# Patient Record
Sex: MALE | Race: AMERICAN INDIAN OR ALASKA NATIVE | Employment: OTHER | URBAN - METROPOLITAN AREA
[De-identification: names, ages, dates, MRNs, and addresses within clinical notes are randomized per-mention and may not be internally consistent; named-entity substitution may affect disease eponyms.]

---

## 2017-01-04 ENCOUNTER — OFFICE VISIT (OUTPATIENT)
Dept: PAIN MANAGEMENT | Age: 67
End: 2017-01-04

## 2017-01-04 VITALS
BODY MASS INDEX: 34.67 KG/M2 | SYSTOLIC BLOOD PRESSURE: 133 MMHG | WEIGHT: 270 LBS | HEART RATE: 60 BPM | DIASTOLIC BLOOD PRESSURE: 89 MMHG

## 2017-01-04 DIAGNOSIS — M54.42 CHRONIC BILATERAL LOW BACK PAIN WITH BILATERAL SCIATICA: ICD-10-CM

## 2017-01-04 DIAGNOSIS — G89.29 CHRONIC BILATERAL LOW BACK PAIN WITH BILATERAL SCIATICA: ICD-10-CM

## 2017-01-04 DIAGNOSIS — M54.41 CHRONIC BILATERAL LOW BACK PAIN WITH BILATERAL SCIATICA: ICD-10-CM

## 2017-01-04 DIAGNOSIS — G60.9 IDIOPATHIC PERIPHERAL NEUROPATHY: ICD-10-CM

## 2017-01-04 DIAGNOSIS — M51.36 DDD (DEGENERATIVE DISC DISEASE), LUMBAR: Primary | ICD-10-CM

## 2017-01-04 DIAGNOSIS — Z98.890 H/O LUMBOSACRAL SPINE SURGERY: ICD-10-CM

## 2017-01-04 DIAGNOSIS — M47.896 OTHER OSTEOARTHRITIS OF SPINE, LUMBAR REGION: ICD-10-CM

## 2017-01-04 DIAGNOSIS — G89.4 CHRONIC PAIN SYNDROME: ICD-10-CM

## 2017-01-04 RX ORDER — OXYCODONE HCL 10 MG/1
10 TABLET, FILM COATED, EXTENDED RELEASE ORAL EVERY 8 HOURS
Qty: 90 TAB | Refills: 0 | Status: SHIPPED | OUTPATIENT
Start: 2017-01-20 | End: 2017-03-02 | Stop reason: SDUPTHER

## 2017-01-04 RX ORDER — OXYCODONE HCL 10 MG/1
10 TABLET, FILM COATED, EXTENDED RELEASE ORAL EVERY 8 HOURS
Qty: 90 TAB | Refills: 0 | Status: SHIPPED | OUTPATIENT
Start: 2017-02-19 | End: 2017-03-02 | Stop reason: SDUPTHER

## 2017-01-04 NOTE — PROGRESS NOTES
HISTORY OF PRESENT ILLNESS  Lety Samson is a 74 y.o. male. HPI Mr. Tiffany Romeo returns today for followup of chronic low back and leg pain s/p work-related injury and h/o multiple lumbar surgeries and interventional procedures, including L4-S1 fusion, ESIs, MB RFAs, and spinal cord stimulator trial. Good improvement with Tens unit. He continues unchanged since last visit. He was reporting some mild worsening low back pain during his last visit. We ordered a lumbar CT scan which was reviewed and discussed in office today. He has been through several lumbar surgeries and interventional procedures including PERNELL's in the past.  He does get good improvement with TENS unit currently. He continues using his hydrocodone very sparingly and has plenty of his medication left over today. I will have him follow-up with Dr. Karina Farris next visit for further evaluation and recommendation. Medication management consists of OxyContin 10 mg TID, Norco 5 bid prn, Lyrica 150 mg TID, and Cymbalta 60 mg/day. He continues using a TENS unit which he describes as the \"best thing\" for his pain. Medications are helping with pain control and quality of life. his pain is 5-6/10 with medication and 10/10 without. Pt describes pain as constant, aching, sharp, tingling and radiating. Aggravating factors include ADLs, while alleviated by medication and activity modification. Current treatment is helping to improve general activity, mood, walking, sleep, enjoyment of life     Pt does report constipation but is well controlled   he is otherwise doing well with no other complaints today. he denies any adverse events including nausea, vomiting, dizziness, increased constipation, hallucinations, or seizures. Medications were brought to visit today. Pill counts were appropriate    PRIOR IMAGIN.  Lumbar CT 2016: Moderate to severe canal narrowing at L1-L2 as result of facet arthropathy, broad-based disc bulge and epidural fat. Postoperative changes from L3-L4 through L5-S1 without evidence of complication. Allergies   Allergen Reactions    Ciprofloxacin Anaphylaxis    Other Plant, Animal, Environmental Other (comments)     Patient cannot have MRI. Patient states that he has metal screws in his left arm.  Lamisil [Terbinafine] Swelling     Tongue swelling    Metformin Other (comments)     Elevated cr 1.4    Morphine Other (comments)     \"loss of blood pressure\"    Other Medication Other (comments)     Doxasylin causes extreme GERD    Pcn [Penicillins] Rash    Pentothal [Thiopental Sodium] Shortness of Breath    Sulfa (Sulfonamide Antibiotics) Rash       Past Surgical History   Procedure Laterality Date    Hx lumbar fusion  1999     fusion from L4-S1 and implantation of hardware    Hx lumbar fusion  1984     fusion on L5 area    Hx orthopaedic Bilateral 2004     trigger finger syndrome-all 4 fingers    Hx orthopaedic Left 2004     left arm torn muscle repair and placement of 2 screws    Hx back surgery  2000     removal of defective hardware    Hx back surgery  1980     to remove bone fragments         Review of Systems   Constitutional: Positive for malaise/fatigue. Negative for chills and fever. HENT: Positive for hearing loss. Respiratory: Negative for cough and shortness of breath. Cardiovascular: Positive for leg swelling. Negative for chest pain and palpitations. Gastrointestinal: Positive for constipation. Negative for diarrhea, heartburn, nausea and vomiting. Musculoskeletal: Positive for back pain and joint pain. Negative for falls. Skin: Negative for rash. Neurological: Negative for dizziness. Psychiatric/Behavioral: Positive for depression. Negative for suicidal ideas. The patient is nervous/anxious and has insomnia. Physical Exam   Constitutional: He is oriented to person, place, and time. He appears well-developed and well-nourished. No distress.    HENT:   Head: Normocephalic and atraumatic. Pulmonary/Chest: Effort normal. No respiratory distress. Neurological: He is alert and oriented to person, place, and time. Skin: Skin is warm and dry. He is not diaphoretic. Psychiatric: He has a normal mood and affect. His speech is normal. Cognition and memory are normal.   Nursing note and vitals reviewed. ASSESSMENT:    1. DDD (degenerative disc disease), lumbar    2. Other osteoarthritis of spine, lumbar region    3. Chronic pain syndrome    4. Chronic bilateral low back pain with bilateral sciatica    5. H/O lumbosacral spine surgery    6. Idiopathic peripheral neuropathy         [place] Prescription Monitoring Program was reviewed which does not demonstrate aberrancies and/or inconsistencies with regard to the historical prescribing of controlled medications to this patient by other providers. PLAN / Pt Instructions:  1. Continue current plan with no evidence of addiction or diversion. Stable on current medication without adverse events. 2. Continue hydrocodone 5/325 mg up to 2 times a day as needed for pain. Currently uses very sparingly and has plenty of medication left over. 3. Continue Cymbalta 60 mg once daily. Has 2 refills left  4. Refill OxyContin 10 mg 3 times daily. 5. Continue Lyrica 150 mg capsule 3 times daily. Has 2 refills left  6. Discussed risks of addiction, dependency, and opioid induced hyperalgesia. 7. Return to clinic in 2 months with Dr. Jaun Donnelly for physician oversight visit. Medications Ordered Today   Medications    oxyCODONE ER (OXYCONTIN) 10 mg ER tablet     Sig: Take 1 Tab by mouth every eight (8) hours for 30 days. Max Daily Amount: 30 mg. Dispense:  90 Tab     Refill:  0    oxyCODONE ER (OXYCONTIN) 10 mg ER tablet     Sig: Take 1 Tab by mouth every eight (8) hours for 30 days. Max Daily Amount: 30 mg.      Dispense:  90 Tab     Refill:  0       Pain medications prescribed with the objective of pain relief and improved physical and psychosocial function in this patient. Spent 25 minutes with patient today reviewing the treatment plan, goals of treatment plan, and limitations of the treatment plan, to include the potential for side effects from medications and procedures. Jorja Crigler, [place] 1/4/2017     Note: Please excuse any typographical errors. Voice recognition software was used for this note and may cause mistakes.

## 2017-01-04 NOTE — PATIENT INSTRUCTIONS
1. Continue current plan with no evidence of addiction or diversion. Stable on current medication without adverse events. 2. Continue hydrocodone 5/325 mg up to 2 times a day as needed for pain. Currently uses very sparingly and has plenty of medication left over. 3. Continue Cymbalta 60 mg once daily. Has 2 refills left  4. Refill OxyContin 10 mg 3 times daily. 5. Continue Lyrica 150 mg capsule 3 times daily. Has 2 refills left  6. Discussed risks of addiction, dependency, and opioid induced hyperalgesia. 7. Return to clinic in 2 months with Dr. Hassan for physician oversight visit.

## 2017-01-04 NOTE — PROGRESS NOTES
Nursing Notes    Patient presents to the office today in follow-up. Patient rates his pain at 8/10 on the numerical pain scale. Reviewed medications with counts as follows:    Rx Date filled Qty Dispensed Pill Count Last Dose Short   norco 5/325mg 09/19/16 30 62 unknown No    oxycontin 10mg CR 12/21/16 90 52 This am  No                                     Comments:     POC UDS was not performed in office today    Any new labs or imaging since last appointment? NO    Have you been to an emergency room (ER) or urgent care clinic since your last visit? NO            Have you been hospitalized since your last visit? NO     If yes, where, when, and reason for visit? Have you seen or consulted any other health care providers outside of the [address] [place] [address]  since your last visit? YES     If yes, where, when, and reason for visit? Cardiology     HM deferred to pcp.

## 2017-01-04 NOTE — MR AVS SNAPSHOT
Visit Information Date & Time Provider Department Dept. Phone Encounter #  
 1/4/2017  9:40 AM PAUL Baker [facility] [phone] [id number] Follow-up Instructions Return in about 2 months (around 3/4/2017). Your Appointments 1/25/2017  9:30 AM  
ROUTINE CARE with Leeroy Anderson MD  
[facility] Appt Note: 6 mo fu  
 [address] Pending sale to [facility] [address] [facility] [address] [facility]  
  
    
 2/20/2017 10:20 AM  
Follow Up with Carmelita Mario MD  
[facility] ([facility]) Appt Note: 6 month follow up [place] [address] [address] [address]  
[phone] [place]  
  
    
 3/9/2017  9:15 AM  
Office Visit with Vangie Ocampo MD  
[facility]) Appt Note: check up [address] [facility] [address] [facility] [address]  
[phone] [address] [facility] [address] Upcoming Health Maintenance Date Due INFLUENZA AGE 9 TO ADULT 8/1/2016 HEMOGLOBIN A1C Q6M 10/14/2016 FOOT EXAM Q1 11/1/2016 MEDICARE YEARLY EXAM 4/21/2017 EYE EXAM RETINAL OR DILATED Q1 4/26/2017 MICROALBUMIN Q1 7/19/2017 LIPID PANEL Q1 7/19/2017 Pneumococcal 65+ Low/Medium Risk (2 of 2 - PPSV23) 7/20/2017 GLAUCOMA SCREENING Q2Y 4/26/2018 COLONOSCOPY 10/19/2023 DTaP/Tdap/Td series (2 - Td) 5/16/2026 Allergies as of 1/4/2017  Review Complete On: 1/4/2017 By: PAUL Baker Severity Noted Reaction Type Reactions Ciprofloxacin High 11/16/2013    Anaphylaxis Other Plant, Animal, Environmental High 03/10/2016    Other (comments) Patient cannot have MRI. Patient states that he has metal screws in his left arm. Lamisil [Terbinafine]  10/18/2013    Swelling Tongue swelling Metformin  03/16/2016    Other (comments) Elevated cr 1.4 Morphine  10/18/2013    Other (comments) \"loss of blood pressure\" Other Medication  10/18/2013    Other (comments) Doxasylin causes extreme GERD Pcn [Penicillins]  10/18/2013    Rash Pentothal [Thiopental Sodium]  10/18/2013    Shortness of Breath Sulfa (Sulfonamide Antibiotics)  10/18/2013    Rash Current Immunizations  Reviewed on 7/20/2016 Name Date Influenza Vaccine 11/3/2014 Influenza Vaccine (Quad) PF 9/16/2015 Pneumococcal Conjugate (PCV-13) 7/20/2016 Tdap 5/16/2016 Not reviewed this visit You Were Diagnosed With   
  
 Codes Comments DDD (degenerative disc disease), lumbar    -  Primary ICD-10-CM: M51.36 
ICD-9-CM: 722.52 Other osteoarthritis of spine, lumbar region     ICD-10-CM: M47.896 Chronic pain syndrome     ICD-10-CM: G89.4 ICD-9-CM: 338. 4 Chronic bilateral low back pain with bilateral sciatica     ICD-10-CM: M54.42, M54.41, G89.29 ICD-9-CM: 724.2, 724.3, 338.29   
 H/O lumbosacral spine surgery     ICD-10-CM: Z98.890 ICD-9-CM: V15.29 Idiopathic peripheral neuropathy     ICD-10-CM: G60.9 ICD-9-CM: 356.9 Vitals BP Pulse Weight(growth percentile) BMI Smoking Status 133/89 (BP 1 Location: Right arm, BP Patient Position: Sitting) 60 270 lb (122.5 kg) 34.67 kg/m2 Former Smoker Vitals History BMI and BSA Data Body Mass Index Body Surface Area  
 34.67 kg/m 2 2.53 m 2 Preferred Pharmacy Pharmacy Name Phone [facility] [address] [place] [address] [phone] Your Updated Medication List  
  
   
This list is accurate as of: 1/4/17 10:03 AM.  Always use your most recent med list.  
  
  
  
  
 ACULAR 0.5 % ophthalmic solution Generic drug:  ketorolac Administer 1 Drop to both eyes two (2) times a day. albuterol 2.5 mg /3 mL (0.083 %) nebulizer solution Commonly known as:  PROVENTIL VENTOLIN  
3 mL by Nebulization route every four (4) hours as needed for Wheezing or Shortness of Breath. atorvastatin 20 mg tablet Commonly known as:  LIPITOR Take 1 Tab by mouth daily. Azelastine 0.15 % (205.5 mcg) nasal spray Commonly known as:  ASTEPRO  
1 Spray by Both Nostrils route two (2) times daily as needed. budesonide-formoterol 160-4.5 mcg/actuation HFA inhaler Commonly known as:  SYMBICORT Take 2 Puffs by inhalation two (2) times a day. Dexlansoprazole 60 mg Cpdb Take 60 mg by mouth daily. fenofibrate nanocrystallized 145 mg tablet Commonly known as:  [employer] Take 1 Tab by mouth daily. FIORICET -40 mg per tablet Generic drug:  butalbital-acetaminophen-caffeine Take 0.5 Tabs by mouth daily as needed for Pain. fluticasone 50 mcg/actuation nasal spray Commonly known as:  FLONASE  
USE ONE SPRAY IN EACH NOSTRIL ONCE DAILY HYDROcodone-acetaminophen 5-325 mg per tablet Commonly known as:  Keagan Ill Take 1 Tab by mouth daily as needed for Pain. insulin glargine 100 unit/mL (3 mL) pen Commonly known as:  LANTUS SOLOSTAR  
30 units every night. Please dispense niddle for lantus solostart pen to use once daily . insulin glulisine 100 unit/mL pen Commonly known as:  APIDRA SOLOSTAR  
2 units per carb consumed Insulin Needles (Disposable) 31 gauge x 5/16\" Ndle Use one for lantus flexipen and 3 for apidra with each meal.  
  
 JANUVIA 50 mg tablet Generic drug:  SITagliptin TAKE ONE TABLET BY MOUTH ONCE DAILY  
  
 levothyroxine 150 mcg tablet Commonly known as:  SYNTHROID  
TAKE ONE TABLET BY MOUTH ONCE DAILY BEFORE BREAKFAST  
  
 lisinopril 5 mg tablet Commonly known as:  PRINIVIL, ZESTRIL  
TAKE 1 TABLET BY MOUTH ONCE DAILY  
  
 metoprolol tartrate 25 mg tablet Commonly known as:  LOPRESSOR  
 Take 1 Tab by mouth two (2) times a day. * montelukast 10 mg tablet Commonly known as:  SINGULAIR Take 1 Tab by mouth daily. * montelukast 10 mg tablet Commonly known as:  SINGULAIR  
TAKE ONE TABLET BY MOUTH ONCE DAILY  
  
 * oxyCODONE ER 10 mg ER tablet Commonly known as:  OxyCONTIN Take 1 Tab by mouth every eight (8) hours for 30 days. Max Daily Amount: 30 mg. Start taking on:  1/20/2017 * oxyCODONE ER 10 mg ER tablet Commonly known as:  OxyCONTIN Take 1 Tab by mouth every eight (8) hours for 30 days. Max Daily Amount: 30 mg. Start taking on:  2/19/2017  
  
 pregabalin 150 mg capsule Commonly known as:  Edman Speaker TAKE 1 CAPSULE BY MOUTH THREE TIMES DAILY FOR 30 DAYS. MAXIMUM 3 CAPSULES DAILY.  
  
 raNITIdine 150 mg tablet Commonly known as:  ZANTAC Take 1 Tab by mouth daily. tamsulosin 0.4 mg capsule Commonly known as:  FLOMAX Take 1 Cap by mouth two (2) times a day. tiotropium 18 mcg inhalation capsule Commonly known as:  Theotis Muss Take 1 Cap by inhalation daily. * Notice: This list has 4 medication(s) that are the same as other medications prescribed for you. Read the directions carefully, and ask your doctor or other care provider to review them with you. Prescriptions Printed Refills  
 oxyCODONE ER (OXYCONTIN) 10 mg ER tablet 0 Starting on: 1/20/2017 Sig: Take 1 Tab by mouth every eight (8) hours for 30 days. Max Daily Amount: 30 mg.  
 Class: Print Route: Oral  
 oxyCODONE ER (OXYCONTIN) 10 mg ER tablet 0 Starting on: 2/19/2017 Sig: Take 1 Tab by mouth every eight (8) hours for 30 days. Max Daily Amount: 30 mg.  
 Class: Print Route: Oral  
  
Follow-up Instructions Return in about 2 months (around 3/4/2017). Patient Instructions 1. Continue current plan with no evidence of addiction or diversion. Stable on current medication without adverse events. 2. Continue hydrocodone 5/325 mg up to 2 times a day as needed for pain. Currently uses very sparingly and has plenty of medication left over. 3. Continue Cymbalta 60 mg once daily. Has 2 refills left 4. Refill OxyContin 10 mg 3 times daily. 5. Continue Lyrica 150 mg capsule 3 times daily. Has 2 refills left 6. Discussed risks of addiction, dependency, and opioid induced hyperalgesia. 7. Return to clinic in 2 months with Dr. Eliz Clements for physician oversight visit. Introducing [facility]! [facility] introduces [employer] patient portal. Now you can access parts of your medical record, email your doctor's office, and request medication refills online. 1. In your internet browser, go to [email or web address] 2. Click on the First Time User? Click Here link in the Sign In box. You will see the New Member Sign Up page. 3. Enter your [employer] Access Code exactly as it appears below. You will not need to use this code after youve completed the sign-up process. If you do not sign up before the expiration date, you must request a new code. · [employer] Access Code: [email or web address] Expires: 2/7/2017 10:06 AM 
 
4. Enter the last four digits of your Social Security Number (xxxx) and Date of Birth (mm/dd/yyyy) as indicated and click Submit. You will be taken to the next sign-up page. 5. Create a [employer] ID. This will be your [employer] login ID and cannot be changed, so think of one that is secure and easy to remember. 6. Create a [employer] password. You can change your password at any time. 7. Enter your Password Reset Question and Answer. This can be used at a later time if you forget your password. 8. Enter your e-mail address. You will receive e-mail notification when new information is available in [address] 9. Click Sign Up. You can now view and download portions of your medical record.  
10. Click the Download Summary menu link to download a portable copy of your medical information. If you have questions, please visit the Frequently Asked Questions section of the [employer] website. Remember, [employer] is NOT to be used for urgent needs. For medical emergencies, dial 911. Now available from your iPhone and Android! Please provide this summary of care documentation to your next provider. Your primary care clinician is listed as Henrik Serna. If you have any questions after today's visit, please call [phone].

## 2017-01-06 DIAGNOSIS — E13.9 DIABETES 1.5, MANAGED AS TYPE 2 (HCC): ICD-10-CM

## 2017-01-06 RX ORDER — INSULIN GLARGINE 100 [IU]/ML
INJECTION, SOLUTION SUBCUTANEOUS
Qty: 3 EACH | Refills: 3 | Status: SHIPPED | OUTPATIENT
Start: 2017-01-06 | End: 2017-04-24 | Stop reason: ALTCHOICE

## 2017-01-19 ENCOUNTER — HOSPITAL ENCOUNTER (OUTPATIENT)
Dept: LAB | Age: 67
Discharge: HOME | End: 2017-01-19
Payer: MEDICARE

## 2017-01-19 LAB
ALBUMIN SERPL BCP-MCNC: 3.7 G/DL (ref 3.4–5)
ALBUMIN/GLOB SERPL: 1.3 {RATIO} (ref 0.8–1.7)
ALP SERPL-CCNC: 51 U/L (ref 45–117)
ALT SERPL-CCNC: 30 U/L (ref 16–61)
ANION GAP BLD CALC-SCNC: 9 MMOL/L (ref 3–18)
AST SERPL W P-5'-P-CCNC: 14 U/L (ref 15–37)
BILIRUB SERPL-MCNC: 0.3 MG/DL (ref 0.2–1)
BUN SERPL-MCNC: 20 MG/DL (ref 7–18)
BUN/CREAT SERPL: 19 (ref 12–20)
CALCIUM SERPL-MCNC: 8.9 MG/DL (ref 8.5–10.1)
CHLORIDE SERPL-SCNC: 100 MMOL/L (ref 100–108)
CHOLEST SERPL-MCNC: 158 MG/DL
CO2 SERPL-SCNC: 30 MMOL/L (ref 21–32)
CREAT SERPL-MCNC: 1.08 MG/DL (ref 0.6–1.3)
EST. AVERAGE GLUCOSE BLD GHB EST-MCNC: 203 MG/DL
GLOBULIN SER CALC-MCNC: 2.9 G/DL (ref 2–4)
GLUCOSE SERPL-MCNC: 198 MG/DL (ref 74–99)
HBA1C MFR BLD: 8.7 % (ref 4.2–5.6)
HDLC SERPL-MCNC: 33 MG/DL (ref 40–60)
HDLC SERPL: 4.8 {RATIO} (ref 0–5)
LDLC SERPL CALC-MCNC: 77 MG/DL (ref 0–100)
LIPID PROFILE,FLP: ABNORMAL
POTASSIUM SERPL-SCNC: 4.2 MMOL/L (ref 3.5–5.5)
PROT SERPL-MCNC: 6.6 G/DL (ref 6.4–8.2)
SODIUM SERPL-SCNC: 139 MMOL/L (ref 136–145)
TRIGL SERPL-MCNC: 240 MG/DL (ref ?–150)
TSH SERPL DL<=0.05 MIU/L-ACNC: 1.16 UIU/ML (ref 0.36–3.74)
VLDLC SERPL CALC-MCNC: 48 MG/DL

## 2017-01-19 PROCEDURE — 83036 HEMOGLOBIN GLYCOSYLATED A1C: CPT | Performed by: FAMILY MEDICINE

## 2017-01-19 PROCEDURE — 84443 ASSAY THYROID STIM HORMONE: CPT | Performed by: FAMILY MEDICINE

## 2017-01-19 PROCEDURE — 36415 COLL VENOUS BLD VENIPUNCTURE: CPT | Performed by: FAMILY MEDICINE

## 2017-01-19 PROCEDURE — 80061 LIPID PANEL: CPT | Performed by: FAMILY MEDICINE

## 2017-01-19 PROCEDURE — 80053 COMPREHEN METABOLIC PANEL: CPT | Performed by: FAMILY MEDICINE

## 2017-01-20 NOTE — PROGRESS NOTES
Let pt know that need to come in to discuss diabetes test. It has gone up. Need to make adjustment in treatment. Will discuss lab result during follow up visit. Thanks.

## 2017-01-25 ENCOUNTER — OFFICE VISIT (OUTPATIENT)
Dept: FAMILY MEDICINE CLINIC | Age: 67
End: 2017-01-25

## 2017-01-25 VITALS
WEIGHT: 271.8 LBS | OXYGEN SATURATION: 97 % | BODY MASS INDEX: 34.88 KG/M2 | HEIGHT: 74 IN | TEMPERATURE: 99 F | DIASTOLIC BLOOD PRESSURE: 74 MMHG | SYSTOLIC BLOOD PRESSURE: 120 MMHG | HEART RATE: 94 BPM | RESPIRATION RATE: 16 BRPM

## 2017-01-25 DIAGNOSIS — R25.1 SHAKINESS: ICD-10-CM

## 2017-01-25 DIAGNOSIS — E11.65 POORLY CONTROLLED DIABETES MELLITUS (HCC): Primary | ICD-10-CM

## 2017-01-25 DIAGNOSIS — R00.0 TACHYCARDIA: ICD-10-CM

## 2017-01-25 DIAGNOSIS — E78.1 HYPERTRIGLYCERIDEMIA: ICD-10-CM

## 2017-01-25 DIAGNOSIS — G43.909 MIGRAINE WITHOUT STATUS MIGRAINOSUS, NOT INTRACTABLE, UNSPECIFIED MIGRAINE TYPE: ICD-10-CM

## 2017-01-25 DIAGNOSIS — R13.10 DYSPHAGIA, UNSPECIFIED TYPE: ICD-10-CM

## 2017-01-25 DIAGNOSIS — J44.9 CHRONIC OBSTRUCTIVE PULMONARY DISEASE, UNSPECIFIED COPD TYPE (HCC): ICD-10-CM

## 2017-01-25 DIAGNOSIS — R79.89 LOW TSH LEVEL: ICD-10-CM

## 2017-01-25 DIAGNOSIS — Z23 ENCOUNTER FOR IMMUNIZATION: ICD-10-CM

## 2017-01-25 DIAGNOSIS — K21.9 GASTROESOPHAGEAL REFLUX DISEASE WITHOUT ESOPHAGITIS: ICD-10-CM

## 2017-01-25 RX ORDER — METOPROLOL TARTRATE 25 MG/1
25 TABLET, FILM COATED ORAL 2 TIMES DAILY
Qty: 180 TAB | Refills: 0 | Status: SHIPPED | OUTPATIENT
Start: 2017-01-25 | End: 2017-05-24 | Stop reason: SDUPTHER

## 2017-01-25 RX ORDER — LEVOTHYROXINE SODIUM 150 UG/1
90 TABLET ORAL
Qty: 90 TAB | Refills: 1 | Status: SHIPPED | OUTPATIENT
Start: 2017-01-25 | End: 2017-01-25

## 2017-01-25 RX ORDER — FENOFIBRATE 145 MG/1
145 TABLET, COATED ORAL DAILY
Qty: 90 TAB | Refills: 1 | Status: SHIPPED | OUTPATIENT
Start: 2017-01-25 | End: 2017-05-24 | Stop reason: SDUPTHER

## 2017-01-25 RX ORDER — LEVOTHYROXINE SODIUM 150 UG/1
150 TABLET ORAL
Qty: 90 TAB | Refills: 1 | Status: SHIPPED | OUTPATIENT
Start: 2017-01-25 | End: 2017-05-24 | Stop reason: SDUPTHER

## 2017-01-25 RX ORDER — LISINOPRIL 5 MG/1
5 TABLET ORAL DAILY
Qty: 90 TAB | Refills: 1 | Status: SHIPPED | OUTPATIENT
Start: 2017-01-25 | End: 2017-05-24 | Stop reason: SDUPTHER

## 2017-01-25 RX ORDER — ATORVASTATIN CALCIUM 20 MG/1
20 TABLET, FILM COATED ORAL DAILY
Qty: 90 TAB | Refills: 1 | Status: SHIPPED | OUTPATIENT
Start: 2017-01-25 | End: 2017-05-24 | Stop reason: SDUPTHER

## 2017-01-25 RX ORDER — ESOMEPRAZOLE MAGNESIUM 40 MG/1
40 CAPSULE, DELAYED RELEASE ORAL 2 TIMES DAILY
COMMUNITY
Start: 2016-11-14 | End: 2020-09-16

## 2017-01-25 RX ORDER — DULOXETIN HYDROCHLORIDE 60 MG/1
60 CAPSULE, DELAYED RELEASE ORAL
COMMUNITY
Start: 2016-12-27 | End: 2017-06-19

## 2017-01-25 RX ORDER — BUTALBITAL, ACETAMINOPHEN AND CAFFEINE 50; 325; 40 MG/1; MG/1; MG/1
0.5 TABLET ORAL
Qty: 10 TAB | Refills: 0 | Status: SHIPPED | OUTPATIENT
Start: 2017-01-25 | End: 2018-11-13

## 2017-01-25 RX ORDER — BUTALBITAL, ACETAMINOPHEN AND CAFFEINE 50; 325; 40 MG/1; MG/1; MG/1
0.5 TABLET ORAL
Status: CANCELLED | OUTPATIENT
Start: 2017-01-25

## 2017-01-25 RX ORDER — BETAMETHASONE DIPROPIONATE 0.5 MG/G
OINTMENT TOPICAL
COMMUNITY
Start: 2017-01-05 | End: 2022-09-14

## 2017-01-25 NOTE — PROGRESS NOTES
1. Have you been to the ER, urgent care clinic since your last visit? Hospitalized since your last visit? No    2. Have you seen or consulted any other health care providers outside of the [facility] since your last visit? Include any pap smears or colon screening. [facility], LOV: two weeks ago for dm foot exam.    Patient has not had flu vaccine; requests today if possible.

## 2017-01-25 NOTE — PROGRESS NOTES
Verbal order for Influenza vaccine    Patient given Influenza vaccine in L deltoid. Patient tolerated well. No adverse effects noted.     Lot [id number]  Exp 6/30/2017  Mtg [address]  [id number]

## 2017-01-25 NOTE — MR AVS SNAPSHOT
Visit Information Date & Time Provider Department Dept. Phone Encounter #  
 1/25/2017  9:30 AM Abigail Merchant, [address] [id number] Follow-up Instructions Return in about 1 month (around 2/25/2017). Your Appointments 2/20/2017 10:20 AM  
Follow Up with Bard Lucie MD  
[facility] ([facility]) Appt Note: 6 month follow up [place] [id number]  
[phone] [address] [address] [address]  
  
    
 3/2/2017  9:30 AM  
Follow Up with Adonay Nam MD  
[address] [facility]) Appt Note: return in 2 months [address] [facility] [address]  
[phone] [address]  
  
    
 3/9/2017  9:15 AM  
Office Visit with Nesha Ramos MD  
[facility]) Appt Note: check up [address] [facility] [address] [address]  
[phone] [address] [place] [id number] Upcoming Health Maintenance Date Due INFLUENZA AGE 9 TO ADULT 8/1/2016 FOOT EXAM Q1 11/1/2016 MEDICARE YEARLY EXAM 4/21/2017 EYE EXAM RETINAL OR DILATED Q1 4/26/2017 HEMOGLOBIN A1C Q6M 7/19/2017 MICROALBUMIN Q1 7/19/2017 Pneumococcal 65+ Low/Medium Risk (2 of 2 - PPSV23) 7/20/2017 LIPID PANEL Q1 1/19/2018 GLAUCOMA SCREENING Q2Y 4/26/2018 COLONOSCOPY 10/19/2023 DTaP/Tdap/Td series (2 - Td) 5/16/2026 Allergies as of 1/25/2017  Review Complete On: 1/25/2017 By: Abigail Merchant MD  
  
 Severity Noted Reaction Type Reactions Ciprofloxacin High 11/16/2013    Anaphylaxis Other Plant, Animal, Environmental High 03/10/2016    Other (comments) Patient cannot have MRI. Patient states that he has metal screws in his left arm. Lamisil [Terbinafine]  10/18/2013    Swelling Tongue swelling Metformin  03/16/2016    Other (comments) Elevated cr 1.4 Morphine  10/18/2013    Other (comments) \"loss of blood pressure\" Other Medication  10/18/2013    Other (comments) Doxasylin causes extreme GERD Pcn [Penicillins]  10/18/2013    Rash Pentothal [Thiopental Sodium]  10/18/2013    Shortness of Breath Sulfa (Sulfonamide Antibiotics)  10/18/2013    Rash Current Immunizations  Reviewed on 7/20/2016 Name Date Influenza Vaccine 11/3/2014 Influenza Vaccine (Quad) PF 1/25/2017, 9/16/2015 Pneumococcal Conjugate (PCV-13) 7/20/2016 Tdap 5/16/2016 Not reviewed this visit You Were Diagnosed With   
  
 Codes Comments Poorly controlled diabetes mellitus ([facility]    -  Primary ICD-10-CM: E11.65 ICD-9-CM: 250.00 Hypertriglyceridemia     ICD-10-CM: E78.1 ICD-9-CM: 272.1 Low TSH level     ICD-10-CM: R94.6 ICD-9-CM: 794.5 Shakiness     ICD-10-CM: R25.1 ICD-9-CM: 781.0 Tachycardia     ICD-10-CM: R00.0 ICD-9-CM: 529. 0 Chronic obstructive pulmonary disease, unspecified COPD type ([facility]     ICD-10-CM: J44.9 ICD-9-CM: 289 Gastroesophageal reflux disease without esophagitis     ICD-10-CM: K21.9 ICD-9-CM: 530.81 Dysphagia, unspecified type     ICD-10-CM: R13.10 ICD-9-CM: 787.20 Migraine without status migrainosus, not intractable, unspecified migraine type     ICD-10-CM: G43.909 ICD-9-CM: 346.90 Encounter for immunization     ICD-10-CM: N47 ICD-9-CM: V03.89 Vitals BP Pulse Temp Resp Height(growth percentile) Weight(growth percentile) 120/74 (BP 1 Location: Left arm, BP Patient Position: Sitting) 94 99 °F (37.2 °C) (Oral) 16 6' 2\" (1.88 m) 271 lb 12.8 oz (123.3 kg) SpO2 BMI Smoking Status 97% 34.9 kg/m2 Former Smoker Vitals History BMI and BSA Data Body Mass Index Body Surface Area  34.9 kg/m 2 2.54 m 2  
  
  
 Preferred Pharmacy Pharmacy Name Phone [facility] [address] [place], [address] [phone] Your Updated Medication List  
  
   
This list is accurate as of: 1/25/17 11:26 AM.  Always use your most recent med list.  
  
  
  
  
 ACULAR 0.5 % ophthalmic solution Generic drug:  ketorolac Administer 1 Drop to both eyes two (2) times a day. albuterol 2.5 mg /3 mL (0.083 %) nebulizer solution Commonly known as:  PROVENTIL VENTOLIN  
3 mL by Nebulization route every four (4) hours as needed for Wheezing or Shortness of Breath. atorvastatin 20 mg tablet Commonly known as:  LIPITOR Take 1 Tab by mouth daily. Azelastine 0.15 % (205.5 mcg) nasal spray Commonly known as:  ASTEPRO  
1 Spray by Both Nostrils route two (2) times daily as needed. betamethasone dipropionate 0.05 % ointment Commonly known as:  DIPROLENE  
  
 budesonide-formoterol 160-4.5 mcg/actuation HFA inhaler Commonly known as:  SYMBICORT Take 2 Puffs by inhalation two (2) times a day. butalbital-acetaminophen-caffeine -40 mg per tablet Commonly known as:  Laveda Forge Take 0.5 Tabs by mouth daily as needed for Pain. DULoxetine 60 mg capsule Commonly known as:  CYMBALTA Take 60 mg by mouth nightly. esomeprazole 40 mg capsule Commonly known as:  Leonette Chiquito Take 40 mg by mouth daily. fenofibrate nanocrystallized 145 mg tablet Commonly known as:  [employer] Take 1 Tab by mouth daily. fluticasone 50 mcg/actuation nasal spray Commonly known as:  FLONASE  
USE ONE SPRAY IN EACH NOSTRIL ONCE DAILY HYDROcodone-acetaminophen 5-325 mg per tablet Commonly known as:  Rolinda Doles Take 1 Tab by mouth daily as needed for Pain. insulin glulisine 100 unit/mL pen Commonly known as:  APIDRA SOLOSTAR  
2 units per carb consumed Insulin Needles (Disposable) 31 gauge x 5/16\" Ndle Use one for lantus flexipen and 3 for apidra with each meal.  
  
 LANTUS SOLOSTAR 100 unit/mL (3 mL) pen Generic drug:  insulin glargine INJECT 30 UNITS SUBCUTANEOUSLY EVERY NIGHT  
  
 levothyroxine 150 mcg tablet Commonly known as:  SYNTHROID Take 1 Tab by mouth Daily (before breakfast). lisinopril 5 mg tablet Commonly known as:  Heather Primmer Take 1 Tab by mouth daily. metoprolol tartrate 25 mg tablet Commonly known as:  LOPRESSOR Take 1 Tab by mouth two (2) times a day. * montelukast 10 mg tablet Commonly known as:  SINGULAIR Take 1 Tab by mouth daily. * montelukast 10 mg tablet Commonly known as:  SINGULAIR  
TAKE ONE TABLET BY MOUTH ONCE DAILY  
  
 * oxyCODONE ER 10 mg ER tablet Commonly known as:  OxyCONTIN Take 1 Tab by mouth every eight (8) hours for 30 days. Max Daily Amount: 30 mg.  
  
 * oxyCODONE ER 10 mg ER tablet Commonly known as:  OxyCONTIN Take 1 Tab by mouth every eight (8) hours for 30 days. Max Daily Amount: 30 mg. Start taking on:  2/19/2017  
  
 pregabalin 150 mg capsule Commonly known as:  Ban Crosser TAKE 1 CAPSULE BY MOUTH THREE TIMES DAILY FOR 30 DAYS. MAXIMUM 3 CAPSULES DAILY.  
  
 raNITIdine 150 mg tablet Commonly known as:  ZANTAC Take 1 Tab by mouth daily. SITagliptin 50 mg tablet Commonly known as:  Loc Oats Take 1 Tab by mouth daily. tamsulosin 0.4 mg capsule Commonly known as:  FLOMAX Take 1 Cap by mouth two (2) times a day. tiotropium 18 mcg inhalation capsule Commonly known as:  Lesley Meeter Take 1 Cap by inhalation daily. * Notice: This list has 4 medication(s) that are the same as other medications prescribed for you. Read the directions carefully, and ask your doctor or other care provider to review them with you. Prescriptions Printed  Refills  
 butalbital-acetaminophen-caffeine (FIORICET, ESGIC) -40 mg per tablet 0  
 Sig: Take 0.5 Tabs by mouth daily as needed for Pain. Class: Print Route: Oral  
  
Prescriptions Sent to Pharmacy Refills  
 atorvastatin (LIPITOR) 20 mg tablet 1 Sig: Take 1 Tab by mouth daily. Class: Normal  
 Pharmacy: [address] [place] [address] [address] [address] Ph #: [phone] Route: Oral  
 fenofibrate nanocrystallized (TRICOR) 145 mg tablet 1 Sig: Take 1 Tab by mouth daily. Class: Normal  
 Pharmacy: [address] [place] [address] [address] [address] Ph #: [phone] Route: Oral  
 SITagliptin (JANUVIA) 50 mg tablet 1 Sig: Take 1 Tab by mouth daily. Class: Normal  
 Pharmacy: [address] [place] [address] [address] Ph #: [phone] Route: Oral  
 lisinopril (PRINIVIL, ZESTRIL) 5 mg tablet 1 Sig: Take 1 Tab by mouth daily. Class: Normal  
 Pharmacy: [address] [place] [address] [address] [address] Ph #: [phone] Route: Oral  
 metoprolol tartrate (LOPRESSOR) 25 mg tablet 0 Sig: Take 1 Tab by mouth two (2) times a day. Class: Normal  
 Pharmacy: [address] [place] [address] [address] [address] Ph #: [phone] Route: Oral  
 levothyroxine (SYNTHROID) 150 mcg tablet 1 Sig: Take 1 Tab by mouth Daily (before breakfast). Class: Normal  
 Pharmacy: [address] [place] [address] [address] [address] Ph #: [phone] Route: Oral  
  
We Performed the Following INFLUENZA VIRUS VAC QUAD,SPLIT,PRESV FREE SYRINGE 3/> YRS IM [id number] CPT(R)] REFERRAL TO ENDOCRINOLOGY [AMT08 Custom] Comments:  
 Please evaluate patient for poorly controlled diabetes Follow-up Instructions Return in about 1 month (around 2/25/2017). Referral Information Referral ID Referred By Referred To  
  
 [id number] [facility] R Not Available Visits Status Start Date End Date 1 New Request 1/25/17 1/25/18 If your referral has a status of pending review or denied, additional information will be sent to support the outcome of this decision. Patient Instructions Chronic Obstructive Pulmonary Disease (COPD): Care Instructions Your Care Instructions Chronic obstructive pulmonary disease (COPD) is a general term for a group of lung diseases, including emphysema and chronic bronchitis. People with COPD have decreased airflow in and out of the lungs, which makes it hard to breathe. The airways also can get clogged with thick mucus. Cigarette smoking is a major cause of COPD. Although there is no cure for COPD, you can slow its progress. Following your treatment plan and taking care of yourself can help you feel better and live longer. Follow-up care is a key part of your treatment and safety. Be sure to make and go to all appointments, and call your doctor if you are having problems. It's also a good idea to know your test results and keep a list of the medicines you take. How can you care for yourself at home? Staying healthy · Do not smoke. This is the most important step you can take to prevent more damage to your lungs. If you need help quitting, talk to your doctor about stop-smoking programs and medicines. These can increase your chances of quitting for good. · Avoid colds and flu. Get a pneumococcal vaccine shot. If you have had one before, ask your doctor whether you need a second dose. Get the flu vaccine every fall. If you must be around people with colds or the flu, wash your hands often. · Avoid secondhand smoke, air pollution, and high altitudes. Also avoid cold, dry air and hot, humid air. Stay at home with your windows closed when air pollution is bad. Medicines and oxygen therapy · Take your medicines exactly as prescribed. Call your doctor if you think you are having a problem with your medicine. · You may be taking medicines such as: ¨ Bronchodilators. These help open your airways and make breathing easier. Bronchodilators are either short-acting (work for 6 to 9 hours) or long-acting (work for 24 hours). You inhale most bronchodilators, so they start to act quickly. Always carry your quick-relief inhaler with you in case you need it while you are away from home. ¨ Corticosteroids (prednisone, budesonide). These reduce airway inflammation. They come in pill or inhaled form. You must take these medicines every day for them to work well. · A spacer may help you get more inhaled medicine to your lungs. Ask your doctor or pharmacist if a spacer is right for you. If it is, ask how to use it properly. · Do not take any vitamins, over-the-counter medicine, or herbal products without talking to your doctor first. 
· If your doctor prescribed antibiotics, take them as directed. Do not stop taking them just because you feel better. You need to take the full course of antibiotics. · Oxygen therapy boosts the amount of oxygen in your blood and helps you breathe easier. Use the flow rate your doctor has recommended, and do not change it without talking to your doctor first. 
Activity · Get regular exercise. Walking is an easy way to get exercise. Start out slowly, and walk a little more each day. · Pay attention to your breathing. You are exercising too hard if you cannot talk while you are exercising. · Take short rest breaks when doing household chores and other activities. · Learn breathing methodssuch as breathing through pursed lipsto help you become less short of breath. · If your doctor has not set you up with a pulmonary rehabilitation program, talk to him or her about whether rehab is right for you. Rehab includes exercise programs, education about your disease and how to manage it, help with diet and other changes, and emotional support. Diet · Eat regular, healthy meals.  Use bronchodilators about 1 hour before you eat to make it easier to eat. Eat several small meals instead of three large ones. Drink beverages at the end of the meal. Avoid foods that are hard to chew. · Eat foods that contain protein so that you do not lose muscle mass. Mental health · Talk to your family, friends, or a therapist about your feelings. It is normal to feel frightened, angry, hopeless, helpless, and even guilty. Talking openly about bad feelings can help you cope. If these feelings last, talk to your doctor. When should you call for help? Call 911 anytime you think you may need emergency care. For example, call if: 
· You have severe trouble breathing. Call your doctor now or seek immediate medical care if: 
· You have new or worse trouble breathing. · You cough up blood. · You have a fever. Watch closely for changes in your health, and be sure to contact your doctor if: 
· You cough more deeply or more often, especially if you notice more mucus or a change in the color of your mucus. · You have new or worse swelling in your legs or belly. · You are not getting better as expected. Where can you learn more? Go to http://slava-enrique.info/. Maddy Franz in the search box to learn more about \"Chronic Obstructive Pulmonary Disease (COPD): Care Instructions. \" Current as of: May 23, 2016 Content Version: 11.1 © 9128-8245 [employer]. Care instructions adapted under license by [employer] (which disclaims liability or warranty for this information). If you have questions about a medical condition or this instruction, always ask your healthcare professional. [place] [address] any warranty or liability for your use of this information. Gastroesophageal Reflux Disease (GERD): Care Instructions Your Care Instructions Gastroesophageal reflux disease (GERD) is the backward flow of stomach acid into the esophagus.  The esophagus is the tube that leads from your throat to your stomach. A one-way valve prevents the stomach acid from moving up into this tube. When you have GERD, this valve does not close tightly enough. If you have mild GERD symptoms including heartburn, you may be able to control the problem with antacids or over-the-counter medicine. Changing your diet, losing weight, and making other lifestyle changes can also help reduce symptoms. Follow-up care is a key part of your treatment and safety. Be sure to make and go to all appointments, and call your doctor if you are having problems. Its also a good idea to know your test results and keep a list of the medicines you take. How can you care for yourself at home? · Take your medicines exactly as prescribed. Call your doctor if you think you are having a problem with your medicine. · Your doctor may recommend over-the-counter medicine. For mild or occasional indigestion, antacids, such as Tums, Gaviscon, Mylanta, or Maalox, may help. Your doctor also may recommend over-the-counter acid reducers, such as Pepcid AC, Tagamet HB, Zantac 75, or Prilosec. Read and follow all instructions on the label. If you use these medicines often, talk with your doctor. · Change your eating habits. ¨ Its best to eat several small meals instead of two or three large meals. ¨ After you eat, wait 2 to 3 hours before you lie down. ¨ Chocolate, mint, and alcohol can make GERD worse. ¨ Spicy foods, foods that have a lot of acid (like tomatoes and oranges), and coffee can make GERD symptoms worse in some people. If your symptoms are worse after you eat a certain food, you may want to stop eating that food to see if your symptoms get better. · Do not smoke or chew tobacco. Smoking can make GERD worse. If you need help quitting, talk to your doctor about stop-smoking programs and medicines. These can increase your chances of quitting for good.  
· If you have GERD symptoms at night, raise the head of your bed 6 to 8 inches by putting the frame on blocks or placing a foam wedge under the head of your mattress. (Adding extra pillows does not work.) · Do not wear tight clothing around your middle. · Lose weight if you need to. Losing just 5 to 10 pounds can help. When should you call for help? Call your doctor now or seek immediate medical care if: 
· You have new or different belly pain. · Your stools are black and tarlike or have streaks of blood. Watch closely for changes in your health, and be sure to contact your doctor if: 
· Your symptoms have not improved after 2 days. · Food seems to catch in your throat or chest. 
Where can you learn more? Go to http://slava-enrique.info/. Enter W025 in the search box to learn more about \"Gastroesophageal Reflux Disease (GERD): Care Instructions. \" Current as of: August 9, 2016 Content Version: 11.1 © 9045-6069 [employer]. Care instructions adapted under license by [employer] (which disclaims liability or warranty for this information). If you have questions about a medical condition or this instruction, always ask your healthcare professional. [place] [address] any warranty or liability for your use of this information. Learning About Low-Fat Eating What is low-fat eating? Most food has some fat in it. Your body needs some fat to be healthy. But some kinds of fats are healthier than others. In a low-fat eating plan, you try to choose healthier fats and eat fewer unhealthy fats. Healthy fats include olive and canola oil. Try to avoid eating too much saturated fat (such as in cheese and meats) and trans fat (a type of fat found in many packaged snack foods and other baked goods). You do not need to cut all fat from your diet. But you can make healthier choices about the types and amount of fat you eat.  
Even though it is a good idea to choose healthier fats, it is still important to be careful of how much fat you eat, because all fats are high in calories. What are the different types of fats? Unhealthy fats · Saturated fat. Saturated fats are mostly in animal foods, such as meat and dairy foods. Tropical oils, such as coconut oil, palm oil, and cocoa butter, are also saturated fats. · Trans fat. Trans fats include shortening, partially hydrogenated vegetable oils, and hydrogenated vegetable oils. Trans fats are made when a liquid fat is turned into a solid fat (for example, when corn oil is made into stick margarine). They are in many processed foods, such as cookies, crackers, and snack foods. Healthy fats · Monounsaturated fat. Monounsaturated fats are liquid at room temperature but get solid when refrigerated. Eating foods that are high in this fat may help lower your \"bad\" (LDL) cholesterol, keep your \"good\" (HDL) cholesterol level up, and lower your chances of getting heart disease. This fat is found in canola oil, olive oil, peanut oil, olives, avocados, nuts, and nut butters. · Polyunsaturated fat. Polyunsaturated fats are liquid at room temperature. They are in safflower, sunflower, and corn oils. They are also the main fat in seafood. Omega-3 fatty acids are types of polyunsaturated fat. Omega-3 fatty acids may lower your chances of getting heart disease. Fatty fish such as salmon and mackerel contain these healthy fatty acids. So do ground flaxseeds and flaxseed oil, soybeans, walnuts, and seeds. Why cut down on unhealthy fats? Eating foods that contain saturated fats can raise the LDL (\"bad\") cholesterol in your blood. Having a high level of LDL cholesterol increases your chance of hardening of the arteries (atherosclerosis), which can lead to heart disease, heart attack, and stroke. Trans fat raises the level of \"bad\" LDL cholesterol in your blood and may lower the \"good\" HDL cholesterol in your blood.  Trans fat can raise your risk of heart disease, heart attack, and stroke. In general: · No more than 10% of your daily calories should come from saturated fat. This is about 20 grams in a 2,000-calorie diet. · No more than 10% of your daily calories should come from polyunsaturated fat. This is about 20 grams in a 2,000-calorie diet. · Monounsaturated fats can be up to 15% of your daily calories. This is about 25 to 30 grams in a 2,000-calorie diet. If you're not sure how much fat you should be eating or how many calories you need each day to stay at a healthy weight, talk to a registered dietitian. He or she can help you create a plan that's right for you. What can you do to cut down on fat? Foods like cheese, butter, sausage, and desserts can have a lot of unhealthy fats. Try these tips for healthier meals at home and when you eat out. At home · Fill up on fruits, vegetables, and whole grains. · Think of meat as a side dish instead of as the main part of your meal. 
· When you do eat meat, make it extra-lean ground beef (97% lean), ground turkey breast (without skin added), meats with fat trimmed off before cooking, or skinless chicken. · Try main dishes that use whole wheat pasta, brown rice, dried beans, or vegetables. · Use cooking methods that use little or no fat, such as broiling, steaming, or grilling. Use cooking spray instead of oil. If you use oil, use a monounsaturated oil, such as canola or olive oil. · Read food labels on canned, bottled, or packaged foods. Choose those with little saturated fat and no trans fat. When eating out at a restaurant · Order foods that are broiled or poached instead of fried or breaded. · Cut back on the amount of butter or margarine that you use on bread. Use small amounts of olive oil instead. · Order sauces, gravies, and salad dressings on the side, and use only a little. · When you order pasta, choose tomato sauce instead of cream sauce. · Ask for salsa with your baked potato instead of sour cream, butter, cheese, or kohler. Where can you learn more? Go to http://slava-enrique.info/. Enter T206 in the search box to learn more about \"Learning About Low-Fat Eating. \" Current as of: July 26, 2016 Content Version: 11.1 © 8838-3271 [employer]. Care instructions adapted under license by [employer] (which disclaims liability or warranty for this information). If you have questions about a medical condition or this instruction, always ask your healthcare professional. [address] any warranty or liability for your use of this information. Introducing [facility]! Radha Landa introduces FiberLight patient portal. Now you can access parts of your medical record, email your doctor's office, and request medication refills online. 1. In your internet browser, go to [email or web address] 2. Click on the First Time User? Click Here link in the Sign In box. You will see the New Member Sign Up page. 3. Enter your FiberLight Access Code exactly as it appears below. You will not need to use this code after youve completed the sign-up process. If you do not sign up before the expiration date, you must request a new code. · FiberLight Access Code: [email or web address] Expires: 2/7/2017 10:06 AM 
 
4. Enter the last four digits of your Social Security Number (xxxx) and Date of Birth (mm/dd/yyyy) as indicated and click Submit. You will be taken to the next sign-up page. 5. Create a 3Nodt ID. This will be your FiberLight login ID and cannot be changed, so think of one that is secure and easy to remember. 6. Create a [employer] password. You can change your password at any time. 7. Enter your Password Reset Question and Answer. This can be used at a later time if you forget your password. 8. Enter your e-mail address.  You will receive e-mail notification when new information is available in [employer]. 9. Click Sign Up. You can now view and download portions of your medical record. 10. Click the Download Summary menu link to download a portable copy of your medical information. If you have questions, please visit the Frequently Asked Questions section of the [employer] website. Remember, [employer] is NOT to be used for urgent needs. For medical emergencies, dial 911. Now available from your iPhone and Android! Please provide this summary of care documentation to your next provider. Your primary care clinician is listed as Renita Yoo. If you have any questions after today's visit, please call [phone].

## 2017-01-25 NOTE — PATIENT INSTRUCTIONS
Chronic Obstructive Pulmonary Disease (COPD): Care Instructions  Your Care Instructions    Chronic obstructive pulmonary disease (COPD) is a general term for a group of lung diseases, including emphysema and chronic bronchitis. People with COPD have decreased airflow in and out of the lungs, which makes it hard to breathe. The airways also can get clogged with thick mucus. Cigarette smoking is a major cause of COPD. Although there is no cure for COPD, you can slow its progress. Following your treatment plan and taking care of yourself can help you feel better and live longer. Follow-up care is a key part of your treatment and safety. Be sure to make and go to all appointments, and call your doctor if you are having problems. It's also a good idea to know your test results and keep a list of the medicines you take. How can you care for yourself at home? Staying healthy  · Do not smoke. This is the most important step you can take to prevent more damage to your lungs. If you need help quitting, talk to your doctor about stop-smoking programs and medicines. These can increase your chances of quitting for good. · Avoid colds and flu. Get a pneumococcal vaccine shot. If you have had one before, ask your doctor whether you need a second dose. Get the flu vaccine every fall. If you must be around people with colds or the flu, wash your hands often. · Avoid secondhand smoke, air pollution, and high altitudes. Also avoid cold, dry air and hot, humid air. Stay at home with your windows closed when air pollution is bad. Medicines and oxygen therapy  · Take your medicines exactly as prescribed. Call your doctor if you think you are having a problem with your medicine. · You may be taking medicines such as:  ¨ Bronchodilators. These help open your airways and make breathing easier. Bronchodilators are either short-acting (work for 6 to 9 hours) or long-acting (work for 24 hours).  You inhale most bronchodilators, so they start to act quickly. Always carry your quick-relief inhaler with you in case you need it while you are away from home. ¨ Corticosteroids (prednisone, budesonide). These reduce airway inflammation. They come in pill or inhaled form. You must take these medicines every day for them to work well. · A spacer may help you get more inhaled medicine to your lungs. Ask your doctor or pharmacist if a spacer is right for you. If it is, ask how to use it properly. · Do not take any vitamins, over-the-counter medicine, or herbal products without talking to your doctor first.  · If your doctor prescribed antibiotics, take them as directed. Do not stop taking them just because you feel better. You need to take the full course of antibiotics. · Oxygen therapy boosts the amount of oxygen in your blood and helps you breathe easier. Use the flow rate your doctor has recommended, and do not change it without talking to your doctor first.  Activity  · Get regular exercise. Walking is an easy way to get exercise. Start out slowly, and walk a little more each day. · Pay attention to your breathing. You are exercising too hard if you cannot talk while you are exercising. · Take short rest breaks when doing household chores and other activities. · Learn breathing methods--such as breathing through pursed lips--to help you become less short of breath. · If your doctor has not set you up with a pulmonary rehabilitation program, talk to him or her about whether rehab is right for you. Rehab includes exercise programs, education about your disease and how to manage it, help with diet and other changes, and emotional support. Diet  · Eat regular, healthy meals. Use bronchodilators about 1 hour before you eat to make it easier to eat. Eat several small meals instead of three large ones. Drink beverages at the end of the meal. Avoid foods that are hard to chew.   · Eat foods that contain protein so that you do not lose muscle mass.  Mental health  · Talk to your family, friends, or a therapist about your feelings. It is normal to feel frightened, angry, hopeless, helpless, and even guilty. Talking openly about bad feelings can help you cope. If these feelings last, talk to your doctor. When should you call for help? Call 911 anytime you think you may need emergency care. For example, call if:  · You have severe trouble breathing. Call your doctor now or seek immediate medical care if:  · You have new or worse trouble breathing. · You cough up blood. · You have a fever. Watch closely for changes in your health, and be sure to contact your doctor if:  · You cough more deeply or more often, especially if you notice more mucus or a change in the color of your mucus. · You have new or worse swelling in your legs or belly. · You are not getting better as expected. Where can you learn more? Go to http://slavaOpexa Therapeuticsenrique.info/. Derrek Henriquez in the search box to learn more about \"Chronic Obstructive Pulmonary Disease (COPD): Care Instructions. \"  Current as of: May 23, 2016  Content Version: 11.1  © 0030-7662 [employer]. Care instructions adapted under license by [employer] (which disclaims liability or warranty for this information). If you have questions about a medical condition or this instruction, always ask your healthcare professional. [place] [address] any warranty or liability for your use of this information. Gastroesophageal Reflux Disease (GERD): Care Instructions  Your Care Instructions    Gastroesophageal reflux disease (GERD) is the backward flow of stomach acid into the esophagus. The esophagus is the tube that leads from your throat to your stomach. A one-way valve prevents the stomach acid from moving up into this tube. When you have GERD, this valve does not close tightly enough.   If you have mild GERD symptoms including heartburn, you may be able to control the problem with antacids or over-the-counter medicine. Changing your diet, losing weight, and making other lifestyle changes can also help reduce symptoms. Follow-up care is a key part of your treatment and safety. Be sure to make and go to all appointments, and call your doctor if you are having problems. Its also a good idea to know your test results and keep a list of the medicines you take. How can you care for yourself at home? · Take your medicines exactly as prescribed. Call your doctor if you think you are having a problem with your medicine. · Your doctor may recommend over-the-counter medicine. For mild or occasional indigestion, antacids, such as Tums, Gaviscon, Mylanta, or Maalox, may help. Your doctor also may recommend over-the-counter acid reducers, such as Pepcid AC, Tagamet HB, Zantac 75, or Prilosec. Read and follow all instructions on the label. If you use these medicines often, talk with your doctor. · Change your eating habits. ¨ Its best to eat several small meals instead of two or three large meals. ¨ After you eat, wait 2 to 3 hours before you lie down. ¨ Chocolate, mint, and alcohol can make GERD worse. ¨ Spicy foods, foods that have a lot of acid (like tomatoes and oranges), and coffee can make GERD symptoms worse in some people. If your symptoms are worse after you eat a certain food, you may want to stop eating that food to see if your symptoms get better. · Do not smoke or chew tobacco. Smoking can make GERD worse. If you need help quitting, talk to your doctor about stop-smoking programs and medicines. These can increase your chances of quitting for good. · If you have GERD symptoms at night, raise the head of your bed 6 to 8 inches by putting the frame on blocks or placing a foam wedge under the head of your mattress. (Adding extra pillows does not work.)  · Do not wear tight clothing around your middle. · Lose weight if you need to.  Losing just 5 to 10 pounds can help. When should you call for help? Call your doctor now or seek immediate medical care if:  · You have new or different belly pain. · Your stools are black and tarlike or have streaks of blood. Watch closely for changes in your health, and be sure to contact your doctor if:  · Your symptoms have not improved after 2 days. · Food seems to catch in your throat or chest.  Where can you learn more? Go to http://slava-enrique.info/. Enter G941 in the search box to learn more about \"Gastroesophageal Reflux Disease (GERD): Care Instructions. \"  Current as of: August 9, 2016  Content Version: 11.1  © 3599-8617 [employer]. Care instructions adapted under license by [employer] (which disclaims liability or warranty for this information). If you have questions about a medical condition or this instruction, always ask your healthcare professional. [place] [address] any warranty or liability for your use of this information. Learning About Low-Fat Eating  What is low-fat eating? Most food has some fat in it. Your body needs some fat to be healthy. But some kinds of fats are healthier than others. In a low-fat eating plan, you try to choose healthier fats and eat fewer unhealthy fats. Healthy fats include olive and canola oil. Try to avoid eating too much saturated fat (such as in cheese and meats) and trans fat (a type of fat found in many packaged snack foods and other baked goods). You do not need to cut all fat from your diet. But you can make healthier choices about the types and amount of fat you eat. Even though it is a good idea to choose healthier fats, it is still important to be careful of how much fat you eat, because all fats are high in calories. What are the different types of fats? Unhealthy fats  · Saturated fat. Saturated fats are mostly in animal foods, such as meat and dairy foods.  Tropical oils, such as coconut oil, palm oil, and cocoa butter, are also saturated fats. · Trans fat. Trans fats include shortening, partially hydrogenated vegetable oils, and hydrogenated vegetable oils. Trans fats are made when a liquid fat is turned into a solid fat (for example, when corn oil is made into stick margarine). They are in many processed foods, such as cookies, crackers, and snack foods. Healthy fats  · Monounsaturated fat. Monounsaturated fats are liquid at room temperature but get solid when refrigerated. Eating foods that are high in this fat may help lower your \"bad\" (LDL) cholesterol, keep your \"good\" (HDL) cholesterol level up, and lower your chances of getting heart disease. This fat is found in canola oil, olive oil, peanut oil, olives, avocados, nuts, and nut butters. · Polyunsaturated fat. Polyunsaturated fats are liquid at room temperature. They are in safflower, sunflower, and corn oils. They are also the main fat in seafood. Omega-3 fatty acids are types of polyunsaturated fat. Omega-3 fatty acids may lower your chances of getting heart disease. Fatty fish such as salmon and mackerel contain these healthy fatty acids. So do ground flaxseeds and flaxseed oil, soybeans, walnuts, and seeds. Why cut down on unhealthy fats? Eating foods that contain saturated fats can raise the LDL (\"bad\") cholesterol in your blood. Having a high level of LDL cholesterol increases your chance of hardening of the arteries (atherosclerosis), which can lead to heart disease, heart attack, and stroke. Trans fat raises the level of \"bad\" LDL cholesterol in your blood and may lower the \"good\" HDL cholesterol in your blood. Trans fat can raise your risk of heart disease, heart attack, and stroke. In general:  · No more than 10% of your daily calories should come from saturated fat. This is about 20 grams in a 2,000-calorie diet. · No more than 10% of your daily calories should come from polyunsaturated fat.  This is about 20 grams in a 2,000-calorie diet.  · Monounsaturated fats can be up to 15% of your daily calories. This is about 25 to 30 grams in a 2,000-calorie diet. If you're not sure how much fat you should be eating or how many calories you need each day to stay at a healthy weight, talk to a registered dietitian. He or she can help you create a plan that's right for you. What can you do to cut down on fat? Foods like cheese, butter, sausage, and desserts can have a lot of unhealthy fats. Try these tips for healthier meals at home and when you eat out. At home  · Fill up on fruits, vegetables, and whole grains. · Think of meat as a side dish instead of as the main part of your meal.  · When you do eat meat, make it extra-lean ground beef (97% lean), ground turkey breast (without skin added), meats with fat trimmed off before cooking, or skinless chicken. · Try main dishes that use whole wheat pasta, brown rice, dried beans, or vegetables. · Use cooking methods that use little or no fat, such as broiling, steaming, or grilling. Use cooking spray instead of oil. If you use oil, use a monounsaturated oil, such as canola or olive oil. · Read food labels on canned, bottled, or packaged foods. Choose those with little saturated fat and no trans fat. When eating out at a restaurant  · Order foods that are broiled or poached instead of fried or breaded. · Cut back on the amount of butter or margarine that you use on bread. Use small amounts of olive oil instead. · Order sauces, gravies, and salad dressings on the side, and use only a little. · When you order pasta, choose tomato sauce instead of cream sauce. · Ask for salsa with your baked potato instead of sour cream, butter, cheese, or kohler. Where can you learn more? Go to http://slava-enrique.info/. Enter M967 in the search box to learn more about \"Learning About Low-Fat Eating. \"  Current as of: July 26, 2016  Content Version: 11.1  © 0472-2904 [employer]. Care instructions adapted under license by [employer] (which disclaims liability or warranty for this information). If you have questions about a medical condition or this instruction, always ask your healthcare professional. [address] any warranty or liability for your use of this information.

## 2017-01-25 NOTE — PROGRESS NOTES
HISTORY OF PRESENT ILLNESS  Mabel Crowley is a 74 y.o. male. HPI; here for routine follow up. Multiple medical problem. Noted worsening of diabetes. Was at goal but now HBA1C recent was 8.7. Said his blood sugar fasting is around 200 sometimes. Went up to lantus around 45 units. Said not much change in diet. Has gained some weight. On Ladonna Donna since last visit as had to take him off from metformin due to renal insufficiency. No hypoglycemia. Suppose to take apidra if high sugar but said not been taking it lately. Denies any chest pain or sob. No dizziness. On and off migraine headaches and taking fiorinef helps. Need to take it rarely as has prescription left since 2015. Now still has medication but wants to get a new prescription. Last episode of migraine was yesterday night. Has h/o GERD. On PPI. Had EGD done showed gastritis. Now coming up appt next week with GI. Said sometimes dysphagia with pills only. No chocking episode. No nausea or vomiting. Does have habit of eating spicy food. Trying to change that. No abdominal pain. H/o hypertriglyceridemia. On fibrate and statin. Taking both with compliance. Denies any muscle ache or leg cramps. Recent labs showed elevated triglyceride. i have discussed with him diet modification. Discussed with him that need to modify diet as worsening of diabetes, triglyceride and gaining weight. Has seen nutritionist in the past and not interested to go back again. Limitation in doing exercise due to chronic lower back pain and neck pain. Worsening of lower back pain since fall around domenic. Seen pain management and on conservative management. Said something is not right as pain is progressively getting worse. Discuss to send a message to Dr. Junior Feldman and he will also follow up from his side if no improvement in pain in couple of weeks. Has coming up appt in march with him. H/o copd. Said need to use nebulizer at bedtime often as feeling post nasal drip.  Since change in weather feeling it. No wheezing or cough at this time. No chest pain or sob. Taking his inhalers as he suppose to. Had h/o tachycardia. Asymptomatic. No palpitation. No diaphoresis. On medication. No concern at this time. H/o hypothyroidism. Recent lab showed thyroid function wnl. Compliance with medication. Kalpesh Kyle he has gained some weight. No appetite changes. No mood changes. Review labs with him. Lab Results   Component Value Date/Time    Cholesterol, total 158 01/19/2017 09:21 AM    HDL Cholesterol 33 01/19/2017 09:21 AM    LDL, calculated 77 01/19/2017 09:21 AM    VLDL, calculated 48 01/19/2017 09:21 AM    Triglyceride 240 01/19/2017 09:21 AM    CHOL/HDL Ratio 4.8 01/19/2017 09:21 AM     Lab Results   Component Value Date/Time    Sodium 139 01/19/2017 09:21 AM    Potassium 4.2 01/19/2017 09:21 AM    Chloride 100 01/19/2017 09:21 AM    CO2 30 01/19/2017 09:21 AM    Anion gap 9 01/19/2017 09:21 AM    Glucose 198 01/19/2017 09:21 AM    BUN 20 01/19/2017 09:21 AM    Creatinine 1.08 01/19/2017 09:21 AM    BUN/Creatinine ratio 19 01/19/2017 09:21 AM    GFR est AA >60 01/19/2017 09:21 AM    GFR est non-AA >60 01/19/2017 09:21 AM    Calcium 8.9 01/19/2017 09:21 AM    Bilirubin, total 0.3 01/19/2017 09:21 AM    ALT 30 01/19/2017 09:21 AM    AST 14 01/19/2017 09:21 AM    Alk. phosphatase 51 01/19/2017 09:21 AM    Protein, total 6.6 01/19/2017 09:21 AM    Albumin 3.7 01/19/2017 09:21 AM    Globulin 2.9 01/19/2017 09:21 AM    A-G Ratio 1.3 01/19/2017 09:21 AM     Lab Results   Component Value Date/Time    TSH 1.16 01/19/2017 09:21 AM     Lab Results   Component Value Date/Time    Hemoglobin A1c 8.7 01/19/2017 09:21 AM    Hemoglobin A1c (POC) 6.9 12/16/2015 10:29 AM     Lab Results   Component Value Date/Time    Microalbumin/Creat ratio (mg/g creat)  07/19/2016 07:19 AM     Cannot calculate ratio due to micro albumin result outside reportable range.     Microalb/Creat ratio (ug/mg creat.) 6.8 03/10/2015 12:00 AM Microalbumin,urine random <0.50 07/19/2016 07:19 AM    Microalbumin, urine 4.9 03/10/2015 12:00 AM     Lab Results   Component Value Date/Time    WBC 7.8 03/11/2015 10:43 AM    HGB 13.9 03/11/2015 10:43 AM    HCT 40.5 03/11/2015 10:43 AM    PLATELET 865 94/05/5372 10:43 AM    MCV 82 03/11/2015 10:43 AM         ROS: see HPI     Physical Exam   Constitutional: He is oriented to person, place, and time. No distress. Neck: No thyromegaly present. Cardiovascular: Normal rate, regular rhythm and normal heart sounds. Pulmonary/Chest: No respiratory distress. He has no wheezes. CTA   Abdominal: Soft. Bowel sounds are normal. There is no tenderness. Musculoskeletal: He exhibits no edema. Lymphadenopathy:     He has no cervical adenopathy. Neurological: He is oriented to person, place, and time. Psychiatric: His behavior is normal.       ASSESSMENT and PLAN    ICD-10-CM ICD-9-CM    1. Poorly controlled diabetes mellitus ([facility] [address]): elevated HBA1C. Discussed diet modification. Limitation in exercise due to chronic back pain. For now will send him to endocrinology. Also he has been not taking his apidra with meal according to sliding scale. He will continue latus to 48 units with apidra according to sliding scale. Will follow endocrine recommendations from here. Advised him to keep blood sugar log and bring it to them. Will f/u next visit. E11.65 250.00 REFERRAL TO ENDOCRINOLOGY   2. Hypertriglyceridemia: continue statin and gemfibrozil. Diet modification addressed. E78.1 272.1 fenofibrate nanocrystallized (TRICOR) 145 mg tablet   3. Low TSH level: recent labs TSh wnl. Gaining some weight. Could be related to diet habit. Will continue current plan and f/u next visit. R94.6 794.5 REFERRAL TO ENDOCRINOLOGY      levothyroxine (SYNTHROID) 150 mcg tablet      DISCONTINUED: levothyroxine (SYNTHROID) 150 mcg tablet   4. Shakiness/ hand : stable. R25.1 781.0 metoprolol tartrate (LOPRESSOR) 25 mg tablet   5.  Tachycardia: stable on beta blocker  R00.0 785.0 metoprolol tartrate (LOPRESSOR) 25 mg tablet   6. Chronic obstructive pulmonary disease, unspecified COPD type ([facility] [address]): stable. As needed use of nebulizer. Post nasal drip. Will observe. J44.9 496    7. Gastroesophageal reflux disease : per EGD gastritis. On PPI. Some dysphagia with pills . Has coming up appt in a week with GI specialist he will f/u with them  K21.9 530.81    8. Dysphagia, unspecified type R13.10 787.20    9. Migraine without status migrainosus, not intractable, unspecified migraine type: as needed fioricet. G43.909 346.90 butalbital-acetaminophen-caffeine (FIORICET, ESGIC) -40 mg per tablet   10. Encounter for immunization Z23 V03.89 INFLUENZA VIRUS VAC QUAD,SPLIT,PRESV FREE SYRINGE 3/> YRS IM   Pt understood and agrees with above plan. Review    Follow-up Disposition:  Return in about 1 month (around 2/25/2017).

## 2017-01-26 DIAGNOSIS — R35.1 BPH ASSOCIATED WITH NOCTURIA: ICD-10-CM

## 2017-01-26 DIAGNOSIS — E13.9 DIABETES 1.5, MANAGED AS TYPE 2 (HCC): ICD-10-CM

## 2017-01-26 DIAGNOSIS — N40.1 BPH ASSOCIATED WITH NOCTURIA: ICD-10-CM

## 2017-01-26 RX ORDER — TAMSULOSIN HYDROCHLORIDE 0.4 MG/1
CAPSULE ORAL
Qty: 180 CAP | Refills: 0 | Status: SHIPPED | OUTPATIENT
Start: 2017-01-26 | End: 2017-02-20 | Stop reason: SDUPTHER

## 2017-01-27 RX ORDER — SYRINGE-NEEDLE,INSULIN,0.5 ML 30 GX5/16"
SYRINGE, EMPTY DISPOSABLE MISCELLANEOUS
Qty: 1 PACKAGE | Refills: 3 | Status: SHIPPED | OUTPATIENT
Start: 2017-01-27

## 2017-02-07 ENCOUNTER — HOSPITAL ENCOUNTER (OUTPATIENT)
Dept: GENERAL RADIOLOGY | Age: 67
Discharge: HOME | End: 2017-02-07
Attending: INTERNAL MEDICINE
Payer: MEDICARE

## 2017-02-07 DIAGNOSIS — K21.9 ESOPHAGEAL REFLUX: ICD-10-CM

## 2017-02-07 PROCEDURE — 74220 X-RAY XM ESOPHAGUS 1CNTRST: CPT

## 2017-02-07 PROCEDURE — 74011000250 HC RX REV CODE- 250: Performed by: INTERNAL MEDICINE

## 2017-02-07 PROCEDURE — 74011000255 HC RX REV CODE- 255: Performed by: INTERNAL MEDICINE

## 2017-02-07 RX ADMIN — ANTACID/ANTIFLATULENT 4 G: 380; 550; 10; 10 GRANULE, EFFERVESCENT ORAL at 08:00

## 2017-02-07 RX ADMIN — BARIUM SULFATE 95 ML: 980 POWDER, FOR SUSPENSION ORAL at 08:00

## 2017-02-07 RX ADMIN — BARIUM SULFATE 700 MG: 700 TABLET ORAL at 08:00

## 2017-02-07 RX ADMIN — BARIUM SULFATE 35 G: 960 POWDER, FOR SUSPENSION ORAL at 08:00

## 2017-02-10 ENCOUNTER — TELEPHONE (OUTPATIENT)
Dept: FAMILY MEDICINE CLINIC | Age: 67
End: 2017-02-10

## 2017-02-10 NOTE — TELEPHONE ENCOUNTER
Spoke with patient (all identifiers verified) to advise of upcoming endocrinology appointment with Dr. Verenice Krause on 3/10/17 at 2:00 p.m. He was asked to arrive 30 minutes prior to the appointment and bring in photo ID, insurance cards, medication list and completed new patient packet mailed by specialist. Patient verbalized understanding. He was given the address and phone number to specialist office [phone] [place], [address] [address]; [phone]). Office notes and labs have been faxed to Dr. Luz Doherty. A fax confirmation has been received.

## 2017-02-20 ENCOUNTER — OFFICE VISIT (OUTPATIENT)
Dept: CARDIOLOGY CLINIC | Age: 67
End: 2017-02-20

## 2017-02-20 VITALS
BODY MASS INDEX: 35.16 KG/M2 | HEIGHT: 74 IN | OXYGEN SATURATION: 97 % | SYSTOLIC BLOOD PRESSURE: 128 MMHG | WEIGHT: 274 LBS | DIASTOLIC BLOOD PRESSURE: 60 MMHG | HEART RATE: 89 BPM

## 2017-02-20 DIAGNOSIS — E13.9 DIABETES 1.5, MANAGED AS TYPE 2 (HCC): ICD-10-CM

## 2017-02-20 DIAGNOSIS — E78.5 HYPERLIPIDEMIA, UNSPECIFIED HYPERLIPIDEMIA TYPE: Primary | ICD-10-CM

## 2017-02-20 DIAGNOSIS — I10 ESSENTIAL HYPERTENSION: ICD-10-CM

## 2017-02-20 DIAGNOSIS — R07.9 CHRONIC CHEST PAIN: ICD-10-CM

## 2017-02-20 DIAGNOSIS — G89.29 CHRONIC CHEST PAIN: ICD-10-CM

## 2017-02-20 NOTE — MR AVS SNAPSHOT
Visit Information Date & Time Provider Department Dept. Phone Encounter #  
 2/20/2017 10:20 AM Joel Nichols MD [facility] [address] [id number] Your Appointments 2/20/2017 10:20 AM  
Follow Up with Joel Nichols MD  
[facility] ([facility]) Appt Note: 6 month follow up; 2/17/17 - confirmed - plk [place] [address] [address] [address] [phone] [address] [phone]  
  
    
 2/22/2017  1:15 PM  
ROUTINE CARE with Kimberly Pike MD  
[facility] ([facility]) Appt Note: 1 mo [address] [facility] [address] [id number] [address] [address]  
  
    
 3/2/2017  9:30 AM  
Follow Up with Rajni Guzman MD  
[facility]) Appt Note: return in 2 months [address] [address]  
[phone]  
  
    
 3/9/2017  9:15 AM  
Office Visit with Latrice Gomez MD  
[facility]) Appt Note: check up [address] [address]  
[phone] [address] [place] [address] Upcoming Health Maintenance Date Due  
 MEDICARE YEARLY EXAM 4/21/2017 EYE EXAM RETINAL OR DILATED Q1 4/26/2017 HEMOGLOBIN A1C Q6M 7/19/2017 MICROALBUMIN Q1 7/19/2017 Pneumococcal 65+ Low/Medium Risk (2 of 2 - PPSV23) 7/20/2017 FOOT EXAM Q1 1/9/2018 LIPID PANEL Q1 1/19/2018 GLAUCOMA SCREENING Q2Y 4/26/2018 COLONOSCOPY 10/19/2023 DTaP/Tdap/Td series (2 - Td) 5/16/2026 Allergies as of 2/20/2017  Review Complete On: 1/25/2017 By: Kimberly Pike MD  
  
 Severity Noted Reaction Type Reactions Ciprofloxacin High 11/16/2013    Anaphylaxis Other Plant, Animal, Environmental High 03/10/2016    Other (comments) Patient cannot have MRI. Patient states that he has metal screws in his left arm. Lamisil [Terbinafine]  10/18/2013    Swelling Tongue swelling Metformin  03/16/2016    Other (comments) Elevated cr 1.4 Morphine  10/18/2013    Other (comments) \"loss of blood pressure\" Other Medication  10/18/2013    Other (comments) Doxasylin causes extreme GERD Pcn [Penicillins]  10/18/2013    Rash Pentothal [Thiopental Sodium]  10/18/2013    Shortness of Breath Sulfa (Sulfonamide Antibiotics)  10/18/2013    Rash Current Immunizations  Reviewed on 7/20/2016 Name Date Influenza Vaccine 11/3/2014 Influenza Vaccine (Quad) PF 1/25/2017, 9/16/2015 Pneumococcal Conjugate (PCV-13) 7/20/2016 Tdap 5/16/2016 Not reviewed this visit You Were Diagnosed With   
  
 Codes Comments Chronic chest pain    -  Primary ICD-10-CM: R07.9, G89.29 ICD-9-CM: 786.50, 338.29 Hyperlipidemia, unspecified hyperlipidemia type     ICD-10-CM: E78.5 ICD-9-CM: 272.4 Essential hypertension     ICD-10-CM: I10 
ICD-9-CM: 401.9 Vitals BP Pulse Height(growth percentile) Weight(growth percentile) SpO2 BMI  
 128/60 89 6' 2\" (1.88 m) 274 lb (124.3 kg) 97% 35.18 kg/m2 Smoking Status Former Smoker Vitals History BMI and BSA Data Body Mass Index Body Surface Area  
 35.18 kg/m 2 2.55 m 2 Preferred Pharmacy Pharmacy Name Phone [facility] [address] [place] [address] [phone] Your Updated Medication List  
  
   
This list is accurate as of: 2/20/17 10:18 AM.  Always use your most recent med list.  
  
  
  
  
 ACULAR 0.5 % ophthalmic solution Generic drug:  ketorolac Administer 1 Drop to both eyes two (2) times a day. albuterol 2.5 mg /3 mL (0.083 %) nebulizer solution Commonly known as:  PROVENTIL VENTOLIN  
3 mL by Nebulization route every four (4) hours as needed for Wheezing or Shortness of Breath. atorvastatin 20 mg tablet Commonly known as:  LIPITOR Take 1 Tab by mouth daily. Azelastine 0.15 % (205.5 mcg) nasal spray Commonly known as:  ASTEPRO  
1 Spray by Both Nostrils route two (2) times daily as needed. betamethasone dipropionate 0.05 % ointment Commonly known as:  DIPROLENE  
  
 budesonide-formoterol 160-4.5 mcg/actuation HFA inhaler Commonly known as:  SYMBICORT Take 2 Puffs by inhalation two (2) times a day. butalbital-acetaminophen-caffeine -40 mg per tablet Commonly known as:  Olevia Isabell Take 0.5 Tabs by mouth daily as needed for Pain. DULoxetine 60 mg capsule Commonly known as:  CYMBALTA Take 60 mg by mouth nightly. esomeprazole 40 mg capsule Commonly known as:  [place] Dima Take 40 mg by mouth daily. fenofibrate nanocrystallized 145 mg tablet Commonly known as:  [employer] Take 1 Tab by mouth daily. fluticasone 50 mcg/actuation nasal spray Commonly known as:  FLONASE  
USE ONE SPRAY IN EACH NOSTRIL ONCE DAILY HYDROcodone-acetaminophen 5-325 mg per tablet Commonly known as:  March Castles Take 1 Tab by mouth daily as needed for Pain. insulin glulisine 100 unit/mL pen Commonly known as:  APIDRA SOLOSTAR  
2 units per carb consumed LANTUS SOLOSTAR 100 unit/mL (3 mL) pen Generic drug:  insulin glargine INJECT 30 UNITS SUBCUTANEOUSLY EVERY NIGHT  
  
 levothyroxine 150 mcg tablet Commonly known as:  SYNTHROID Take 1 Tab by mouth Daily (before breakfast). lisinopril 5 mg tablet Commonly known as:  Heather Primmer Take 1 Tab by mouth daily. metoprolol tartrate 25 mg tablet Commonly known as:  LOPRESSOR Take 1 Tab by mouth two (2) times a day. montelukast 10 mg tablet Commonly known as:  SINGULAIR Take 1 Tab by mouth daily. oxyCODONE ER 10 mg ER tablet Commonly known as:  OxyCONTIN Take 1 Tab by mouth every eight (8) hours for 30 days. Max Daily Amount: 30 mg. PEN NEEDLE 31 gauge x 5/16\" Ndle Generic drug:  Insulin Needles (Disposable) USE ONE PEN NEEDLE FOR LANTUS FLEXPEN AND 3 PEN NEEDLES FOR APIDRA WITH EACH MEAL  
  
 pregabalin 150 mg capsule Commonly known as:  Ban [place]er TAKE 1 CAPSULE BY MOUTH THREE TIMES DAILY FOR 30 DAYS. MAXIMUM 3 CAPSULES DAILY.  
  
 raNITIdine 150 mg tablet Commonly known as:  ZANTAC Take 1 Tab by mouth daily. SITagliptin 50 mg tablet Commonly known as:  Loc Oats Take 1 Tab by mouth daily. tamsulosin 0.4 mg capsule Commonly known as:  FLOMAX Take 1 Cap by mouth two (2) times a day. tiotropium 18 mcg inhalation capsule Commonly known as:  Lesley Meeter Take 1 Cap by inhalation daily. We Performed the Following AMB POC EKG ROUTINE W/ 12 LEADS, INTER & REP [77253 CPT(R)] Introducing [address]! [place] [employer] [facility] introduces [employer] patient portal. Now you can access parts of your medical record, email your doctor's office, and request medication refills online. 1. In your internet browser, go to [email or web address] 2. Click on the First Time User? Click Here link in the Sign In box. You will see the New Member Sign Up page. 3. Enter your [employer] Access Code exactly as it appears below. You will not need to use this code after youve completed the sign-up process. If you do not sign up before the expiration date, you must request a new code. · [employer] Access Code: [email or web address] Expires: 5/21/2017  9:43 AM 
 
4. Enter the last four digits of your Social Security Number (xxxx) and Date of Birth (mm/dd/yyyy) as indicated and click Submit. You will be taken to the next sign-up page. 5. Create a [employer]acotta ID. This will be your [employer] login ID and cannot be changed, so think of one that is secure and easy to remember. 6. Create a [employer]Streams password. You can change your password at any time. 7. Enter your Password Reset Question and Answer. This can be used at a later time if you forget your password. 8. Enter your e-mail address. You will receive e-mail notification when new information is available in [address] 9. Click Sign Up. You can now view and download portions of your medical record. 10. Click the Download Summary menu link to download a portable copy of your medical information. If you have questions, please visit the Frequently Asked Questions section of the [employer] website. Remember, [employer]Streams is NOT to be used for urgent needs. For medical emergencies, dial 911. Now available from your iPhone and Android! Please provide this summary of care documentation to your next provider. Your primary care clinician is listed as Cesar Denton. If you have any questions after today's visit, please call [phone].

## 2017-02-20 NOTE — PROGRESS NOTES
HISTORY OF PRESENT ILLNESS  Rito Currie is a 74 y.o. male. Hypertension   Associated symptoms include chest pain ( Atypical). Pertinent negatives include no abdominal pain, no headaches and no shortness of breath. Patient presents for an add-on office visit. He has a past medical history significant for hypertension, dyslipidemia,  diabetes mellitus, and COPD. He was initially referred here for evaluation of dyspnea on exertion and tachycardia with activity. The patient does not have a previous cardiac history. He does have a history of atypical chest pain, and reports a significant cardiac workup while living in [address] [place] [address] back in 2009 which included a cardiac catheterization showing no significant coronary artery disease. More recently, the patient underwent an echocardiogram in January 2014, showing an LVEF of 08-86%, grade 1 diastolic dysfunction, in no significant valvular heart disease. Normal PA pressures. The patient was last seen in the office 6 months ago. Since last visit, he has been doing fairly well. He still complains of episodic chest pain once or twice a month which only last for a couple of minutes always occurring at rest.  The symptoms have been present for several years if not longer have not particularly changed in frequency or character. He has never had any exertional chest pain. No change in his chronic dyspnea on exertion. No leg swelling, orthopnea, or PND. Past Medical History   Diagnosis Date    Asthma     Cancer [facility])      BASAL     Cardiac catheterization 2009     [address] [place] [address]:  No significant CAD.  Cardiac echocardiogram 01/20/2014     EF 50-55%. No WMA. Gr 1 DDfx. RVSP 25-30 mmHg. Mild TR.       DM type 2 (diabetes mellitus, type 2) (HCC)     Enlarged prostate     Failed back syndrome     GERD (gastroesophageal reflux disease)     Hearing loss, bilateral      Hearing Aids    Hypertension     Hypothyroidism     Insomnia  Low serum testosterone level     Neuropathy     Osteoarthritis of multiple joints     S/P colonoscopy 8/14/13     mild diverticulosis in sigmoid colon w/o evidence of diverticulitis; f/u 5 years    SOB (shortness of breath)      chronic; 2' \"lung fever\"    Vertigo       Current Outpatient Prescriptions   Medication Sig Dispense Refill    PEN NEEDLE 31 gauge x 5/16\" ndle USE ONE PEN NEEDLE FOR LANTUS FLEXPEN AND 3 PEN NEEDLES FOR APIDRA WITH EACH MEAL 1 Package 3    atorvastatin (LIPITOR) 20 mg tablet Take 1 Tab by mouth daily. 90 Tab 1    fenofibrate nanocrystallized (TRICOR) 145 mg tablet Take 1 Tab by mouth daily. 90 Tab 1    SITagliptin (JANUVIA) 50 mg tablet Take 1 Tab by mouth daily. 90 Tab 1    lisinopril (PRINIVIL, ZESTRIL) 5 mg tablet Take 1 Tab by mouth daily. 90 Tab 1    metoprolol tartrate (LOPRESSOR) 25 mg tablet Take 1 Tab by mouth two (2) times a day. 180 Tab 0    DULoxetine (CYMBALTA) 60 mg capsule Take 60 mg by mouth nightly.  esomeprazole (NEXIUM) 40 mg capsule Take 40 mg by mouth daily.  betamethasone dipropionate (DIPROLENE) 0.05 % ointment       levothyroxine (SYNTHROID) 150 mcg tablet Take 1 Tab by mouth Daily (before breakfast). 90 Tab 1    butalbital-acetaminophen-caffeine (FIORICET, ESGIC) -40 mg per tablet Take 0.5 Tabs by mouth daily as needed for Pain. 10 Tab 0    LANTUS SOLOSTAR 100 unit/mL (3 mL) pen INJECT 30 UNITS SUBCUTANEOUSLY EVERY NIGHT 3 Each 3    oxyCODONE ER (OXYCONTIN) 10 mg ER tablet Take 1 Tab by mouth every eight (8) hours for 30 days. Max Daily Amount: 30 mg. 90 Tab 0    fluticasone (FLONASE) 50 mcg/actuation nasal spray USE ONE SPRAY IN EACH NOSTRIL ONCE DAILY 1 Bottle 0    pregabalin (LYRICA) 150 mg capsule TAKE 1 CAPSULE BY MOUTH THREE TIMES DAILY FOR 30 DAYS. MAXIMUM 3 CAPSULES DAILY. 90 Cap 3    ranitidine (ZANTAC) 150 mg tablet Take 1 Tab by mouth daily. 90 Tab 0    montelukast (SINGULAIR) 10 mg tablet Take 1 Tab by mouth daily. 90 Tab 0    insulin glulisine (APIDRA SOLOSTAR) 100 unit/mL pen 2 units per carb consumed 3 Syringe 1    tamsulosin (FLOMAX) 0.4 mg capsule Take 1 Cap by mouth two (2) times a day. 180 Cap 3    HYDROcodone-acetaminophen (NORCO) 5-325 mg per tablet Take 1 Tab by mouth daily as needed for Pain.  albuterol (PROVENTIL VENTOLIN) 2.5 mg /3 mL (0.083 %) nebulizer solution 3 mL by Nebulization route every four (4) hours as needed for Wheezing or Shortness of Breath. 1 Package 5    Azelastine (ASTEPRO) 0.15 % (205.5 mcg) nasal spray 1 [place] by Both Nostrils route two (2) times daily as needed. 1 Bottle 5    budesonide-formoterol (SYMBICORT) 160-4.5 mcg/actuation HFA inhaler Take 2 Puffs by inhalation two (2) times a day. 1 Inhaler 5    tiotropium (SPIRIVA) 18 mcg inhalation capsule Take 1 Cap by inhalation daily. 30 Cap 5    ketorolac (ACULAR) 0.5 % ophthalmic solution Administer 1 Drop to both eyes two (2) times a day. Allergies   Allergen Reactions    Ciprofloxacin Anaphylaxis    Other Plant, Animal, Environmental Other (comments)     Patient cannot have MRI. Patient states that he has metal screws in his left arm.  Lamisil [Terbinafine] Swelling     Tongue swelling    Metformin Other (comments)     Elevated cr 1.4    Morphine Other (comments)     \"loss of blood pressure\"    Other Medication Other (comments)     Doxasylin causes extreme GERD    Pcn [Penicillins] Rash    Pentothal [Thiopental Sodium] Shortness of Breath    Sulfa (Sulfonamide Antibiotics) Rash      Social History   Substance Use Topics    Smoking status: Former Smoker     Packs/day: 1.00     Years: 31.00     Types: Pipe, Cigars     Quit date: 1/1/1995    Smokeless tobacco: Never Used    Alcohol use No            Review of Systems   Constitutional: Negative for chills, fever and weight loss. HENT: Negative for nosebleeds. Eyes: Negative for blurred vision and double vision.    Respiratory: Negative for cough, shortness of breath and wheezing. Cardiovascular: Positive for chest pain ( Atypical). Negative for palpitations, orthopnea, claudication, leg swelling and PND. Gastrointestinal: Negative for abdominal pain, heartburn, nausea and vomiting. Genitourinary: Negative for dysuria and hematuria. Musculoskeletal: Negative for falls and myalgias. Skin: Negative for rash. Neurological: Negative for dizziness, focal weakness and headaches. Endo/Heme/Allergies: Does not bruise/bleed easily. Psychiatric/Behavioral: Negative for substance abuse. Visit Vitals    /60    Pulse 89    Ht 6' 2\" (1.88 m)    Wt 124.3 kg (274 lb)    SpO2 97%    BMI 35.18 kg/m2      Physical Exam   Constitutional: He is oriented to person, place, and time. He appears well-developed and well-nourished. HENT:   Head: Normocephalic and atraumatic. Eyes: Conjunctivae are normal.   Neck: Neck supple. No JVD present. Carotid bruit is not present. Cardiovascular: Normal rate, regular rhythm, S1 normal, S2 normal and normal pulses. Exam reveals distant heart sounds. Exam reveals no gallop and no S3. No murmur heard. Pulmonary/Chest: Breath sounds normal. He has no wheezes. He has no rales. Abdominal: Soft. Bowel sounds are normal. There is no tenderness. Musculoskeletal: He exhibits no edema. Neurological: He is alert and oriented to person, place, and time. Skin: Skin is warm and dry. EKG: Normal sinus rhythm,, low voltage in the precordial leads, poor R wave progression, No ST or T wave abnormalities concerning for ischemia. Compared to the previous EKG, APCs are no longer present. ASSESSMENT and PLAN    Mixed dyslipidemia. Patient is now taking both TriCor and atorvastatin 20 mg daily. His most recent lipid panel from January 2017 looked very reasonable: Total cholesterol 158, triglycerides 240, LDL 77, HDL 33. I would continue this regimen. Atypical chest pain.  No significant change in his symptoms over the past one to 2 years. Hypertension. Patient blood pressure remains well controlled on his current regimen. Diabetes mellitus. His most recent hemoglobin A1c was 8.7. This is followed by his PCP, I am a cardiac standpoint I would prefer his A1c to be less than 7. Followup in 6 months or sooner if needed.

## 2017-02-20 NOTE — PROGRESS NOTES
1. Have you been to the ER, urgent care clinic since your last visit? Hospitalized since your last visit? No     2. Have you seen or consulted any other health care providers outside of the [address] [place] [address] since your last visit? Include any pap smears or colon screening.  No

## 2017-02-22 ENCOUNTER — OFFICE VISIT (OUTPATIENT)
Dept: FAMILY MEDICINE CLINIC | Age: 67
End: 2017-02-22

## 2017-02-22 VITALS
HEIGHT: 74 IN | OXYGEN SATURATION: 96 % | RESPIRATION RATE: 16 BRPM | TEMPERATURE: 98.3 F | HEART RATE: 97 BPM | SYSTOLIC BLOOD PRESSURE: 118 MMHG | DIASTOLIC BLOOD PRESSURE: 68 MMHG | BODY MASS INDEX: 35.32 KG/M2 | WEIGHT: 275.2 LBS

## 2017-02-22 DIAGNOSIS — E11.65 POORLY CONTROLLED DIABETES MELLITUS (HCC): Primary | ICD-10-CM

## 2017-02-22 NOTE — PROGRESS NOTES
Chief Complaint   Patient presents with    Diabetes    Hypertension     Patient states he is here for follow up on DM and HTN.

## 2017-02-22 NOTE — PROGRESS NOTES
HISTORY OF PRESENT ILLNESS  Marbin Green is a 74 y.o. male. HPI: Here for follow up on diabetes. Last visit elevated HBA1C and his fasting sugar also elevated around 200. Adjusted some lantus dose. Gradually went up and now taking 48 units of lantus at bedtime. Review blood sugar log from home and fasting sugar is coming down around 150 and post prandial in upper 200. He has been watching his carb closely as well. Denies any headache, dizziness, no chest pain or trouble breathing, no arm or leg weakness. No nausea or vomiting, no weight or appetite changes, no depression or anxiety. no palpitation, no diaphoresis. Had some urinary frequency last visit could be from high blood sugar which has been getting better. No dry mouth. No unusual fatigue. Has chronic constipation could be from his pain medication. Following GI for his GERD symptoms and lately increased PPI to bid.    Visit Vitals    /68 (BP 1 Location: Left arm, BP Patient Position: Sitting)    Pulse 97    Temp 98.3 °F (36.8 °C) (Oral)    Resp 16    Ht 6' 2\" (1.88 m)    Wt 275 lb 3.2 oz (124.8 kg)    SpO2 96%    BMI 35.33 kg/m2     Lab Results   Component Value Date/Time    Hemoglobin A1c 8.7 01/19/2017 09:21 AM    Hemoglobin A1c (POC) 6.9 12/16/2015 10:29 AM     Lab Results   Component Value Date/Time    Cholesterol, total 158 01/19/2017 09:21 AM    HDL Cholesterol 33 01/19/2017 09:21 AM    LDL, calculated 77 01/19/2017 09:21 AM    VLDL, calculated 48 01/19/2017 09:21 AM    Triglyceride 240 01/19/2017 09:21 AM    CHOL/HDL Ratio 4.8 01/19/2017 09:21 AM     Lab Results   Component Value Date/Time    Sodium 139 01/19/2017 09:21 AM    Potassium 4.2 01/19/2017 09:21 AM    Chloride 100 01/19/2017 09:21 AM    CO2 30 01/19/2017 09:21 AM    Anion gap 9 01/19/2017 09:21 AM    Glucose 198 01/19/2017 09:21 AM    BUN 20 01/19/2017 09:21 AM    Creatinine 1.08 01/19/2017 09:21 AM    BUN/Creatinine ratio 19 01/19/2017 09:21 AM    GFR est AA >60 01/19/2017 09:21 AM    GFR est non-AA >60 01/19/2017 09:21 AM    Calcium 8.9 01/19/2017 09:21 AM    Bilirubin, total 0.3 01/19/2017 09:21 AM    AST (SGOT) 14 01/19/2017 09:21 AM    Alk. phosphatase 51 01/19/2017 09:21 AM    Protein, total 6.6 01/19/2017 09:21 AM    Albumin 3.7 01/19/2017 09:21 AM    Globulin 2.9 01/19/2017 09:21 AM    A-G Ratio 1.3 01/19/2017 09:21 AM    ALT (SGPT) 30 01/19/2017 09:21 AM     Lab Results   Component Value Date/Time    TSH 1.16 01/19/2017 09:21 AM     Lab Results   Component Value Date/Time    Microalbumin/Creat ratio (mg/g creat)  07/19/2016 07:19 AM     Cannot calculate ratio due to micro albumin result outside reportable range. Microalbumin,urine random <0.50 07/19/2016 07:19 AM    Microalbumin, urine 4.9 03/10/2015 12:00 AM       ROS: see HPI     Physical Exam   Constitutional: He is oriented to person, place, and time. No distress. Cardiovascular: Normal heart sounds. Pulmonary/Chest: No respiratory distress. Abdominal: Soft. There is no tenderness. Musculoskeletal: He exhibits no edema. Neurological: He is oriented to person, place, and time. ASSESSMENT and PLAN    ICD-10-CM ICD-9-CM    1. Poorly controlled diabetes mellitus ([facility] [address]): for now gradually improving fasting blood sugar. See HPI. See scanned blood sugar log. Continue diet modification. For now lantus 52 units. Has coming up appt with endocrinologist and will follow their recommendations. E11.65 250.00    Pt understood and agrees with above plan. Review HM  Blood pressure stable today. Will observe and continue current plan. Follow-up Disposition:  Return in about 2 months (around 4/22/2017).

## 2017-02-22 NOTE — MR AVS SNAPSHOT
Visit Information Date & Time Provider Department Dept. Phone Encounter #  
 2/22/2017  1:15 PM Bridget Walden, [address] [facility] [id number] Follow-up Instructions Return in about 2 months (around 4/22/2017). Your Appointments 3/2/2017  9:30 AM  
Follow Up with Thamas Dakin, MD  
[address] [place] [address] for Pain Management [address] [place] [address]) Appt Note: return in 2 months [address] [facility] [address]  
[phone] [address]  
  
    
 3/9/2017  9:15 AM  
Office Visit with Trevon David MD  
[facility] [address] [facility]) Appt Note: check up [address] [address]  
[phone] [address] [address]  
  
    
 8/21/2017  2:40 PM  
Follow Up with Ondina Jones MD  
[facility] ([address]) Appt Note: 6 month follow up [place] [id number]  
[phone] [address] Upcoming Health Maintenance Date Due  
 MEDICARE YEARLY EXAM 4/21/2017 EYE EXAM RETINAL OR DILATED Q1 4/26/2017 HEMOGLOBIN A1C Q6M 7/19/2017 MICROALBUMIN Q1 7/19/2017 Pneumococcal 65+ Low/Medium Risk (2 of 2 - PPSV23) 7/20/2017 FOOT EXAM Q1 1/9/2018 LIPID PANEL Q1 1/19/2018 GLAUCOMA SCREENING Q2Y 4/26/2018 COLONOSCOPY 10/19/2023 DTaP/Tdap/Td series (2 - Td) 5/16/2026 Allergies as of 2/22/2017  Review Complete On: 2/22/2017 By: Bridget Walden MD  
  
 Severity Noted Reaction Type Reactions Ciprofloxacin High 11/16/2013    Anaphylaxis Other Plant, Animal, Environmental High 03/10/2016    Other (comments) Patient cannot have MRI. Patient states that he has metal screws in his left arm. Lamisil [Terbinafine]  10/18/2013    Swelling Tongue swelling Metformin  03/16/2016    Other (comments) Elevated cr 1.4 Morphine  10/18/2013    Other (comments) \"loss of blood pressure\" Other Medication  10/18/2013    Other (comments) Doxasylin causes extreme GERD Pcn [Penicillins]  10/18/2013    Rash Pentothal [Thiopental Sodium]  10/18/2013    Shortness of Breath Sulfa (Sulfonamide Antibiotics)  10/18/2013    Rash Current Immunizations  Reviewed on 7/20/2016 Name Date Influenza Vaccine 11/3/2014 Influenza Vaccine (Quad) PF 1/25/2017, 9/16/2015 Pneumococcal Conjugate (PCV-13) 7/20/2016 Tdap 5/16/2016 Not reviewed this visit You Were Diagnosed With   
  
 Codes Comments Poorly controlled diabetes mellitus ([facility] [address]    -  Primary ICD-10-CM: E11.65 ICD-9-CM: 250.00 Vitals BP  
  
  
  
  
  
 118/68 (BP 1 Location: Left arm, BP Patient Position: Sitting) BMI and BSA Data Body Mass Index Body Surface Area  
 35.33 kg/m 2 2.55 m 2 Preferred Pharmacy Pharmacy Name Phone [facility] [address] [place] [address] [phone] Your Updated Medication List  
  
   
This list is accurate as of: 2/22/17  1:54 PM.  Always use your most recent med list.  
  
  
  
  
 ACULAR 0.5 % ophthalmic solution Generic drug:  ketorolac Administer 1 Drop to both eyes two (2) times a day. albuterol 2.5 mg /3 mL (0.083 %) nebulizer solution Commonly known as:  PROVENTIL VENTOLIN  
3 mL by Nebulization route every four (4) hours as needed for Wheezing or Shortness of Breath. atorvastatin 20 mg tablet Commonly known as:  LIPITOR Take 1 Tab by mouth daily. Azelastine 0.15 % (205.5 mcg) nasal spray Commonly known as:  ASTEPRO  
1 Spray by Both Nostrils route two (2) times daily as needed. betamethasone dipropionate 0.05 % ointment Commonly known as:  DIPROLENE  
  
 budesonide-formoterol 160-4.5 mcg/actuation HFA inhaler Commonly known as:  SYMBICORT  
 Take 2 Puffs by inhalation two (2) times a day. butalbital-acetaminophen-caffeine -40 mg per tablet Commonly known as:  Ardell Push Take 0.5 Tabs by mouth daily as needed for Pain. DULoxetine 60 mg capsule Commonly known as:  CYMBALTA Take 60 mg by mouth nightly. esomeprazole 40 mg capsule Commonly known as:  Marta Dame Take 40 mg by mouth two (2) times a day. fenofibrate nanocrystallized 145 mg tablet Commonly known as:  [employer] Take 1 Tab by mouth daily. fluticasone 50 mcg/actuation nasal spray Commonly known as:  FLONASE  
USE ONE SPRAY IN EACH NOSTRIL ONCE DAILY HYDROcodone-acetaminophen 5-325 mg per tablet Commonly known as:  Raf Dolphin Take 1 Tab by mouth daily as needed for Pain. insulin glulisine 100 unit/mL pen Commonly known as:  APIDRA SOLOSTAR  
2 units per carb consumed LANTUS SOLOSTAR 100 unit/mL (3 mL) pen Generic drug:  insulin glargine INJECT 30 UNITS SUBCUTANEOUSLY EVERY NIGHT  
  
 levothyroxine 150 mcg tablet Commonly known as:  SYNTHROID Take 1 Tab by mouth Daily (before breakfast). lisinopril 5 mg tablet Commonly known as:  Mollie Ripa Take 1 Tab by mouth daily. metoprolol tartrate 25 mg tablet Commonly known as:  LOPRESSOR Take 1 Tab by mouth two (2) times a day. montelukast 10 mg tablet Commonly known as:  SINGULAIR Take 1 Tab by mouth daily. oxyCODONE ER 10 mg ER tablet Commonly known as:  OxyCONTIN Take 1 Tab by mouth every eight (8) hours for 30 days. Max Daily Amount: 30 mg. PEN NEEDLE 31 gauge x 5/16\" Ndle Generic drug:  Insulin Needles (Disposable) USE ONE PEN NEEDLE FOR LANTUS FLEXPEN AND 3 PEN NEEDLES FOR APIDRA WITH EACH MEAL  
  
 pregabalin 150 mg capsule Commonly known as:  Herb Bustle TAKE 1 CAPSULE BY MOUTH THREE TIMES DAILY FOR 30 DAYS. MAXIMUM 3 CAPSULES DAILY.  
  
 raNITIdine 150 mg tablet Commonly known as:  ZANTAC Take 1 Tab by mouth daily. SITagliptin 50 mg tablet Commonly known as:  Mordecai Chris Take 1 Tab by mouth daily. tamsulosin 0.4 mg capsule Commonly known as:  FLOMAX Take 1 Cap by mouth two (2) times a day. tiotropium 18 mcg inhalation capsule Commonly known as:  Dot Standing Take 1 Cap by inhalation daily. Follow-up Instructions Return in about 2 months (around 4/22/2017). Patient Instructions Learning About Diabetes Food Guidelines Your Care Instructions Meal planning is important to manage diabetes. It helps keep your blood sugar at a target level (which you set with your doctor). You don't have to eat special foods. You can eat what your family eats, including sweets once in a while. But you do have to pay attention to how often you eat and how much you eat of certain foods. You may want to work with a dietitian or a certified diabetes educator (CDE) to help you plan meals and snacks. A dietitian or CDE can also help you lose weight if that is one of your goals. What should you know about eating carbs? Managing the amount of carbohydrate (carbs) you eat is an important part of healthy meals when you have diabetes. Carbohydrate is found in many foods. · Learn which foods have carbs. And learn the amounts of carbs in different foods. ¨ Bread, cereal, pasta, and rice have about 15 grams of carbs in a serving. A serving is 1 slice of bread (1 ounce), ½ cup of cooked cereal, or 1/3 cup of cooked pasta or rice. ¨ Fruits have 15 grams of carbs in a serving. A serving is 1 small fresh fruit, such as an apple or orange; ½ of a banana; ½ cup of cooked or canned fruit; ½ cup of fruit juice; 1 cup of melon or raspberries; or 2 tablespoons of dried fruit. ¨ Milk and no-sugar-added yogurt have 15 grams of carbs in a serving. A serving is 1 cup of milk or 2/3 cup of no-sugar-added yogurt. ¨ Starchy vegetables have 15 grams of carbs in a serving.  A serving is ½ cup of mashed potatoes or sweet potato; 1 cup winter squash; ½ of a small baked potato; ½ cup of cooked beans; or ½ cup cooked corn or green peas. · Learn how much carbs to eat each day and at each meal. A dietitian or CDE can teach you how to keep track of the amount of carbs you eat. This is called carbohydrate counting. · If you are not sure how to count carbohydrate grams, use the Plate Method to plan meals. It is a good, quick way to make sure that you have a balanced meal. It also helps you spread carbs throughout the day. ¨ Divide your plate by types of foods. Put non-starchy vegetables on half the plate, meat or other protein food on one-quarter of the plate, and a grain or starchy vegetable in the final quarter of the plate. To this you can add a small piece of fruit and 1 cup of milk or yogurt, depending on how many carbs you are supposed to eat at a meal. 
· Try to eat about the same amount of carbs at each meal. Do not \"save up\" your daily allowance of carbs to eat at one meal. 
· Proteins have very little or no carbs per serving. Examples of proteins are beef, chicken, turkey, fish, eggs, tofu, cheese, cottage cheese, and peanut butter. A serving size of meat is 3 ounces, which is about the size of a deck of cards. Examples of meat substitute serving sizes (equal to 1 ounce of meat) are 1/4 cup of cottage cheese, 1 egg, 1 tablespoon of peanut butter, and ½ cup of tofu. How can you eat out and still eat healthy? · Learn to estimate the serving sizes of foods that have carbohydrate. If you measure food at home, it will be easier to estimate the amount in a serving of restaurant food. · If the meal you order has too much carbohydrate (such as potatoes, corn, or baked beans), ask to have a low-carbohydrate food instead. Ask for a salad or green vegetables. · If you use insulin, check your blood sugar before and after eating out to help you plan how much to eat in the future. · If you eat more carbohydrate at a meal than you had planned, take a walk or do other exercise. This will help lower your blood sugar. What else should you know? · Limit saturated fat, such as the fat from meat and dairy products. This is a healthy choice because people who have diabetes are at higher risk of heart disease. So choose lean cuts of meat and nonfat or low-fat dairy products. Use olive or canola oil instead of butter or shortening when cooking. · Don't skip meals. Your blood sugar may drop too low if you skip meals and take insulin or certain medicines for diabetes. · Check with your doctor before you drink alcohol. Alcohol can cause your blood sugar to drop too low. Alcohol can also cause a bad reaction if you take certain diabetes medicines. Follow-up care is a key part of your treatment and safety. Be sure to make and go to all appointments, and call your doctor if you are having problems. It's also a good idea to know your test results and keep a list of the medicines you take. Where can you learn more? Go to http://slava-enrique.info/. Enter X525 in the search box to learn more about \"Learning About Diabetes Food Guidelines. \" Current as of: May 23, 2016 Content Version: 11.1 © 6241-0557 [employer]. Care instructions adapted under license by [employer] (which disclaims liability or warranty for this information). If you have questions about a medical condition or this instruction, always ask your healthcare professional. [place] [address] any warranty or liability for your use of this information. Introducing [facility]! Meghan Farr introduces [employer] patient portal. Now you can access parts of your medical record, email your doctor's office, and request medication refills online. 1. In your internet browser, go to [email or web address] 2. Click on the First Time User? Click Here link in the Sign In box. You will see the New Member Sign Up page. 3. Enter your [employer] Access Code exactly as it appears below. You will not need to use this code after youve completed the sign-up process. If you do not sign up before the expiration date, you must request a new code. · [employer] Access Code: [email or web address] Expires: 5/21/2017  9:43 AM 
 
4. Enter the last four digits of your Social Security Number (xxxx) and Date of Birth (mm/dd/yyyy) as indicated and click Submit. You will be taken to the next sign-up page. 5. Create a E[employer] Network ID. This will be your E[employer] Network login ID and cannot be changed, so think of one that is secure and easy to remember. 6. Create a [employer] password. You can change your password at any time. 7. Enter your Password Reset Question and Answer. This can be used at a later time if you forget your password. 8. Enter your e-mail address. You will receive e-mail notification when new information is available in [address] 9. Click Sign Up. You can now view and download portions of your medical record. 10. Click the Download Summary menu link to download a portable copy of your medical information. If you have questions, please visit the Frequently Asked Questions section of the [employer] website. Remember, [employer] is NOT to be used for urgent needs. For medical emergencies, dial 911. Now available from your iPhone and Android! Please provide this summary of care documentation to your next provider. Your primary care clinician is listed as Kelsey Adam. If you have any questions after today's visit, please call [phone].

## 2017-02-22 NOTE — PATIENT INSTRUCTIONS

## 2017-03-02 ENCOUNTER — OFFICE VISIT (OUTPATIENT)
Dept: PAIN MANAGEMENT | Age: 67
End: 2017-03-02

## 2017-03-02 VITALS — DIASTOLIC BLOOD PRESSURE: 79 MMHG | HEART RATE: 84 BPM | SYSTOLIC BLOOD PRESSURE: 131 MMHG

## 2017-03-02 DIAGNOSIS — Z79.899 ENCOUNTER FOR LONG-TERM (CURRENT) USE OF MEDICATIONS: ICD-10-CM

## 2017-03-02 DIAGNOSIS — Z98.890 H/O LUMBOSACRAL SPINE SURGERY: ICD-10-CM

## 2017-03-02 DIAGNOSIS — G89.4 CHRONIC PAIN SYNDROME: ICD-10-CM

## 2017-03-02 DIAGNOSIS — M47.896 OTHER OSTEOARTHRITIS OF SPINE, LUMBAR REGION: ICD-10-CM

## 2017-03-02 DIAGNOSIS — M51.36 DDD (DEGENERATIVE DISC DISEASE), LUMBAR: ICD-10-CM

## 2017-03-02 DIAGNOSIS — M54.5 CHRONIC LOW BACK PAIN, UNSPECIFIED BACK PAIN LATERALITY, WITH SCIATICA PRESENCE UNSPECIFIED: Primary | ICD-10-CM

## 2017-03-02 DIAGNOSIS — G89.29 CHRONIC LOW BACK PAIN, UNSPECIFIED BACK PAIN LATERALITY, WITH SCIATICA PRESENCE UNSPECIFIED: Primary | ICD-10-CM

## 2017-03-02 PROBLEM — M54.50 CHRONIC LOW BACK PAIN: Status: ACTIVE | Noted: 2017-03-02

## 2017-03-02 LAB
ALCOHOL UR POC: NORMAL
AMPHETAMINES UR POC: NEGATIVE
BARBITURATES UR POC: NEGATIVE
BENZODIAZEPINES UR POC: NEGATIVE
BUPRENORPHINE UR POC: NORMAL
CANNABINOIDS UR POC: NEGATIVE
CARISOPRODOL UR POC: NORMAL
COCAINE UR POC: NEGATIVE
FENTANYL UR POC: NORMAL
MDMA/ECSTASY UR POC: NEGATIVE
METHADONE UR POC: NEGATIVE
METHAMPHETAMINE UR POC: NEGATIVE
METHYLPHENIDATE UR POC: NEGATIVE
OPIATES UR POC: NEGATIVE
OXYCODONE UR POC: NEGATIVE
PHENCYCLIDINE UR POC: NEGATIVE
PROPOXYPHENE UR POC: NORMAL
TRAMADOL UR POC: NORMAL
TRICYCLICS UR POC: NEGATIVE

## 2017-03-02 RX ORDER — OXYCODONE HCL 10 MG/1
10 TABLET, FILM COATED, EXTENDED RELEASE ORAL EVERY 8 HOURS
Qty: 90 TAB | Refills: 0 | Status: SHIPPED | OUTPATIENT
Start: 2017-04-01 | End: 2017-04-27 | Stop reason: SDUPTHER

## 2017-03-02 RX ORDER — DULOXETIN HYDROCHLORIDE 60 MG/1
60 CAPSULE, DELAYED RELEASE ORAL
Qty: 30 CAP | Refills: 3 | Status: SHIPPED | OUTPATIENT
Start: 2017-03-02 | End: 2017-04-27 | Stop reason: SDUPTHER

## 2017-03-02 RX ORDER — PREGABALIN 150 MG/1
CAPSULE ORAL
Qty: 90 CAP | Refills: 3 | Status: SHIPPED | OUTPATIENT
Start: 2017-03-02 | End: 2017-04-27 | Stop reason: SDUPTHER

## 2017-03-02 RX ORDER — HYDROCODONE BITARTRATE AND ACETAMINOPHEN 5; 325 MG/1; MG/1
1 TABLET ORAL
Qty: 30 TAB | Refills: 0 | Status: SHIPPED | OUTPATIENT
Start: 2017-03-02 | End: 2017-04-27 | Stop reason: SDUPTHER

## 2017-03-02 RX ORDER — OXYCODONE HCL 10 MG/1
10 TABLET, FILM COATED, EXTENDED RELEASE ORAL EVERY 8 HOURS
Qty: 90 TAB | Refills: 0 | Status: SHIPPED | OUTPATIENT
Start: 2017-03-02 | End: 2017-04-27 | Stop reason: SDUPTHER

## 2017-03-02 NOTE — MR AVS SNAPSHOT
Visit Information Date & Time Provider Department Dept. Phone Encounter #  
 3/2/2017  9:30 AM Hayley Queen MD [facility] for Pain Management [phone] Follow-up Instructions Return in about 2 months (around 5/2/2017). Your Appointments 3/9/2017  9:15 AM  
Office Visit with Cherylynn Dance, MD  
[facility] [address] [facility]) Appt Note: check up [address] [address]  
[phone] [address] [address]  
  
    
 4/24/2017  9:30 AM  
Office Visit with Marlon Lundberg MD  
[address] [facility] ([address]) Appt Note: 2 month followup and [place] [address] [place] [address] [facility] [address]  
  
    
 8/21/2017  2:40 PM  
Follow Up with Gerardo Shah MD  
[facility] ([address]) Appt Note: 6 month follow up [place] [facility] [id number]  
[phone] [address] Upcoming Health Maintenance Date Due  
 MEDICARE YEARLY EXAM 4/21/2017 EYE EXAM RETINAL OR DILATED Q1 4/26/2017 HEMOGLOBIN A1C Q6M 7/19/2017 MICROALBUMIN Q1 7/19/2017 Pneumococcal 65+ Low/Medium Risk (2 of 2 - PPSV23) 7/20/2017 FOOT EXAM Q1 1/9/2018 LIPID PANEL Q1 1/19/2018 GLAUCOMA SCREENING Q2Y 4/26/2018 COLONOSCOPY 10/19/2023 DTaP/Tdap/Td series (2 - Td) 5/16/2026 Allergies as of 3/2/2017  Review Complete On: 3/2/2017 By: Hayley Queen MD  
  
 Severity Noted Reaction Type Reactions Ciprofloxacin High 11/16/2013    Anaphylaxis Other Plant, Animal, Environmental High 03/10/2016    Other (comments) Patient cannot have MRI. Patient states that he has metal screws in his left arm. Lamisil [Terbinafine]  10/18/2013    Swelling Tongue swelling Metformin  03/16/2016    Other (comments) Elevated cr 1.4 Morphine  10/18/2013    Other (comments) \"loss of blood pressure\" Other Medication  10/18/2013    Other (comments) Doxasylin causes extreme GERD Pcn [Penicillins]  10/18/2013    Rash Pentothal [Thiopental Sodium]  10/18/2013    Shortness of Breath Sulfa (Sulfonamide Antibiotics)  10/18/2013    Rash Current Immunizations  Reviewed on 7/20/2016 Name Date Influenza Vaccine 11/3/2014 Influenza Vaccine (Quad) PF 1/25/2017, 9/16/2015 Pneumococcal Conjugate (PCV-13) 7/20/2016 Tdap 5/16/2016 Not reviewed this visit You Were Diagnosed With   
  
 Codes Comments Chronic low back pain, unspecified back pain laterality, with sciatica presence unspecified    -  Primary ICD-10-CM: M54.5, G89.29 ICD-9-CM: 724.2, 338.29 Encounter for long-term (current) use of medications     ICD-10-CM: Z79.899 ICD-9-CM: V58.69 Chronic pain syndrome     ICD-10-CM: G89.4 ICD-9-CM: 338.4 Other osteoarthritis of spine, lumbar region     ICD-10-CM: M47.896 H/O lumbosacral spine surgery     ICD-10-CM: Z98.890 ICD-9-CM: V15.29 DDD (degenerative disc disease), lumbar     ICD-10-CM: M51.36 
ICD-9-CM: 722.52 Vitals BP  
  
  
  
  
  
 131[address]79 Preferred Pharmacy Pharmacy Name Phone [facility] [address] [place] [address] [phone] Your Updated Medication List  
  
   
This list is accurate as of: 3/2/17  9:36 AM.  Always use your most recent med list.  
  
  
  
  
 ACULAR 0.5 % ophthalmic solution Generic drug:  ketorolac Administer 1 Drop to both eyes two (2) times a day. albuterol 2.5 mg /3 mL (0.083 %) nebulizer solution Commonly known as:  PROVENTIL VENTOLIN  
3 mL by Nebulization route every four (4) hours as needed for Wheezing or Shortness of Breath. atorvastatin 20 mg tablet Commonly known as:  LIPITOR Take 1 Tab by mouth daily. Azelastine 0.15 % (205.5 mcg) nasal spray Commonly known as:  ASTEPRO  
1 Spray by Both Nostrils route two (2) times daily as needed. betamethasone dipropionate 0.05 % ointment Commonly known as:  DIPROLENE  
  
 budesonide-formoterol 160-4.5 mcg/actuation HFA inhaler Commonly known as:  SYMBICORT Take 2 Puffs by inhalation two (2) times a day. butalbital-acetaminophen-caffeine -40 mg per tablet Commonly known as:  Carmell Sahil Take 0.5 Tabs by mouth daily as needed for Pain. * DULoxetine 60 mg capsule Commonly known as:  CYMBALTA Take 60 mg by mouth nightly. * DULoxetine 60 mg capsule Commonly known as:  CYMBALTA Take 1 Cap by mouth nightly for 30 days. esomeprazole 40 mg capsule Commonly known as:  Milana Waller Take 40 mg by mouth two (2) times a day. fenofibrate nanocrystallized 145 mg tablet Commonly known as:  [employer] Take 1 Tab by mouth daily. fluticasone 50 mcg/actuation nasal spray Commonly known as:  FLONASE  
USE ONE SPRAY IN EACH NOSTRIL ONCE DAILY  
  
 * HYDROcodone-acetaminophen 5-325 mg per tablet Commonly known as:  Yaneth Burgess Take 1 Tab by mouth daily as needed for Pain. * HYDROcodone-acetaminophen 5-325 mg per tablet Commonly known as:  Yaneth Burgess Take 1 Tab by mouth daily as needed for Pain for up to 30 days. insulin glulisine 100 unit/mL pen Commonly known as:  APIDRA SOLOSTAR  
2 units per carb consumed LANTUS SOLOSTAR 100 unit/mL (3 mL) pen Generic drug:  insulin glargine INJECT 30 UNITS SUBCUTANEOUSLY EVERY NIGHT  
  
 levothyroxine 150 mcg tablet Commonly known as:  SYNTHROID Take 1 Tab by mouth Daily (before breakfast). lisinopril 5 mg tablet Commonly known as:  Gloria Lizandro Take 1 Tab by mouth daily. metoprolol tartrate 25 mg tablet Commonly known as:  LOPRESSOR  
 Take 1 Tab by mouth two (2) times a day. montelukast 10 mg tablet Commonly known as:  SINGULAIR Take 1 Tab by mouth daily. * oxyCODONE ER 10 mg ER tablet Commonly known as:  OxyCONTIN Take 1 Tab by mouth every eight (8) hours for 30 days. Max Daily Amount: 30 mg.  
  
 * oxyCODONE ER 10 mg ER tablet Commonly known as:  OxyCONTIN Take 1 Tab by mouth every eight (8) hours for 30 days. Max Daily Amount: 30 mg. Start taking on:  4/1/2017 PEN NEEDLE 31 gauge x 5/16\" Ndle Generic drug:  Insulin Needles (Disposable) USE ONE PEN NEEDLE FOR LANTUS FLEXPEN AND 3 PEN NEEDLES FOR APIDRA WITH EACH MEAL  
  
 pregabalin 150 mg capsule Commonly known as:  Valerie Bard TAKE 1 CAPSULE BY MOUTH THREE TIMES DAILY FOR 30 DAYS. MAXIMUM 3 CAPSULES DAILY.  
  
 raNITIdine 150 mg tablet Commonly known as:  ZANTAC Take 1 Tab by mouth daily. SITagliptin 50 mg tablet Commonly known as:  Erling Sinner Take 1 Tab by mouth daily. tamsulosin 0.4 mg capsule Commonly known as:  FLOMAX Take 1 Cap by mouth two (2) times a day. tiotropium 18 mcg inhalation capsule Commonly known as:  Murlene Lauren Take 1 Cap by inhalation daily. * Notice: This list has 6 medication(s) that are the same as other medications prescribed for you. Read the directions carefully, and ask your doctor or other care provider to review them with you. Prescriptions Printed Refills  
 oxyCODONE ER (OXYCONTIN) 10 mg ER tablet 0 Sig: Take 1 Tab by mouth every eight (8) hours for 30 days. Max Daily Amount: 30 mg.  
 Class: Print Route: Oral  
 oxyCODONE ER (OXYCONTIN) 10 mg ER tablet 0 Starting on: 4/1/2017 Sig: Take 1 Tab by mouth every eight (8) hours for 30 days. Max Daily Amount: 30 mg.  
 Class: Print Route: Oral  
 pregabalin (LYRICA) 150 mg capsule 3 Sig: TAKE 1 CAPSULE BY MOUTH THREE TIMES DAILY FOR 30 DAYS. MAXIMUM 3 CAPSULES DAILY. Class: Print HYDROcodone-acetaminophen (NORCO) 5-325 mg per tablet 0 Sig: Take 1 Tab by mouth daily as needed for Pain for up to 30 days. Class: Print Route: Oral  
  
Prescriptions Sent to Pharmacy Refills DULoxetine (CYMBALTA) 60 mg capsule 3 Sig: Take 1 Cap by mouth nightly for 30 days. Class: Normal  
 Pharmacy: [address], [address] [place] [address] #: [phone] Route: Oral  
  
We Performed the Following AMB POC DRUG SCREEN () [ [facility]] DRUG SCREEN [[id number] Custom] Follow-up Instructions Return in about 2 months (around 5/2/2017). Introducing [facility]! [facility] introduces [employer] patient portal. Now you can access parts of your medical record, email your doctor's office, and request medication refills online. 1. In your internet browser, go to [email or web address] 2. Click on the First Time User? Click Here link in the Sign In box. You will see the New Member Sign Up page. 3. Enter your ELIKE Access Code exactly as it appears below. You will not need to use this code after youve completed the sign-up process. If you do not sign up before the expiration date, you must request a new code. · ELIKE Access Code: [email or web address] Expires: 5/21/2017  9:43 AM 
 
4. Enter the last four digits of your Social Security Number (xxxx) and Date of Birth (mm/dd/yyyy) as indicated and click Submit. You will be taken to the next sign-up page. 5. Create a [employer] ID. This will be your ELIKE login ID and cannot be changed, so think of one that is secure and easy to remember. 6. Create a ELIKE password. You can change your password at any time. 7. Enter your Password Reset Question and Answer. This can be used at a later time if you forget your password. 8. Enter your e-mail address. You will receive e-mail notification when new information is available in [address] 9. Click Sign Up. You can now view and download portions of your medical record. 10. Click the Download Summary menu link to download a portable copy of your medical information. If you have questions, please visit the Frequently Asked Questions section of the [employer] website. Remember, [employer] is NOT to be used for urgent needs. For medical emergencies, dial 911. Now available from your iPhone and Android! Please provide this summary of care documentation to your next provider. Your primary care clinician is listed as Edwina Schuster. If you have any questions after today's visit, please call [phone].

## 2017-03-02 NOTE — PROGRESS NOTES
HISTORY OF PRESENT ILLNESS  Brianna Colbert is a 74 y.o. male. HPI Comments: Meds help with pain control and quality of life. No new side effects reported today. No new medical problems reported today. Visit survey reviewed, and will be scanned. Recent Average pain level (out of 10). -- 7  Chief complaint, low back pain  Chronic pain  Using Cymbalta 60 mg once a day  Lyrica 150 mg 3 times a day  OxyContin 10 mg 3 times a day  Infrequent use of hydrocodone, 5 mg. A prescription for hydrocodone last him well over a month. He uses a TENS unit as needed  Medication helps improve general activity, mood, walking, sleep, enjoyment of life             Review of Systems   Constitutional: Positive for malaise/fatigue. Negative for chills and fever. HENT: Positive for hearing loss. Respiratory: Negative for cough and shortness of breath. Cardiovascular: Positive for leg swelling. Negative for chest pain and palpitations. Gastrointestinal: Positive for constipation. Negative for diarrhea, heartburn, nausea and vomiting. Musculoskeletal: Positive for back pain and joint pain. Negative for falls. Skin: Negative for rash. Neurological: Negative for dizziness. Psychiatric/Behavioral: Positive for depression. Negative for suicidal ideas. The patient is nervous/anxious and has insomnia. Physical Exam   Constitutional: He is oriented to person, place, and time. He appears well-developed and well-nourished. No distress. HENT:   Head: Normocephalic and atraumatic. Pulmonary/Chest: Effort normal. No respiratory distress. Neurological: He is alert and oriented to person, place, and time. Skin: Skin is warm and dry. He is not diaphoretic. Psychiatric: He has a normal mood and affect. His speech is normal. Cognition and memory are normal.   Nursing note and vitals reviewed. ASSESSMENT and PLAN  Encounter Diagnoses   Name Primary?     Chronic low back pain, unspecified back pain laterality, with sciatica presence unspecified Yes    Encounter for long-term (current) use of medications     Chronic pain syndrome     Other osteoarthritis of spine, lumbar region     H/O lumbosacral spine surgery     DDD (degenerative disc disease), lumbar    No signs of addiction or diversion. The primary Dxes. ,including pain are controlled. Pain/Pain control/Meds and Quality Of Life have been reviewed. Nonpharmacologic therapy and non-opioid pharmacologic therapy are always considered. If opioid therapy is prescribed, this is only if the expected benefits for both pain and function are anticipated to outweigh risks. Possible changes to treatment plan considered. Support/education given as needed. Today-medications are as listed. No significant changes to medications. Follow up -- 2 months.    --Urine test or oral swab today. Also, the prescription monitoring program was reviewed today. The majority of today's visit was spent counseling and coordinating care. Total visit time-40 minutes. -Dragon medical dictation software was used for portions of this report. Unintended errors may occur.

## 2017-03-02 NOTE — PROGRESS NOTES
Nursing Notes    Patient presents to the office today in follow-up. Reviewed medications with counts as follows:    Rx Date filled Qty Dispensed Pill Count Last Dose Short   norco 5/325 9/19/16 30 62 unknown no   oxycontin 10 mg 2/23/17 90 59 This am Yes. 5 tabs   Mr. Miguel Godinez has a reminder for a \"due or due soon\" health maintenance. I have asked that he contact his primary care provider for follow-up on this health maintenance. POC UDS was performed in office today    Any new labs or imaging since last appointment? YES  Labs    Have you been to an emergency room (ER) or urgent care clinic since your last visit? NO            Have you been hospitalized since your last visit? NO     If yes, where, when, and reason for visit? Have you seen or consulted any other health care providers outside of the [facility]  since your last visit?   YES     If yes, where, when, and reason for visit?   [facility]

## 2017-03-09 ENCOUNTER — HOSPITAL ENCOUNTER (OUTPATIENT)
Dept: LAB | Age: 67
Discharge: HOME | End: 2017-03-09
Payer: MEDICARE

## 2017-03-09 ENCOUNTER — OFFICE VISIT (OUTPATIENT)
Dept: UROLOGY | Age: 67
End: 2017-03-09

## 2017-03-09 VITALS
SYSTOLIC BLOOD PRESSURE: 136 MMHG | DIASTOLIC BLOOD PRESSURE: 76 MMHG | HEART RATE: 80 BPM | BODY MASS INDEX: 35.16 KG/M2 | HEIGHT: 74 IN | TEMPERATURE: 96.7 F | WEIGHT: 274 LBS | OXYGEN SATURATION: 98 %

## 2017-03-09 DIAGNOSIS — R35.1 BPH ASSOCIATED WITH NOCTURIA: Primary | ICD-10-CM

## 2017-03-09 DIAGNOSIS — N40.1 BPH ASSOCIATED WITH NOCTURIA: Primary | ICD-10-CM

## 2017-03-09 DIAGNOSIS — N40.1 BPH ASSOCIATED WITH NOCTURIA: ICD-10-CM

## 2017-03-09 DIAGNOSIS — R35.1 BPH ASSOCIATED WITH NOCTURIA: ICD-10-CM

## 2017-03-09 LAB
BILIRUB UR QL STRIP: NEGATIVE
GLUCOSE UR-MCNC: NEGATIVE MG/DL
KETONES P FAST UR STRIP-MCNC: NEGATIVE MG/DL
PH UR STRIP: 5.5 [PH] (ref 4.6–8)
PROT UR QL STRIP: NEGATIVE MG/DL
PSA SERPL-MCNC: 0.3 NG/ML (ref 0–4)
SP GR UR STRIP: 1.01 (ref 1–1.03)
UA UROBILINOGEN AMB POC: NORMAL (ref 0.2–1)
URINALYSIS CLARITY POC: CLEAR
URINALYSIS COLOR POC: YELLOW
URINE BLOOD POC: NEGATIVE
URINE LEUKOCYTES POC: NEGATIVE
URINE NITRITES POC: NEGATIVE

## 2017-03-09 PROCEDURE — 84153 ASSAY OF PSA TOTAL: CPT | Performed by: UROLOGY

## 2017-03-09 RX ORDER — TAMSULOSIN HYDROCHLORIDE 0.4 MG/1
0.4 CAPSULE ORAL DAILY
Qty: 90 CAP | Refills: 3 | Status: SHIPPED | OUTPATIENT
Start: 2017-03-09 | End: 2017-04-24 | Stop reason: SDUPTHER

## 2017-03-09 NOTE — PATIENT INSTRUCTIONS
cisimple Activation    Thank you for requesting access to cisimple. Please follow the instructions below to securely access and download your online medical record. cisimple allows you to send messages to your doctor, view your test results, renew your prescriptions, schedule appointments, and more. How Do I Sign Up? 1. In your internet browser, go to [email or web address]. 2. Click on the First Time User? Click Here link in the Sign In box. You will see the New Member Sign Up page. 3. Enter your cisimple Access Code exactly as it appears below. You will not need to use this code after youve completed the sign-up process. If you do not sign up before the expiration date, you must request a new code. cisimple Access Code: [email or web address]  Expires: 2017  9:43 AM (This is the date your cisimple access code will )    4. Enter the last four digits of your Social Security Number (xxxx) and Date of Birth (mm/dd/yyyy) as indicated and click Submit. You will be taken to the next sign-up page. 5. Create a cisimple ID. This will be your cisimple login ID and cannot be changed, so think of one that is secure and easy to remember. 6. Create a cisimple password. You can change your password at any time. 7. Enter your Password Reset Question and Answer. This can be used at a later time if you forget your password. 8. Enter your e-mail address. You will receive e-mail notification when new information is available in [address] 9. Click Sign Up. You can now view and download portions of your medical record. 10. Click the Download Summary menu link to download a portable copy of your medical information. Additional Information    If you have questions, please visit the Frequently Asked Questions section of the cisimple website at [email or web address] Remember, cisimple is NOT to be used for urgent needs. For medical emergencies, dial 911.            Benign Prostatic Hyperplasia: Care Instructions  Your Care Instructions    Benign prostatic hyperplasia, or BPH, is an enlarged prostate gland. The prostate is a small gland that makes some of the fluid in semen. Prostate enlargement happens to almost all men as they age. It is usually not serious. BPH does not cause prostate cancer. As the prostate gets bigger, it may partly block the flow of urine. You may have a hard time getting a urine stream started or completely stopped. BPH can cause dribbling. You may have a weak urine stream, or you may have to urinate more often than you used to, especially at night. Most men find these problems easy to manage. You do not need treatment unless your symptoms bother you a lot or you have other problems, such as bladder infections or stones. In these cases, medicines may help. Surgery is not needed unless the urine flow is blocked or the symptoms do not get better with medicine. Follow-up care is a key part of your treatment and safety. Be sure to make and go to all appointments, and call your doctor if you are having problems. It's also a good idea to know your test results and keep a list of the medicines you take. How can you care for yourself at home? · Take plenty of time to urinate. Try to relax. · Try \"double voiding. \" Urinate as much you can, relax for a few moments, and then try to urinate again. · Sit on the toilet to urinate. · Read or think of other things while you are waiting. · Turn on a faucet, or try to picture running water. Some men find that this helps get their urine flowing. · If dribbling is a problem, wash your penis daily to avoid skin irritation and infection. · Avoid caffeine and alcohol. These drinks will increase how often you need to urinate. Spread your fluid intake throughout the day. If the urge to urinate often wakes you at night, limit your fluid intake in the evening. Urinate right before you go to bed.   · Many over-the-counter cold and allergy medicines can make the symptoms of BPH worse. Avoid antihistamines, decongestants, and allergy pills, if you can. Read the warnings on the package. · If you take any prescription medicines, especially tranquilizers or antidepressants, ask your doctor or pharmacist whether they can cause urination problems. There may be other medicines you can use that do not cause urinary problems. · Be safe with medicines. Take your medicines exactly as prescribed. Call your doctor if you think you are having a problem with your medicine. When should you call for help? Call your doctor now or seek immediate medical care if:  · You cannot urinate at all. · You have symptoms of a urinary infection. For example:  ¨ You have blood or pus in your urine. ¨ You have pain in your back just below your rib cage. This is called flank pain. ¨ You have a fever, chills, or body aches. ¨ It hurts to urinate. ¨ You have groin or belly pain. Watch closely for changes in your health, and be sure to contact your doctor if:  · It hurts when you ejaculate. · Your urinary problems get a lot worse or bother you a lot. Where can you learn more? Go to http://slava-enrique.info/. Enter E687 in the search box to learn more about \"Benign Prostatic Hyperplasia: Care Instructions. \"  Current as of: May 24, 2016  Content Version: 11.1  © 0117-6170 [employer]. Care instructions adapted under license by [employer] (which disclaims liability or warranty for this information). If you have questions about a medical condition or this instruction, always ask your healthcare professional. [place] [address] any warranty or liability for your use of this information.

## 2017-03-09 NOTE — MR AVS SNAPSHOT
Visit Information Date & Time Provider Department Dept. Phone Encounter #  
 3/9/2017  9:15 AM Jina Bence, Yulisa [facility] [phone] Your Appointments 4/24/2017  9:30 AM  
Office Visit with Janie Jenkins MD  
[facility]) Appt Note: 2 month followup and [place] [address] [facility] [address]  
225 [facility] [address] [facility] [address]  
  
    
 4/27/2017 11:00 AM  
Follow Up with Hue Mixon MD  
[address] [facility] [address] [place] [address]) Appt Note: [address] F/U W/PROVIDER 3MTHS  
 33[address] [facility] [address]  
[phone] Za \[facility] [place] [facility]\""u [id number]  
  
    
 8/21/2017  2:40 PM  
Follow Up with Magdy Peraza MD  
[facility] ([address] [facility]) Appt Note: 6 month follow up [place] [address] [address] [address] [phone] [address] [place] [address] St [address] Upcoming Health Maintenance Date Due  
 MEDICARE YEARLY EXAM 4/21/2017 EYE EXAM RETINAL OR DILATED Q1 4/26/2017 HEMOGLOBIN A1C Q6M 7/19/2017 MICROALBUMIN Q1 7/19/2017 Pneumococcal 65+ Low/Medium Risk (2 of 2 - PPSV23) 7/20/2017 FOOT EXAM Q1 1/9/2018 LIPID PANEL Q1 1/19/2018 GLAUCOMA SCREENING Q2Y 4/26/2018 COLONOSCOPY 10/19/2023 DTaP/Tdap/Td series (2 - Td) 5/16/2026 Allergies as of 3/9/2017  Review Complete On: 3/9/2017 By: Daniel Morrow LPN Severity Noted Reaction Type Reactions Ciprofloxacin High 11/16/2013    Anaphylaxis Other Plant, Animal, Environmental High 03/10/2016    Other (comments) Patient cannot have MRI. Patient states that he has metal screws in his left arm. Lamisil [Terbinafine]  10/18/2013    Swelling Tongue swelling Metformin  03/16/2016    Other (comments) Elevated cr 1.4 Morphine  10/18/2013    Other (comments) \"loss of blood pressure\" Other Medication  10/18/2013    Other (comments) Doxasylin causes extreme GERD Pcn [Penicillins]  10/18/2013    Rash Pentothal [Thiopental Sodium]  10/18/2013    Shortness of Breath Sulfa (Sulfonamide Antibiotics)  10/18/2013    Rash Current Immunizations  Reviewed on 7/20/2016 Name Date Influenza Vaccine 11/3/2014 Influenza Vaccine (Quad) PF 1/25/2017, 9/16/2015 Pneumococcal Conjugate (PCV-13) 7/20/2016 Tdap 5/16/2016 Not reviewed this visit You Were Diagnosed With   
  
 Codes Comments BPH associated with nocturia    -  Primary ICD-10-CM: N40.1, R35.1 ICD-9-CM: 600.01, 788.43 Vitals BP Pulse Temp Height(growth percentile) Weight(growth percentile) SpO2  
 136/76 (BP 1 Location: Left arm, BP Patient Position: Sitting) 80 96.7 °F (35.9 °C) 6' 2\" (1.88 m) 274 lb (124.3 kg) 98% BMI Smoking Status 35.18 kg/m2 Former Smoker Vitals History BMI and BSA Data Body Mass Index Body Surface Area  
 35.18 kg/m 2 2.55 m 2 Preferred Pharmacy Pharmacy Name Phone [facility] [address] [place] [address] [phone] Your Updated Medication List  
  
   
This list is accurate as of: 3/9/17  9:19 AM.  Always use your most recent med list.  
  
  
  
  
 ACULAR 0.5 % ophthalmic solution Generic drug:  ketorolac Administer 1 Drop to both eyes two (2) times a day. albuterol 2.5 mg /3 mL (0.083 %) nebulizer solution Commonly known as:  PROVENTIL VENTOLIN  
3 mL by Nebulization route every four (4) hours as needed for Wheezing or Shortness of Breath. atorvastatin 20 mg tablet Commonly known as:  LIPITOR Take 1 Tab by mouth daily. Azelastine 0.15 % (205.5 mcg) nasal spray Commonly known as:  ASTEPRO  
 1 [place] by Both Nostrils route two (2) times daily as needed. betamethasone dipropionate 0.05 % ointment Commonly known as:  DIPROLENE  
  
 budesonide-formoterol 160-4.5 mcg/actuation HFA inhaler Commonly known as:  SYMBICORT Take 2 Puffs by inhalation two (2) times a day. butalbital-acetaminophen-caffeine -40 mg per tablet Commonly known as:  Lavella Amble Take 0.5 Tabs by mouth daily as needed for Pain. * DULoxetine 60 mg capsule Commonly known as:  CYMBALTA Take 60 mg by mouth nightly. * DULoxetine 60 mg capsule Commonly known as:  CYMBALTA Take 1 Cap by mouth nightly for 30 days. esomeprazole 40 mg capsule Commonly known as:  Mikala Calkin Take 40 mg by mouth two (2) times a day. fenofibrate nanocrystallized 145 mg tablet Commonly known as:  [employer] Take 1 Tab by mouth daily. fluticasone 50 mcg/actuation nasal spray Commonly known as:  FLONASE  
USE ONE SPRAY IN EACH NOSTRIL ONCE DAILY  
  
 * HYDROcodone-acetaminophen 5-325 mg per tablet Commonly known as:  Chitra Arts Take 1 Tab by mouth daily as needed for Pain. * HYDROcodone-acetaminophen 5-325 mg per tablet Commonly known as:  Chitra Arts Take 1 Tab by mouth daily as needed for Pain for up to 30 days. insulin glulisine 100 unit/mL pen Commonly known as:  APIDRA SOLOSTAR  
2 units per carb consumed LANTUS SOLOSTAR 100 unit/mL (3 mL) pen Generic drug:  insulin glargine INJECT 30 UNITS SUBCUTANEOUSLY EVERY NIGHT  
  
 levothyroxine 150 mcg tablet Commonly known as:  SYNTHROID Take 1 Tab by mouth Daily (before breakfast). lisinopril 5 mg tablet Commonly known as:  Iniguez Lorenzo Take 1 Tab by mouth daily. metoprolol tartrate 25 mg tablet Commonly known as:  LOPRESSOR Take 1 Tab by mouth two (2) times a day. montelukast 10 mg tablet Commonly known as:  SINGULAIR Take 1 Tab by mouth daily. * oxyCODONE ER 10 mg ER tablet Commonly known as:  OxyCONTIN Take 1 Tab by mouth every eight (8) hours for 30 days. Max Daily Amount: 30 mg.  
  
 * oxyCODONE ER 10 mg ER tablet Commonly known as:  OxyCONTIN Take 1 Tab by mouth every eight (8) hours for 30 days. Max Daily Amount: 30 mg. Start taking on:  4/1/2017 PEN NEEDLE 31 gauge x 5/16\" Ndle Generic drug:  Insulin Needles (Disposable) USE ONE PEN NEEDLE FOR LANTUS FLEXPEN AND 3 PEN NEEDLES FOR APIDRA WITH EACH MEAL  
  
 pregabalin 150 mg capsule Commonly known as:  Park Conchita TAKE 1 CAPSULE BY MOUTH THREE TIMES DAILY FOR 30 DAYS. MAXIMUM 3 CAPSULES DAILY.  
  
 raNITIdine 150 mg tablet Commonly known as:  ZANTAC Take 1 Tab by mouth daily. SITagliptin 50 mg tablet Commonly known as:  Martínez Connie Take 1 Tab by mouth daily. * tamsulosin 0.4 mg capsule Commonly known as:  FLOMAX Take 1 Cap by mouth two (2) times a day. * tamsulosin 0.4 mg capsule Commonly known as:  FLOMAX Take 1 Cap by mouth daily. Indications: SYMPTOMATIC BENIGN PROSTATIC HYPERPLASIA  
  
 tiotropium 18 mcg inhalation capsule Commonly known as:  Ross Rodney Take 1 Cap by inhalation daily. * Notice: This list has 8 medication(s) that are the same as other medications prescribed for you. Read the directions carefully, and ask your doctor or other care provider to review them with you. Prescriptions Sent to Pharmacy Refills  
 tamsulosin (FLOMAX) 0.4 mg capsule 3 Sig: Take 1 Cap by mouth daily. Indications: SYMPTOMATIC BENIGN PROSTATIC HYPERPLASIA Class: Normal  
 Pharmacy: [facility]. [address] [place] [address] Ph #: [phone] Route: Oral  
  
We Performed the Following AMB POC URINALYSIS DIP STICK AUTO W/O MICRO [34552 CPT(R)] OH COLLECTION VENOUS BLOOD,VENIPUNCTURE [id number] CPT(R)] To-Do List   
 03/09/2017 Lab:  PROSTATE SPECIFIC AG (PSA) Patient Instructions MyChart Activation Thank you for requesting access to [employer]. Please follow the instructions below to securely access and download your online medical record. [employer] allows you to send messages to your doctor, view your test results, renew your prescriptions, schedule appointments, and more. How Do I Sign Up? 1. In your internet browser, go to [email or web address]. 2. Click on the First Time User? Click Here link in the Sign In box. You will see the New Member Sign Up page. 3. Enter your [employer] Access Code exactly as it appears below. You will not need to use this code after youve completed the sign-up process. If you do not sign up before the expiration date, you must request a new code. [employer] Access Code: [email or web address] Expires: 2017  9:43 AM (This is the date your [employer] access code will ) 4. Enter the last four digits of your Social Security Number (xxxx) and Date of Birth (mm/dd/yyyy) as indicated and click Submit. You will be taken to the next sign-up page. 5. Create a [employer] ID. This will be your [employer] login ID and cannot be changed, so think of one that is secure and easy to remember. 6. Create a [employer] password. You can change your password at any time. 7. Enter your Password Reset Question and Answer. This can be used at a later time if you forget your password. 8. Enter your e-mail address. You will receive e-mail notification when new information is available in [address] 9. Click Sign Up. You can now view and download portions of your medical record. 10. Click the Download Summary menu link to download a portable copy of your medical information. Additional Information If you have questions, please visit the Frequently Asked Questions section of the [employer] website at [email or web address] Remember, [employer] is NOT to be used for urgent needs. For medical emergencies, dial 911. Benign Prostatic Hyperplasia: Care Instructions Your Care Instructions Benign prostatic hyperplasia, or BPH, is an enlarged prostate gland. The prostate is a small gland that makes some of the fluid in semen. Prostate enlargement happens to almost all men as they age. It is usually not serious. BPH does not cause prostate cancer. As the prostate gets bigger, it may partly block the flow of urine. You may have a hard time getting a urine stream started or completely stopped. BPH can cause dribbling. You may have a weak urine stream, or you may have to urinate more often than you used to, especially at night. Most men find these problems easy to manage. You do not need treatment unless your symptoms bother you a lot or you have other problems, such as bladder infections or stones. In these cases, medicines may help. Surgery is not needed unless the urine flow is blocked or the symptoms do not get better with medicine. Follow-up care is a key part of your treatment and safety. Be sure to make and go to all appointments, and call your doctor if you are having problems. It's also a good idea to know your test results and keep a list of the medicines you take. How can you care for yourself at home? · Take plenty of time to urinate. Try to relax. · Try \"double voiding. \" Urinate as much you can, relax for a few moments, and then try to urinate again. · Sit on the toilet to urinate. · Read or think of other things while you are waiting. · Turn on a faucet, or try to picture running water. Some men find that this helps get their urine flowing. · If dribbling is a problem, wash your penis daily to avoid skin irritation and infection. · Avoid caffeine and alcohol. These drinks will increase how often you need to urinate. Spread your fluid intake throughout the day. If the urge to urinate often wakes you at night, limit your fluid intake in the evening. Urinate right before you go to bed. · Many over-the-counter cold and allergy medicines can make the symptoms of BPH worse. Avoid antihistamines, decongestants, and allergy pills, if you can. Read the warnings on the package. · If you take any prescription medicines, especially tranquilizers or antidepressants, ask your doctor or pharmacist whether they can cause urination problems. There may be other medicines you can use that do not cause urinary problems. · Be safe with medicines. Take your medicines exactly as prescribed. Call your doctor if you think you are having a problem with your medicine. When should you call for help? Call your doctor now or seek immediate medical care if: 
· You cannot urinate at all. · You have symptoms of a urinary infection. For example: ¨ You have blood or pus in your urine. ¨ You have pain in your back just below your rib cage. This is called flank pain. ¨ You have a fever, chills, or body aches. ¨ It hurts to urinate. ¨ You have groin or belly pain. Watch closely for changes in your health, and be sure to contact your doctor if: 
· It hurts when you ejaculate. · Your urinary problems get a lot worse or bother you a lot. Where can you learn more? Go to http://slava-enrique.info/. Enter R019 in the search box to learn more about \"Benign Prostatic Hyperplasia: Care Instructions. \" Current as of: May 24, 2016 Content Version: 11.1 © 7048-6550 [employer]. Care instructions adapted under license by [employer] (which disclaims liability or warranty for this information). If you have questions about a medical condition or this instruction, always ask your healthcare professional. [place] [address] any warranty or liability for your use of this information. Introducing [address]!    
 Long Darnell introduces [employer] patient portal. Now you can access parts of your medical record, email your doctor's office, and request medication refills online. 1. In your internet browser, go to [email or web address] 2. Click on the First Time User? Click Here link in the Sign In box. You will see the New Member Sign Up page. 3. Enter your [employer] Access Code exactly as it appears below. You will not need to use this code after youve completed the sign-up process. If you do not sign up before the expiration date, you must request a new code. · [employer] Access Code: [email or web address] Expires: 5/21/2017  9:43 AM 
 
4. Enter the last four digits of your Social Security Number (xxxx) and Date of Birth (mm/dd/yyyy) as indicated and click Submit. You will be taken to the next sign-up page. 5. Create a [employer] ID. This will be your [employer] login ID and cannot be changed, so think of one that is secure and easy to remember. 6. Create a [employer] password. You can change your password at any time. 7. Enter your Password Reset Question and Answer. This can be used at a later time if you forget your password. 8. Enter your e-mail address. You will receive e-mail notification when new information is available in [address] 9. Click Sign Up. You can now view and download portions of your medical record. 10. Click the Download Summary menu link to download a portable copy of your medical information. If you have questions, please visit the Frequently Asked Questions section of the [employer] website. Remember, [employer] is NOT to be used for urgent needs. For medical emergencies, dial 911. Now available from your iPhone and Android! Please provide this summary of care documentation to your next provider. Your primary care clinician is listed as [place]. If you have any questions after today's visit, please call [phone].

## 2017-03-10 LAB
CREATININE, EXTERNAL: 94.8
HBA1C MFR BLD HPLC: 7.7 %
MICROALBUMIN UR TEST STR-MCNC: 4.1 MG/DL

## 2017-03-10 NOTE — PROGRESS NOTES
Susan Chadwick 74 y.o. male     Mr. Daniels Speak seen today for symptomatic BPH follow-up on alpha-blocker Flomax 0.4 mg daily also has history of hypogonadism  Treated by testosterone replacement therapy by IM injection of testosterone which has been discontinued because of perceived ineffectiveness  Vision is now voiding with a solid urinary stream good control no daytime urgency frequency nocturia once or twice per night-patient has no complaints regarding urination at this time    Hematocrit 43.1  \"   PSA 0.3 in September 2014  PSA 0.2 in March 2015  PSA 0.6 in March 2016     Review of Systems:    CNS: No seizures syncope headaches or visual changes/ Vertigo/Peripheral neuropathy  Respiratory: Shortness of breath  Cardiovascular:Hypertension  Intestinal:GERD  Urinary: Hypogonadism  Skeletal: No bone or joint pain  Endocrine:Hypothyroidism  Other:    Allergies: Allergies   Allergen Reactions    Ciprofloxacin Anaphylaxis    Other Plant, Animal, Environmental Other (comments)     Patient cannot have MRI. Patient states that he has metal screws in his left arm.  Lamisil [Terbinafine] Swelling     Tongue swelling    Metformin Other (comments)     Elevated cr 1.4    Morphine Other (comments)     \"loss of blood pressure\"    Other Medication Other (comments)     Doxasylin causes extreme GERD    Pcn [Penicillins] Rash    Pentothal [Thiopental Sodium] Shortness of Breath    Sulfa (Sulfonamide Antibiotics) Rash      Medications:    Current Outpatient Prescriptions   Medication Sig Dispense Refill    tamsulosin (FLOMAX) 0.4 mg capsule Take 1 Cap by mouth daily. Indications: SYMPTOMATIC BENIGN PROSTATIC HYPERPLASIA 90 Cap 3    oxyCODONE ER (OXYCONTIN) 10 mg ER tablet Take 1 Tab by mouth every eight (8) hours for 30 days. Max Daily Amount: 30 mg. 90 Tab 0    pregabalin (LYRICA) 150 mg capsule TAKE 1 CAPSULE BY MOUTH THREE TIMES DAILY FOR 30 DAYS. MAXIMUM 3 CAPSULES DAILY.  90 Cap 3    DULoxetine (CYMBALTA) 60 mg capsule Take 1 Cap by mouth nightly for 30 days. 30 Cap 3    PEN NEEDLE 31 gauge x 5/16\" ndle USE ONE PEN NEEDLE FOR LANTUS FLEXPEN AND 3 PEN NEEDLES FOR APIDRA WITH EACH MEAL 1 Package 3    atorvastatin (LIPITOR) 20 mg tablet Take 1 Tab by mouth daily. 90 Tab 1    fenofibrate nanocrystallized (TRICOR) 145 mg tablet Take 1 Tab by mouth daily. 90 Tab 1    SITagliptin (JANUVIA) 50 mg tablet Take 1 Tab by mouth daily. 90 Tab 1    metoprolol tartrate (LOPRESSOR) 25 mg tablet Take 1 Tab by mouth two (2) times a day. 180 Tab 0    esomeprazole (NEXIUM) 40 mg capsule Take 40 mg by mouth two (2) times a day.  betamethasone dipropionate (DIPROLENE) 0.05 % ointment       levothyroxine (SYNTHROID) 150 mcg tablet Take 1 Tab by mouth Daily (before breakfast). 90 Tab 1    LANTUS SOLOSTAR 100 unit/mL (3 mL) pen INJECT 30 UNITS SUBCUTANEOUSLY EVERY NIGHT (Patient taking differently: INJECT 52 UNITS SUBCUTANEOUSLY EVERY NIGHT) 3 Each 3    insulin glulisine (APIDRA SOLOSTAR) 100 unit/mL pen 2 units per carb consumed 3 Syringe 1    tamsulosin (FLOMAX) 0.4 mg capsule Take 1 Cap by mouth two (2) times a day. 180 Cap 3    tiotropium (SPIRIVA) 18 mcg inhalation capsule Take 1 Cap by inhalation daily. 30 Cap 5    [START ON 4/1/2017] oxyCODONE ER (OXYCONTIN) 10 mg ER tablet Take 1 Tab by mouth every eight (8) hours for 30 days. Max Daily Amount: 30 mg. 90 Tab 0    HYDROcodone-acetaminophen (NORCO) 5-325 mg per tablet Take 1 Tab by mouth daily as needed for Pain for up to 30 days. 30 Tab 0    lisinopril (PRINIVIL, ZESTRIL) 5 mg tablet Take 1 Tab by mouth daily. 90 Tab 1    DULoxetine (CYMBALTA) 60 mg capsule Take 60 mg by mouth nightly.  butalbital-acetaminophen-caffeine (FIORICET, ESGIC) -40 mg per tablet Take 0.5 Tabs by mouth daily as needed for Pain.  10 Tab 0    fluticasone (FLONASE) 50 mcg/actuation nasal spray USE ONE SPRAY IN EACH NOSTRIL ONCE DAILY 1 Bottle 0    ranitidine (ZANTAC) 150 mg tablet Take 1 Tab by mouth daily. 90 Tab 0    montelukast (SINGULAIR) 10 mg tablet Take 1 Tab by mouth daily. 90 Tab 0    HYDROcodone-acetaminophen (NORCO) 5-325 mg per tablet Take 1 Tab by mouth daily as needed for Pain.  albuterol (PROVENTIL VENTOLIN) 2.5 mg /3 mL (0.083 %) nebulizer solution 3 mL by Nebulization route every four (4) hours as needed for Wheezing or Shortness of Breath. 1 Package 5    Azelastine (ASTEPRO) 0.15 % (205.5 mcg) nasal spray 1 Ste[place] by Both Nostrils route two (2) times daily as needed. 1 Bottle 5    budesonide-formoterol (SYMBICORT) 160-4.5 mcg/actuation HFA inhaler Take 2 Puffs by inhalation two (2) times a day. 1 Inhaler 5    ketorolac (ACULAR) 0.5 % ophthalmic solution Administer 1 Drop to both eyes two (2) times a day. Past Medical History:   Diagnosis Date    Asthma     Cancer [facility])     BASAL     Cardiac catheterization 2009    [address]:  No significant CAD.  Cardiac echocardiogram 01/20/2014    EF 50-55%. No WMA. Gr 1 DDfx. RVSP 25-30 mmHg. Mild TR.       DM type 2 (diabetes mellitus, type 2) ([facility])     Enlarged prostate     Failed back syndrome     GERD (gastroesophageal reflux disease)     Hearing loss, bilateral     Hearing Aids    Hypertension     Hypothyroidism     Insomnia     Low serum testosterone level     Neuropathy     Osteoarthritis of multiple joints     S/P colonoscopy 8/14/13    mild diverticulosis in sigmoid colon w/o evidence of diverticulitis; f/u 5 years    SOB (shortness of breath)     chronic; 2' \"lung fever\"    Vertigo       Past Surgical History:   Procedure Laterality Date    HX BACK SURGERY  2000    removal of defective hardware    [address]    to remove bone fragments    HX LUMBAR FUSION  1999    fusion from L4-S1 and implantation of hardware    HX LUMBAR FUSION  1984    fusion on L5 area    HX ORTHOPAEDIC Bilateral 2004    trigger finger syndrome-all 4 fingers    HX ORTHOPAEDIC Left 2004    left arm torn muscle repair and placement of 2 screws     Family History   Problem Relation Age of Onset    Cancer Mother      Bone and Brain Cancer    Diabetes Mother     Liver Disease Father      Lung Cancer    Kidney Disease Sister     Diabetes Daughter     Colon Cancer Brother         Physical Examination: Well-nourished mature male in no apparent distress    Prostate by BOY is large rounded smooth benign consistency and nontender-no induration no nodularity   No rectal masses induration or tenderness     urinalysis: Negative dipstick/nitrite negative    Impression: Mildly symptomatic BPH responding favorably to alpha-blocker treatment                       -Hypogonadism unresponsive to testosterone replacement therapy    Plan: Flomax 0.4 mg daily #90 refill ×3    PSA today      Discussed prospect of reevaluating hypogonadism-patient not interested in pursuing that problem at this time            rtc 1 yr PSA BOY PVR      More than 1/2 of this 25 minute visit was spent in counselling and coordination of care, as described above. Robin Harry MD  -electronically signed-    PLEASE NOTE:  This document has been produced using voice recognition software. Unrecognized errors in transcription may be present.

## 2017-03-13 ENCOUNTER — TELEPHONE (OUTPATIENT)
Dept: FAMILY MEDICINE CLINIC | Age: 67
End: 2017-03-13

## 2017-03-13 NOTE — TELEPHONE ENCOUNTER
Jane with [address] called and states Mr. Jesenia Ruby thinks he has had a shingles vaccine in the past but is not sure exactly what he had. She is asking if we can please call back with this information [phone].

## 2017-04-18 ENCOUNTER — TELEPHONE (OUTPATIENT)
Dept: FAMILY MEDICINE CLINIC | Age: 67
End: 2017-04-18

## 2017-04-18 NOTE — TELEPHONE ENCOUNTER
Patient's wife called this afternoon requesting nurse Bonilla to call back regarding patient. She states patient was seen at an urgent care when they were out of town and was diagnosed with pneumonia. She is requesting to give nurse some information regarding patient.

## 2017-04-19 NOTE — TELEPHONE ENCOUNTER
Spoke with patient's wife (HIPPA verified) regarding wanting to verify receipt of Urgent Care office notes from [place] where patient went, was seen for pneumonia. Patient's wife informed we did receive the record and Dr Vijay Lake has it and is aware. Patient's wife voiced understanding.

## 2017-04-24 ENCOUNTER — OFFICE VISIT (OUTPATIENT)
Dept: FAMILY MEDICINE CLINIC | Age: 67
End: 2017-04-24

## 2017-04-24 VITALS
DIASTOLIC BLOOD PRESSURE: 66 MMHG | HEIGHT: 74 IN | BODY MASS INDEX: 34.11 KG/M2 | RESPIRATION RATE: 16 BRPM | OXYGEN SATURATION: 99 % | SYSTOLIC BLOOD PRESSURE: 126 MMHG | HEART RATE: 92 BPM | WEIGHT: 265.8 LBS | TEMPERATURE: 98.4 F

## 2017-04-24 DIAGNOSIS — Z13.39 SCREENING FOR ALCOHOLISM: ICD-10-CM

## 2017-04-24 DIAGNOSIS — E13.9 DIABETES 1.5, MANAGED AS TYPE 2 (HCC): ICD-10-CM

## 2017-04-24 DIAGNOSIS — Z00.00 ROUTINE GENERAL MEDICAL EXAMINATION AT A HEALTH CARE FACILITY: ICD-10-CM

## 2017-04-24 DIAGNOSIS — J44.9 ASTHMA WITH COPD (CHRONIC OBSTRUCTIVE PULMONARY DISEASE) (HCC): ICD-10-CM

## 2017-04-24 DIAGNOSIS — R05.9 COUGH: Primary | ICD-10-CM

## 2017-04-24 RX ORDER — MONTELUKAST SODIUM 10 MG/1
10 TABLET ORAL DAILY
Qty: 90 TAB | Refills: 0 | Status: SHIPPED | OUTPATIENT
Start: 2017-04-24 | End: 2017-06-25 | Stop reason: SDUPTHER

## 2017-04-24 RX ORDER — PEN NEEDLE, DIABETIC 31 GX3/16"
NEEDLE, DISPOSABLE MISCELLANEOUS
Qty: 100 PEN NEEDLE | Refills: 3 | Status: SHIPPED | OUTPATIENT
Start: 2017-04-24 | End: 2018-02-08 | Stop reason: SDUPTHER

## 2017-04-24 RX ORDER — LINAGLIPTIN 5 MG/1
5 TABLET, FILM COATED ORAL DAILY
COMMUNITY
Start: 2017-03-15 | End: 2017-08-25 | Stop reason: ALTCHOICE

## 2017-04-24 RX ORDER — CEFDINIR 300 MG/1
CAPSULE ORAL
COMMUNITY
Start: 2017-04-14 | End: 2017-04-24 | Stop reason: ALTCHOICE

## 2017-04-24 RX ORDER — AZITHROMYCIN 250 MG/1
TABLET, FILM COATED ORAL
COMMUNITY
Start: 2017-04-14 | End: 2017-04-24 | Stop reason: ALTCHOICE

## 2017-04-24 RX ORDER — BLOOD SUGAR DIAGNOSTIC
STRIP MISCELLANEOUS
COMMUNITY
Start: 2017-03-13

## 2017-04-24 RX ORDER — ALBUTEROL SULFATE 0.83 MG/ML
2.5 SOLUTION RESPIRATORY (INHALATION)
Qty: 1 PACKAGE | Refills: 5 | Status: SHIPPED | OUTPATIENT
Start: 2017-04-24 | End: 2021-09-22

## 2017-04-24 RX ORDER — LANCETS
EACH MISCELLANEOUS
COMMUNITY
Start: 2017-03-13

## 2017-04-24 RX ORDER — INSULIN GLARGINE 100 [IU]/ML
INJECTION, SOLUTION SUBCUTANEOUS
Qty: 3 PEN | Refills: 3 | Status: SHIPPED | OUTPATIENT
Start: 2017-04-24 | End: 2019-08-01 | Stop reason: SDUPTHER

## 2017-04-24 NOTE — MR AVS SNAPSHOT
Visit Information Date & Time Provider Department Dept. Phone Encounter #  
 4/24/2017  9:30 AM Hina Siu, [address] [id number] Follow-up Instructions Return in about 1 month (around 5/24/2017). Your Appointments 4/27/2017 11:00 AM  
Follow Up with Marci Peter MD  
[address] [facility]) Appt Note: [address] F/U W/PROVIDER 3MTHS  
 [address] [facility] [address]  
[phone]  [facility] [id number]  
  
    
 8/21/2017  2:40 PM  
Follow Up with Hanny Shah MD  
[facility] ([facility]) Appt Note: 6 month follow up [place] [phone]  
[phone] [address]  
  
    
 3/8/2018  9:00 AM  
Office Visit with Vernell Weinstein MD  
[facility]) Appt Note: check up [address] [address] [address]  
[phone] [address] [facility] [address] [place] [address] Upcoming Health Maintenance Date Due  
 MEDICARE YEARLY EXAM 4/21/2017 EYE EXAM RETINAL OR DILATED Q1 4/26/2017 Pneumococcal 65+ Low/Medium Risk (2 of 2 - PPSV23) 7/20/2017 HEMOGLOBIN A1C Q6M 9/10/2017 FOOT EXAM Q1 1/9/2018 LIPID PANEL Q1 1/19/2018 MICROALBUMIN Q1 3/10/2018 GLAUCOMA SCREENING Q2Y 4/26/2018 COLONOSCOPY 10/19/2023 DTaP/Tdap/Td series (2 - Td) 5/16/2026 Allergies as of 4/24/2017  Review Complete On: 4/24/2017 By: Hina Siu MD  
  
 Severity Noted Reaction Type Reactions Ciprofloxacin High 11/16/2013    Anaphylaxis Other Plant, Animal, Environmental High 03/10/2016    Other (comments) Patient cannot have MRI. Patient states that he has metal screws in his left arm. Lamisil [Terbinafine]  10/18/2013    Swelling Tongue swelling Metformin  03/16/2016    Other (comments) Elevated cr 1.4 Morphine  10/18/2013    Other (comments) \"loss of blood pressure\" Other Medication  10/18/2013    Other (comments) Doxasylin causes extreme GERD Pcn [Penicillins]  10/18/2013    Rash Pentothal [Thiopental Sodium]  10/18/2013    Shortness of Breath Sulfa (Sulfonamide Antibiotics)  10/18/2013    Rash Current Immunizations  Reviewed on 7/20/2016 Name Date Influenza Vaccine 11/3/2014 Influenza Vaccine (Quad) PF 1/25/2017, 9/16/2015 Pneumococcal Conjugate (PCV-13) 7/20/2016 Pneumococcal Polysaccharide (PPSV-23) 3/30/2017 Tdap 5/16/2016 Not reviewed this visit You Were Diagnosed With   
  
 Codes Comments Cough    -  Primary ICD-10-CM: U32 ICD-9-CM: 786.2 Diabetes 1.5, managed as type 2 ([facility]     ICD-10-CM: E13.9 ICD-9-CM: 250.00 Routine general medical examination at a health care facility     ICD-10-CM: Z00.00 ICD-9-CM: V70.0 Screening for alcoholism     ICD-10-CM: Z13.89 ICD-9-CM: V79.1 Asthma with COPD (chronic obstructive pulmonary disease) ([facility].)     ICD-10-CM: J44.9, J45.909 ICD-9-CM: 493.20 Vitals BP Pulse Temp Resp Height(growth percentile) Weight(growth percentile) 126/66 (BP 1 Location: Left arm, BP Patient Position: Sitting) 92 98.4 °F (36.9 °C) (Oral) 16 6' 2\" (1.88 m) 265 lb 12.8 oz (120.6 kg) SpO2 BMI Smoking Status 99% 34.13 kg/m2 Former Smoker Vitals History BMI and BSA Data Body Mass Index Body Surface Area  
 34.13 kg/m 2 2.51 m 2 Preferred Pharmacy Pharmacy Name Phone [address] [facility] [address] [place] [address] [phone] Your Updated Medication List  
  
   
This list is accurate as of: 4/24/17 10:25 AM.  Always use your most recent med list.  
  
  
  
  
 ACCU-CHEK JAZZY PLUS TEST STRP strip Generic drug:  glucose blood VI test strips [address] [place] [address] Generic drug:  Lancets ACULAR 0.5 % ophthalmic solution Generic drug:  ketorolac Administer 1 Drop to both eyes two (2) times a day. albuterol 2.5 mg /3 mL (0.083 %) nebulizer solution Commonly known as:  PROVENTIL VENTOLIN  
3 mL by Nebulization route every four (4) hours as needed for Wheezing or Shortness of Breath. atorvastatin 20 mg tablet Commonly known as:  LIPITOR Take 1 Tab by mouth daily. Azelastine 0.15 % (205.5 mcg) nasal spray Commonly known as:  ASTEPRO  
1 Spray by Both Nostrils route two (2) times daily as needed. betamethasone dipropionate 0.05 % ointment Commonly known as:  DIPROLENE  
  
 budesonide-formoterol 160-4.5 mcg/actuation HFA inhaler Commonly known as:  SYMBICORT Take 2 Puffs by inhalation two (2) times a day. butalbital-acetaminophen-caffeine -40 mg per tablet Commonly known as:  Murvin Duc Take 0.5 Tabs by mouth daily as needed for Pain. DULoxetine 60 mg capsule Commonly known as:  CYMBALTA Take 60 mg by mouth nightly. esomeprazole 40 mg capsule Commonly known as:  Maximiano Poster Take 40 mg by mouth two (2) times a day. fenofibrate nanocrystallized 145 mg tablet Commonly known as:  [employer] Take 1 Tab by mouth daily. fluticasone 50 mcg/actuation nasal spray Commonly known as:  FLONASE  
USE ONE SPRAY IN EACH NOSTRIL ONCE DAILY HYDROcodone-acetaminophen 5-325 mg per tablet Commonly known as:  Kriste Toño Take 1 Tab by mouth daily as needed for Pain. insulin glulisine 100 unit/mL pen Commonly known as:  APIDRA SOLOSTAR  
2 units per carb consumed  
  
 levothyroxine 150 mcg tablet Commonly known as:  SYNTHROID Take 1 Tab by mouth Daily (before breakfast). lisinopril 5 mg tablet Commonly known as:  Dorette Mike Take 1 Tab by mouth daily. metoprolol tartrate 25 mg tablet Commonly known as:  LOPRESSOR Take 1 Tab by mouth two (2) times a day. montelukast 10 mg tablet Commonly known as:  SINGULAIR Take 1 Tab by mouth daily. oxyCODONE ER 10 mg ER tablet Commonly known as:  OxyCONTIN Take 1 Tab by mouth every eight (8) hours for 30 days. Max Daily Amount: 30 mg.  
  
 * BD ULTRA-FINE FANI PEN NEEDLES  
by Does Not Apply route. 4mm x 32G * PEN NEEDLE 31 gauge x 5/16\" Ndle Generic drug:  Insulin Needles (Disposable) USE ONE PEN NEEDLE FOR LANTUS FLEXPEN AND 3 PEN NEEDLES FOR APIDRA WITH EACH MEAL * Insulin Needles (Disposable) 32 gauge x 5/32\" Ndle Commonly known as:  Fani Pen Needle Take at bedtime  
  
 pregabalin 150 mg capsule Commonly known as:  York Pace TAKE 1 CAPSULE BY MOUTH THREE TIMES DAILY FOR 30 DAYS. MAXIMUM 3 CAPSULES DAILY. tamsulosin 0.4 mg capsule Commonly known as:  FLOMAX Take 1 Cap by mouth two (2) times a day. tiotropium 18 mcg inhalation capsule Commonly known as:  [facility] Take 1 Cap by inhalation daily. TRADJENTA 5 mg tablet Generic drug:  linagliptin Take 5 mg by mouth daily. * Notice: This list has 3 medication(s) that are the same as other medications prescribed for you. Read the directions carefully, and ask your doctor or other care provider to review them with you. Prescriptions Sent to Pharmacy Refills  
 insulin glulisine (APIDRA SOLOSTAR) 100 unit/mL pen 3 Si units per carb consumed Class: Normal  
 Pharmacy: [address] [address] Ph #: [phone]  
 montelukast (SINGULAIR) 10 mg tablet 0 Sig: Take 1 Tab by mouth daily. Class: Normal  
 Pharmacy: [address] [address] Ph #: [phone] Route: Oral  
 albuterol (PROVENTIL VENTOLIN) 2.5 mg /3 mL (0.083 %) nebulizer solution 5 Sig: 3 mL by Nebulization route every four (4) hours as needed for Wheezing or Shortness of Breath.   
 Class: Normal  
 Pharmacy: [address], [address] [place] Ph #: [phone] Route: Nebulization Insulin Needles, Disposable, (FANI PEN NEEDLE) 32 gauge x 5/32\" ndle 3 Sig: Take at bedtime Class: Normal  
 Pharmacy: [address] [place], [address] [place] Ph #: [phone] Follow-up Instructions Return in about 1 month (around 5/24/2017). To-Do List   
 06/05/2017 Imaging:  XR CHEST PA LAT Patient Instructions Medicare Part B Preventive Services Limitations Recommendation Scheduled Bone Mass Measurement 
(age 72 & older, biennial) Requires diagnosis related to osteoporosis or estrogen deficiency. Biennial benefit unless patient has history of long-term glucocorticoid tx or baseline is needed because initial test was by other method N/a Cardiovascular Screening Blood Tests (every 5 years) Total cholesterol, HDL, Triglycerides Order as a panel if possible Up to date Colorectal Cancer Screening 
-Fecal occult blood test (annual) -Flexible sigmoidoscopy (5y) 
-Screening colonoscopy (10y) -Barium Enema  Up to date Counseling to Prevent Tobacco Use (up to 8 sessions per year) - Counseling greater than 3 and up to 10 minutes - Counseling greater than 10 minutes Patients must be asymptomatic of tobacco-related conditions to receive as preventive service N/a Diabetes Screening Tests (at least every 3 years, Medicare covers annually or at 6-month intervals for prediabetic patients) Fasting blood sugar (FBS) or glucose tolerance test (GTT) Patient must be diagnosed with one of the following: 
-Hypertension, Dyslipidemia, obesity, previous impaired FBS or GTT 
Or any two of the following: overweight, FH of diabetes, age ? 72, history of gestational diabetes, birth of baby weighing more than 9 pounds Up to date Diabetes Self-Management Training (DSMT) (no USPSTF recommendation) Requires referral by treating physician for patient with diabetes or renal disease. 10 hours of initial DSMT session of no less than 30 minutes each in a continuous 12-month period. 2 hours of follow-up DSMT in subsequent years. N/a Glaucoma Screening (no USPSTF recommendation) Diabetes mellitus, family history, , age 48 or over,  American, age 72 or over Up to date Coming up appt in may Human Immunodeficiency Virus (HIV) Screening (annually for increased risk patients) HIV-1 and HIV-2 by EIA, RAHUL, rapid antibody test, or oral mucosa transudate Patient must be at increased risk for HIV infection per USPSTF guidelines or pregnant. Tests covered annually for patients at increased risk. Pregnant patients may receive up to 3 test during pregnancy. N/a Medical Nutrition Therapy (MNT) (for diabetes or renal disease not recommended schedule) Requires referral by treating physician for patient with diabetes or renal disease. Can be provided in same year as diabetes self-management training (DSMT), and CMS recommends medical nutrition therapy take place after DSMT. Up to 3 hours for initial year and 2 hours in subsequent years. N/a Prostate Cancer Screening (annually up to age 76) - Digital rectal exam (BOY) - Prostate specific antigen (PSA) Annually (age 48 or over), BOY not paid separately when covered E/M service is provided on same date Men up to age 76 may need a screening blood test for prostate cancer at certain intervals, depending on their personal and family history. This decision is between the patient and his provider. Up to date Following urology Seasonal Influenza Vaccination (annually)  Up to date  Yearly Pneumococcal Vaccination (once after 65)  Up to date Hepatitis B Vaccinations (if medium/high risk) Medium/high risk factors:  End-stage renal disease, Hemophiliacs who received Factor VIII or IX concentrates, Clients of institutions for the mentally retarded, Persons who live in the same house as a HepB virus carrier, Homosexual men, Illicit injectable drug abusers. NA Shingles Vaccination A shingles vaccine is also recommended once in a lifetime after age 61 Up to date Ultrasound Screening for Abdominal Aortic Aneurysm (AAA) (once) Patient must be referred through IPPE and not have had a screening for abdominal aortic aneurysm before under Medicare. Limited to patients who meet one of the following criteria: 
- Men who are 73-68 years old and have smoked more than 100 cigarettes in their lifetime. 
-Anyone with a FH of AAA 
-Anyone recommended for screening by USPSTF N/a Asthma Action Plan: After Your Visit Your Care Instructions An asthma action plan is based on peak flow and asthma symptoms. Sorting symptoms and peak flow into red, yellow, and green \"zones\" can help you know how bad your asthma is and what actions you should take. Work with your doctor to make your plan. An action plan may include: · The peak flow readings and symptoms for each zone. · What medicines to take in each zone. · When to call a doctor. · A list of emergency contact numbers. · A list of your asthma triggers. Follow-up care is a key part of your treatment and safety. Be sure to make and go to all appointments, and call your doctor if you are having problems. It's also a good idea to know your test results and keep a list of the medicines you take. How can you care for yourself at home? · Take your daily medicines to help minimize long-term damage and avoid asthma attacks. · Check your peak flow every morning and evening. This is the best way to know how well your lungs are working. · Check your action plan to see what zone you are in. 
¨ If you are in the green zone, keep taking your daily asthma medicines as prescribed.  
¨ If you are in the yellow zone, you may be having or will soon have an asthma attack. You may not have any symptoms, but your lungs are not working as well as they should. Take the medicines listed in your action plan. If you stay in the yellow zone, your doctor may need to increase the dose or add a medicine. ¨ If you are in the red zone, follow your action plan. If your symptoms or peak flow don't improve soon, you may need to go to the emergency room or be admitted to the hospital. 
· Use an asthma diary. Write down your peak flow readings in the asthma diary. If you have an attack, write down what caused it (if you know), the symptoms, and what medicine you took. · Make sure you know how and when to call your doctor or go to the hospital. 
· Take both the asthma action plan and the asthma diaryalong with your peak flow meter and medicineswhen you see your doctor. Tell your doctor if you are having trouble following your action plan. When should you call for help? Call 911 anytime you think you may need emergency care. For example, call if: 
· You have severe trouble breathing. Call your doctor now or seek immediate medical care if: 
· Your symptoms do not get better after you have followed your asthma action plan. · You cough up yellow, dark brown, or bloody mucus (sputum). Watch closely for changes in your health, and be sure to contact your doctor if: 
· Your coughing and wheezing get worse. · You need to use quick-relief medicine on more than 2 days a week (unless it is just for exercise). · You need help figuring out what is triggering your asthma attacks. Where can you learn more? Go to Crosswise.be Enter 976 9748 in the search box to learn more about \"Asthma Action Plan: After Your Visit. \"  
© 9168-0429 Healthwise, Incorporated. Care instructions adapted under license by Torie Jain (which disclaims liability or warranty for this information).  This care instruction is for use with your licensed healthcare professional. If you have questions about a medical condition or this instruction, always ask your healthcare professional. [place] [address] any warranty or liability for your use of this information. Content Version: 04.5.973833; Last Revised: March 9, 2012 Pneumonia: Care Instructions Your Care Instructions Pneumonia is an infection of the lungs. Most cases are caused by infections from bacteria or viruses. Pneumonia may be mild or very severe. If it is caused by bacteria, you will be treated with antibiotics. It may take a few weeks to a few months to recover fully from pneumonia, depending on how sick you were and whether your overall health is good. Follow-up care is a key part of your treatment and safety. Be sure to make and go to all appointments, and call your doctor if you are having problems. Its also a good idea to know your test results and keep a list of the medicines you take. How can you care for yourself at home? · Take your antibiotics exactly as directed. Do not stop taking the medicine just because you are feeling better. You need to take the full course of antibiotics. · Take your medicines exactly as prescribed. Call your doctor if you think you are having a problem with your medicine. · Get plenty of rest and sleep. You may feel weak and tired for a while, but your energy level will improve with time. · To prevent dehydration, drink plenty of fluids, enough so that your urine is light yellow or clear like water. Choose water and other caffeine-free clear liquids until you feel better. If you have kidney, heart, or liver disease and have to limit fluids, talk with your doctor before you increase the amount of fluids you drink. · Take care of your cough so you can rest. A cough that brings up mucus from your lungs is common with pneumonia.  It is one way your body gets rid of the infection. But if coughing keeps you from resting or causes severe fatigue and chest-wall pain, talk to your doctor. He or she may suggest that you take a medicine to reduce the cough. · Use a vaporizer or humidifier to add moisture to your bedroom. Follow the directions for cleaning the machine. · Do not smoke or allow others to smoke around you. Smoke will make your cough last longer. If you need help quitting, talk to your doctor about stop-smoking programs and medicines. These can increase your chances of quitting for good. · Take an over-the-counter pain medicine, such as acetaminophen (Tylenol), ibuprofen (Advil, Motrin), or naproxen (Aleve). Read and follow all instructions on the label. · Do not take two or more pain medicines at the same time unless the doctor told you to. Many pain medicines have acetaminophen, which is Tylenol. Too much acetaminophen (Tylenol) can be harmful. · If you were given a spirometer to measure how well your lungs are working, use it as instructed. This can help your doctor tell how your recovery is going. · To prevent pneumonia in the future, talk to your doctor about getting a flu vaccine (once a year) and a pneumococcal vaccine (one time only for most people). When should you call for help? Call 911 anytime you think you may need emergency care. For example, call if: 
· You have severe trouble breathing. Call your doctor now or seek immediate medical care if: 
· You cough up dark brown or bloody mucus (sputum). · You have new or worse trouble breathing. · You are dizzy or lightheaded, or you feel like you may faint. Watch closely for changes in your health, and be sure to contact your doctor if: 
· You have a new or higher fever. · You are coughing more deeply or more often. · You are not getting better after 2 days (48 hours). · You do not get better as expected. Where can you learn more? Go to http://slava-enrique.info/. Enter [id number] in the search box to learn more about \"Pneumonia: Care Instructions. \" Current as of: May 23, 2016 Content Version: 11.2 © 4952-0448 [employer]. Care instructions adapted under license by [employer] (which disclaims liability or warranty for this information). If you have questions about a medical condition or this instruction, always ask your healthcare professional. [address] any warranty or liability for your use of this information. Learning About Diabetes Food Guidelines Your Care Instructions Meal planning is important to manage diabetes. It helps keep your blood sugar at a target level (which you set with your doctor). You don't have to eat special foods. You can eat what your family eats, including sweets once in a while. But you do have to pay attention to how often you eat and how much you eat of certain foods. You may want to work with a dietitian or a certified diabetes educator (CDE) to help you plan meals and snacks. A dietitian or CDE can also help you lose weight if that is one of your goals. What should you know about eating carbs? Managing the amount of carbohydrate (carbs) you eat is an important part of healthy meals when you have diabetes. Carbohydrate is found in many foods. · Learn which foods have carbs. And learn the amounts of carbs in different foods. ¨ Bread, cereal, pasta, and rice have about 15 grams of carbs in a serving. A serving is 1 slice of bread (1 ounce), ½ cup of cooked cereal, or 1/3 cup of cooked pasta or rice. ¨ Fruits have 15 grams of carbs in a serving. A serving is 1 small fresh fruit, such as an apple or orange; ½ of a banana; ½ cup of cooked or canned fruit; ½ cup of fruit juice; 1 cup of melon or raspberries; or 2 tablespoons of dried fruit. ¨ Milk and no-sugar-added yogurt have 15 grams of carbs in a serving. A serving is 1 cup of milk or 2/3 cup of no-sugar-added yogurt. ¨ Starchy vegetables have 15 grams of carbs in a serving. A serving is ½ cup of mashed potatoes or sweet potato; 1 cup winter squash; ½ of a small baked potato; ½ cup of cooked beans; or ½ cup cooked corn or green peas. · Learn how much carbs to eat each day and at each meal. A dietitian or CDE can teach you how to keep track of the amount of carbs you eat. This is called carbohydrate counting. · If you are not sure how to count carbohydrate grams, use the Plate Method to plan meals. It is a good, quick way to make sure that you have a balanced meal. It also helps you spread carbs throughout the day. ¨ Divide your plate by types of foods. Put non-starchy vegetables on half the plate, meat or other protein food on one-quarter of the plate, and a grain or starchy vegetable in the final quarter of the plate. To this you can add a small piece of fruit and 1 cup of milk or yogurt, depending on how many carbs you are supposed to eat at a meal. 
· Try to eat about the same amount of carbs at each meal. Do not \"save up\" your daily allowance of carbs to eat at one meal. 
· Proteins have very little or no carbs per serving. Examples of proteins are beef, chicken, turkey, fish, eggs, tofu, cheese, cottage cheese, and peanut butter. A serving size of meat is 3 ounces, which is about the size of a deck of cards. Examples of meat substitute serving sizes (equal to 1 ounce of meat) are 1/4 cup of cottage cheese, 1 egg, 1 tablespoon of peanut butter, and ½ cup of tofu. How can you eat out and still eat healthy? · Learn to estimate the serving sizes of foods that have carbohydrate. If you measure food at home, it will be easier to estimate the amount in a serving of restaurant food. · If the meal you order has too much carbohydrate (such as potatoes, corn, or baked beans), ask to have a low-carbohydrate food instead. Ask for a salad or green vegetables.  
· If you use insulin, check your blood sugar before and after eating out to help you plan how much to eat in the future. · If you eat more carbohydrate at a meal than you had planned, take a walk or do other exercise. This will help lower your blood sugar. What else should you know? · Limit saturated fat, such as the fat from meat and dairy products. This is a healthy choice because people who have diabetes are at higher risk of heart disease. So choose lean cuts of meat and nonfat or low-fat dairy products. Use olive or canola oil instead of butter or shortening when cooking. · Don't skip meals. Your blood sugar may drop too low if you skip meals and take insulin or certain medicines for diabetes. · Check with your doctor before you drink alcohol. Alcohol can cause your blood sugar to drop too low. Alcohol can also cause a bad reaction if you take certain diabetes medicines. Follow-up care is a key part of your treatment and safety. Be sure to make and go to all appointments, and call your doctor if you are having problems. It's also a good idea to know your test results and keep a list of the medicines you take. Where can you learn more? Go to http://slavaRetSKUenrique.info/. Enter L171 in the search box to learn more about \"Learning About Diabetes Food Guidelines. \" Current as of: May 23, 2016 Content Version: 11.2 © 6560-5484 [employer]. Care instructions adapted under license by NextInput (which disclaims liability or warranty for this information). If you have questions about a medical condition or this instruction, always ask your healthcare professional. [place] [address] any warranty or liability for your use of this information. Introducing \[facility] [place] [facility]\"" & HEALTH SERVICES! [employer] introduces [employer] patient portal. Now you can access parts of your medical record, email your doctor's office, and request medication refills online. 1. In your internet browser, go to [email or web address] 2. Click on the First Time User? Click Here link in the Sign In box. You will see the New Member Sign Up page. 3. Enter your [email or web address] Access Code exactly as it appears below. You will not need to use this code after youve completed the sign-up process. If you do not sign up before the expiration date, you must request a new code. · [email or web address] Access Code: [email or web address] Expires: 5/21/2017 10:43 AM 
 
4. Enter the last four digits of your Social Security Number (xxxx) and Date of Birth (mm/dd/yyyy) as indicated and click Submit. You will be taken to the next sign-up page. 5. Create a [email or web address] ID. This will be your [email or web address] login ID and cannot be changed, so think of one that is secure and easy to remember. 6. Create a [email or web address] password. You can change your password at any time. 7. Enter your Password Reset Question and Answer. This can be used at a later time if you forget your password. 8. Enter your e-mail address. You will receive e-mail notification when new information is available in [address] 9. Click Sign Up. You can now view and download portions of your medical record. 10. Click the Download Summary menu link to download a portable copy of your medical information. If you have questions, please visit the Frequently Asked Questions section of the [email or web address] website. Remember, [email or web address] is NOT to be used for urgent needs. For medical emergencies, dial 911. Now available from your iPhone and Android! Please provide this summary of care documentation to your next provider. Your primary care clinician is listed as Delfina Ferguson. If you have any questions after today's visit, please call [phone].

## 2017-04-24 NOTE — PATIENT INSTRUCTIONS
Medicare Part B Preventive Services Limitations Recommendation Scheduled   Bone Mass Measurement  (age 72 & older, biennial) Requires diagnosis related to osteoporosis or estrogen deficiency. Biennial benefit unless patient has history of long-term glucocorticoid tx or baseline is needed because initial test was by other method N/a      Cardiovascular Screening Blood Tests (every 5 years)  Total cholesterol, HDL, Triglycerides Order as a panel if possible Up to date    Colorectal Cancer Screening  -Fecal occult blood test (annual)  -Flexible sigmoidoscopy (5y)  -Screening colonoscopy (10y)  -Barium Enema  Up to date    Counseling to Prevent Tobacco Use (up to 8 sessions per year)  - Counseling greater than 3 and up to 10 minutes  - Counseling greater than 10 minutes Patients must be asymptomatic of tobacco-related conditions to receive as preventive service N/a      Diabetes Screening Tests (at least every 3 years, Medicare covers annually or at 6-month intervals for prediabetic patients)    Fasting blood sugar (FBS) or glucose tolerance test (GTT) Patient must be diagnosed with one of the following:  -Hypertension, Dyslipidemia, obesity, previous impaired FBS or GTT  Or any two of the following: overweight, FH of diabetes, age ? 72, history of gestational diabetes, birth of baby weighing more than 9 pounds Up to date    Diabetes Self-Management Training (DSMT) (no USPSTF recommendation) Requires referral by treating physician for patient with diabetes or renal disease. 10 hours of initial DSMT session of no less than 30 minutes each in a continuous 12-month period. 2 hours of follow-up DSMT in subsequent years.  N/a      Glaucoma Screening (no USPSTF recommendation) Diabetes mellitus, family history, , age 48 or over,  American, age 72 or over Up to date Coming up appt in may    Human Immunodeficiency Virus (HIV) Screening (annually for increased risk patients)  HIV-1 and HIV-2 by EIA, RAHUL, rapid antibody test, or oral mucosa transudate Patient must be at increased risk for HIV infection per USPSTF guidelines or pregnant. Tests covered annually for patients at increased risk. Pregnant patients may receive up to 3 test during pregnancy. N/a      Medical Nutrition Therapy (MNT) (for diabetes or renal disease not recommended schedule) Requires referral by treating physician for patient with diabetes or renal disease. Can be provided in same year as diabetes self-management training (DSMT), and CMS recommends medical nutrition therapy take place after DSMT. Up to 3 hours for initial year and 2 hours in subsequent years. N/a      Prostate Cancer Screening (annually up to age 76)  - Digital rectal exam (BOY)  - Prostate specific antigen (PSA) Annually (age 48 or over), BOY not paid separately when covered E/M service is provided on same date  Men up to age 76 may need a screening blood test for prostate cancer at certain intervals, depending on their personal and family history. This decision is between the patient and his provider. Up to date Following urology    Seasonal Influenza Vaccination (annually)  Up to date  Yearly      Pneumococcal Vaccination (once after 72)  Up to date     Hepatitis B Vaccinations (if medium/high risk) Medium/high risk factors:  End-stage renal disease,  Hemophiliacs who received Factor VIII or IX concentrates, Clients of institutions for the mentally retarded, Persons who live in the same house as a HepB virus carrier, Homosexual men, Illicit injectable drug abusers. NA    Shingles Vaccination A shingles vaccine is also recommended once in a lifetime after age 61 Up to date     Ultrasound Screening for Abdominal Aortic Aneurysm (AAA) (once) Patient must be referred through [facility] and not have had a screening for abdominal aortic aneurysm before under Medicare.   Limited to patients who meet one of the following criteria:  - Men who are 73-68 years old and have smoked more than 100 cigarettes in their lifetime.  -Anyone with a FH of AAA  -Anyone recommended for screening by USPSTF N/a          Asthma Action Plan: After Your Visit  Your Care Instructions  An asthma action plan is based on peak flow and asthma symptoms. Sorting symptoms and peak flow into red, yellow, and green \"zones\" can help you know how bad your asthma is and what actions you should take. Work with your doctor to make your plan. An action plan may include:  · The peak flow readings and symptoms for each zone. · What medicines to take in each zone. · When to call a doctor. · A list of emergency contact numbers. · A list of your asthma triggers. Follow-up care is a key part of your treatment and safety. Be sure to make and go to all appointments, and call your doctor if you are having problems. It's also a good idea to know your test results and keep a list of the medicines you take. How can you care for yourself at home? · Take your daily medicines to help minimize long-term damage and avoid asthma attacks. · Check your peak flow every morning and evening. This is the best way to know how well your lungs are working. · Check your action plan to see what zone you are in.  ¨ If you are in the green zone, keep taking your daily asthma medicines as prescribed. ¨ If you are in the yellow zone, you may be having or will soon have an asthma attack. You may not have any symptoms, but your lungs are not working as well as they should. Take the medicines listed in your action plan. If you stay in the yellow zone, your doctor may need to increase the dose or add a medicine. ¨ If you are in the red zone, follow your action plan. If your symptoms or peak flow don't improve soon, you may need to go to the emergency room or be admitted to the hospital.  · Use an asthma diary. Write down your peak flow readings in the asthma diary.  If you have an attack, write down what caused it (if you know), the symptoms, and what medicine you took. · Make sure you know how and when to call your doctor or go to the hospital.  · Take both the asthma action plan and the asthma diary--along with your peak flow meter and medicines--when you see your doctor. Tell your doctor if you are having trouble following your action plan. When should you call for help? Call 911 anytime you think you may need emergency care. For example, call if:  · You have severe trouble breathing. Call your doctor now or seek immediate medical care if:  · Your symptoms do not get better after you have followed your asthma action plan. · You cough up yellow, dark brown, or bloody mucus (sputum). Watch closely for changes in your health, and be sure to contact your doctor if:  · Your coughing and wheezing get worse. · You need to use quick-relief medicine on more than 2 days a week (unless it is just for exercise). · You need help figuring out what is triggering your asthma attacks. Where can you learn more? Go to [employer]  Enter B511 in the search box to learn more about \"Asthma Action Plan: After Your Visit. \"   © 1272-7329 [employer]. Care instructions adapted under license by [employer] (which disclaims liability or warranty for this information). This care instruction is for use with your licensed healthcare professional. If you have questions about a medical condition or this instruction, always ask your healthcare professional. [place] [address] any warranty or liability for your use of this information. Content Version: 01.1.244633; Last Revised: March 9, 2012                 Pneumonia: Care Instructions  Your Care Instructions    Pneumonia is an infection of the lungs. Most cases are caused by infections from bacteria or viruses. Pneumonia may be mild or very severe. If it is caused by bacteria, you will be treated with antibiotics.  It may take a few weeks to a few months to recover fully from pneumonia, depending on how sick you were and whether your overall health is good. Follow-up care is a key part of your treatment and safety. Be sure to make and go to all appointments, and call your doctor if you are having problems. Its also a good idea to know your test results and keep a list of the medicines you take. How can you care for yourself at home? · Take your antibiotics exactly as directed. Do not stop taking the medicine just because you are feeling better. You need to take the full course of antibiotics. · Take your medicines exactly as prescribed. Call your doctor if you think you are having a problem with your medicine. · Get plenty of rest and sleep. You may feel weak and tired for a while, but your energy level will improve with time. · To prevent dehydration, drink plenty of fluids, enough so that your urine is light yellow or clear like water. Choose water and other caffeine-free clear liquids until you feel better. If you have kidney, heart, or liver disease and have to limit fluids, talk with your doctor before you increase the amount of fluids you drink. · Take care of your cough so you can rest. A cough that brings up mucus from your lungs is common with pneumonia. It is one way your body gets rid of the infection. But if coughing keeps you from resting or causes severe fatigue and chest-wall pain, talk to your doctor. He or she may suggest that you take a medicine to reduce the cough. · Use a vaporizer or humidifier to add moisture to your bedroom. Follow the directions for cleaning the machine. · Do not smoke or allow others to smoke around you. Smoke will make your cough last longer. If you need help quitting, talk to your doctor about stop-smoking programs and medicines. These can increase your chances of quitting for good. · Take an over-the-counter pain medicine, such as acetaminophen (Tylenol), ibuprofen (Advil, Motrin), or naproxen (Aleve).  Read and follow all instructions on the label. · Do not take two or more pain medicines at the same time unless the doctor told you to. Many pain medicines have acetaminophen, which is Tylenol. Too much acetaminophen (Tylenol) can be harmful. · If you were given a spirometer to measure how well your lungs are working, use it as instructed. This can help your doctor tell how your recovery is going. · To prevent pneumonia in the future, talk to your doctor about getting a flu vaccine (once a year) and a pneumococcal vaccine (one time only for most people). When should you call for help? Call 911 anytime you think you may need emergency care. For example, call if:  · You have severe trouble breathing. Call your doctor now or seek immediate medical care if:  · You cough up dark brown or bloody mucus (sputum). · You have new or worse trouble breathing. · You are dizzy or lightheaded, or you feel like you may faint. Watch closely for changes in your health, and be sure to contact your doctor if:  · You have a new or higher fever. · You are coughing more deeply or more often. · You are not getting better after 2 days (48 hours). · You do not get better as expected. Where can you learn more? Go to http://slava-enrique.info/. Enter [id number] in the search box to learn more about \"Pneumonia: Care Instructions. \"  Current as of: May 23, 2016  Content Version: 11.2  © 8786-0342 [employer]. Care instructions adapted under license by [employer] (which disclaims liability or warranty for this information). If you have questions about a medical condition or this instruction, always ask your healthcare professional. [place] [address] any warranty or liability for your use of this information. Learning About Diabetes Food Guidelines  Your Care Instructions  Meal planning is important to manage diabetes.  It helps keep your blood sugar at a target level (which you set with your doctor). You don't have to eat special foods. You can eat what your family eats, including sweets once in a while. But you do have to pay attention to how often you eat and how much you eat of certain foods. You may want to work with a dietitian or a certified diabetes educator (CDE) to help you plan meals and snacks. A dietitian or CDE can also help you lose weight if that is one of your goals. What should you know about eating carbs? Managing the amount of carbohydrate (carbs) you eat is an important part of healthy meals when you have diabetes. Carbohydrate is found in many foods. · Learn which foods have carbs. And learn the amounts of carbs in different foods. ¨ Bread, cereal, pasta, and rice have about 15 grams of carbs in a serving. A serving is 1 slice of bread (1 ounce), ½ cup of cooked cereal, or 1/3 cup of cooked pasta or rice. ¨ Fruits have 15 grams of carbs in a serving. A serving is 1 small fresh fruit, such as an apple or orange; ½ of a banana; ½ cup of cooked or canned fruit; ½ cup of fruit juice; 1 cup of melon or raspberries; or 2 tablespoons of dried fruit. ¨ Milk and no-sugar-added yogurt have 15 grams of carbs in a serving. A serving is 1 cup of milk or 2/3 cup of no-sugar-added yogurt. ¨ Starchy vegetables have 15 grams of carbs in a serving. A serving is ½ cup of mashed potatoes or sweet potato; 1 cup winter squash; ½ of a small baked potato; ½ cup of cooked beans; or ½ cup cooked corn or green peas. · Learn how much carbs to eat each day and at each meal. A dietitian or CDE can teach you how to keep track of the amount of carbs you eat. This is called carbohydrate counting. · If you are not sure how to count carbohydrate grams, use the Plate Method to plan meals. It is a good, quick way to make sure that you have a balanced meal. It also helps you spread carbs throughout the day. ¨ Divide your plate by types of foods.  Put non-starchy vegetables on half the plate, meat or other protein food on one-quarter of the plate, and a grain or starchy vegetable in the final quarter of the plate. To this you can add a small piece of fruit and 1 cup of milk or yogurt, depending on how many carbs you are supposed to eat at a meal.  · Try to eat about the same amount of carbs at each meal. Do not \"save up\" your daily allowance of carbs to eat at one meal.  · Proteins have very little or no carbs per serving. Examples of proteins are beef, chicken, turkey, fish, eggs, tofu, cheese, cottage cheese, and peanut butter. A serving size of meat is 3 ounces, which is about the size of a deck of cards. Examples of meat substitute serving sizes (equal to 1 ounce of meat) are 1/4 cup of cottage cheese, 1 egg, 1 tablespoon of peanut butter, and ½ cup of tofu. How can you eat out and still eat healthy? · Learn to estimate the serving sizes of foods that have carbohydrate. If you measure food at home, it will be easier to estimate the amount in a serving of restaurant food. · If the meal you order has too much carbohydrate (such as potatoes, corn, or baked beans), ask to have a low-carbohydrate food instead. Ask for a salad or green vegetables. · If you use insulin, check your blood sugar before and after eating out to help you plan how much to eat in the future. · If you eat more carbohydrate at a meal than you had planned, take a walk or do other exercise. This will help lower your blood sugar. What else should you know? · Limit saturated fat, such as the fat from meat and dairy products. This is a healthy choice because people who have diabetes are at higher risk of heart disease. So choose lean cuts of meat and nonfat or low-fat dairy products. Use olive or canola oil instead of butter or shortening when cooking. · Don't skip meals. Your blood sugar may drop too low if you skip meals and take insulin or certain medicines for diabetes. · Check with your doctor before you drink alcohol.  Alcohol can cause your blood sugar to drop too low. Alcohol can also cause a bad reaction if you take certain diabetes medicines. Follow-up care is a key part of your treatment and safety. Be sure to make and go to all appointments, and call your doctor if you are having problems. It's also a good idea to know your test results and keep a list of the medicines you take. Where can you learn more? Go to http://slava-enrique.info/. Enter M495 in the search box to learn more about \"Learning About Diabetes Food Guidelines. \"  Current as of: May 23, 2016  Content Version: 11.2  © 3857-3375 [employer]. Care instructions adapted under license by [employer] (which disclaims liability or warranty for this information). If you have questions about a medical condition or this instruction, always ask your healthcare professional. [address] any warranty or liability for your use of this information.

## 2017-04-24 NOTE — PROGRESS NOTES
This is a Subsequent Medicare Annual Wellness Visit providing Personalized Prevention Plan Services (PPPS) (Performed 12 months after initial AWV and PPPS )    I have reviewed the patient's medical history in detail and updated the computerized patient record. History     Past Medical History:   Diagnosis Date    Asthma     Cancer [facility])     BASAL     Cardiac catheterization 2009    [address] [facility]:  No significant CAD.  Cardiac echocardiogram 01/20/2014    EF 50-55%. No WMA. Gr 1 DDfx. RVSP 25-30 mmHg. Mild TR.  DM type 2 (diabetes mellitus, type 2) (HCC)     Enlarged prostate     Failed back syndrome     GERD (gastroesophageal reflux disease)     Hearing loss, bilateral     Hearing Aids    Hypertension     Hypothyroidism     Insomnia     Low serum testosterone level     Neuropathy     Osteoarthritis of multiple joints     S/P colonoscopy 8/14/13    mild diverticulosis in sigmoid colon w/o evidence of diverticulitis; f/u 5 years    SOB (shortness of breath)     chronic; 2' \"lung fever\"    Vertigo       Past Surgical History:   Procedure Laterality Date    HX BACK SURGERY  2000    removal of defective hardware    [address]    to remove bone fragments    HX LUMBAR FUSION  1999    fusion from L4-S1 and implantation of hardware    HX LUMBAR FUSION  1984    fusion on L5 area    HX ORTHOPAEDIC Bilateral 2004    trigger finger syndrome-all 4 fingers    HX ORTHOPAEDIC Left 2004    left arm torn muscle repair and placement of 2 screws     Current Outpatient Prescriptions   Medication Sig Dispense Refill    TRADJENTA 5 mg tablet Take 5 mg by mouth daily.  ACCU-CHEK JAZZY PLUS TEST STRP strip       ACCU-CHEK SOFTCLIX LANCETS misc       PEN NEEDLE, DIABETIC (BD ULTRA-FINE FANI PEN NEEDLES) by Does Not Apply route. 4mm x 32G      oxyCODONE ER (OXYCONTIN) 10 mg ER tablet Take 1 Tab by mouth every eight (8) hours for 30 days.  Max Daily Amount: 30 mg. 90 Tab 0    pregabalin (LYRICA) 150 mg capsule TAKE 1 CAPSULE BY MOUTH THREE TIMES DAILY FOR 30 DAYS. MAXIMUM 3 CAPSULES DAILY. 90 Cap 3    PEN NEEDLE 31 gauge x 5/16\" ndle USE ONE PEN NEEDLE FOR LANTUS FLEXPEN AND 3 PEN NEEDLES FOR APIDRA WITH EACH MEAL 1 Package 3    atorvastatin (LIPITOR) 20 mg tablet Take 1 Tab by mouth daily. 90 Tab 1    fenofibrate nanocrystallized (TRICOR) 145 mg tablet Take 1 Tab by mouth daily. 90 Tab 1    lisinopril (PRINIVIL, ZESTRIL) 5 mg tablet Take 1 Tab by mouth daily. 90 Tab 1    metoprolol tartrate (LOPRESSOR) 25 mg tablet Take 1 Tab by mouth two (2) times a day. 180 Tab 0    DULoxetine (CYMBALTA) 60 mg capsule Take 60 mg by mouth nightly.  esomeprazole (NEXIUM) 40 mg capsule Take 40 mg by mouth two (2) times a day.  betamethasone dipropionate (DIPROLENE) 0.05 % ointment       levothyroxine (SYNTHROID) 150 mcg tablet Take 1 Tab by mouth Daily (before breakfast). 90 Tab 1    butalbital-acetaminophen-caffeine (FIORICET, ESGIC) -40 mg per tablet Take 0.5 Tabs by mouth daily as needed for Pain. 10 Tab 0    LANTUS SOLOSTAR 100 unit/mL (3 mL) pen INJECT 30 UNITS SUBCUTANEOUSLY EVERY NIGHT (Patient taking differently: INJECT 52 UNITS SUBCUTANEOUSLY EVERY NIGHT) 3 Each 3    fluticasone (FLONASE) 50 mcg/actuation nasal spray USE ONE SPRAY IN EACH NOSTRIL ONCE DAILY 1 Bottle 0    montelukast (SINGULAIR) 10 mg tablet Take 1 Tab by mouth daily. 90 Tab 0    insulin glulisine (APIDRA SOLOSTAR) 100 unit/mL pen 2 units per carb consumed 3 Syringe 1    tamsulosin (FLOMAX) 0.4 mg capsule Take 1 Cap by mouth two (2) times a day. 180 Cap 3    HYDROcodone-acetaminophen (NORCO) 5-325 mg per tablet Take 1 Tab by mouth daily as needed for Pain.  albuterol (PROVENTIL VENTOLIN) 2.5 mg /3 mL (0.083 %) nebulizer solution 3 mL by Nebulization route every four (4) hours as needed for Wheezing or Shortness of Breath.  1 Package 5    Azelastine (ASTEPRO) 0.15 % (205.5 mcg) nasal spray 1 [place] by Both Nostrils route two (2) times daily as needed. 1 Bottle 5    budesonide-formoterol (SYMBICORT) 160-4.5 mcg/actuation HFA inhaler Take 2 Puffs by inhalation two (2) times a day. 1 Inhaler 5    tiotropium (SPIRIVA) 18 mcg inhalation capsule Take 1 Cap by inhalation daily. 30 Cap 5    ketorolac (ACULAR) 0.5 % ophthalmic solution Administer 1 Drop to both eyes two (2) times a day.  cefdinir (OMNICEF) 300 mg capsule       azithromycin (ZITHROMAX) 250 mg tablet       tamsulosin (FLOMAX) 0.4 mg capsule Take 1 Cap by mouth daily. Indications: SYMPTOMATIC BENIGN PROSTATIC HYPERPLASIA 90 Cap 3    SITagliptin (JANUVIA) 50 mg tablet Take 1 Tab by mouth daily. 90 Tab 1    ranitidine (ZANTAC) 150 mg tablet Take 1 Tab by mouth daily. 90 Tab 0     Allergies   Allergen Reactions    Ciprofloxacin Anaphylaxis    Other Plant, Animal, Environmental Other (comments)     Patient cannot have MRI. Patient states that he has metal screws in his left arm.     Lamisil [Terbinafine] Swelling     Tongue swelling    Metformin Other (comments)     Elevated cr 1.4    Morphine Other (comments)     \"loss of blood pressure\"    Other Medication Other (comments)     Doxasylin causes extreme GERD    Pcn [Penicillins] Rash    Pentothal [Thiopental Sodium] Shortness of Breath    Sulfa (Sulfonamide Antibiotics) Rash     Family History   Problem Relation Age of Onset    Cancer Mother      Bone and Brain Cancer    Diabetes Mother     Liver Disease Father      Lung Cancer    Kidney Disease Sister     Diabetes Daughter     Colon Cancer Brother      Social History   Substance Use Topics    Smoking status: Former Smoker     Packs/day: 1.00     Years: 31.00     Types: Pipe, Cigars     Quit date: 1/1/1995    Smokeless tobacco: Never Used    Alcohol use No     Patient Active Problem List   Diagnosis Code    Chronic bilateral low back pain with bilateral sciatica M54.42, M54.41, G89.29    [facility] J44.9    Migraine headache G43.909    GERD (gastroesophageal reflux disease) K21.9    Hyperlipidemia E78.5    Hypothyroidism E03.9    BPH (benign prostatic hyperplasia) N40.0    Hypotestosteronism E29.1    Vertigo R44    Encounter for long-term (current) use of other medications Z79.899    Chronic pain syndrome G89.4    DJD (degenerative joint disease), lumbar M47.816    H/O lumbosacral spine surgery Z98.890    DDD (degenerative disc disease), lumbar M51.36    Peripheral neuropathy (HCC) G62.9    Asthma with COPD (chronic obstructive pulmonary disease) ([facility]) J44.9, J45.909    Essential hypertension I10    Vitamin B12 deficiency E53.8    Type 2 diabetes mellitus with diabetic neuropathy ([facility]) E11.40    Chronic chest pain R07.9, G89.29    Advance directive in chart Z68.5    H/O colonoscopy/ due in 2018 Z98.890    Chronic low back pain M54.5, G89.29       Depression Risk Factor Screening:     PHQ 2 / 9, over the last two weeks 2/22/2017   Little interest or pleasure in doing things Not at all   Feeling down, depressed or hopeless Not at all   Total Score PHQ 2 0     Alcohol Risk Factor Screening: On any occasion during the past 3 months, have you had more than 4 drinks containing alcohol? No    Do you average more than 14 drinks per week? No      Functional Ability and Level of Safety:     Hearing Loss   profound    Activities of Daily Living   Self-care. Requires assistance with: no ADLs    Fall Risk     Fall Risk Assessment, last 12 mths 2/22/2017   Able to walk? Yes   Fall in past 12 months? Yes   Fall with injury?  Yes   Number of falls in past 12 months 2   Fall Risk Score 3     Abuse Screen   Patient is not abused    Review of Systems   per Dr Mccormick Getting    Physical Examination     Evaluation of Cognitive Function:  Mood/affect:  happy  Appearance: age appropriate  Family member/caregiver input: N/A    Cognitive functions intact     Patient Care Team:  Darshan Lopez MD as PCP - General (Family Practice)  Cortes Cruz MD (Otolaryngology)  PAUL Aguirre (Internal Medicine)  Lucy Nguyen MD as Physician (Urology)  Janeth Moreno as Physician (Podiatry)  Jordyn Avila MD (Ophthalmology)  Luz Paulino MD (Cardiology)  Saige Finn MD (Endocrinology)    Advice/Referrals/Counseling   Education and counseling provided:  Are appropriate based on today's review and evaluation  Review nurses note and assessment. Agree with that. Discussed 5 years health plan and given copy in AVS.  Discussed advance directive. He has copy in the chart. No change since last reviewed . Discussed DNR and DNI. Assessment/Plan       ICD-10-CM ICD-9-CM    1. Routine general medical examination at a health care facility Z00.00 V70.0    2. Screening for alcoholism Z13.89 V79.1    Pt understood and agrees with above plan. Follow-up Disposition:  Return in about 1 month (around 5/24/2017). Chales Minus

## 2017-04-24 NOTE — PROGRESS NOTES
HISTORY OF PRESENT ILLNESS  Kvng Joshua is a 74 y.o. male. HPI: Here for follow up on diabetes and also recently was out of town and diagnosed with pneumonia. He was treated with steroid and antibiotic. Now feeling much better. No more sob. Cough on and off but feeling much better. No more audible wheezing. No chest pain. No nausea or vomiting. No urinary or bowel complains. Has h/o asthma with copd. On his inhalers. No fever. No sputum with cough. Has h/o BPH. On medication. Recently seen urology. No new recommendations. Current regimen is helping. No more on testosterone therapy as it did not feel any symptomatic improvement. For diabetes seen endocrinologist. Zhanna Plump to tradjenta and it is expensive. Will talk to endocrinologist. His fasting sugar is improving gradually. Did not bring the log. Review recent HBA1C 7.7 done at endocrinologist. No hypoglycemia. His lantus was changed to basaglar due to insurance preference. Still on 52 units. Will be following endocrinologist recommendations. Vitals stable.    Visit Vitals    /66 (BP 1 Location: Left arm, BP Patient Position: Sitting)    Pulse 92    Temp 98.4 °F (36.9 °C) (Oral)    Resp 16    Ht 6' 2\" (1.88 m)    Wt 265 lb 12.8 oz (120.6 kg)    SpO2 99%    BMI 34.13 kg/m2     Lab Results   Component Value Date/Time    Hemoglobin A1c 8.7 01/19/2017 09:21 AM    Hemoglobin A1c (POC) 6.9 12/16/2015 10:29 AM    Hemoglobin A1c, External 7.7 03/10/2017     Lab Results   Component Value Date/Time    Sodium 139 01/19/2017 09:21 AM    Potassium 4.2 01/19/2017 09:21 AM    Chloride 100 01/19/2017 09:21 AM    CO2 30 01/19/2017 09:21 AM    Anion gap 9 01/19/2017 09:21 AM    Glucose 198 01/19/2017 09:21 AM    BUN 20 01/19/2017 09:21 AM    Creatinine 1.08 01/19/2017 09:21 AM    BUN/Creatinine ratio 19 01/19/2017 09:21 AM    GFR est AA >60 01/19/2017 09:21 AM    GFR est non-AA >60 01/19/2017 09:21 AM    Calcium 8.9 01/19/2017 09:21 AM    Bilirubin, total 0.3 01/19/2017 09:21 AM    AST (SGOT) 14 01/19/2017 09:21 AM    Alk. phosphatase 51 01/19/2017 09:21 AM    Protein, total 6.6 01/19/2017 09:21 AM    Albumin 3.7 01/19/2017 09:21 AM    Globulin 2.9 01/19/2017 09:21 AM    A-G Ratio 1.3 01/19/2017 09:21 AM    ALT (SGPT) 30 01/19/2017 09:21 AM     Lab Results   Component Value Date/Time    Cholesterol, total 158 01/19/2017 09:21 AM    HDL Cholesterol 33 01/19/2017 09:21 AM    LDL, calculated 77 01/19/2017 09:21 AM    VLDL, calculated 48 01/19/2017 09:21 AM    Triglyceride 240 01/19/2017 09:21 AM    CHOL/HDL Ratio 4.8 01/19/2017 09:21 AM     ROS: see HPI     Physical Exam   Constitutional: He is oriented to person, place, and time. No distress. Cardiovascular: Normal rate, regular rhythm and normal heart sounds. Pulmonary/Chest:   CTA   Abdominal: Soft. Bowel sounds are normal. There is no tenderness. Musculoskeletal: He exhibits no edema. Neurological: He is oriented to person, place, and time. Psychiatric: His behavior is normal.       ASSESSMENT and PLAN    ICD-10-CM ICD-9-CM    1. Cough/ pneumonia : records not received from out of town hospital yet. Will try to obtain them. Done with antibiotic and prednisone. Feeling better. Will repeat chest x-ray in 6 wks. R05 786.2 XR CHEST PA LAT   2. Diabetes 1.5, managed as type 2 [facility]): seen endocrinology. Changed insulin and oral medication. It is expensive for him tradjenta. He will talk to endocrinologist. For now diet modification. Changed basal insulin by endo. F/u next visit. Advised to bring blood sugar log. E13.9 250.00 insulin glulisine (APIDRA SOLOSTAR) 100 unit/mL pen      Insulin Needles, Disposable, (FANI PEN NEEDLE) 32 gauge x 5/32\" ndle   3. Asthma with COPD (chronic obstructive pulmonary disease) ([facility]): for now improving. Recent pneumonia. Will obtain records. F/u next visit. Continue current management.   J44.9 493.20 montelukast (SINGULAIR) 10 mg tablet    J45.909  albuterol (PROVENTIL VENTOLIN) 2.5 mg /3 mL (0.083 %) nebulizer solution   Pt understood and agrees with above plan. Review    Follow-up Disposition:  Return in about 1 month (around 5/24/2017).

## 2017-04-24 NOTE — PROGRESS NOTES
1. Have you been to the ER, urgent care clinic since your last visit? [facility]    2. Have you seen or consulted any other health care providers outside of the [address] [place] [address] since your last visit? Include any pap smears or colon screening. Dr. Murphy Noland, LOV: 4/12/17    [facility] Jerri is too expensive; patient wants to discuss. Last dm eye exam six months ago with Dr. Arianna Akers; has upcoming appt on 5/11/17 for floaters and vision changes.

## 2017-04-27 ENCOUNTER — OFFICE VISIT (OUTPATIENT)
Dept: PAIN MANAGEMENT | Age: 67
End: 2017-04-27

## 2017-04-27 VITALS — HEART RATE: 86 BPM | SYSTOLIC BLOOD PRESSURE: 122 MMHG | DIASTOLIC BLOOD PRESSURE: 75 MMHG | RESPIRATION RATE: 18 BRPM

## 2017-04-27 DIAGNOSIS — M54.5 CHRONIC LOW BACK PAIN, UNSPECIFIED BACK PAIN LATERALITY, WITH SCIATICA PRESENCE UNSPECIFIED: ICD-10-CM

## 2017-04-27 DIAGNOSIS — G89.29 CHRONIC BILATERAL LOW BACK PAIN WITH BILATERAL SCIATICA: Primary | ICD-10-CM

## 2017-04-27 DIAGNOSIS — M51.36 DDD (DEGENERATIVE DISC DISEASE), LUMBAR: ICD-10-CM

## 2017-04-27 DIAGNOSIS — G89.4 CHRONIC PAIN SYNDROME: ICD-10-CM

## 2017-04-27 DIAGNOSIS — M47.816 OSTEOARTHRITIS OF LUMBAR SPINE, UNSPECIFIED SPINAL OSTEOARTHRITIS COMPLICATION STATUS: ICD-10-CM

## 2017-04-27 DIAGNOSIS — M54.42 CHRONIC BILATERAL LOW BACK PAIN WITH BILATERAL SCIATICA: Primary | ICD-10-CM

## 2017-04-27 DIAGNOSIS — Z98.890 H/O LUMBOSACRAL SPINE SURGERY: ICD-10-CM

## 2017-04-27 DIAGNOSIS — G89.29 CHRONIC LOW BACK PAIN, UNSPECIFIED BACK PAIN LATERALITY, WITH SCIATICA PRESENCE UNSPECIFIED: ICD-10-CM

## 2017-04-27 DIAGNOSIS — M54.41 CHRONIC BILATERAL LOW BACK PAIN WITH BILATERAL SCIATICA: Primary | ICD-10-CM

## 2017-04-27 RX ORDER — OXYCODONE HCL 10 MG/1
10 TABLET, FILM COATED, EXTENDED RELEASE ORAL EVERY 8 HOURS
Qty: 90 TAB | Refills: 0 | Status: SHIPPED | OUTPATIENT
Start: 2017-04-27 | End: 2017-06-19

## 2017-04-27 RX ORDER — HYDROCODONE BITARTRATE AND ACETAMINOPHEN 5; 325 MG/1; MG/1
1 TABLET ORAL
Qty: 30 TAB | Refills: 0 | Status: SHIPPED | OUTPATIENT
Start: 2017-04-27 | End: 2017-06-19

## 2017-04-27 RX ORDER — OXYCODONE HCL 10 MG/1
10 TABLET, FILM COATED, EXTENDED RELEASE ORAL EVERY 8 HOURS
Qty: 90 TAB | Refills: 0 | Status: SHIPPED | OUTPATIENT
Start: 2017-05-26 | End: 2017-06-19

## 2017-04-27 RX ORDER — PREGABALIN 150 MG/1
CAPSULE ORAL
Qty: 90 CAP | Refills: 3 | Status: SHIPPED | OUTPATIENT
Start: 2017-04-27 | End: 2017-06-19

## 2017-04-27 RX ORDER — DULOXETIN HYDROCHLORIDE 60 MG/1
60 CAPSULE, DELAYED RELEASE ORAL
Qty: 30 CAP | Refills: 3 | Status: SHIPPED | OUTPATIENT
Start: 2017-04-27 | End: 2017-05-27

## 2017-04-27 RX ORDER — NALOXONE HYDROCHLORIDE 4 MG/.1ML
4 SPRAY NASAL AS NEEDED
Qty: 1 BOX | Refills: 1 | Status: SHIPPED | OUTPATIENT
Start: 2017-04-27 | End: 2022-02-15

## 2017-04-27 NOTE — PROGRESS NOTES
Nursing Notes    Patient presents to the office today in follow-up. Reviewed medications with counts as follows:    Rx Date filled Qty Dispensed Pill Count Last Dose Short   oxycontin 10 mg 4/26/17 90 89 This am no   norco 5/325 9/19/16 30 58 2 wks ago no   Mr. Janett Aponte has a reminder for a \"due or due soon\" health maintenance. I have asked that he contact his primary care provider for follow-up on this health maintenance. Comments: patient returned norco 5/325 prescription with start date of 3/2/17 to be destroyed    POC UDS was not performed in office today    Any new labs or imaging since last appointment? NO    Have you been to an emergency room (ER) or urgent care clinic since your last visit? YES          ER in [place]. Dx with bronchitis     Have you been hospitalized since your last visit? NO     If yes, where, when, and reason for visit? Have you seen or consulted any other health care providers outside of the [address] [place] [address]  since your last visit? YES     If yes, where, when, and reason for visit?

## 2017-04-27 NOTE — PROGRESS NOTES
HISTORY OF PRESENT ILLNESS  Elif Robb is a 74 y.o. male. HPI Comments: Meds help with pain control and quality of life. No new side effects reported today. Visit survey reviewed and will be scanned.  reviewed. Recent average level of pain(out of 10)-7  Chief complaint low back pain  Pain to different joints  Chronic pain  Using OxyContin 10 mg 3 times a day  Hydrocodone 5 mg once a day as needed, he uses this infrequently and one prescription lasts quite a while  Lyrica 150 mg 3 times a day  Cymbalta 60 mg once a day  Medication helps improve general activity, mood, walking, sleep, enjoyment of life      Measuring clinical outcomes of chronic pain patients. This was reviewed today. The survey will be scanned. Please see the survey for details. Total score-6    He did explain to me that he recently was visiting family in [place]. He had some problems with pneumonia. He is doing better now. Review of Systems   Constitutional: Positive for malaise/fatigue. Negative for chills and fever. HENT: Positive for hearing loss. Respiratory: Negative for cough and shortness of breath. Cardiovascular: Positive for leg swelling. Negative for chest pain and palpitations. Gastrointestinal: Positive for constipation. Negative for diarrhea, heartburn, nausea and vomiting. Musculoskeletal: Positive for back pain and joint pain. Negative for falls. Skin: Negative for rash. Neurological: Negative for dizziness. Psychiatric/Behavioral: Positive for depression. Negative for suicidal ideas. The patient is nervous/anxious and has insomnia. Physical Exam   Constitutional: He is oriented to person, place, and time. He appears well-developed and well-nourished. No distress. HENT:   Head: Normocephalic and atraumatic. Pulmonary/Chest: Effort normal. No respiratory distress. Neurological: He is alert and oriented to person, place, and time. Skin: Skin is warm and dry. He is not diaphoretic. Psychiatric: He has a normal mood and affect. His speech is normal. Cognition and memory are normal.   Nursing note and vitals reviewed. ASSESSMENT and PLAN  Encounter Diagnoses   Name Primary?  Chronic bilateral low back pain with bilateral sciatica Yes    Chronic pain syndrome     Osteoarthritis of lumbar spine, unspecified spinal osteoarthritis complication status     H/O lumbosacral spine surgery     DDD (degenerative disc disease), lumbar     Chronic low back pain, unspecified back pain laterality, with sciatica presence unspecified    No indicators for recent medication misuse.  reviewed. Pain Meds and Quality Of Life have been reviewed. Nonpharmacologic therapy and non-opioid pharmacologic therapy were considered. If opioid therapy is prescribed, this is only if the expected benefits are anticipated to outweigh risks. Possible changes to treatment plan considered. Support/education given as needed. Today-medications are as listed. No significant changes to medications. Follow up -- 2 months. The majority of today's visit was spent counseling and coordinating care, visit time 40 minutes. Additional time spent discussing the naloxone. Because the patient's current regimen places him/her at increased risk for possible overdose, a prescription for naloxone is being provided. The patient understands that this medication is only to be used in the setting of a possible overdose and that inadvertent use of this medication could precipitate overt withdrawal.  I discussed naloxone with the patient today. In addition, the patient has received the naloxone instruction sheet.

## 2017-04-27 NOTE — MR AVS SNAPSHOT
Visit Information Date & Time Provider Department Dept. Phone Encounter #  
 4/27/2017 11:00 AM Dariusz Adam MD [facility] [id number] Follow-up Instructions Return in about 2 months (around 6/27/2017). Follow-up and Disposition History Your Appointments 5/24/2017  9:45 AM  
ROUTINE CARE with Riya Gurrola MD  
[facility] ([address]) Appt Note: routine f/u 1mo; -  
 [address] [id number] [address] 200 [facility]  
  
    
 8/21/2017  2:40 PM  
Follow Up with Arturo Berman MD  
[facility] ([address]) Appt Note: 6 month follow up [place] [address] [address] [id number] [phone] [address]  
  
    
 3/8/2018  9:00 AM  
Office Visit with Jim Garcia MD  
[facility] [address] [place] [address]) Appt Note: check up [address] [address]  
[phone] [address] [facility] [address] Upcoming Health Maintenance Date Due  
 EYE EXAM RETINAL OR DILATED Q1 4/26/2017 HEMOGLOBIN A1C Q6M 9/10/2017 FOOT EXAM Q1 1/9/2018 LIPID PANEL Q1 1/19/2018 MICROALBUMIN Q1 3/10/2018 MEDICARE YEARLY EXAM 4/25/2018 GLAUCOMA SCREENING Q2Y 4/26/2018 COLONOSCOPY 10/19/2023 DTaP/Tdap/Td series (2 - Td) 5/16/2026 Allergies as of 4/27/2017  Review Complete On: 4/27/2017 By: Dariusz Adam MD  
  
 Severity Noted Reaction Type Reactions Ciprofloxacin High 11/16/2013    Anaphylaxis Other Plant, Animal, Environmental High 03/10/2016    Other (comments) Patient cannot have MRI. Patient states that he has metal screws in his left arm. Lamisil [Terbinafine]  10/18/2013    Swelling Tongue swelling Metformin  03/16/2016    Other (comments) Elevated cr 1.4 Morphine  10/18/2013    Other (comments) \"loss of blood pressure\" Other Medication  10/18/2013    Other (comments) Doxasylin causes extreme GERD Pcn [Penicillins]  10/18/2013    Rash Pentothal [Thiopental Sodium]  10/18/2013    Shortness of Breath Sulfa (Sulfonamide Antibiotics)  10/18/2013    Rash Current Immunizations  Reviewed on 7/20/2016 Name Date Influenza Vaccine 11/3/2014 Influenza Vaccine (Quad) PF 1/25/2017, 9/16/2015 Pneumococcal Conjugate (PCV-13) 7/20/2016 Pneumococcal Polysaccharide (PPSV-23) 3/30/2017 Tdap 5/16/2016 Not reviewed this visit You Were Diagnosed With   
  
 Codes Comments Chronic bilateral low back pain with bilateral sciatica    -  Primary ICD-10-CM: M54.42, M54.41, G89.29 ICD-9-CM: 724.2, 724.3, 338.29 Chronic pain syndrome     ICD-10-CM: G89.4 ICD-9-CM: 338.4 Osteoarthritis of lumbar spine, unspecified spinal osteoarthritis complication status     [facility]-MI: M47.816 ICD-9-CM: 721.3 H/O lumbosacral spine surgery     ICD-10-CM: Z98.890 ICD-9-CM: V15.29 DDD (degenerative disc disease), lumbar     ICD-10-CM: M51.36 
ICD-9-CM: 722.52 Chronic low back pain, unspecified back pain laterality, with sciatica presence unspecified     ICD-10-CM: M54.5, G89.29 ICD-9-CM: 724.2, 338.29 Vitals BP Pulse Resp Smoking Status 122/75 80 18 Former Smoker Preferred Pharmacy Pharmacy Name Phone [facility] [address] [place] [address] [phone] Your Updated Medication List  
  
   
This list is accurate as of: 4/27/17 11:14 AM.  Always use your most recent med list.  
  
  
  
  
 ACCU-CHEK JAZZY PLUS TEST STRP strip Generic drug:  glucose blood VI test strips [address] Generic drug:  Lancets ACULAR 0.5 % ophthalmic solution Generic drug:  ketorolac Administer 1 Drop to both eyes two (2) times a day. albuterol 2.5 mg /3 mL (0.083 %) nebulizer solution Commonly known as:  PROVENTIL VENTOLIN  
3 mL by Nebulization route every four (4) hours as needed for Wheezing or Shortness of Breath. atorvastatin 20 mg tablet Commonly known as:  LIPITOR Take 1 Tab by mouth daily. Azelastine 0.15 % (205.5 mcg) nasal spray Commonly known as:  ASTEPRO  
1 Spray by Both Nostrils route two (2) times daily as needed. betamethasone dipropionate 0.05 % ointment Commonly known as:  DIPROLENE  
  
 budesonide-formoterol 160-4.5 mcg/actuation HFA inhaler Commonly known as:  SYMBICORT Take 2 Puffs by inhalation two (2) times a day. butalbital-acetaminophen-caffeine -40 mg per tablet Commonly known as:  Francisco Constable Take 0.5 Tabs by mouth daily as needed for Pain. * DULoxetine 60 mg capsule Commonly known as:  CYMBALTA Take 60 mg by mouth nightly. * DULoxetine 60 mg capsule Commonly known as:  CYMBALTA Take 1 Cap by mouth nightly for 30 days. esomeprazole 40 mg capsule Commonly known as:  Tan Sine Take 40 mg by mouth two (2) times a day. fenofibrate nanocrystallized 145 mg tablet Commonly known as:  [employer] Take 1 Tab by mouth daily. fluticasone 50 mcg/actuation nasal spray Commonly known as:  FLONASE  
USE ONE SPRAY IN EACH NOSTRIL ONCE DAILY  
  
 * HYDROcodone-acetaminophen 5-325 mg per tablet Commonly known as:  [place] Take 1 Tab by mouth daily as needed for Pain. * HYDROcodone-acetaminophen 5-325 mg per tablet Commonly known as:  [place] Take 1 Tab by mouth daily as needed for Pain for up to 30 days. insulin glargine 100 unit/mL (3 mL) pen Commonly known as:  BASAGLAR KWIKPEN  
52 units at bedtime  
  
 insulin glulisine 100 unit/mL pen Commonly known as:  APIDRA SOLOSTAR  
2 units per carb consumed. Max 30 / day  
  
 levothyroxine 150 mcg tablet Commonly known as:  SYNTHROID Take 1 Tab by mouth Daily (before breakfast). lisinopril 5 mg tablet Commonly known as:  Nam Leys Take 1 Tab by mouth daily. metoprolol tartrate 25 mg tablet Commonly known as:  LOPRESSOR Take 1 Tab by mouth two (2) times a day. montelukast 10 mg tablet Commonly known as:  SINGULAIR Take 1 Tab by mouth daily. naloxone 4 mg/actuation Spry 4 mg by Nasal route as needed. * oxyCODONE ER 10 mg ER tablet Commonly known as:  OxyCONTIN Take 1 Tab by mouth every eight (8) hours for 30 days. Max Daily Amount: 30 mg.  
  
 * oxyCODONE ER 10 mg ER tablet Commonly known as:  OxyCONTIN Take 1 Tab by mouth every eight (8) hours for 30 days. Max Daily Amount: 30 mg. Start taking on:  5/26/2017 * BD ULTRA-FINE MIREYA PEN NEEDLES  
by Does Not Apply route. 4mm x 32G * PEN NEEDLE 31 gauge x 5/16\" Ndle Generic drug:  Insulin Needles (Disposable) USE ONE PEN NEEDLE FOR LANTUS FLEXPEN AND 3 PEN NEEDLES FOR APIDRA WITH EACH MEAL * Insulin Needles (Disposable) 32 gauge x 5/32\" Ndle Commonly known as:  Mireya Pen Needle Take at bedtime  
  
 pregabalin 150 mg capsule Commonly known as:  Elo [place] TAKE 1 CAPSULE BY MOUTH THREE TIMES DAILY FOR 30 DAYS. MAXIMUM 3 CAPSULES DAILY. tamsulosin 0.4 mg capsule Commonly known as:  FLOMAX Take 1 Cap by mouth two (2) times a day. tiotropium 18 mcg inhalation capsule Commonly known as:  Tiffanie Buckle Take 1 Cap by inhalation daily. TRADJENTA 5 mg tablet Generic drug:  linagliptin Take 5 mg by mouth daily. * Notice: This list has 9 medication(s) that are the same as other medications prescribed for you. Read the directions carefully, and ask your doctor or other care provider to review them with you. Prescriptions Printed  Refills  
 oxyCODONE ER (OXYCONTIN) 10 mg ER tablet 0  
 Sig: Take 1 Tab by mouth every eight (8) hours for 30 days. Max Daily Amount: 30 mg.  
 Class: Print Route: Oral  
 oxyCODONE ER (OXYCONTIN) 10 mg ER tablet 0 Starting on: 2017 Sig: Take 1 Tab by mouth every eight (8) hours for 30 days. Max Daily Amount: 30 mg.  
 Class: Print Route: Oral  
 pregabalin (LYRICA) 150 mg capsule 3 Sig: TAKE 1 CAPSULE BY MOUTH THREE TIMES DAILY FOR 30 DAYS. MAXIMUM 3 CAPSULES DAILY. Class: Print HYDROcodone-acetaminophen (NORCO) 5-325 mg per tablet 0 Sig: Take 1 Tab by mouth daily as needed for Pain for up to 30 days. Class: Print Route: Oral  
 naloxone 4 mg/actuation spry 1 Si mg by Nasal route as needed. Class: Print Route: Nasal  
  
Prescriptions Sent to Pharmacy Refills DULoxetine (CYMBALTA) 60 mg capsule 3 Sig: Take 1 Cap by mouth nightly for 30 days. Class: Normal  
 Pharmacy: [address] [place] [address] #: [phone] Route: Oral  
  
Follow-up Instructions Return in about 2 months (around 2017). Introducing [facility]! Torie Jain introduces [employer]-System patient portal. Now you can access parts of your medical record, email your doctor's office, and request medication refills online. 1. In your internet browser, go to [email or web address] 2. Click on the First Time User? Click Here link in the Sign In box. You will see the New Member Sign Up page. 3. Enter your [employer]-System Access Code exactly as it appears below. You will not need to use this code after youve completed the sign-up process. If you do not sign up before the expiration date, you must request a new code. · [employer]-System Access Code: [email or web address] Expires: 2017 10:43 AM 
 
4. Enter the last four digits of your Social Security Number (xxxx) and Date of Birth (mm/dd/yyyy) as indicated and click Submit. You will be taken to the next sign-up page. 5. Create a Kickfire ID. This will be your [employer]fire login ID and cannot be changed, so think of one that is secure and easy to remember. 6. Create a [employer]fire password. You can change your password at any time. 7. Enter your Password Reset Question and Answer. This can be used at a later time if you forget your password. 8. Enter your e-mail address. You will receive e-mail notification when new information is available in [address] 9. Click Sign Up. You can now view and download portions of your medical record. 10. Click the Download Summary menu link to download a portable copy of your medical information. If you have questions, please visit the Frequently Asked Questions section of the [employer] website. Remember, [employer]fire is NOT to be used for urgent needs. For medical emergencies, dial 911. Now available from your iPhone and Android! Please provide this summary of care documentation to your next provider. Your primary care clinician is listed as Delfina Ferguson. If you have any questions after today's visit, please call [phone].

## 2017-05-02 DIAGNOSIS — R35.1 BPH ASSOCIATED WITH NOCTURIA: ICD-10-CM

## 2017-05-02 DIAGNOSIS — N40.1 BPH ASSOCIATED WITH NOCTURIA: ICD-10-CM

## 2017-05-02 RX ORDER — TAMSULOSIN HYDROCHLORIDE 0.4 MG/1
CAPSULE ORAL
Qty: 180 CAP | Refills: 0 | Status: SHIPPED | OUTPATIENT
Start: 2017-05-02 | End: 2017-09-05 | Stop reason: SDUPTHER

## 2017-05-17 ENCOUNTER — HOSPITAL ENCOUNTER (OUTPATIENT)
Dept: GENERAL RADIOLOGY | Age: 67
Discharge: HOME | End: 2017-05-17
Payer: MEDICARE

## 2017-05-17 DIAGNOSIS — R05.9 COUGH: ICD-10-CM

## 2017-05-17 PROCEDURE — 71020 XR CHEST PA LAT: CPT

## 2017-05-17 NOTE — PROGRESS NOTES
Let pt know that chest x- ray showed no acute finding. Thanks . Further discussion on follow up visit.  Raman

## 2017-05-23 NOTE — PROGRESS NOTES
Spoke with patient (2 verifiers name/) regarding chest x-ray showed no acute finding and further discussion on follow up visit. Patient voiced understanding.

## 2017-05-24 ENCOUNTER — OFFICE VISIT (OUTPATIENT)
Dept: FAMILY MEDICINE CLINIC | Age: 67
End: 2017-05-24

## 2017-05-24 VITALS
TEMPERATURE: 98.4 F | WEIGHT: 267.4 LBS | BODY MASS INDEX: 34.32 KG/M2 | HEART RATE: 73 BPM | RESPIRATION RATE: 16 BRPM | OXYGEN SATURATION: 99 % | DIASTOLIC BLOOD PRESSURE: 66 MMHG | HEIGHT: 74 IN | SYSTOLIC BLOOD PRESSURE: 114 MMHG

## 2017-05-24 DIAGNOSIS — R00.0 TACHYCARDIA: ICD-10-CM

## 2017-05-24 DIAGNOSIS — E78.1 HYPERTRIGLYCERIDEMIA: ICD-10-CM

## 2017-05-24 DIAGNOSIS — J18.9 PNEUMONIA DUE TO INFECTIOUS ORGANISM, UNSPECIFIED LATERALITY, UNSPECIFIED PART OF LUNG: Primary | ICD-10-CM

## 2017-05-24 DIAGNOSIS — R79.89 LOW TSH LEVEL: ICD-10-CM

## 2017-05-24 DIAGNOSIS — I10 ESSENTIAL HYPERTENSION: ICD-10-CM

## 2017-05-24 DIAGNOSIS — E11.65 POORLY CONTROLLED DIABETES MELLITUS (HCC): ICD-10-CM

## 2017-05-24 DIAGNOSIS — R25.1 SHAKINESS: ICD-10-CM

## 2017-05-24 RX ORDER — FENOFIBRATE 145 MG/1
145 TABLET, COATED ORAL DAILY
Qty: 90 TAB | Refills: 1 | Status: SHIPPED | OUTPATIENT
Start: 2017-05-24 | End: 2018-05-14 | Stop reason: SDUPTHER

## 2017-05-24 RX ORDER — METOPROLOL TARTRATE 25 MG/1
25 TABLET, FILM COATED ORAL 2 TIMES DAILY
Qty: 180 TAB | Refills: 0 | Status: SHIPPED | OUTPATIENT
Start: 2017-05-24 | End: 2017-11-21 | Stop reason: SDUPTHER

## 2017-05-24 RX ORDER — LISINOPRIL 5 MG/1
5 TABLET ORAL DAILY
Qty: 90 TAB | Refills: 1 | Status: SHIPPED | OUTPATIENT
Start: 2017-05-24 | End: 2017-08-25 | Stop reason: SDUPTHER

## 2017-05-24 RX ORDER — ATORVASTATIN CALCIUM 20 MG/1
20 TABLET, FILM COATED ORAL DAILY
Qty: 90 TAB | Refills: 1 | Status: SHIPPED | OUTPATIENT
Start: 2017-05-24 | End: 2018-06-14 | Stop reason: SDUPTHER

## 2017-05-24 RX ORDER — LEVOTHYROXINE SODIUM 150 UG/1
150 TABLET ORAL
Qty: 90 TAB | Refills: 1 | Status: SHIPPED | OUTPATIENT
Start: 2017-05-24 | End: 2018-03-13 | Stop reason: SDUPTHER

## 2017-05-24 NOTE — PROGRESS NOTES
HISTORY OF PRESENT ILLNESS  Angel Sauceda is a 74 y.o. male. HPI : here for follow up on on diabetes and recent pneumonia. Also h/o asthma / copd. Now feeling much better. 6 wks repeat chest x-ray showed no acute finding. Denies any cough or cold. No fever. No chest  Congestion. No wheezing. No trouble breathing or chest pain. Feeling much better after done with antibiotic and prednisone. Also needs medication refill. Last labs done at endocrinology noted low TSh and normal T4. For now will repeat labs. Asymptomatic. Complaint with medication. No mood changes. No urine or bowel changes. Visit Vitals    /66 (BP 1 Location: Left arm, BP Patient Position: Sitting)    Pulse 73    Temp 98.4 °F (36.9 °C) (Oral)    Resp 16    Ht 6' 2\" (1.88 m)    Wt 267 lb 6.4 oz (121.3 kg)    SpO2 99%    BMI 34.33 kg/m2     No headache or dizziness. No tachycardia. On beta blocker. Also denies any hand shakiness. His chronic back pain stable , under care of pain management. No concern from patient. H/o diabetes. Elevated HBA1C to 7.7/ following endocrinology. For now on tradjenta and it is expensive. Will talk to endocrinology regarding changing medication. Will follow their recommendations. Denies any hypoglycemia. No abdominal pain. No nausea or vomiting. On 52 units of bedtime insulin( basaglar)  and less use of Apidra with meal compare to before. Today morning fasting blood sugar was 99. GERD symptoms stable on current medication. ROS; see HPI     Physical Exam   Constitutional: He is oriented to person, place, and time. No distress. Cardiovascular: Normal rate, regular rhythm and normal heart sounds. Pulmonary/Chest:   CTA   Abdominal: Soft. Bowel sounds are normal. There is no tenderness. Musculoskeletal: He exhibits no edema. Neurological: He is oriented to person, place, and time. Psychiatric: His behavior is normal.       ASSESSMENT and PLAN    ICD-10-CM ICD-9-CM    1.  Pneumonia due to infectious organism, unspecified laterality, unspecified part of lung: feeling better. Repeat chest x0-ray no acute finding. Asymptomatic. J18.9 136.9      484.8    2. Hypertriglyceridemia: given medication refill. E78.1 272.1 atorvastatin (LIPITOR) 20 mg tablet      fenofibrate nanocrystallized (TRICOR) 145 mg tablet   3. Shakiness/ hand : stable on medication. R25.1 781.0 metoprolol tartrate (LOPRESSOR) 25 mg tablet   4. Tachycardial; stable on medication. Asymptomatic.  R00.0 785.0 metoprolol tartrate (LOPRESSOR) 25 mg tablet   5. Low TSH level: recheck labs and also given medication refill. R94.6 794.5 levothyroxine (SYNTHROID) 150 mcg tablet      TSH 3RD GENERATION      T4, FREE   6. Poorly controlled diabetes mellitus ([address]): improving HBA1C. Recent was 7.7. Improving fasting blood sugar. Will follow endocrinology recommendations. He will talk to them about changing Jinnie Rimes as it is expensive. E11.65 250.00    7. Essential hypertension: stable. Given medication refill. On low salt diet. I10 401.9 lisinopril (PRINIVIL, ZESTRIL) 5 mg tablet   8. GERD symptoms stable. Avoiding spicy food. Pt understood and agrees with above plan. Review HM  Up to date on eye exam. Pending records. Follow-up Disposition:  Return in about 3 months (around 8/24/2017).

## 2017-05-24 NOTE — MR AVS SNAPSHOT
Visit Information Date & Time Provider Department Dept. Phone Encounter #  
 5/24/2017  9:45 AM Ladonna Shankar, [address] [id number] Follow-up Instructions Return in about 3 months (around 8/24/2017). Your Appointments 6/19/2017 11:15 AM  
Follow Up with Luis Healy MD  
[facility] [address] [place] [address]) Appt Note: return in 2 months [address] [facility] [address]  
[phone] Kayley Shields 1348 [id number]  
  
    
 8/21/2017  2:40 PM  
Follow Up with Beulah Hernandez MD  
[facility] ([address] [place] [address]) Appt Note: 6 month follow up [address] [facility] [address] [address] [id number] [phone] [address]  
  
    
 3/8/2018  9:00 AM  
Office Visit with El Vance MD  
[facility] [address] [place] [address]) Appt Note: check up [address] [facility] [address] Dustin [address] [facility] [address]  
[phone] [address] [facility] [address] [facility] [address] Upcoming Health Maintenance Date Due  
 EYE EXAM RETINAL OR DILATED Q1 4/26/2017 INFLUENZA AGE 9 TO ADULT 8/1/2017 HEMOGLOBIN A1C Q6M 9/10/2017 FOOT EXAM Q1 1/9/2018 LIPID PANEL Q1 1/19/2018 MICROALBUMIN Q1 3/10/2018 MEDICARE YEARLY EXAM 4/25/2018 GLAUCOMA SCREENING Q2Y 4/26/2018 COLONOSCOPY 10/19/2023 DTaP/Tdap/Td series (2 - Td) 5/16/2026 Allergies as of 5/24/2017  Review Complete On: 5/24/2017 By: Ladonna Shankar MD  
  
 Severity Noted Reaction Type Reactions Ciprofloxacin High 11/16/2013    Anaphylaxis Other Plant, Animal, Environmental High 03/10/2016    Other (comments) Patient cannot have MRI. Patient states that he has metal screws in his left arm. Lamisil [Terbinafine]  10/18/2013    Swelling Tongue swelling Metformin  03/16/2016    Other (comments) Elevated cr 1.4 Morphine  10/18/2013    Other (comments) \"loss of blood pressure\" Other Medication  10/18/2013    Other (comments) Doxasylin causes extreme GERD Pcn [Penicillins]  10/18/2013    Rash Pentothal [Thiopental Sodium]  10/18/2013    Shortness of Breath Sulfa (Sulfonamide Antibiotics)  10/18/2013    Rash Current Immunizations  Reviewed on 7/20/2016 Name Date Influenza Vaccine 11/3/2014 Influenza Vaccine (Quad) PF 1/25/2017, 9/16/2015 Pneumococcal Conjugate (PCV-13) 7/20/2016 Pneumococcal Polysaccharide (PPSV-23) 3/30/2017 Tdap 5/16/2016 Not reviewed this visit You Were Diagnosed With   
  
 Codes Comments Pneumonia due to infectious organism, unspecified laterality, unspecified part of lung    -  Primary ICD-10-CM: J18.9 ICD-9-CM: 136.9, 484.8 Hypertriglyceridemia     ICD-10-CM: E78.1 ICD-9-CM: 272.1 Shakiness     ICD-10-CM: R25.1 ICD-9-CM: 781.0 Tachycardia     ICD-10-CM: R00.0 ICD-9-CM: 785.0 Low TSH level     ICD-10-CM: R94.6 ICD-9-CM: 794.5 Poorly controlled diabetes mellitus ([facility] [address]     ICD-10-CM: E11.65 ICD-9-CM: 250.00 Essential hypertension     ICD-10-CM: I10 
ICD-9-CM: 401.9 Vitals BP Pulse Temp Resp Height(growth percentile) Weight(growth percentile) 114/66 (BP 1 Location: Left arm, BP Patient Position: Sitting) 73 98.4 °F (36.9 °C) (Oral) 16 6' 2\" (1.88 m) 267 lb 6.4 oz (121.3 kg) SpO2 BMI Smoking Status 99% 34.33 kg/m2 Former Smoker BMI and BSA Data Body Mass Index Body Surface Area  
 34.33 kg/m 2 2.52 m 2 Preferred Pharmacy Pharmacy Name Phone [facility] [address] [place] [address] Avenue [phone] Your Updated Medication List  
  
   
This list is accurate as of: 5/24/17 10:20 AM.  Always use your most recent med list.  
  
  
  
  
 ACCU-CHEK JZAZY PLUS TEST STRP strip Generic drug:  glucose blood VI test strips [address] Generic drug:  Lancets ACULAR 0.5 % ophthalmic solution Generic drug:  ketorolac Administer 1 Drop to both eyes two (2) times a day. albuterol 2.5 mg /3 mL (0.083 %) nebulizer solution Commonly known as:  PROVENTIL VENTOLIN  
3 mL by Nebulization route every four (4) hours as needed for Wheezing or Shortness of Breath. atorvastatin 20 mg tablet Commonly known as:  LIPITOR Take 1 Tab by mouth daily. betamethasone dipropionate 0.05 % ointment Commonly known as:  DIPROLENE  
  
 budesonide-formoterol 160-4.5 mcg/actuation HFA inhaler Commonly known as:  SYMBICORT Take 2 Puffs by inhalation two (2) times a day. butalbital-acetaminophen-caffeine -40 mg per tablet Commonly known as:  Clayton Collado Take 0.5 Tabs by mouth daily as needed for Pain. * DULoxetine 60 mg capsule Commonly known as:  CYMBALTA Take 60 mg by mouth nightly. * DULoxetine 60 mg capsule Commonly known as:  CYMBALTA Take 1 Cap by mouth nightly for 30 days. esomeprazole 40 mg capsule Commonly known as:  Jesu Murcia Take 40 mg by mouth two (2) times a day. fenofibrate nanocrystallized 145 mg tablet Commonly known as:  [employer] Take 1 Tab by mouth daily. fluticasone 50 mcg/actuation nasal spray Commonly known as:  FLONASE  
USE ONE SPRAY IN EACH NOSTRIL ONCE DAILY  
  
 * HYDROcodone-acetaminophen 5-325 mg per tablet Commonly known as:  Mark Kilts Take 1 Tab by mouth daily as needed for Pain. * HYDROcodone-acetaminophen 5-325 mg per tablet Commonly known as:  Mark Kilts Take 1 Tab by mouth daily as needed for Pain for up to 30 days. insulin glargine 100 unit/mL (3 mL) Inpn Commonly known as:  JOSHAGLAR KWIKPEN  
52 units at bedtime  
  
 insulin glulisine 100 unit/mL pen Commonly known as:  APIDRA SOLOSTAR  
2 units per carb consumed. Max 30 / day levothyroxine 150 mcg tablet Commonly known as:  SYNTHROID Take 1 Tab by mouth Daily (before breakfast). lisinopril 5 mg tablet Commonly known as:  Garrick Mash Take 1 Tab by mouth daily. metoprolol tartrate 25 mg tablet Commonly known as:  LOPRESSOR Take 1 Tab by mouth two (2) times a day. montelukast 10 mg tablet Commonly known as:  SINGULAIR Take 1 Tab by mouth daily. naloxone 4 mg/actuation Spry 4 mg by Nasal route as needed. * oxyCODONE ER 10 mg ER tablet Commonly known as:  OxyCONTIN Take 1 Tab by mouth every eight (8) hours for 30 days. Max Daily Amount: 30 mg.  
  
 * oxyCODONE ER 10 mg ER tablet Commonly known as:  OxyCONTIN Take 1 Tab by mouth every eight (8) hours for 30 days. Max Daily Amount: 30 mg. Start taking on:  5/26/2017 * BD ULTRA-FINE FANI PEN NEEDLES  
by Does Not Apply route. 4mm x 32G * PEN NEEDLE 31 gauge x 5/16\" Ndle Generic drug:  Insulin Needles (Disposable) USE ONE PEN NEEDLE FOR LANTUS FLEXPEN AND 3 PEN NEEDLES FOR APIDRA WITH EACH MEAL * Insulin Needles (Disposable) 32 gauge x 5/32\" Ndle Commonly known as:  Fani Pen Needle Take at bedtime  
  
 pregabalin 150 mg capsule Commonly known as:  Daphane Ang TAKE 1 CAPSULE BY MOUTH THREE TIMES DAILY FOR 30 DAYS. MAXIMUM 3 CAPSULES DAILY. * tamsulosin 0.4 mg capsule Commonly known as:  FLOMAX Take 1 Cap by mouth two (2) times a day. * tamsulosin 0.4 mg capsule Commonly known as:  FLOMAX TAKE ONE CAPSULE BY MOUTH TWICE DAILY  
  
 tiotropium 18 mcg inhalation capsule Commonly known as:  Zada Shy Take 1 Cap by inhalation daily. TRADJENTA 5 mg tablet Generic drug:  linagliptin Take 5 mg by mouth daily. * Notice: This list has 11 medication(s) that are the same as other medications prescribed for you. Read the directions carefully, and ask your doctor or other care provider to review them with you. Prescriptions Sent to Pharmacy Refills  
 atorvastatin (LIPITOR) 20 mg tablet 1 Sig: Take 1 Tab by mouth daily. Class: Normal  
 Pharmacy: [address] Ph #: [phone] Route: Oral  
 fenofibrate nanocrystallized (TRICOR) 145 mg tablet 1 Sig: Take 1 Tab by mouth daily. Class: Normal  
 Pharmacy: [address] Ph #: [phone] Route: Oral  
 lisinopril (PRINIVIL, ZESTRIL) 5 mg tablet 1 Sig: Take 1 Tab by mouth daily. Class: Normal  
 Pharmacy: [address] Ph #: [phone] Route: Oral  
 metoprolol tartrate (LOPRESSOR) 25 mg tablet 0 Sig: Take 1 Tab by mouth two (2) times a day. Class: Normal  
 Pharmacy: [address] Ph #: [phone] Route: Oral  
 levothyroxine (SYNTHROID) 150 mcg tablet 1 Sig: Take 1 Tab by mouth Daily (before breakfast). Class: Normal  
 Pharmacy: [address] Ph #: [phone] Route: Oral  
  
Follow-up Instructions Return in about 3 months (around 8/24/2017). To-Do List   
 05/24/2017 Lab:  T4, FREE   
  
 05/24/2017 Lab:  TSH 3RD GENERATION Patient Instructions Learning About Diabetes Food Guidelines Your Care Instructions Meal planning is important to manage diabetes. It helps keep your blood sugar at a target level (which you set with your doctor). You don't have to eat special foods. You can eat what your family eats, including sweets once in a while. But you do have to pay attention to how often you eat and how much you eat of certain foods. You may want to work with a dietitian or a certified diabetes educator (CDE) to help you plan meals and snacks. A dietitian or CDE can also help you lose weight if that is one of your goals. What should you know about eating carbs? Managing the amount of carbohydrate (carbs) you eat is an important part of healthy meals when you have diabetes. Carbohydrate is found in many foods. · Learn which foods have carbs. And learn the amounts of carbs in different foods. ¨ Bread, cereal, pasta, and rice have about 15 grams of carbs in a serving. A serving is 1 slice of bread (1 ounce), ½ cup of cooked cereal, or 1/3 cup of cooked pasta or rice. ¨ Fruits have 15 grams of carbs in a serving. A serving is 1 small fresh fruit, such as an apple or orange; ½ of a banana; ½ cup of cooked or canned fruit; ½ cup of fruit juice; 1 cup of melon or raspberries; or 2 tablespoons of dried fruit. ¨ Milk and no-sugar-added yogurt have 15 grams of carbs in a serving. A serving is 1 cup of milk or 2/3 cup of no-sugar-added yogurt. ¨ Starchy vegetables have 15 grams of carbs in a serving. A serving is ½ cup of mashed potatoes or sweet potato; 1 cup winter squash; ½ of a small baked potato; ½ cup of cooked beans; or ½ cup cooked corn or green peas. · Learn how much carbs to eat each day and at each meal. A dietitian or CDE can teach you how to keep track of the amount of carbs you eat. This is called carbohydrate counting. · If you are not sure how to count carbohydrate grams, use the Plate Method to plan meals. It is a good, quick way to make sure that you have a balanced meal. It also helps you spread carbs throughout the day. ¨ Divide your plate by types of foods. Put non-starchy vegetables on half the plate, meat or other protein food on one-quarter of the plate, and a grain or starchy vegetable in the final quarter of the plate. To this you can add a small piece of fruit and 1 cup of milk or yogurt, depending on how many carbs you are supposed to eat at a meal. 
· Try to eat about the same amount of carbs at each meal. Do not \"save up\" your daily allowance of carbs to eat at one meal. 
· Proteins have very little or no carbs per serving.  Examples of proteins are beef, chicken, turkey, fish, eggs, tofu, cheese, cottage cheese, and peanut butter. A serving size of meat is 3 ounces, which is about the size of a deck of cards. Examples of meat substitute serving sizes (equal to 1 ounce of meat) are 1/4 cup of cottage cheese, 1 egg, 1 tablespoon of peanut butter, and ½ cup of tofu. How can you eat out and still eat healthy? · Learn to estimate the serving sizes of foods that have carbohydrate. If you measure food at home, it will be easier to estimate the amount in a serving of restaurant food. · If the meal you order has too much carbohydrate (such as potatoes, corn, or baked beans), ask to have a low-carbohydrate food instead. Ask for a salad or green vegetables. · If you use insulin, check your blood sugar before and after eating out to help you plan how much to eat in the future. · If you eat more carbohydrate at a meal than you had planned, take a walk or do other exercise. This will help lower your blood sugar. What else should you know? · Limit saturated fat, such as the fat from meat and dairy products. This is a healthy choice because people who have diabetes are at higher risk of heart disease. So choose lean cuts of meat and nonfat or low-fat dairy products. Use olive or canola oil instead of butter or shortening when cooking. · Don't skip meals. Your blood sugar may drop too low if you skip meals and take insulin or certain medicines for diabetes. · Check with your doctor before you drink alcohol. Alcohol can cause your blood sugar to drop too low. Alcohol can also cause a bad reaction if you take certain diabetes medicines. Follow-up care is a key part of your treatment and safety. Be sure to make and go to all appointments, and call your doctor if you are having problems. It's also a good idea to know your test results and keep a list of the medicines you take. Where can you learn more? Go to http://slava-enrique.info/. Enter J452 in the search box to learn more about \"Learning About Diabetes Food Guidelines. \" Current as of: May 23, 2016 Content Version: 11.2 © 0772-6502 Haha Pinche. Care instructions adapted under license by Spring.me (which disclaims liability or warranty for this information). If you have questions about a medical condition or this instruction, always ask your healthcare professional. [address] any warranty or liability for your use of this information. Low Sodium Diet (2,000 Milligram): Care Instructions Your Care Instructions Too much sodium causes your body to hold on to extra water. This can raise your blood pressure and force your heart and kidneys to work harder. In very serious cases, this could cause you to be put in the hospital. It might even be life-threatening. By limiting sodium, you will feel better and lower your risk of serious problems. The most common source of sodium is salt. People get most of the salt in their diet from canned, prepared, and packaged foods. Fast food and restaurant meals also are very high in sodium. Your doctor will probably limit your sodium to less than 2,000 milligrams (mg) a day. This limit counts all the sodium in prepared and packaged foods and any salt you add to your food. Follow-up care is a key part of your treatment and safety. Be sure to make and go to all appointments, and call your doctor if you are having problems. It's also a good idea to know your test results and keep a list of the medicines you take. How can you care for yourself at home? Read food labels · Read labels on cans and food packages. The labels tell you how much sodium is in each serving. Make sure that you look at the serving size. If you eat more than the serving size, you have eaten more sodium. · Food labels also tell you the Percent Daily Value for sodium. Choose products with low Percent Daily Values for sodium. · Be aware that sodium can come in forms other than salt, including monosodium glutamate (MSG), sodium citrate, and sodium bicarbonate (baking soda). MSG is often added to Asian food. When you eat out, you can sometimes ask for food without MSG or added salt. Buy low-sodium foods · Buy foods that are labeled \"unsalted\" (no salt added), \"sodium-free\" (less than 5 mg of sodium per serving), or \"low-sodium\" (less than 140 mg of sodium per serving). Foods labeled \"reduced-sodium\" and \"light sodium\" may still have too much sodium. Be sure to read the label to see how much sodium you are getting. · Buy fresh vegetables, or frozen vegetables without added sauces. Buy low-sodium versions of canned vegetables, soups, and other canned goods. Prepare low-sodium meals · Cut back on the amount of salt you use in cooking. This will help you adjust to the taste. Do not add salt after cooking. One teaspoon of salt has about 2,300 mg of sodium. · Take the salt shaker off the table. · Flavor your food with garlic, lemon juice, onion, vinegar, herbs, and spices. Do not use soy sauce, lite soy sauce, steak sauce, onion salt, garlic salt, celery salt, mustard, or ketchup on your food. · Use low-sodium salad dressings, sauces, and ketchup. Or make your own salad dressings and sauces without adding salt. · Use less salt (or none) when recipes call for it. You can often use half the salt a recipe calls for without losing flavor. Other foods such as rice, pasta, and grains do not need added salt. · Rinse canned vegetables, and cook them in fresh water. This removes somebut not allof the salt. · Avoid water that is naturally high in sodium or that has been treated with water softeners, which add sodium. Call your local water company to find out the sodium content of your water supply. If you buy bottled water, read the label and choose a sodium-free brand. Avoid high-sodium foods · Avoid eating: ¨ Smoked, cured, salted, and canned meat, fish, and poultry. ¨ Ham, kohler, hot dogs, and luncheon meats. ¨ Regular, hard, and processed cheese and regular peanut butter. ¨ Crackers with salted tops, and other salted snack foods such as pretzels, chips, and salted popcorn. ¨ Frozen prepared meals, unless labeled low-sodium. ¨ Canned and dried soups, broths, and bouillon, unless labeled sodium-free or low-sodium. ¨ Canned vegetables, unless labeled sodium-free or low-sodium. ¨ Western Lesley fries, pizza, tacos, and other fast foods. ¨ Pickles, olives, ketchup, and other condiments, especially soy sauce, unless labeled sodium-free or low-sodium. Where can you learn more? Go to http://slava-enrique.info/. Enter S636 in the search box to learn more about \"Low Sodium Diet (2,000 Milligram): Care Instructions. \" Current as of: July 26, 2016 Content Version: 11.2 © 4606-0423 Sensorin. Care instructions adapted under license by Airtime (which disclaims liability or warranty for this information). If you have questions about a medical condition or this instruction, always ask your healthcare professional. [place] [address] any warranty or liability for your use of this information. Pneumonia: Care Instructions Your Care Instructions Pneumonia is an infection of the lungs. Most cases are caused by infections from bacteria or viruses. Pneumonia may be mild or very severe. If it is caused by bacteria, you will be treated with antibiotics. It may take a few weeks to a few months to recover fully from pneumonia, depending on how sick you were and whether your overall health is good. Follow-up care is a key part of your treatment and safety. Be sure to make and go to all appointments, and call your doctor if you are having problems. Its also a good idea to know your test results and keep a list of the medicines you take. How can you care for yourself at home? · Take your antibiotics exactly as directed. Do not stop taking the medicine just because you are feeling better. You need to take the full course of antibiotics. · Take your medicines exactly as prescribed. Call your doctor if you think you are having a problem with your medicine. · Get plenty of rest and sleep. You may feel weak and tired for a while, but your energy level will improve with time. · To prevent dehydration, drink plenty of fluids, enough so that your urine is light yellow or clear like water. Choose water and other caffeine-free clear liquids until you feel better. If you have kidney, heart, or liver disease and have to limit fluids, talk with your doctor before you increase the amount of fluids you drink. · Take care of your cough so you can rest. A cough that brings up mucus from your lungs is common with pneumonia. It is one way your body gets rid of the infection. But if coughing keeps you from resting or causes severe fatigue and chest-wall pain, talk to your doctor. He or she may suggest that you take a medicine to reduce the cough. · Use a vaporizer or humidifier to add moisture to your bedroom. Follow the directions for cleaning the machine. · Do not smoke or allow others to smoke around you. Smoke will make your cough last longer. If you need help quitting, talk to your doctor about stop-smoking programs and medicines. These can increase your chances of quitting for good. · Take an over-the-counter pain medicine, such as acetaminophen (Tylenol), ibuprofen (Advil, Motrin), or naproxen (Aleve). Read and follow all instructions on the label. · Do not take two or more pain medicines at the same time unless the doctor told you to. Many pain medicines have acetaminophen, which is Tylenol. Too much acetaminophen (Tylenol) can be harmful.  
· If you were given a spirometer to measure how well your lungs are working, use it as instructed. This can help your doctor tell how your recovery is going. · To prevent pneumonia in the future, talk to your doctor about getting a flu vaccine (once a year) and a pneumococcal vaccine (one time only for most people). When should you call for help? Call 911 anytime you think you may need emergency care. For example, call if: 
· You have severe trouble breathing. Call your doctor now or seek immediate medical care if: 
· You cough up dark brown or bloody mucus (sputum). · You have new or worse trouble breathing. · You are dizzy or lightheaded, or you feel like you may faint. Watch closely for changes in your health, and be sure to contact your doctor if: 
· You have a new or higher fever. · You are coughing more deeply or more often. · You are not getting better after 2 days (48 hours). · You do not get better as expected. Where can you learn more? Go to http://slava-enrique.info/. Enter [id number] in the search box to learn more about \"Pneumonia: Care Instructions. \" Current as of: May 23, 2016 Content Version: 11.2 © 6130-5492 Qazzow. Care instructions adapted under license by Refinder by Gnowsis (which disclaims liability or warranty for this information). If you have questions about a medical condition or this instruction, always ask your healthcare professional. [place] [address] any warranty or liability for your use of this information. Introducing [facility]! Vane Cleverly introduces "[employer]Here[employer] patient portal. Now you can access parts of your medical record, email your doctor's office, and request medication refills online. 1. In your internet browser, go to [email or web address] 2. Click on the First Time User? Click Here link in the Sign In box. You will see the New Member Sign Up page. 3. Enter your "[employer] Access Code exactly as it appears below.  You will not need to use this code after youve completed the sign-up process. If you do not sign up before the expiration date, you must request a new code. · mSpot Access Code: [email or web address] Expires: 8/22/2017 10:20 AM 
 
4. Enter the last four digits of your Social Security Number (xxxx) and Date of Birth (mm/dd/yyyy) as indicated and click Submit. You will be taken to the next sign-up page. 5. Create a mSpot ID. This will be your mSpot login ID and cannot be changed, so think of one that is secure and easy to remember. 6. Create a mSpot password. You can change your password at any time. 7. Enter your Password Reset Question and Answer. This can be used at a later time if you forget your password. 8. Enter your e-mail address. You will receive e-mail notification when new information is available in [address] 9. Click Sign Up. You can now view and download portions of your medical record. 10. Click the Download Summary menu link to download a portable copy of your medical information. If you have questions, please visit the Frequently Asked Questions section of the mSpot website. Remember, mSpot is NOT to be used for urgent needs. For medical emergencies, dial 911. Now available from your iPhone and Android! Please provide this summary of care documentation to your next provider. Your primary care clinician is listed as Shayla Blake. If you have any questions after today's visit, please call [phone].

## 2017-05-24 NOTE — PROGRESS NOTES
1. Have you been to the ER, urgent care clinic since your last visit? Hospitalized since your last visit? No    2. Have you seen or consulted any other health care providers outside of the [address] [place] [address] since your last visit? Include any pap smears or colon screening. Dr. Stephany Escalera for eye exam on 5/18/17. Patient is scheduled for dm eye exam with Dr. Nuvia Branch on 6/11/17.

## 2017-05-24 NOTE — PATIENT INSTRUCTIONS
Learning About Diabetes Food Guidelines  Your Care Instructions  Meal planning is important to manage diabetes. It helps keep your blood sugar at a target level (which you set with your doctor). You don't have to eat special foods. You can eat what your family eats, including sweets once in a while. But you do have to pay attention to how often you eat and how much you eat of certain foods. You may want to work with a dietitian or a certified diabetes educator (CDE) to help you plan meals and snacks. A dietitian or CDE can also help you lose weight if that is one of your goals. What should you know about eating carbs? Managing the amount of carbohydrate (carbs) you eat is an important part of healthy meals when you have diabetes. Carbohydrate is found in many foods. · Learn which foods have carbs. And learn the amounts of carbs in different foods. ¨ Bread, cereal, pasta, and rice have about 15 grams of carbs in a serving. A serving is 1 slice of bread (1 ounce), ½ cup of cooked cereal, or 1/3 cup of cooked pasta or rice. ¨ Fruits have 15 grams of carbs in a serving. A serving is 1 small fresh fruit, such as an apple or orange; ½ of a banana; ½ cup of cooked or canned fruit; ½ cup of fruit juice; 1 cup of melon or raspberries; or 2 tablespoons of dried fruit. ¨ Milk and no-sugar-added yogurt have 15 grams of carbs in a serving. A serving is 1 cup of milk or 2/3 cup of no-sugar-added yogurt. ¨ Starchy vegetables have 15 grams of carbs in a serving. A serving is ½ cup of mashed potatoes or sweet potato; 1 cup winter squash; ½ of a small baked potato; ½ cup of cooked beans; or ½ cup cooked corn or green peas. · Learn how much carbs to eat each day and at each meal. A dietitian or CDE can teach you how to keep track of the amount of carbs you eat. This is called carbohydrate counting. · If you are not sure how to count carbohydrate grams, use the Plate Method to plan meals.  It is a good, quick way to make sure that you have a balanced meal. It also helps you spread carbs throughout the day. ¨ Divide your plate by types of foods. Put non-starchy vegetables on half the plate, meat or other protein food on one-quarter of the plate, and a grain or starchy vegetable in the final quarter of the plate. To this you can add a small piece of fruit and 1 cup of milk or yogurt, depending on how many carbs you are supposed to eat at a meal.  · Try to eat about the same amount of carbs at each meal. Do not \"save up\" your daily allowance of carbs to eat at one meal.  · Proteins have very little or no carbs per serving. Examples of proteins are beef, chicken, turkey, fish, eggs, tofu, cheese, cottage cheese, and peanut butter. A serving size of meat is 3 ounces, which is about the size of a deck of cards. Examples of meat substitute serving sizes (equal to 1 ounce of meat) are 1/4 cup of cottage cheese, 1 egg, 1 tablespoon of peanut butter, and ½ cup of tofu. How can you eat out and still eat healthy? · Learn to estimate the serving sizes of foods that have carbohydrate. If you measure food at home, it will be easier to estimate the amount in a serving of restaurant food. · If the meal you order has too much carbohydrate (such as potatoes, corn, or baked beans), ask to have a low-carbohydrate food instead. Ask for a salad or green vegetables. · If you use insulin, check your blood sugar before and after eating out to help you plan how much to eat in the future. · If you eat more carbohydrate at a meal than you had planned, take a walk or do other exercise. This will help lower your blood sugar. What else should you know? · Limit saturated fat, such as the fat from meat and dairy products. This is a healthy choice because people who have diabetes are at higher risk of heart disease. So choose lean cuts of meat and nonfat or low-fat dairy products. Use olive or canola oil instead of butter or shortening when cooking.   · Don't skip meals. Your blood sugar may drop too low if you skip meals and take insulin or certain medicines for diabetes. · Check with your doctor before you drink alcohol. Alcohol can cause your blood sugar to drop too low. Alcohol can also cause a bad reaction if you take certain diabetes medicines. Follow-up care is a key part of your treatment and safety. Be sure to make and go to all appointments, and call your doctor if you are having problems. It's also a good idea to know your test results and keep a list of the medicines you take. Where can you learn more? Go to http://slava-enrique.info/. Enter D349 in the search box to learn more about \"Learning About Diabetes Food Guidelines. \"  Current as of: May 23, 2016  Content Version: 11.2  © 4180-3920 [employer]. Care instructions adapted under license by [employer] (which disclaims liability or warranty for this information). If you have questions about a medical condition or this instruction, always ask your healthcare professional. [place] [address] any warranty or liability for your use of this information. Low Sodium Diet (2,000 Milligram): Care Instructions  Your Care Instructions  Too much sodium causes your body to hold on to extra water. This can raise your blood pressure and force your heart and kidneys to work harder. In very serious cases, this could cause you to be put in the hospital. It might even be life-threatening. By limiting sodium, you will feel better and lower your risk of serious problems. The most common source of sodium is salt. People get most of the salt in their diet from canned, prepared, and packaged foods. Fast food and restaurant meals also are very high in sodium. Your doctor will probably limit your sodium to less than 2,000 milligrams (mg) a day. This limit counts all the sodium in prepared and packaged foods and any salt you add to your food.   Follow-up care is a key part of your treatment and safety. Be sure to make and go to all appointments, and call your doctor if you are having problems. It's also a good idea to know your test results and keep a list of the medicines you take. How can you care for yourself at home? Read food labels  · Read labels on cans and food packages. The labels tell you how much sodium is in each serving. Make sure that you look at the serving size. If you eat more than the serving size, you have eaten more sodium. · Food labels also tell you the Percent Daily Value for sodium. Choose products with low Percent Daily Values for sodium. · Be aware that sodium can come in forms other than salt, including monosodium glutamate (MSG), sodium citrate, and sodium bicarbonate (baking soda). MSG is often added to Asian food. When you eat out, you can sometimes ask for food without MSG or added salt. Buy low-sodium foods  · Buy foods that are labeled \"unsalted\" (no salt added), \"sodium-free\" (less than 5 mg of sodium per serving), or \"low-sodium\" (less than 140 mg of sodium per serving). Foods labeled \"reduced-sodium\" and \"light sodium\" may still have too much sodium. Be sure to read the label to see how much sodium you are getting. · Buy fresh vegetables, or frozen vegetables without added sauces. Buy low-sodium versions of canned vegetables, soups, and other canned goods. Prepare low-sodium meals  · Cut back on the amount of salt you use in cooking. This will help you adjust to the taste. Do not add salt after cooking. One teaspoon of salt has about 2,300 mg of sodium. · Take the salt shaker off the table. · Flavor your food with garlic, lemon juice, onion, vinegar, herbs, and spices. Do not use soy sauce, lite soy sauce, steak sauce, onion salt, garlic salt, celery salt, mustard, or ketchup on your food. · Use low-sodium salad dressings, sauces, and ketchup. Or make your own salad dressings and sauces without adding salt.   · Use less salt (or none) when recipes call for it. You can often use half the salt a recipe calls for without losing flavor. Other foods such as rice, pasta, and grains do not need added salt. · Rinse canned vegetables, and cook them in fresh water. This removes some--but not all--of the salt. · Avoid water that is naturally high in sodium or that has been treated with water softeners, which add sodium. Call your local water company to find out the sodium content of your water supply. If you buy bottled water, read the label and choose a sodium-free brand. Avoid high-sodium foods  · Avoid eating:  ¨ Smoked, cured, salted, and canned meat, fish, and poultry. ¨ Ham, kohler, hot dogs, and luncheon meats. ¨ Regular, hard, and processed cheese and regular peanut butter. ¨ Crackers with salted tops, and other salted snack foods such as pretzels, chips, and salted popcorn. ¨ Frozen prepared meals, unless labeled low-sodium. ¨ Canned and dried soups, broths, and bouillon, unless labeled sodium-free or low-sodium. ¨ Canned vegetables, unless labeled sodium-free or low-sodium. ¨ [place] fries, pizza, tacos, and other fast foods. ¨ Pickles, olives, ketchup, and other condiments, especially soy sauce, unless labeled sodium-free or low-sodium. Where can you learn more? Go to http://slava-enrique.info/. Enter O258 in the search box to learn more about \"Low Sodium Diet (2,000 Milligram): Care Instructions. \"  Current as of: July 26, 2016  Content Version: 11.2  © 3116-2067 [employer]. Care instructions adapted under license by [employer] (which disclaims liability or warranty for this information). If you have questions about a medical condition or this instruction, always ask your healthcare professional. [address] any warranty or liability for your use of this information.        Pneumonia: Care Instructions  Your Care Instructions    Pneumonia is an infection of the lungs. Most cases are caused by infections from bacteria or viruses. Pneumonia may be mild or very severe. If it is caused by bacteria, you will be treated with antibiotics. It may take a few weeks to a few months to recover fully from pneumonia, depending on how sick you were and whether your overall health is good. Follow-up care is a key part of your treatment and safety. Be sure to make and go to all appointments, and call your doctor if you are having problems. Its also a good idea to know your test results and keep a list of the medicines you take. How can you care for yourself at home? · Take your antibiotics exactly as directed. Do not stop taking the medicine just because you are feeling better. You need to take the full course of antibiotics. · Take your medicines exactly as prescribed. Call your doctor if you think you are having a problem with your medicine. · Get plenty of rest and sleep. You may feel weak and tired for a while, but your energy level will improve with time. · To prevent dehydration, drink plenty of fluids, enough so that your urine is light yellow or clear like water. Choose water and other caffeine-free clear liquids until you feel better. If you have kidney, heart, or liver disease and have to limit fluids, talk with your doctor before you increase the amount of fluids you drink. · Take care of your cough so you can rest. A cough that brings up mucus from your lungs is common with pneumonia. It is one way your body gets rid of the infection. But if coughing keeps you from resting or causes severe fatigue and chest-wall pain, talk to your doctor. He or she may suggest that you take a medicine to reduce the cough. · Use a vaporizer or humidifier to add moisture to your bedroom. Follow the directions for cleaning the machine. · Do not smoke or allow others to smoke around you. Smoke will make your cough last longer.  If you need help quitting, talk to your doctor about stop-smoking programs and medicines. These can increase your chances of quitting for good. · Take an over-the-counter pain medicine, such as acetaminophen (Tylenol), ibuprofen (Advil, Motrin), or naproxen (Aleve). Read and follow all instructions on the label. · Do not take two or more pain medicines at the same time unless the doctor told you to. Many pain medicines have acetaminophen, which is Tylenol. Too much acetaminophen (Tylenol) can be harmful. · If you were given a spirometer to measure how well your lungs are working, use it as instructed. This can help your doctor tell how your recovery is going. · To prevent pneumonia in the future, talk to your doctor about getting a flu vaccine (once a year) and a pneumococcal vaccine (one time only for most people). When should you call for help? Call 911 anytime you think you may need emergency care. For example, call if:  · You have severe trouble breathing. Call your doctor now or seek immediate medical care if:  · You cough up dark brown or bloody mucus (sputum). · You have new or worse trouble breathing. · You are dizzy or lightheaded, or you feel like you may faint. Watch closely for changes in your health, and be sure to contact your doctor if:  · You have a new or higher fever. · You are coughing more deeply or more often. · You are not getting better after 2 days (48 hours). · You do not get better as expected. Where can you learn more? Go to http://slava-enrique.info/. Enter [id number] in the search box to learn more about \"Pneumonia: Care Instructions. \"  Current as of: May 23, 2016  Content Version: 11.2  © 2511-8959 [employer]. Care instructions adapted under license by [employer] (which disclaims liability or warranty for this information).  If you have questions about a medical condition or this instruction, always ask your healthcare professional. Zaida Jamil disclaims any warranty or liability for your use of this information.

## 2017-06-16 LAB
HBA1C MFR BLD HPLC: 7.2 %
LDL-C, EXTERNAL: 82

## 2017-06-19 ENCOUNTER — OFFICE VISIT (OUTPATIENT)
Dept: PAIN MANAGEMENT | Age: 67
End: 2017-06-19

## 2017-06-19 VITALS — DIASTOLIC BLOOD PRESSURE: 77 MMHG | RESPIRATION RATE: 17 BRPM | HEART RATE: 79 BPM | SYSTOLIC BLOOD PRESSURE: 129 MMHG

## 2017-06-19 DIAGNOSIS — M54.41 CHRONIC BILATERAL LOW BACK PAIN WITH BILATERAL SCIATICA: ICD-10-CM

## 2017-06-19 DIAGNOSIS — G89.29 CHRONIC LOW BACK PAIN, UNSPECIFIED BACK PAIN LATERALITY, WITH SCIATICA PRESENCE UNSPECIFIED: Primary | ICD-10-CM

## 2017-06-19 DIAGNOSIS — G89.4 CHRONIC PAIN SYNDROME: ICD-10-CM

## 2017-06-19 DIAGNOSIS — M47.816 OSTEOARTHRITIS OF LUMBAR SPINE, UNSPECIFIED SPINAL OSTEOARTHRITIS COMPLICATION STATUS: ICD-10-CM

## 2017-06-19 DIAGNOSIS — M54.5 CHRONIC LOW BACK PAIN, UNSPECIFIED BACK PAIN LATERALITY, WITH SCIATICA PRESENCE UNSPECIFIED: Primary | ICD-10-CM

## 2017-06-19 DIAGNOSIS — Z98.890 H/O LUMBOSACRAL SPINE SURGERY: ICD-10-CM

## 2017-06-19 DIAGNOSIS — M51.36 DDD (DEGENERATIVE DISC DISEASE), LUMBAR: ICD-10-CM

## 2017-06-19 DIAGNOSIS — M54.42 CHRONIC BILATERAL LOW BACK PAIN WITH BILATERAL SCIATICA: ICD-10-CM

## 2017-06-19 DIAGNOSIS — G89.29 CHRONIC BILATERAL LOW BACK PAIN WITH BILATERAL SCIATICA: ICD-10-CM

## 2017-06-19 RX ORDER — PREGABALIN 150 MG/1
CAPSULE ORAL
Qty: 90 CAP | Refills: 3 | Status: SHIPPED | OUTPATIENT
Start: 2017-06-19 | End: 2017-08-21 | Stop reason: SDUPTHER

## 2017-06-19 RX ORDER — DULOXETIN HYDROCHLORIDE 60 MG/1
60 CAPSULE, DELAYED RELEASE ORAL
Qty: 30 CAP | Refills: 1 | Status: SHIPPED | OUTPATIENT
Start: 2017-06-19 | End: 2017-08-21 | Stop reason: SDUPTHER

## 2017-06-19 RX ORDER — OXYCODONE HCL 10 MG/1
10 TABLET, FILM COATED, EXTENDED RELEASE ORAL EVERY 8 HOURS
Qty: 90 TAB | Refills: 0 | Status: SHIPPED | OUTPATIENT
Start: 2017-07-18 | End: 2017-08-21 | Stop reason: SDUPTHER

## 2017-06-19 RX ORDER — OXYCODONE HCL 10 MG/1
10 TABLET, FILM COATED, EXTENDED RELEASE ORAL EVERY 8 HOURS
Qty: 90 TAB | Refills: 0 | Status: SHIPPED | OUTPATIENT
Start: 2017-06-19 | End: 2017-08-21 | Stop reason: SDUPTHER

## 2017-06-19 RX ORDER — HYDROCODONE BITARTRATE AND ACETAMINOPHEN 5; 325 MG/1; MG/1
1 TABLET ORAL
Qty: 30 TAB | Refills: 0 | Status: SHIPPED | OUTPATIENT
Start: 2017-07-18 | End: 2017-08-21 | Stop reason: SDUPTHER

## 2017-06-19 RX ORDER — HYDROCODONE BITARTRATE AND ACETAMINOPHEN 5; 325 MG/1; MG/1
1 TABLET ORAL
Qty: 30 TAB | Refills: 0 | Status: SHIPPED | OUTPATIENT
Start: 2017-06-19 | End: 2017-08-21 | Stop reason: SDUPTHER

## 2017-06-19 NOTE — PROGRESS NOTES
HISTORY OF PRESENT ILLNESS  Sarah Quigley is a 74 y.o. male. HPI Comments: Meds help with pain control and quality of life. No new side effects reported today. Visit survey reviewed and will be scanned.  reviewed. Recent average level of pain(out of 10)-7    Chief complaint low back pain  Chronic pain  Using OxyContin 10 mg 3 times a day  Hydrocodone 5 mg once a day as needed  Lyrica 150 mg 3 times a day  Cymbalta 60 mg once a day  TENS unit as needed                      Back Pain    Associated symptoms include leg pain. Pertinent negatives include no chest pain and no fever. Hip Injury    Associated symptoms include back pain. Leg Pain    Associated symptoms include back pain. Review of Systems   Constitutional: Positive for malaise/fatigue. Negative for chills and fever. HENT: Positive for hearing loss. Respiratory: Negative for cough and shortness of breath. Cardiovascular: Positive for leg swelling. Negative for chest pain and palpitations. Gastrointestinal: Positive for constipation. Negative for diarrhea, heartburn, nausea and vomiting. Musculoskeletal: Positive for back pain and joint pain. Negative for falls. Skin: Negative for rash. Neurological: Negative for dizziness. Psychiatric/Behavioral: Positive for depression. Negative for suicidal ideas. The patient is nervous/anxious and has insomnia. Physical Exam   Constitutional: He is oriented to person, place, and time. He appears well-developed and well-nourished. No distress. HENT:   Head: Normocephalic and atraumatic. Pulmonary/Chest: Effort normal. No respiratory distress. Neurological: He is alert and oriented to person, place, and time. Skin: Skin is warm and dry. He is not diaphoretic. Psychiatric: He has a normal mood and affect. His speech is normal. Cognition and memory are normal.   Nursing note and vitals reviewed. ASSESSMENT and PLAN  Encounter Diagnoses   Name Primary?     Chronic low back pain, unspecified back pain laterality, with sciatica presence unspecified Yes    Chronic pain syndrome     Osteoarthritis of lumbar spine, unspecified spinal osteoarthritis complication status     H/O lumbosacral spine surgery     DDD (degenerative disc disease), lumbar     Chronic bilateral low back pain with bilateral sciatica    No indicators for recent medication misuse.  reviewed. Pain Meds and Quality Of Life have been reviewed. Nonpharmacologic therapy and non-opioid pharmacologic therapy were considered. If opioid therapy is prescribed, this is only if the expected benefits are anticipated to outweigh risks. Possible changes to treatment plan considered. Support/education given as needed. Today-medications are as listed. No significant changes to medications. Follow up -- 2 months.

## 2017-06-19 NOTE — MR AVS SNAPSHOT
Visit Information Date & Time Provider Department Dept. Phone Encounter #  
 6/19/2017 11:15 AM Katherine Ferrera MD [address] [facility] [phone] Follow-up Instructions Return in about 2 months (around 8/19/2017). Your Appointments 8/21/2017  2:40 PM  
Follow Up with Letty Rollins MD  
[facility] ([address]) Appt Note: 6 month follow up [place] [address] [address] [address] [phone] [place] [phone]  
  
    
 8/25/2017  9:30 AM  
ROUTINE CARE with Yee Love MD  
920 [facility] ([address]) Appt Note: 3 month followup [place] [facility] [address] [place] [address] [address] 200 [facility] [address]  
  
    
 3/8/2018  9:00 AM  
Office Visit with Elizabeth Birmingham MD  
[facility] [facility]) Appt Note: check up [address] [address]  
[phone] [address] [place] [address] Upcoming Health Maintenance Date Due  
 EYE EXAM RETINAL OR DILATED Q1 4/26/2017 INFLUENZA AGE 9 TO ADULT 8/1/2017 HEMOGLOBIN A1C Q6M 9/10/2017 FOOT EXAM Q1 1/9/2018 LIPID PANEL Q1 1/19/2018 MICROALBUMIN Q1 3/10/2018 MEDICARE YEARLY EXAM 4/25/2018 GLAUCOMA SCREENING Q2Y 4/26/2018 COLONOSCOPY 10/19/2023 DTaP/Tdap/Td series (2 - Td) 5/16/2026 Allergies as of 6/19/2017  Review Complete On: 6/19/2017 By: Katherine Ferrera MD  
  
 Severity Noted Reaction Type Reactions Ciprofloxacin High 11/16/2013    Anaphylaxis Other Plant, Animal, Environmental High 03/10/2016    Other (comments) Patient cannot have MRI. Patient states that he has metal screws in his left arm. Lamisil [Terbinafine]  10/18/2013    Swelling Tongue swelling Metformin  03/16/2016    Other (comments) Elevated cr 1.4 Morphine  10/18/2013    Other (comments) \"loss of blood pressure\" Other Medication  10/18/2013    Other (comments) Doxasylin causes extreme GERD Pcn [Penicillins]  10/18/2013    Rash Pentothal [Thiopental Sodium]  10/18/2013    Shortness of Breath Sulfa (Sulfonamide Antibiotics)  10/18/2013    Rash Current Immunizations  Reviewed on 7/20/2016 Name Date Influenza Vaccine 11/3/2014 Influenza Vaccine (Quad) PF 1/25/2017, 9/16/2015 Pneumococcal Conjugate (PCV-13) 7/20/2016 Pneumococcal Polysaccharide (PPSV-23) 3/30/2017 Tdap 5/16/2016 Not reviewed this visit You Were Diagnosed With   
  
 Codes Comments Chronic low back pain, unspecified back pain laterality, with sciatica presence unspecified    -  Primary ICD-10-CM: M54.5, G89.29 ICD-9-CM: 724.2, 338.29 Chronic pain syndrome     ICD-10-CM: G89.4 ICD-9-CM: 338.4 Osteoarthritis of lumbar spine, unspecified spinal osteoarthritis complication status     DWB-90-OP: M47.816 ICD-9-CM: 721.3 H/O lumbosacral spine surgery     ICD-10-CM: Z98.890 ICD-9-CM: V15.29 DDD (degenerative disc disease), lumbar     ICD-10-CM: M51.36 
ICD-9-CM: 722.52 Chronic bilateral low back pain with bilateral sciatica     ICD-10-CM: M54.42, M54.41, G89.29 ICD-9-CM: 724.2, 724.3, 338.29 Vitals BP Pulse Resp Smoking Status 129/77 79 14 Former Smoker Vitals History Preferred Pharmacy Pharmacy Name Phone [facility] [address] [place] [address] Avenue [phone] Your Updated Medication List  
  
   
This list is accurate as of: 6/19/17 11:36 AM.  Always use your most recent med list.  
  
  
  
  
 ACCU-CHEK JAZZY PLUS TEST STRP strip Generic drug:  glucose blood VI test strips [address] Generic drug:  Lancets ACULAR 0.5 % ophthalmic solution Generic drug:  ketorolac Administer 1 Drop to both eyes two (2) times a day. albuterol 2.5 mg /3 mL (0.083 %) nebulizer solution Commonly known as:  PROVENTIL VENTOLIN  
3 mL by Nebulization route every four (4) hours as needed for Wheezing or Shortness of Breath. atorvastatin 20 mg tablet Commonly known as:  LIPITOR Take 1 Tab by mouth daily. betamethasone dipropionate 0.05 % ointment Commonly known as:  DIPROLENE  
  
 budesonide-formoterol 160-4.5 mcg/actuation HFA inhaler Commonly known as:  SYMBICORT Take 2 Puffs by inhalation two (2) times a day. butalbital-acetaminophen-caffeine -40 mg per tablet Commonly known as:  Reginold Smolder Take 0.5 Tabs by mouth daily as needed for Pain. DULoxetine 60 mg capsule Commonly known as:  CYMBALTA Take 1 Cap by mouth nightly. esomeprazole 40 mg capsule Commonly known as:  Juline Estrellita Take 40 mg by mouth two (2) times a day. fenofibrate nanocrystallized 145 mg tablet Commonly known as:  [employer] Take 1 Tab by mouth daily. fluticasone 50 mcg/actuation nasal spray Commonly known as:  FLONASE  
USE ONE SPRAY IN EACH NOSTRIL ONCE DAILY  
  
 * HYDROcodone-acetaminophen 5-325 mg per tablet Commonly known as:  Fede Luis Take 1 Tab by mouth daily as needed for Pain. * HYDROcodone-acetaminophen 5-325 mg per tablet Commonly known as:  Fede Cuero Take 1 Tab by mouth daily as needed for Pain for up to 30 days. * HYDROcodone-acetaminophen 5-325 mg per tablet Commonly known as:  Fede Cuero Take 1 Tab by mouth daily as needed for Pain for up to 30 days. Start taking on:  7/18/2017  
  
 insulin glargine 100 unit/mL (3 mL) Inpn Commonly known as:  BASAGLAR KWIKPEN  
52 units at bedtime  
  
 insulin glulisine 100 unit/mL pen Commonly known as:  APIDRA SOLOSTAR  
2 units per carb consumed. Max 30 / day  
  
 levothyroxine 150 mcg tablet Commonly known as:  SYNTHROID  
 Take 1 Tab by mouth Daily (before breakfast). lisinopril 5 mg tablet Commonly known as:  Gareth Fleming Take 1 Tab by mouth daily. metoprolol tartrate 25 mg tablet Commonly known as:  LOPRESSOR Take 1 Tab by mouth two (2) times a day. montelukast 10 mg tablet Commonly known as:  SINGULAIR Take 1 Tab by mouth daily. naloxone 4 mg/actuation Spry 4 mg by Nasal route as needed. * oxyCODONE ER 10 mg ER tablet Commonly known as:  OxyCONTIN Take 1 Tab by mouth every eight (8) hours for 30 days. Max Daily Amount: 30 mg.  
  
 * oxyCODONE ER 10 mg ER tablet Commonly known as:  OxyCONTIN Take 1 Tab by mouth every eight (8) hours for 30 days. Max Daily Amount: 30 mg. Start taking on:  7/18/2017 * BD ULTRA-FINE FANI PEN NEEDLES  
by Does Not Apply route. 4mm x 32G * PEN NEEDLE 31 gauge x 5/16\" Ndle Generic drug:  Insulin Needles (Disposable) USE ONE PEN NEEDLE FOR LANTUS FLEXPEN AND 3 PEN NEEDLES FOR APIDRA WITH EACH MEAL * Insulin Needles (Disposable) 32 gauge x 5/32\" Ndle Commonly known as:  Fani Pen Needle Take at bedtime  
  
 pregabalin 150 mg capsule Commonly known as:  Karolynn Arch TAKE 1 CAPSULE BY MOUTH THREE TIMES DAILY FOR 30 DAYS. MAXIMUM 3 CAPSULES DAILY. * tamsulosin 0.4 mg capsule Commonly known as:  FLOMAX Take 1 Cap by mouth two (2) times a day. * tamsulosin 0.4 mg capsule Commonly known as:  FLOMAX TAKE ONE CAPSULE BY MOUTH TWICE DAILY  
  
 tiotropium 18 mcg inhalation capsule Commonly known as:  Hubertus Hutching Take 1 Cap by inhalation daily. TRADJENTA 5 mg tablet Generic drug:  linagliptin Take 5 mg by mouth daily. * Notice: This list has 10 medication(s) that are the same as other medications prescribed for you. Read the directions carefully, and ask your doctor or other care provider to review them with you. Prescriptions Printed Refills oxyCODONE ER (OXYCONTIN) 10 mg ER tablet 0 Sig: Take 1 Tab by mouth every eight (8) hours for 30 days. Max Daily Amount: 30 mg.  
 Class: Print Route: Oral  
 oxyCODONE ER (OXYCONTIN) 10 mg ER tablet 0 Starting on: 7/18/2017 Sig: Take 1 Tab by mouth every eight (8) hours for 30 days. Max Daily Amount: 30 mg.  
 Class: Print Route: Oral  
 pregabalin (LYRICA) 150 mg capsule 3 Sig: TAKE 1 CAPSULE BY MOUTH THREE TIMES DAILY FOR 30 DAYS. MAXIMUM 3 CAPSULES DAILY. Class: Print HYDROcodone-acetaminophen (NORCO) 5-325 mg per tablet 0 Sig: Take 1 Tab by mouth daily as needed for Pain for up to 30 days. Class: Print Route: Oral  
 HYDROcodone-acetaminophen (NORCO) 5-325 mg per tablet 0 Starting on: 7/18/2017 Sig: Take 1 Tab by mouth daily as needed for Pain for up to 30 days. Class: Print Route: Oral  
  
Prescriptions Sent to Pharmacy Refills DULoxetine (CYMBALTA) 60 mg capsule 1 Sig: Take 1 Cap by mouth nightly. Class: Normal  
 Pharmacy: [address] [facility], [address] [place] [address] Ph #: [phone] Route: Oral  
  
Follow-up Instructions Return in about 2 months (around 8/19/2017). Introducing [facility]! Norma Valerio introduces Billetto patient portal. Now you can access parts of your medical record, email your doctor's office, and request medication refills online. 1. In your internet browser, go to [email or web address] 2. Click on the First Time User? Click Here link in the Sign In box. You will see the New Member Sign Up page. 3. Enter your Billetto Access Code exactly as it appears below. You will not need to use this code after youve completed the sign-up process. If you do not sign up before the expiration date, you must request a new code. · Billetto Access Code: [email or web address] Expires: 8/22/2017 10:20 AM 
 
4.  Enter the last four digits of your Social Security Number (xxxx) and Date of Birth (mm/dd/yyyy) as indicated and click Submit. You will be taken to the next sign-up page. 5. Create a Boond ID. This will be your Boond login ID and cannot be changed, so think of one that is secure and easy to remember. 6. Create a Boond password. You can change your password at any time. 7. Enter your Password Reset Question and Answer. This can be used at a later time if you forget your password. 8. Enter your e-mail address. You will receive e-mail notification when new information is available in [address] 9. Click Sign Up. You can now view and download portions of your medical record. 10. Click the Download Summary menu link to download a portable copy of your medical information. If you have questions, please visit the Frequently Asked Questions section of the Boond website. Remember, Boond is NOT to be used for urgent needs. For medical emergencies, dial 911. Now available from your iPhone and Android! Please provide this summary of care documentation to your next provider. Your primary care clinician is listed as Martínez Mata. If you have any questions after today's visit, please call [phone].

## 2017-06-19 NOTE — PROGRESS NOTES
Nursing Notes    Patient presents to the office today in follow-up. Reviewed medications with counts as follows:    Rx Date filled Qty Dispensed Pill Count Last Dose Short   oxycontin 10 mg 5/24/17 90 18 today no   norco 5/325 9/9/16 30 58 unknown no   Mr. Denisha Law has a reminder for a \"due or due soon\" health maintenance. I have asked that he contact his primary care provider for follow-up on this health maintenance. Comments: patient returned norco 5/325 prescription with start date of 4/27/17 to be destroyed. Patient mentions he has an oxycontin prescription at home    POC UDS was not performed in office today    Any new labs or imaging since last appointment? YES  Labs    Have you been to an emergency room (ER) or urgent care clinic since your last visit? NO            Have you been hospitalized since your last visit? NO     If yes, where, when, and reason for visit? Have you seen or consulted any other health care providers outside of the [address] [place] [address]  since your last visit?   YES     If yes, where, when, and reason for visit?   pcp  Dr Roberto Barrow

## 2017-06-25 DIAGNOSIS — J44.9 ASTHMA WITH COPD (CHRONIC OBSTRUCTIVE PULMONARY DISEASE) (HCC): ICD-10-CM

## 2017-06-25 RX ORDER — MONTELUKAST SODIUM 10 MG/1
TABLET ORAL
Qty: 90 TAB | Refills: 0 | Status: SHIPPED | COMMUNITY
Start: 2017-06-25 | End: 2017-10-05 | Stop reason: SDUPTHER

## 2017-07-19 ENCOUNTER — HOSPITAL ENCOUNTER (OUTPATIENT)
Dept: LAB | Age: 67
Discharge: HOME | End: 2017-07-19
Payer: MEDICARE

## 2017-07-19 DIAGNOSIS — R79.89 LOW TSH LEVEL: ICD-10-CM

## 2017-07-19 LAB
T4 FREE SERPL-MCNC: 1.4 NG/DL (ref 0.7–1.5)
TSH SERPL DL<=0.05 MIU/L-ACNC: 1.19 UIU/ML (ref 0.36–3.74)

## 2017-07-19 PROCEDURE — 36415 COLL VENOUS BLD VENIPUNCTURE: CPT | Performed by: FAMILY MEDICINE

## 2017-07-19 PROCEDURE — 84439 ASSAY OF FREE THYROXINE: CPT | Performed by: FAMILY MEDICINE

## 2017-07-19 PROCEDURE — 84443 ASSAY THYROID STIM HORMONE: CPT | Performed by: FAMILY MEDICINE

## 2017-07-19 NOTE — LETTER
7/19/2017 4:48 PM 
 
Mr. Antonina Krishna 
[facility] [address] [place] [address] Dear Antonina Krishna: 
 
Please find your most recent results below. Resulted Orders TSH 3RD GENERATION Result Value Ref Range TSH 1.19 0.36 - 3.74 uIU/mL T4, FREE Result Value Ref Range T4, Free 1.4 0.7 - 1.5 NG/DL  
 
 
RECOMMENDATIONS: 
 
Your thyroid function test is within normal limits. Please call me if you have any questions: [phone] Sincerely, Prince Augustine MD

## 2017-08-21 ENCOUNTER — OFFICE VISIT (OUTPATIENT)
Dept: PAIN MANAGEMENT | Age: 67
End: 2017-08-21

## 2017-08-21 ENCOUNTER — OFFICE VISIT (OUTPATIENT)
Dept: CARDIOLOGY CLINIC | Age: 67
End: 2017-08-21

## 2017-08-21 VITALS
WEIGHT: 281 LBS | BODY MASS INDEX: 36.06 KG/M2 | HEART RATE: 74 BPM | DIASTOLIC BLOOD PRESSURE: 66 MMHG | SYSTOLIC BLOOD PRESSURE: 126 MMHG | HEIGHT: 74 IN | OXYGEN SATURATION: 97 %

## 2017-08-21 VITALS
SYSTOLIC BLOOD PRESSURE: 146 MMHG | HEART RATE: 77 BPM | BODY MASS INDEX: 35.81 KG/M2 | TEMPERATURE: 97.2 F | WEIGHT: 279 LBS | DIASTOLIC BLOOD PRESSURE: 81 MMHG | HEIGHT: 74 IN | RESPIRATION RATE: 19 BRPM

## 2017-08-21 DIAGNOSIS — Z98.890 H/O LUMBOSACRAL SPINE SURGERY: ICD-10-CM

## 2017-08-21 DIAGNOSIS — M54.5 CHRONIC LOW BACK PAIN, UNSPECIFIED BACK PAIN LATERALITY, WITH SCIATICA PRESENCE UNSPECIFIED: ICD-10-CM

## 2017-08-21 DIAGNOSIS — G89.29 CHRONIC LOW BACK PAIN, UNSPECIFIED BACK PAIN LATERALITY, WITH SCIATICA PRESENCE UNSPECIFIED: ICD-10-CM

## 2017-08-21 DIAGNOSIS — Z79.899 ENCOUNTER FOR LONG-TERM (CURRENT) USE OF MEDICATIONS: ICD-10-CM

## 2017-08-21 DIAGNOSIS — G89.29 CHRONIC BILATERAL LOW BACK PAIN WITH BILATERAL SCIATICA: Primary | ICD-10-CM

## 2017-08-21 DIAGNOSIS — E78.5 HYPERLIPIDEMIA, UNSPECIFIED HYPERLIPIDEMIA TYPE: ICD-10-CM

## 2017-08-21 DIAGNOSIS — M51.36 DDD (DEGENERATIVE DISC DISEASE), LUMBAR: ICD-10-CM

## 2017-08-21 DIAGNOSIS — M47.816 OSTEOARTHRITIS OF LUMBAR SPINE, UNSPECIFIED SPINAL OSTEOARTHRITIS COMPLICATION STATUS: ICD-10-CM

## 2017-08-21 DIAGNOSIS — M54.42 CHRONIC BILATERAL LOW BACK PAIN WITH BILATERAL SCIATICA: Primary | ICD-10-CM

## 2017-08-21 DIAGNOSIS — E13.9 DIABETES 1.5, MANAGED AS TYPE 2 (HCC): ICD-10-CM

## 2017-08-21 DIAGNOSIS — M54.41 CHRONIC BILATERAL LOW BACK PAIN WITH BILATERAL SCIATICA: Primary | ICD-10-CM

## 2017-08-21 DIAGNOSIS — G89.4 CHRONIC PAIN SYNDROME: ICD-10-CM

## 2017-08-21 DIAGNOSIS — I10 ESSENTIAL HYPERTENSION: Primary | ICD-10-CM

## 2017-08-21 RX ORDER — OXYCODONE HCL 10 MG/1
10 TABLET, FILM COATED, EXTENDED RELEASE ORAL EVERY 8 HOURS
Qty: 90 TAB | Refills: 0 | Status: SHIPPED | OUTPATIENT
Start: 2017-09-20 | End: 2017-10-24 | Stop reason: SDUPTHER

## 2017-08-21 RX ORDER — OXYCODONE HCL 10 MG/1
10 TABLET, FILM COATED, EXTENDED RELEASE ORAL EVERY 8 HOURS
Qty: 90 TAB | Refills: 0 | Status: SHIPPED | OUTPATIENT
Start: 2017-08-21 | End: 2017-10-24 | Stop reason: SDUPTHER

## 2017-08-21 RX ORDER — PREGABALIN 150 MG/1
CAPSULE ORAL
Qty: 90 CAP | Refills: 3 | Status: SHIPPED | OUTPATIENT
Start: 2017-08-21 | End: 2017-10-24 | Stop reason: SDUPTHER

## 2017-08-21 RX ORDER — DULOXETIN HYDROCHLORIDE 60 MG/1
60 CAPSULE, DELAYED RELEASE ORAL
Qty: 30 CAP | Refills: 1 | Status: SHIPPED | OUTPATIENT
Start: 2017-08-21 | End: 2017-10-24 | Stop reason: SDUPTHER

## 2017-08-21 RX ORDER — HYDROCODONE BITARTRATE AND ACETAMINOPHEN 5; 325 MG/1; MG/1
1 TABLET ORAL
Qty: 30 TAB | Refills: 0 | Status: SHIPPED | OUTPATIENT
Start: 2017-09-20 | End: 2017-10-24 | Stop reason: SDUPTHER

## 2017-08-21 RX ORDER — HYDROCODONE BITARTRATE AND ACETAMINOPHEN 5; 325 MG/1; MG/1
1 TABLET ORAL
Qty: 30 TAB | Refills: 0 | Status: SHIPPED | OUTPATIENT
Start: 2017-08-21 | End: 2017-10-24 | Stop reason: SDUPTHER

## 2017-08-21 NOTE — MR AVS SNAPSHOT
Visit Information Date & Time Provider Department Dept. Phone Encounter #  
 8/21/2017  9:45 AM Julián Rosales MD [facility] [phone] Follow-up Instructions Return in about 2 months (around 10/21/2017). Follow-up and Disposition History Your Appointments 8/21/2017  2:40 PM  
Follow Up with Nataliia Mendenhall MD  
[facility] ([address]) Appt Note: 6 month follow up [place] [address] [address] [address] [phone] [place]  [phone]  
  
    
 8/25/2017  9:30 AM  
ROUTINE CARE with Kelley Resendiz MD  
[facility] ([address]) Appt Note: 3 month followup [facility] [address] [facility] [address] [facility]  
  
    
 10/17/2017  9:45 AM  
Follow Up with Julián Rosales MD  
[facility] [address] [place] [address]) Appt Note: return in 2 months [address] [address]  
[phone] [facility] [address] [address]  
  
    
 3/8/2018  9:00 AM  
Office Visit with Kiana Fisher MD  
[facility] [address] [facility]) Appt Note: check up [address] [address]  
[phone] [address] [place] [address] Upcoming Health Maintenance Date Due INFLUENZA AGE 9 TO ADULT 8/1/2017 HEMOGLOBIN A1C Q6M 12/16/2017 FOOT EXAM Q1 1/9/2018 MICROALBUMIN Q1 3/10/2018 MEDICARE YEARLY EXAM 4/25/2018 LIPID PANEL Q1 6/16/2018 EYE EXAM RETINAL OR DILATED Q1 7/11/2018 GLAUCOMA SCREENING Q2Y 7/11/2019 COLONOSCOPY 10/19/2023 DTaP/Tdap/Td series (2 - Td) 5/16/2026 Allergies as of 8/21/2017  Review Complete On: 8/21/2017 By: Julián Rosales MD  
  
 Severity Noted Reaction Type Reactions Ciprofloxacin High 11/16/2013    Anaphylaxis Other Plant, Animal, Environmental High 03/10/2016    Other (comments) Patient cannot have MRI. Patient states that he has metal screws in his left arm. Lamisil [Terbinafine]  10/18/2013    Swelling Tongue swelling Metformin  03/16/2016    Other (comments) Elevated cr 1.4 Morphine  10/18/2013    Other (comments) \"loss of blood pressure\" Other Medication  10/18/2013    Other (comments) Doxasylin causes extreme GERD Pcn [Penicillins]  10/18/2013    Rash Pentothal [Thiopental Sodium]  10/18/2013    Shortness of Breath Sulfa (Sulfonamide Antibiotics)  10/18/2013    Rash Current Immunizations  Reviewed on 7/20/2016 Name Date Influenza Vaccine 11/3/2014 Influenza Vaccine (Quad) PF 1/25/2017, 9/16/2015 Pneumococcal Conjugate (PCV-13) 7/20/2016 Pneumococcal Polysaccharide (PPSV-23) 3/30/2017 Tdap 5/16/2016 Not reviewed this visit You Were Diagnosed With   
  
 Codes Comments Chronic bilateral low back pain with bilateral sciatica    -  Primary ICD-10-CM: M54.42, M54.41, G89.29 ICD-9-CM: 724.2, 724.3, 338.29 Encounter for long-term (current) use of medications     ICD-10-CM: Z79.899 ICD-9-CM: V58.69 Chronic pain syndrome     ICD-10-CM: G89.4 ICD-9-CM: 338.4 Osteoarthritis of lumbar spine, unspecified spinal osteoarthritis complication status     UNI-48-EP: M47.816 ICD-9-CM: 721.3 H/O lumbosacral spine surgery     ICD-10-CM: Z98.890 ICD-9-CM: V15.29 DDD (degenerative disc disease), lumbar     ICD-10-CM: M51.36 
ICD-9-CM: 722.52 Chronic low back pain, unspecified back pain laterality, with sciatica presence unspecified     ICD-10-CM: M54.5, G89.29 ICD-9-CM: 724.2, 338.29 Vitals BP Pulse Temp Resp Height(growth percentile) Weight(growth percentile) 146/81 77 97.2 °F (36.2 °C) 19 6' 2\" (1.88 m) 279 lb (126.6 kg) BMI Smoking Status 35.82 kg/m2 Former Smoker Vitals History BMI and BSA Data Body Mass Index Body Surface Area  
 35.82 kg/m 2 2.57 m 2 Preferred Pharmacy Pharmacy Name Phone [address] [facility] [place]s [address] [place] [address] [facility] [phone] Your Updated Medication List  
  
   
This list is accurate as of: 8/21/17 10:04 AM.  Always use your most recent med list.  
  
  
  
  
 ACCU-CHEK JAZZY PLUS TEST STRP strip Generic drug:  glucose blood VI test strips [address] Generic drug:  Lancets ACULAR 0.5 % ophthalmic solution Generic drug:  ketorolac Administer 1 Drop to both eyes two (2) times a day. albuterol 2.5 mg /3 mL (0.083 %) nebulizer solution Commonly known as:  PROVENTIL VENTOLIN  
3 mL by Nebulization route every four (4) hours as needed for Wheezing or Shortness of Breath. atorvastatin 20 mg tablet Commonly known as:  LIPITOR Take 1 Tab by mouth daily. betamethasone dipropionate 0.05 % ointment Commonly known as:  DIPROLENE  
  
 budesonide-formoterol 160-4.5 mcg/actuation HFA inhaler Commonly known as:  SYMBICORT Take 2 Puffs by inhalation two (2) times a day. butalbital-acetaminophen-caffeine -40 mg per tablet Commonly known as:  Denyce Sharp Take 0.5 Tabs by mouth daily as needed for Pain. DULoxetine 60 mg capsule Commonly known as:  CYMBALTA Take 1 Cap by mouth nightly. esomeprazole 40 mg capsule Commonly known as:  Eugenia Levans Take 40 mg by mouth two (2) times a day. fenofibrate nanocrystallized 145 mg tablet Commonly known as:  [employer] Take 1 Tab by mouth daily. fluticasone 50 mcg/actuation nasal spray Commonly known as:  FLONASE  
USE ONE SPRAY IN EACH NOSTRIL ONCE DAILY  
  
 * HYDROcodone-acetaminophen 5-325 mg per tablet Commonly known as:  Mone Castro  
 Take 1 Tab by mouth daily as needed for Pain. * HYDROcodone-acetaminophen 5-325 mg per tablet Commonly known as:  Shawn Sykes Take 1 Tab by mouth daily as needed for Pain for up to 30 days. * HYDROcodone-acetaminophen 5-325 mg per tablet Commonly known as:  Shawn Sykes Take 1 Tab by mouth daily as needed for Pain for up to 30 days. Start taking on:  9/20/2017  
  
 insulin glargine 100 unit/mL (3 mL) Inpn Commonly known as:  BASAGLAR KWIKPEN  
52 units at bedtime  
  
 insulin glulisine 100 unit/mL pen Commonly known as:  APIDRA SOLOSTAR  
2 units per carb consumed. Max 30 / day  
  
 levothyroxine 150 mcg tablet Commonly known as:  SYNTHROID Take 1 Tab by mouth Daily (before breakfast). lisinopril 5 mg tablet Commonly known as:  Kathyleen Terence Take 1 Tab by mouth daily. metoprolol tartrate 25 mg tablet Commonly known as:  LOPRESSOR Take 1 Tab by mouth two (2) times a day. montelukast 10 mg tablet Commonly known as:  SINGULAIR  
TAKE ONE TABLET BY MOUTH ONCE DAILY  
  
 naloxone 4 mg/actuation Spry 4 mg by Nasal route as needed. * oxyCODONE ER 10 mg ER tablet Commonly known as:  OxyCONTIN Take 1 Tab by mouth every eight (8) hours for 30 days. Max Daily Amount: 30 mg.  
  
 * oxyCODONE ER 10 mg ER tablet Commonly known as:  OxyCONTIN Take 1 Tab by mouth every eight (8) hours for 30 days. Max Daily Amount: 30 mg. Start taking on:  9/20/2017 * BD ULTRA-FINE MIREYA PEN NEEDLES  
by Does Not Apply route. 4mm x 32G * PEN NEEDLE 31 gauge x 5/16\" Ndle Generic drug:  Insulin Needles (Disposable) USE ONE PEN NEEDLE FOR LANTUS FLEXPEN AND 3 PEN NEEDLES FOR APIDRA WITH EACH MEAL * Insulin Needles (Disposable) 32 gauge x 5/32\" Ndle Commonly known as:  Mireya Pen Needle Take at bedtime  
  
 pregabalin 150 mg capsule Commonly known as:  Nicole Daviso TAKE 1 CAPSULE BY MOUTH THREE TIMES DAILY FOR 30 DAYS. MAXIMUM 3 CAPSULES DAILY. * tamsulosin 0.4 mg capsule Commonly known as:  FLOMAX Take 1 Cap by mouth two (2) times a day. * tamsulosin 0.4 mg capsule Commonly known as:  FLOMAX TAKE ONE CAPSULE BY MOUTH TWICE DAILY  
  
 tiotropium 18 mcg inhalation capsule Commonly known as:  Ralph Tiana Take 1 Cap by inhalation daily. TRADJENTA 5 mg tablet Generic drug:  linagliptin Take 5 mg by mouth daily. * Notice: This list has 10 medication(s) that are the same as other medications prescribed for you. Read the directions carefully, and ask your doctor or other care provider to review them with you. Prescriptions Printed Refills  
 oxyCODONE ER (OXYCONTIN) 10 mg ER tablet 0 Sig: Take 1 Tab by mouth every eight (8) hours for 30 days. Max Daily Amount: 30 mg.  
 Class: Print Route: Oral  
 oxyCODONE ER (OXYCONTIN) 10 mg ER tablet 0 Starting on: 9/20/2017 Sig: Take 1 Tab by mouth every eight (8) hours for 30 days. Max Daily Amount: 30 mg.  
 Class: Print Route: Oral  
 pregabalin (LYRICA) 150 mg capsule 3 Sig: TAKE 1 CAPSULE BY MOUTH THREE TIMES DAILY FOR 30 DAYS. MAXIMUM 3 CAPSULES DAILY. Class: Print HYDROcodone-acetaminophen (NORCO) 5-325 mg per tablet 0 Sig: Take 1 Tab by mouth daily as needed for Pain for up to 30 days. Class: Print Route: Oral  
 HYDROcodone-acetaminophen (NORCO) 5-325 mg per tablet 0 Starting on: 9/20/2017 Sig: Take 1 Tab by mouth daily as needed for Pain for up to 30 days. Class: Print Route: Oral  
  
Prescriptions Sent to Pharmacy Refills DULoxetine (CYMBALTA) 60 mg capsule 1 Sig: Take 1 Cap by mouth nightly. Class: Normal  
 Pharmacy: [address] [place] [address] #: [phone] Route: Oral  
  
We Performed the Following AMB POC DRUG SCREEN () [ [facility]] DRUG SCREEN [[id number] Custom] Follow-up Instructions Return in about 2 months (around 10/21/2017). Patient Instructions Preventing Falls: Care Instructions Your Care Instructions Getting around your home safely can be a challenge if you have injuries or health problems that make it easy for you to fall. Loose rugs and furniture in walkways are among the dangers for many older people who have problems walking or who have poor eyesight. People who have conditions such as arthritis, osteoporosis, or dementia also have to be careful not to fall. You can make your home safer with a few simple measures. Follow-up care is a key part of your treatment and safety. Be sure to make and go to all appointments, and call your doctor if you are having problems. It's also a good idea to know your test results and keep a list of the medicines you take. How can you care for yourself at home? Taking care of yourself · You may get dizzy if you do not drink enough water. To prevent dehydration, drink plenty of fluids, enough so that your urine is light yellow or clear like water. Choose water and other caffeine-free clear liquids. If you have kidney, heart, or liver disease and have to limit fluids, talk with your doctor before you increase the amount of fluids you drink. · Exercise regularly to improve your strength, muscle tone, and balance. Walk if you can. Swimming may be a good choice if you cannot walk easily. · Have your vision and hearing checked each year or any time you notice a change. If you have trouble seeing and hearing, you might not be able to avoid objects and could lose your balance. · Know the side effects of the medicines you take. Ask your doctor or pharmacist whether the medicines you take can affect your balance. Sleeping pills or sedatives can affect your balance. · Limit the amount of alcohol you drink. Alcohol can impair your balance and other senses.  
· Ask your doctor whether calluses or corns on your feet need to be removed. If you wear loose-fitting shoes because of calluses or corns, you can lose your balance and fall. · Talk to your doctor if you have numbness in your feet. Preventing falls at home · Remove raised doorway thresholds, throw rugs, and clutter. Repair loose carpet or raised areas in the floor. · Move furniture and electrical cords to keep them out of walking paths. · Use nonskid floor wax, and wipe up spills right away, especially on ceramic tile floors. · If you use a walker or cane, put rubber tips on it. If you use crutches, clean the bottoms of them regularly with an abrasive pad, such as steel wool. · Keep your house well lit, especially Wilfredo Cord, and outside walkways. Use night-lights in areas such as hallways and bathrooms. Add extra light switches or use remote switches (such as switches that go on or off when you clap your hands) to make it easier to turn lights on if you have to get up during the night. · Install sturdy handrails on stairways. · Move items in your cabinets so that the things you use a lot are on the lower shelves (about waist level). · Keep a cordless phone and a flashlight with new batteries by your bed. If possible, put a phone in each of the main rooms of your house, or carry a cell phone in case you fall and cannot reach a phone. Or, you can wear a device around your neck or wrist. You push a button that sends a signal for help. · Wear low-heeled shoes that fit well and give your feet good support. Use footwear with nonskid soles. Check the heels and soles of your shoes for wear. Repair or replace worn heels or soles. · Do not wear socks without shoes on wood floors. · Walk on the grass when the sidewalks are slippery. If you live in an area that gets snow and ice in the winter, sprinkle salt on slippery steps and sidewalks. Preventing falls in the bath · Install grab bars and nonskid mats inside and outside your shower or tub and near the toilet and sinks. · Use shower chairs and bath benches. · Use a hand-held shower head that will allow you to sit while showering. · Get into a tub or shower by putting the weaker leg in first. Get out of a tub or shower with your strong side first. 
· Repair loose toilet seats and consider installing a raised toilet seat to make getting on and off the toilet easier. · Keep your bathroom door unlocked while you are in the shower. Where can you learn more? Go to http://slava-enrique.info/. Enter [phone] in the search box to learn more about \"Preventing Falls: Care Instructions. \" Current as of: August 4, 2016 Content Version: 11.3 © 2010-7751 [employer]. Care instructions adapted under license by [employer] (which disclaims liability or warranty for this information). If you have questions about a medical condition or this instruction, always ask your healthcare professional. [address] any warranty or liability for your use of this information. Introducing [facility]! Neela Avelar introduces [employer] patient portal. Now you can access parts of your medical record, email your doctor's office, and request medication refills online. 1. In your internet browser, go to [email or web address] 2. Click on the First Time User? Click Here link in the Sign In box. You will see the New Member Sign Up page. 3. Enter your [employer] Access Code exactly as it appears below. You will not need to use this code after youve completed the sign-up process. If you do not sign up before the expiration date, you must request a new code. · [employer] Access Code: [email or web address] Expires: 8/22/2017 10:20 AM 
 
4. Enter the last four digits of your Social Security Number (xxxx) and Date of Birth (mm/dd/yyyy) as indicated and click Submit. You will be taken to the next sign-up page. 5. Create a [employer] ID. This will be your [employer] login ID and cannot be changed, so think of one that is secure and easy to remember. 6. Create a [employer] password. You can change your password at any time. 7. Enter your Password Reset Question and Answer. This can be used at a later time if you forget your password. 8. Enter your e-mail address. You will receive e-mail notification when new information is available in [address] 9. Click Sign Up. You can now view and download portions of your medical record. 10. Click the Download Summary menu link to download a portable copy of your medical information. If you have questions, please visit the Frequently Asked Questions section of the [employer] website. Remember, [employer] is NOT to be used for urgent needs. For medical emergencies, dial 911. Now available from your iPhone and Android! Please provide this summary of care documentation to your next provider. Your primary care clinician is listed as Eliezer Hardy. If you have any questions after today's visit, please call [phone].

## 2017-08-21 NOTE — PROGRESS NOTES
HISTORY OF PRESENT ILLNESS  Swapna Bolanos is a 74 y.o. male. Hypertension   Associated symptoms include shortness of breath. Pertinent negatives include no chest pain, no abdominal pain and no headaches. Shortness of Breath   Pertinent negatives include no fever, no headaches, no cough, no wheezing, no PND, no orthopnea, no chest pain, no vomiting, no abdominal pain, no rash, no leg swelling and no claudication. Palpitations    Associated symptoms include shortness of breath. Pertinent negatives include no fever, no chest pain, no claudication, no orthopnea, no PND, no abdominal pain, no nausea, no vomiting, no headaches, no dizziness and no cough. His past medical history is significant for hypertension. Patient presents for a follow-up office visit. He has a past medical history significant for hypertension, dyslipidemia,  diabetes mellitus, and COPD. He was initially referred here for evaluation of dyspnea on exertion and tachycardia with activity. The patient does not have a previous cardiac history. He does have a history of atypical chest pain, and reports a significant cardiac workup while living in [address] [place] [address] in 2009 which included a cardiac catheterization showing no significant coronary artery disease. More recently, the patient underwent an echocardiogram in January 2014, showing an LVEF of 96-69%, grade 1 diastolic dysfunction, in no significant valvular heart disease. Normal PA pressures. The patient was last seen in the office 6 months ago. Since last visit, he reports he was diagnosed with a pneumonia in April of this year while traveling in [place]. He also has noticed several episodes of palpitations which he describes as his heart racing. However each episode only last for a few minutes before subsiding. No associated dizziness, diaphoresis, or syncope. He has not expressed any new chest pain or pressure, no shortness of breath at rest or with exertion.   No orthopnea, no PND, leg swelling. Past Medical History:   Diagnosis Date    Asthma     Cancer [facility])     BASAL     Cardiac catheterization 2009    [address]:  No significant CAD.  Cardiac echocardiogram 01/20/2014    EF 50-55%. No WMA. Gr 1 DDfx. RVSP 25-30 mmHg. Mild TR.  DM type 2 (diabetes mellitus, type 2) (HCC)     Enlarged prostate     Failed back syndrome     GERD (gastroesophageal reflux disease)     Hearing loss, bilateral     Hearing Aids    Hypertension     Hypothyroidism     Insomnia     Low serum testosterone level     Neuropathy (HCC)     Osteoarthritis of multiple joints     S/P colonoscopy 8/14/13    mild diverticulosis in sigmoid colon w/o evidence of diverticulitis; f/u 5 years    SOB (shortness of breath)     chronic; 2' \"lung fever\"    Vertigo       Current Outpatient Prescriptions   Medication Sig Dispense Refill    oxyCODONE ER (OXYCONTIN) 10 mg ER tablet Take 1 Tab by mouth every eight (8) hours for 30 days. Max Daily Amount: 30 mg. 90 Tab 0    [START ON 9/20/2017] oxyCODONE ER (OXYCONTIN) 10 mg ER tablet Take 1 Tab by mouth every eight (8) hours for 30 days. Max Daily Amount: 30 mg. 90 Tab 0    pregabalin (LYRICA) 150 mg capsule TAKE 1 CAPSULE BY MOUTH THREE TIMES DAILY FOR 30 DAYS. MAXIMUM 3 CAPSULES DAILY. 90 Cap 3    DULoxetine (CYMBALTA) 60 mg capsule Take 1 Cap by mouth nightly. 30 Cap 1    HYDROcodone-acetaminophen (NORCO) 5-325 mg per tablet Take 1 Tab by mouth daily as needed for Pain for up to 30 days. 30 Tab 0    [START ON 9/20/2017] HYDROcodone-acetaminophen (NORCO) 5-325 mg per tablet Take 1 Tab by mouth daily as needed for Pain for up to 30 days. 30 Tab 0    montelukast (SINGULAIR) 10 mg tablet TAKE ONE TABLET BY MOUTH ONCE DAILY 90 Tab 0    atorvastatin (LIPITOR) 20 mg tablet Take 1 Tab by mouth daily. 90 Tab 1    fenofibrate nanocrystallized (TRICOR) 145 mg tablet Take 1 Tab by mouth daily.  90 Tab 1    lisinopril (PRINIVIL, ZESTRIL) 5 mg tablet Take 1 Tab by mouth daily. 90 Tab 1    metoprolol tartrate (LOPRESSOR) 25 mg tablet Take 1 Tab by mouth two (2) times a day. 180 Tab 0    levothyroxine (SYNTHROID) 150 mcg tablet Take 1 Tab by mouth Daily (before breakfast). 90 Tab 1    tamsulosin (FLOMAX) 0.4 mg capsule TAKE ONE CAPSULE BY MOUTH TWICE DAILY 180 Cap 0    naloxone 4 mg/actuation spry 4 mg by Nasal route as needed. 1 Box 1    TRADJENTA 5 mg tablet Take 5 mg by mouth daily.  ACCU-CHEK JAZZY PLUS TEST STRP strip       ACCU-CHEK SOFTCLIX LANCETS misc       albuterol (PROVENTIL VENTOLIN) 2.5 mg /3 mL (0.083 %) nebulizer solution 3 mL by Nebulization route every four (4) hours as needed for Wheezing or Shortness of Breath. 1 Package 5    PEN NEEDLE, DIABETIC (BD ULTRA-FINE FANI PEN NEEDLES) by Does Not Apply route. 4mm x 32G      Insulin Needles, Disposable, (FANI PEN NEEDLE) 32 gauge x 5/32\" ndle Take at bedtime 100 Pen Needle 3    insulin glulisine (APIDRA SOLOSTAR) 100 unit/mL pen 2 units per carb consumed. Max 30 / day 5 Pen 3    insulin glargine (BASAGLAR KWIKPEN) 100 unit/mL (3 mL) pen 52 units at bedtime 3 Pen 3    PEN NEEDLE 31 gauge x 5/16\" ndle USE ONE PEN NEEDLE FOR LANTUS FLEXPEN AND 3 PEN NEEDLES FOR APIDRA WITH EACH MEAL 1 Package 3    esomeprazole (NEXIUM) 40 mg capsule Take 40 mg by mouth two (2) times a day.  betamethasone dipropionate (DIPROLENE) 0.05 % ointment       butalbital-acetaminophen-caffeine (FIORICET, ESGIC) -40 mg per tablet Take 0.5 Tabs by mouth daily as needed for Pain. 10 Tab 0    fluticasone (FLONASE) 50 mcg/actuation nasal spray USE ONE SPRAY IN EACH NOSTRIL ONCE DAILY 1 Bottle 0    tamsulosin (FLOMAX) 0.4 mg capsule Take 1 Cap by mouth two (2) times a day. 180 Cap 3    HYDROcodone-acetaminophen (NORCO) 5-325 mg per tablet Take 1 Tab by mouth daily as needed for Pain.       budesonide-formoterol (SYMBICORT) 160-4.5 mcg/actuation HFA inhaler Take 2 Puffs by inhalation two (2) times a day. 1 Inhaler 5    tiotropium (SPIRIVA) 18 mcg inhalation capsule Take 1 Cap by inhalation daily. 30 Cap 5    ketorolac (ACULAR) 0.5 % ophthalmic solution Administer 1 Drop to both eyes two (2) times a day. Allergies   Allergen Reactions    Ciprofloxacin Anaphylaxis    Other Plant, Animal, Environmental Other (comments)     Patient cannot have MRI. Patient states that he has metal screws in his left arm.  Lamisil [Terbinafine] Swelling     Tongue swelling    Metformin Other (comments)     Elevated cr 1.4    Morphine Other (comments)     \"loss of blood pressure\"    Other Medication Other (comments)     Doxasylin causes extreme GERD    Pcn [Penicillins] Rash    Pentothal [Thiopental Sodium] Shortness of Breath    Sulfa (Sulfonamide Antibiotics) Rash      Social History   Substance Use Topics    Smoking status: Former Smoker     Packs/day: 1.00     Years: 31.00     Types: Pipe, Cigars     Quit date: 1/1/1995    Smokeless tobacco: Never Used    Alcohol use No            Review of Systems   Constitutional: Negative for chills, fever and weight loss. HENT: Negative for nosebleeds. Eyes: Negative for blurred vision and double vision. Respiratory: Positive for shortness of breath. Negative for cough and wheezing. Cardiovascular: Positive for palpitations. Negative for chest pain, orthopnea, claudication, leg swelling and PND. Gastrointestinal: Negative for abdominal pain, heartburn, nausea and vomiting. Genitourinary: Negative for dysuria and hematuria. Musculoskeletal: Negative for falls and myalgias. Skin: Negative for rash. Neurological: Negative for dizziness, focal weakness and headaches. Endo/Heme/Allergies: Does not bruise/bleed easily. Psychiatric/Behavioral: Negative for substance abuse.      Visit Vitals    /66    Pulse 74    Ht 6' 2\" (1.88 m)    Wt 127.5 kg (281 lb)    SpO2 97%    BMI 36.08 kg/m2      Physical Exam   Constitutional: He is oriented to person, place, and time. He appears well-developed and well-nourished. HENT:   Head: Normocephalic and atraumatic. Eyes: Conjunctivae are normal.   Neck: Neck supple. No JVD present. Carotid bruit is not present. Cardiovascular: Normal rate, regular rhythm, S1 normal, S2 normal and normal pulses. Exam reveals distant heart sounds. Exam reveals no gallop and no S3. No murmur heard. Pulmonary/Chest: Breath sounds normal. He has no wheezes. He has no rales. Abdominal: Soft. Bowel sounds are normal. There is no tenderness. Musculoskeletal: He exhibits no edema. Neurological: He is alert and oriented to person, place, and time. Skin: Skin is warm and dry. EKG: Normal sinus rhythm, borderline low voltage,  No ST or T wave abnormalities concerning for ischemia. Compared to the previous EKG, no significant interval change. ASSESSMENT and PLAN    Essential hypertension. Patient's blood pressure is now well controlled on his current medical regimen. All of which I would continue. Mixed dyslipidemia. Patient is now taking both TriCor and atorvastatin 20 mg daily. This is being followed by his PCP. Atypical chest pain. No significant change in his symptoms over the past one to 2 years. Diabetes mellitus. Historically this has been poorly controlled. This is followed by his PCP. From a cardiac standpoint I would prefer his A1c to be less than 7. Followup in 6 months or sooner if needed.

## 2017-08-21 NOTE — PROGRESS NOTES
HISTORY OF PRESENT ILLNESS  Edita White is a 74 y.o. male. HPI Comments: Meds help with pain control and quality of life. No new side effects reported today. Visit survey reviewed and will be scanned.  reviewed. Recent average level of pain(out of 10)-6  Chief complaint low back pain with symptoms in the legs  Pain to both feet, history of peripheral neuropathy  Using OxyContin 10 mg 3 times a day  Hydrocodone 5 mg once a day as needed, he does not use this every day  Lyrica 150 mg 3 times a day  Cymbalta 60 mg in the evening  Medication helps improve general activity, mood, walking, sleep, enjoyment of life  He has had some issues, intermittent, with imbalance for up to 10 years. He has been educated on this and on safety factors. I reminded him today and he has been told in the past the use of a cane or assistive device might help to decrease risk of fall. He has seen neurologist before and has been tested, diagnostic testing performed in the past as well. He states they are not 100% sure why he has intermittent episodes of imbalance. He states that at times he has issues with vertigo. He does not feel that the medications we are prescribing for him have any side effects or other possible complications that increase the risk for fall. Denies that they cause imbalance or dizziness. Measuring clinical outcomes of chronic pain patients. This was reviewed today. The survey will be scanned. Please see the survey for details. Total score3      Review of Systems   Constitutional: Positive for malaise/fatigue. Negative for chills and fever. HENT: Positive for hearing loss. Respiratory: Negative for cough and shortness of breath. Cardiovascular: Positive for leg swelling. Negative for chest pain and palpitations. Gastrointestinal: Positive for constipation. Negative for diarrhea, heartburn, nausea and vomiting. Musculoskeletal: Positive for back pain and joint pain. Negative for falls. Skin: Negative for rash. Neurological: Negative for dizziness. Psychiatric/Behavioral: Positive for depression. Negative for suicidal ideas. The patient is nervous/anxious and has insomnia. Physical Exam   Constitutional: He is oriented to person, place, and time. He appears well-developed and well-nourished. No distress. HENT:   Head: Normocephalic and atraumatic. Pulmonary/Chest: Effort normal. No respiratory distress. Neurological: He is alert and oriented to person, place, and time. Skin: Skin is warm and dry. He is not diaphoretic. Psychiatric: He has a normal mood and affect. His speech is normal. Cognition and memory are normal.   Nursing note and vitals reviewed. ASSESSMENT and PLAN  Encounter Diagnoses   Name Primary?  Chronic bilateral low back pain with bilateral sciatica Yes    Encounter for long-term (current) use of medications     Chronic pain syndrome     Osteoarthritis of lumbar spine, unspecified spinal osteoarthritis complication status     H/O lumbosacral spine surgery     DDD (degenerative disc disease), lumbar     Chronic low back pain, unspecified back pain laterality, with sciatica presence unspecified    --Urine test or oral swab today. Also, the prescription monitoring program was reviewed today. The majority of today's visit was spent counseling and coordinating care. Total visit time-40 minutes. -Dragon medical dictation software was used for portions of this report. Unintended errors may occur. No indicators for recent medication misuse.  reviewed. Pain Meds and Quality Of Life have been reviewed. Nonpharmacologic therapy and non-opioid pharmacologic therapy were considered. If opioid therapy is prescribed, this is only if the expected benefits are anticipated to outweigh risks. Possible changes to treatment plan considered. Support/education given as needed. Today-medications are as listed. No significant changes to medications.   Follow up -- 2 months.

## 2017-08-21 NOTE — PROGRESS NOTES
1. Have you been to the ER, urgent care clinic since your last visit? Hospitalized since your last visit? No     2. Have you seen or consulted any other health care providers outside of the [address] [place] [address] since your last visit? Include any pap smears or colon screening.  No

## 2017-08-21 NOTE — PROGRESS NOTES
Nursing Notes    Patient presents to the office today in follow-up. Reviewed medications with counts as follows:    Rx Date filled Qty Dispensed Pill Count Last Dose Short   norco 5/325 9/19/16 30 58 unknown no   oxycontin 10 mg 7/27/17 90 15 This am no   Mr. Suzy Mckee has a reminder for a \"due or due soon\" health maintenance. I have asked that he contact his primary care provider for follow-up on this health maintenance. Comments: (+) fall risk. Provider made aware. POC UDS was performed in office today    Any new labs or imaging since last appointment? NO    Have you been to an emergency room (ER) or urgent care clinic since your last visit? NO            Have you been hospitalized since your last visit? NO     If yes, where, when, and reason for visit? Have you seen or consulted any other health care providers outside of the [address] [place] [address]  since your last visit? NO     If yes, where, when, and reason for visit?

## 2017-08-21 NOTE — PATIENT INSTRUCTIONS

## 2017-08-21 NOTE — MR AVS SNAPSHOT
Visit Information Date & Time Provider Department Dept. Phone Encounter #  
 8/21/2017  2:40 PM Miguel Ochoa MD [phone] [id number] Your Appointments 8/25/2017  9:30 AM  
ROUTINE CARE with Jose Riley MD  
[facility] Appt Note: 3 month followup [address] [place] [address] [facility]  
  
    
 10/17/2017  9:45 AM  
Follow Up with Bibiana Khan MD  
[address] [place] [facility]) Appt Note: return in 2 months [address] [place] [address]  
[phone] [address]  
  
    
 2/19/2018  9:00 AM  
Follow Up with Miguel Ochoa MD  
[facility] ([facility]) Appt Note: 6 month f/up [facility] [id number] [phone] [address] [place] [address]  
  
    
 3/8/2018  9:00 AM  
Office Visit with Sara Fitzpatrick MD  
[facility] Appt Note: check up [address] [place] [address] [facility] [address]  
[phone] [address] [facility] [address] Upcoming Health Maintenance Date Due INFLUENZA AGE 9 TO ADULT 8/1/2017 HEMOGLOBIN A1C Q6M 12/16/2017 FOOT EXAM Q1 1/9/2018 MICROALBUMIN Q1 3/10/2018 MEDICARE YEARLY EXAM 4/25/2018 LIPID PANEL Q1 6/16/2018 EYE EXAM RETINAL OR DILATED Q1 7/11/2018 GLAUCOMA SCREENING Q2Y 7/11/2019 COLONOSCOPY 10/19/2023 DTaP/Tdap/Td series (2 - Td) 5/16/2026 Allergies as of 8/21/2017  Review Complete On: 8/21/2017 By: Bibiana Khan MD  
  
 Severity Noted Reaction Type Reactions Ciprofloxacin High 11/16/2013    Anaphylaxis Other Plant, Animal, Environmental High 03/10/2016    Other (comments) Patient cannot have MRI. Patient states that he has metal screws in his left arm. Lamisil [Terbinafine]  10/18/2013    Swelling Tongue swelling Metformin  03/16/2016    Other (comments) Elevated cr 1.4 Morphine  10/18/2013    Other (comments) \"loss of blood pressure\" Other Medication  10/18/2013    Other (comments) Doxasylin causes extreme GERD Pcn [Penicillins]  10/18/2013    Rash Pentothal [Thiopental Sodium]  10/18/2013    Shortness of Breath Sulfa (Sulfonamide Antibiotics)  10/18/2013    Rash Current Immunizations  Reviewed on 7/20/2016 Name Date Influenza Vaccine 11/3/2014 Influenza Vaccine (Quad) PF 1/25/2017, 9/16/2015 Pneumococcal Conjugate (PCV-13) 7/20/2016 Pneumococcal Polysaccharide (PPSV-23) 3/30/2017 Tdap 5/16/2016 Not reviewed this visit You Were Diagnosed With   
  
 Codes Comments Hyperlipidemia, unspecified hyperlipidemia type    -  Primary ICD-10-CM: E78.5 ICD-9-CM: 272.4 Essential hypertension     ICD-10-CM: I10 
ICD-9-CM: 401.9 Vitals BP Pulse Height(growth percentile) Weight(growth percentile) SpO2 BMI  
 126/66 74 6' 2\" (1.88 m) 281 lb (127.5 kg) 97% 36.08 kg/m2 Smoking Status Former Smoker Vitals History BMI and BSA Data Body Mass Index Body Surface Area 36.08 kg/m 2 2.58 m 2 Preferred Pharmacy Pharmacy Name Phone [address] [place] [address] [address] [facility] [phone] Your Updated Medication List  
  
   
This list is accurate as of: 8/21/17  3:36 PM.  Always use your most recent med list.  
  
  
  
  
 ACCU-CHEK JAZZY PLUS TEST STRP strip Generic drug:  glucose blood VI test strips [address] Generic drug:  Lancets ACULAR 0.5 % ophthalmic solution Generic drug:  ketorolac Administer 1 Drop to both eyes two (2) times a day. albuterol 2.5 mg /3 mL (0.083 %) nebulizer solution Commonly known as:  PROVENTIL VENTOLIN  
3 mL by Nebulization route every four (4) hours as needed for Wheezing or Shortness of Breath. atorvastatin 20 mg tablet Commonly known as:  LIPITOR Take 1 Tab by mouth daily. betamethasone dipropionate 0.05 % ointment Commonly known as:  DIPROLENE  
  
 budesonide-formoterol 160-4.5 mcg/actuation HFA inhaler Commonly known as:  SYMBICORT Take 2 Puffs by inhalation two (2) times a day. butalbital-acetaminophen-caffeine -40 mg per tablet Commonly known as:  [place] Take 0.5 Tabs by mouth daily as needed for Pain. DULoxetine 60 mg capsule Commonly known as:  CYMBALTA Take 1 Cap by mouth nightly. esomeprazole 40 mg capsule Commonly known as:  Maudry Nikia Take 40 mg by mouth two (2) times a day. fenofibrate nanocrystallized 145 mg tablet Commonly known as:  [employer] Take 1 Tab by mouth daily. fluticasone 50 mcg/actuation nasal spray Commonly known as:  FLONASE  
USE ONE SPRAY IN EACH NOSTRIL ONCE DAILY  
  
 * HYDROcodone-acetaminophen 5-325 mg per tablet Commonly known as:  [place] Dank Take 1 Tab by mouth daily as needed for Pain. * HYDROcodone-acetaminophen 5-325 mg per tablet Commonly known as:  [place] Dank Take 1 Tab by mouth daily as needed for Pain for up to 30 days. * HYDROcodone-acetaminophen 5-325 mg per tablet Commonly known as:  [place] Dank Take 1 Tab by mouth daily as needed for Pain for up to 30 days. Start taking on:  9/20/2017  
  
 insulin glargine 100 unit/mL (3 mL) Inpn Commonly known as:  BASAGLAR KWIKPEN  
52 units at bedtime  
  
 insulin glulisine 100 unit/mL pen Commonly known as:  APIDRA SOLOSTAR  
2 units per carb consumed. Max 30 / day  
  
 levothyroxine 150 mcg tablet Commonly known as:  SYNTHROID Take 1 Tab by mouth Daily (before breakfast). lisinopril 5 mg tablet Commonly known as:  Burma Fairy Take 1 Tab by mouth daily. metoprolol tartrate 25 mg tablet Commonly known as:  LOPRESSOR Take 1 Tab by mouth two (2) times a day. montelukast 10 mg tablet Commonly known as:  SINGULAIR  
TAKE ONE TABLET BY MOUTH ONCE DAILY  
  
 naloxone 4 mg/actuation Spry 4 mg by Nasal route as needed. * oxyCODONE ER 10 mg ER tablet Commonly known as:  OxyCONTIN Take 1 Tab by mouth every eight (8) hours for 30 days. Max Daily Amount: 30 mg.  
  
 * oxyCODONE ER 10 mg ER tablet Commonly known as:  OxyCONTIN Take 1 Tab by mouth every eight (8) hours for 30 days. Max Daily Amount: 30 mg. Start taking on:  9/20/2017 * BD ULTRA-FINE FANI PEN NEEDLES  
by Does Not Apply route. 4mm x 32G * PEN NEEDLE 31 gauge x 5/16\" Ndle Generic drug:  Insulin Needles (Disposable) USE ONE PEN NEEDLE FOR LANTUS FLEXPEN AND 3 PEN NEEDLES FOR APIDRA WITH EACH MEAL * Insulin Needles (Disposable) 32 gauge x 5/32\" Ndle Commonly known as:  Fani Pen Needle Take at bedtime  
  
 pregabalin 150 mg capsule Commonly known as:  Ruthanna Domingo TAKE 1 CAPSULE BY MOUTH THREE TIMES DAILY FOR 30 DAYS. MAXIMUM 3 CAPSULES DAILY. * tamsulosin 0.4 mg capsule Commonly known as:  FLOMAX Take 1 Cap by mouth two (2) times a day. * tamsulosin 0.4 mg capsule Commonly known as:  FLOMAX TAKE ONE CAPSULE BY MOUTH TWICE DAILY  
  
 tiotropium 18 mcg inhalation capsule Commonly known as:  Buddy Busman Take 1 Cap by inhalation daily. TRADJENTA 5 mg tablet Generic drug:  linagliptin Take 5 mg by mouth daily. * Notice: This list has 10 medication(s) that are the same as other medications prescribed for you. Read the directions carefully, and ask your doctor or other care provider to review them with you. We Performed the Following AMB POC EKG ROUTINE W/ 12 LEADS, INTER & REP [76237 CPT(R)] Introducing [facility]! Afua Avery introduces [employer] patient portal. Now you can access parts of your medical record, email your doctor's office, and request medication refills online. 1. In your internet browser, go to [email or web address] 2. Click on the First Time User? Click Here link in the Sign In box. You will see the New Member Sign Up page. 3. Enter your [employer] Access Code exactly as it appears below. You will not need to use this code after youve completed the sign-up process. If you do not sign up before the expiration date, you must request a new code. · [employer] Access Code: [email or web address] Expires: 8/22/2017 10:20 AM 
 
4. Enter the last four digits of your Social Security Number (xxxx) and Date of Birth (mm/dd/yyyy) as indicated and click Submit. You will be taken to the next sign-up page. 5. Create a [employer] ID. This will be your [employer] login ID and cannot be changed, so think of one that is secure and easy to remember. 6. Create a [employer] password. You can change your password at any time. 7. Enter your Password Reset Question and Answer. This can be used at a later time if you forget your password. 8. Enter your e-mail address. You will receive e-mail notification when new information is available in [address] 9. Click Sign Up. You can now view and download portions of your medical record. 10. Click the Download Summary menu link to download a portable copy of your medical information. If you have questions, please visit the Frequently Asked Questions section of the [employer] website. Remember, [employer] is NOT to be used for urgent needs. For medical emergencies, dial 911. Now available from your iPhone and Android! Please provide this summary of care documentation to your next provider. Your primary care clinician is listed as Brandan [email or web address]. If you have any questions after today's visit, please call [phone].

## 2017-08-25 ENCOUNTER — OFFICE VISIT (OUTPATIENT)
Dept: FAMILY MEDICINE CLINIC | Age: 67
End: 2017-08-25

## 2017-08-25 VITALS
HEIGHT: 74 IN | DIASTOLIC BLOOD PRESSURE: 76 MMHG | SYSTOLIC BLOOD PRESSURE: 124 MMHG | HEART RATE: 77 BPM | BODY MASS INDEX: 35.42 KG/M2 | TEMPERATURE: 98.5 F | OXYGEN SATURATION: 95 % | RESPIRATION RATE: 16 BRPM | WEIGHT: 276 LBS

## 2017-08-25 DIAGNOSIS — E11.9 WELL CONTROLLED DIABETES MELLITUS (HCC): ICD-10-CM

## 2017-08-25 DIAGNOSIS — E66.01 MORBID OBESITY, UNSPECIFIED OBESITY TYPE (HCC): ICD-10-CM

## 2017-08-25 DIAGNOSIS — M25.512 ACUTE PAIN OF LEFT SHOULDER: ICD-10-CM

## 2017-08-25 DIAGNOSIS — R00.0 TACHYCARDIA: ICD-10-CM

## 2017-08-25 DIAGNOSIS — J44.9 CHRONIC OBSTRUCTIVE PULMONARY DISEASE, UNSPECIFIED COPD TYPE (HCC): ICD-10-CM

## 2017-08-25 DIAGNOSIS — I10 ESSENTIAL HYPERTENSION: Primary | ICD-10-CM

## 2017-08-25 DIAGNOSIS — M79.642 PAIN OF LEFT HAND: ICD-10-CM

## 2017-08-25 DIAGNOSIS — G89.4 CHRONIC PAIN SYNDROME: ICD-10-CM

## 2017-08-25 DIAGNOSIS — E03.9 HYPOTHYROIDISM, UNSPECIFIED TYPE: ICD-10-CM

## 2017-08-25 RX ORDER — ZOSTER VACCINE LIVE 19400 [PFU]/.65ML
INJECTION, POWDER, LYOPHILIZED, FOR SUSPENSION SUBCUTANEOUS
COMMUNITY
Start: 2017-06-15 | End: 2018-06-05 | Stop reason: ALTCHOICE

## 2017-08-25 RX ORDER — LISINOPRIL 5 MG/1
5 TABLET ORAL DAILY
Qty: 90 TAB | Refills: 1 | Status: SHIPPED | OUTPATIENT
Start: 2017-08-25 | End: 2018-01-22 | Stop reason: SDUPTHER

## 2017-08-25 RX ORDER — SITAGLIPTIN 100 MG/1
100 TABLET, FILM COATED ORAL DAILY
COMMUNITY
Start: 2017-07-25 | End: 2020-09-16

## 2017-08-25 NOTE — MR AVS SNAPSHOT
Visit Information Date & Time Provider Department Dept. Phone Encounter #  
 8/25/2017  9:30 AM Eliezer Hardy, [address] [id number] Follow-up Instructions Return in about 3 months (around 11/25/2017). Your Appointments 10/17/2017  9:45 AM  
Follow Up with Paddy Coates MD  
[facility] [address] [place] [address]) Appt Note: return in 2 months [address] [facility] [address]  
[phone] [address]  
  
    
 2/19/2018  9:00 AM  
Follow Up with Behzad Nguyen MD  
[facility] ([address]) Appt Note: 6 month f/up [place] [id number] [phone] [address] [place] [address]  
  
    
 3/8/2018  9:00 AM  
Office Visit with Lucie Galvez MD  
[facility] [address] [place] [address]) Appt Note: check up [address] [facility] [address] [address]  
[phone] [address] [facility] [address] [place] [id number] Upcoming Health Maintenance Date Due INFLUENZA AGE 9 TO ADULT 8/1/2017 HEMOGLOBIN A1C Q6M 12/16/2017 FOOT EXAM Q1 1/9/2018 MICROALBUMIN Q1 3/10/2018 MEDICARE YEARLY EXAM 4/25/2018 LIPID PANEL Q1 6/16/2018 EYE EXAM RETINAL OR DILATED Q1 7/11/2018 GLAUCOMA SCREENING Q2Y 7/11/2019 COLONOSCOPY 10/19/2023 DTaP/Tdap/Td series (2 - Td) 5/16/2026 Allergies as of 8/25/2017  Review Complete On: 8/25/2017 By: Eliezer Hardy MD  
  
 Severity Noted Reaction Type Reactions Ciprofloxacin High 11/16/2013    Anaphylaxis Other Plant, Animal, Environmental High 03/10/2016    Other (comments) Patient cannot have MRI. Patient states that he has metal screws in his left arm. Lamisil [Terbinafine]  10/18/2013    Swelling Tongue swelling Metformin  03/16/2016    Other (comments) Elevated cr 1.4 Morphine  10/18/2013    Other (comments) \"loss of blood pressure\" Other Medication  10/18/2013    Other (comments) Doxasylin causes extreme GERD Pcn [Penicillins]  10/18/2013    Rash Pentothal [Thiopental Sodium]  10/18/2013    Shortness of Breath Sulfa (Sulfonamide Antibiotics)  10/18/2013    Rash Current Immunizations  Reviewed on 7/20/2016 Name Date Influenza Vaccine 11/3/2014 Influenza Vaccine (Quad) PF 1/25/2017, 9/16/2015 Pneumococcal Conjugate (PCV-13) 7/20/2016 Pneumococcal Polysaccharide (PPSV-23) 3/30/2017 Tdap 5/16/2016 Zoster Vaccine, Live 6/15/2017 Not reviewed this visit You Were Diagnosed With   
  
 Codes Comments Essential hypertension    -  Primary ICD-10-CM: I10 
ICD-9-CM: 401.9 Well controlled diabetes mellitus ([facility]     ICD-10-CM: E11.9 ICD-9-CM: 250.00 Chronic pain syndrome     ICD-10-CM: G89.4 ICD-9-CM: 338.4 Acute pain of left shoulder     ICD-10-CM: M25.512 ICD-9-CM: 719.41 Chronic obstructive pulmonary disease, unspecified COPD type ([facility].)     ICD-10-CM: J44.9 ICD-9-CM: 462 Pain of left hand     ICD-10-CM: J76.800 ICD-9-CM: 729.5 Tachycardia     ICD-10-CM: R00.0 ICD-9-CM: 785.0 Morbid obesity, unspecified obesity type ([facility].)     ICD-10-CM: E66.01 
ICD-9-CM: 278.01 Hypothyroidism, unspecified type     ICD-10-CM: E03.9 ICD-9-CM: 045. 9 Vitals BP Pulse Temp Resp Height(growth percentile) Weight(growth percentile) 124/76 (BP 1 Location: Left arm, BP Patient Position: Sitting) 77 98.5 °F (36.9 °C) (Oral) 16 6' 2\" (1.88 m) 276 lb (125.2 kg) SpO2 BMI Smoking Status 95% 35.44 kg/m2 Former Smoker BMI and BSA Data Body Mass Index Body Surface Area  
 35.44 kg/m 2 2.56 m 2 Preferred Pharmacy Pharmacy Name Phone [facility] [address] [place], [address] [place] [address] [phone] Your Updated Medication List  
  
   
This list is accurate as of: 8/25/17 10:07 AM.  Always use your most recent med list.  
  
  
  
  
 ACCU-CHEK JAZZY PLUS TEST STRP strip Generic drug:  glucose blood VI test strips [address] Generic drug:  Lancets ACULAR 0.5 % ophthalmic solution Generic drug:  ketorolac Administer 1 Drop to both eyes two (2) times a day. albuterol 2.5 mg /3 mL (0.083 %) nebulizer solution Commonly known as:  PROVENTIL VENTOLIN  
3 mL by Nebulization route every four (4) hours as needed for Wheezing or Shortness of Breath. atorvastatin 20 mg tablet Commonly known as:  LIPITOR Take 1 Tab by mouth daily. betamethasone dipropionate 0.05 % ointment Commonly known as:  DIPROLENE  
  
 budesonide-formoterol 160-4.5 mcg/actuation HFA inhaler Commonly known as:  SYMBICORT Take 2 Puffs by inhalation two (2) times a day. butalbital-acetaminophen-caffeine -40 mg per tablet Commonly known as:  Eduard Devoid Take 0.5 Tabs by mouth daily as needed for Pain. DULoxetine 60 mg capsule Commonly known as:  CYMBALTA Take 1 Cap by mouth nightly. esomeprazole 40 mg capsule Commonly known as:  Jenise [place] Take 40 mg by mouth two (2) times a day. fenofibrate nanocrystallized 145 mg tablet Commonly known as:  [employer] Take 1 Tab by mouth daily. fluticasone 50 mcg/actuation nasal spray Commonly known as:  FLONASE  
USE ONE SPRAY IN EACH NOSTRIL ONCE DAILY  
  
 * HYDROcodone-acetaminophen 5-325 mg per tablet Commonly known as:  Mandeep Hernández Take 1 Tab by mouth daily as needed for Pain. * HYDROcodone-acetaminophen 5-325 mg per tablet Commonly known as:  Mandeep [place] Take 1 Tab by mouth daily as needed for Pain for up to 30 days. * HYDROcodone-acetaminophen 5-325 mg per tablet Commonly known as:  Mandeep [place] Take 1 Tab by mouth daily as needed for Pain for up to 30 days. Start taking on:  9/20/2017  
  
 insulin glargine 100 unit/mL (3 mL) Inpn Commonly known as:  BASAGLAR KWIKPEN  
52 units at bedtime  
  
 insulin glulisine 100 unit/mL pen Commonly known as:  APIDRA SOLOSTAR  
2 units per carb consumed. Max 30 / day JANUVIA 100 mg tablet Generic drug:  SITagliptin Take 100 mg by mouth daily. levothyroxine 150 mcg tablet Commonly known as:  SYNTHROID Take 1 Tab by mouth Daily (before breakfast). lisinopril 5 mg tablet Commonly known as:  Matt Peek Take 1 Tab by mouth daily. metoprolol tartrate 25 mg tablet Commonly known as:  LOPRESSOR Take 1 Tab by mouth two (2) times a day. montelukast 10 mg tablet Commonly known as:  SINGULAIR  
TAKE ONE TABLET BY MOUTH ONCE DAILY  
  
 naloxone 4 mg/actuation Spry 4 mg by Nasal route as needed. * oxyCODONE ER 10 mg ER tablet Commonly known as:  OxyCONTIN Take 1 Tab by mouth every eight (8) hours for 30 days. Max Daily Amount: 30 mg.  
  
 * oxyCODONE ER 10 mg ER tablet Commonly known as:  OxyCONTIN Take 1 Tab by mouth every eight (8) hours for 30 days. Max Daily Amount: 30 mg. Start taking on:  9/20/2017 * BD ULTRA-FINE FANI PEN NEEDLES  
by Does Not Apply route. 4mm x 32G * PEN NEEDLE 31 gauge x 5/16\" Ndle Generic drug:  Insulin Needles (Disposable) USE ONE PEN NEEDLE FOR LANTUS FLEXPEN AND 3 PEN NEEDLES FOR APIDRA WITH EACH MEAL * Insulin Needles (Disposable) 32 gauge x 5/32\" Ndle Commonly known as:  Fani Pen Needle Take at bedtime  
  
 pregabalin 150 mg capsule Commonly known as:  Arely Dunnings TAKE 1 CAPSULE BY MOUTH THREE TIMES DAILY FOR 30 DAYS. MAXIMUM 3 CAPSULES DAILY. * tamsulosin 0.4 mg capsule Commonly known as:  FLOMAX Take 1 Cap by mouth two (2) times a day. * tamsulosin 0.4 mg capsule Commonly known as:  FLOMAX TAKE ONE CAPSULE BY MOUTH TWICE DAILY  
  
 tiotropium 18 mcg inhalation capsule Commonly known as:  Long Para Take 1 Cap by inhalation daily. varicella-zoster vacine live 19,400 unit/0.65 mL Susr injection Generic drug:  varicella zoster vacine live * Notice: This list has 10 medication(s) that are the same as other medications prescribed for you. Read the directions carefully, and ask your doctor or other care provider to review them with you. Prescriptions Sent to Pharmacy Refills  
 lisinopril (PRINIVIL, ZESTRIL) 5 mg tablet 1 Sig: Take 1 Tab by mouth daily. Class: Normal  
 Pharmacy: [address] [facility], [address] [facility] #: [phone] Route: Oral  
  
Follow-up Instructions Return in about 3 months (around 11/25/2017). Patient Instructions Low Sodium Diet (2,000 Milligram): Care Instructions Your Care Instructions Too much sodium causes your body to hold on to extra water. This can raise your blood pressure and force your heart and kidneys to work harder. In very serious cases, this could cause you to be put in the hospital. It might even be life-threatening. By limiting sodium, you will feel better and lower your risk of serious problems. The most common source of sodium is salt. People get most of the salt in their diet from canned, prepared, and packaged foods. Fast food and restaurant meals also are very high in sodium. Your doctor will probably limit your sodium to less than 2,000 milligrams (mg) a day. This limit counts all the sodium in prepared and packaged foods and any salt you add to your food. Follow-up care is a key part of your treatment and safety. Be sure to make and go to all appointments, and call your doctor if you are having problems. It's also a good idea to know your test results and keep a list of the medicines you take. How can you care for yourself at home? Read food labels · Read labels on cans and food packages.  The labels tell you how much sodium is in each serving. Make sure that you look at the serving size. If you eat more than the serving size, you have eaten more sodium. · Food labels also tell you the Percent Daily Value for sodium. Choose products with low Percent Daily Values for sodium. · Be aware that sodium can come in forms other than salt, including monosodium glutamate (MSG), sodium citrate, and sodium bicarbonate (baking soda). MSG is often added to Asian food. When you eat out, you can sometimes ask for food without MSG or added salt. Buy low-sodium foods · Buy foods that are labeled \"unsalted\" (no salt added), \"sodium-free\" (less than 5 mg of sodium per serving), or \"low-sodium\" (less than 140 mg of sodium per serving). Foods labeled \"reduced-sodium\" and \"light sodium\" may still have too much sodium. Be sure to read the label to see how much sodium you are getting. · Buy fresh vegetables, or frozen vegetables without added sauces. Buy low-sodium versions of canned vegetables, soups, and other canned goods. Prepare low-sodium meals · Cut back on the amount of salt you use in cooking. This will help you adjust to the taste. Do not add salt after cooking. One teaspoon of salt has about 2,300 mg of sodium. · Take the salt shaker off the table. · Flavor your food with garlic, lemon juice, onion, vinegar, herbs, and spices. Do not use soy sauce, lite soy sauce, steak sauce, onion salt, garlic salt, celery salt, mustard, or ketchup on your food. · Use low-sodium salad dressings, sauces, and ketchup. Or make your own salad dressings and sauces without adding salt. · Use less salt (or none) when recipes call for it. You can often use half the salt a recipe calls for without losing flavor. Other foods such as rice, pasta, and grains do not need added salt. · Rinse canned vegetables, and cook them in fresh water. This removes somebut not allof the salt.  
· Avoid water that is naturally high in sodium or that has been treated with water softeners, which add sodium. Call your local water company to find out the sodium content of your water supply. If you buy bottled water, read the label and choose a sodium-free brand. Avoid high-sodium foods · Avoid eating: ¨ Smoked, cured, salted, and canned meat, fish, and poultry. ¨ Ham, kohler, hot dogs, and luncheon meats. ¨ Regular, hard, and processed cheese and regular peanut butter. ¨ Crackers with salted tops, and other salted snack foods such as pretzels, chips, and salted popcorn. ¨ Frozen prepared meals, unless labeled low-sodium. ¨ Canned and dried soups, broths, and bouillon, unless labeled sodium-free or low-sodium. ¨ Canned vegetables, unless labeled sodium-free or low-sodium. ¨ [place] fries, pizza, tacos, and other fast foods. ¨ Pickles, olives, ketchup, and other condiments, especially soy sauce, unless labeled sodium-free or low-sodium. Where can you learn more? Go to http://slavaSouthern Swimenrique.info/. Enter U746 in the search box to learn more about \"Low Sodium Diet (2,000 Milligram): Care Instructions. \" Current as of: July 26, 2016 Content Version: 11.3 © 2438-7473 ProprietÃ¡rioDireto. Care instructions adapted under license by [employer] (which disclaims liability or warranty for this information). If you have questions about a medical condition or this instruction, always ask your healthcare professional. [place] [address] any warranty or liability for your use of this information. Learning About Diabetes Food Guidelines Your Care Instructions Meal planning is important to manage diabetes. It helps keep your blood sugar at a target level (which you set with your doctor). You don't have to eat special foods. You can eat what your family eats, including sweets once in a while. But you do have to pay attention to how often you eat and how much you eat of certain foods. You may want to work with a dietitian or a certified diabetes educator (CDE) to help you plan meals and snacks. A dietitian or CDE can also help you lose weight if that is one of your goals. What should you know about eating carbs? Managing the amount of carbohydrate (carbs) you eat is an important part of healthy meals when you have diabetes. Carbohydrate is found in many foods. · Learn which foods have carbs. And learn the amounts of carbs in different foods. ¨ Bread, cereal, pasta, and rice have about 15 grams of carbs in a serving. A serving is 1 slice of bread (1 ounce), ½ cup of cooked cereal, or 1/3 cup of cooked pasta or rice. ¨ Fruits have 15 grams of carbs in a serving. A serving is 1 small fresh fruit, such as an apple or orange; ½ of a banana; ½ cup of cooked or canned fruit; ½ cup of fruit juice; 1 cup of melon or raspberries; or 2 tablespoons of dried fruit. ¨ Milk and no-sugar-added yogurt have 15 grams of carbs in a serving. A serving is 1 cup of milk or 2/3 cup of no-sugar-added yogurt. ¨ Starchy vegetables have 15 grams of carbs in a serving. A serving is ½ cup of mashed potatoes or sweet potato; 1 cup winter squash; ½ of a small baked potato; ½ cup of cooked beans; or ½ cup cooked corn or green peas. · Learn how much carbs to eat each day and at each meal. A dietitian or CDE can teach you how to keep track of the amount of carbs you eat. This is called carbohydrate counting. · If you are not sure how to count carbohydrate grams, use the Plate Method to plan meals. It is a good, quick way to make sure that you have a balanced meal. It also helps you spread carbs throughout the day. ¨ Divide your plate by types of foods. Put non-starchy vegetables on half the plate, meat or other protein food on one-quarter of the plate, and a grain or starchy vegetable in the final quarter of the plate.  To this you can add a small piece of fruit and 1 cup of milk or yogurt, depending on how many carbs you are supposed to eat at a meal. 
· Try to eat about the same amount of carbs at each meal. Do not \"save up\" your daily allowance of carbs to eat at one meal. 
· Proteins have very little or no carbs per serving. Examples of proteins are beef, chicken, turkey, fish, eggs, tofu, cheese, cottage cheese, and peanut butter. A serving size of meat is 3 ounces, which is about the size of a deck of cards. Examples of meat substitute serving sizes (equal to 1 ounce of meat) are 1/4 cup of cottage cheese, 1 egg, 1 tablespoon of peanut butter, and ½ cup of tofu. How can you eat out and still eat healthy? · Learn to estimate the serving sizes of foods that have carbohydrate. If you measure food at home, it will be easier to estimate the amount in a serving of restaurant food. · If the meal you order has too much carbohydrate (such as potatoes, corn, or baked beans), ask to have a low-carbohydrate food instead. Ask for a salad or green vegetables. · If you use insulin, check your blood sugar before and after eating out to help you plan how much to eat in the future. · If you eat more carbohydrate at a meal than you had planned, take a walk or do other exercise. This will help lower your blood sugar. What else should you know? · Limit saturated fat, such as the fat from meat and dairy products. This is a healthy choice because people who have diabetes are at higher risk of heart disease. So choose lean cuts of meat and nonfat or low-fat dairy products. Use olive or canola oil instead of butter or shortening when cooking. · Don't skip meals. Your blood sugar may drop too low if you skip meals and take insulin or certain medicines for diabetes. · Check with your doctor before you drink alcohol. Alcohol can cause your blood sugar to drop too low. Alcohol can also cause a bad reaction if you take certain diabetes medicines. Follow-up care is a key part of your treatment and safety.  Be sure to make and go to all appointments, and call your doctor if you are having problems. It's also a good idea to know your test results and keep a list of the medicines you take. Where can you learn more? Go to http://slava-enriuqe.info/. Enter F096 in the search box to learn more about \"Learning About Diabetes Food Guidelines. \" Current as of: March 13, 2017 Content Version: 11.3 © 3626-3080 eLama. Care instructions adapted under license by [employer] (which disclaims liability or warranty for this information). If you have questions about a medical condition or this instruction, always ask your healthcare professional. [address] any warranty or liability for your use of this information. Carpal Tunnel Syndrome: Exercises Your Care Instructions Here are some examples of typical rehabilitation exercises for your condition. Start each exercise slowly. Ease off the exercise if you start to have pain. Your doctor or your physical or occupational therapist will tell you when you can start these exercises and which ones will work best for you. How to do the exercises Note: When you no longer have pain or numbness, you can do exercises to help prevent carpal tunnel syndrome from coming back. Do not do any stretch or movement that is uncomfortable or painful. Warm-up stretches 1. Rotate your wrist up, down, and from side to side. Repeat 4 times. 2. Stretch your fingers far apart. Relax them, and then stretch them again. Repeat 4 times. 3. Stretch your thumb by pulling it back gently, holding it, and then releasing it. Repeat 4 times. Prayer stretch 1. Start with your palms together in front of your chest just below your chin. 2. Slowly lower your hands toward your waistline, keeping your hands close to your stomach and your palms together until you feel a mild to moderate stretch under your forearms. 3. Hold for at least 15 to 30 seconds. Repeat 2 to 4 times. Wrist flexor stretch 1. Extend your arm in front of you with your palm up. 2. Bend your wrist, pointing your hand toward the floor. 3. With your other hand, gently bend your wrist farther until you feel a mild to moderate stretch in your forearm. 4. Hold for at least 15 to 30 seconds. Repeat 2 to 4 times. Wrist extensor stretch Repeat steps 1 through 4 of the stretch above, but begin with your extended hand palm down. Follow-up care is a key part of your treatment and safety. Be sure to make and go to all appointments, and call your doctor if you are having problems. It's also a good idea to know your test results and keep a list of the medicines you take. Where can you learn more? Go to http://slava-enrique.info/. Enter C800 in the search box to learn more about \"Carpal Tunnel Syndrome: Exercises. \" Current as of: March 21, 2017 Content Version: 11.3 © 5961-0498 Compliance 11. Care instructions adapted under license by MVP Interactive (which disclaims liability or warranty for this information). If you have questions about a medical condition or this instruction, always ask your healthcare professional. [address] any warranty or liability for your use of this information. Introducing [facility]! Alberto Fulton introduces ReefEdge patient portal. Now you can access parts of your medical record, email your doctor's office, and request medication refills online. 1. In your internet browser, go to [email or web address] 2. Click on the First Time User? Click Here link in the Sign In box. You will see the New Member Sign Up page. 3. Enter your ReefEdge Access Code exactly as it appears below. You will not need to use this code after youve completed the sign-up process. If you do not sign up before the expiration date, you must request a new code. · [employer] Access Code: [email or web address] Expires: 11/23/2017 10:07 AM 
 
4. Enter the last four digits of your Social Security Number (xxxx) and Date of Birth (mm/dd/yyyy) as indicated and click Submit. You will be taken to the next sign-up page. 5. Create a [employer] ID. This will be your [employer] login ID and cannot be changed, so think of one that is secure and easy to remember. 6. Create a [employer] password. You can change your password at any time. 7. Enter your Password Reset Question and Answer. This can be used at a later time if you forget your password. 8. Enter your e-mail address. You will receive e-mail notification when new information is available in [address] 9. Click Sign Up. You can now view and download portions of your medical record. 10. Click the Download Summary menu link to download a portable copy of your medical information. If you have questions, please visit the Frequently Asked Questions section of the [employer] website. Remember, [employer] is NOT to be used for urgent needs. For medical emergencies, dial 911. Now available from your iPhone and Android! Please provide this summary of care documentation to your next provider. Your primary care clinician is listed as Shahid Abraham. If you have any questions after today's visit, please call [phone].

## 2017-08-25 NOTE — PATIENT INSTRUCTIONS
Low Sodium Diet (2,000 Milligram): Care Instructions  Your Care Instructions  Too much sodium causes your body to hold on to extra water. This can raise your blood pressure and force your heart and kidneys to work harder. In very serious cases, this could cause you to be put in the hospital. It might even be life-threatening. By limiting sodium, you will feel better and lower your risk of serious problems. The most common source of sodium is salt. People get most of the salt in their diet from canned, prepared, and packaged foods. Fast food and restaurant meals also are very high in sodium. Your doctor will probably limit your sodium to less than 2,000 milligrams (mg) a day. This limit counts all the sodium in prepared and packaged foods and any salt you add to your food. Follow-up care is a key part of your treatment and safety. Be sure to make and go to all appointments, and call your doctor if you are having problems. It's also a good idea to know your test results and keep a list of the medicines you take. How can you care for yourself at home? Read food labels  · Read labels on cans and food packages. The labels tell you how much sodium is in each serving. Make sure that you look at the serving size. If you eat more than the serving size, you have eaten more sodium. · Food labels also tell you the Percent Daily Value for sodium. Choose products with low Percent Daily Values for sodium. · Be aware that sodium can come in forms other than salt, including monosodium glutamate (MSG), sodium citrate, and sodium bicarbonate (baking soda). MSG is often added to Asian food. When you eat out, you can sometimes ask for food without MSG or added salt. Buy low-sodium foods  · Buy foods that are labeled \"unsalted\" (no salt added), \"sodium-free\" (less than 5 mg of sodium per serving), or \"low-sodium\" (less than 140 mg of sodium per serving).  Foods labeled \"reduced-sodium\" and \"light sodium\" may still have too much sodium. Be sure to read the label to see how much sodium you are getting. · Buy fresh vegetables, or frozen vegetables without added sauces. Buy low-sodium versions of canned vegetables, soups, and other canned goods. Prepare low-sodium meals  · Cut back on the amount of salt you use in cooking. This will help you adjust to the taste. Do not add salt after cooking. One teaspoon of salt has about 2,300 mg of sodium. · Take the salt shaker off the table. · Flavor your food with garlic, lemon juice, onion, vinegar, herbs, and spices. Do not use soy sauce, lite soy sauce, steak sauce, onion salt, garlic salt, celery salt, mustard, or ketchup on your food. · Use low-sodium salad dressings, sauces, and ketchup. Or make your own salad dressings and sauces without adding salt. · Use less salt (or none) when recipes call for it. You can often use half the salt a recipe calls for without losing flavor. Other foods such as rice, pasta, and grains do not need added salt. · Rinse canned vegetables, and cook them in fresh water. This removes some--but not all--of the salt. · Avoid water that is naturally high in sodium or that has been treated with water softeners, which add sodium. Call your local water company to find out the sodium content of your water supply. If you buy bottled water, read the label and choose a sodium-free brand. Avoid high-sodium foods  · Avoid eating:  ¨ Smoked, cured, salted, and canned meat, fish, and poultry. ¨ Ham, kohler, hot dogs, and luncheon meats. ¨ Regular, hard, and processed cheese and regular peanut butter. ¨ Crackers with salted tops, and other salted snack foods such as pretzels, chips, and salted popcorn. ¨ Frozen prepared meals, unless labeled low-sodium. ¨ Canned and dried soups, broths, and bouillon, unless labeled sodium-free or low-sodium. ¨ Canned vegetables, unless labeled sodium-free or low-sodium. ¨ [place] fries, pizza, tacos, and other fast foods.   Yemi Vilchis, olives, ketchup, and other condiments, especially soy sauce, unless labeled sodium-free or low-sodium. Where can you learn more? Go to http://slava-enrique.info/. Enter D630 in the search box to learn more about \"Low Sodium Diet (2,000 Milligram): Care Instructions. \"  Current as of: July 26, 2016  Content Version: 11.3  © 0241-9662 [email or web address]. Care instructions adapted under license by [employer] (which disclaims liability or warranty for this information). If you have questions about a medical condition or this instruction, always ask your healthcare professional. [address] any warranty or liability for your use of this information. Learning About Diabetes Food Guidelines  Your Care Instructions  Meal planning is important to manage diabetes. It helps keep your blood sugar at a target level (which you set with your doctor). You don't have to eat special foods. You can eat what your family eats, including sweets once in a while. But you do have to pay attention to how often you eat and how much you eat of certain foods. You may want to work with a dietitian or a certified diabetes educator (CDE) to help you plan meals and snacks. A dietitian or CDE can also help you lose weight if that is one of your goals. What should you know about eating carbs? Managing the amount of carbohydrate (carbs) you eat is an important part of healthy meals when you have diabetes. Carbohydrate is found in many foods. · Learn which foods have carbs. And learn the amounts of carbs in different foods. ¨ Bread, cereal, pasta, and rice have about 15 grams of carbs in a serving. A serving is 1 slice of bread (1 ounce), ½ cup of cooked cereal, or 1/3 cup of cooked pasta or rice. ¨ Fruits have 15 grams of carbs in a serving.  A serving is 1 small fresh fruit, such as an apple or orange; ½ of a banana; ½ cup of cooked or canned fruit; ½ cup of fruit juice; 1 cup of melon or raspberries; or 2 tablespoons of dried fruit. ¨ Milk and no-sugar-added yogurt have 15 grams of carbs in a serving. A serving is 1 cup of milk or 2/3 cup of no-sugar-added yogurt. ¨ Starchy vegetables have 15 grams of carbs in a serving. A serving is ½ cup of mashed potatoes or sweet potato; 1 cup winter squash; ½ of a small baked potato; ½ cup of cooked beans; or ½ cup cooked corn or green peas. · Learn how much carbs to eat each day and at each meal. A dietitian or CDE can teach you how to keep track of the amount of carbs you eat. This is called carbohydrate counting. · If you are not sure how to count carbohydrate grams, use the Plate Method to plan meals. It is a good, quick way to make sure that you have a balanced meal. It also helps you spread carbs throughout the day. ¨ Divide your plate by types of foods. Put non-starchy vegetables on half the plate, meat or other protein food on one-quarter of the plate, and a grain or starchy vegetable in the final quarter of the plate. To this you can add a small piece of fruit and 1 cup of milk or yogurt, depending on how many carbs you are supposed to eat at a meal.  · Try to eat about the same amount of carbs at each meal. Do not \"save up\" your daily allowance of carbs to eat at one meal.  · Proteins have very little or no carbs per serving. Examples of proteins are beef, chicken, turkey, fish, eggs, tofu, cheese, cottage cheese, and peanut butter. A serving size of meat is 3 ounces, which is about the size of a deck of cards. Examples of meat substitute serving sizes (equal to 1 ounce of meat) are 1/4 cup of cottage cheese, 1 egg, 1 tablespoon of peanut butter, and ½ cup of tofu. How can you eat out and still eat healthy? · Learn to estimate the serving sizes of foods that have carbohydrate. If you measure food at home, it will be easier to estimate the amount in a serving of restaurant food.   · If the meal you order has too much carbohydrate (such as potatoes, corn, or baked beans), ask to have a low-carbohydrate food instead. Ask for a salad or green vegetables. · If you use insulin, check your blood sugar before and after eating out to help you plan how much to eat in the future. · If you eat more carbohydrate at a meal than you had planned, take a walk or do other exercise. This will help lower your blood sugar. What else should you know? · Limit saturated fat, such as the fat from meat and dairy products. This is a healthy choice because people who have diabetes are at higher risk of heart disease. So choose lean cuts of meat and nonfat or low-fat dairy products. Use olive or canola oil instead of butter or shortening when cooking. · Don't skip meals. Your blood sugar may drop too low if you skip meals and take insulin or certain medicines for diabetes. · Check with your doctor before you drink alcohol. Alcohol can cause your blood sugar to drop too low. Alcohol can also cause a bad reaction if you take certain diabetes medicines. Follow-up care is a key part of your treatment and safety. Be sure to make and go to all appointments, and call your doctor if you are having problems. It's also a good idea to know your test results and keep a list of the medicines you take. Where can you learn more? Go to http://slava-enrique.info/. Enter Y804 in the search box to learn more about \"Learning About Diabetes Food Guidelines. \"  Current as of: March 13, 2017  Content Version: 11.3  © 8670-2660 [employer]. Care instructions adapted under license by [employer] (which disclaims liability or warranty for this information). If you have questions about a medical condition or this instruction, always ask your healthcare professional. [place] [address] any warranty or liability for your use of this information.        Carpal Tunnel Syndrome: Exercises  Your Care Instructions  Here are some examples of typical rehabilitation exercises for your condition. Start each exercise slowly. Ease off the exercise if you start to have pain. Your doctor or your physical or occupational therapist will tell you when you can start these exercises and which ones will work best for you. How to do the exercises  Note: When you no longer have pain or numbness, you can do exercises to help prevent carpal tunnel syndrome from coming back. Do not do any stretch or movement that is uncomfortable or painful. Warm-up stretches  1. Rotate your wrist up, down, and from side to side. Repeat 4 times. 2. Stretch your fingers far apart. Relax them, and then stretch them again. Repeat 4 times. 3. Stretch your thumb by pulling it back gently, holding it, and then releasing it. Repeat 4 times. Prayer stretch    1. Start with your palms together in front of your chest just below your chin. 2. Slowly lower your hands toward your waistline, keeping your hands close to your stomach and your palms together until you feel a mild to moderate stretch under your forearms. 3. Hold for at least 15 to 30 seconds. Repeat 2 to 4 times. Wrist flexor stretch    1. Extend your arm in front of you with your palm up. 2. Bend your wrist, pointing your hand toward the floor. 3. With your other hand, gently bend your wrist farther until you feel a mild to moderate stretch in your forearm. 4. Hold for at least 15 to 30 seconds. Repeat 2 to 4 times. Wrist extensor stretch    Repeat steps 1 through 4 of the stretch above, but begin with your extended hand palm down. Follow-up care is a key part of your treatment and safety. Be sure to make and go to all appointments, and call your doctor if you are having problems. It's also a good idea to know your test results and keep a list of the medicines you take. Where can you learn more? Go to http://slava-enrique.info/.   Enter M577 in the search box to learn more about \"Carpal Tunnel Syndrome: Exercises. \"  Current as of: March 21, 2017  Content Version: 11.3  © 5059-6600 [employer]. Care instructions adapted under license by [employer] (which disclaims liability or warranty for this information). If you have questions about a medical condition or this instruction, always ask your healthcare professional. [place] [address] any warranty or liability for your use of this information.

## 2017-08-25 NOTE — PROGRESS NOTES
1. Have you been to the ER, urgent care clinic since your last visit? Hospitalized since your last visit? No    2. Have you seen or consulted any other health care providers outside of the [address] [place] [address] since your last visitarb? Include any pap smears or colon screening. Dr. Marquise Blunt LOV: two months ago and has upcoming appointment in September. Also, patient has upcoming podiatry appointment at [address]. Last flu vaccine 2016. Patient states colonoscopy was in 2013 at [place] [address]; states he was told to have another one in five years.

## 2017-08-25 NOTE — PROGRESS NOTES
HISTORY OF PRESENT ILLNESS  Edita White is a 74 y.o. male. HPI: here for routine follow up. Multiple medical problem. Following pain management for his chronic pain, mainly back and neck. Currently c/o left shoulder pain as he almost fell. Was more sore and now gradually improving. No swelling noted. Some limitation on ROM but said it has been getting better. C/o rt hand pain and some tremor and pain radiates from wrist to fingers mainly radial site. No swelling noted. Sheng James was diagnosed with carpal tunnel in the past but does not want any surgery or injection. Currently on pain medication. Also said he feels tremor over left hand more in am. Currently stable. No weakness of upper ext. Has h/o hypertension and tachycardia. Since on beta blocker it is stable. Evaluated for sleep apnea and had no sleep apnea. Obesity. Limitation in doing exercise due to back pain. For now trying diet modification. Last visit noted low TSH. Repeat was ok. No dosage adjustment done. Currently denies any new symptoms. Diabetes improving. Recent HBA1C improved from 7.7 to 7.2. Changed tradjanta back to [place] and it is improving. Today fasting sugar was 93 at home. Sheng James it is average below 120 . No hypoglycemia. Review labs.    Lab Results   Component Value Date/Time    Hemoglobin A1c 8.7 01/19/2017 09:21 AM    Hemoglobin A1c (POC) 6.9 12/16/2015 10:29 AM    Hemoglobin A1c, External 7.2 06/16/2017     Lab Results   Component Value Date/Time    Sodium 139 01/19/2017 09:21 AM    Potassium 4.2 01/19/2017 09:21 AM    Chloride 100 01/19/2017 09:21 AM    CO2 30 01/19/2017 09:21 AM    Anion gap 9 01/19/2017 09:21 AM    Glucose 198 01/19/2017 09:21 AM    BUN 20 01/19/2017 09:21 AM    Creatinine 1.08 01/19/2017 09:21 AM    BUN/Creatinine ratio 19 01/19/2017 09:21 AM    GFR est AA >60 01/19/2017 09:21 AM    GFR est non-AA >60 01/19/2017 09:21 AM    Calcium 8.9 01/19/2017 09:21 AM    Bilirubin, total 0.3 01/19/2017 09:21 AM    AST (SGOT) 14 01/19/2017 09:21 AM    Alk. phosphatase 51 01/19/2017 09:21 AM    Protein, total 6.6 01/19/2017 09:21 AM    Albumin 3.7 01/19/2017 09:21 AM    Globulin 2.9 01/19/2017 09:21 AM    A-G Ratio 1.3 01/19/2017 09:21 AM    ALT (SGPT) 30 01/19/2017 09:21 AM     Lab Results   Component Value Date/Time    Cholesterol, total 158 01/19/2017 09:21 AM    HDL Cholesterol 33 01/19/2017 09:21 AM    LDL, calculated 77 01/19/2017 09:21 AM    VLDL, calculated 48 01/19/2017 09:21 AM    Triglyceride 240 01/19/2017 09:21 AM    CHOL/HDL Ratio 4.8 01/19/2017 09:21 AM     Lab Results   Component Value Date/Time    Microalbumin/Creat ratio (mg/g creat)  07/19/2016 07:19 AM     Cannot calculate ratio due to micro albumin result outside reportable range. Microalbumin,urine random <0.50 07/19/2016 07:19 AM     Lab Results   Component Value Date/Time    TSH 1.19 07/19/2017 08:24 AM     H/o copd. Stable. No cough or chest congestion or wheezing. ROS: see HPI     Physical Exam   Constitutional: He is oriented to person, place, and time. No distress. Cardiovascular: Normal heart sounds. Pulmonary/Chest: No respiratory distress. He has no wheezes. Abdominal: Soft. There is no tenderness. Musculoskeletal: He exhibits no edema. Left shoulder: generalize discomfort over trapezius muscle area. No point tenderness over left shoulder . ROM limited above 90 degree. Muscle tone and power bilaterally equal in upper ext. No swelling or redness of joint noted. Left hand: no swelling or redness noted over wrist area. ROM wnl. Tinel test negative. Neurological: He is oriented to person, place, and time. Psychiatric: His behavior is normal.       ASSESSMENT and PLAN    ICD-10-CM ICD-9-CM    1. Essential hypertension: stable. Will continue current plan. Low salt diet. I10 401.9 lisinopril (PRINIVIL, ZESTRIL) 5 mg tablet   2.  Well controlled diabetes mellitus ([address]): gradually improving since changed medication back to [place] and d/c tradjenta. No hypoglycemia. Up to date with eye and foot exam. Diet modification. Continue f/u with endocrinologist.  E11.9 250.00    3. Chronic pain syndrome; following pain management. G89.4 338.4    4. Acute pain of left shoulder/ recent fall: now improving gradually. Symptomatic treatment. M25.512 719.41    5. Chronic obstructive pulmonary disease, unspecified COPD type ([facility]): stable. J44.9 496    6. Pain of left hand/ h/o carpal tunnel syndrome: ? Carpal tunnel syndrome vs neck degenerative changes. Will observe. symptomatic treatment with wrist support, ice.  M79.642 729.5    7. Tachycardia: stable on beta blocker. R00.0 785.0    8. Morbid obesity, unspecified obesity type ([facility]): limitation in exercise due to back pain. On diet modification. E66.01 278.01    9. Hypothyroidism, unspecified type: last lab showed low tsh. Repeat was ok. For now continue current dose. E03.9 244.9    Pt understood and agrees with above plan. Review HM  Discussed flu shot will be available soon probably next month. Follow-up Disposition:  Return in about 3 months (around 11/25/2017).

## 2017-09-05 DIAGNOSIS — R35.1 BPH ASSOCIATED WITH NOCTURIA: ICD-10-CM

## 2017-09-05 DIAGNOSIS — N40.1 BPH ASSOCIATED WITH NOCTURIA: ICD-10-CM

## 2017-09-05 RX ORDER — TAMSULOSIN HYDROCHLORIDE 0.4 MG/1
CAPSULE ORAL
Qty: 180 CAP | Refills: 0 | Status: SHIPPED | OUTPATIENT
Start: 2017-09-05 | End: 2017-10-24

## 2017-10-05 DIAGNOSIS — J44.9 ASTHMA WITH COPD (CHRONIC OBSTRUCTIVE PULMONARY DISEASE) (HCC): ICD-10-CM

## 2017-10-05 RX ORDER — MONTELUKAST SODIUM 10 MG/1
TABLET ORAL
Qty: 90 TAB | Refills: 0 | Status: SHIPPED | OUTPATIENT
Start: 2017-10-05 | End: 2017-11-01 | Stop reason: SDUPTHER

## 2017-10-24 ENCOUNTER — OFFICE VISIT (OUTPATIENT)
Dept: PAIN MANAGEMENT | Age: 67
End: 2017-10-24

## 2017-10-24 VITALS
WEIGHT: 276 LBS | DIASTOLIC BLOOD PRESSURE: 80 MMHG | HEART RATE: 77 BPM | HEIGHT: 74 IN | TEMPERATURE: 98 F | SYSTOLIC BLOOD PRESSURE: 172 MMHG | BODY MASS INDEX: 35.42 KG/M2 | RESPIRATION RATE: 14 BRPM

## 2017-10-24 DIAGNOSIS — G89.29 CHRONIC LOW BACK PAIN, UNSPECIFIED BACK PAIN LATERALITY, WITH SCIATICA PRESENCE UNSPECIFIED: ICD-10-CM

## 2017-10-24 DIAGNOSIS — M54.42 CHRONIC BILATERAL LOW BACK PAIN WITH BILATERAL SCIATICA: Primary | ICD-10-CM

## 2017-10-24 DIAGNOSIS — M47.816 OSTEOARTHRITIS OF LUMBAR SPINE, UNSPECIFIED SPINAL OSTEOARTHRITIS COMPLICATION STATUS: ICD-10-CM

## 2017-10-24 DIAGNOSIS — M54.41 CHRONIC BILATERAL LOW BACK PAIN WITH BILATERAL SCIATICA: Primary | ICD-10-CM

## 2017-10-24 DIAGNOSIS — G89.29 CHRONIC BILATERAL LOW BACK PAIN WITH BILATERAL SCIATICA: Primary | ICD-10-CM

## 2017-10-24 DIAGNOSIS — G89.4 CHRONIC PAIN SYNDROME: ICD-10-CM

## 2017-10-24 DIAGNOSIS — M54.5 CHRONIC LOW BACK PAIN, UNSPECIFIED BACK PAIN LATERALITY, WITH SCIATICA PRESENCE UNSPECIFIED: ICD-10-CM

## 2017-10-24 DIAGNOSIS — M51.36 DDD (DEGENERATIVE DISC DISEASE), LUMBAR: ICD-10-CM

## 2017-10-24 DIAGNOSIS — Z98.890 H/O LUMBOSACRAL SPINE SURGERY: ICD-10-CM

## 2017-10-24 RX ORDER — DULOXETIN HYDROCHLORIDE 60 MG/1
60 CAPSULE, DELAYED RELEASE ORAL
Qty: 30 CAP | Refills: 1 | Status: SHIPPED | OUTPATIENT
Start: 2017-10-24 | End: 2017-12-19 | Stop reason: SDUPTHER

## 2017-10-24 RX ORDER — HYDROCODONE BITARTRATE AND ACETAMINOPHEN 5; 325 MG/1; MG/1
1 TABLET ORAL
Qty: 30 TAB | Refills: 0 | Status: SHIPPED | OUTPATIENT
Start: 2017-10-24 | End: 2017-12-19 | Stop reason: SDUPTHER

## 2017-10-24 RX ORDER — OXYCODONE HCL 10 MG/1
10 TABLET, FILM COATED, EXTENDED RELEASE ORAL EVERY 8 HOURS
Qty: 90 TAB | Refills: 0 | Status: SHIPPED | OUTPATIENT
Start: 2017-10-24 | End: 2017-11-23

## 2017-10-24 RX ORDER — OXYCODONE HCL 10 MG/1
10 TABLET, FILM COATED, EXTENDED RELEASE ORAL EVERY 12 HOURS
COMMUNITY
End: 2017-12-19 | Stop reason: SDUPTHER

## 2017-10-24 RX ORDER — PREGABALIN 150 MG/1
CAPSULE ORAL
Qty: 90 CAP | Refills: 3 | Status: SHIPPED | OUTPATIENT
Start: 2017-10-24 | End: 2017-12-19 | Stop reason: SDUPTHER

## 2017-10-24 RX ORDER — HYDROCODONE BITARTRATE AND ACETAMINOPHEN 5; 325 MG/1; MG/1
1 TABLET ORAL
Qty: 30 TAB | Refills: 0 | Status: SHIPPED | OUTPATIENT
Start: 2017-11-23 | End: 2017-12-19 | Stop reason: SDUPTHER

## 2017-10-24 RX ORDER — OXYCODONE HCL 10 MG/1
10 TABLET, FILM COATED, EXTENDED RELEASE ORAL EVERY 8 HOURS
Qty: 90 TAB | Refills: 0 | Status: SHIPPED | OUTPATIENT
Start: 2017-11-23 | End: 2017-12-19 | Stop reason: SDUPTHER

## 2017-10-24 NOTE — PROGRESS NOTES
Nursing Notes    Patient presents to the office today in follow-up. Patient rates his pain at 6/10 on the numerical pain scale. Reviewed medications with counts as follows:    Rx Date filled Qty Dispensed Pill Count Last Dose Short   norco 5-325mg 09/19/17 30 58 +4 Rx 2 month ago no   oxycontin 10mg 09/30/17 90 20 today no                                  Comments:     POC UDS was not performed in office today    Any new labs or imaging since last appointment? YES, labs    Have you been to an emergency room (ER) or urgent care clinic since your last visit? NO            Have you been hospitalized since your last visit? NO     If yes, where, when, and reason for visit? Have you seen or consulted any other health care providers outside of the [address] [place] [address]  since your last visit? YES     If yes, where, when, and reason for visit? HM deferred to pcp.

## 2017-10-24 NOTE — PROGRESS NOTES
HISTORY OF PRESENT ILLNESS  Anil Hutson is a 74 y.o. male. HPI Comments: Visit survey reviewed  Chief complaint chronic pain syndrome low back pain  Medication helps improve general activity, mood, walking, sleep, enjoyment of life  He will continue with the OxyContin 10 mg 3 times a day, LyricaDahliaalta  Continue hydrocodone 5 mg, once a day as needed    No new significant side effects reported  Medication continues to help improve quality of life. Medications reviewed including risk and benefits. Recent average level of pain6    Measurement of clinical outcome for chronic pain patient/ SPAASMS Score Card-            Information reviewed and will be scanned. Total score today-5      Review of Systems   Constitutional: Positive for malaise/fatigue. Negative for chills and fever. HENT: Positive for hearing loss. Respiratory: Negative for cough and shortness of breath. Cardiovascular: Positive for leg swelling. Negative for chest pain and palpitations. Gastrointestinal: Positive for constipation. Negative for diarrhea, heartburn, nausea and vomiting. Musculoskeletal: Positive for back pain and joint pain. Negative for falls. Skin: Negative for rash. Neurological: Negative for dizziness. Psychiatric/Behavioral: Positive for depression. Negative for suicidal ideas. The patient is nervous/anxious and has insomnia. Physical Exam   Constitutional: He appears well-developed and well-nourished. He is cooperative. He does not have a sickly appearance. HENT:   Head: Normocephalic and atraumatic. Right Ear: External ear normal. No drainage. Left Ear: External ear normal. No drainage. Nose: Nose normal.   Eyes: Lids are normal. Right eye exhibits no discharge. Left eye exhibits no discharge. Right conjunctiva has no hemorrhage. Left conjunctiva has no hemorrhage. Neck: Neck supple. No tracheal deviation present. No thyroid mass present.    Pulmonary/Chest: Effort normal. No respiratory distress. Neurological: He is alert. No cranial nerve deficit. Skin: Skin is intact. No rash noted. Psychiatric: His speech is normal. His affect is not angry. He does not express inappropriate judgment. Nursing note and vitals reviewed. ASSESSMENT and PLAN  Encounter Diagnoses   Name Primary?  Chronic bilateral low back pain with bilateral sciatica Yes    Chronic pain syndrome     Osteoarthritis of lumbar spine, unspecified spinal osteoarthritis complication status     H/O lumbosacral spine surgery     DDD (degenerative disc disease), lumbar     Chronic low back pain, unspecified back pain laterality, with sciatica presence unspecified    No indicators for recent medication misuse.  reviewed. Pain Meds and Quality Of Life have been reviewed. Nonpharmacologic therapy and non-opioid pharmacologic therapy were considered. If opioid therapy is prescribed, this is only if the expected benefits are anticipated to outweigh risks. Possible changes to treatment plan considered. Support/education given as needed. Today-medications are as listed. No significant changes to medications. Follow up -- 2 months.

## 2017-10-24 NOTE — MR AVS SNAPSHOT
Visit Information Date & Time Provider Department Dept. Phone Encounter #  
 10/24/2017  8:00 AM Rajni Guzman MD [address] [facility] [phone] [id number] Follow-up Instructions Return in about 2 months (around 12/24/2017). Follow-up and Disposition History Your Appointments 11/27/2017  9:30 AM  
ROUTINE CARE with Kimberly Pike MD  
[facility] ([address]) Appt Note: routine f/u 3mo [address] [place] [address] 200 [facility]  
  
    
 2/19/2018  9:00 AM  
Follow Up with Joel Nichols MD  
[facility] ([address]) Appt Note: 6 month f/up Theo Segal [phone]  
[phone] [address]  
  
    
 3/8/2018  9:00 AM  
Office Visit with Latrice Gomez MD  
[facility] [address] [place] [address]) Appt Note: check up [address] [place] [id number] [facility] [facility] [id number]  
[phone] [address] [place] [address] Upcoming Health Maintenance Date Due INFLUENZA AGE 9 TO ADULT 8/1/2017 HEMOGLOBIN A1C Q6M 12/16/2017 FOOT EXAM Q1 1/9/2018 MICROALBUMIN Q1 3/10/2018 MEDICARE YEARLY EXAM 4/25/2018 LIPID PANEL Q1 6/16/2018 EYE EXAM RETINAL OR DILATED Q1 7/11/2018 GLAUCOMA SCREENING Q2Y 7/11/2019 COLONOSCOPY 10/19/2023 DTaP/Tdap/Td series (2 - Td) 5/16/2026 Allergies as of 10/24/2017  Review Complete On: 10/24/2017 By: Rajni Guzman MD  
  
 Severity Noted Reaction Type Reactions Ciprofloxacin High 11/16/2013    Anaphylaxis Other Plant, Animal, Environmental High 03/10/2016    Other (comments) Patient cannot have MRI. Patient states that he has metal screws in his left arm. Lamisil [Terbinafine]  10/18/2013    Swelling Tongue swelling Metformin  03/16/2016    Other (comments) Elevated cr 1.4 Morphine  10/18/2013    Other (comments) \"loss of blood pressure\" Other Medication  10/18/2013    Other (comments) Doxasylin causes extreme GERD Pcn [Penicillins]  10/18/2013    Rash Pentothal [Thiopental Sodium]  10/18/2013    Shortness of Breath Sulfa (Sulfonamide Antibiotics)  10/18/2013    Rash Current Immunizations  Reviewed on 7/20/2016 Name Date Influenza Vaccine 11/3/2014 Influenza Vaccine (Quad) PF 1/25/2017, 9/16/2015 Pneumococcal Conjugate (PCV-13) 7/20/2016 Pneumococcal Polysaccharide (PPSV-23) 3/30/2017 Tdap 5/16/2016 Zoster Vaccine, Live 6/15/2017 Not reviewed this visit You Were Diagnosed With   
  
 Codes Comments Chronic bilateral low back pain with bilateral sciatica    -  Primary ICD-10-CM: M54.42, M54.41, G89.29 ICD-9-CM: 724.2, 724.3, 338.29 Chronic pain syndrome     ICD-10-CM: G89.4 ICD-9-CM: 338.4 Osteoarthritis of lumbar spine, unspecified spinal osteoarthritis complication status     R-85-OX: M47.816 ICD-9-CM: 721.3 H/O lumbosacral spine surgery     ICD-10-CM: Z98.890 ICD-9-CM: V15.29 DDD (degenerative disc disease), lumbar     ICD-10-CM: M51.36 
ICD-9-CM: 722.52 Chronic low back pain, unspecified back pain laterality, with sciatica presence unspecified     ICD-10-CM: M54.5, G89.29 ICD-9-CM: 724.2, 338.29 Vitals BP Pulse Temp Resp Height(growth percentile) Weight(growth percentile) 172/80 77 98 °F (36.7 °C) 14 6' 2\" (1.88 m) 276 lb (125.2 kg) BMI Smoking Status 35.44 kg/m2 Former Smoker BMI and BSA Data Body Mass Index Body Surface Area  
 35.44 kg/m 2 2.56 m 2 Preferred Pharmacy Pharmacy Name Phone [facility] [address] [facility] [phone] Your Updated Medication List  
  
   
 This list is accurate as of: 10/24/17  8:48 AM.  Always use your most recent med list.  
  
  
  
  
 ACCU-CHEK JAZZY PLUS TEST STRP strip Generic drug:  glucose blood VI test strips [address] Generic drug:  Lancets ACULAR 0.5 % ophthalmic solution Generic drug:  ketorolac Administer 1 Drop to both eyes two (2) times a day. albuterol 2.5 mg /3 mL (0.083 %) nebulizer solution Commonly known as:  PROVENTIL VENTOLIN  
3 mL by Nebulization route every four (4) hours as needed for Wheezing or Shortness of Breath. atorvastatin 20 mg tablet Commonly known as:  LIPITOR Take 1 Tab by mouth daily. betamethasone dipropionate 0.05 % ointment Commonly known as:  DIPROLENE  
  
 budesonide-formoterol 160-4.5 mcg/actuation Hfaa Commonly known as:  SYMBICORT Take 2 Puffs by inhalation two (2) times a day. butalbital-acetaminophen-caffeine -40 mg per tablet Commonly known as:  [place] Fresh Take 0.5 Tabs by mouth daily as needed for Pain. DULoxetine 60 mg capsule Commonly known as:  CYMBALTA Take 1 Cap by mouth nightly. esomeprazole 40 mg capsule Commonly known as:  Khan Cullens Take 40 mg by mouth two (2) times a day. fenofibrate nanocrystallized 145 mg tablet Commonly known as:  [employer] Take 1 Tab by mouth daily. fluticasone 50 mcg/actuation nasal spray Commonly known as:  FLONASE  
USE ONE SPRAY IN EACH NOSTRIL ONCE DAILY  
  
 * HYDROcodone-acetaminophen 5-325 mg per tablet Commonly known as:  Staci Parisian Take 1 Tab by mouth daily as needed for Pain. * HYDROcodone-acetaminophen 5-325 mg per tablet Commonly known as:  Staci Parisian Take 1 Tab by mouth daily as needed for Pain for up to 30 days. * HYDROcodone-acetaminophen 5-325 mg per tablet Commonly known as:  Staci Parisian Take 1 Tab by mouth daily as needed for Pain for up to 30 days. Start taking on:  11/23/2017 insulin glargine 100 unit/mL (3 mL) Inpn Commonly known as:  BASAGLAR KWIKPEN  
52 units at bedtime  
  
 insulin glulisine 100 unit/mL pen Commonly known as:  APIDRA SOLOSTAR  
2 units per carb consumed. Max 30 / day JANUVIA 100 mg tablet Generic drug:  SITagliptin Take 100 mg by mouth daily. levothyroxine 150 mcg tablet Commonly known as:  SYNTHROID Take 1 Tab by mouth Daily (before breakfast). lisinopril 5 mg tablet Commonly known as:  Nonah Alexa Take 1 Tab by mouth daily. metoprolol tartrate 25 mg tablet Commonly known as:  LOPRESSOR Take 1 Tab by mouth two (2) times a day. montelukast 10 mg tablet Commonly known as:  SINGULAIR  
TAKE ONE TABLET BY MOUTH ONCE DAILY  
  
 naloxone 4 mg/actuation nasal spray Commonly known as:  NARCAN  
4 mg by Nasal route as needed. * oxyCODONE ER 10 mg ER tablet Commonly known as:  OxyCONTIN Take 10 mg by mouth every twelve (12) hours. * oxyCODONE ER 10 mg ER tablet Commonly known as:  OxyCONTIN Take 1 Tab by mouth every eight (8) hours for 30 days. Max Daily Amount: 30 mg.  
  
 * oxyCODONE ER 10 mg ER tablet Commonly known as:  OxyCONTIN Take 1 Tab by mouth every eight (8) hours for 30 days. Max Daily Amount: 30 mg. Start taking on:  11/23/2017 * BD ULTRA-FINE FANI PEN NEEDLES  
by Does Not Apply route. 4mm x 32G * PEN NEEDLE 31 gauge x 5/16\" Ndle Generic drug:  Insulin Needles (Disposable) USE ONE PEN NEEDLE FOR LANTUS FLEXPEN AND 3 PEN NEEDLES FOR APIDRA WITH EACH MEAL * Insulin Needles (Disposable) 32 gauge x 5/32\" Ndle Commonly known as:  Fani Pen Needle Take at bedtime  
  
 pregabalin 150 mg capsule Commonly known as:  Seleta Baller TAKE 1 CAPSULE BY MOUTH THREE TIMES DAILY FOR 30 DAYS. MAXIMUM 3 CAPSULES DAILY. tamsulosin 0.4 mg capsule Commonly known as:  FLOMAX Take 1 Cap by mouth two (2) times a day. tiotropium 18 mcg inhalation capsule Commonly known as:  Mandy Estimable Take 1 Cap by inhalation daily. varicella-zoster vacine live 19,400 unit/0.65 mL Susr injection Generic drug:  varicella zoster vacine live * Notice: This list has 9 medication(s) that are the same as other medications prescribed for you. Read the directions carefully, and ask your doctor or other care provider to review them with you. Prescriptions Printed Refills  
 pregabalin (LYRICA) 150 mg capsule 3 Sig: TAKE 1 CAPSULE BY MOUTH THREE TIMES DAILY FOR 30 DAYS. MAXIMUM 3 CAPSULES DAILY. Class: Print  
 oxyCODONE ER (OXYCONTIN) 10 mg ER tablet 0 Sig: Take 1 Tab by mouth every eight (8) hours for 30 days. Max Daily Amount: 30 mg.  
 Class: Print Route: Oral  
 oxyCODONE ER (OXYCONTIN) 10 mg ER tablet 0 Starting on: 11/23/2017 Sig: Take 1 Tab by mouth every eight (8) hours for 30 days. Max Daily Amount: 30 mg.  
 Class: Print Route: Oral  
 HYDROcodone-acetaminophen (NORCO) 5-325 mg per tablet 0 Sig: Take 1 Tab by mouth daily as needed for Pain for up to 30 days. Class: Print Route: Oral  
 HYDROcodone-acetaminophen (NORCO) 5-325 mg per tablet 0 Starting on: 11/23/2017 Sig: Take 1 Tab by mouth daily as needed for Pain for up to 30 days. Class: Print Route: Oral  
  
Prescriptions Sent to Pharmacy Refills DULoxetine (CYMBALTA) 60 mg capsule 1 Sig: Take 1 Cap by mouth nightly. Class: Normal  
 Pharmacy: [address] Ph #: [phone] Route: Oral  
  
Follow-up Instructions Return in about 2 months (around 12/24/2017). Introducing [facility]! [facility] introduces Users[employer] patient portal. Now you can access parts of your medical record, email your doctor's office, and request medication refills online. 1. In your internet browser, go to [email or web address] You [email or web address]/When You Wish 2. Click on the First Time User? Click Here link in the Sign In box. You will see the New Member Sign Up page. 3. Enter your NetDocuments Access Code exactly as it appears below. You will not need to use this code after youve completed the sign-up process. If you do not sign up before the expiration date, you must request a new code. · Net[employer] Access Code: [email or web address] Expires: 11/23/2017 10:07 AM 
 
4. Enter the last four digits of your Social Security Number (xxxx) and Date of Birth (mm/dd/yyyy) as indicated and click Submit. You will be taken to the next sign-up page. 5. Create a NetDocuments ID. This will be your NetDocuments login ID and cannot be changed, so think of one that is secure and easy to remember. 6. Create a [employer] password. You can change your password at any time. 7. Enter your Password Reset Question and Answer. This can be used at a later time if you forget your password. 8. Enter your e-mail address. You will receive e-mail notification when new information is available in [address] 9. Click Sign Up. You can now view and download portions of your medical record. 10. Click the Download Summary menu link to download a portable copy of your medical information. If you have questions, please visit the Frequently Asked Questions section of the Net[employer] website. Remember, NetDocuments is NOT to be used for urgent needs. For medical emergencies, dial 911. Now available from your iPhone and Android! Please provide this summary of care documentation to your next provider. Your primary care clinician is listed as Levi Rodriguez. If you have any questions after today's visit, please call [phone].

## 2017-11-27 ENCOUNTER — OFFICE VISIT (OUTPATIENT)
Dept: FAMILY MEDICINE CLINIC | Age: 67
End: 2017-11-27

## 2017-11-27 VITALS
RESPIRATION RATE: 16 BRPM | DIASTOLIC BLOOD PRESSURE: 70 MMHG | OXYGEN SATURATION: 95 % | BODY MASS INDEX: 35.68 KG/M2 | HEART RATE: 100 BPM | SYSTOLIC BLOOD PRESSURE: 124 MMHG | WEIGHT: 278 LBS | HEIGHT: 74 IN | TEMPERATURE: 98.3 F

## 2017-11-27 DIAGNOSIS — I10 ESSENTIAL HYPERTENSION: ICD-10-CM

## 2017-11-27 DIAGNOSIS — J44.9 CHRONIC OBSTRUCTIVE PULMONARY DISEASE, UNSPECIFIED COPD TYPE (HCC): ICD-10-CM

## 2017-11-27 DIAGNOSIS — E78.5 HYPERLIPIDEMIA, UNSPECIFIED HYPERLIPIDEMIA TYPE: ICD-10-CM

## 2017-11-27 DIAGNOSIS — L02.92 BOIL: ICD-10-CM

## 2017-11-27 DIAGNOSIS — K21.9 GASTROESOPHAGEAL REFLUX DISEASE WITHOUT ESOPHAGITIS: ICD-10-CM

## 2017-11-27 DIAGNOSIS — M47.896 OTHER OSTEOARTHRITIS OF SPINE, LUMBAR REGION: ICD-10-CM

## 2017-11-27 DIAGNOSIS — E03.9 HYPOTHYROIDISM, UNSPECIFIED TYPE: ICD-10-CM

## 2017-11-27 DIAGNOSIS — R25.1 SHAKINESS: ICD-10-CM

## 2017-11-27 DIAGNOSIS — E11.8 TYPE 2 DIABETES MELLITUS WITH COMPLICATION, WITH LONG-TERM CURRENT USE OF INSULIN (HCC): Primary | ICD-10-CM

## 2017-11-27 DIAGNOSIS — G62.89 OTHER POLYNEUROPATHY: ICD-10-CM

## 2017-11-27 DIAGNOSIS — R00.0 TACHYCARDIA: ICD-10-CM

## 2017-11-27 DIAGNOSIS — Z79.4 TYPE 2 DIABETES MELLITUS WITH COMPLICATION, WITH LONG-TERM CURRENT USE OF INSULIN (HCC): Primary | ICD-10-CM

## 2017-11-27 RX ORDER — CLINDAMYCIN HYDROCHLORIDE 150 MG/1
150 CAPSULE ORAL 3 TIMES DAILY
Qty: 21 CAP | Refills: 0 | Status: SHIPPED | OUTPATIENT
Start: 2017-11-27 | End: 2017-12-04

## 2017-11-27 RX ORDER — METOPROLOL TARTRATE 25 MG/1
25 TABLET, FILM COATED ORAL 2 TIMES DAILY
Qty: 180 TAB | Refills: 1 | Status: SHIPPED | OUTPATIENT
Start: 2017-11-27 | End: 2018-08-24 | Stop reason: SDUPTHER

## 2017-11-27 NOTE — PROGRESS NOTES
Chief Complaint   Patient presents with    Hypertension    Diabetes    Medication Refill    Other     ingrown hair on face     Patient states he is here for HTN, DM and medication refill. Patient also states he has an ingrown hair on right side of face.

## 2017-11-27 NOTE — MR AVS SNAPSHOT
Visit Information Date & Time Provider Department Dept. Phone Encounter #  
 11/27/2017  9:30 AM Janae Canas, [address] [id number] Follow-up Instructions Return in about 3 months (around 2/27/2018) for if no improvement in boil f/u in 2 wks. .  
  
Your Appointments 12/19/2017  8:45 AM  
Follow Up with Nnamdi Baker MD  
[facility] [address] [place] [address]) Appt Note: Return in about 2 months (around 12/24/2017). [address] [facility] [address]  
[phone] [address]  
  
    
 2/19/2018  9:00 AM  
Follow Up with Anthony Miller MD  
[facility] ([address] [place] [address]) Appt Note: 6 month f/up [place] [address] [address] [address] [phone] [address]  
  
    
 3/8/2018  9:00 AM  
Office Visit with Brandan Lofton MD  
[facility] [address] [place] [address]) Appt Note: check up [address] [place] [address]  
[phone] [address] [address] Upcoming Health Maintenance Date Due HEMOGLOBIN A1C Q6M 12/16/2017 FOOT EXAM Q1 1/9/2018 MICROALBUMIN Q1 3/10/2018 MEDICARE YEARLY EXAM 4/25/2018 LIPID PANEL Q1 6/16/2018 EYE EXAM RETINAL OR DILATED Q1 7/11/2018 GLAUCOMA SCREENING Q2Y 7/11/2019 COLONOSCOPY 10/19/2023 DTaP/Tdap/Td series (2 - Td) 5/16/2026 Allergies as of 11/27/2017  Review Complete On: 11/27/2017 By: Janae Canas MD  
  
 Severity Noted Reaction Type Reactions Ciprofloxacin High 11/16/2013    Anaphylaxis Other Plant, Animal, Environmental High 03/10/2016    Other (comments) Patient cannot have MRI. Patient states that he has metal screws in his left arm. Lamisil [Terbinafine]  10/18/2013    Swelling Tongue swelling Metformin  03/16/2016    Other (comments) Elevated cr 1.4 Morphine  10/18/2013    Other (comments) \"loss of blood pressure\" Other Medication  10/18/2013    Other (comments) Doxasylin causes extreme GERD Pcn [Penicillins]  10/18/2013    Rash Pentothal [Thiopental Sodium]  10/18/2013    Shortness of Breath Sulfa (Sulfonamide Antibiotics)  10/18/2013    Rash Current Immunizations  Reviewed on 7/20/2016 Name Date Influenza Vaccine 11/3/2014 Influenza Vaccine (Quad) PF 1/25/2017, 9/16/2015 Pneumococcal Conjugate (PCV-13) 7/20/2016 Pneumococcal Polysaccharide (PPSV-23) 3/30/2017 Tdap 5/16/2016 Zoster Vaccine, Live 6/15/2017 Not reviewed this visit You Were Diagnosed With   
  
 Codes Comments Type 2 diabetes mellitus with complication, with long-term current use of insulin ([facility])    -  Primary ICD-10-CM: E11.8, Z79.4 ICD-9-CM: 250.90, V58.67 Shakiness     ICD-10-CM: R25.1 ICD-9-CM: 781.0 Tachycardia     ICD-10-CM: R00.0 ICD-9-CM: 094. 0 Chronic obstructive pulmonary disease, unspecified COPD type ([facility].)     ICD-10-CM: J44.9 ICD-9-CM: 122 Essential hypertension     ICD-10-CM: I10 
ICD-9-CM: 401.9 Other osteoarthritis of spine, lumbar region     ICD-10-CM: M47.896 ICD-9-CM: 721.3 Hypothyroidism, unspecified type     ICD-10-CM: E03.9 ICD-9-CM: 244.9 Gastroesophageal reflux disease without esophagitis     ICD-10-CM: K21.9 ICD-9-CM: 530.81 Hyperlipidemia, unspecified hyperlipidemia type     ICD-10-CM: E78.5 ICD-9-CM: 272.4 BMI 35.0-35.9,adult     ICD-10-CM: B41.45 ICD-9-CM: V85.35 Other polyneuropathy     ICD-10-CM: G62.89 ICD-9-CM: 357.89 Boil     ICD-10-CM: L02.92 
ICD-9-CM: 680.9 Vitals BP Pulse Temp Resp Height(growth percentile) Weight(growth percentile) 124/70 (BP 1 Location: Left arm, BP Patient Position: Sitting) 100 98.3 °F (36.8 °C) (Oral) 16 6' 2\" (1.88 m) 278 lb (126.1 kg) SpO2 BMI Smoking Status 95% 35.69 kg/m2 Former Smoker BMI and BSA Data Body Mass Index Body Surface Area  
 35.69 kg/m 2 2.57 m 2 Preferred Pharmacy Pharmacy Name Phone [facility] [address] [phone] Your Updated Medication List  
  
   
This list is accurate as of: 11/27/17 10:19 AM.  Always use your most recent med list.  
  
  
  
  
 ACCU-CHEK JAZZY PLUS TEST STRP strip Generic drug:  glucose blood VI test strips [address] Generic drug:  Lancets ACULAR 0.5 % ophthalmic solution Generic drug:  ketorolac Administer 1 Drop to both eyes two (2) times a day. albuterol 2.5 mg /3 mL (0.083 %) nebulizer solution Commonly known as:  PROVENTIL VENTOLIN  
3 mL by Nebulization route every four (4) hours as needed for Wheezing or Shortness of Breath. atorvastatin 20 mg tablet Commonly known as:  LIPITOR Take 1 Tab by mouth daily. betamethasone dipropionate 0.05 % ointment Commonly known as:  DIPROLENE  
  
 budesonide-formoterol 160-4.5 mcg/actuation Hfaa Commonly known as:  SYMBICORT Take 2 Puffs by inhalation two (2) times a day. butalbital-acetaminophen-caffeine -40 mg per tablet Commonly known as:  Neil Huey Take 0.5 Tabs by mouth daily as needed for Pain. clindamycin 150 mg capsule Commonly known as:  CLEOCIN Take 1 Cap by mouth three (3) times daily for 7 days. DULoxetine 60 mg capsule Commonly known as:  CYMBALTA Take 1 Cap by mouth nightly. esomeprazole 40 mg capsule Commonly known as:  Naoma Alexis Take 40 mg by mouth two (2) times a day. fenofibrate nanocrystallized 145 mg tablet Commonly known as:  [employer] Take 1 Tab by mouth daily. fluticasone 50 mcg/actuation nasal spray Commonly known as:  FLONASE  
USE ONE SPRAY IN EACH NOSTRIL ONCE DAILY * HYDROcodone-acetaminophen 5-325 mg per tablet Commonly known as:  Yane President Take 1 Tab by mouth daily as needed for Pain. * HYDROcodone-acetaminophen 5-325 mg per tablet Commonly known as:  Yane President Take 1 Tab by mouth daily as needed for Pain for up to 30 days. insulin glargine 100 unit/mL (3 mL) Inpn Commonly known as:  BASAGLAR KWIKPEN  
52 units at bedtime  
  
 insulin glulisine 100 unit/mL pen Commonly known as:  APIDRA SOLOSTAR  
2 units per carb consumed. Max 30 / day JANUVIA 100 mg tablet Generic drug:  SITagliptin Take 100 mg by mouth daily. levothyroxine 150 mcg tablet Commonly known as:  SYNTHROID Take 1 Tab by mouth Daily (before breakfast). lisinopril 5 mg tablet Commonly known as:  Tony Shutters Take 1 Tab by mouth daily. metoprolol tartrate 25 mg tablet Commonly known as:  LOPRESSOR Take 1 Tab by mouth two (2) times a day. montelukast 10 mg tablet Commonly known as:  SINGULAIR  
TAKE ONE TABLET BY MOUTH ONCE DAILY  
  
 naloxone 4 mg/actuation nasal spray Commonly known as:  NARCAN  
4 mg by Nasal route as needed. * oxyCODONE ER 10 mg ER tablet Commonly known as:  OxyCONTIN Take 10 mg by mouth every twelve (12) hours. * oxyCODONE ER 10 mg ER tablet Commonly known as:  OxyCONTIN Take 1 Tab by mouth every eight (8) hours for 30 days. Max Daily Amount: 30 mg.  
  
 * BD ULTRA-FINE MIREYA PEN NEEDLES  
by Does Not Apply route. 4mm x 32G * PEN NEEDLE 31 gauge x 5/16\" Ndle Generic drug:  Insulin Needles (Disposable) USE ONE PEN NEEDLE FOR LANTUS FLEXPEN AND 3 PEN NEEDLES FOR APIDRA WITH EACH MEAL * Insulin Needles (Disposable) 32 gauge x 5/32\" Ndle Commonly known as:  Mireya Pen Needle Take at bedtime  
  
 pregabalin 150 mg capsule Commonly known as:  Dakota Richmond TAKE 1 CAPSULE BY MOUTH THREE TIMES DAILY FOR 30 DAYS. MAXIMUM 3 CAPSULES DAILY. tamsulosin 0.4 mg capsule Commonly known as:  FLOMAX Take 1 Cap by mouth two (2) times a day. tiotropium 18 mcg inhalation capsule Commonly known as:  Ross Rodney Take 1 Cap by inhalation daily. varicella-zoster vacine live 19,400 unit/0.65 mL Susr injection Generic drug:  varicella zoster vaccine live * Notice: This list has 7 medication(s) that are the same as other medications prescribed for you. Read the directions carefully, and ask your doctor or other care provider to review them with you. Prescriptions Sent to Pharmacy Refills  
 metoprolol tartrate (LOPRESSOR) 25 mg tablet 1 Sig: Take 1 Tab by mouth two (2) times a day. Class: Normal  
 Pharmacy: [id number] [place] [address] Ph #: [phone] Route: Oral  
 clindamycin (CLEOCIN) 150 mg capsule 0 Sig: Take 1 Cap by mouth three (3) times daily for 7 days. Class: Normal  
 Pharmacy: [id number] [place] [address] Ph #: [phone] Route: Oral  
  
Follow-up Instructions Return in about 3 months (around 2/27/2018) for if no improvement in boil f/u in 2 wks. Alysa Quezada To-Do List   
 11/27/2017 Lab:  HEMOGLOBIN A1C WITH EAG   
  
 11/27/2017 Lab:  METABOLIC PANEL, COMPREHENSIVE Patient Instructions Chronic Obstructive Pulmonary Disease (COPD): Care Instructions Your Care Instructions Chronic obstructive pulmonary disease (COPD) is a general term for a group of lung diseases, including emphysema and chronic bronchitis. People with COPD have decreased airflow in and out of the lungs, which makes it hard to breathe. The airways also can get clogged with thick mucus. Cigarette smoking is a major cause of COPD. Although there is no cure for COPD, you can slow its progress. Following your treatment plan and taking care of yourself can help you feel better and live longer. Follow-up care is a key part of your treatment and safety.  Be sure to make and go to all appointments, and call your doctor if you are having problems. It's also a good idea to know your test results and keep a list of the medicines you take. How can you care for yourself at home? ?Staying healthy ? · Do not smoke. This is the most important step you can take to prevent more damage to your lungs. If you need help quitting, talk to your doctor about stop-smoking programs and medicines. These can increase your chances of quitting for good. ? · Avoid colds and flu. Get a pneumococcal vaccine shot. If you have had one before, ask your doctor whether you need a second dose. Get the flu vaccine every fall. If you must be around people with colds or the flu, wash your hands often. ? · Avoid secondhand smoke, air pollution, and high altitudes. Also avoid cold, dry air and hot, humid air. Stay at home with your windows closed when air pollution is bad. ?Medicines and oxygen therapy ? · Take your medicines exactly as prescribed. Call your doctor if you think you are having a problem with your medicine. ? · You may be taking medicines such as: ¨ Bronchodilators. These help open your airways and make breathing easier. Bronchodilators are either short-acting (work for 6 to 9 hours) or long-acting (work for 24 hours). You inhale most bronchodilators, so they start to act quickly. Always carry your quick-relief inhaler with you in case you need it while you are away from home. ¨ Corticosteroids (prednisone, budesonide). These reduce airway inflammation. They come in pill or inhaled form. You must take these medicines every day for them to work well. ? · A spacer may help you get more inhaled medicine to your lungs. Ask your doctor or pharmacist if a spacer is right for you. If it is, ask how to use it properly.   
? · Do not take any vitamins, over-the-counter medicine, or herbal products without talking to your doctor first.  
 ? · If your doctor prescribed antibiotics, take them as directed. Do not stop taking them just because you feel better. You need to take the full course of antibiotics. ? · Oxygen therapy boosts the amount of oxygen in your blood and helps you breathe easier. Use the flow rate your doctor has recommended, and do not change it without talking to your doctor first.  
Activity ? · Get regular exercise. Walking is an easy way to get exercise. Start out slowly, and walk a little more each day. ? · Pay attention to your breathing. You are exercising too hard if you cannot talk while you are exercising. ? · Take short rest breaks when doing household chores and other activities. ? · Learn breathing methods-such as breathing through pursed lips-to help you become less short of breath. ? · If your doctor has not set you up with a pulmonary rehabilitation program, talk to him or her about whether rehab is right for you. Rehab includes exercise programs, education about your disease and how to manage it, help with diet and other changes, and emotional support. Diet ? · Eat regular, healthy meals. Use bronchodilators about 1 hour before you eat to make it easier to eat. Eat several small meals instead of three large ones. Drink beverages at the end of the meal. Avoid foods that are hard to chew. ? · Eat foods that contain protein so that you do not lose muscle mass. ? · Talk with your doctor if you gain too much weight or if you lose weight without trying. ?Mental health ? · Talk to your family, friends, or a therapist about your feelings. It is normal to feel frightened, angry, hopeless, helpless, and even guilty. Talking openly about bad feelings can help you cope. If these feelings last, talk to your doctor. When should you call for help? Call 911 anytime you think you may need emergency care. For example, call if: 
? · You have severe trouble breathing. ?Call your doctor now or seek immediate medical care if: 
? · You have new or worse trouble breathing. ? · You cough up blood. ? · You have a fever. ? Watch closely for changes in your health, and be sure to contact your doctor if: 
? · You cough more deeply or more often, especially if you notice more mucus or a change in the color of your mucus. ? · You have new or worse swelling in your legs or belly. ? · You are not getting better as expected. Where can you learn more? Go to http://slava-enrique.info/. Lucien Linda in the search box to learn more about \"Chronic Obstructive Pulmonary Disease (COPD): Care Instructions. \" Current as of: May 12, 2017 Content Version: 11.4 © 3925-4329 [employer]. Care instructions adapted under license by [employer] (which disclaims liability or warranty for this information). If you have questions about a medical condition or this instruction, always ask your healthcare professional. [place] [address] any warranty or liability for your use of this information. Low Sodium Diet (2,000 Milligram): Care Instructions Your Care Instructions Too much sodium causes your body to hold on to extra water. This can raise your blood pressure and force your heart and kidneys to work harder. In very serious cases, this could cause you to be put in the hospital. It might even be life-threatening. By limiting sodium, you will feel better and lower your risk of serious problems. The most common source of sodium is salt. People get most of the salt in their diet from canned, prepared, and packaged foods. Fast food and restaurant meals also are very high in sodium. Your doctor will probably limit your sodium to less than 2,000 milligrams (mg) a day. This limit counts all the sodium in prepared and packaged foods and any salt you add to your food. Follow-up care is a key part of your treatment and safety. Be sure to make and go to all appointments, and call your doctor if you are having problems. It's also a good idea to know your test results and keep a list of the medicines you take. How can you care for yourself at home? Read food labels · Read labels on cans and food packages. The labels tell you how much sodium is in each serving. Make sure that you look at the serving size. If you eat more than the serving size, you have eaten more sodium. · Food labels also tell you the Percent Daily Value for sodium. Choose products with low Percent Daily Values for sodium. · Be aware that sodium can come in forms other than salt, including monosodium glutamate (MSG), sodium citrate, and sodium bicarbonate (baking soda). MSG is often added to Asian food. When you eat out, you can sometimes ask for food without MSG or added salt. Buy low-sodium foods · Buy foods that are labeled \"unsalted\" (no salt added), \"sodium-free\" (less than 5 mg of sodium per serving), or \"low-sodium\" (less than 140 mg of sodium per serving). Foods labeled \"reduced-sodium\" and \"light sodium\" may still have too much sodium. Be sure to read the label to see how much sodium you are getting. · Buy fresh vegetables, or frozen vegetables without added sauces. Buy low-sodium versions of canned vegetables, soups, and other canned goods. Prepare low-sodium meals · Cut back on the amount of salt you use in cooking. This will help you adjust to the taste. Do not add salt after cooking. One teaspoon of salt has about 2,300 mg of sodium. · Take the salt shaker off the table. · Flavor your food with garlic, lemon juice, onion, vinegar, herbs, and spices. Do not use soy sauce, lite soy sauce, steak sauce, onion salt, garlic salt, celery salt, mustard, or ketchup on your food. · Use low-sodium salad dressings, sauces, and ketchup. Or make your own salad dressings and sauces without adding salt. · Use less salt (or none) when recipes call for it. You can often use half the salt a recipe calls for without losing flavor. Other foods such as rice, pasta, and grains do not need added salt. · Rinse canned vegetables, and cook them in fresh water. This removes some-but not all-of the salt. · Avoid water that is naturally high in sodium or that has been treated with water softeners, which add sodium. Call your local water company to find out the sodium content of your water supply. If you buy bottled water, read the label and choose a sodium-free brand. Avoid high-sodium foods · Avoid eating: ¨ Smoked, cured, salted, and canned meat, fish, and poultry. ¨ Ham, kohler, hot dogs, and luncheon meats. ¨ Regular, hard, and processed cheese and regular peanut butter. ¨ Crackers with salted tops, and other salted snack foods such as pretzels, chips, and salted popcorn. ¨ Frozen prepared meals, unless labeled low-sodium. ¨ Canned and dried soups, broths, and bouillon, unless labeled sodium-free or low-sodium. ¨ Canned vegetables, unless labeled sodium-free or low-sodium. ¨ Western Lesley fries, pizza, tacos, and other fast foods. ¨ Pickles, olives, ketchup, and other condiments, especially soy sauce, unless labeled sodium-free or low-sodium. Where can you learn more? Go to http://slava-enrique.info/. Enter F339 in the search box to learn more about \"Low Sodium Diet (2,000 Milligram): Care Instructions. \" Current as of: May 12, 2017 Content Version: 11.4 © 8962-0275 [employer]. Care instructions adapted under license by [employer] (which disclaims liability or warranty for this information). If you have questions about a medical condition or this instruction, always ask your healthcare professional. [address]  any warranty or liability for your use of this information. Learning About Diabetes Food Guidelines Your Care Instructions Meal planning is important to manage diabetes. It helps keep your blood sugar at a target level (which you set with your doctor). You don't have to eat special foods. You can eat what your family eats, including sweets once in a while. But you do have to pay attention to how often you eat and how much you eat of certain foods. You may want to work with a dietitian or a certified diabetes educator (CDE) to help you plan meals and snacks. A dietitian or CDE can also help you lose weight if that is one of your goals. What should you know about eating carbs? Managing the amount of carbohydrate (carbs) you eat is an important part of healthy meals when you have diabetes. Carbohydrate is found in many foods. · Learn which foods have carbs. And learn the amounts of carbs in different foods. ¨ Bread, cereal, pasta, and rice have about 15 grams of carbs in a serving. A serving is 1 slice of bread (1 ounce), ½ cup of cooked cereal, or 1/3 cup of cooked pasta or rice. ¨ Fruits have 15 grams of carbs in a serving. A serving is 1 small fresh fruit, such as an apple or orange; ½ of a banana; ½ cup of cooked or canned fruit; ½ cup of fruit juice; 1 cup of melon or raspberries; or 2 tablespoons of dried fruit. ¨ Milk and no-sugar-added yogurt have 15 grams of carbs in a serving. A serving is 1 cup of milk or 2/3 cup of no-sugar-added yogurt. ¨ Starchy vegetables have 15 grams of carbs in a serving. A serving is ½ cup of mashed potatoes or sweet potato; 1 cup winter squash; ½ of a small baked potato; ½ cup of cooked beans; or ½ cup cooked corn or green peas. · Learn how much carbs to eat each day and at each meal. A dietitian or CDE can teach you how to keep track of the amount of carbs you eat. This is called carbohydrate counting. · If you are not sure how to count carbohydrate grams, use the Plate Method to plan meals.  It is a good, quick way to make sure that you have a balanced meal. It also helps you spread carbs throughout the day. ¨ Divide your plate by types of foods. Put non-starchy vegetables on half the plate, meat or other protein food on one-quarter of the plate, and a grain or starchy vegetable in the final quarter of the plate. To this you can add a small piece of fruit and 1 cup of milk or yogurt, depending on how many carbs you are supposed to eat at a meal. 
· Try to eat about the same amount of carbs at each meal. Do not \"save up\" your daily allowance of carbs to eat at one meal. 
· Proteins have very little or no carbs per serving. Examples of proteins are beef, chicken, turkey, fish, eggs, tofu, cheese, cottage cheese, and peanut butter. A serving size of meat is 3 ounces, which is about the size of a deck of cards. Examples of meat substitute serving sizes (equal to 1 ounce of meat) are 1/4 cup of cottage cheese, 1 egg, 1 tablespoon of peanut butter, and ½ cup of tofu. How can you eat out and still eat healthy? · Learn to estimate the serving sizes of foods that have carbohydrate. If you measure food at home, it will be easier to estimate the amount in a serving of restaurant food. · If the meal you order has too much carbohydrate (such as potatoes, corn, or baked beans), ask to have a low-carbohydrate food instead. Ask for a salad or green vegetables. · If you use insulin, check your blood sugar before and after eating out to help you plan how much to eat in the future. · If you eat more carbohydrate at a meal than you had planned, take a walk or do other exercise. This will help lower your blood sugar. What else should you know? · Limit saturated fat, such as the fat from meat and dairy products. This is a healthy choice because people who have diabetes are at higher risk of heart disease. So choose lean cuts of meat and nonfat or low-fat dairy products. Use olive or canola oil instead of butter or shortening when cooking. · Don't skip meals. Your blood sugar may drop too low if you skip meals and take insulin or certain medicines for diabetes. · Check with your doctor before you drink alcohol. Alcohol can cause your blood sugar to drop too low. Alcohol can also cause a bad reaction if you take certain diabetes medicines. Follow-up care is a key part of your treatment and safety. Be sure to make and go to all appointments, and call your doctor if you are having problems. It's also a good idea to know your test results and keep a list of the medicines you take. Where can you learn more? Go to http://slava-enrique.info/. Enter J784 in the search box to learn more about \"Learning About Diabetes Food Guidelines. \" Current as of: March 13, 2017 Content Version: 11.4 © 6757-2487 [employer]. Care instructions adapted under license by [employer] (which disclaims liability or warranty for this information). If you have questions about a medical condition or this instruction, always ask your healthcare professional. [place] [address] any warranty or liability for your use of this information. Neuropathic Pain: Care Instructions Your Care Instructions Neuropathic pain is caused by pressure on or damage to your nerves. It's often simply called nerve pain. Some people feel this type of pain all the time. For others, it comes and goes. Diabetes, shingles, or an injury can cause nerve pain. Many people say the pain feels sharp, burning, or stabbing. But some people feel it as a dull ache. In some cases, it makes your skin very sensitive. So touch, pressure, and other sensations that did not hurt before may now cause pain. It's important to know that this kind of pain is real and can affect your quality of life. It's also important to know that treatment can help. Treatment includes pain medicines, exercise, and physical therapy. Medicines can help reduce the number of pain signals that travel over the nerves. This can make the painful areas less sensitive. It can also help you sleep better and improve your mood. But medicines are only one part of successful treatment. Most people do best with more than one kind of treatment. Your doctor may recommend that you try cognitive-behavioral therapy and stress management. Or, if needed, you may decide to try to quit smoking, lower your blood pressure, or better control blood sugar. These kinds of healthy changes can also make a difference. If you feel that your treatment is not working, talk to your doctor. And be sure to tell your doctor if you think you might be depressed or anxious. These are common problems that can also be treated. Follow-up care is a key part of your treatment and safety. Be sure to make and go to all appointments, and call your doctor if you are having problems. It's also a good idea to know your test results and keep a list of the medicines you take. How can you care for yourself at home? · Be safe with medicines. Read and follow all instructions on the label. ¨ If the doctor gave you a prescription medicine for pain, take it as prescribed. ¨ If you are not taking a prescription pain medicine, ask your doctor if you can take an over-the-counter medicine. · Save hard tasks for days when you have less pain. Follow a hard task with an easy task. And remember to take breaks. · Relax, and reduce stress. You may want to try deep breathing or meditation. These can help. · Keep moving. Gentle, daily exercise can help reduce pain. Your doctor or physical therapist can tell you what type of exercise is best for you. This may include walking, swimming, and stationary biking. It may also include stretches and range-of-motion exercises. · Try heat, cold packs, and massage. · Get enough sleep. Constant pain can make you more tired.  If the pain makes it hard to sleep, talk with your doctor. · Think positively. Your thoughts can affect your pain. Do fun things to distract yourself from the pain. See a movie, read a book, listen to music, or spend time with a friend. · Keep a pain diary. Try to write down how strong your pain is and what it feels like. Also try to notice and write down how your moods, thoughts, sleep, activities, and medicine affect your pain. These notes can help you and your doctor find the best ways to treat your pain. Reducing constipation caused by pain medicine Pain medicines often cause constipation. To reduce constipation: 
· Include fruits, vegetables, beans, and whole grains in your diet each day. These foods are high in fiber. · Drink plenty of fluids, enough so that your urine is light yellow or clear like water. If you have kidney, heart, or liver disease and have to limit fluids, talk with your doctor before you increase the amount of fluids you drink. · Get some exercise every day. Build up slowly to 30 to 60 minutes a day on 5 or more days of the week. · Take a fiber supplement, such as Citrucel or Metamucil, every day if needed. Read and follow all instructions on the label. · Schedule time each day for a bowel movement. Having a daily routine may help. Take your time and do not strain when having a bowel movement. · Ask your doctor about a laxative. The goal is to have one easy bowel movement every 1 to 2 days. Do not let constipation go untreated for more than 3 days. When should you call for help? Call your doctor now or seek immediate medical care if: 
? · You feel sad, anxious, or hopeless for more than a few days. This could mean you are depressed. Depression is common in people who have a lot of pain. But it can be treated. ? · You have trouble with bowel movements, such as: 
¨ No bowel movement in 3 days. ¨ Blood in the anal area, in your stool, or on the toilet paper. ¨ Diarrhea for more than 24 hours. ? Watch closely for changes in your health, and be sure to contact your doctor if: 
? · Your pain is getting worse. ? · You can't sleep because of pain. ? · You are very worried or anxious about your pain. ? · You have trouble taking your pain medicine. ? · You have any concerns about your pain medicine or its side effects. ? · You have vomiting or cramps for more than 2 hours. Where can you learn more? Go to http://slava-enrique.info/. Enter A580 in the search box to learn more about \"Neuropathic Pain: Care Instructions. \" Current as of: October 14, 2016 Content Version: 11.4 © 8503-9783 [employer]. Care instructions adapted under license by [employer] (which disclaims liability or warranty for this information). If you have questions about a medical condition or this instruction, always ask your healthcare professional. [address] any warranty or liability for your use of this information. Introducing [facility]! Raven Howard introduces PrintToPeer patient portal. Now you can access parts of your medical record, email your doctor's office, and request medication refills online. 1. In your internet browser, go to [email or web address]://[employer]. [employer] 2. Click on the First Time User? Click Here link in the Sign In box. You will see the New Member Sign Up page. 3. Enter your PrintToPeer Access Code exactly as it appears below. You will not need to use this code after youve completed the sign-up process. If you do not sign up before the expiration date, you must request a new code. · PrintToPeer Access Code: [email or web address] Expires: 2/25/2018 10:19 AM 
 
4. Enter the last four digits of your Social Security Number (xxxx) and Date of Birth (mm/dd/yyyy) as indicated and click Submit. You will be taken to the next sign-up page. 5. Create a AVIS ID. This will be your AVIS login ID and cannot be changed, so think of one that is secure and easy to remember. 6. Create a AVIS password. You can change your password at any time. 7. Enter your Password Reset Question and Answer. This can be used at a later time if you forget your password. 8. Enter your e-mail address. You will receive e-mail notification when new information is available in [address] 9. Click Sign Up. You can now view and download portions of your medical record. 10. Click the Download Summary menu link to download a portable copy of your medical information. If you have questions, please visit the Frequently Asked Questions section of the AVIS website. Remember, AVIS is NOT to be used for urgent needs. For medical emergencies, dial 911. Now available from your iPhone and Android! Please provide this summary of care documentation to your next provider. Your primary care clinician is listed as Mike Jeffers. If you have any questions after today's visit, please call [phone].

## 2017-11-27 NOTE — PROGRESS NOTES
HISTORY OF PRESENT ILLNESS  Karina Turner is a 69 y.o. male. HPI : Here for routine follow up. H/o diabetes. Last HBA1C was around 7.2./ said fasting blood sugar is around 120-130. Doing diet modification. Exercise has limitation due to chronic lower back pain and neuropathy. H/o copd. Currently since few days dry cough. No cold symptoms. No wheezing. No sob or chest pain. No fever. No nausea or vomiting. No abdominal pain. Does not smoke. Not increase use of albuterol. Had flu shot this season. H.o hypertension. Vitals stable today. Asymptomatic. Visit Vitals    /70 (BP 1 Location: Left arm, BP Patient Position: Sitting)    Pulse 100    Temp 98.3 °F (36.8 °C) (Oral)    Resp 16    Ht 6' 2\" (1.88 m)    Wt 278 lb (126.1 kg)    SpO2 95%    BMI 35.69 kg/m2     Review medication list, vitals, problem list,allergies. Review labs. Lab Results   Component Value Date/Time    Hemoglobin A1c 8.7 01/19/2017 09:21 AM    Hemoglobin A1c (POC) 6.9 12/16/2015 10:29 AM    Hemoglobin A1c, External 7.2 06/16/2017     Lab Results   Component Value Date/Time    Sodium 139 01/19/2017 09:21 AM    Potassium 4.2 01/19/2017 09:21 AM    Chloride 100 01/19/2017 09:21 AM    CO2 30 01/19/2017 09:21 AM    Anion gap 9 01/19/2017 09:21 AM    Glucose 198 01/19/2017 09:21 AM    BUN 20 01/19/2017 09:21 AM    Creatinine 1.08 01/19/2017 09:21 AM    BUN/Creatinine ratio 19 01/19/2017 09:21 AM    GFR est AA >60 01/19/2017 09:21 AM    GFR est non-AA >60 01/19/2017 09:21 AM    Calcium 8.9 01/19/2017 09:21 AM    Bilirubin, total 0.3 01/19/2017 09:21 AM    AST (SGOT) 14 01/19/2017 09:21 AM    Alk.  phosphatase 51 01/19/2017 09:21 AM    Protein, total 6.6 01/19/2017 09:21 AM    Albumin 3.7 01/19/2017 09:21 AM    Globulin 2.9 01/19/2017 09:21 AM    A-G Ratio 1.3 01/19/2017 09:21 AM    ALT (SGPT) 30 01/19/2017 09:21 AM     Lab Results   Component Value Date/Time    Cholesterol, total 158 01/19/2017 09:21 AM    HDL Cholesterol 33 01/19/2017 09:21 AM    LDL, calculated 77 01/19/2017 09:21 AM    VLDL, calculated 48 01/19/2017 09:21 AM    Triglyceride 240 01/19/2017 09:21 AM    CHOL/HDL Ratio 4.8 01/19/2017 09:21 AM     Lab Results   Component Value Date/Time    TSH 1.19 07/19/2017 08:24 AM     Lab Results   Component Value Date/Time    Microalbumin/Creat ratio (mg/g creat)  07/19/2016 07:19 AM     Cannot calculate ratio due to micro albumin result outside reportable range. Microalbumin,urine random <0.50 07/19/2016 07:19 AM     Has chronic back pain. Following pain management. Also on lyrica for peripheral neuropathy from back and neck pain and also partly from diabetes. Said pain gradually worsening. No weakness of ext. H/o hypothyroidism. Asymptomatic. No weight change.s no mood changes. No heat or cold intolerance. H/o GERd. On PPI. Seen GI. Had EGD showed gastritis. For now avoiding spicy diet and it is fairly stable. Today concern with boil over face around rt jaw area. Painful. No discharge. No open skin. Going on since few days. Not taken any medication for current problem. Has h/o sinus tachycardia. Asymptomatic. On metoprolol and it is helping. Denies any palpitation or unusual chest pain or diaphoresis. ROS : see HPI   Physical Exam   Constitutional: He is oriented to person, place, and time. No distress. Neck: No thyromegaly present. Cardiovascular: Normal rate, regular rhythm and normal heart sounds. Pulmonary/Chest:   CTA   Abdominal: Soft. Bowel sounds are normal. There is no tenderness. Musculoskeletal: He exhibits no edema. Lymphadenopathy:     He has no cervical adenopathy. Neurological: He is oriented to person, place, and time. Skin:   0.5 cm boil over rt jaw area. No discharge. Mild tender on touch. Redness over surrounding area. No swelling. Psychiatric: His behavior is normal.       ASSESSMENT and PLAN    ICD-10-CM ICD-9-CM    1.  Type 2 diabetes mellitus with complication, with long-term current use of insulin [facility]): will recheck labs. Following endo. Fasting sugar is satisfactory/ continue diet modification. Limitation in exercise. E11.8 250.90 HEMOGLOBIN A1C WITH EAG    B09.2 V04.40 METABOLIC PANEL, COMPREHENSIVE   2. Shakiness/ hand : stable on metoprolol. R25.1 781.0 metoprolol tartrate (LOPRESSOR) 25 mg tablet   3. Tachycardia: fairly stable on metoprolol. R00.0 785.0 metoprolol tartrate (LOPRESSOR) 25 mg tablet   4. Chronic obstructive pulmonary disease, unspecified COPD type ([facility] [address]): stable. No wheezing. Mild dry  Cough. Advised to take albuterol as needed. J44.9 496    5. Essential hypertension: stable at this time. Low salt diet. Exercise as tolerated. Will continue current plan. I10 401.9    6. Other osteoarthritis of spine, lumbar region: following pain management. Also neuropathy. On symptomatic treatment which is managed by pain management as well. M47.896 721.3    7. Hypothyroidism, unspecified type: on medication. E03.9 244.9    8. Gastroesophageal reflux disease without esophagitis: stable on symptomatic treatment. K21.9 530.81    9. Hyperlipidemia, unspecified hyperlipidemia type: on  Statin. Will observe. LDL at goal.  E78.5 272.4    10. BMI 35.0-35.9,adult: exercise limitation. On diet modification. Z68.35 V85.35    11. Other polyneuropathy/ due to chronic neck and back pain/ diabetes : on symptomatic treatment. Managed by pain management. G62.89 357.89    12. Boil: for now given clindamycin as allergic to penicillin. Discussed to take it with probiotic. F/u next visit. L02.92 680.9 clindamycin (CLEOCIN) 150 mg capsule   Pt understood and agree with the plan   Review HM   Follow-up Disposition:  Return in about 3 months (around 2/27/2018) for if no improvement in boil f/u in 2 wks. Matt Henry

## 2017-11-27 NOTE — PATIENT INSTRUCTIONS
Chronic Obstructive Pulmonary Disease (COPD): Care Instructions  Your Care Instructions    Chronic obstructive pulmonary disease (COPD) is a general term for a group of lung diseases, including emphysema and chronic bronchitis. People with COPD have decreased airflow in and out of the lungs, which makes it hard to breathe. The airways also can get clogged with thick mucus. Cigarette smoking is a major cause of COPD. Although there is no cure for COPD, you can slow its progress. Following your treatment plan and taking care of yourself can help you feel better and live longer. Follow-up care is a key part of your treatment and safety. Be sure to make and go to all appointments, and call your doctor if you are having problems. It's also a good idea to know your test results and keep a list of the medicines you take. How can you care for yourself at home? ?Staying healthy  ? · Do not smoke. This is the most important step you can take to prevent more damage to your lungs. If you need help quitting, talk to your doctor about stop-smoking programs and medicines. These can increase your chances of quitting for good. ? · Avoid colds and flu. Get a pneumococcal vaccine shot. If you have had one before, ask your doctor whether you need a second dose. Get the flu vaccine every fall. If you must be around people with colds or the flu, wash your hands often. ? · Avoid secondhand smoke, air pollution, and high altitudes. Also avoid cold, dry air and hot, humid air. Stay at home with your windows closed when air pollution is bad. ?Medicines and oxygen therapy  ? · Take your medicines exactly as prescribed. Call your doctor if you think you are having a problem with your medicine. ? · You may be taking medicines such as:  ¨ Bronchodilators. These help open your airways and make breathing easier. Bronchodilators are either short-acting (work for 6 to 9 hours) or long-acting (work for 24 hours).  You inhale most bronchodilators, so they start to act quickly. Always carry your quick-relief inhaler with you in case you need it while you are away from home. ¨ Corticosteroids (prednisone, budesonide). These reduce airway inflammation. They come in pill or inhaled form. You must take these medicines every day for them to work well. ? · A spacer may help you get more inhaled medicine to your lungs. Ask your doctor or pharmacist if a spacer is right for you. If it is, ask how to use it properly. ? · Do not take any vitamins, over-the-counter medicine, or herbal products without talking to your doctor first.   ? · If your doctor prescribed antibiotics, take them as directed. Do not stop taking them just because you feel better. You need to take the full course of antibiotics. ? · Oxygen therapy boosts the amount of oxygen in your blood and helps you breathe easier. Use the flow rate your doctor has recommended, and do not change it without talking to your doctor first.   Activity  ? · Get regular exercise. Walking is an easy way to get exercise. Start out slowly, and walk a little more each day. ? · Pay attention to your breathing. You are exercising too hard if you cannot talk while you are exercising. ? · Take short rest breaks when doing household chores and other activities. ? · Learn breathing methods-such as breathing through pursed lips-to help you become less short of breath. ? · If your doctor has not set you up with a pulmonary rehabilitation program, talk to him or her about whether rehab is right for you. Rehab includes exercise programs, education about your disease and how to manage it, help with diet and other changes, and emotional support. Diet  ? · Eat regular, healthy meals. Use bronchodilators about 1 hour before you eat to make it easier to eat. Eat several small meals instead of three large ones. Drink beverages at the end of the meal. Avoid foods that are hard to chew.    ? · Eat foods that contain protein so that you do not lose muscle mass. ? · Talk with your doctor if you gain too much weight or if you lose weight without trying. ?Mental health  ? · Talk to your family, friends, or a therapist about your feelings. It is normal to feel frightened, angry, hopeless, helpless, and even guilty. Talking openly about bad feelings can help you cope. If these feelings last, talk to your doctor. When should you call for help? Call 911 anytime you think you may need emergency care. For example, call if:  ? · You have severe trouble breathing. ?Call your doctor now or seek immediate medical care if:  ? · You have new or worse trouble breathing. ? · You cough up blood. ? · You have a fever. ? Watch closely for changes in your health, and be sure to contact your doctor if:  ? · You cough more deeply or more often, especially if you notice more mucus or a change in the color of your mucus. ? · You have new or worse swelling in your legs or belly. ? · You are not getting better as expected. Where can you learn more? Go to http://slava-enrique.info/. Dwaine Shanks in the search box to learn more about \"Chronic Obstructive Pulmonary Disease (COPD): Care Instructions. \"  Current as of: May 12, 2017  Content Version: 11.4  © 4470-9676 [employer]. Care instructions adapted under license by [employer] (which disclaims liability or warranty for this information). If you have questions about a medical condition or this instruction, always ask your healthcare professional. [place] [address] any warranty or liability for your use of this information. Low Sodium Diet (2,000 Milligram): Care Instructions  Your Care Instructions    Too much sodium causes your body to hold on to extra water. This can raise your blood pressure and force your heart and kidneys to work harder.  In very serious cases, this could cause you to be put in the hospital. It might even be life-threatening. By limiting sodium, you will feel better and lower your risk of serious problems. The most common source of sodium is salt. People get most of the salt in their diet from canned, prepared, and packaged foods. Fast food and restaurant meals also are very high in sodium. Your doctor will probably limit your sodium to less than 2,000 milligrams (mg) a day. This limit counts all the sodium in prepared and packaged foods and any salt you add to your food. Follow-up care is a key part of your treatment and safety. Be sure to make and go to all appointments, and call your doctor if you are having problems. It's also a good idea to know your test results and keep a list of the medicines you take. How can you care for yourself at home? Read food labels  · Read labels on cans and food packages. The labels tell you how much sodium is in each serving. Make sure that you look at the serving size. If you eat more than the serving size, you have eaten more sodium. · Food labels also tell you the Percent Daily Value for sodium. Choose products with low Percent Daily Values for sodium. · Be aware that sodium can come in forms other than salt, including monosodium glutamate (MSG), sodium citrate, and sodium bicarbonate (baking soda). MSG is often added to Asian food. When you eat out, you can sometimes ask for food without MSG or added salt. Buy low-sodium foods  · Buy foods that are labeled \"unsalted\" (no salt added), \"sodium-free\" (less than 5 mg of sodium per serving), or \"low-sodium\" (less than 140 mg of sodium per serving). Foods labeled \"reduced-sodium\" and \"light sodium\" may still have too much sodium. Be sure to read the label to see how much sodium you are getting. · Buy fresh vegetables, or frozen vegetables without added sauces. Buy low-sodium versions of canned vegetables, soups, and other canned goods.   Prepare low-sodium meals  · Cut back on the amount of salt you use in cooking. This will help you adjust to the taste. Do not add salt after cooking. One teaspoon of salt has about 2,300 mg of sodium. · Take the salt shaker off the table. · Flavor your food with garlic, lemon juice, onion, vinegar, herbs, and spices. Do not use soy sauce, lite soy sauce, steak sauce, onion salt, garlic salt, celery salt, mustard, or ketchup on your food. · Use low-sodium salad dressings, sauces, and ketchup. Or make your own salad dressings and sauces without adding salt. · Use less salt (or none) when recipes call for it. You can often use half the salt a recipe calls for without losing flavor. Other foods such as rice, pasta, and grains do not need added salt. · Rinse canned vegetables, and cook them in fresh water. This removes some-but not all-of the salt. · Avoid water that is naturally high in sodium or that has been treated with water softeners, which add sodium. Call your local water company to find out the sodium content of your water supply. If you buy bottled water, read the label and choose a sodium-free brand. Avoid high-sodium foods  · Avoid eating:  ¨ Smoked, cured, salted, and canned meat, fish, and poultry. ¨ Ham, kohler, hot dogs, and luncheon meats. ¨ Regular, hard, and processed cheese and regular peanut butter. ¨ Crackers with salted tops, and other salted snack foods such as pretzels, chips, and salted popcorn. ¨ Frozen prepared meals, unless labeled low-sodium. ¨ Canned and dried soups, broths, and bouillon, unless labeled sodium-free or low-sodium. ¨ Canned vegetables, unless labeled sodium-free or low-sodium. ¨ [place] fries, pizza, tacos, and other fast foods. ¨ Pickles, olives, ketchup, and other condiments, especially soy sauce, unless labeled sodium-free or low-sodium. Where can you learn more? Go to http://slava-enrique.info/.   Enter K216 in the search box to learn more about \"Low Sodium Diet (2,000 Milligram): Care Instructions. \"  Current as of: May 12, 2017  Content Version: 11.4  © 4311-0741 [employer]. Care instructions adapted under license by [employer] (which disclaims liability or warranty for this information). If you have questions about a medical condition or this instruction, always ask your healthcare professional. [address] any warranty or liability for your use of this information. Learning About Diabetes Food Guidelines  Your Care Instructions    Meal planning is important to manage diabetes. It helps keep your blood sugar at a target level (which you set with your doctor). You don't have to eat special foods. You can eat what your family eats, including sweets once in a while. But you do have to pay attention to how often you eat and how much you eat of certain foods. You may want to work with a dietitian or a certified diabetes educator (CDE) to help you plan meals and snacks. A dietitian or CDE can also help you lose weight if that is one of your goals. What should you know about eating carbs? Managing the amount of carbohydrate (carbs) you eat is an important part of healthy meals when you have diabetes. Carbohydrate is found in many foods. · Learn which foods have carbs. And learn the amounts of carbs in different foods. ¨ Bread, cereal, pasta, and rice have about 15 grams of carbs in a serving. A serving is 1 slice of bread (1 ounce), ½ cup of cooked cereal, or 1/3 cup of cooked pasta or rice. ¨ Fruits have 15 grams of carbs in a serving. A serving is 1 small fresh fruit, such as an apple or orange; ½ of a banana; ½ cup of cooked or canned fruit; ½ cup of fruit juice; 1 cup of melon or raspberries; or 2 tablespoons of dried fruit. ¨ Milk and no-sugar-added yogurt have 15 grams of carbs in a serving. A serving is 1 cup of milk or 2/3 cup of no-sugar-added yogurt. ¨ Starchy vegetables have 15 grams of carbs in a serving.  A serving is ½ cup of mashed potatoes or sweet potato; 1 cup winter squash; ½ of a small baked potato; ½ cup of cooked beans; or ½ cup cooked corn or green peas. · Learn how much carbs to eat each day and at each meal. A dietitian or CDE can teach you how to keep track of the amount of carbs you eat. This is called carbohydrate counting. · If you are not sure how to count carbohydrate grams, use the Plate Method to plan meals. It is a good, quick way to make sure that you have a balanced meal. It also helps you spread carbs throughout the day. ¨ Divide your plate by types of foods. Put non-starchy vegetables on half the plate, meat or other protein food on one-quarter of the plate, and a grain or starchy vegetable in the final quarter of the plate. To this you can add a small piece of fruit and 1 cup of milk or yogurt, depending on how many carbs you are supposed to eat at a meal.  · Try to eat about the same amount of carbs at each meal. Do not \"save up\" your daily allowance of carbs to eat at one meal.  · Proteins have very little or no carbs per serving. Examples of proteins are beef, chicken, turkey, fish, eggs, tofu, cheese, cottage cheese, and peanut butter. A serving size of meat is 3 ounces, which is about the size of a deck of cards. Examples of meat substitute serving sizes (equal to 1 ounce of meat) are 1/4 cup of cottage cheese, 1 egg, 1 tablespoon of peanut butter, and ½ cup of tofu. How can you eat out and still eat healthy? · Learn to estimate the serving sizes of foods that have carbohydrate. If you measure food at home, it will be easier to estimate the amount in a serving of restaurant food. · If the meal you order has too much carbohydrate (such as potatoes, corn, or baked beans), ask to have a low-carbohydrate food instead. Ask for a salad or green vegetables. · If you use insulin, check your blood sugar before and after eating out to help you plan how much to eat in the future.   · If you eat more carbohydrate at a meal than you had planned, take a walk or do other exercise. This will help lower your blood sugar. What else should you know? · Limit saturated fat, such as the fat from meat and dairy products. This is a healthy choice because people who have diabetes are at higher risk of heart disease. So choose lean cuts of meat and nonfat or low-fat dairy products. Use olive or canola oil instead of butter or shortening when cooking. · Don't skip meals. Your blood sugar may drop too low if you skip meals and take insulin or certain medicines for diabetes. · Check with your doctor before you drink alcohol. Alcohol can cause your blood sugar to drop too low. Alcohol can also cause a bad reaction if you take certain diabetes medicines. Follow-up care is a key part of your treatment and safety. Be sure to make and go to all appointments, and call your doctor if you are having problems. It's also a good idea to know your test results and keep a list of the medicines you take. Where can you learn more? Go to http://slava-enrique.info/. Enter H490 in the search box to learn more about \"Learning About Diabetes Food Guidelines. \"  Current as of: March 13, 2017  Content Version: 11.4  © 4373-7414 [employer]. Care instructions adapted under license by [employer] (which disclaims liability or warranty for this information). If you have questions about a medical condition or this instruction, always ask your healthcare professional. [place] [address] any warranty or liability for your use of this information. Neuropathic Pain: Care Instructions  Your Care Instructions    Neuropathic pain is caused by pressure on or damage to your nerves. It's often simply called nerve pain. Some people feel this type of pain all the time. For others, it comes and goes. Diabetes, shingles, or an injury can cause nerve pain.  Many people say the pain feels sharp, burning, or stabbing. But some people feel it as a dull ache. In some cases, it makes your skin very sensitive. So touch, pressure, and other sensations that did not hurt before may now cause pain. It's important to know that this kind of pain is real and can affect your quality of life. It's also important to know that treatment can help. Treatment includes pain medicines, exercise, and physical therapy. Medicines can help reduce the number of pain signals that travel over the nerves. This can make the painful areas less sensitive. It can also help you sleep better and improve your mood. But medicines are only one part of successful treatment. Most people do best with more than one kind of treatment. Your doctor may recommend that you try cognitive-behavioral therapy and stress management. Or, if needed, you may decide to try to quit smoking, lower your blood pressure, or better control blood sugar. These kinds of healthy changes can also make a difference. If you feel that your treatment is not working, talk to your doctor. And be sure to tell your doctor if you think you might be depressed or anxious. These are common problems that can also be treated. Follow-up care is a key part of your treatment and safety. Be sure to make and go to all appointments, and call your doctor if you are having problems. It's also a good idea to know your test results and keep a list of the medicines you take. How can you care for yourself at home? · Be safe with medicines. Read and follow all instructions on the label. ¨ If the doctor gave you a prescription medicine for pain, take it as prescribed. ¨ If you are not taking a prescription pain medicine, ask your doctor if you can take an over-the-counter medicine. · Save hard tasks for days when you have less pain. Follow a hard task with an easy task. And remember to take breaks. · Relax, and reduce stress. You may want to try deep breathing or meditation. These can help.   · Keep moving. Gentle, daily exercise can help reduce pain. Your doctor or physical therapist can tell you what type of exercise is best for you. This may include walking, swimming, and stationary biking. It may also include stretches and range-of-motion exercises. · Try heat, cold packs, and massage. · Get enough sleep. Constant pain can make you more tired. If the pain makes it hard to sleep, talk with your doctor. · Think positively. Your thoughts can affect your pain. Do fun things to distract yourself from the pain. See a movie, read a book, listen to music, or spend time with a friend. · Keep a pain diary. Try to write down how strong your pain is and what it feels like. Also try to notice and write down how your moods, thoughts, sleep, activities, and medicine affect your pain. These notes can help you and your doctor find the best ways to treat your pain. Reducing constipation caused by pain medicine  Pain medicines often cause constipation. To reduce constipation:  · Include fruits, vegetables, beans, and whole grains in your diet each day. These foods are high in fiber. · Drink plenty of fluids, enough so that your urine is light yellow or clear like water. If you have kidney, heart, or liver disease and have to limit fluids, talk with your doctor before you increase the amount of fluids you drink. · Get some exercise every day. Build up slowly to 30 to 60 minutes a day on 5 or more days of the week. · Take a fiber supplement, such as Citrucel or Metamucil, every day if needed. Read and follow all instructions on the label. · Schedule time each day for a bowel movement. Having a daily routine may help. Take your time and do not strain when having a bowel movement. · Ask your doctor about a laxative. The goal is to have one easy bowel movement every 1 to 2 days. Do not let constipation go untreated for more than 3 days. When should you call for help?   Call your doctor now or seek immediate medical care if:  ? · You feel sad, anxious, or hopeless for more than a few days. This could mean you are depressed. Depression is common in people who have a lot of pain. But it can be treated. ? · You have trouble with bowel movements, such as:  ¨ No bowel movement in 3 days. ¨ Blood in the anal area, in your stool, or on the toilet paper. ¨ Diarrhea for more than 24 hours. ? Watch closely for changes in your health, and be sure to contact your doctor if:  ? · Your pain is getting worse. ? · You can't sleep because of pain. ? · You are very worried or anxious about your pain. ? · You have trouble taking your pain medicine. ? · You have any concerns about your pain medicine or its side effects. ? · You have vomiting or cramps for more than 2 hours. Where can you learn more? Go to http://slava-enrique.info/. Enter K264 in the search box to learn more about \"Neuropathic Pain: Care Instructions. \"  Current as of: October 14, 2016  Content Version: 11.4  © 2189-3141 [employer]. Care instructions adapted under license by [employer] (which disclaims liability or warranty for this information). If you have questions about a medical condition or this instruction, always ask your healthcare professional. [address] any warranty or liability for your use of this information.

## 2017-12-18 DIAGNOSIS — N40.1 BPH ASSOCIATED WITH NOCTURIA: ICD-10-CM

## 2017-12-18 DIAGNOSIS — R35.1 BPH ASSOCIATED WITH NOCTURIA: ICD-10-CM

## 2017-12-18 RX ORDER — TAMSULOSIN HYDROCHLORIDE 0.4 MG/1
CAPSULE ORAL
Qty: 180 CAP | Refills: 0 | Status: SHIPPED | OUTPATIENT
Start: 2017-12-18 | End: 2017-12-19 | Stop reason: SDUPTHER

## 2017-12-19 ENCOUNTER — OFFICE VISIT (OUTPATIENT)
Dept: PAIN MANAGEMENT | Age: 67
End: 2017-12-19

## 2017-12-19 VITALS
WEIGHT: 278 LBS | TEMPERATURE: 98.5 F | SYSTOLIC BLOOD PRESSURE: 136 MMHG | HEIGHT: 74 IN | RESPIRATION RATE: 12 BRPM | DIASTOLIC BLOOD PRESSURE: 64 MMHG | BODY MASS INDEX: 35.68 KG/M2 | HEART RATE: 85 BPM

## 2017-12-19 DIAGNOSIS — G89.4 CHRONIC PAIN SYNDROME: ICD-10-CM

## 2017-12-19 DIAGNOSIS — M47.896 OTHER OSTEOARTHRITIS OF SPINE, LUMBAR REGION: ICD-10-CM

## 2017-12-19 DIAGNOSIS — M54.41 CHRONIC BILATERAL LOW BACK PAIN WITH BILATERAL SCIATICA: Primary | ICD-10-CM

## 2017-12-19 DIAGNOSIS — M51.36 DDD (DEGENERATIVE DISC DISEASE), LUMBAR: ICD-10-CM

## 2017-12-19 DIAGNOSIS — G89.29 CHRONIC LOW BACK PAIN, UNSPECIFIED BACK PAIN LATERALITY, WITH SCIATICA PRESENCE UNSPECIFIED: ICD-10-CM

## 2017-12-19 DIAGNOSIS — M54.42 CHRONIC BILATERAL LOW BACK PAIN WITH BILATERAL SCIATICA: Primary | ICD-10-CM

## 2017-12-19 DIAGNOSIS — M54.5 CHRONIC LOW BACK PAIN, UNSPECIFIED BACK PAIN LATERALITY, WITH SCIATICA PRESENCE UNSPECIFIED: ICD-10-CM

## 2017-12-19 DIAGNOSIS — Z98.890 H/O LUMBOSACRAL SPINE SURGERY: ICD-10-CM

## 2017-12-19 DIAGNOSIS — G89.29 CHRONIC BILATERAL LOW BACK PAIN WITH BILATERAL SCIATICA: Primary | ICD-10-CM

## 2017-12-19 RX ORDER — OXYCODONE HCL 10 MG/1
10 TABLET, FILM COATED, EXTENDED RELEASE ORAL EVERY 8 HOURS
Qty: 90 TAB | Refills: 0 | Status: SHIPPED | OUTPATIENT
Start: 2018-01-18 | End: 2017-12-19 | Stop reason: SDUPTHER

## 2017-12-19 RX ORDER — OXYCODONE HCL 10 MG/1
10 TABLET, FILM COATED, EXTENDED RELEASE ORAL EVERY 12 HOURS
Qty: 60 TAB | Refills: 0 | Status: SHIPPED | OUTPATIENT
Start: 2017-12-19 | End: 2017-12-19

## 2017-12-19 RX ORDER — DULOXETIN HYDROCHLORIDE 60 MG/1
60 CAPSULE, DELAYED RELEASE ORAL
Qty: 30 CAP | Refills: 1 | Status: SHIPPED | OUTPATIENT
Start: 2017-12-19 | End: 2018-02-20 | Stop reason: SDUPTHER

## 2017-12-19 RX ORDER — HYDROCODONE BITARTRATE AND ACETAMINOPHEN 5; 325 MG/1; MG/1
1 TABLET ORAL
Qty: 30 TAB | Refills: 0 | Status: SHIPPED | OUTPATIENT
Start: 2018-01-18 | End: 2018-08-20 | Stop reason: SDUPTHER

## 2017-12-19 RX ORDER — PREGABALIN 150 MG/1
CAPSULE ORAL
Qty: 90 CAP | Refills: 3 | Status: SHIPPED | OUTPATIENT
Start: 2017-12-19 | End: 2018-02-20 | Stop reason: SDUPTHER

## 2017-12-19 RX ORDER — HYDROCODONE BITARTRATE AND ACETAMINOPHEN 5; 325 MG/1; MG/1
1 TABLET ORAL
Qty: 30 TAB | Refills: 0 | Status: SHIPPED | OUTPATIENT
Start: 2017-12-19 | End: 2018-08-20 | Stop reason: SDUPTHER

## 2017-12-19 RX ORDER — OXYCODONE HCL 10 MG/1
10 TABLET, FILM COATED, EXTENDED RELEASE ORAL EVERY 8 HOURS
Qty: 90 TAB | Refills: 0 | Status: SHIPPED | OUTPATIENT
Start: 2017-12-19 | End: 2018-02-20 | Stop reason: SDUPTHER

## 2017-12-19 NOTE — MR AVS SNAPSHOT
Visit Information Date & Time Provider Department Dept. Phone Encounter #  
 12/19/2017  8:45 AM Isaiah Farooq MD [facility] [phone] Follow-up Instructions Return in about 9 weeks (around 2/20/2018). Follow-up and Disposition History Your Appointments 2/19/2018  9:00 AM  
Follow Up with Kristian Jensen MD  
[facility] ([address]) Appt Note: 6 month f/up [place] [id number]  
[phone] Daniel Lopez [id number]  
  
    
 2/27/2018  9:30 AM  
ROUTINE CARE with Wang Hicks MD  
[facility] ([address]) Appt Note: routine f/u 3mo [address] [facility] [address] [place] [address] [address] [facility]  
  
    
 3/8/2018  9:00 AM  
Office Visit with Ирина Jimenez MD  
[facility] [address] [facility]) Appt Note: check up [address] [address]  
[phone] [address] [facility] [address] Upcoming Health Maintenance Date Due HEMOGLOBIN A1C Q6M 12/16/2017 FOOT EXAM Q1 1/9/2018 MICROALBUMIN Q1 3/10/2018 MEDICARE YEARLY EXAM 4/25/2018 LIPID PANEL Q1 6/16/2018 EYE EXAM RETINAL OR DILATED Q1 7/11/2018 GLAUCOMA SCREENING Q2Y 7/11/2019 COLONOSCOPY 10/19/2023 DTaP/Tdap/Td series (2 - Td) 5/16/2026 Allergies as of 12/19/2017  Review Complete On: 12/19/2017 By: Isaiah Farooq MD  
  
 Severity Noted Reaction Type Reactions Ciprofloxacin High 11/16/2013    Anaphylaxis Other Plant, Animal, Environmental High 03/10/2016    Other (comments) Patient cannot have MRI. Patient states that he has metal screws in his left arm. Lamisil [Terbinafine]  10/18/2013    Swelling Tongue swelling Metformin  03/16/2016    Other (comments) Elevated cr 1.4 Morphine  10/18/2013    Other (comments) \"loss of blood pressure\" Other Medication  10/18/2013    Other (comments) Doxasylin causes extreme GERD Pcn [Penicillins]  10/18/2013    Rash Pentothal [Thiopental Sodium]  10/18/2013    Shortness of Breath Sulfa (Sulfonamide Antibiotics)  10/18/2013    Rash Current Immunizations  Reviewed on 7/20/2016 Name Date Influenza Vaccine 11/3/2014 Influenza Vaccine (Quad) PF 1/25/2017, 9/16/2015 Pneumococcal Conjugate (PCV-13) 7/20/2016 Pneumococcal Polysaccharide (PPSV-23) 3/30/2017 Tdap 5/16/2016 Zoster Vaccine, Live 6/15/2017 Not reviewed this visit You Were Diagnosed With   
  
 Codes Comments Chronic bilateral low back pain with bilateral sciatica    -  Primary ICD-10-CM: M54.42, M54.41, G89.29 ICD-9-CM: 724.2, 724.3, 338.29 Chronic low back pain, unspecified back pain laterality, with sciatica presence unspecified     ICD-10-CM: M54.5, G89.29 ICD-9-CM: 724.2, 338.29 Chronic pain syndrome     ICD-10-CM: G89.4 ICD-9-CM: 338.4 Other osteoarthritis of spine, lumbar region     ICD-10-CM: M47.896 ICD-9-CM: 721.3 H/O lumbosacral spine surgery     ICD-10-CM: Z98.890 ICD-9-CM: V15.29 DDD (degenerative disc disease), lumbar     ICD-10-CM: M51.36 
ICD-9-CM: 722.52 Vitals BP Pulse Temp Resp Height(growth percentile) Weight(growth percentile) 136/64 (BP 1 Location: Left arm, BP Patient Position: Sitting) 85 98.5 °F (36.9 °C) (Oral) 12 6' 2\" (1.88 m) 278 lb (126.1 kg) BMI Smoking Status 35.69 kg/m2 Former Smoker Vitals History BMI and BSA Data Body Mass Index Body Surface Area  
 35.69 kg/m 2 2.57 m 2 Preferred Pharmacy Pharmacy Name Phone [facility] [address] [place], [address] [phone] Your Updated Medication List  
  
   
 This list is accurate as of: 12/19/17  9:08 AM.  Always use your most recent med list.  
  
  
  
  
 ACCU-CHEK JAZZY PLUS TEST STRP strip Generic drug:  glucose blood VI test strips [address] Generic drug:  Lancets ACULAR 0.5 % ophthalmic solution Generic drug:  ketorolac Administer 1 Drop to both eyes two (2) times a day. albuterol 2.5 mg /3 mL (0.083 %) nebulizer solution Commonly known as:  PROVENTIL VENTOLIN  
3 mL by Nebulization route every four (4) hours as needed for Wheezing or Shortness of Breath. atorvastatin 20 mg tablet Commonly known as:  LIPITOR Take 1 Tab by mouth daily. betamethasone dipropionate 0.05 % ointment Commonly known as:  DIPROLENE  
  
 budesonide-formoterol 160-4.5 mcg/actuation Hfaa Commonly known as:  SYMBICORT Take 2 Puffs by inhalation two (2) times a day. butalbital-acetaminophen-caffeine -40 mg per tablet Commonly known as:  Viktor Angulo Take 0.5 Tabs by mouth daily as needed for Pain. DULoxetine 60 mg capsule Commonly known as:  CYMBALTA Take 1 Cap by mouth nightly. esomeprazole 40 mg capsule Commonly known as:  Dorette Einstein Take 40 mg by mouth two (2) times a day. fenofibrate nanocrystallized 145 mg tablet Commonly known as:  [employer] Take 1 Tab by mouth daily. fluticasone 50 mcg/actuation nasal spray Commonly known as:  FLONASE  
USE ONE SPRAY IN EACH NOSTRIL ONCE DAILY  
  
 * HYDROcodone-acetaminophen 5-325 mg per tablet Commonly known as:  Keagan Ill Take 1 Tab by mouth daily as needed for Pain. * HYDROcodone-acetaminophen 5-325 mg per tablet Commonly known as:  Keagan Ill Take 1 Tab by mouth daily as needed for Pain for up to 30 days. * HYDROcodone-acetaminophen 5-325 mg per tablet Commonly known as:  Keagan Ill Take 1 Tab by mouth daily as needed for Pain for up to 30 days. Start taking on:  1/18/2018 insulin glargine 100 unit/mL (3 mL) Inpn Commonly known as:  BASAGLAR KWIKPEN  
52 units at bedtime  
  
 insulin glulisine 100 unit/mL pen Commonly known as:  APIDRA SOLOSTAR  
2 units per carb consumed. Max 30 / day JANUVIA 100 mg tablet Generic drug:  SITagliptin Take 100 mg by mouth daily. levothyroxine 150 mcg tablet Commonly known as:  SYNTHROID Take 1 Tab by mouth Daily (before breakfast). lisinopril 5 mg tablet Commonly known as:  [place] Take 1 Tab by mouth daily. metoprolol tartrate 25 mg tablet Commonly known as:  LOPRESSOR Take 1 Tab by mouth two (2) times a day. montelukast 10 mg tablet Commonly known as:  SINGULAIR  
TAKE ONE TABLET BY MOUTH ONCE DAILY  
  
 naloxone 4 mg/actuation nasal spray Commonly known as:  NARCAN  
4 mg by Nasal route as needed. oxyCODONE ER 10 mg ER tablet Commonly known as:  OxyCONTIN Take 1 Tab by mouth every eight (8) hours for 30 days. Max Daily Amount: 30 mg.  
  
 * BD ULTRA-FINE MIREYA PEN NEEDLES  
by Does Not Apply route. 4mm x 32G * PEN NEEDLE 31 gauge x 5/16\" Ndle Generic drug:  Insulin Needles (Disposable) USE ONE PEN NEEDLE FOR LANTUS FLEXPEN AND 3 PEN NEEDLES FOR APIDRA WITH EACH MEAL * Insulin Needles (Disposable) 32 gauge x 5/32\" Ndle Commonly known as:  Mireya Pen Needle Take at bedtime  
  
 pregabalin 150 mg capsule Commonly known as:  Chatsworth Schlatter TAKE 1 CAPSULE BY MOUTH THREE TIMES DAILY FOR 30 DAYS. MAXIMUM 3 CAPSULES DAILY. tamsulosin 0.4 mg capsule Commonly known as:  FLOMAX Take 1 Cap by mouth two (2) times a day. tiotropium 18 mcg inhalation capsule Commonly known as:  Vernell Navid Take 1 Cap by inhalation daily. varicella-zoster vacine live 19,400 unit/0.65 mL Susr injection Generic drug:  varicella zoster vaccine live * Notice:   This list has 6 medication(s) that are the same as other medications prescribed for you. Read the directions carefully, and ask your doctor or other care provider to review them with you. Prescriptions Printed Refills  
 pregabalin (LYRICA) 150 mg capsule 3 Sig: TAKE 1 CAPSULE BY MOUTH THREE TIMES DAILY FOR 30 DAYS. MAXIMUM 3 CAPSULES DAILY. Class: Print HYDROcodone-acetaminophen (NORCO) 5-325 mg per tablet 0 Sig: Take 1 Tab by mouth daily as needed for Pain for up to 30 days. Class: Print Route: Oral  
 HYDROcodone-acetaminophen (NORCO) 5-325 mg per tablet 0 Starting on: 1/18/2018 Sig: Take 1 Tab by mouth daily as needed for Pain for up to 30 days. Class: Print Route: Oral  
 oxyCODONE ER (OXYCONTIN) 10 mg ER tablet 0 Sig: Take 1 Tab by mouth every eight (8) hours for 30 days. Max Daily Amount: 30 mg.  
 Class: Print Route: Oral  
  
Prescriptions Sent to Pharmacy Refills DULoxetine (CYMBALTA) 60 mg capsule 1 Sig: Take 1 Cap by mouth nightly. Class: Normal  
 Pharmacy: [id number]. [facility],[address] [place] [address] #: [phone] Route: Oral  
  
Follow-up Instructions Return in about 9 weeks (around 2/20/2018). Introducing [facility]! [facility] introduces [employer] patient portal. Now you can access parts of your medical record, email your doctor's office, and request medication refills online. 1. In your internet browser, go to [email or web address] 2. Click on the First Time User? Click Here link in the Sign In box. You will see the New Member Sign Up page. 3. Enter your [employer] Access Code exactly as it appears below. You will not need to use this code after youve completed the sign-up process. If you do not sign up before the expiration date, you must request a new code. · [employer] Access Code: [email or web address] Expires: 2/25/2018 10:19 AM 
 
4.  Enter the last four digits of your Social Security Number (xxxx) and Date of Birth (mm/dd/yyyy) as indicated and click Submit. You will be taken to the next sign-up page. 5. Create a [employer] ID. This will be your [employer] login ID and cannot be changed, so think of one that is secure and easy to remember. 6. Create a [employer] password. You can change your password at any time. 7. Enter your Password Reset Question and Answer. This can be used at a later time if you forget your password. 8. Enter your e-mail address. You will receive e-mail notification when new information is available in [address] 9. Click Sign Up. You can now view and download portions of your medical record. 10. Click the Download Summary menu link to download a portable copy of your medical information. If you have questions, please visit the Frequently Asked Questions section of the [employer] website. Remember, [employer] is NOT to be used for urgent needs. For medical emergencies, dial 911. Now available from your iPhone and Android! Please provide this summary of care documentation to your next provider. Your primary care clinician is listed as Yuridia Krause. If you have any questions after today's visit, please call [phone].

## 2017-12-19 NOTE — PROGRESS NOTES
Nursing Notes    Patient presents to the office today in follow-up. Patient rates his pain at 5/10 on the numerical pain scale. Reviewed medications with counts as follows:    Rx Date filled Qty Dispensed Pill Count Last Dose Short   norco 5/325 mg - pt has 2 unfilled prescriptions 09/19/17 30 58 ? no   oxycontin 10 mg 12/04/17 90 45 today no                           POC UDS was not performed in office today. Any new labs or imaging since last appointment? NO    Have you been to an emergency room (ER) or urgent care clinic since your last visit? NO            Have you been hospitalized since your last visit? NO     If yes, where, when, and reason for visit? Have you seen or consulted any other health care providers outside of the [address] [place] [address]  since your last visit? NO     If yes, where, when, and reason for visit? HM deferred to pcp.

## 2017-12-19 NOTE — PROGRESS NOTES
HISTORY OF PRESENT ILLNESS  Ruthie Benjamin is a 69 y.o. male. HPI Comments: Visit survey reviewed  Chief complaint- chronic pain syndrome- low back pain  Medication helps general activity, mood, walking, sleep, enjoyment of life  He will continue with Cymbalta 60 mg once a day  Lyrica 150 mg 3 times a day  OxyContin 10 mg 3 times a day  Hydrocodone 5 mg once a day as needed    No new significant side effects reported  Medication continues to help improve quality of life. Medications reviewed including risk and benefits. Recent average level of pain-6,7    Measurement of clinical outcome for chronic pain patient/ SPAASMS Score Card-            Information reviewed and will be scanned. Total score today-7      Review of Systems   Constitutional: Positive for malaise/fatigue. Negative for chills and fever. HENT: Positive for hearing loss. Respiratory: Negative for cough and shortness of breath. Cardiovascular: Positive for leg swelling. Negative for chest pain and palpitations. Gastrointestinal: Positive for constipation. Negative for diarrhea, heartburn, nausea and vomiting. Musculoskeletal: Positive for back pain and joint pain. Negative for falls. Skin: Negative for rash. Neurological: Negative for dizziness. Psychiatric/Behavioral: Positive for depression. Negative for suicidal ideas. The patient is nervous/anxious and has insomnia. Physical Exam   Constitutional: He appears well-developed and well-nourished. He is cooperative. He does not have a sickly appearance. HENT:   Head: Normocephalic and atraumatic. Right Ear: External ear normal. No drainage. Left Ear: External ear normal. No drainage. Nose: Nose normal.   Eyes: Lids are normal. Right eye exhibits no discharge. Left eye exhibits no discharge. Right conjunctiva has no hemorrhage. Left conjunctiva has no hemorrhage. Neck: Neck supple. No tracheal deviation present. No thyroid mass present.    Pulmonary/Chest: Effort normal. No respiratory distress. Neurological: He is alert. No cranial nerve deficit. Skin: Skin is intact. No rash noted. Psychiatric: His speech is normal. His affect is not angry. He does not express inappropriate judgment. Nursing note and vitals reviewed. ASSESSMENT and PLAN  Encounter Diagnoses   Name Primary?  Chronic bilateral low back pain with bilateral sciatica Yes    Chronic low back pain, unspecified back pain laterality, with sciatica presence unspecified     Chronic pain syndrome     Other osteoarthritis of spine, lumbar region     H/O lumbosacral spine surgery     DDD (degenerative disc disease), lumbar    No indicators for recent medication misuse.  reviewed. Pain Meds and Quality Of Life have been reviewed. Nonpharmacologic therapy and non-opioid pharmacologic therapy were considered. If opioid therapy is prescribed, this is only if the expected benefits are anticipated to outweigh risks. Possible changes to treatment plan considered. Support/education given as needed. Today-medications are as listed. No significant changes to medications. Follow up -- 2 months. The patient was informed that moving forward, I will no longer be with this clinic. They were reminded that they can continue care with this clinic and I did request a follow-up for the patient with this clinic. They will also receive a list of other pain physicians/clinics in the area in case they are interested.

## 2017-12-22 ENCOUNTER — HOSPITAL ENCOUNTER (OUTPATIENT)
Dept: LAB | Age: 67
Discharge: HOME | End: 2017-12-22
Payer: MEDICARE

## 2017-12-22 DIAGNOSIS — Z79.4 TYPE 2 DIABETES MELLITUS WITH COMPLICATION, WITH LONG-TERM CURRENT USE OF INSULIN (HCC): ICD-10-CM

## 2017-12-22 DIAGNOSIS — E11.8 TYPE 2 DIABETES MELLITUS WITH COMPLICATION, WITH LONG-TERM CURRENT USE OF INSULIN (HCC): ICD-10-CM

## 2017-12-22 LAB
ALBUMIN SERPL-MCNC: 3.7 G/DL (ref 3.4–5)
ALBUMIN/GLOB SERPL: 1.2 {RATIO} (ref 0.8–1.7)
ALP SERPL-CCNC: 48 U/L (ref 45–117)
ALT SERPL-CCNC: 31 U/L (ref 16–61)
ANION GAP SERPL CALC-SCNC: 6 MMOL/L (ref 3–18)
AST SERPL-CCNC: 17 U/L (ref 15–37)
BILIRUB SERPL-MCNC: 0.4 MG/DL (ref 0.2–1)
BUN SERPL-MCNC: 16 MG/DL (ref 7–18)
BUN/CREAT SERPL: 15 (ref 12–20)
CALCIUM SERPL-MCNC: 8.9 MG/DL (ref 8.5–10.1)
CHLORIDE SERPL-SCNC: 103 MMOL/L (ref 100–108)
CO2 SERPL-SCNC: 31 MMOL/L (ref 21–32)
CREAT SERPL-MCNC: 1.07 MG/DL (ref 0.6–1.3)
EST. AVERAGE GLUCOSE BLD GHB EST-MCNC: 157 MG/DL
GLOBULIN SER CALC-MCNC: 3 G/DL (ref 2–4)
GLUCOSE SERPL-MCNC: 135 MG/DL (ref 74–99)
HBA1C MFR BLD: 7.1 % (ref 4.2–5.6)
POTASSIUM SERPL-SCNC: 4.9 MMOL/L (ref 3.5–5.5)
PROT SERPL-MCNC: 6.7 G/DL (ref 6.4–8.2)
SODIUM SERPL-SCNC: 140 MMOL/L (ref 136–145)

## 2017-12-22 PROCEDURE — 36415 COLL VENOUS BLD VENIPUNCTURE: CPT | Performed by: FAMILY MEDICINE

## 2017-12-22 PROCEDURE — 80053 COMPREHEN METABOLIC PANEL: CPT | Performed by: FAMILY MEDICINE

## 2017-12-22 PROCEDURE — 83036 HEMOGLOBIN GLYCOSYLATED A1C: CPT | Performed by: FAMILY MEDICINE

## 2017-12-22 NOTE — PROGRESS NOTES
Let pt know that hba1c is around 7. Will continue current plan and further discussion on follow up visit. Need to follow diabetic diet. Bring blood sugar log on follow up visit.  Thanks

## 2018-01-22 DIAGNOSIS — I10 ESSENTIAL HYPERTENSION: ICD-10-CM

## 2018-01-22 RX ORDER — LISINOPRIL 5 MG/1
5 TABLET ORAL DAILY
Qty: 90 TAB | Refills: 1 | Status: SHIPPED | OUTPATIENT
Start: 2018-01-22 | End: 2018-06-05 | Stop reason: SDUPTHER

## 2018-01-22 NOTE — TELEPHONE ENCOUNTER
This patient contacted office for the following prescriptions to be filled:    Medication requested :    Requested Prescriptions     Pending Prescriptions Disp Refills    lisinopril (PRINIVIL, ZESTRIL) 5 mg tablet 90 Tab 1     Sig: Take 1 Tab by mouth daily.      PCP: René Sanchez or Print:  [facility]   Mail order or [address]     Scheduled appointment if not seen by current providers in office:  LOV 11/27/2017 f/u 2/27/2018

## 2018-02-19 ENCOUNTER — OFFICE VISIT (OUTPATIENT)
Dept: CARDIOLOGY CLINIC | Age: 68
End: 2018-02-19

## 2018-02-19 VITALS
DIASTOLIC BLOOD PRESSURE: 74 MMHG | SYSTOLIC BLOOD PRESSURE: 110 MMHG | BODY MASS INDEX: 35.94 KG/M2 | OXYGEN SATURATION: 94 % | HEART RATE: 90 BPM | WEIGHT: 280 LBS | RESPIRATION RATE: 16 BRPM | HEIGHT: 74 IN

## 2018-02-19 DIAGNOSIS — I10 ESSENTIAL HYPERTENSION: Primary | ICD-10-CM

## 2018-02-19 DIAGNOSIS — Z79.4 TYPE 2 DIABETES MELLITUS WITH COMPLICATION, WITH LONG-TERM CURRENT USE OF INSULIN (HCC): ICD-10-CM

## 2018-02-19 DIAGNOSIS — E78.5 HYPERLIPIDEMIA, UNSPECIFIED HYPERLIPIDEMIA TYPE: ICD-10-CM

## 2018-02-19 DIAGNOSIS — G89.29 CHRONIC CHEST PAIN: ICD-10-CM

## 2018-02-19 DIAGNOSIS — E11.8 TYPE 2 DIABETES MELLITUS WITH COMPLICATION, WITH LONG-TERM CURRENT USE OF INSULIN (HCC): ICD-10-CM

## 2018-02-19 DIAGNOSIS — R07.9 CHRONIC CHEST PAIN: ICD-10-CM

## 2018-02-19 DIAGNOSIS — J44.9 CHRONIC OBSTRUCTIVE PULMONARY DISEASE, UNSPECIFIED COPD TYPE (HCC): ICD-10-CM

## 2018-02-19 NOTE — PROGRESS NOTES
HISTORY OF PRESENT ILLNESS  Manolo Rene is a 69 y.o. male. Follow-up   Associated symptoms include shortness of breath. Pertinent negatives include no chest pain, no abdominal pain and no headaches. Hypertension   Associated symptoms include shortness of breath. Pertinent negatives include no chest pain, no abdominal pain and no headaches. Shortness of Breath   Pertinent negatives include no fever, no headaches, no cough, no wheezing, no PND, no orthopnea, no chest pain, no vomiting, no abdominal pain, no rash, no leg swelling and no claudication. Patient presents for a follow-up office visit. He has a past medical history significant for hypertension, dyslipidemia,  diabetes mellitus, and COPD. He was initially referred here for evaluation of dyspnea on exertion and tachycardia with activity. The patient does not have a previous cardiac history. He does have a history of atypical chest pain, and reports a significant cardiac workup while living in [address] [place] [address] back in 2009 which included a cardiac catheterization showing no significant coronary artery disease. The patient underwent an echocardiogram in January 2014, showing an LVEF of 51-35%, grade 1 diastolic dysfunction, in no significant valvular heart disease. Normal PA pressures. The patient was last seen in the office 6 months ago. Since last visit, he he has been feeling about the same. He still has shortness of breath with any exertional activity but has been present for many many years. He continues to have rare episodes of chest pain which are more sharp and stabbing in nature. They have not changed over the past 5 years. They have not been any more frequent or severe in nature. He does not engage in any physical activity. Past Medical History:   Diagnosis Date    Asthma     Cancer [facility])     BASAL     Cardiac catheterization 2009    [facility]:  No significant CAD.       Cardiac echocardiogram 01/20/2014 EF 50-55%. No WMA. Gr 1 DDfx. RVSP 25-30 mmHg. Mild TR.  DM type 2 (diabetes mellitus, type 2) (HCC)     Enlarged prostate     Failed back syndrome     GERD (gastroesophageal reflux disease)     Hearing loss, bilateral     Hearing Aids    Hypertension     Hypothyroidism     Insomnia     Low serum testosterone level     Neuropathy     Osteoarthritis of multiple joints     S/P colonoscopy 8/14/13    mild diverticulosis in sigmoid colon w/o evidence of diverticulitis; f/u 5 years    SOB (shortness of breath)     chronic; 2' \"lung fever\"    Vertigo       Current Outpatient Prescriptions   Medication Sig Dispense Refill    FANI PEN NEEDLE 32 gauge x 5/32\" ndle USE AT BEDTIME 100 Pen Needle 6    lisinopril (PRINIVIL, ZESTRIL) 5 mg tablet Take 1 Tab by mouth daily. 90 Tab 1    pregabalin (LYRICA) 150 mg capsule TAKE 1 CAPSULE BY MOUTH THREE TIMES DAILY FOR 30 DAYS. MAXIMUM 3 CAPSULES DAILY. 90 Cap 3    DULoxetine (CYMBALTA) 60 mg capsule Take 1 Cap by mouth nightly. 30 Cap 1    metoprolol tartrate (LOPRESSOR) 25 mg tablet Take 1 Tab by mouth two (2) times a day. 180 Tab 1    montelukast (SINGULAIR) 10 mg tablet TAKE ONE TABLET BY MOUTH ONCE DAILY 90 Tab 1    JANUVIA 100 mg tablet Take 100 mg by mouth daily.  VARICELLA-ZOSTER VACINE LIVE 19,400 unit/0.65 mL susr injection       atorvastatin (LIPITOR) 20 mg tablet Take 1 Tab by mouth daily. 90 Tab 1    fenofibrate nanocrystallized (TRICOR) 145 mg tablet Take 1 Tab by mouth daily. 90 Tab 1    levothyroxine (SYNTHROID) 150 mcg tablet Take 1 Tab by mouth Daily (before breakfast). 90 Tab 1    naloxone 4 mg/actuation spry 4 mg by Nasal route as needed. 1 Box 1    ACCU-CHEK JAZZY PLUS TEST STRP strip       ACCU-CHEK SOFTCLIX LANCETS misc       albuterol (PROVENTIL VENTOLIN) 2.5 mg /3 mL (0.083 %) nebulizer solution 3 mL by Nebulization route every four (4) hours as needed for Wheezing or Shortness of Breath.  1 Package 5    PEN NEEDLE, DIABETIC (BD ULTRA-FINE AFNI PEN NEEDLES) by Does Not Apply route. 4mm x 32G      insulin glulisine (APIDRA SOLOSTAR) 100 unit/mL pen 2 units per carb consumed. Max 30 / day 5 Pen 3    insulin glargine (BASAGLAR KWIKPEN) 100 unit/mL (3 mL) pen 52 units at bedtime 3 Pen 3    PEN NEEDLE 31 gauge x 5/16\" ndle USE ONE PEN NEEDLE FOR LANTUS FLEXPEN AND 3 PEN NEEDLES FOR APIDRA WITH EACH MEAL 1 Package 3    esomeprazole (NEXIUM) 40 mg capsule Take 40 mg by mouth two (2) times a day.  betamethasone dipropionate (DIPROLENE) 0.05 % ointment       butalbital-acetaminophen-caffeine (FIORICET, ESGIC) -40 mg per tablet Take 0.5 Tabs by mouth daily as needed for Pain. 10 Tab 0    fluticasone (FLONASE) 50 mcg/actuation nasal spray USE ONE SPRAY IN EACH NOSTRIL ONCE DAILY 1 Bottle 0    tamsulosin (FLOMAX) 0.4 mg capsule Take 1 Cap by mouth two (2) times a day. 180 Cap 3    HYDROcodone-acetaminophen (NORCO) 5-325 mg per tablet Take 1 Tab by mouth daily as needed for Pain.  budesonide-formoterol (SYMBICORT) 160-4.5 mcg/actuation HFA inhaler Take 2 Puffs by inhalation two (2) times a day. 1 Inhaler 5    tiotropium (SPIRIVA) 18 mcg inhalation capsule Take 1 Cap by inhalation daily. 30 Cap 5    ketorolac (ACULAR) 0.5 % ophthalmic solution Administer 1 Drop to both eyes two (2) times a day. Allergies   Allergen Reactions    Ciprofloxacin Anaphylaxis    Other Plant, Animal, Environmental Other (comments)     Patient cannot have MRI. Patient states that he has metal screws in his left arm.     Lamisil [Terbinafine] Swelling     Tongue swelling    Metformin Other (comments)     Elevated cr 1.4    Morphine Other (comments)     \"loss of blood pressure\"    Other Medication Other (comments)     Doxasylin causes extreme GERD    Pcn [Penicillins] Rash    Pentothal [Thiopental Sodium] Shortness of Breath    Sulfa (Sulfonamide Antibiotics) Rash      Social History   Substance Use Topics    Smoking status: Former Smoker     Packs/day: 1.00     Years: 31.00     Types: Pipe, Cigars     Quit date: 1/1/1995    Smokeless tobacco: Never Used    Alcohol use No            Review of Systems   Constitutional: Negative for chills, fever and weight loss. HENT: Negative for nosebleeds. Eyes: Negative for blurred vision and double vision. Respiratory: Positive for shortness of breath. Negative for cough and wheezing. Cardiovascular: Negative for chest pain, palpitations, orthopnea, claudication, leg swelling and PND. Gastrointestinal: Negative for abdominal pain, heartburn, nausea and vomiting. Genitourinary: Negative for dysuria and hematuria. Musculoskeletal: Negative for falls and myalgias. Skin: Negative for rash. Neurological: Negative for dizziness, focal weakness and headaches. Endo/Heme/Allergies: Does not bruise/bleed easily. Psychiatric/Behavioral: Negative for substance abuse. Visit Vitals    /74 (BP 1 Location: Left arm, BP Patient Position: Sitting)    Pulse 90    Resp 16    Ht 6' 2\" (1.88 m)    Wt 127 kg (280 lb)    SpO2 94%    BMI 35.95 kg/m2      Physical Exam   Constitutional: He is oriented to person, place, and time. He appears well-developed and well-nourished. HENT:   Head: Normocephalic and atraumatic. Eyes: Conjunctivae are normal.   Neck: Neck supple. No JVD present. Carotid bruit is not present. Cardiovascular: Normal rate, regular rhythm, S1 normal, S2 normal and normal pulses. Exam reveals distant heart sounds. Exam reveals no gallop and no S3. No murmur heard. Pulmonary/Chest: Breath sounds normal. He has no wheezes. He has no rales. Abdominal: Soft. Bowel sounds are normal. There is no tenderness. Musculoskeletal: He exhibits no edema. Neurological: He is alert and oriented to person, place, and time. Skin: Skin is warm and dry. EKG: Normal sinus rhythm, borderline low voltage,  No ST or T wave abnormalities concerning for ischemia. Baseline artifact. Compared to the previous EKG, no significant interval change. ASSESSMENT and PLAN    Essential hypertension. Patient's blood pressure remains well controlled on his current medical regimen. All of which I would continue. Mixed dyslipidemia. Patient is now taking both TriCor and atorvastatin 20 mg daily. This is being followed by his PCP. Atypical chest pain. No significant change in his symptoms over the past one to 2-3 years. Diabetes mellitus. Historically this has been poorly controlled. This is followed by his PCP. From a cardiac standpoint I would prefer his A1c to be less than 7. Chronic dyspnea on exertion. This is felt to be related to chronic pulmonary disease  and deconditioning. No further cardiac workup needed. The patient can follow up annually, sooner if needed.

## 2018-02-19 NOTE — MR AVS SNAPSHOT
[address] [place] [address] [address] Rita Russo [address] 
[phone] Patient: Antoni Robles MRN: [id number] EOY:[id number] Visit Information Date & Time Provider Department Dept. Phone Encounter #  
 2/19/2018  9:00 AM Jose Manuel Alvarez MD [facility] [address] [id number] Your Appointments 2/20/2018 10:40 AM  
Follow Up with PAUL Silver [facility] (REJI SCHEDULING) Appt Note: Return in about 9 weeks (around 2/20/2018). pt states will be good on rx [address] [facility] [address]  
[phone] [address]  
  
    
 2/27/2018  9:30 AM  
ROUTINE CARE with Krissy Sumner MD  
[facility] ([address]) Appt Note: routine f/u 3mo [address] [place] [address]  
  
    
 3/8/2018  9:00 AM  
Office Visit with Cynthia Garcia MD  
[facility] [address] [facility]) Appt Note: check up [address] [id number] [phone] [address] [place] [address] Upcoming Health Maintenance Date Due  
 FOOT EXAM Q1 1/9/2018 MICROALBUMIN Q1 3/10/2018 MEDICARE YEARLY EXAM 4/25/2018 LIPID PANEL Q1 6/16/2018 HEMOGLOBIN A1C Q6M 6/22/2018 EYE EXAM RETINAL OR DILATED Q1 7/11/2018 GLAUCOMA SCREENING Q2Y 7/11/2019 COLONOSCOPY 10/19/2023 DTaP/Tdap/Td series (2 - Td) 5/16/2026 Allergies as of 2/19/2018  Review Complete On: 2/19/2018 By: Gracie Cabrera Severity Noted Reaction Type Reactions Ciprofloxacin High 11/16/2013    Anaphylaxis Other Plant, Animal, Environmental High 03/10/2016    Other (comments) Patient cannot have MRI.  Patient states that he has metal screws in his left arm. Lamisil [Terbinafine]  10/18/2013    Swelling Tongue swelling Metformin  03/16/2016    Other (comments) Elevated cr 1.4 Morphine  10/18/2013    Other (comments) \"loss of blood pressure\" Other Medication  10/18/2013    Other (comments) Doxasylin causes extreme GERD Pcn [Penicillins]  10/18/2013    Rash Pentothal [Thiopental Sodium]  10/18/2013    Shortness of Breath Sulfa (Sulfonamide Antibiotics)  10/18/2013    Rash Current Immunizations  Reviewed on 7/20/2016 Name Date Influenza Vaccine 11/3/2014 Influenza Vaccine (Quad) PF 1/25/2017, 9/16/2015 Pneumococcal Conjugate (PCV-13) 7/20/2016 Pneumococcal Polysaccharide (PPSV-23) 3/30/2017 Tdap 5/16/2016 Zoster Vaccine, Live 6/15/2017 Not reviewed this visit You Were Diagnosed With   
  
 Codes Comments Essential hypertension    -  Primary ICD-10-CM: I10 
ICD-9-CM: 401.9 Hyperlipidemia, unspecified hyperlipidemia type     ICD-10-CM: E78.5 ICD-9-CM: 272.4 Chronic obstructive pulmonary disease, unspecified COPD type ([facility]     ICD-10-CM: J44.9 ICD-9-CM: 662 Type 2 diabetes mellitus with complication, with long-term current use of insulin (HCC)     ICD-10-CM: E11.8, Z79.4 ICD-9-CM: 250.90, V58.67 Vitals BP Pulse Resp Height(growth percentile) Weight(growth percentile) SpO2  
 110/74 (BP 1 Location: Left arm, BP Patient Position: Sitting) 90 16 6' 2\" (1.88 m) 280 lb (127 kg) 94% BMI Smoking Status 35.95 kg/m2 Former Smoker Vitals History BMI and BSA Data Body Mass Index Body Surface Area 35.95 kg/m 2 2.58 m 2 [facility] [address] [place] [address] [phone] Your Updated Medication List  
  
   
This list is accurate as of: 2/19/18  9:25 AM.  Always use your most recent med list.  
  
  
  
  
 ACCU-CHEK JAZZY PLUS TEST STRP strip Generic drug:  glucose blood VI test strips [address] Generic drug:  Lancets ACULAR 0.5 % ophthalmic solution Generic drug:  ketorolac Administer 1 Drop to both eyes two (2) times a day. albuterol 2.5 mg /3 mL (0.083 %) nebulizer solution Commonly known as:  PROVENTIL VENTOLIN  
3 mL by Nebulization route every four (4) hours as needed for Wheezing or Shortness of Breath. atorvastatin 20 mg tablet Commonly known as:  LIPITOR Take 1 Tab by mouth daily. betamethasone dipropionate 0.05 % ointment Commonly known as:  DIPROLENE  
  
 budesonide-formoterol 160-4.5 mcg/actuation Hfaa Commonly known as:  SYMBICORT Take 2 Puffs by inhalation two (2) times a day. butalbital-acetaminophen-caffeine -40 mg per tablet Commonly known as:  Megan Grade Take 0.5 Tabs by mouth daily as needed for Pain. DULoxetine 60 mg capsule Commonly known as:  CYMBALTA Take 1 Cap by mouth nightly. esomeprazole 40 mg capsule Commonly known as:  Suzanne Browner Take 40 mg by mouth two (2) times a day. fenofibrate nanocrystallized 145 mg tablet Commonly known as:  [employer] Take 1 Tab by mouth daily. fluticasone 50 mcg/actuation nasal spray Commonly known as:  FLONASE  
USE ONE SPRAY IN EACH NOSTRIL ONCE DAILY HYDROcodone-acetaminophen 5-325 mg per tablet Commonly known as:  Mertie [place] Take 1 Tab by mouth daily as needed for Pain. insulin glargine 100 unit/mL (3 mL) Inpn Commonly known as:  BASAGLAR KWIKPEN U-100 INSULIN  
52 units at bedtime  
  
 insulin glulisine U-100 100 unit/mL pen Commonly known as:  APIDRA SOLOSTAR U-100 INSULIN  
2 units per carb consumed. Max 30 / day JANUVIA 100 mg tablet Generic drug:  SITagliptin Take 100 mg by mouth daily. levothyroxine 150 mcg tablet Commonly known as:  SYNTHROID Take 1 Tab by mouth Daily (before breakfast). lisinopril 5 mg tablet Commonly known as:  Enrigue Sails Take 1 Tab by mouth daily. metoprolol tartrate 25 mg tablet Commonly known as:  LOPRESSOR Take 1 Tab by mouth two (2) times a day. montelukast 10 mg tablet Commonly known as:  SINGULAIR  
TAKE ONE TABLET BY MOUTH ONCE DAILY  
  
 naloxone 4 mg/actuation nasal spray Commonly known as:  NARCAN  
4 mg by Nasal route as needed. * BD ULTRA-FINE FANI PEN NEEDLES  
by Does Not Apply route. 4mm x 32G * PEN NEEDLE 31 gauge x 5/16\" Ndle Generic drug:  Insulin Needles (Disposable) USE ONE PEN NEEDLE FOR LANTUS FLEXPEN AND 3 PEN NEEDLES FOR APIDRA WITH EACH MEAL * Fani Pen Needle 32 gauge x 5/32\" Ndle Generic drug:  Insulin Needles (Disposable) USE AT BEDTIME  
  
 pregabalin 150 mg capsule Commonly known as:  Pamella Milo TAKE 1 CAPSULE BY MOUTH THREE TIMES DAILY FOR 30 DAYS. MAXIMUM 3 CAPSULES DAILY. tamsulosin 0.4 mg capsule Commonly known as:  FLOMAX Take 1 Cap by mouth two (2) times a day. tiotropium 18 mcg inhalation capsule Commonly known as:  Fonnie Ditty Take 1 Cap by inhalation daily. varicella-zoster vacine live 19,400 unit/0.65 mL Susr injection Generic drug:  varicella zoster vaccine live * Notice: This list has 3 medication(s) that are the same as other medications prescribed for you. Read the directions carefully, and ask your doctor or other care provider to review them with you. We Performed the Following AMB POC EKG ROUTINE W/ 12 LEADS, INTER & REP [24019 CPT(R)] Introducing [facility]! [facility] introduces [employer] patient portal. Now you can access parts of your medical record, email your doctor's office, and request medication refills online. 1. In your internet browser, go to [email or web address] 2. Click on the First Time User? Click Here link in the Sign In box. You will see the New Member Sign Up page. 3. Enter your [employer] Access Code exactly as it appears below. You will not need to use this code after youve completed the sign-up process. If you do not sign up before the expiration date, you must request a new code. · [employer] Access Code: [email or web address] Expires: 2/25/2018 10:19 AM 
 
4. Enter the last four digits of your Social Security Number (xxxx) and Date of Birth (mm/dd/yyyy) as indicated and click Submit. You will be taken to the next sign-up page. 5. Create a [employer] ID. This will be your [employer] login ID and cannot be changed, so think of one that is secure and easy to remember. 6. Create a [employer] password. You can change your password at any time. 7. Enter your Password Reset Question and Answer. This can be used at a later time if you forget your password. 8. Enter your e-mail address. You will receive e-mail notification when new information is available in [address] 9. Click Sign Up. You can now view and download portions of your medical record. 10. Click the Download Summary menu link to download a portable copy of your medical information. If you have questions, please visit the Frequently Asked Questions section of the [employer] website. Remember, [employer] is NOT to be used for urgent needs. For medical emergencies, dial 911. Now available from your iPhone and Android! Please provide this summary of care documentation to your next provider. Your primary care clinician is listed as Renae Polanco. If you have any questions after today's visit, please call [phone].

## 2018-02-19 NOTE — PROGRESS NOTES
1. Have you been to the ER, urgent care clinic since your last visit? Hospitalized since your last visit? No    2. Have you seen or consulted any other health care providers outside of the [address] [place] [address] since your last visit? Include any pap smears or colon screening.  No

## 2018-02-20 ENCOUNTER — OFFICE VISIT (OUTPATIENT)
Dept: PAIN MANAGEMENT | Age: 68
End: 2018-02-20

## 2018-02-20 VITALS
BODY MASS INDEX: 35.95 KG/M2 | TEMPERATURE: 97.7 F | WEIGHT: 280 LBS | SYSTOLIC BLOOD PRESSURE: 117 MMHG | DIASTOLIC BLOOD PRESSURE: 81 MMHG | RESPIRATION RATE: 16 BRPM | HEART RATE: 88 BPM

## 2018-02-20 DIAGNOSIS — M54.41 CHRONIC BILATERAL LOW BACK PAIN WITH BILATERAL SCIATICA: Primary | ICD-10-CM

## 2018-02-20 DIAGNOSIS — M47.896 OTHER OSTEOARTHRITIS OF SPINE, LUMBAR REGION: ICD-10-CM

## 2018-02-20 DIAGNOSIS — Z79.899 ENCOUNTER FOR LONG-TERM (CURRENT) USE OF HIGH-RISK MEDICATION: ICD-10-CM

## 2018-02-20 DIAGNOSIS — M51.36 DDD (DEGENERATIVE DISC DISEASE), LUMBAR: ICD-10-CM

## 2018-02-20 DIAGNOSIS — G62.89 OTHER POLYNEUROPATHY: ICD-10-CM

## 2018-02-20 DIAGNOSIS — G89.4 CHRONIC PAIN SYNDROME: ICD-10-CM

## 2018-02-20 DIAGNOSIS — M54.42 CHRONIC BILATERAL LOW BACK PAIN WITH BILATERAL SCIATICA: Primary | ICD-10-CM

## 2018-02-20 DIAGNOSIS — G89.29 CHRONIC BILATERAL LOW BACK PAIN WITH BILATERAL SCIATICA: Primary | ICD-10-CM

## 2018-02-20 LAB
ALCOHOL UR POC: NORMAL
AMPHETAMINES UR POC: NEGATIVE
BARBITURATES UR POC: NORMAL
BENZODIAZEPINES UR POC: NEGATIVE
BUPRENORPHINE UR POC: NEGATIVE
CANNABINOIDS UR POC: NEGATIVE
CARISOPRODOL UR POC: NORMAL
COCAINE UR POC: NEGATIVE
FENTANYL UR POC: NORMAL
MDMA/ECSTASY UR POC: NORMAL
METHADONE UR POC: NEGATIVE
METHAMPHETAMINE UR POC: NORMAL
METHYLPHENIDATE UR POC: NORMAL
OPIATES UR POC: NEGATIVE
OXYCODONE UR POC: NORMAL
PHENCYCLIDINE UR POC: NORMAL
PROPOXYPHENE UR POC: NORMAL
TRAMADOL UR POC: NORMAL
TRICYCLICS UR POC: NORMAL

## 2018-02-20 RX ORDER — OXYCODONE HCL 10 MG/1
10 TABLET, FILM COATED, EXTENDED RELEASE ORAL EVERY 8 HOURS
Qty: 60 TAB | Refills: 0 | Status: SHIPPED | OUTPATIENT
Start: 2018-04-30 | End: 2018-03-08

## 2018-02-20 RX ORDER — HYDROCODONE BITARTRATE AND ACETAMINOPHEN 5; 325 MG/1; MG/1
1 TABLET ORAL
Qty: 30 TAB | Refills: 0 | Status: CANCELLED | OUTPATIENT
Start: 2018-02-20 | End: 2018-03-22

## 2018-02-20 RX ORDER — DULOXETIN HYDROCHLORIDE 60 MG/1
60 CAPSULE, DELAYED RELEASE ORAL
Qty: 30 CAP | Refills: 5 | Status: SHIPPED | OUTPATIENT
Start: 2018-03-13 | End: 2018-05-22 | Stop reason: SDUPTHER

## 2018-02-20 RX ORDER — OXYCODONE HCL 10 MG/1
10 TABLET, FILM COATED, EXTENDED RELEASE ORAL EVERY 8 HOURS
Qty: 90 TAB | Refills: 0 | Status: SHIPPED | OUTPATIENT
Start: 2018-04-01 | End: 2018-03-08

## 2018-02-20 RX ORDER — OXYCODONE HCL 10 MG/1
10 TABLET, FILM COATED, EXTENDED RELEASE ORAL EVERY 8 HOURS
Qty: 90 TAB | Refills: 0 | Status: CANCELLED | OUTPATIENT
Start: 2018-02-20 | End: 2018-03-22

## 2018-02-20 RX ORDER — PREGABALIN 150 MG/1
CAPSULE ORAL
Qty: 90 CAP | Refills: 5 | Status: SHIPPED | OUTPATIENT
Start: 2018-02-28 | End: 2018-08-20 | Stop reason: SDUPTHER

## 2018-02-20 RX ORDER — OXYCODONE HCL 10 MG/1
10 TABLET, FILM COATED, EXTENDED RELEASE ORAL EVERY 8 HOURS
Qty: 90 TAB | Refills: 0 | Status: SHIPPED | OUTPATIENT
Start: 2018-03-02 | End: 2018-04-01

## 2018-02-20 NOTE — MR AVS SNAPSHOT
[address] [facility] [address] 
[phone] Patient: Real Damico MRN: [id number] ELB:[birth year] Visit Information Date & Time Provider Department Dept. Phone Encounter #  
 2/20/2018 10:40 AM Joselyn Son [facility] [id number] Follow-up Instructions Return in about 3 months (around 5/20/2018). Your Appointments 2/20/2018 10:40 AM  
Follow Up with PAUL Son [facility] (REJI SCHEDULING) Appt Note: Return in about 9 weeks (around 2/20/2018). pt states will be good on rx 30 [facility] [address]  
[phone] [address]  
  
    
 2/27/2018  9:30 AM  
ROUTINE CARE with August Ramires MD  
[address] [facility] ([address]) Appt Note: routine f/u 3mo [address] [facility] [address] [facility] [address] [facility]  
  
    
 3/8/2018  9:00 AM  
Office Visit with Mya Valera MD  
[facility] [address]) Appt Note: check up [address] [address]  
[phone] [address] [address]  
  
    
 2/18/2019  9:20 AM  
Follow Up with Mario De Los Santos MD  
[facility] ([address]) Appt Note: 1 year f/u [facility] [place] [facility] [facility] [id number]  
[phone] [address] [facility] [address] Upcoming Health Maintenance Date Due  
 FOOT EXAM Q1 1/9/2018 MICROALBUMIN Q1 3/10/2018 MEDICARE YEARLY EXAM 4/25/2018 LIPID PANEL Q1 6/16/2018 HEMOGLOBIN A1C Q6M 6/22/2018 EYE EXAM RETINAL OR DILATED Q1 7/11/2018 GLAUCOMA SCREENING Q2Y 7/11/2019 COLONOSCOPY 10/19/2023 DTaP/Tdap/Td series (2 - Td) 5/16/2026 Allergies as of 2/20/2018  Review Complete On: 2/20/2018 By: PAUL Alonso Severity Noted Reaction Type Reactions Ciprofloxacin High 11/16/2013    Anaphylaxis Other Plant, Animal, Environmental High 03/10/2016    Other (comments) Patient cannot have MRI. Patient states that he has metal screws in his left arm. Lamisil [Terbinafine]  10/18/2013    Swelling Tongue swelling Metformin  03/16/2016    Other (comments) Elevated cr 1.4 Morphine  10/18/2013    Other (comments) \"loss of blood pressure\" Other Medication  10/18/2013    Other (comments) Doxasylin causes extreme GERD Pcn [Penicillins]  10/18/2013    Rash Pentothal [Thiopental Sodium]  10/18/2013    Shortness of Breath Sulfa (Sulfonamide Antibiotics)  10/18/2013    Rash Current Immunizations  Reviewed on 7/20/2016 Name Date Influenza Vaccine 11/3/2014 Influenza Vaccine (Quad) PF 1/25/2017, 9/16/2015 Pneumococcal Conjugate (PCV-13) 7/20/2016 Pneumococcal Polysaccharide (PPSV-23) 3/30/2017 Tdap 5/16/2016 Zoster Vaccine, Live 6/15/2017 Not reviewed this visit You Were Diagnosed With   
  
 Codes Comments Chronic bilateral low back pain with bilateral sciatica    -  Primary ICD-10-CM: M54.42, M54.41, G89.29 ICD-9-CM: 724.2, 724.3, 338.29 Encounter for long-term (current) use of high-risk medication     ICD-10-CM: Z79.899 ICD-9-CM: V58.69 Chronic pain syndrome     ICD-10-CM: G89.4 ICD-9-CM: 338.4 Other osteoarthritis of spine, lumbar region     ICD-10-CM: M47.896 ICD-9-CM: 721.3 DDD (degenerative disc disease), lumbar     ICD-10-CM: M51.36 
ICD-9-CM: 722.52 Other polyneuropathy     ICD-10-CM: G62.89 ICD-9-CM: 357.89 Vitals BP Pulse Temp Resp Weight(growth percentile) BMI  
 117/81 (BP 1 Location: Left arm, BP Patient Position: Sitting) 88 97.7 °F (36.5 °C) 16 280 lb (127 kg) 35.95 kg/m2 Smoking Status Former Smoker BMI and BSA Data Body Mass Index Body Surface Area 35.95 kg/m 2 2.58 m 2 Preferred Pharmacy Pharmacy Name Phone [address] [place] [address] [address] [facility] [phone] Your Updated Medication List  
  
   
This list is accurate as of: 2/20/18 10:36 AM.  Always use your most recent med list.  
  
  
  
  
 ACCU-CHEK JAZZY PLUS TEST STRP strip Generic drug:  glucose blood VI test strips [address] Generic drug:  Lancets ACULAR 0.5 % ophthalmic solution Generic drug:  ketorolac Administer 1 Drop to both eyes two (2) times a day. albuterol 2.5 mg /3 mL (0.083 %) nebulizer solution Commonly known as:  PROVENTIL VENTOLIN  
3 mL by Nebulization route every four (4) hours as needed for Wheezing or Shortness of Breath. atorvastatin 20 mg tablet Commonly known as:  LIPITOR Take 1 Tab by mouth daily. betamethasone dipropionate 0.05 % ointment Commonly known as:  DIPROLENE  
  
 budesonide-formoterol 160-4.5 mcg/actuation Hfaa Commonly known as:  SYMBICORT Take 2 Puffs by inhalation two (2) times a day. butalbital-acetaminophen-caffeine -40 mg per tablet Commonly known as:  Ana Darvin Take 0.5 Tabs by mouth daily as needed for Pain. DULoxetine 60 mg capsule Commonly known as:  CYMBALTA Take 1 Cap by mouth nightly for 30 days. Start taking on:  3/13/2018  
  
 esomeprazole 40 mg capsule Commonly known as:  Liane Grove Take 40 mg by mouth two (2) times a day. fenofibrate nanocrystallized 145 mg tablet Commonly known as:  [employer] Take 1 Tab by mouth daily. fluticasone 50 mcg/actuation nasal spray Commonly known as:  FLONASE  
USE ONE SPRAY IN EACH NOSTRIL ONCE DAILY HYDROcodone-acetaminophen 5-325 mg per tablet Commonly known as:  Adjesusita Otto  
 Take 1 Tab by mouth daily as needed for Pain. insulin glargine 100 unit/mL (3 mL) Inpn Commonly known as:  BASAGLAR KWIKPEN U-100 INSULIN  
52 units at bedtime  
  
 insulin glulisine U-100 100 unit/mL pen Commonly known as:  APIDRA SOLOSTAR U-100 INSULIN  
2 units per carb consumed. Max 30 / day JANUVIA 100 mg tablet Generic drug:  SITagliptin Take 100 mg by mouth daily. levothyroxine 150 mcg tablet Commonly known as:  SYNTHROID Take 1 Tab by mouth Daily (before breakfast). lisinopril 5 mg tablet Commonly known as:  Ladona Dunks Take 1 Tab by mouth daily. metoprolol tartrate 25 mg tablet Commonly known as:  LOPRESSOR Take 1 Tab by mouth two (2) times a day. montelukast 10 mg tablet Commonly known as:  SINGULAIR  
TAKE ONE TABLET BY MOUTH ONCE DAILY  
  
 naloxone 4 mg/actuation nasal spray Commonly known as:  NARCAN  
4 mg by Nasal route as needed. * oxyCODONE ER 10 mg ER tablet Commonly known as:  OxyCONTIN Take 1 Tab by mouth every eight (8) hours for 30 days. Max Daily Amount: 30 mg. Start taking on:  3/2/2018 * oxyCODONE ER 10 mg ER tablet Commonly known as:  OxyCONTIN Take 1 Tab by mouth every eight (8) hours for 30 days. Max Daily Amount: 30 mg. Start taking on:  4/1/2018 * oxyCODONE ER 10 mg ER tablet Commonly known as:  OxyCONTIN Take 1 Tab by mouth every eight (8) hours. Max Daily Amount: 30 mg. Start taking on:  4/30/2018 * BD ULTRA-FINE FANI PEN NEEDLES  
by Does Not Apply route. 4mm x 32G * PEN NEEDLE 31 gauge x 5/16\" Ndle Generic drug:  Insulin Needles (Disposable) USE ONE PEN NEEDLE FOR LANTUS FLEXPEN AND 3 PEN NEEDLES FOR APIDRA WITH EACH MEAL * Fani Pen Needle 32 gauge x 5/32\" Ndle Generic drug:  Insulin Needles (Disposable) USE AT BEDTIME  
  
 pregabalin 150 mg capsule Commonly known as:  Magda Mary Ellen TAKE 1 CAPSULE BY MOUTH THREE TIMES DAILY FOR 30 DAYS.  MAXIMUM 3 CAPSULES DAILY. Start taking on:  2/28/2018  
  
 tamsulosin 0.4 mg capsule Commonly known as:  FLOMAX Take 1 Cap by mouth two (2) times a day. tiotropium 18 mcg inhalation capsule Commonly known as:  Donnajean Hay Take 1 Cap by inhalation daily. varicella-zoster vacine live 19,400 unit/0.65 mL Susr injection Generic drug:  varicella zoster vaccine live * Notice: This list has 6 medication(s) that are the same as other medications prescribed for you. Read the directions carefully, and ask your doctor or other care provider to review them with you. Prescriptions Printed Refills  
 oxyCODONE ER (OXYCONTIN) 10 mg ER tablet 0 Starting on: 3/2/2018 Sig: Take 1 Tab by mouth every eight (8) hours for 30 days. Max Daily Amount: 30 mg.  
 Class: Print Route: Oral  
 oxyCODONE ER (OXYCONTIN) 10 mg ER tablet 0 Starting on: 4/1/2018 Sig: Take 1 Tab by mouth every eight (8) hours for 30 days. Max Daily Amount: 30 mg.  
 Class: Print Route: Oral  
 oxyCODONE ER (OXYCONTIN) 10 mg ER tablet 0 Starting on: 4/30/2018 Sig: Take 1 Tab by mouth every eight (8) hours. Max Daily Amount: 30 mg.  
 Class: Print Route: Oral  
 pregabalin (LYRICA) 150 mg capsule 5 Starting on: 2/28/2018 Sig: TAKE 1 CAPSULE BY MOUTH THREE TIMES DAILY FOR 30 DAYS. MAXIMUM 3 CAPSULES DAILY. Class: Print Prescriptions Sent to Pharmacy Refills DULoxetine (CYMBALTA) 60 mg capsule 5 Starting on: 3/13/2018 Sig: Take 1 Cap by mouth nightly for 30 days. Class: Normal  
 Pharmacy: [address] [place] [address] #: [phone] Route: Oral  
  
We Performed the Following AMB POC DRUG SCREEN () [ [facility]] DRUG SCREEN [[id number] Custom] Follow-up Instructions Return in about 3 months (around 5/20/2018). Patient Instructions 1. Continue current plan with no evidence of addiction or diversion. Stable on current medication without adverse events. 2. Continue hydrocodone 5/325 mg up to 2 times a day as needed for pain. Currently uses very sparingly and has plenty of medication left over with unfilled rx. 3. Refill Cymbalta 60 mg once daily. 5 refills 4. Refill OxyContin 10 mg every 8 hours 5. Refill Lyrica 150 mg capsule 3 times daily. 5 refills 6. Discussed risks of addiction, dependency, and opioid induced hyperalgesia. 7. Return to clinic in 3 months Introducing [facility]! [address] introduces [employer] patient portal. Now you can access parts of your medical record, email your doctor's office, and request medication refills online. 1. In your internet browser, go to https://"[employer]. [employer]/"[employer] 2. Click on the First Time User? Click Here link in the Sign In box. You will see the New Member Sign Up page. 3. Enter your [employer] Access Code exactly as it appears below. You will not need to use this code after youve completed the sign-up process. If you do not sign up before the expiration date, you must request a new code. · [employer] Access Code: [email or web address] Expires: 2/25/2018 10:19 AM 
 
4. Enter the last four digits of your Social Security Number (xxxx) and Date of Birth (mm/dd/yyyy) as indicated and click Submit. You will be taken to the next sign-up page. 5. Create a [employer] ID. This will be your [employer] login ID and cannot be changed, so think of one that is secure and easy to remember. 6. Create a [employer] password. You can change your password at any time. 7. Enter your Password Reset Question and Answer. This can be used at a later time if you forget your password. 8. Enter your e-mail address. You will receive e-mail notification when new information is available in [address] 9. Click Sign Up. You can now view and download portions of your medical record. 10. Click the Download Summary menu link to download a portable copy of your medical information. If you have questions, please visit the Frequently Asked Questions section of the [employer] website. Remember, [employer] is NOT to be used for urgent needs. For medical emergencies, dial 911. Now available from your iPhone and Android! Please provide this summary of care documentation to your next provider. Your primary care clinician is listed as Etelvina Modi. If you have any questions after today's visit, please call [phone].

## 2018-02-20 NOTE — PROGRESS NOTES
HISTORY OF PRESENT ILLNESS  Connie Mcfadden is a 69 y.o. male. HPI Mr. Shante Navarro returns today for followup of chronic low back and leg pain s/p work-related injury and h/o multiple lumbar surgeries and interventional procedures, including L4-S1 fusion, ESIs, MB RFAs, and spinal cord stimulator trial. Good improvement with Tens unit. He continues unchanged since last visit. He continues to do well with his current treatment plan which has been offering significant pain control. He reports that his Lyrica has been a \"lifesaver. \"  He reports this medication has helped significantly with his radiating pains. He reports no new complaints today. We will continue with his current treatment plan with no changes. I will have him follow-up in 3 months for further evaluation and recommendation. Medication management consists of OxyContin 10 mg TID, Norco 5 bid prn, Lyrica 150 mg TID, and Cymbalta 60 mg/day. He continues using a TENS unit which he describes as the \"best thing\" for his pain. Medications are helping with pain control and quality of life. his pain is 5-6/10 with medication and 10/10 without. Pt describes pain as constant, aching, sharp, tingling and radiating. Aggravating factors include ADLs, while alleviated by medication and activity modification. Current treatment is helping to improve general activity, mood, walking, sleep, enjoyment of life     Pt does report constipation but is well controlled   he is otherwise doing well with no other complaints today. he denies any adverse events including nausea, vomiting, dizziness, increased constipation, hallucinations, or seizures. POC UDS today. Confirmation pending. PRIOR IMAGIN.  Lumbar CT 2016: Moderate to severe canal narrowing at L1-L2 as result of facet arthropathy, broad-based disc bulge and epidural fat. Postoperative changes from L3-L4 through L5-S1 without evidence of complication.       Allergies   Allergen Reactions    Ciprofloxacin Anaphylaxis    Other Plant, Animal, Environmental Other (comments)     Patient cannot have MRI. Patient states that he has metal screws in his left arm.  Lamisil [Terbinafine] Swelling     Tongue swelling    Metformin Other (comments)     Elevated cr 1.4    Morphine Other (comments)     \"loss of blood pressure\"    Other Medication Other (comments)     Doxasylin causes extreme GERD    Pcn [Penicillins] Rash    Pentothal [Thiopental Sodium] Shortness of Breath    Sulfa (Sulfonamide Antibiotics) Rash       Past Surgical History:   Procedure Laterality Date    HX BACK SURGERY  2000    removal of defective hardware    HX BACK SURGERY  1980    to remove bone fragments    HX LUMBAR FUSION  1999    fusion from L4-S1 and implantation of hardware    HX LUMBAR FUSION  1984    fusion on L5 area    HX ORTHOPAEDIC Bilateral 2004    trigger finger syndrome-all 4 fingers    HX ORTHOPAEDIC Left 2004    left arm torn muscle repair and placement of 2 screws         Review of Systems   Constitutional: Positive for malaise/fatigue. Negative for chills and fever. HENT: Positive for hearing loss. Respiratory: Negative for cough. Cardiovascular: Positive for leg swelling. Negative for palpitations. Gastrointestinal: Positive for constipation. Negative for diarrhea, heartburn, nausea and vomiting. Musculoskeletal: Positive for joint pain. Negative for falls. Skin: Negative for rash. Neurological: Negative for dizziness. Psychiatric/Behavioral: Positive for depression. Negative for suicidal ideas. The patient is nervous/anxious and has insomnia. Physical Exam   Constitutional: He is oriented to person, place, and time. He appears well-developed and well-nourished. No distress. HENT:   Head: Normocephalic and atraumatic. Pulmonary/Chest: Effort normal. No respiratory distress. Neurological: He is alert and oriented to person, place, and time. Skin: Skin is warm and dry.  He is not diaphoretic. Psychiatric: He has a normal mood and affect. His speech is normal. Cognition and memory are normal.   Nursing note and vitals reviewed. ASSESSMENT:    1. Chronic bilateral low back pain with bilateral sciatica    2. Encounter for long-term (current) use of high-risk medication    3. Chronic pain syndrome    4. Other osteoarthritis of spine, lumbar region    5. DDD (degenerative disc disease), lumbar    6. Other polyneuropathy         [place] Prescription Monitoring Program was reviewed which does not demonstrate aberrancies and/or inconsistencies with regard to the historical prescribing of controlled medications to this patient by other providers. PLAN / Pt Instructions:  1. Continue current plan with no evidence of addiction or diversion. Stable on current medication without adverse events. 2. Continue hydrocodone 5/325 mg up to 2 times a day as needed for pain. Currently uses very sparingly and has plenty of medication left over with unfilled rx. 3. Refill Cymbalta 60 mg once daily. 5 refills   4. Refill OxyContin 10 mg every 8 hours   5. Refill Lyrica 150 mg capsule 3 times daily. 5 refills  6. Discussed risks of addiction, dependency, and opioid induced hyperalgesia. 7. Return to clinic in 3 months     Medications Ordered Today   Medications    oxyCODONE ER (OXYCONTIN) 10 mg ER tablet     Sig: Take 1 Tab by mouth every eight (8) hours for 30 days. Max Daily Amount: 30 mg. Dispense:  90 Tab     Refill:  0    oxyCODONE ER (OXYCONTIN) 10 mg ER tablet     Sig: Take 1 Tab by mouth every eight (8) hours for 30 days. Max Daily Amount: 30 mg. Dispense:  90 Tab     Refill:  0    oxyCODONE ER (OXYCONTIN) 10 mg ER tablet     Sig: Take 1 Tab by mouth every eight (8) hours. Max Daily Amount: 30 mg. Dispense:  60 Tab     Refill:  0    DULoxetine (CYMBALTA) 60 mg capsule     Sig: Take 1 Cap by mouth nightly for 30 days.      Dispense:  30 Cap     Refill:  5    pregabalin (LYRICA) 150 mg capsule     Sig: TAKE 1 CAPSULE BY MOUTH THREE TIMES DAILY FOR 30 DAYS. MAXIMUM 3 CAPSULES DAILY. Dispense:  90 Cap     Refill:  5       Pain medications prescribed with the objective of pain relief and improved physical and psychosocial function in this patient. Spent 25 minutes with patient today reviewing the treatment plan, goals of treatment plan, and limitations of the treatment plan, to include the potential for side effects from medications and procedures. Zach Kendrick, [id number] 2/20/2018     Note: Please excuse any typographical errors. Voice recognition software was used for this note and may cause mistakes.

## 2018-02-20 NOTE — PATIENT INSTRUCTIONS
1. Continue current plan with no evidence of addiction or diversion. Stable on current medication without adverse events. 2. Continue hydrocodone 5/325 mg up to 2 times a day as needed for pain. Currently uses very sparingly and has plenty of medication left over with unfilled rx. 3. Refill Cymbalta 60 mg once daily. 5 refills   4. Refill OxyContin 10 mg every 8 hours   5. Refill Lyrica 150 mg capsule 3 times daily. 5 refills  6. Discussed risks of addiction, dependency, and opioid induced hyperalgesia.    7. Return to clinic in 3 months

## 2018-02-20 NOTE — PROGRESS NOTES
Nursing Notes    Patient presents to the office today in follow-up. Patient rates his pain at 5/10 on the numerical pain scale. Reviewed medications with counts as follows:    Rx Date filled Qty Dispensed Pill Count Last Dose Short   Norco 5 mg 01/02/18 30 30` Nov  no   Oxycontin 10 CR 02/01/18 90 33 This am  no         Comments: Patient is here today for a follow up appt today he states his pain level today is a 5  He states his PCM took labs from him. POC UDS was performed in office today per verbal order per [facility]    Any new labs or imaging since last appointment? YES labs done at [facility]    Have you been to an emergency room (ER) or urgent care clinic since your last visit? NO            Have you been hospitalized since your last visit? NO     If yes, where, when, and reason for visit? Have you seen or consulted any other health care providers outside of the [address] [place] [address]  since your last visit? YES PCM     If yes, where, when, and reason for visit? HM deferred to pcp.

## 2018-02-27 ENCOUNTER — OFFICE VISIT (OUTPATIENT)
Dept: FAMILY MEDICINE CLINIC | Age: 68
End: 2018-02-27

## 2018-02-27 VITALS
DIASTOLIC BLOOD PRESSURE: 78 MMHG | SYSTOLIC BLOOD PRESSURE: 120 MMHG | RESPIRATION RATE: 16 BRPM | HEIGHT: 74 IN | HEART RATE: 92 BPM | OXYGEN SATURATION: 92 % | WEIGHT: 280.4 LBS | BODY MASS INDEX: 35.99 KG/M2

## 2018-02-27 DIAGNOSIS — E11.8 TYPE 2 DIABETES MELLITUS WITH COMPLICATION, WITH LONG-TERM CURRENT USE OF INSULIN (HCC): ICD-10-CM

## 2018-02-27 DIAGNOSIS — K21.9 GASTROESOPHAGEAL REFLUX DISEASE WITHOUT ESOPHAGITIS: ICD-10-CM

## 2018-02-27 DIAGNOSIS — L02.92 BOIL: Primary | ICD-10-CM

## 2018-02-27 DIAGNOSIS — E03.9 HYPOTHYROIDISM, UNSPECIFIED TYPE: ICD-10-CM

## 2018-02-27 DIAGNOSIS — Z79.4 TYPE 2 DIABETES MELLITUS WITH COMPLICATION, WITH LONG-TERM CURRENT USE OF INSULIN (HCC): ICD-10-CM

## 2018-02-27 DIAGNOSIS — G56.02 CARPAL TUNNEL SYNDROME OF LEFT WRIST: ICD-10-CM

## 2018-02-27 DIAGNOSIS — R23.8 OTHER SKIN CHANGES: ICD-10-CM

## 2018-02-27 DIAGNOSIS — E78.5 HYPERLIPIDEMIA, UNSPECIFIED HYPERLIPIDEMIA TYPE: ICD-10-CM

## 2018-02-27 DIAGNOSIS — J44.9 CHRONIC OBSTRUCTIVE PULMONARY DISEASE, UNSPECIFIED COPD TYPE (HCC): ICD-10-CM

## 2018-02-27 RX ORDER — CLINDAMYCIN HYDROCHLORIDE 150 MG/1
150 CAPSULE ORAL 3 TIMES DAILY
Qty: 21 CAP | Refills: 0 | Status: SHIPPED | OUTPATIENT
Start: 2018-02-27 | End: 2018-03-06

## 2018-02-27 NOTE — PATIENT INSTRUCTIONS
Learning About Diabetes Food Guidelines  Your Care Instructions    Meal planning is important to manage diabetes. It helps keep your blood sugar at a target level (which you set with your doctor). You don't have to eat special foods. You can eat what your family eats, including sweets once in a while. But you do have to pay attention to how often you eat and how much you eat of certain foods. You may want to work with a dietitian or a certified diabetes educator (CDE) to help you plan meals and snacks. A dietitian or CDE can also help you lose weight if that is one of your goals. What should you know about eating carbs? Managing the amount of carbohydrate (carbs) you eat is an important part of healthy meals when you have diabetes. Carbohydrate is found in many foods. · Learn which foods have carbs. And learn the amounts of carbs in different foods. ¨ Bread, cereal, pasta, and rice have about 15 grams of carbs in a serving. A serving is 1 slice of bread (1 ounce), ½ cup of cooked cereal, or 1/3 cup of cooked pasta or rice. ¨ Fruits have 15 grams of carbs in a serving. A serving is 1 small fresh fruit, such as an apple or orange; ½ of a banana; ½ cup of cooked or canned fruit; ½ cup of fruit juice; 1 cup of melon or raspberries; or 2 tablespoons of dried fruit. ¨ Milk and no-sugar-added yogurt have 15 grams of carbs in a serving. A serving is 1 cup of milk or 2/3 cup of no-sugar-added yogurt. ¨ Starchy vegetables have 15 grams of carbs in a serving. A serving is ½ cup of mashed potatoes or sweet potato; 1 cup winter squash; ½ of a small baked potato; ½ cup of cooked beans; or ½ cup cooked corn or green peas. · Learn how much carbs to eat each day and at each meal. A dietitian or CDE can teach you how to keep track of the amount of carbs you eat. This is called carbohydrate counting. · If you are not sure how to count carbohydrate grams, use the Plate Method to plan meals.  It is a good, quick way to make sure that you have a balanced meal. It also helps you spread carbs throughout the day. ¨ Divide your plate by types of foods. Put non-starchy vegetables on half the plate, meat or other protein food on one-quarter of the plate, and a grain or starchy vegetable in the final quarter of the plate. To this you can add a small piece of fruit and 1 cup of milk or yogurt, depending on how many carbs you are supposed to eat at a meal.  · Try to eat about the same amount of carbs at each meal. Do not \"save up\" your daily allowance of carbs to eat at one meal.  · Proteins have very little or no carbs per serving. Examples of proteins are beef, chicken, turkey, fish, eggs, tofu, cheese, cottage cheese, and peanut butter. A serving size of meat is 3 ounces, which is about the size of a deck of cards. Examples of meat substitute serving sizes (equal to 1 ounce of meat) are 1/4 cup of cottage cheese, 1 egg, 1 tablespoon of peanut butter, and ½ cup of tofu. How can you eat out and still eat healthy? · Learn to estimate the serving sizes of foods that have carbohydrate. If you measure food at home, it will be easier to estimate the amount in a serving of restaurant food. · If the meal you order has too much carbohydrate (such as potatoes, corn, or baked beans), ask to have a low-carbohydrate food instead. Ask for a salad or green vegetables. · If you use insulin, check your blood sugar before and after eating out to help you plan how much to eat in the future. · If you eat more carbohydrate at a meal than you had planned, take a walk or do other exercise. This will help lower your blood sugar. What else should you know? · Limit saturated fat, such as the fat from meat and dairy products. This is a healthy choice because people who have diabetes are at higher risk of heart disease. So choose lean cuts of meat and nonfat or low-fat dairy products.  Use olive or canola oil instead of butter or shortening when cooking. · Don't skip meals. Your blood sugar may drop too low if you skip meals and take insulin or certain medicines for diabetes. · Check with your doctor before you drink alcohol. Alcohol can cause your blood sugar to drop too low. Alcohol can also cause a bad reaction if you take certain diabetes medicines. Follow-up care is a key part of your treatment and safety. Be sure to make and go to all appointments, and call your doctor if you are having problems. It's also a good idea to know your test results and keep a list of the medicines you take. Where can you learn more? Go to http://slava-enrique.info/. Enter O578 in the search box to learn more about \"Learning About Diabetes Food Guidelines. \"  Current as of: March 13, 2017  Content Version: 11.4  © 0495-7992 [employer]. Care instructions adapted under license by [employer] (which disclaims liability or warranty for this information). If you have questions about a medical condition or this instruction, always ask your healthcare professional. [place] [address] any warranty or liability for your use of this information. Chronic Obstructive Pulmonary Disease (COPD): Care Instructions  Your Care Instructions    Chronic obstructive pulmonary disease (COPD) is a general term for a group of lung diseases, including emphysema and chronic bronchitis. People with COPD have decreased airflow in and out of the lungs, which makes it hard to breathe. The airways also can get clogged with thick mucus. Cigarette smoking is a major cause of COPD. Although there is no cure for COPD, you can slow its progress. Following your treatment plan and taking care of yourself can help you feel better and live longer. Follow-up care is a key part of your treatment and safety. Be sure to make and go to all appointments, and call your doctor if you are having problems.  It's also a good idea to know your test results and keep a list of the medicines you take. How can you care for yourself at home? ?Staying healthy  ? · Do not smoke. This is the most important step you can take to prevent more damage to your lungs. If you need help quitting, talk to your doctor about stop-smoking programs and medicines. These can increase your chances of quitting for good. ? · Avoid colds and flu. Get a pneumococcal vaccine shot. If you have had one before, ask your doctor whether you need a second dose. Get the flu vaccine every fall. If you must be around people with colds or the flu, wash your hands often. ? · Avoid secondhand smoke, air pollution, and high altitudes. Also avoid cold, dry air and hot, humid air. Stay at home with your windows closed when air pollution is bad. ?Medicines and oxygen therapy  ? · Take your medicines exactly as prescribed. Call your doctor if you think you are having a problem with your medicine. ? · You may be taking medicines such as:  ¨ Bronchodilators. These help open your airways and make breathing easier. Bronchodilators are either short-acting (work for 6 to 9 hours) or long-acting (work for 24 hours). You inhale most bronchodilators, so they start to act quickly. Always carry your quick-relief inhaler with you in case you need it while you are away from home. ¨ Corticosteroids (prednisone, budesonide). These reduce airway inflammation. They come in pill or inhaled form. You must take these medicines every day for them to work well. ? · A spacer may help you get more inhaled medicine to your lungs. Ask your doctor or pharmacist if a spacer is right for you. If it is, ask how to use it properly. ? · Do not take any vitamins, over-the-counter medicine, or herbal products without talking to your doctor first.   ? · If your doctor prescribed antibiotics, take them as directed. Do not stop taking them just because you feel better. You need to take the full course of antibiotics.    ? · Oxygen therapy boosts the amount of oxygen in your blood and helps you breathe easier. Use the flow rate your doctor has recommended, and do not change it without talking to your doctor first.   Activity  ? · Get regular exercise. Walking is an easy way to get exercise. Start out slowly, and walk a little more each day. ? · Pay attention to your breathing. You are exercising too hard if you cannot talk while you are exercising. ? · Take short rest breaks when doing household chores and other activities. ? · Learn breathing methods-such as breathing through pursed lips-to help you become less short of breath. ? · If your doctor has not set you up with a pulmonary rehabilitation program, talk to him or her about whether rehab is right for you. Rehab includes exercise programs, education about your disease and how to manage it, help with diet and other changes, and emotional support. Diet  ? · Eat regular, healthy meals. Use bronchodilators about 1 hour before you eat to make it easier to eat. Eat several small meals instead of three large ones. Drink beverages at the end of the meal. Avoid foods that are hard to chew. ? · Eat foods that contain protein so that you do not lose muscle mass. ? · Talk with your doctor if you gain too much weight or if you lose weight without trying. ?Mental health  ? · Talk to your family, friends, or a therapist about your feelings. It is normal to feel frightened, angry, hopeless, helpless, and even guilty. Talking openly about bad feelings can help you cope. If these feelings last, talk to your doctor. When should you call for help? Call 911 anytime you think you may need emergency care. For example, call if:  ? · You have severe trouble breathing. ?Call your doctor now or seek immediate medical care if:  ? · You have new or worse trouble breathing. ? · You cough up blood. ? · You have a fever. ? Watch closely for changes in your health, and be sure to contact your doctor if:  ? · You cough more deeply or more often, especially if you notice more mucus or a change in the color of your mucus. ? · You have new or worse swelling in your legs or belly. ? · You are not getting better as expected. Where can you learn more? Go to http://slava-enrique.info/. Nile Marie in the search box to learn more about \"Chronic Obstructive Pulmonary Disease (COPD): Care Instructions. \"  Current as of: May 12, 2017  Content Version: 11.4  © 4085-2695 [employer]. Care instructions adapted under license by [employer] (which disclaims liability or warranty for this information). If you have questions about a medical condition or this instruction, always ask your healthcare professional. [address] any warranty or liability for your use of this information. Low Sodium Diet (2,000 Milligram): Care Instructions  Your Care Instructions    Too much sodium causes your body to hold on to extra water. This can raise your blood pressure and force your heart and kidneys to work harder. In very serious cases, this could cause you to be put in the hospital. It might even be life-threatening. By limiting sodium, you will feel better and lower your risk of serious problems. The most common source of sodium is salt. People get most of the salt in their diet from canned, prepared, and packaged foods. Fast food and restaurant meals also are very high in sodium. Your doctor will probably limit your sodium to less than 2,000 milligrams (mg) a day. This limit counts all the sodium in prepared and packaged foods and any salt you add to your food. Follow-up care is a key part of your treatment and safety. Be sure to make and go to all appointments, and call your doctor if you are having problems. It's also a good idea to know your test results and keep a list of the medicines you take. How can you care for yourself at home?   Read food labels  · Read labels on cans and food packages. The labels tell you how much sodium is in each serving. Make sure that you look at the serving size. If you eat more than the serving size, you have eaten more sodium. · Food labels also tell you the Percent Daily Value for sodium. Choose products with low Percent Daily Values for sodium. · Be aware that sodium can come in forms other than salt, including monosodium glutamate (MSG), sodium citrate, and sodium bicarbonate (baking soda). MSG is often added to Asian food. When you eat out, you can sometimes ask for food without MSG or added salt. Buy low-sodium foods  · Buy foods that are labeled \"unsalted\" (no salt added), \"sodium-free\" (less than 5 mg of sodium per serving), or \"low-sodium\" (less than 140 mg of sodium per serving). Foods labeled \"reduced-sodium\" and \"light sodium\" may still have too much sodium. Be sure to read the label to see how much sodium you are getting. · Buy fresh vegetables, or frozen vegetables without added sauces. Buy low-sodium versions of canned vegetables, soups, and other canned goods. Prepare low-sodium meals  · Cut back on the amount of salt you use in cooking. This will help you adjust to the taste. Do not add salt after cooking. One teaspoon of salt has about 2,300 mg of sodium. · Take the salt shaker off the table. · Flavor your food with garlic, lemon juice, onion, vinegar, herbs, and spices. Do not use soy sauce, lite soy sauce, steak sauce, onion salt, garlic salt, celery salt, mustard, or ketchup on your food. · Use low-sodium salad dressings, sauces, and ketchup. Or make your own salad dressings and sauces without adding salt. · Use less salt (or none) when recipes call for it. You can often use half the salt a recipe calls for without losing flavor. Other foods such as rice, pasta, and grains do not need added salt. · Rinse canned vegetables, and cook them in fresh water.  This removes some-but not all-of the salt.  · Avoid water that is naturally high in sodium or that has been treated with water softeners, which add sodium. Call your local water company to find out the sodium content of your water supply. If you buy bottled water, read the label and choose a sodium-free brand. Avoid high-sodium foods  · Avoid eating:  ¨ Smoked, cured, salted, and canned meat, fish, and poultry. ¨ Ham, kohler, hot dogs, and luncheon meats. ¨ Regular, hard, and processed cheese and regular peanut butter. ¨ Crackers with salted tops, and other salted snack foods such as pretzels, chips, and salted popcorn. ¨ Frozen prepared meals, unless labeled low-sodium. ¨ Canned and dried soups, broths, and bouillon, unless labeled sodium-free or low-sodium. ¨ Canned vegetables, unless labeled sodium-free or low-sodium. ¨ [place] fries, pizza, tacos, and other fast foods. ¨ Pickles, olives, ketchup, and other condiments, especially soy sauce, unless labeled sodium-free or low-sodium. Where can you learn more? Go to http://slavaConsensus Pointenrique.info/. Enter L674 in the search box to learn more about \"Low Sodium Diet (2,000 Milligram): Care Instructions. \"  Current as of: May 12, 2017  Content Version: 11.4  © 5546-3323 [employer]. Care instructions adapted under license by [employer] (which disclaims liability or warranty for this information). If you have questions about a medical condition or this instruction, always ask your healthcare professional. [place] [address] any warranty or liability for your use of this information. Skin Abscess: Care Instructions  Your Care Instructions    A skin abscess is a bacterial infection that forms a pocket of pus. A boil is a kind of skin abscess. The doctor may have cut an opening in the abscess so that the pus can drain out. You may have gauze in the cut so that the abscess will stay open and keep draining. You may need antibiotics.  You will need to follow up with your doctor to make sure the infection has gone away. The doctor has checked you carefully, but problems can develop later. If you notice any problems or new symptoms, get medical treatment right away. Follow-up care is a key part of your treatment and safety. Be sure to make and go to all appointments, and call your doctor if you are having problems. It's also a good idea to know your test results and keep a list of the medicines you take. How can you care for yourself at home? · Apply warm and dry compresses, a heating pad set on low, or a hot water bottle 3 or 4 times a day for pain. Keep a cloth between the heat source and your skin. · If your doctor prescribed antibiotics, take them as directed. Do not stop taking them just because you feel better. You need to take the full course of antibiotics. · Take pain medicines exactly as directed. ¨ If the doctor gave you a prescription medicine for pain, take it as prescribed. ¨ If you are not taking a prescription pain medicine, ask your doctor if you can take an over-the-counter medicine. · Keep your bandage clean and dry. Change the bandage whenever it gets wet or dirty, or at least one time a day. · If the abscess was packed with gauze:  ¨ Keep follow-up appointments to have the gauze changed or removed. If the doctor instructed you to remove the gauze, gently pull out all of the gauze when your doctor tells you to. ¨ After the gauze is removed, soak the area in warm water for 15 to 20 minutes 2 times a day, until the wound closes. When should you call for help? Call your doctor now or seek immediate medical care if:  ? · You have signs of worsening infection, such as:  ¨ Increased pain, swelling, warmth, or redness. ¨ Red streaks leading from the infected skin. ¨ Pus draining from the wound. ¨ A fever. ? Watch closely for changes in your health, and be sure to contact your doctor if:  ? · You do not get better as expected. Where can you learn more? Go to http://slava-enrique.info/. Enter D826 in the search box to learn more about \"Skin Abscess: Care Instructions. \"  Current as of: October 13, 2016  Content Version: 11.4  © 1456-2831 [employer]. Care instructions adapted under license by [employer] (which disclaims liability or warranty for this information). If you have questions about a medical condition or this instruction, always ask your healthcare professional. [place] [address] any warranty or liability for your use of this information.

## 2018-02-27 NOTE — PROGRESS NOTES
HISTORY OF PRESENT ILLNESS  Sydnee Apgar is a 69 y.o. male. HPI : Here for routine follow up and also had few concern. C/o boil over rt side of jaw. Since few days. Growing in the size and painful on and off. During shave bleed. Also had spot over his lower back near prior surgical scar and he has scratched. Now circular dry patch. No pain. Mild irritation on and off mainly after shower. No bleeding or open skin noted at this time. Also c/o left wrist pain and tingling, numbness and pain over left hand. Said it was swollen up to mid forearm but improved at this time. ROM over wrist wnl. Using wrist support and it has been helping. Chronic back pain. Following pain management. Discussed high BMI> limitation with exercise but discussed importance of diet modification. Discussed importance of weight loss and diet modification, importance of weight loss as a benefit for high blood pressure and high blood sugar. He understands and will consistent with diet modification. Will start walking as tolerated once weather gets better. H/o GERD. Now modify his diet as he used to eat spicy food. Stable on PPI. No nausea or vomiting, no abdominal pain. H/o COPD. Stable. Cough stable. No wheezing or unusual sob. Will observe. H/o diabetes. On medication. Following endo. Recently HBA1C went up and readjusted insulin dose by endo. No hypoglycemia. Today fasting sugar was around 92. On statin and ARB. H/o hypertension and hyperlipidemia. Doing diet modification and limitation with exercise. Complaint with medication and side effects. Asymptomatic. Visit Vitals    /78 (BP 1 Location: Left arm, BP Patient Position: Sitting)    Pulse 92    Resp 16    Ht 6' 2\" (1.88 m)    Wt 280 lb 6.4 oz (127.2 kg)    SpO2 92%    BMI 36 kg/m2     Review medication list, vitals, problem list,allergies. Review labs.    Lab Results   Component Value Date/Time    WBC 7.8 2015 10:43 AM    HGB 13.9 2015 10:43 AM    HCT 40.5 03/11/2015 10:43 AM    PLATELET 288 14/13/6054 10:43 AM    MCV 82 03/11/2015 10:43 AM     Lab Results   Component Value Date/Time    Sodium 140 12/22/2017 08:26 AM    Potassium 4.9 12/22/2017 08:26 AM    Chloride 103 12/22/2017 08:26 AM    CO2 31 12/22/2017 08:26 AM    Anion gap 6 12/22/2017 08:26 AM    Glucose 135 (H) 12/22/2017 08:26 AM    BUN 16 12/22/2017 08:26 AM    Creatinine 1.07 12/22/2017 08:26 AM    BUN/Creatinine ratio 15 12/22/2017 08:26 AM    GFR est AA >60 12/22/2017 08:26 AM    GFR est non-AA >60 12/22/2017 08:26 AM    Calcium 8.9 12/22/2017 08:26 AM    Bilirubin, total 0.4 12/22/2017 08:26 AM    AST (SGOT) 17 12/22/2017 08:26 AM    Alk. phosphatase 48 12/22/2017 08:26 AM    Protein, total 6.7 12/22/2017 08:26 AM    Albumin 3.7 12/22/2017 08:26 AM    Globulin 3.0 12/22/2017 08:26 AM    A-G Ratio 1.2 12/22/2017 08:26 AM    ALT (SGPT) 31 12/22/2017 08:26 AM     Lab Results   Component Value Date/Time    TSH 1.19 07/19/2017 08:24 AM     Lab Results   Component Value Date/Time    Cholesterol, total 158 01/19/2017 09:21 AM    HDL Cholesterol 33 (L) 01/19/2017 09:21 AM    LDL, calculated 77 01/19/2017 09:21 AM    VLDL, calculated 48 01/19/2017 09:21 AM    Triglyceride 240 (H) 01/19/2017 09:21 AM    CHOL/HDL Ratio 4.8 01/19/2017 09:21 AM     Lab Results   Component Value Date/Time    Hemoglobin A1c 7.1 (H) 12/22/2017 08:26 AM    Hemoglobin A1c (POC) 6.9 12/16/2015 10:29 AM    Hemoglobin A1c, External 7.2 06/16/2017     Lab Results   Component Value Date/Time    Microalbumin/Creat ratio (mg/g creat)  07/19/2016 07:19 AM     Cannot calculate ratio due to micro albumin result outside reportable range. Microalbumin,urine random <0.50 07/19/2016 07:19 AM     Had foot exam done at podiatry a month ago. ROS: Denies any headache, dizziness, no chest pain or trouble breathing, no arm or leg weakness. No nausea or vomiting, no weight or appetite changes, no depression or anxiety.  No urine or bowel complains, no palpitation, no diaphoresis. Physical Exam   Constitutional: He is oriented to person, place, and time. No distress. Neck: No thyromegaly present. Cardiovascular: Normal rate, regular rhythm and normal heart sounds. Pulmonary/Chest:   CTA   Abdominal: Soft. Bowel sounds are normal. There is no tenderness. Musculoskeletal: He exhibits no edema. Lymphadenopathy:     He has no cervical adenopathy. Neurological: He is oriented to person, place, and time. Skin:   Boil over rt jaw. Mild redness around affected area and tender on touch. No discharge. No open skin. Lower back: circular rash, flat, mild itchy. Non tender. No open skin or discharge. Psychiatric: His behavior is normal.       ASSESSMENT and PLAN    ICD-10-CM ICD-9-CM    1. Boil: probably need for I & D. We do not do that in the office. Sending to dermatology. Got an appt today. Advised to hold off on antibiotic if I & D gets done today and follow dermatology recommendations. L02.92 680.9 REFERRAL TO DERMATOLOGY      clindamycin (CLEOCIN) 150 mg capsule    rt neck    2. Other skin changes: for now sending to dermatology  R23.8 782.9 REFERRAL TO DERMATOLOGY    lower back    3. Carpal tunnel syndrome of left wrist: for now symptomatic treatment with ice, wrist support. Will observe and if needed will consider EMG. G56.02 354.0    4. BMI 36.0-36.9,adult: Discussed high BMI> limitation with exercise but discussed importance of diet modification. Discussed importance of weight loss and diet modification, importance of weight loss as a benefit for high blood pressure and high blood sugar. He understands and will consistent with diet modification. Will start walking as tolerated once weather gets better. Z68.36 V85.36    5. Type 2 diabetes mellitus with complication, with long-term current use of insulin ([address]): elevated HBA1C. Following endo. Recently adjusted insulin dose. Now taking lantus 50-52 units.   E11.8 250.90 HEMOGLOBIN A1C WITH EAG    X77.2 G90.24 METABOLIC PANEL, COMPREHENSIVE      TSH 3RD GENERATION      LIPID PANEL      MICROALBUMIN, UR, RAND W/ MICROALB/CREAT RATIO      CBC W/O DIFF   6. Chronic obstructive pulmonary disease, unspecified COPD type ([facility] [address]): stable. Continue current plan. J44.9 496    7. Hyperlipidemia, unspecified hyperlipidemia type: diet modification. Continue current dose of statin. E78.5 272.4    8. Gastroesophageal reflux disease without esophagitis: improved at this time with diet modification and PPI. Following GI .  K21.9 530.81    9. Hypothyroidism, unspecified type: asymptomatic. Some constipation could be from pain medication as well. Will observe. Recheck labs before next visit. E03.9 244.9    Pt understood and agree with the plan   Review HM   Follow-up Disposition:  Return in about 3 months (around 5/27/2018).

## 2018-02-27 NOTE — MR AVS SNAPSHOT
1017 [facility] 250 200 [facility] 
[phone] Patient: Jose M Diaz MRN: [id number] PED:01[birth year]76 Visit Information Date & Time Provider Department Dept. Phone Encounter #  
 2/27/2018  9:30 AM Og Moseley, [address] [place] [address] Road [id number] Follow-up Instructions Return in about 3 months (around 5/27/2018). Your Appointments 3/8/2018  9:00 AM  
Office Visit with Dada Ramirez MD  
[facility] [address] [place] [address]) Appt Note: check up [address] [facility] [address] [address]  
[phone] [address] [employer] [phone]  
  
    
 5/22/2018 10:00 AM  
Workers Comp F/U with PAUL Dobson [address] for Pain Management (REJI SCHEDULING) Appt Note: 3 mon f/u per rc. ..to  
 30 [facility] [address]  
[phone] [address]  
  
    
 2/18/2019  9:20 AM  
Follow Up with Delisa Ruvalcaba MD  
[facility] ([address]) Appt Note: 1 year f/u kmb [facility] [id number] [phone] [address] [place] [address] [address] Upcoming Health Maintenance Date Due MICROALBUMIN Q1 3/10/2018 MEDICARE YEARLY EXAM 4/25/2018 LIPID PANEL Q1 6/16/2018 HEMOGLOBIN A1C Q6M 6/22/2018 EYE EXAM RETINAL OR DILATED Q1 7/11/2018 FOOT EXAM Q1 1/26/2019 GLAUCOMA SCREENING Q2Y 7/11/2019 COLONOSCOPY 10/19/2023 DTaP/Tdap/Td series (2 - Td) 5/16/2026 Allergies as of 2/27/2018  Review Complete On: 2/27/2018 By: Og Moseley MD  
  
 Severity Noted Reaction Type Reactions Ciprofloxacin High 11/16/2013    Anaphylaxis Other Plant, Animal, Environmental High 03/10/2016    Other (comments) Patient cannot have MRI. Patient states that he has metal screws in his left arm. Lamisil [Terbinafine]  10/18/2013    Swelling Tongue swelling Metformin  03/16/2016    Other (comments) Elevated cr 1.4 Morphine  10/18/2013    Other (comments) \"loss of blood pressure\" Other Medication  10/18/2013    Other (comments) Doxasylin causes extreme GERD Pcn [Penicillins]  10/18/2013    Rash Pentothal [Thiopental Sodium]  10/18/2013    Shortness of Breath Sulfa (Sulfonamide Antibiotics)  10/18/2013    Rash Current Immunizations  Reviewed on 7/20/2016 Name Date Influenza Vaccine 11/3/2014 Influenza Vaccine (Quad) PF 1/25/2017, 9/16/2015 Pneumococcal Conjugate (PCV-13) 7/20/2016 Pneumococcal Polysaccharide (PPSV-23) 3/30/2017 Tdap 5/16/2016 Zoster Vaccine, Live 6/15/2017 Not reviewed this visit You Were Diagnosed With   
  
 Codes Comments Boil    -  Primary ICD-10-CM: L02.92 
ICD-9-CM: 680.9 rt neck Other skin changes     ICD-10-CM: R23.8 ICD-9-CM: 782.9 lower back Carpal tunnel syndrome of left wrist     ICD-10-CM: G56.02 
ICD-9-CM: 354.0 BMI 36.0-36.9,adult     ICD-10-CM: H44.08 
ICD-9-CM: V85.36 Type 2 diabetes mellitus with complication, with long-term current use of insulin (HCC)     ICD-10-CM: E11.8, Z79.4 ICD-9-CM: 250.90, V58.67 Chronic obstructive pulmonary disease, unspecified COPD type ([facility]     ICD-10-CM: J44.9 ICD-9-CM: 896 Hyperlipidemia, unspecified hyperlipidemia type     ICD-10-CM: E78.5 ICD-9-CM: 272.4 Gastroesophageal reflux disease without esophagitis     ICD-10-CM: K21.9 ICD-9-CM: 530.81 Hypothyroidism, unspecified type     ICD-10-CM: E03.9 ICD-9-CM: 665. 9 Vitals BP Pulse Resp Height(growth percentile) Weight(growth percentile) SpO2  
 120/78 (BP 1 Location: Left arm, BP Patient Position: Sitting) 92 16 6' 2\" (1.88 m) 280 lb 6.4 oz (127.2 kg) 92% BMI Smoking Status 39 kg/m2 Former Smoker BMI and BSA Data Body Mass Index Body Surface Area  
 36 kg/m 2 2.58 m 2 Preferred Pharmacy Pharm[facility] Name Phone [address] [facility] [place] [phone] Your Updated Medication List  
  
   
This list is accurate as of 2/27/18 10:06 AM.  Always use your most recent med list.  
  
  
  
  
 ACCU-CHEK JAZZY PLUS TEST STRP strip Generic drug:  glucose blood VI test strips [address] Generic drug:  Lancets ACULAR 0.5 % ophthalmic solution Generic drug:  ketorolac Administer 1 Drop to both eyes two (2) times a day. albuterol 2.5 mg /3 mL (0.083 %) nebulizer solution Commonly known as:  PROVENTIL VENTOLIN  
3 mL by Nebulization route every four (4) hours as needed for Wheezing or Shortness of Breath. atorvastatin 20 mg tablet Commonly known as:  LIPITOR Take 1 Tab by mouth daily. betamethasone dipropionate 0.05 % ointment Commonly known as:  DIPROLENE  
  
 budesonide-formoterol 160-4.5 mcg/actuation Hfaa Commonly known as:  SYMBICORT Take 2 Puffs by inhalation two (2) times a day. butalbital-acetaminophen-caffeine -40 mg per tablet Commonly known as:  Denver Sorrow Take 0.5 Tabs by mouth daily as needed for Pain. clindamycin 150 mg capsule Commonly known as:  CLEOCIN Take 1 Cap by mouth three (3) times daily for 7 days. DULoxetine 60 mg capsule Commonly known as:  CYMBALTA Take 1 Cap by mouth nightly for 30 days. Start taking on:  3/13/2018  
  
 esomeprazole 40 mg capsule Commonly known as:  Mandie Hides Take 40 mg by mouth two (2) times a day. fenofibrate nanocrystallized 145 mg tablet Commonly known as:  [employer] Take 1 Tab by mouth daily. fluticasone 50 mcg/actuation nasal spray Commonly known as:  FLONASE  
USE ONE SPRAY IN EACH NOSTRIL ONCE DAILY HYDROcodone-acetaminophen 5-325 mg per tablet Commonly known as:  Reji Staff Take 1 Tab by mouth daily as needed for Pain. insulin glargine 100 unit/mL (3 mL) Inpn Commonly known as:  BASAGLAR KWIKPEN U-100 INSULIN  
52 units at bedtime  
  
 insulin glulisine U-100 100 unit/mL pen Commonly known as:  APIDRA SOLOSTAR U-100 INSULIN  
2 units per carb consumed. Max 30 / day JANUVIA 100 mg tablet Generic drug:  SITagliptin Take 50 mg by mouth daily. Indications: patient stated he was given 50 mg  
  
 levothyroxine 150 mcg tablet Commonly known as:  SYNTHROID Take 1 Tab by mouth Daily (before breakfast). lisinopril 5 mg tablet Commonly known as:  Ladona Dunks Take 1 Tab by mouth daily. metoprolol tartrate 25 mg tablet Commonly known as:  LOPRESSOR Take 1 Tab by mouth two (2) times a day. montelukast 10 mg tablet Commonly known as:  SINGULAIR  
TAKE ONE TABLET BY MOUTH ONCE DAILY  
  
 naloxone 4 mg/actuation nasal spray Commonly known as:  NARCAN  
4 mg by Nasal route as needed. * oxyCODONE ER 10 mg ER tablet Commonly known as:  OxyCONTIN Take 1 Tab by mouth every eight (8) hours for 30 days. Max Daily Amount: 30 mg. Start taking on:  3/2/2018 * oxyCODONE ER 10 mg ER tablet Commonly known as:  OxyCONTIN Take 1 Tab by mouth every eight (8) hours for 30 days. Max Daily Amount: 30 mg. Start taking on:  4/1/2018 * oxyCODONE ER 10 mg ER tablet Commonly known as:  OxyCONTIN Take 1 Tab by mouth every eight (8) hours. Max Daily Amount: 30 mg. Start taking on:  4/30/2018 * BD ULTRA-FINE FANI PEN NEEDLES  
by Does Not Apply route. 4mm x 32G * PEN NEEDLE 31 gauge x 5/16\" Ndle Generic drug:  Insulin Needles (Disposable) USE ONE PEN NEEDLE FOR LANTUS FLEXPEN AND 3 PEN NEEDLES FOR APIDRA WITH EACH MEAL * Fani Pen Needle 32 gauge x 5/32\" Ndle Generic drug:  Insulin Needles (Disposable) USE AT BEDTIME  
  
 pregabalin 150 mg capsule Commonly known as:  Magda Hyde TAKE 1 CAPSULE BY MOUTH THREE TIMES DAILY FOR 30 DAYS. MAXIMUM 3 CAPSULES DAILY. Start taking on:  2/28/2018  
  
 tamsulosin 0.4 mg capsule Commonly known as:  FLOMAX Take 1 Cap by mouth two (2) times a day. tiotropium 18 mcg inhalation capsule Commonly known as:  Joy Gavel Take 1 Cap by inhalation daily. varicella-zoster vacine live 19,400 unit/0.65 mL Susr injection Generic drug:  varicella zoster vaccine live * Notice: This list has 6 medication(s) that are the same as other medications prescribed for you. Read the directions carefully, and ask your doctor or other care provider to review them with you. Prescriptions Sent to Pharmacy Refills  
 clindamycin (CLEOCIN) 150 mg capsule 0 Sig: Take 1 Cap by mouth three (3) times daily for 7 days. Class: Normal  
 Pharmacy: [address] [facility] [address] Ph #: [phone] Route: Oral  
  
We Performed the Following REFERRAL TO DERMATOLOGY [REF19 Custom] Follow-up Instructions Return in about 3 months (around 5/27/2018). To-Do List   
 02/27/2018 Lab:  CBC W/O DIFF   
  
 02/27/2018 Lab:  HEMOGLOBIN A1C WITH EAG   
  
 02/27/2018 Lab:  LIPID PANEL   
  
 02/27/2018 Lab:  METABOLIC PANEL, COMPREHENSIVE   
  
 02/27/2018 Lab:  MICROALBUMIN, UR, RAND W/ MICROALB/CREAT RATIO   
  
 02/27/2018 Lab:  TSH 3RD GENERATION Referral Information Referral ID Referred By Referred To  
  
 [id number] [address] [place] [address] [facility] [address], [address] [place] [address]. Phone: [phone] Fax: [phone] Visits Status Start Date End Date 1 New Request 2/27/18 2/27/19 If your referral has a status of pending review or denied, additional information will be sent to support the outcome of this decision. Patient Instructions Learning About Diabetes Food Guidelines Your Care Instructions Meal planning is important to manage diabetes. It helps keep your blood sugar at a target level (which you set with your doctor). You don't have to eat special foods. You can eat what your family eats, including sweets once in a while. But you do have to pay attention to how often you eat and how much you eat of certain foods. You may want to work with a dietitian or a certified diabetes educator (CDE) to help you plan meals and snacks. A dietitian or CDE can also help you lose weight if that is one of your goals. What should you know about eating carbs? Managing the amount of carbohydrate (carbs) you eat is an important part of healthy meals when you have diabetes. Carbohydrate is found in many foods. · Learn which foods have carbs. And learn the amounts of carbs in different foods. ¨ Bread, cereal, pasta, and rice have about 15 grams of carbs in a serving. A serving is 1 slice of bread (1 ounce), ½ cup of cooked cereal, or 1/3 cup of cooked pasta or rice. ¨ Fruits have 15 grams of carbs in a serving. A serving is 1 small fresh fruit, such as an apple or orange; ½ of a banana; ½ cup of cooked or canned fruit; ½ cup of fruit juice; 1 cup of melon or raspberries; or 2 tablespoons of dried fruit. ¨ Milk and no-sugar-added yogurt have 15 grams of carbs in a serving. A serving is 1 cup of milk or 2/3 cup of no-sugar-added yogurt. ¨ Starchy vegetables have 15 grams of carbs in a serving. A serving is ½ cup of mashed potatoes or sweet potato; 1 cup winter squash; ½ of a small baked potato; ½ cup of cooked beans; or ½ cup cooked corn or green peas. · Learn how much carbs to eat each day and at each meal. A dietitian or CDE can teach you how to keep track of the amount of carbs you eat. This is called carbohydrate counting. · If you are not sure how to count carbohydrate grams, use the Plate Method to plan meals.  It is a good, quick way to make sure that you have a balanced meal. It also helps you spread carbs throughout the day. ¨ Divide your plate by types of foods. Put non-starchy vegetables on half the plate, meat or other protein food on one-quarter of the plate, and a grain or starchy vegetable in the final quarter of the plate. To this you can add a small piece of fruit and 1 cup of milk or yogurt, depending on how many carbs you are supposed to eat at a meal. 
· Try to eat about the same amount of carbs at each meal. Do not \"save up\" your daily allowance of carbs to eat at one meal. 
· Proteins have very little or no carbs per serving. Examples of proteins are beef, chicken, turkey, fish, eggs, tofu, cheese, cottage cheese, and peanut butter. A serving size of meat is 3 ounces, which is about the size of a deck of cards. Examples of meat substitute serving sizes (equal to 1 ounce of meat) are 1/4 cup of cottage cheese, 1 egg, 1 tablespoon of peanut butter, and ½ cup of tofu. How can you eat out and still eat healthy? · Learn to estimate the serving sizes of foods that have carbohydrate. If you measure food at home, it will be easier to estimate the amount in a serving of restaurant food. · If the meal you order has too much carbohydrate (such as potatoes, corn, or baked beans), ask to have a low-carbohydrate food instead. Ask for a salad or green vegetables. · If you use insulin, check your blood sugar before and after eating out to help you plan how much to eat in the future. · If you eat more carbohydrate at a meal than you had planned, take a walk or do other exercise. This will help lower your blood sugar. What else should you know? · Limit saturated fat, such as the fat from meat and dairy products. This is a healthy choice because people who have diabetes are at higher risk of heart disease. So choose lean cuts of meat and nonfat or low-fat dairy products. Use olive or canola oil instead of butter or shortening when cooking. · Don't skip meals. Your blood sugar may drop too low if you skip meals and take insulin or certain medicines for diabetes. · Check with your doctor before you drink alcohol. Alcohol can cause your blood sugar to drop too low. Alcohol can also cause a bad reaction if you take certain diabetes medicines. Follow-up care is a key part of your treatment and safety. Be sure to make and go to all appointments, and call your doctor if you are having problems. It's also a good idea to know your test results and keep a list of the medicines you take. Where can you learn more? Go to http://slavahealthfinchenrique.info/. Enter K711 in the search box to learn more about \"Learning About Diabetes Food Guidelines. \" Current as of: March 13, 2017 Content Version: 11.4 © 6098-4437 [employer]. Care instructions adapted under license by [employer] (which disclaims liability or warranty for this information). If you have questions about a medical condition or this instruction, always ask your healthcare professional. [place] [address] any warranty or liability for your use of this information. Chronic Obstructive Pulmonary Disease (COPD): Care Instructions Your Care Instructions Chronic obstructive pulmonary disease (COPD) is a general term for a group of lung diseases, including emphysema and chronic bronchitis. People with COPD have decreased airflow in and out of the lungs, which makes it hard to breathe. The airways also can get clogged with thick mucus. Cigarette smoking is a major cause of COPD. Although there is no cure for COPD, you can slow its progress. Following your treatment plan and taking care of yourself can help you feel better and live longer. Follow-up care is a key part of your treatment and safety.  Be sure to make and go to all appointments, and call your doctor if you are having problems. It's also a good idea to know your test results and keep a list of the medicines you take. How can you care for yourself at home? ?Staying healthy ? · Do not smoke. This is the most important step you can take to prevent more damage to your lungs. If you need help quitting, talk to your doctor about stop-smoking programs and medicines. These can increase your chances of quitting for good. ? · Avoid colds and flu. Get a pneumococcal vaccine shot. If you have had one before, ask your doctor whether you need a second dose. Get the flu vaccine every fall. If you must be around people with colds or the flu, wash your hands often. ? · Avoid secondhand smoke, air pollution, and high altitudes. Also avoid cold, dry air and hot, humid air. Stay at home with your windows closed when air pollution is bad. ?Medicines and oxygen therapy ? · Take your medicines exactly as prescribed. Call your doctor if you think you are having a problem with your medicine. ? · You may be taking medicines such as: ¨ Bronchodilators. These help open your airways and make breathing easier. Bronchodilators are either short-acting (work for 6 to 9 hours) or long-acting (work for 24 hours). You inhale most bronchodilators, so they start to act quickly. Always carry your quick-relief inhaler with you in case you need it while you are away from home. ¨ Corticosteroids (prednisone, budesonide). These reduce airway inflammation. They come in pill or inhaled form. You must take these medicines every day for them to work well. ? · A spacer may help you get more inhaled medicine to your lungs. Ask your doctor or pharmacist if a spacer is right for you. If it is, ask how to use it properly. ? · Do not take any vitamins, over-the-counter medicine, or herbal products without talking to your doctor first.  
? · If your doctor prescribed antibiotics, take them as directed.  Do not stop taking them just because you feel better. You need to take the full course of antibiotics. ? · Oxygen therapy boosts the amount of oxygen in your blood and helps you breathe easier. Use the flow rate your doctor has recommended, and do not change it without talking to your doctor first.  
Activity ? · Get regular exercise. Walking is an easy way to get exercise. Start out slowly, and walk a little more each day. ? · Pay attention to your breathing. You are exercising too hard if you cannot talk while you are exercising. ? · Take short rest breaks when doing household chores and other activities. ? · Learn breathing methods-such as breathing through pursed lips-to help you become less short of breath. ? · If your doctor has not set you up with a pulmonary rehabilitation program, talk to him or her about whether rehab is right for you. Rehab includes exercise programs, education about your disease and how to manage it, help with diet and other changes, and emotional support. Diet ? · Eat regular, healthy meals. Use bronchodilators about 1 hour before you eat to make it easier to eat. Eat several small meals instead of three large ones. Drink beverages at the end of the meal. Avoid foods that are hard to chew. ? · Eat foods that contain protein so that you do not lose muscle mass. ? · Talk with your doctor if you gain too much weight or if you lose weight without trying. ?Mental health ? · Talk to your family, friends, or a therapist about your feelings. It is normal to feel frightened, angry, hopeless, helpless, and even guilty. Talking openly about bad feelings can help you cope. If these feelings last, talk to your doctor. When should you call for help? Call 911 anytime you think you may need emergency care. For example, call if: 
? · You have severe trouble breathing. ?Call your doctor now or seek immediate medical care if: 
? · You have new or worse trouble breathing. ? · You cough up blood. ? · You have a fever. ? Watch closely for changes in your health, and be sure to contact your doctor if: 
? · You cough more deeply or more often, especially if you notice more mucus or a change in the color of your mucus. ? · You have new or worse swelling in your legs or belly. ? · You are not getting better as expected. Where can you learn more? Go to http://slava-enrique.info/. Rosemarie Head in the search box to learn more about \"Chronic Obstructive Pulmonary Disease (COPD): Care Instructions. \" Current as of: May 12, 2017 Content Version: 11.4 © 8243-5947 [employer]. Care instructions adapted under license by [employer] (which disclaims liability or warranty for this information). If you have questions about a medical condition or this instruction, always ask your healthcare professional. [place] [address] any warranty or liability for your use of this information. Low Sodium Diet (2,000 Milligram): Care Instructions Your Care Instructions Too much sodium causes your body to hold on to extra water. This can raise your blood pressure and force your heart and kidneys to work harder. In very serious cases, this could cause you to be put in the hospital. It might even be life-threatening. By limiting sodium, you will feel better and lower your risk of serious problems. The most common source of sodium is salt. People get most of the salt in their diet from canned, prepared, and packaged foods. Fast food and restaurant meals also are very high in sodium. Your doctor will probably limit your sodium to less than 2,000 milligrams (mg) a day. This limit counts all the sodium in prepared and packaged foods and any salt you add to your food. Follow-up care is a key part of your treatment and safety.  Be sure to make and go to all appointments, and call your doctor if you are having problems. It's also a good idea to know your test results and keep a list of the medicines you take. How can you care for yourself at home? Read food labels · Read labels on cans and food packages. The labels tell you how much sodium is in each serving. Make sure that you look at the serving size. If you eat more than the serving size, you have eaten more sodium. · Food labels also tell you the Percent Daily Value for sodium. Choose products with low Percent Daily Values for sodium. · Be aware that sodium can come in forms other than salt, including monosodium glutamate (MSG), sodium citrate, and sodium bicarbonate (baking soda). MSG is often added to Asian food. When you eat out, you can sometimes ask for food without MSG or added salt. Buy low-sodium foods · Buy foods that are labeled \"unsalted\" (no salt added), \"sodium-free\" (less than 5 mg of sodium per serving), or \"low-sodium\" (less than 140 mg of sodium per serving). Foods labeled \"reduced-sodium\" and \"light sodium\" may still have too much sodium. Be sure to read the label to see how much sodium you are getting. · Buy fresh vegetables, or frozen vegetables without added sauces. Buy low-sodium versions of canned vegetables, soups, and other canned goods. Prepare low-sodium meals · Cut back on the amount of salt you use in cooking. This will help you adjust to the taste. Do not add salt after cooking. One teaspoon of salt has about 2,300 mg of sodium. · Take the salt shaker off the table. · Flavor your food with garlic, lemon juice, onion, vinegar, herbs, and spices. Do not use soy sauce, lite soy sauce, steak sauce, onion salt, garlic salt, celery salt, mustard, or ketchup on your food. · Use low-sodium salad dressings, sauces, and ketchup. Or make your own salad dressings and sauces without adding salt. · Use less salt (or none) when recipes call for it.  You can often use half the salt a recipe calls for without losing flavor. Other foods such as rice, pasta, and grains do not need added salt. · Rinse canned vegetables, and cook them in fresh water. This removes some-but not all-of the salt. · Avoid water that is naturally high in sodium or that has been treated with water softeners, which add sodium. Call your local water company to find out the sodium content of your water supply. If you buy bottled water, read the label and choose a sodium-free brand. Avoid high-sodium foods · Avoid eating: ¨ Smoked, cured, salted, and canned meat, fish, and poultry. ¨ Ham, kohler, hot dogs, and luncheon meats. ¨ Regular, hard, and processed cheese and regular peanut butter. ¨ Crackers with salted tops, and other salted snack foods such as pretzels, chips, and salted popcorn. ¨ Frozen prepared meals, unless labeled low-sodium. ¨ Canned and dried soups, broths, and bouillon, unless labeled sodium-free or low-sodium. ¨ Canned vegetables, unless labeled sodium-free or low-sodium. ¨ Western Lesley fries, pizza, tacos, and other fast foods. ¨ Pickles, olives, ketchup, and other condiments, especially soy sauce, unless labeled sodium-free or low-sodium. Where can you learn more? Go to http://slava-enrique.info/. Enter H018 in the search box to learn more about \"Low Sodium Diet (2,000 Milligram): Care Instructions. \" Current as of: May 12, 2017 Content Version: 11.4 © 1004-7757 [employer]. Care instructions adapted under license by [employer] (which disclaims liability or warranty for this information). If you have questions about a medical condition or this instruction, always ask your healthcare professional. [place] [address] any warranty or liability for your use of this information. Skin Abscess: Care Instructions Your Care Instructions A skin abscess is a bacterial infection that forms a pocket of pus.  A boil is a kind of skin abscess. The doctor may have cut an opening in the abscess so that the pus can drain out. You may have gauze in the cut so that the abscess will stay open and keep draining. You may need antibiotics. You will need to follow up with your doctor to make sure the infection has gone away. The doctor has checked you carefully, but problems can develop later. If you notice any problems or new symptoms, get medical treatment right away. Follow-up care is a key part of your treatment and safety. Be sure to make and go to all appointments, and call your doctor if you are having problems. It's also a good idea to know your test results and keep a list of the medicines you take. How can you care for yourself at home? · Apply warm and dry compresses, a heating pad set on low, or a hot water bottle 3 or 4 times a day for pain. Keep a cloth between the heat source and your skin. · If your doctor prescribed antibiotics, take them as directed. Do not stop taking them just because you feel better. You need to take the full course of antibiotics. · Take pain medicines exactly as directed. ¨ If the doctor gave you a prescription medicine for pain, take it as prescribed. ¨ If you are not taking a prescription pain medicine, ask your doctor if you can take an over-the-counter medicine. · Keep your bandage clean and dry. Change the bandage whenever it gets wet or dirty, or at least one time a day. · If the abscess was packed with gauze: 
¨ Keep follow-up appointments to have the gauze changed or removed. If the doctor instructed you to remove the gauze, gently pull out all of the gauze when your doctor tells you to. ¨ After the gauze is removed, soak the area in warm water for 15 to 20 minutes 2 times a day, until the wound closes. When should you call for help? Call your doctor now or seek immediate medical care if: 
? · You have signs of worsening infection, such as: ¨ Increased pain, swelling, warmth, or redness. ¨ Red streaks leading from the infected skin. ¨ Pus draining from the wound. ¨ A fever. ? Watch closely for changes in your health, and be sure to contact your doctor if: 
? · You do not get better as expected. Where can you learn more? Go to http://slava-enrique.info/. Enter D053 in the search box to learn more about \"Skin Abscess: Care Instructions. \" Current as of: October 13, 2016 Content Version: 11.4 © 9654-8070 [employer]. Care instructions adapted under license by [employer] (which disclaims liability or warranty for this information). If you have questions about a medical condition or this instruction, always ask your healthcare professional. [address] any warranty or liability for your use of this information. Introducing [facility]! Salvador Schaeffer introduces [employer] patient portal. Now you can access parts of your medical record, email your doctor's office, and request medication refills online. 1. In your internet browser, go to [email or web address] 2. Click on the First Time User? Click Here link in the Sign In box. You will see the New Member Sign Up page. 3. Enter your [employer] Access Code exactly as it appears below. You will not need to use this code after youve completed the sign-up process. If you do not sign up before the expiration date, you must request a new code. · [employer] Access Code: [email or web address] Expires: 5/28/2018 10:06 AM 
 
4. Enter the last four digits of your Social Security Number (xxxx) and Date of Birth (mm/dd/yyyy) as indicated and click Submit. You will be taken to the next sign-up page. 5. Create a [employer] ID. This will be your [employer] login ID and cannot be changed, so think of one that is secure and easy to remember. 6. Create a [employer] password. You can change your password at any time. 7. Enter your Password Reset Question and Answer. This can be used at a later time if you forget your password. 8. Enter your e-mail address. You will receive e-mail notification when new information is available in [address] 9. Click Sign Up. You can now view and download portions of your medical record. 10. Click the Download Summary menu link to download a portable copy of your medical information. If you have questions, please visit the Frequently Asked Questions section of the [employer] website. Remember, [employer] is NOT to be used for urgent needs. For medical emergencies, dial 911. Now available from your iPhone and Android! Please provide this summary of care documentation to your next provider. Your primary care clinician is listed as Fede Martini. If you have any questions after today's visit, please call [phone].

## 2018-02-27 NOTE — PROGRESS NOTES
Chief Complaint   Patient presents with    Diabetes    Hypertension    Arm Pain     left forearm pain    Other     cyst on neck    Other     on back     Patient states he is here for follow up on DM and HTN. Patient states he has had left forearm pain with numbness since last Thursday. Patient would like to discuss removal of cyst on his neck and rash on middle of his back.

## 2018-03-08 ENCOUNTER — OFFICE VISIT (OUTPATIENT)
Dept: UROLOGY | Age: 68
End: 2018-03-08

## 2018-03-08 ENCOUNTER — HOSPITAL ENCOUNTER (OUTPATIENT)
Dept: LAB | Age: 68
Discharge: HOME | End: 2018-03-08
Payer: MEDICARE

## 2018-03-08 VITALS
HEART RATE: 91 BPM | OXYGEN SATURATION: 98 % | WEIGHT: 281 LBS | SYSTOLIC BLOOD PRESSURE: 133 MMHG | DIASTOLIC BLOOD PRESSURE: 76 MMHG | HEIGHT: 74 IN | BODY MASS INDEX: 36.06 KG/M2

## 2018-03-08 DIAGNOSIS — N40.1 BENIGN PROSTATIC HYPERPLASIA WITH LOWER URINARY TRACT SYMPTOMS, SYMPTOM DETAILS UNSPECIFIED: Primary | ICD-10-CM

## 2018-03-08 DIAGNOSIS — N40.1 BENIGN PROSTATIC HYPERPLASIA WITH LOWER URINARY TRACT SYMPTOMS, SYMPTOM DETAILS UNSPECIFIED: ICD-10-CM

## 2018-03-08 LAB
BILIRUB UR QL STRIP: NEGATIVE
GLUCOSE UR-MCNC: NORMAL MG/DL
KETONES P FAST UR STRIP-MCNC: NEGATIVE MG/DL
PH UR STRIP: 6 [PH] (ref 4.6–8)
PROT UR QL STRIP: NEGATIVE
PSA SERPL-MCNC: 0.2 NG/ML (ref 0–4)
SP GR UR STRIP: 1.02 (ref 1–1.03)
UA UROBILINOGEN AMB POC: NORMAL (ref 0.2–1)
URINALYSIS CLARITY POC: CLEAR
URINALYSIS COLOR POC: YELLOW
URINE BLOOD POC: NEGATIVE
URINE LEUKOCYTES POC: NEGATIVE
URINE NITRITES POC: NEGATIVE

## 2018-03-08 PROCEDURE — 84153 ASSAY OF PSA TOTAL: CPT | Performed by: UROLOGY

## 2018-03-08 RX ORDER — TAMSULOSIN HYDROCHLORIDE 0.4 MG/1
0.4 CAPSULE ORAL DAILY
Qty: 90 CAP | Refills: 3 | Status: SHIPPED | OUTPATIENT
Start: 2018-03-08 | End: 2018-06-05 | Stop reason: SDUPTHER

## 2018-03-08 NOTE — PATIENT INSTRUCTIONS

## 2018-03-08 NOTE — PROGRESS NOTES
Rebekah Tiwari 69 y.o. male     Mr. Cyndee Esposito seen today for follow-up symptomatic BPH responding favorably to alpha-blocker therapy with Flomax 0.4 mg daily hypogonadism  Previously treated with testosterone replacement therapy by IM injection of testosterone discontinued in 2017 because of failure to appreciate any beneficial effect  Patient is voiding with a solid stream good control nocturia once per night    Hematocrit 43.1  \"      PSA 0.3 in September 2014  PSA 0.2 in March 2015  PSA 0.6 in March 2016  PSA 0.3 in March 2017      Review of Systems:    CNS: No seizures syncope headaches or visual changes/ Vertigo/Peripheral neuropathy  Respiratory: Shortness of breath  Cardiovascular:Hypertension  Intestinal:GERD  Urinary: Hypogonadism  Skeletal: No bone or joint pain  Endocrine:Hypothyroidism  Other:    Allergies: Allergies   Allergen Reactions    Ciprofloxacin Anaphylaxis    Other Plant, Animal, Environmental Other (comments)     Patient cannot have MRI. Patient states that he has metal screws in his left arm.  Lamisil [Terbinafine] Swelling     Tongue swelling    Metformin Other (comments)     Elevated cr 1.4    Morphine Other (comments)     \"loss of blood pressure\"    Other Medication Other (comments)     Doxasylin causes extreme GERD    Pcn [Penicillins] Rash    Pentothal [Thiopental Sodium] Shortness of Breath    Sulfa (Sulfonamide Antibiotics) Rash      Medications:    Current Outpatient Prescriptions   Medication Sig Dispense Refill    oxyCODONE ER (OXYCONTIN) 10 mg ER tablet Take 1 Tab by mouth every eight (8) hours for 30 days. Max Daily Amount: 30 mg. 90 Tab 0    [START ON 3/13/2018] DULoxetine (CYMBALTA) 60 mg capsule Take 1 Cap by mouth nightly for 30 days. 30 Cap 5    pregabalin (LYRICA) 150 mg capsule TAKE 1 CAPSULE BY MOUTH THREE TIMES DAILY FOR 30 DAYS. MAXIMUM 3 CAPSULES DAILY.  90 Cap 5    FANI PEN NEEDLE 32 gauge x 5/32\" ndle USE AT BEDTIME 100 Pen Needle 6    lisinopril (PRINIVIL, ZESTRIL) 5 mg tablet Take 1 Tab by mouth daily. 90 Tab 1    metoprolol tartrate (LOPRESSOR) 25 mg tablet Take 1 Tab by mouth two (2) times a day. 180 Tab 1    montelukast (SINGULAIR) 10 mg tablet TAKE ONE TABLET BY MOUTH ONCE DAILY 90 Tab 1    JANUVIA 100 mg tablet Take 50 mg by mouth daily. Indications: patient stated he was given 50 mg      VARICELLA-ZOSTER VACINE LIVE 19,400 unit/0.65 mL susr injection       atorvastatin (LIPITOR) 20 mg tablet Take 1 Tab by mouth daily. 90 Tab 1    fenofibrate nanocrystallized (TRICOR) 145 mg tablet Take 1 Tab by mouth daily. 90 Tab 1    levothyroxine (SYNTHROID) 150 mcg tablet Take 1 Tab by mouth Daily (before breakfast). 90 Tab 1    ACCU-CHEK JAZZY PLUS TEST STRP strip       ACCU-CHEK SOFTCLIX LANCETS misc       PEN NEEDLE, DIABETIC (BD ULTRA-FINE FANI PEN NEEDLES) by Does Not Apply route. 4mm x 32G      insulin glulisine (APIDRA SOLOSTAR) 100 unit/mL pen 2 units per carb consumed. Max 30 / day (Patient taking differently: 2 units per carb consumed. Max 30 / day  Indications: patient states taking 10 units three times a day) 5 Pen 3    insulin glargine (BASAGLAR KWIKPEN) 100 unit/mL (3 mL) pen 52 units at bedtime 3 Pen 3    PEN NEEDLE 31 gauge x 5/16\" ndle USE ONE PEN NEEDLE FOR LANTUS FLEXPEN AND 3 PEN NEEDLES FOR APIDRA WITH EACH MEAL 1 Package 3    esomeprazole (NEXIUM) 40 mg capsule Take 40 mg by mouth two (2) times a day.  betamethasone dipropionate (DIPROLENE) 0.05 % ointment       tamsulosin (FLOMAX) 0.4 mg capsule Take 1 Cap by mouth two (2) times a day. 180 Cap 3    budesonide-formoterol (SYMBICORT) 160-4.5 mcg/actuation HFA inhaler Take 2 Puffs by inhalation two (2) times a day. 1 Inhaler 5    naloxone 4 mg/actuation spry 4 mg by Nasal route as needed. 1 Box 1    albuterol (PROVENTIL VENTOLIN) 2.5 mg /3 mL (0.083 %) nebulizer solution 3 mL by Nebulization route every four (4) hours as needed for Wheezing or Shortness of Breath.  1 Package 5    butalbital-acetaminophen-caffeine (FIORICET, ESGIC) -40 mg per tablet Take 0.5 Tabs by mouth daily as needed for Pain. 10 Tab 0    fluticasone (FLONASE) 50 mcg/actuation nasal spray USE ONE SPRAY IN EACH NOSTRIL ONCE DAILY 1 Bottle 0    HYDROcodone-acetaminophen (NORCO) 5-325 mg per tablet Take 1 Tab by mouth daily as needed for Pain.  tiotropium (SPIRIVA) 18 mcg inhalation capsule Take 1 Cap by inhalation daily. 30 Cap 5    ketorolac (ACULAR) 0.5 % ophthalmic solution Administer 1 Drop to both eyes two (2) times a day. Past Medical History:   Diagnosis Date    Asthma     Cancer [facility])     BASAL     Cardiac catheterization 2009    [address]:  No significant CAD.  Cardiac echocardiogram 01/20/2014    EF 50-55%. No WMA. Gr 1 DDfx. RVSP 25-30 mmHg. Mild TR.       DM type 2 (diabetes mellitus, type 2) (HCC)     Enlarged prostate     Failed back syndrome     GERD (gastroesophageal reflux disease)     Hearing loss, bilateral     Hearing Aids    Hypertension     Hypothyroidism     Insomnia     Low serum testosterone level     Neuropathy     Osteoarthritis of multiple joints     S/P colonoscopy 8/14/13    mild diverticulosis in sigmoid colon w/o evidence of diverticulitis; f/u 5 years    SOB (shortness of breath)     chronic; 2' \"lung fever\"    Vertigo       Past Surgical History:   Procedure Laterality Date    HX BACK SURGERY  2000    removal of defective hardware    [address]    to remove bone fragments    HX LUMBAR FUSION  1999    fusion from L4-S1 and implantation of hardware    HX LUMBAR FUSION  1984    fusion on L5 area    HX ORTHOPAEDIC Bilateral 2004    trigger finger syndrome-all 4 fingers    HX ORTHOPAEDIC Left 2004    left arm torn muscle repair and placement of 2 screws     Family History   Problem Relation Age of Onset    Cancer Mother      Bone and Brain Cancer    Diabetes Mother     Liver Disease Father      Lung Cancer    Kidney Disease Sister     Diabetes Daughter     Colon Cancer Brother         Physical Examination: Well-nourished mature male in no apparent distress    Prostate by BOY is large rounded smooth benign consistency and nontender-no induration no nodularity  No rectal masses induration or tenderness    Urinalysis: Negative dipstick/nitrite negative/heme-negative    PVR today 28 cc    Impression: Symptomatic BPH responding favorably to alpha-blocker Rx                        -Hypogonadism failing to respond favorably to testosterone replacement                                         therapy-therefore, discontinued        Plan: PSA today    Rx Flomax 0.4 mg daily #90 refill ×3    rtc 1 yr PSA BOY PVR      More than 1/2 of this 25 minute visit was spent in counselling and coordination of care, as described above. Dirk Frankel, MD  -electronically signed-    PLEASE NOTE:  This document has been produced using voice recognition software. Unrecognized errors in transcription may be present.

## 2018-03-08 NOTE — PROGRESS NOTES
RBV. Per Dr. Bustamante Ford lab drawn in office today for PSA for BPH. Mr. Federico Starkey has a reminder for a \"due or due soon\" health maintenance. I have asked that he contact his primary care provider for follow-up on this health maintenance.

## 2018-03-08 NOTE — MR AVS SNAPSHOT
[address] [address] [facility] [address] 
[phone] Patient: Karie Aschoff MRN: [id number] VINH:[id number] Visit Information Date & Time Provider Department Dept. Phone Encounter #  
 3/8/2018  9:00 AM Basil Davis [id number] Your Appointments 5/22/2018 10:00 AM  
Workers Comp F/U with PAUL Chu [facility] (REJI SCHEDULING) Appt Note: 3 mon f/u per rc. ..to  
 [address] [address]  
[phone]  
  
    
 6/5/2018 10:00 AM  
Office Visit with Lev Murphy MD  
[facility]) Appt Note: AWV  and 3mo f/u  
 [address] [place] [address] [address] [place] [address]  
  
    
 2/18/2019  9:20 AM  
Follow Up with General Susy MD  
[facility] Appt Note: 1 year f/u kmb Theo Huff [id number] [phone] [address] [place] [address] [address] Upcoming Health Maintenance Date Due Bone Densitometry (Dexa) Screening 11/1/2013 MICROALBUMIN Q1 3/10/2018 LIPID PANEL Q1 6/16/2018 HEMOGLOBIN A1C Q6M 6/22/2018 EYE EXAM RETINAL OR DILATED Q1 7/11/2018 FOOT EXAM Q1 1/26/2019 GLAUCOMA SCREENING Q2Y 7/11/2019 COLONOSCOPY 10/19/2023 DTaP/Tdap/Td series (2 - Td) 5/16/2026 Allergies as of 3/8/2018  Review Complete On: 3/8/2018 By: Kiki Barker LPN Severity Noted Reaction Type Reactions Ciprofloxacin High 11/16/2013    Anaphylaxis Other Plant, Animal, Environmental High 03/10/2016    Other (comments) Patient cannot have MRI.  Patient states that he has metal screws in his left arm. Lamisil [Terbinafine]  10/18/2013    Swelling Tongue swelling Metformin  03/16/2016    Other (comments) Elevated cr 1.4 Morphine  10/18/2013    Other (comments) \"loss of blood pressure\" Other Medication  10/18/2013    Other (comments) Doxasylin causes extreme GERD Pcn [Penicillins]  10/18/2013    Rash Pentothal [Thiopental Sodium]  10/18/2013    Shortness of Breath Sulfa (Sulfonamide Antibiotics)  10/18/2013    Rash Current Immunizations  Reviewed on 7/20/2016 Name Date Influenza Vaccine 11/3/2014 Influenza Vaccine (Quad) PF 1/25/2017, 9/16/2015 Pneumococcal Conjugate (PCV-13) 7/20/2016 Pneumococcal Polysaccharide (PPSV-23) 3/30/2017 Tdap 5/16/2016 Zoster Vaccine, Live 6/15/2017 Not reviewed this visit You Were Diagnosed With   
  
 Codes Comments Benign prostatic hyperplasia with lower urinary tract symptoms, symptom details unspecified    -  Primary ICD-10-CM: N40.1 ICD-9-CM: 600.01 Vitals BP Pulse Height(growth percentile) Weight(growth percentile) SpO2 BMI  
 133/76 (BP 1 Location: Left arm, BP Patient Position: Sitting) 91 6' 2\" (1.88 m) 281 lb (127.5 kg) 98% 36.08 kg/m2 Smoking Status Former Smoker BMI and BSA Data Body Mass Index Body Surface Area 36.08 kg/m 2 2.58 m 2 Preferred Pharmacy Pharmacy Name Phone 500 [address] [phone] Your Updated Medication List  
  
   
This list is accurate as of 3/8/18  9:58 AM.  Always use your most recent med list.  
  
  
  
  
 ACCU-CHEK JAZZY PLUS TEST STRP strip Generic drug:  glucose blood VI test strips [address] Generic drug:  Lancets ACULAR 0.5 % ophthalmic solution Generic drug:  ketorolac Administer 1 Drop to both eyes two (2) times a day. albuterol 2.5 mg /3 mL (0.083 %) nebulizer solution Commonly known as:  PROVENTIL VENTOLIN  
3 mL by Nebulization route every four (4) hours as needed for Wheezing or Shortness of Breath. atorvastatin 20 mg tablet Commonly known as:  LIPITOR Take 1 Tab by mouth daily. betamethasone dipropionate 0.05 % ointment Commonly known as:  DIPROLENE  
  
 budesonide-formoterol 160-4.5 mcg/actuation Hfaa Commonly known as:  SYMBICORT Take 2 Puffs by inhalation two (2) times a day. butalbital-acetaminophen-caffeine -40 mg per tablet Commonly known as:  Curlee Vinicio Take 0.5 Tabs by mouth daily as needed for Pain. DULoxetine 60 mg capsule Commonly known as:  CYMBALTA Take 1 Cap by mouth nightly for 30 days. Start taking on:  3/13/2018  
  
 esomeprazole 40 mg capsule Commonly known as:  Deadra Martyr Take 40 mg by mouth two (2) times a day. fenofibrate nanocrystallized 145 mg tablet Commonly known as:  [employer] Take 1 Tab by mouth daily. fluticasone 50 mcg/actuation nasal spray Commonly known as:  FLONASE  
USE ONE SPRAY IN EACH NOSTRIL ONCE DAILY HYDROcodone-acetaminophen 5-325 mg per tablet Commonly known as:  Len Fraction Take 1 Tab by mouth daily as needed for Pain. insulin glargine 100 unit/mL (3 mL) Inpn Commonly known as:  BASAGLAR KWIKPEN U-100 INSULIN  
52 units at bedtime  
  
 insulin glulisine U-100 100 unit/mL pen Commonly known as:  APIDRA SOLOSTAR U-100 INSULIN  
2 units per carb consumed. Max 30 / day JANUVIA 100 mg tablet Generic drug:  SITagliptin Take 50 mg by mouth daily. Indications: patient stated he was given 50 mg  
  
 levothyroxine 150 mcg tablet Commonly known as:  SYNTHROID Take 1 Tab by mouth Daily (before breakfast). lisinopril 5 mg tablet Commonly known as:  Tamera Bantam Take 1 Tab by mouth daily. metoprolol tartrate 25 mg tablet Commonly known as:  LOPRESSOR Take 1 Tab by mouth two (2) times a day. montelukast 10 mg tablet Commonly known as:  SINGULAIR  
TAKE ONE TABLET BY MOUTH ONCE DAILY  
  
 naloxone 4 mg/actuation nasal spray Commonly known as:  NARCAN  
4 mg by Nasal route as needed. oxyCODONE ER 10 mg ER tablet Commonly known as:  OxyCONTIN Take 1 Tab by mouth every eight (8) hours for 30 days. Max Daily Amount: 30 mg.  
  
 * BD ULTRA-FINE MIREYA PEN NEEDLES  
by Does Not Apply route. 4mm x 32G * PEN NEEDLE 31 gauge x 5/16\" Ndle Generic drug:  Insulin Needles (Disposable) USE ONE PEN NEEDLE FOR LANTUS FLEXPEN AND 3 PEN NEEDLES FOR APIDRA WITH EACH MEAL * Mireya Pen Needle 32 gauge x 5/32\" Ndle Generic drug:  Insulin Needles (Disposable) USE AT BEDTIME  
  
 pregabalin 150 mg capsule Commonly known as:  Aspen Chavez TAKE 1 CAPSULE BY MOUTH THREE TIMES DAILY FOR 30 DAYS. MAXIMUM 3 CAPSULES DAILY. tamsulosin 0.4 mg capsule Commonly known as:  FLOMAX Take 1 Cap by mouth two (2) times a day. tiotropium 18 mcg inhalation capsule Commonly known as:  Lupe Miki Take 1 Cap by inhalation daily. varicella-zoster vacine live 19,400 unit/0.65 mL Susr injection Generic drug:  varicella zoster vaccine live * Notice: This list has 3 medication(s) that are the same as other medications prescribed for you. Read the directions carefully, and ask your doctor or other care provider to review them with you. We Performed the Following AMB POC URINALYSIS DIP STICK AUTO W/O MICRO [16796 CPT(R)] COLLECTION VENOUS BLOOD,VENIPUNCTURE [id number] CPT(R)] Patient Instructions Benign Prostatic Hyperplasia: Care Instructions Your Care Instructions Benign prostatic hyperplasia, or BPH, is an enlarged prostate gland. The prostate is a small gland that makes some of the fluid in semen. Prostate enlargement happens to almost all men as they age. It is usually not serious. BPH does not cause prostate cancer. As the prostate gets bigger, it may partly block the flow of urine. You may have a hard time getting a urine stream started or completely stopped. BPH can cause dribbling. You may have a weak urine stream, or you may have to urinate more often than you used to, especially at night. Most men find these problems easy to manage. You do not need treatment unless your symptoms bother you a lot or you have other problems, such as bladder infections or stones. In these cases, medicines may help. Surgery is not needed unless the urine flow is blocked or the symptoms do not get better with medicine. Follow-up care is a key part of your treatment and safety. Be sure to make and go to all appointments, and call your doctor if you are having problems. It's also a good idea to know your test results and keep a list of the medicines you take. How can you care for yourself at home? · Take plenty of time to urinate. Try to relax. · Try \"double voiding. \" Urinate as much you can, relax for a few moments, and then try to urinate again. · Sit on the toilet to urinate. · Read or think of other things while you are waiting. · Turn on a faucet, or try to picture running water. Some men find that this helps get their urine flowing. · If dribbling is a problem, wash your penis daily to avoid skin irritation and infection. · Avoid caffeine and alcohol. These drinks will increase how often you need to urinate. Spread your fluid intake throughout the day. If the urge to urinate often wakes you at night, limit your fluid intake in the evening. Urinate right before you go to bed. · Many over-the-counter cold and allergy medicines can make the symptoms of BPH worse. Avoid antihistamines, decongestants, and allergy pills, if you can. Read the warnings on the package.  
· If you take any prescription medicines, especially tranquilizers or antidepressants, ask your doctor or pharmacist whether they can cause urination problems. There may be other medicines you can use that do not cause urinary problems. · Be safe with medicines. Take your medicines exactly as prescribed. Call your doctor if you think you are having a problem with your medicine. When should you call for help? Call your doctor now or seek immediate medical care if: 
? · You cannot urinate at all. ? · You have symptoms of a urinary infection. For example: ¨ You have blood or pus in your urine. ¨ You have pain in your back just below your rib cage. This is called flank pain. ¨ You have a fever, chills, or body aches. ¨ It hurts to urinate. ¨ You have groin or belly pain. ? Watch closely for changes in your health, and be sure to contact your doctor if: 
? · It hurts when you ejaculate. ? · Your urinary problems get a lot worse or bother you a lot. Where can you learn more? Go to http://slava-enrique.info/. Enter D857 in the search box to learn more about \"Benign Prostatic Hyperplasia: Care Instructions. \" Current as of: March 14, 2017 Content Version: 11.4 © 7403-5171 [employer]. Care instructions adapted under license by [employer] (which disclaims liability or warranty for this information). If you have questions about a medical condition or this instruction, always ask your healthcare professional. [address] any warranty or liability for your use of this information. Introducing [facility]! [facility] introduces cloud.IQ patient portal. Now you can access parts of your medical record, email your doctor's office, and request medication refills online. 1. In your internet browser, go to [email or web address] 2. Click on the First Time User? Click Here link in the Sign In box. You will see the New Member Sign Up page. 3. Enter your cloud.IQ Access Code exactly as it appears below.  You will not need to use this code after youve completed the sign-up process. If you do not sign up before the expiration date, you must request a new code. · [employer] Access Code: [email or web address] Expires: 5/28/2018 10:06 AM 
 
4. Enter the last four digits of your Social Security Number (xxxx) and Date of Birth (mm/dd/yyyy) as indicated and click Submit. You will be taken to the next sign-up page. 5. Create a [employer] ID. This will be your [employer] login ID and cannot be changed, so think of one that is secure and easy to remember. 6. Create a [employer] password. You can change your password at any time. 7. Enter your Password Reset Question and Answer. This can be used at a later time if you forget your password. 8. Enter your e-mail address. You will receive e-mail notification when new information is available in [address] 9. Click Sign Up. You can now view and download portions of your medical record. 10. Click the Download Summary menu link to download a portable copy of your medical information. If you have questions, please visit the Frequently Asked Questions section of the [employer] website. Remember, [employer] is NOT to be used for urgent needs. For medical emergencies, dial 911. Now available from your iPhone and Android! Please provide this summary of care documentation to your next provider. Your primary care clinician is listed as Millicent Lee. If you have any questions after today's visit, please call [phone].

## 2018-03-13 DIAGNOSIS — R79.89 LOW TSH LEVEL: ICD-10-CM

## 2018-03-14 RX ORDER — LEVOTHYROXINE SODIUM 150 UG/1
TABLET ORAL
Qty: 90 TAB | Refills: 1 | Status: SHIPPED | OUTPATIENT
Start: 2018-03-14 | End: 2018-08-24 | Stop reason: SDUPTHER

## 2018-03-29 DIAGNOSIS — J30.89 OTHER ALLERGIC RHINITIS: ICD-10-CM

## 2018-03-30 RX ORDER — FLUTICASONE PROPIONATE 50 MCG
SPRAY, SUSPENSION (ML) NASAL
Qty: 1 BOTTLE | Refills: 0 | Status: SHIPPED | OUTPATIENT
Start: 2018-03-30 | End: 2018-08-24 | Stop reason: SDUPTHER

## 2018-04-02 ENCOUNTER — ANESTHESIA EVENT (OUTPATIENT)
Dept: ENDOSCOPY | Age: 68
End: 2018-04-02
Payer: MEDICARE

## 2018-04-03 ENCOUNTER — TELEPHONE (OUTPATIENT)
Dept: PAIN MANAGEMENT | Age: 68
End: 2018-04-03

## 2018-04-03 ENCOUNTER — HOSPITAL ENCOUNTER (OUTPATIENT)
Age: 68
Setting detail: OUTPATIENT SURGERY
Discharge: HOME | End: 2018-04-03
Attending: INTERNAL MEDICINE | Admitting: INTERNAL MEDICINE
Payer: MEDICARE

## 2018-04-03 ENCOUNTER — ANESTHESIA (OUTPATIENT)
Dept: ENDOSCOPY | Age: 68
End: 2018-04-03
Payer: MEDICARE

## 2018-04-03 VITALS
BODY MASS INDEX: 39.2 KG/M2 | TEMPERATURE: 98.1 F | DIASTOLIC BLOOD PRESSURE: 76 MMHG | RESPIRATION RATE: 16 BRPM | WEIGHT: 280 LBS | OXYGEN SATURATION: 96 % | HEIGHT: 71 IN | SYSTOLIC BLOOD PRESSURE: 96 MMHG | HEART RATE: 72 BPM

## 2018-04-03 LAB — GLUCOSE BLD STRIP.AUTO-MCNC: 122 MG/DL (ref 70–110)

## 2018-04-03 PROCEDURE — 74011250636 HC RX REV CODE- 250/636: Performed by: NURSE ANESTHETIST, CERTIFIED REGISTERED

## 2018-04-03 PROCEDURE — 76060000031 HC ANESTHESIA FIRST 0.5 HR: Performed by: INTERNAL MEDICINE

## 2018-04-03 PROCEDURE — 82962 GLUCOSE BLOOD TEST: CPT

## 2018-04-03 PROCEDURE — 74011250636 HC RX REV CODE- 250/636

## 2018-04-03 PROCEDURE — 76040000019: Performed by: INTERNAL MEDICINE

## 2018-04-03 RX ORDER — MAGNESIUM SULFATE 100 %
4 CRYSTALS MISCELLANEOUS AS NEEDED
Status: DISCONTINUED | OUTPATIENT
Start: 2018-04-03 | End: 2018-04-03 | Stop reason: HOSPADM

## 2018-04-03 RX ORDER — SODIUM CHLORIDE 0.9 % (FLUSH) 0.9 %
5-10 SYRINGE (ML) INJECTION AS NEEDED
Status: DISCONTINUED | OUTPATIENT
Start: 2018-04-03 | End: 2018-04-03 | Stop reason: HOSPADM

## 2018-04-03 RX ORDER — SODIUM CHLORIDE 0.9 % (FLUSH) 0.9 %
5-10 SYRINGE (ML) INJECTION AS NEEDED
Status: CANCELLED | OUTPATIENT
Start: 2018-04-03 | End: 2018-04-03

## 2018-04-03 RX ORDER — FLUMAZENIL 0.1 MG/ML
0.2 INJECTION INTRAVENOUS
Status: CANCELLED | OUTPATIENT
Start: 2018-04-03 | End: 2018-04-03

## 2018-04-03 RX ORDER — SODIUM CHLORIDE 0.9 % (FLUSH) 0.9 %
5-10 SYRINGE (ML) INJECTION EVERY 8 HOURS
Status: DISCONTINUED | OUTPATIENT
Start: 2018-04-03 | End: 2018-04-03 | Stop reason: HOSPADM

## 2018-04-03 RX ORDER — NALOXONE HYDROCHLORIDE 0.4 MG/ML
0.4 INJECTION, SOLUTION INTRAMUSCULAR; INTRAVENOUS; SUBCUTANEOUS
Status: CANCELLED | OUTPATIENT
Start: 2018-04-03 | End: 2018-04-03

## 2018-04-03 RX ORDER — ATROPINE SULFATE 0.1 MG/ML
0.5 INJECTION INTRAVENOUS
Status: CANCELLED | OUTPATIENT
Start: 2018-04-03 | End: 2018-04-03

## 2018-04-03 RX ORDER — DEXTROMETHORPHAN/PSEUDOEPHED 2.5-7.5/.8
1.2 DROPS ORAL
Status: CANCELLED | OUTPATIENT
Start: 2018-04-03

## 2018-04-03 RX ORDER — SODIUM CHLORIDE 0.9 % (FLUSH) 0.9 %
5-10 SYRINGE (ML) INJECTION EVERY 8 HOURS
Status: CANCELLED | OUTPATIENT
Start: 2018-04-03 | End: 2018-04-03

## 2018-04-03 RX ORDER — INSULIN LISPRO 100 [IU]/ML
INJECTION, SOLUTION INTRAVENOUS; SUBCUTANEOUS ONCE
Status: DISCONTINUED | OUTPATIENT
Start: 2018-04-03 | End: 2018-04-03 | Stop reason: HOSPADM

## 2018-04-03 RX ORDER — DEXTROSE 50 % IN WATER (D50W) INTRAVENOUS SYRINGE
25-50 AS NEEDED
Status: DISCONTINUED | OUTPATIENT
Start: 2018-04-03 | End: 2018-04-03 | Stop reason: HOSPADM

## 2018-04-03 RX ORDER — SODIUM CHLORIDE, SODIUM LACTATE, POTASSIUM CHLORIDE, CALCIUM CHLORIDE 600; 310; 30; 20 MG/100ML; MG/100ML; MG/100ML; MG/100ML
75 INJECTION, SOLUTION INTRAVENOUS CONTINUOUS
Status: DISCONTINUED | OUTPATIENT
Start: 2018-04-03 | End: 2018-04-03 | Stop reason: HOSPADM

## 2018-04-03 RX ORDER — SODIUM CHLORIDE, SODIUM LACTATE, POTASSIUM CHLORIDE, CALCIUM CHLORIDE 600; 310; 30; 20 MG/100ML; MG/100ML; MG/100ML; MG/100ML
INJECTION, SOLUTION INTRAVENOUS
Status: DISCONTINUED | OUTPATIENT
Start: 2018-04-03 | End: 2018-04-03 | Stop reason: HOSPADM

## 2018-04-03 RX ORDER — OXYCODONE HYDROCHLORIDE 10 MG/1
10 TABLET ORAL 3 TIMES DAILY
COMMUNITY
End: 2018-11-13

## 2018-04-03 RX ORDER — PROPOFOL 10 MG/ML
INJECTION, EMULSION INTRAVENOUS AS NEEDED
Status: DISCONTINUED | OUTPATIENT
Start: 2018-04-03 | End: 2018-04-03 | Stop reason: HOSPADM

## 2018-04-03 RX ORDER — EPINEPHRINE 0.1 MG/ML
1 INJECTION INTRACARDIAC; INTRAVENOUS
Status: CANCELLED | OUTPATIENT
Start: 2018-04-03 | End: 2018-04-03

## 2018-04-03 RX ADMIN — SODIUM CHLORIDE, SODIUM LACTATE, POTASSIUM CHLORIDE, CALCIUM CHLORIDE: 600; 310; 30; 20 INJECTION, SOLUTION INTRAVENOUS at 07:42

## 2018-04-03 RX ADMIN — PROPOFOL 50 MG: 10 INJECTION, EMULSION INTRAVENOUS at 07:53

## 2018-04-03 RX ADMIN — SODIUM CHLORIDE, SODIUM LACTATE, POTASSIUM CHLORIDE, AND CALCIUM CHLORIDE 75 ML/HR: 600; 310; 30; 20 INJECTION, SOLUTION INTRAVENOUS at 07:27

## 2018-04-03 RX ADMIN — PROPOFOL 50 MG: 10 INJECTION, EMULSION INTRAVENOUS at 07:49

## 2018-04-03 RX ADMIN — PROPOFOL 100 MG: 10 INJECTION, EMULSION INTRAVENOUS at 07:42

## 2018-04-03 NOTE — DISCHARGE INSTRUCTIONS
Colonoscopy: What to Expect at [address] [place] [address]  After you have a colonoscopy, you will stay at the clinic for 1 to 2 hours until the medicines wear off. Then you can go home. But you will need to arrange for a ride. Your doctor will tell you when you can eat and do your other usual activities. Your doctor will talk to you about when you will need your next colonoscopy. Your doctor can help you decide how often you need to be checked. This will depend on the results of your test and your risk for colorectal cancer. After the test, you may be bloated or have gas pains. You may need to pass gas. If a biopsy was done or a polyp was removed, you may have streaks of blood in your stool (feces) for a few days. This care sheet gives you a general idea about how long it will take for you to recover. But each person recovers at a different pace. Follow the steps below to get better as quickly as possible. How can you care for yourself at home? Activity  · Rest when you feel tired. · You can do your normal activities when it feels okay to do so. Diet  · Follow your doctor's directions for eating. · Unless your doctor has told you not to, drink plenty of fluids. This helps to replace the fluids that were lost during the colon prep. · Do not drink alcohol. Medicines  · If polyps were removed or a biopsy was done during the test, your doctor may tell you not to take aspirin or other anti-inflammatory medicines for a few days. These include ibuprofen (Advil, Motrin) and naproxen (Aleve). Other instructions  · For your safety, do not drive or operate machinery until the medicine wears off and you can think clearly. Your doctor may tell you not to drive or operate machinery until the day after your test.  · Do not sign legal documents or make major decisions until the medicine wears off and you can think clearly. The anesthesia can make it hard for you to fully understand what you are agreeing to.   Follow-up care is a key part of your treatment and safety. Be sure to make and go to all appointments, and call your doctor if you are having problems. It's also a good idea to know your test results and keep a list of the medicines you take. When should you call for help? Call 911 anytime you think you may need emergency care. For example, call if:  · You passed out (lost consciousness). · You pass maroon or bloody stools. · You have severe belly pain. Call your doctor now or seek immediate medical care if:  · Your stools are black and tarlike. · Your stools have streaks of blood, but you did not have a biopsy or any polyps removed. · You have belly pain, or your belly is swollen and firm. · You vomit. · You have a fever. · You are very dizzy. Watch closely for changes in your health, and be sure to contact your doctor if you have any problems. Where can you learn more? Go to [employer] Simple[employer]  Enter E264 in the search box to learn more about \"Colonoscopy: What to Expect at Home. \"   © 3340-1421 [employer]. Care instructions adapted under license by [facility] (which disclaims liability or warranty for this information). This care instruction is for use with your licensed healthcare professional. If you have questions about a medical condition or this instruction, always ask your healthcare professional. [address] any warranty or liability for your use of this information. Content Version: 40.0.602461; Current as of: November 14, 2014     Learning About Diverticulosis and Diverticulitis  What are diverticulosis and diverticulitis? In diverticulosis and diverticulitis, pouches called diverticula form in the wall of the large intestine, or colon. · In diverticulosis, the pouches do not cause any pain or other symptoms. · In diverticulitis, the pouches get inflamed or infected and cause symptoms. Doctors aren't sure what causes these pouches in the colon.  But they think that a low-fiber diet may play a role. Without fiber to add bulk to the stool, the colon has to work harder than normal to push the stool forward. The pressure from this may cause pouches to form in weak spots along the colon. Some people with diverticulosis get diverticulitis. But experts don't know why this happens. What are the symptoms? · In diverticulosis, most people don't have symptoms. But pouches sometimes bleed. · In diverticulitis, symptoms may last from a few hours to a week or more. They include:  ¨ Belly pain. This is usually in the lower left side. It is sometimes worse when you move. This is the most common symptom. ¨ Fever and chills. ¨ Bloating and gas. ¨ Diarrhea or constipation. ¨ Nausea and sometimes vomiting. ¨ Not feeling like eating. How can you prevent these problems? You may be able to lower your chance of getting diverticulitis. You can do this by taking steps to prevent constipation. · Eat fruits, vegetables, beans, and whole grains every day. These foods are high in fiber. · Drink plenty of fluids (enough so that your urine is light yellow or clear like water). If you have kidney, heart, or liver disease and have to limit fluids, talk with your doctor before you increase the amount of fluids you drink. · Get at least 30 minutes of exercise on most days of the week. Walking is a good choice. You also may want to do other activities, such as running, swimming, cycling, or playing tennis or team sports. · Take a fiber supplement, such as Citrucel or Metamucil, every day if needed. Read and follow all instructions on the label. · Schedule time each day for a bowel movement. Having a daily routine may help. Take your time and do not strain when having a bowel movement. Some people avoid nuts, seeds, berries, and popcorn. They believe that these foods might get trapped in the diverticula and cause pain.  But there is no proof that these foods cause diverticulitis or make it worse. How are these problems treated? · The best way to treat diverticulosis is to avoid constipation. (See the tips above.)  · Treatment for diverticulitis includes antibiotics and often a change in your diet. You may need only liquids at first. Your doctor may suggest pain medicines for pain or belly cramps. In some cases, surgery may be needed. Follow-up care is a key part of your treatment and safety. Be sure to make and go to all appointments, and call your doctor if you are having problems. It's also a good idea to know your test results and keep a list of the medicines you take. Where can you learn more? Go to http://slava-enrique.info/. Enter T250 in the search box to learn more about \"Learning About Diverticulosis and Diverticulitis. \"  Current as of: May 12, 2017  Content Version: 11.4  © 9928-0458 [employer]. Care instructions adapted under license by [facility] (which disclaims liability or warranty for this information). If you have questions about a medical condition or this instruction, always ask your healthcare professional. [address] any warranty or liability for your use of this information. High-Fiber Diet: Care Instructions  Your Care Instructions    A high-fiber diet may help you relieve constipation and feel less bloated. Your doctor and dietitian will help you make a high-fiber eating plan based on your personal needs. The plan will include the things you like to eat. It will also make sure that you get 30 grams of fiber a day. Before you make changes to the way you eat, be sure to talk with your doctor or dietitian. Follow-up care is a key part of your treatment and safety. Be sure to make and go to all appointments, and call your doctor if you are having problems. It's also a good idea to know your test results and keep a list of the medicines you take. How can you care for yourself at home?   · You can increase how much fiber you get if you eat more of certain foods. These foods include:  ¨ Whole-grain breads and cereals. ¨ Fruits, such as pears, apples, and peaches. Eat the skins, peels, and seeds, if you can. ¨ Vegetables, such as broccoli, cabbage, spinach, carrots, asparagus, and squash. ¨ Starchy vegetables. These include potatoes with skins, kidney beans, and lima beans. · Take a fiber supplement every day if your doctor recommends it. Examples are Benefiber, Citrucel, FiberCon, and Metamucil. Ask your doctor how much to take. · Drink plenty of fluids, enough so that your urine is light yellow or clear like water. If you have kidney, heart, or liver disease and have to limit fluids, talk with your doctor before you increase the amount of fluids you drink. · Get some exercise every day. Exercise helps stool move through the colon. It also helps prevent constipation. · Keep a food diary. Try to notice and write down what foods cause gas, pain, or other symptoms. Then you can avoid these foods. Where can you learn more? Go to http://slava-enrique.info/. Enter B261 in the search box to learn more about \"High-Fiber Diet: Care Instructions. \"  Current as of: May 12, 2017  Content Version: 11.4  © 5759-8862 [employer]. Care instructions adapted under license by [employer] (which disclaims liability or warranty for this information). If you have questions about a medical condition or this instruction, always ask your healthcare professional. [place] [address] any warranty or liability for your use of this information. DISCHARGE SUMMARY from Nurse     POST-PROCEDURE INSTRUCTIONS:    Call your Physician if you:  ? Observe any excess bleeding. ? Develop a temperature over 100.5o F.  ? Experience abdominal, shoulder or chest pain. ?  Notice any signs of decreased circulation or nerve impairment to an extremity such as a change in color, persistent numbness, tingling, coldness or increase in pain. ? Vomit blood or you have nausea and vomiting lasting longer than 4 hours. ? Are unable to take medications. ? Are unable to urinate within 8 hours after discharge following general anesthesia or intravenous sedation. For the next 24 hours after receiving general anesthesia or intravenous sedation, or while taking prescription Narcotics, limit your activities:  ? Do NOT drive a motor vehicle, operate hazard machinery or power tools, or perform tasks that require coordination. The medication you received during your procedure may have some effect on your mental awareness. ? Do NOT make important personal or business decisions. The medication you received during your procedure may have some effect on your mental awareness. ? Do NOT drink alcoholic beverages. These drinks do not mix well with the medications that have been given to you. ? Upon discharge from the hospital, you must be accompanied by a responsible adult. ? Resume your diet as directed by your physician. ? Resume medications as your physician has prescribed. ? Please give a list of your current medications to your Primary Care Provider. ? Please update this list whenever your medications are discontinued, doses are changed, or new medications (including over-the-counter products) are added. ? Please carry medication information at all times in case of emergency situations. These are general instructions for a healthy lifestyle:    No smoking/ No tobacco products/ Avoid exposure to second hand smoke.  Surgeon General's Warning:  Quitting smoking now greatly reduces serious risk to your health. Obesity, smoking, and a sedentary lifestyle greatly increase your risk for illness.    A healthy diet, regular physical exercise & weight monitoring are important for maintaining a healthy lifestyle   You may be retaining fluid if you have a history of heart failure or if you experience any of the following symptoms:  Weight gain of 3 pounds or more overnight or 5 pounds in a week, increased swelling in our hands or feet or shortness of breath while lying flat in bed. Please call your doctor as soon as you notice any of these symptoms; do not wait until your next office visit. Recognize signs and symptoms of STROKE:  F  -  Face looks uneven  A  -  Arms unable to move or move unevenly  S  -  Speech slurred or non-existent  T  -  Time to call 911 - as soon as signs and symptoms begin - DO NOT go back to bed or wait to see If you get better - TIME IS BRAIN. Colorectal Screening   Colorectal cancer almost always develops from precancerous polyps (abnormal growths) in the colon or rectum. Screening tests can find precancerous polyps, so that they can be removed before they turn into cancer. Screening tests can also find colorectal cancer early, when treatment works best.  Shellie Schuster Speak with your physician about when you should begin screening and how often you should be tested. Additional Information    If you have questions, please call [phone]. Remember, [facility] is NOT to be used for urgent needs. For medical emergencies, dial 911. Educational references and/or instructions provided during this visit included:    Diverticulosis, High fiber diet      APPOINTMENTS:    Please make a follow-up appointment with your physician. Discharge information has been reviewed with the patient and spouse. The patient and spouse verbalized understanding.

## 2018-04-03 NOTE — PROCEDURES
[email or web address]  [address] [place], [address]      Brief Procedure Note    Cassi Salmeron  1950  [id number]    Date of Procedure: 4/3/2018    Preoperative diagnosis: V12.72 - Z86.010, Personal history of colonic polyps  V16.0 - Z80.0, Family history of colon cancer  530.81 - K21.9,  Gastroesophageal reflux disease  278.00 - E66.9,  Obesity    Postoperative diagnosis: Diverticulosis.     Type of Anesthesia: MAC (Monitored anesthesia care)    Description of findings: same as post op dx    Procedure: Procedure(s):  COLONOSCOPY    :  Dr. Supa Merritt MD    Assistant(s): Endoscopy Technician-1: Damaris Higgins  Endoscopy RN-1: Bhargavi Murray RN  Endoscopy RN-2: Mary Jane Leo RN    EBL:None    Specimens: * No specimens in log *    Findings: See printed and scanned procedure note    Complications: None    Dr. Supa Merritt MD  4/3/2018  8:04 AM

## 2018-04-03 NOTE — TELEPHONE ENCOUNTER
Sent PA for Oxycontin 10 mg ER via covermymeds to [facility]. Waiting for response.

## 2018-04-03 NOTE — ANESTHESIA PREPROCEDURE EVALUATION
Anesthetic History               Review of Systems / Medical History  Patient summary reviewed and pertinent labs reviewed    Pulmonary    COPD: moderate      Shortness of breath  Asthma : well controlled       Neuro/Psych   Within defined limits           Cardiovascular    Hypertension              Exercise tolerance: <4 METS     GI/Hepatic/Renal     GERD           Endo/Other    Diabetes: using insulin  Hypothyroidism  Obesity and arthritis     Other Findings   Comments: Documentation of current medication  Current medications obtained, documented and obtained? YES      Risk Factors for Postoperative nausea/vomiting:       History of postoperative nausea/vomiting? NO       Female? NO       Motion sickness? NO       Intended opioid administration for postoperative analgesia? NO      Smoking Abstinence:  Current Smoker? NO  Elective Surgery? YES  Seen preoperatively by anesthesiologist or proxy prior to day of surgery? YES  Pt abstained from smoking 24 hours prior to anesthesia?  N/A    Preventive care/screening for High Blood Pressure:  Aged 18 years and older: YES  Screened for high blood pressure: YES  Patients with high blood pressure referred to primary care provider   for BP management: YES                 Physical Exam    Airway  Mallampati: II  TM Distance: 4 - 6 cm  Neck ROM: normal range of motion   Mouth opening: Normal     Cardiovascular  Regular rate and rhythm,  S1 and S2 normal,  no murmur, click, rub, or gallop             Dental    Dentition: Poor dentition     Pulmonary  Breath sounds clear to auscultation               Abdominal  GI exam deferred       Other Findings            Anesthetic Plan    ASA: 3  Anesthesia type: MAC          Induction: Intravenous  Anesthetic plan and risks discussed with: Patient

## 2018-04-03 NOTE — ANESTHESIA POSTPROCEDURE EVALUATION
Post-Anesthesia Evaluation & Assessment    Visit Vitals    BP 96/76    Pulse 72    Temp 36.7 °C (98.1 °F)    Resp 16    Ht 5' 11\" (1.803 m)    Wt 127 kg (280 lb)    SpO2 96%    BMI 39.05 kg/m2       Post-operative hydration adequate. Pain score (VAS): 0 Pain Scale 1: Numeric (0 - 10) (04/03/18 0810)  Pain Intensity 1: 0 (04/03/18 0810)   Managed. Mental status & Level of consciousness: returned to baseline    Neurological status: returned to baseline    Pulmonary status: airway patent, oxygen given as needed. Complications related to anesthesia: none    Patient has met all discharge requirements.     Additional comments:        Taryn Infante MD  April 3, 2018

## 2018-04-03 NOTE — IP AVS SNAPSHOT
[address] [facility] 
 
 
 [address] [place] [address] [address] [address] [phone] Patient: Silvana Andrews MRN: [id number] About your hospitalization You were admitted on:  April 3, 2018 You last received care in the:  [facility] You were discharged on:  April 3, 2018 Why you were hospitalized Your primary diagnosis was:  Not on File Follow-up Information Follow up With Details Comments Contact Info [address] [facility] [address] 
[phone] Charmayne Pons, MD   [address] [facility] [address] [facility] [address] [facility] [address] 
[phone] Your Scheduled Appointments Tuesday May 22, 2018 10:00 AM EDT Workers Comp F/U with PAUL Goodman [address] for Pain Management (REJI SCHEDULING) [address] [place] [address]  
[phone] Discharge Orders None A check neftali indicates which time of day the medication should be taken. My Medications CHANGE how you take these medications Instructions Each Dose to Equal  
 Morning Noon Evening Bedtime  
 insulin glulisine U-100 100 unit/mL pen Commonly known as:  APIDRA SOLOSTAR U-100 INSULIN What changed:  additional instructions Your last dose was: Your next dose is:    
   
   
 2 units per carb consumed. Max 30 / day CONTINUE taking these medications Instructions Each Dose to Equal  
 Morning Noon Evening Bedtime ACCU-CHEK JAZZY PLUS TEST STRP strip Generic drug:  glucose blood VI test strips Your last dose was: Your next dose is:    
   
   
      
   
   
   
  
 [address] [facility] Generic drug:  Lancets Your last dose was: Your next dose is: ACULAR 0.5 % ophthalmic solution Generic drug:  ketorolac Your last dose was: Your next dose is:    
   
   
 Administer 1 Drop to both eyes two (2) times a day. 1 Drop  
    
   
   
   
  
 albuterol 2.5 mg /3 mL (0.083 %) nebulizer solution Commonly known as:  PROVENTIL VENTOLIN Your last dose was: Your next dose is:    
   
   
 3 mL by Nebulization route every four (4) hours as needed for Wheezing or Shortness of Breath. 2.5 mg  
    
   
   
   
  
 atorvastatin 20 mg tablet Commonly known as:  LIPITOR Your last dose was: Your next dose is: Take 1 Tab by mouth daily. 20 mg  
    
   
   
   
  
 betamethasone dipropionate 0.05 % ointment Commonly known as:  Brionna Mckenzie Your last dose was: Your next dose is:    
   
   
      
   
   
   
  
 budesonide-formoterol 160-4.5 mcg/actuation Hfaa Commonly known as:  SYMBICORT Your last dose was: Your next dose is: Take 2 Puffs by inhalation two (2) times a day. 2 Puff  
    
   
   
   
  
 butalbital-acetaminophen-caffeine -40 mg per tablet Commonly known as:  Ana M Drake Your last dose was: Your next dose is: Take 0.5 Tabs by mouth daily as needed for Pain. 0.5 Tab DULoxetine 60 mg capsule Commonly known as:  CYMBALTA Your last dose was: Your next dose is: Take 1 Cap by mouth nightly for 30 days. 60 mg  
    
   
   
   
  
 esomeprazole 40 mg capsule Commonly known as:  Esa Banks Your last dose was: Your next dose is: Take 40 mg by mouth two (2) times a day. 40 mg  
    
   
   
   
  
 fenofibrate nanocrystallized 145 mg tablet Commonly known as:  [employer] Your last dose was: Your next dose is: Take 1 Tab by mouth daily. 145 mg  
    
   
   
   
  
 fluticasone 50 mcg/actuation nasal spray Commonly known as:  Raul Inffrancis Your last dose was: Your next dose is:    
   
   
 USE ONE SPRAY(S) IN EACH NOSTRIL ONCE DAILY HYDROcodone-acetaminophen 5-325 mg per tablet Commonly known as:  Shawn Sykes Your last dose was: Your next dose is: Take 1 Tab by mouth daily as needed for Pain. 1 Tab  
    
   
   
   
  
 insulin glargine 100 unit/mL (3 mL) Inpn Commonly known as:  BASAGLAR KWIKPEN U-100 INSULIN Your last dose was: Your next dose is:    
   
   
 52 units at bedtime JANUVIA 100 mg tablet Generic drug:  SITagliptin Your last dose was: Your next dose is: Take 50 mg by mouth daily. Indications: patient stated he was given 50 mg  
 50 mg  
    
   
   
   
  
 levothyroxine 150 mcg tablet Commonly known as:  SYNTHROID Your last dose was: Your next dose is: TAKE ONE TABLET BY MOUTH ONCE DAILY BEFORE BREAKFAST  
     
   
   
   
  
 lisinopril 5 mg tablet Commonly known as:  Arik Pratt Your last dose was: Your next dose is: Take 1 Tab by mouth daily. 5 mg  
    
   
   
   
  
 metoprolol tartrate 25 mg tablet Commonly known as:  LOPRESSOR Your last dose was: Your next dose is: Take 1 Tab by mouth two (2) times a day. 25 mg  
    
   
   
   
  
 montelukast 10 mg tablet Commonly known as:  SINGULAIR Your last dose was: Your next dose is: TAKE ONE TABLET BY MOUTH ONCE DAILY  
     
   
   
   
  
 naloxone 4 mg/actuation nasal spray Commonly known as:  [employer] Your last dose was: Your next dose is:    
   
   
 4 mg by Nasal route as needed. 4 mg  
    
   
   
   
  
 oxyCODONE IR 10 mg Tab immediate release tablet Commonly known as:  Alexys Barrett Your last dose was: Your next dose is: Take 10 mg by mouth three (3) times daily.   
 10 mg  
    
 * BD ULTRA-FINE FANI PEN NEEDLES Your last dose was: Your next dose is:    
   
   
 by Does Not Apply route. 4mm x 32G * PEN NEEDLE 31 gauge x 5/16\" Ndle Generic drug:  Insulin Needles (Disposable) Your last dose was: Your next dose is:    
   
   
 USE ONE PEN NEEDLE FOR LANTUS FLEXPEN AND 3 PEN NEEDLES FOR APIDRA WITH EACH MEAL * Fani Pen Needle 32 gauge x 5/32\" Ndle Generic drug:  Insulin Needles (Disposable) Your last dose was: Your next dose is: USE AT BEDTIME  
     
   
   
   
  
 pregabalin 150 mg capsule Commonly known as:  Balinda Garb Your last dose was: Your next dose is: TAKE 1 CAPSULE BY MOUTH THREE TIMES DAILY FOR 30 DAYS. MAXIMUM 3 CAPSULES DAILY. * tamsulosin 0.4 mg capsule Commonly known as:  FLOMAX Your last dose was: Your next dose is: Take 1 Cap by mouth two (2) times a day. 0.4 mg  
    
   
   
   
  
 * tamsulosin 0.4 mg capsule Commonly known as:  FLOMAX Your last dose was: Your next dose is: Take 1 Cap by mouth daily. Indications: benign prostatic hyperplasia with lower urinary tract sx  
 0.4 mg  
    
   
   
   
  
 tiotropium 18 mcg inhalation capsule Commonly known as:  Sonja Revels Your last dose was: Your next dose is: Take 1 Cap by inhalation daily. 1 Cap  
    
   
   
   
  
 varicella-zoster vacine live 19,400 unit/0.65 mL Susr injection Generic drug:  varicella zoster vaccine live Your last dose was: Your next dose is: * Notice: This list has 5 medication(s) that are the same as other medications prescribed for you. Read the directions carefully, and ask your doctor or other care provider to review them with you. Opioid Education Prescription Opioids: What You Need to Know: 
 
Prescription opioids can be used to help relieve moderate-to-severe pain and are often prescribed following a surgery or injury, or for certain health conditions. These medications can be an important part of treatment but also come with serious risks. Opioids are strong pain medicines. Examples include hydrocodone, oxycodone, fentanyl, and morphine. Heroin is an example of an illegal opioid. It is important to work with your health care provider to make sure you are getting the safest, most effective care. WHAT ARE THE RISKS AND SIDE EFFECTS OF OPIOID USE? Prescription opioids carry serious risks of addiction and overdose, especially with prolonged use. An opioid overdose, often marked by slow breathing, can cause sudden death. The use of prescription opioids can have a number of side effects as well, even when taken as directed. · Tolerance-meaning you might need to take more of a medication for the same pain relief · Physical dependence-meaning you have symptoms of withdrawal when the medication is stopped. Withdrawal symptoms can include nausea, sweating, chills, diarrhea, stomach cramps, and muscle aches. Withdrawal can last up to several weeks, depending on which drug you took and how long you took it. · Increased sensitivity to pain · Constipation · Nausea, vomiting, and dry mouth · Sleepiness and dizziness · Confusion · Depression · Low levels of testosterone that can result in lower sex drive, energy, and strength · Itching and sweating RISKS ARE GREATER WITH:      
· History of drug misuse, substance use disorder, or overdose · Mental health conditions (such as depression or anxiety) · Sleep apnea · Older age (72 years or older) · Pregnancy Avoid alcohol while taking prescription opioids. Also, unless specifically advised by your health care provider, medications to avoid include: · Benzodiazepines (such as Xanax or Valium) · Muscle relaxants (such as Soma or Flexeril) · Hypnotics (such as Ambien or Lunesta) · Other prescription opioids KNOW YOUR OPTIONS Talk to your health care provider about ways to manage your pain that don't involve prescription opioids. Some of these options may actually work better and have fewer risks and side effects. Options may include: 
· Pain relievers such as acetaminophen, ibuprofen, and naproxen · Some medications that are also used for depression or seizures · Physical therapy and exercise · Counseling to help patients learn how to cope better with triggers of pain and stress. · Application of heat or cold compress · Massage therapy · Relaxation techniques Be Informed Make sure you know the name of your medication, how much and how often to take it, and its potential risks & side effects. IF YOU ARE PRESCRIBED OPIOIDS FOR PAIN: 
· Never take opioids in greater amounts or more often than prescribed. Remember the goal is not to be pain-free but to manage your pain at a tolerable level. · Follow up with your primary care provider to: · Work together to create a plan on how to manage your pain. · Talk about ways to help manage your pain that don't involve prescription opioids. · Talk about any and all concerns and side effects. · Help prevent misuse and abuse. · Never sell or share prescription opioids · Help prevent misuse and abuse. · Store prescription opioids in a secure place and out of reach of others (this may include visitors, children, friends, and family). · Safely dispose of unused/unwanted prescription opioids: Find your community drug take-back program or your pharmacy mail-back program, or flush them down the toilet, following guidance from the Food and Drug Administration (www.fda.gov/Drugs/ResourcesForYou). · Visit www.cdc.gov/drugoverdose to learn about the risks of opioid abuse and overdose. · If you believe you may be struggling with addiction, tell your health care provider and ask for guidance or call Sabina Umaña at [phone]. Discharge Instructions Colonoscopy: What to Expect at [facility] Your Recovery After you have a colonoscopy, you will stay at the clinic for 1 to 2 hours until the medicines wear off. Then you can go home. But you will need to arrange for a ride. Your doctor will tell you when you can eat and do your other usual activities. Your doctor will talk to you about when you will need your next colonoscopy. Your doctor can help you decide how often you need to be checked. This will depend on the results of your test and your risk for colorectal cancer. After the test, you may be bloated or have gas pains. You may need to pass gas. If a biopsy was done or a polyp was removed, you may have streaks of blood in your stool (feces) for a few days. This care sheet gives you a general idea about how long it will take for you to recover. But each person recovers at a different pace. Follow the steps below to get better as quickly as possible. How can you care for yourself at home? Activity · Rest when you feel tired. · You can do your normal activities when it feels okay to do so. Diet · Follow your doctor's directions for eating. · Unless your doctor has told you not to, drink plenty of fluids. This helps to replace the fluids that were lost during the colon prep. · Do not drink alcohol. Medicines · If polyps were removed or a biopsy was done during the test, your doctor may tell you not to take aspirin or other anti-inflammatory medicines for a few days. These include ibuprofen (Advil, Motrin) and naproxen (Aleve). Other instructions · For your safety, do not drive or operate machinery until the medicine wears off and you can think clearly.  Your doctor may tell you not to drive or operate machinery until the day after your test. 
· Do not sign legal documents or make major decisions until the medicine wears off and you can think clearly. The anesthesia can make it hard for you to fully understand what you are agreeing to. Follow-up care is a key part of your treatment and safety. Be sure to make and go to all appointments, and call your doctor if you are having problems. It's also a good idea to know your test results and keep a list of the medicines you take. When should you call for help? Call 911 anytime you think you may need emergency care. For example, call if: 
· You passed out (lost consciousness). · You pass maroon or bloody stools. · You have severe belly pain. Call your doctor now or seek immediate medical care if: 
· Your stools are black and tarlike. · Your stools have streaks of blood, but you did not have a biopsy or any polyps removed. · You have belly pain, or your belly is swollen and firm. · You vomit. · You have a fever. · You are very dizzy. Watch closely for changes in your health, and be sure to contact your doctor if you have any problems. Where can you learn more? Go to Proformative.be Enter E264 in the search box to learn more about \"Colonoscopy: What to Expect at Home. \"  
© 2234-3648 [employer]wise, Incorporated. Care instructions adapted under license by [employer] (which disclaims liability or warranty for this information). This care instruction is for use with your licensed healthcare professional. If you have questions about a medical condition or this instruction, always ask your healthcare professional. [place] [address] any warranty or liability for your use of this information. Content Version: 52.1.946327; Current as of: November 14, 2014 Learning About Diverticulosis and Diverticulitis What are diverticulosis and diverticulitis? In diverticulosis and diverticulitis, pouches called diverticula form in the wall of the large intestine, or colon. · In diverticulosis, the pouches do not cause any pain or other symptoms. · In diverticulitis, the pouches get inflamed or infected and cause symptoms. Doctors aren't sure what causes these pouches in the colon. But they think that a low-fiber diet may play a role. Without fiber to add bulk to the stool, the colon has to work harder than normal to push the stool forward. The pressure from this may cause pouches to form in weak spots along the colon. Some people with diverticulosis get diverticulitis. But experts don't know why this happens. What are the symptoms? · In diverticulosis, most people don't have symptoms. But pouches sometimes bleed. · In diverticulitis, symptoms may last from a few hours to a week or more. They include: ¨ Belly pain. This is usually in the lower left side. It is sometimes worse when you move. This is the most common symptom. ¨ Fever and chills. ¨ Bloating and gas. ¨ Diarrhea or constipation. ¨ Nausea and sometimes vomiting. ¨ Not feeling like eating. How can you prevent these problems? You may be able to lower your chance of getting diverticulitis. You can do this by taking steps to prevent constipation. · Eat fruits, vegetables, beans, and whole grains every day. These foods are high in fiber. · Drink plenty of fluids (enough so that your urine is light yellow or clear like water). If you have kidney, heart, or liver disease and have to limit fluids, talk with your doctor before you increase the amount of fluids you drink. · Get at least 30 minutes of exercise on most days of the week. Walking is a good choice. You also may want to do other activities, such as running, swimming, cycling, or playing tennis or team sports. · Take a fiber supplement, such as Citrucel or Metamucil, every day if needed. Read and follow all instructions on the label. · Schedule time each day for a bowel movement. Having a daily routine may help. Take your time and do not strain when having a bowel movement. Some people avoid nuts, seeds, berries, and popcorn. They believe that these foods might get trapped in the diverticula and cause pain. But there is no proof that these foods cause diverticulitis or make it worse. How are these problems treated? · The best way to treat diverticulosis is to avoid constipation. (See the tips above.) · Treatment for diverticulitis includes antibiotics and often a change in your diet. You may need only liquids at first. Your doctor may suggest pain medicines for pain or belly cramps. In some cases, surgery may be needed. Follow-up care is a key part of your treatment and safety. Be sure to make and go to all appointments, and call your doctor if you are having problems. It's also a good idea to know your test results and keep a list of the medicines you take. Where can you learn more? Go to http://slava-enrique.info/. Enter K283 in the search box to learn more about \"Learning About Diverticulosis and Diverticulitis. \" Current as of: May 12, 2017 Content Version: 11.4 © 1811-2466 [employer]. Care instructions adapted under license by [employer] (which disclaims liability or warranty for this information). If you have questions about a medical condition or this instruction, always ask your healthcare professional. [place] [address] any warranty or liability for your use of this information. High-Fiber Diet: Care Instructions Your Care Instructions A high-fiber diet may help you relieve constipation and feel less bloated. Your doctor and dietitian will help you make a high-fiber eating plan based on your personal needs. The plan will include the things you like to eat. It will also make sure that you get 30 grams of fiber a day. Before you make changes to the way you eat, be sure to talk with your doctor or dietitian. Follow-up care is a key part of your treatment and safety. Be sure to make and go to all appointments, and call your doctor if you are having problems. It's also a good idea to know your test results and keep a list of the medicines you take. How can you care for yourself at home? · You can increase how much fiber you get if you eat more of certain foods. These foods include: ¨ Whole-grain breads and cereals. ¨ Fruits, such as pears, apples, and peaches. Eat the skins, peels, and seeds, if you can. ¨ Vegetables, such as broccoli, cabbage, spinach, carrots, asparagus, and squash. ¨ Starchy vegetables. These include potatoes with skins, kidney beans, and lima beans. · Take a fiber supplement every day if your doctor recommends it. Examples are Benefiber, Citrucel, FiberCon, and Metamucil. Ask your doctor how much to take. · Drink plenty of fluids, enough so that your urine is light yellow or clear like water. If you have kidney, heart, or liver disease and have to limit fluids, talk with your doctor before you increase the amount of fluids you drink. · Get some exercise every day. Exercise helps stool move through the colon. It also helps prevent constipation. · Keep a food diary. Try to notice and write down what foods cause gas, pain, or other symptoms. Then you can avoid these foods. Where can you learn more? Go to http://slava-enrique.info/. Enter X865 in the search box to learn more about \"High-Fiber Diet: Care Instructions. \" Current as of: May 12, 2017 Content Version: 11.4 © 3025-6364 [employer]. Care instructions adapted under license by [employer] (which disclaims liability or warranty for this information).  If you have questions about a medical condition or this instruction, always ask your healthcare professional. [employer] disclaims any warranty or liability for your use of this information. DISCHARGE SUMMARY from Nurse POST-PROCEDURE INSTRUCTIONS: 
 
Call your Physician if you: 
? Observe any excess bleeding. ? Develop a temperature over 100.5o F. 
? Experience abdominal, shoulder or chest pain. ? Notice any signs of decreased circulation or nerve impairment to an extremity such as a change in color, persistent numbness, tingling, coldness or increase in pain. ? Vomit blood or you have nausea and vomiting lasting longer than 4 hours. ? Are unable to take medications. ? Are unable to urinate within 8 hours after discharge following general anesthesia or intravenous sedation. For the next 24 hours after receiving general anesthesia or intravenous sedation, or while taking prescription Narcotics, limit your activities: 
? Do NOT drive a motor vehicle, operate hazard machinery or power tools, or perform tasks that require coordination. The medication you received during your procedure may have some effect on your mental awareness. ? Do NOT make important personal or business decisions. The medication you received during your procedure may have some effect on your mental awareness. ? Do NOT drink alcoholic beverages. These drinks do not mix well with the medications that have been given to you. ? Upon discharge from the hospital, you must be accompanied by a responsible adult. ? Resume your diet as directed by your physician. ? Resume medications as your physician has prescribed. ? Please give a list of your current medications to your Primary Care Provider. ? Please update this list whenever your medications are discontinued, doses are changed, or new medications (including over-the-counter products) are added. ? Please carry medication information at all times in case of emergency situations. These are general instructions for a healthy lifestyle: No smoking/ No tobacco products/ Avoid exposure to second hand smoke. ? Surgeon General's Warning:  Quitting smoking now greatly reduces serious risk to your health. Obesity, smoking, and a sedentary lifestyle greatly increase your risk for illness. ? A healthy diet, regular physical exercise & weight monitoring are important for maintaining a healthy lifestyle ? You may be retaining fluid if you have a history of heart failure or if you experience any of the following symptoms:  Weight gain of 3 pounds or more overnight or 5 pounds in a week, increased swelling in our hands or feet or shortness of breath while lying flat in bed. Please call your doctor as soon as you notice any of these symptoms; do not wait until your next office visit. Recognize signs and symptoms of STROKE: 
F  -  Face looks uneven A  -  Arms unable to move or move unevenly S  -  Speech slurred or non-existent T  -  Time to call 911 - as soon as signs and symptoms begin - DO NOT go back to bed or wait to see If you get better - TIME IS BRAIN. Colorectal Screening ? Colorectal cancer almost always develops from precancerous polyps (abnormal growths) in the colon or rectum. Screening tests can find precancerous polyps, so that they can be removed before they turn into cancer. Screening tests can also find colorectal cancer early, when treatment works best. 
? Speak with your physician about when you should begin screening and how often you should be tested. Additional Information If you have questions, please call [phone]. Remember, [employer] is NOT to be used for urgent needs. For medical emergencies, dial 911. Educational references and/or instructions provided during this visit included: 
 
Diverticulosis, High fiber diet APPOINTMENTS: 
 
Please make a follow-up appointment with your physician. Discharge information has been reviewed with the patient and spouse.   The patient and spouse verbalized understanding. ACO Transitions of Care Introducing [employer] [address] offers a voluntary care coordination program to provide high quality service and care to [employer] fee-for-service beneficiaries. Tabby Maloney was designed to help you enhance your health and well-being through the following services: ? Transitions of Care  support for individuals who are transitioning from one care setting to another (example: Hospital to home). ? Chronic and Complex Care Coordination  support for individuals and caregivers of those with serious or chronic illnesses or with more than one chronic (ongoing) condition and those who take a number of different medications. If you meet specific medical criteria, a [address] [place] [address] Rd may call you directly to coordinate your care with your primary care physician and your other care providers. For questions about the [facility] programs, please, contact your physicians office. For general questions or additional information about Accountable Care Organizations: 
Please visit www.medicare.gov/acos. html or call 1-800-MEDICARE (9-275.797.3038) TTY users should call 9-686.197.7653. Introducing [facility] & HEALTH SERVICES! [employer] introduces Salorix patient portal. Now you can access parts of your medical record, email your doctor's office, and request medication refills online. 1. In your internet browser, go to [email or web address] 2. Click on the First Time User? Click Here link in the Sign In box. You will see the New Member Sign Up page. 3. Enter your Salorix Access Code exactly as it appears below. You will not need to use this code after youve completed the sign-up process. If you do not sign up before the expiration date, you must request a new code. · Salorix Access Code: [email or web address] Expires: 5/28/2018 11:06 AM 
 
4. Enter the last four digits of your Social Security Number (xxxx) and Date of Birth (mm/dd/yyyy) as indicated and click Submit. You will be taken to the next sign-up page. 5. Create a EcoSynthetixt ID. This will be your Reduce Data login ID and cannot be changed, so think of one that is secure and easy to remember. 6. Create a Reduce Data password. You can change your password at any time. 7. Enter your Password Reset Question and Answer. This can be used at a later time if you forget your password. 8. Enter your e-mail address. You will receive e-mail notification when new information is available in [address] 9. Click Sign Up. You can now view and download portions of your medical record. 10. Click the Download Summary menu link to download a portable copy of your medical information. If you have questions, please visit the Frequently Asked Questions section of the Reduce Data website. Remember, Reduce Data is NOT to be used for urgent needs. For medical emergencies, dial 911. Now available from your iPhone and Android! Introducing Bladimir Metcalf As a Thersia Nerissa patient, I wanted to make you aware of our electronic visit tool called Bladimir Liufin. Thersia Nerissa 24/7 allows you to connect within minutes with a medical provider 24 hours a day, seven days a week via a mobile device or tablet or logging into a secure website from your computer. You can access Bladimir Liufin from anywhere in the [place]. A virtual visit might be right for you when you have a simple condition and feel like you just dont want to get out of bed, or cant get away from work for an appointment, when your regular Thersia Nerissa provider is not available (evenings, weekends or holidays), or when youre out of town and need minor care.   Electronic visits cost only $49 and if the Bladimir Metcalf provider determines a prescription is needed to treat your condition, one can be electronically transmitted to a nearby pharmacy*. Please take a moment to enroll today if you have not already done so. The enrollment process is free and takes just a few minutes. To enroll, please download the [employer] 24/7 ashlie to your tablet or phone, or visit [email or web address] to enroll on your computer. And, as an [address] [place] [address] patient with a [employer] account, the results of your visits will be scanned into your electronic medical record and your primary care provider will be able to view the scanned results. We urge you to continue to see your regular [employer] provider for your ongoing medical care. And while your primary care provider may not be the one available when you seek a [facility] virtual visit, the peace of mind you get from getting a real diagnosis real time can be priceless. For more information on [facility], view our Frequently Asked Questions (FAQs) at [email or web address]. Sincerely, 
 
Charisse Dorantes MD 
Chief Medical Officer [facility] Timothy *:  certain medications cannot be prescribed via [facility] Providers Seen During Your Hospitalization Provider Specialty Primary office phone Vanessa Baez MD Gastroenterology [phone] Your Primary Care Physician (PCP) Primary Care Physician Office Phone Office Fax [place], [address] [phone] You are allergic to the following Allergen Reactions Ciprofloxacin Anaphylaxis Other Plant, Animal, Environmental Other (comments) Patient cannot have MRI. Patient states that he has metal screws in his left arm. Lamisil (Terbinafine) Swelling Tongue swelling Metformin Other (comments) Elevated cr 1.4 Morphine Other (comments) \"loss of blood pressure\" Other Medication Other (comments) Doxasylin causes extreme GERD Pcn (Penicillins) Rash Pentothal (Thiopental Sodium) Shortness of Breath Sulfa (Sulfonamide Antibiotics) Rash Recent Documentation Height Weight BMI Smoking Status 1.803 m 127 kg 39.05 kg/m2 Former Smoker Emergency Contacts Name Discharge Info Relation Home Work Mobile Zhanna Velásquez DISCHARGE CAREGIVER [3] Spouse [[phone] Patient Belongings The following personal items are in your possession at time of discharge: 
  Dental Appliances: None  Visual Aid: Glasses, Sent home Please provide this summary of care documentation to your next provider. Signatures-by signing, you are acknowledging that this After Visit Summary has been reviewed with you and you have received a copy. Patient Signature:  ____________________________________________________________ Date:  ____________________________________________________________  
  
Katarina Woodbourne Provider Signature:  ____________________________________________________________ Date:  ____________________________________________________________

## 2018-04-03 NOTE — H&P
History and Physical reviewed; I have examined the patient and there are no pertinent changes. Cole Craig MD, MD   7:34 AM 4/3/2018  [facility], [address] [place] [address]  [email or web address]

## 2018-04-04 ENCOUNTER — TELEPHONE (OUTPATIENT)
Dept: PAIN MANAGEMENT | Age: 68
End: 2018-04-04

## 2018-04-04 NOTE — TELEPHONE ENCOUNTER
FEP approved  OxyContin 10 mg ER, quantity 270 tabs/90 days. Approval is good from 3/8/18-9/30/18. Faxed to [employer]. Called patient to inform him of approval and patient verbalized understanding.

## 2018-05-02 ENCOUNTER — TELEPHONE (OUTPATIENT)
Dept: PAIN MANAGEMENT | Age: 68
End: 2018-05-02

## 2018-05-02 NOTE — TELEPHONE ENCOUNTER
Received call from patient requesting prescription for 04/2018 oxycontin be redone due to being incorrect; prescription instructions are to take every eight hours , however dispense quantity is only 60; please advise.

## 2018-05-03 DIAGNOSIS — M54.42 CHRONIC BILATERAL LOW BACK PAIN WITH BILATERAL SCIATICA: ICD-10-CM

## 2018-05-03 DIAGNOSIS — M54.41 CHRONIC BILATERAL LOW BACK PAIN WITH BILATERAL SCIATICA: ICD-10-CM

## 2018-05-03 DIAGNOSIS — G89.4 CHRONIC PAIN SYNDROME: ICD-10-CM

## 2018-05-03 DIAGNOSIS — G89.29 CHRONIC BILATERAL LOW BACK PAIN WITH BILATERAL SCIATICA: ICD-10-CM

## 2018-05-03 RX ORDER — OXYCODONE HCL 10 MG/1
10 TABLET, FILM COATED, EXTENDED RELEASE ORAL EVERY 8 HOURS
Qty: 90 TAB | Refills: 0 | Status: SHIPPED | OUTPATIENT
Start: 2018-05-03 | End: 2018-05-22 | Stop reason: SDUPTHER

## 2018-05-14 DIAGNOSIS — E78.1 HYPERTRIGLYCERIDEMIA: ICD-10-CM

## 2018-05-14 RX ORDER — FENOFIBRATE 145 MG/1
TABLET, COATED ORAL
Qty: 90 TAB | Refills: 1 | Status: SHIPPED | OUTPATIENT
Start: 2018-05-14 | End: 2018-11-13

## 2018-05-22 ENCOUNTER — OFFICE VISIT (OUTPATIENT)
Dept: PAIN MANAGEMENT | Age: 68
End: 2018-05-22

## 2018-05-22 VITALS
SYSTOLIC BLOOD PRESSURE: 114 MMHG | HEART RATE: 54 BPM | DIASTOLIC BLOOD PRESSURE: 71 MMHG | TEMPERATURE: 98.5 F | RESPIRATION RATE: 12 BRPM | BODY MASS INDEX: 39.2 KG/M2 | HEIGHT: 71 IN | WEIGHT: 280 LBS

## 2018-05-22 DIAGNOSIS — M47.896 OTHER OSTEOARTHRITIS OF SPINE, LUMBAR REGION: ICD-10-CM

## 2018-05-22 DIAGNOSIS — M54.41 CHRONIC BILATERAL LOW BACK PAIN WITH BILATERAL SCIATICA: ICD-10-CM

## 2018-05-22 DIAGNOSIS — G89.29 CHRONIC BILATERAL LOW BACK PAIN WITH BILATERAL SCIATICA: ICD-10-CM

## 2018-05-22 DIAGNOSIS — M54.42 CHRONIC BILATERAL LOW BACK PAIN WITH BILATERAL SCIATICA: ICD-10-CM

## 2018-05-22 DIAGNOSIS — Z98.890 H/O LUMBOSACRAL SPINE SURGERY: ICD-10-CM

## 2018-05-22 DIAGNOSIS — G89.4 CHRONIC PAIN SYNDROME: ICD-10-CM

## 2018-05-22 DIAGNOSIS — M51.36 DDD (DEGENERATIVE DISC DISEASE), LUMBAR: ICD-10-CM

## 2018-05-22 RX ORDER — DULOXETIN HYDROCHLORIDE 60 MG/1
60 CAPSULE, DELAYED RELEASE ORAL
Qty: 30 CAP | Refills: 5 | Status: SHIPPED | OUTPATIENT
Start: 2018-05-22 | End: 2018-06-21

## 2018-05-22 RX ORDER — OXYCODONE HCL 10 MG/1
10 TABLET, FILM COATED, EXTENDED RELEASE ORAL EVERY 8 HOURS
Qty: 60 TAB | Refills: 0 | Status: SHIPPED | OUTPATIENT
Start: 2018-07-02 | End: 2018-06-05 | Stop reason: ALTCHOICE

## 2018-05-22 RX ORDER — OXYCODONE HCL 10 MG/1
10 TABLET, FILM COATED, EXTENDED RELEASE ORAL EVERY 8 HOURS
Qty: 90 TAB | Refills: 0 | Status: SHIPPED | OUTPATIENT
Start: 2018-08-01 | End: 2019-02-12

## 2018-05-22 RX ORDER — OXYCODONE HCL 10 MG/1
10 TABLET, FILM COATED, EXTENDED RELEASE ORAL EVERY 8 HOURS
Qty: 90 TAB | Refills: 0 | Status: SHIPPED | OUTPATIENT
Start: 2018-06-03 | End: 2018-06-05 | Stop reason: ALTCHOICE

## 2018-05-22 NOTE — MR AVS SNAPSHOT
[address] [facility] [address] 
[phone] Patient: Cassi Salmeron MRN: [id number] Visit Information Date & Time Provider Department Dept. Phone Encounter #  
 5/22/2018 10:00 AM Jeannine Verma, [address] [place] [address] for Pain Management [id number] Follow-up Instructions Return in about 3 months (around 8/22/2018). Your Appointments 6/5/2018 10:00 AM  
Office Visit with Edd Singh MD  
[facility]) Appt Note: AWV  and 3mo f/u  
 [address] [facility] [address] [place] [address] [address] [facility]  
  
    
 2/18/2019  9:20 AM  
Follow Up with Misael Miller MD  
[facility] ([address]) Appt Note: 1 year f/u kmb [facility] [address] [address] [address] [phone] [facility] [address]  
  
    
 3/7/2019  9:00 AM  
Office Visit with Roro Romero MD  
[facility] [address] [facility]) Appt Note: check up [address] [address] [phone] [address] [place] [id number] Upcoming Health Maintenance Date Due MICROALBUMIN Q1 3/10/2018 MEDICARE YEARLY EXAM 4/25/2018 LIPID PANEL Q1 6/16/2018 HEMOGLOBIN A1C Q6M 6/22/2018 EYE EXAM RETINAL OR DILATED Q1 7/11/2018 Influenza Age 5 to Adult 8/1/2018 FOOT EXAM Q1 1/26/2019 GLAUCOMA SCREENING Q2Y 7/11/2019 COLONOSCOPY 4/3/2023 DTaP/Tdap/Td series (2 - Td) 5/16/2026 Allergies as of 5/22/2018  Review Complete On: 5/22/2018 By: PAUL López Severity Noted Reaction Type Reactions Ciprofloxacin High 11/16/2013    Anaphylaxis Other Plant, Animal, Environmental High 03/10/2016    Other (comments) Patient cannot have MRI. Patient states that he has metal screws in his left arm. Lamisil [Terbinafine]  10/18/2013    Swelling Tongue swelling Metformin  03/16/2016    Other (comments) Elevated cr 1.4 Morphine  10/18/2013    Other (comments) \"loss of blood pressure\" Other Medication  10/18/2013    Other (comments) Doxasylin causes extreme GERD Pcn [Penicillins]  10/18/2013    Rash Pentothal [Thiopental Sodium]  10/18/2013    Shortness of Breath Sulfa (Sulfonamide Antibiotics)  10/18/2013    Rash Current Immunizations  Reviewed on 7/20/2016 Name Date Influenza Vaccine 11/3/2014 Influenza Vaccine (Quad) PF 1/25/2017, 9/16/2015 Pneumococcal Conjugate (PCV-13) 7/20/2016 Pneumococcal Polysaccharide (PPSV-23) 3/30/2017 Tdap 5/16/2016 Zoster Vaccine, Live 6/15/2017 Not reviewed this visit You Were Diagnosed With   
  
 Codes Comments Chronic pain syndrome     ICD-10-CM: G89.4 ICD-9-CM: 338. 4 Chronic bilateral low back pain with bilateral sciatica     ICD-10-CM: M54.42, M54.41, G89.29 ICD-9-CM: 724.2, 724.3, 338.29   
 DDD (degenerative disc disease), lumbar     ICD-10-CM: M51.36 
ICD-9-CM: 722.52 Other osteoarthritis of spine, lumbar region     ICD-10-CM: M47.896 ICD-9-CM: 721.3 H/O lumbosacral spine surgery     ICD-10-CM: Z98.890 ICD-9-CM: V15.29 Vitals BP Pulse Temp Resp Height(growth percentile) Weight(growth percentile) 114/71 (BP 1 Location: Left arm, BP Patient Position: Sitting) (!) 54 98.5 °F (36.9 °C) 12 5' 11\" (1.803 m) 280 lb (127 kg) BMI Smoking Status 39.05 kg/m2 Former Smoker Vitals History BMI and BSA Data Body Mass Index Body Surface Area 39.05 kg/m 2 2.52 m 2 Preferred Pharmacy Pharmacy Name Phone Ana Lilia Yoon [address] [facility], [address] [place] [address] [phone] Your Updated Medication List  
  
   
This list is accurate as of 5/22/18 11:21 AM.  Always use your most recent med list.  
  
  
  
  
 ACCU-CHEK JAZZY PLUS TEST STRP strip Generic drug:  glucose blood VI test strips [address] Generic drug:  Lancets ACULAR 0.5 % ophthalmic solution Generic drug:  ketorolac Administer 1 Drop to both eyes two (2) times a day. albuterol 2.5 mg /3 mL (0.083 %) nebulizer solution Commonly known as:  PROVENTIL VENTOLIN  
3 mL by Nebulization route every four (4) hours as needed for Wheezing or Shortness of Breath. atorvastatin 20 mg tablet Commonly known as:  LIPITOR Take 1 Tab by mouth daily. betamethasone dipropionate 0.05 % ointment Commonly known as:  DIPROLENE  
  
 budesonide-formoterol 160-4.5 mcg/actuation Hfaa Commonly known as:  SYMBICORT Take 2 Puffs by inhalation two (2) times a day. butalbital-acetaminophen-caffeine -40 mg per tablet Commonly known as:  Mack Antu Take 0.5 Tabs by mouth daily as needed for Pain. DULoxetine 60 mg capsule Commonly known as:  CYMBALTA Take 1 Cap by mouth nightly for 30 days. esomeprazole 40 mg capsule Commonly known as:  Tunica  Take 40 mg by mouth two (2) times a day. fenofibrate nanocrystallized 145 mg tablet Commonly known as:  TRICOR  
TAKE ONE TABLET BY MOUTH ONCE DAILY  
  
 fluticasone 50 mcg/actuation nasal spray Commonly known as:  FLONASE  
USE ONE SPRAY(S) IN EACH NOSTRIL ONCE DAILY HYDROcodone-acetaminophen 5-325 mg per tablet Commonly known as:  Alida [place] Take 1 Tab by mouth daily as needed for Pain. insulin glargine 100 unit/mL (3 mL) Inpn Commonly known as:  BASAGLAR KWIKPEN U-100 INSULIN  
52 units at bedtime  
  
 insulin glulisine U-100 100 unit/mL pen Commonly known as:  APIDRA SOLOSTAR U-100 INSULIN  
2 units per carb consumed. Max 30 / day JANUVIA 100 mg tablet Generic drug:  SITagliptin Take 50 mg by mouth daily. Indications: patient stated he was given 50 mg  
  
 levothyroxine 150 mcg tablet Commonly known as:  SYNTHROID  
TAKE ONE TABLET BY MOUTH ONCE DAILY BEFORE BREAKFAST  
  
 lisinopril 5 mg tablet Commonly known as:  Mechele Livings Take 1 Tab by mouth daily. metoprolol tartrate 25 mg tablet Commonly known as:  LOPRESSOR Take 1 Tab by mouth two (2) times a day. montelukast 10 mg tablet Commonly known as:  SINGULAIR  
TAKE ONE TABLET BY MOUTH ONCE DAILY  
  
 naloxone 4 mg/actuation nasal spray Commonly known as:  NARCAN  
4 mg by Nasal route as needed. * oxyCODONE IR 10 mg Tab immediate release tablet Commonly known as:  Maciej Aarti Take 10 mg by mouth three (3) times daily. * oxyCODONE ER 10 mg ER tablet Commonly known as:  OxyCONTIN Take 1 Tab by mouth every eight (8) hours for 30 days. Max Daily Amount: 30 mg. Start taking on:  6/3/2018 * oxyCODONE ER 10 mg ER tablet Commonly known as:  OxyCONTIN Take 1 Tab by mouth every eight (8) hours. Max Daily Amount: 30 mg. Start taking on:  7/2/2018 * oxyCODONE ER 10 mg ER tablet Commonly known as:  OxyCONTIN Take 1 Tab by mouth every eight (8) hours. Max Daily Amount: 30 mg. Start taking on:  8/1/2018 * BD ULTRA-FINE MIREYA PEN NEEDLES  
by Does Not Apply route. 4mm x 32G * PEN NEEDLE 31 gauge x 5/16\" Ndle Generic drug:  Insulin Needles (Disposable) USE ONE PEN NEEDLE FOR LANTUS FLEXPEN AND 3 PEN NEEDLES FOR APIDRA WITH EACH MEAL * Mireya Pen Needle 32 gauge x 5/32\" Ndle Generic drug:  Insulin Needles (Disposable) USE AT BEDTIME  
  
 pregabalin 150 mg capsule Commonly known as:  Urvashi Saint TAKE 1 CAPSULE BY MOUTH THREE TIMES DAILY FOR 30 DAYS. MAXIMUM 3 CAPSULES DAILY. * tamsulosin 0.4 mg capsule Commonly known as:  FLOMAX Take 1 Cap by mouth two (2) times a day. * tamsulosin 0.4 mg capsule Commonly known as:  FLOMAX Take 1 Cap by mouth daily. Indications: benign prostatic hyperplasia with lower urinary tract sx  
  
 tiotropium 18 mcg inhalation capsule Commonly known as:  Ezra Anthony Take 1 Cap by inhalation daily. varicella-zoster vacine live 19,400 unit/0.65 mL Susr injection Generic drug:  varicella zoster vaccine live * Notice: This list has 9 medication(s) that are the same as other medications prescribed for you. Read the directions carefully, and ask your doctor or other care provider to review them with you. Prescriptions Printed Refills  
 oxyCODONE ER (OXYCONTIN) 10 mg ER tablet 0 Starting on: 8/1/2018 Sig: Take 1 Tab by mouth every eight (8) hours. Max Daily Amount: 30 mg.  
 Class: Print Route: Oral  
 oxyCODONE ER (OXYCONTIN) 10 mg ER tablet 0 Starting on: 7/2/2018 Sig: Take 1 Tab by mouth every eight (8) hours. Max Daily Amount: 30 mg.  
 Class: Print Route: Oral  
 oxyCODONE ER (OXYCONTIN) 10 mg ER tablet 0 Starting on: 6/3/2018 Sig: Take 1 Tab by mouth every eight (8) hours for 30 days. Max Daily Amount: 30 mg.  
 Class: Print Route: Oral  
  
Prescriptions Sent to Pharmacy Refills DULoxetine (CYMBALTA) 60 mg capsule 5 Sig: Take 1 Cap by mouth nightly for 30 days. Class: Normal  
 Pharmacy: [facility] DR IRASEMA YOUSIF [address] [place] [address] #: [phone] Route: Oral  
  
Follow-up Instructions Return in about 3 months (around 8/22/2018). Patient Instructions 1. Continue current plan with no evidence of addiction or diversion. Stable on current medication without adverse events. 2. Continue hydrocodone 5/325 mg up to 2 times a day as needed for pain. Currently uses very sparingly and has plenty of medication left over with unfilled rx. 3. Refill Cymbalta 60 mg once daily. 5 refills 4. Refill OxyContin 10 mg every 8 hours 5. Continue Lyrica 150 mg capsule 3 times daily. 5 refills 6. Discussed risks of addiction, dependency, and opioid induced hyperalgesia. 7. Please remember to call at least 4-5 business days prior to your medication refill 8. Return to clinic in 3 months. Please call and cancel your appointment and pain management agreement if you do decide to transfer your care. Introducing hospitals & [facility] SERVICES! [employer] introduces VeriFone patient portal. Now you can access parts of your medical record, email your doctor's office, and request medication refills online. 1. In your internet browser, go to [email or web address] 2. Click on the First Time User? Click Here link in the Sign In box. You will see the New Member Sign Up page. 3. Enter your VeriFone Access Code exactly as it appears below. You will not need to use this code after youve completed the sign-up process. If you do not sign up before the expiration date, you must request a new code. · VeriFone Access Code: [email or web address] Expires: 5/28/2018 11:06 AM 
 
4. Enter the last four digits of your Social Security Number (xxxx) and Date of Birth (mm/dd/yyyy) as indicated and click Submit. You will be taken to the next sign-up page. 5. Create a VeriFone ID. This will be your VeriFone login ID and cannot be changed, so think of one that is secure and easy to remember. 6. Create a VeriFone password. You can change your password at any time. 7. Enter your Password Reset Question and Answer. This can be used at a later time if you forget your password. 8. Enter your e-mail address. You will receive e-mail notification when new information is available in [address] 9. Click Sign Up. You can now view and download portions of your medical record. 10. Click the Download Summary menu link to download a portable copy of your medical information.  
 
If you have questions, please visit the Frequently Asked Questions section of the [employer]. Remember, [employer] is NOT to be used for urgent needs. For medical emergencies, dial 911. Now available from your iPhone and Android! Please provide this summary of care documentation to your next provider. Your primary care clinician is listed as Akil Santiago. If you have any questions after today's visit, please call [phone].

## 2018-05-22 NOTE — PROGRESS NOTES
HISTORY OF PRESENT ILLNESS  Ryan Linares is a 69 y.o. male. HPI Mr. Sameer Mcneil returns today for followup of chronic low back and leg pain s/p work-related injury and h/o multiple lumbar surgeries and interventional procedures, including L4-S1 fusion, ESIs, MB RFAs, and spinal cord stimulator trial. Good improvement with Tens unit. He continues unchanged since last visit. He continues to do very well with his current treatment plan which has been offering significant pain control. He does continue using hydrocodone very sparingly and reports that \"he does not remember the last time he took 1 of his pills. \"  He reports that the only time he uses hydrocodone as when his TENS unit is not working. We had a very lengthy conversation today about the departure of his previous providers who are no longer with our practice. I explained to him that moving forward we will be focusing on a more conservative and non-opioid plan of care. This will result in changes to his treatment plan. I have recommended that we go ahead and discontinue hydrocodone. He will may continue with his remaining hydrocodone to use on an as needed basis when needed. We will plan to begin tapering his OxyContin next visit. He is in agreement with this plan. He reports no new complaints today. I will have him follow-up in 3 months for further evaluation and recommendation or sooner if needed. Medication management consists of OxyContin 10 mg TID, Norco 5 bid prn, Lyrica 150 mg TID, and Cymbalta 60 mg/day. No concurrent benzodiazepines. He continues using a TENS unit which he describes as the \"best thing\" for his pain. Medications are helping with pain control and quality of life. his pain is 5-6/10 with medication and 10/10 without. Pt describes pain as constant, aching, sharp, tingling and radiating. Aggravating factors include ADLs, while alleviated by medication and activity modification.  Current treatment is helping to improve general activity, mood, walking, sleep, enjoyment of life    Mr. Susanna Aguilar is tolerating medications well, with no side effects noted. He is able to stay more active with less discomfort with these current doses. In the past 30 days, the patient reports an average of 40-50% pain relief with current treatment/medications. He is informed of side effects, risks, and benefits of this regimen, and emphasizes that he derives a significant improvement in functionality and quality of life, and notes that non-opioid medications and therapies in the past have not offered significant benefit. Pt does report constipation but is well controlled   he is otherwise doing well with no other complaints today. he denies any adverse events including nausea, vomiting, dizziness, increased constipation, hallucinations, or seizures. MME: 54  COMM: 12  PMA updated: 2016  Last UDS reviewed      PRIOR IMAGIN.  Lumbar CT 2016: Moderate to severe canal narrowing at L1-L2 as result of facet arthropathy, broad-based disc bulge and epidural fat. Postoperative changes from L3-L4 through L5-S1 without evidence of complication. Allergies   Allergen Reactions    Ciprofloxacin Anaphylaxis    Other Plant, Animal, Environmental Other (comments)     Patient cannot have MRI. Patient states that he has metal screws in his left arm.     Lamisil [Terbinafine] Swelling     Tongue swelling    Metformin Other (comments)     Elevated cr 1.4    Morphine Other (comments)     \"loss of blood pressure\"    Other Medication Other (comments)     Doxasylin causes extreme GERD    Pcn [Penicillins] Rash    Pentothal [Thiopental Sodium] Shortness of Breath    Sulfa (Sulfonamide Antibiotics) Rash       Past Surgical History:   Procedure Laterality Date    COLONOSCOPY N/A 4/3/2018    COLONOSCOPY performed by Kassie Ga MD at [facility]    removal of defective hardware    Yasmani Kelly 40    to remove bone fragments    HX LUMBAR FUSION  1999    fusion from L4-S1 and implantation of hardware    HX LUMBAR FUSION  1984    fusion on L5 area    HX ORTHOPAEDIC Bilateral 2004    trigger finger syndrome-all 4 fingers    HX ORTHOPAEDIC Left 2004    left arm torn muscle repair and placement of 2 screws    HX OTHER SURGICAL      skin cancer removal         Review of Systems   Constitutional: Positive for malaise/fatigue. Negative for chills and fever. HENT: Positive for hearing loss. Respiratory: Negative for cough. Cardiovascular: Positive for leg swelling. Negative for palpitations. Gastrointestinal: Positive for constipation. Negative for diarrhea, heartburn, nausea and vomiting. Musculoskeletal: Positive for joint pain. Negative for falls. Skin: Negative for rash. Neurological: Negative for dizziness. Psychiatric/Behavioral: Positive for depression. Negative for suicidal ideas. The patient is nervous/anxious and has insomnia. Physical Exam   Constitutional: He is oriented to person, place, and time. He appears well-developed and well-nourished. No distress. HENT:   Head: Normocephalic and atraumatic. Pulmonary/Chest: Effort normal. No respiratory distress. Neurological: He is alert and oriented to person, place, and time. Skin: Skin is warm and dry. He is not diaphoretic. Psychiatric: He has a normal mood and affect. His speech is normal. Cognition and memory are normal.   Nursing note and vitals reviewed. ASSESSMENT:    1. Chronic pain syndrome    2. Chronic bilateral low back pain with bilateral sciatica    3. DDD (degenerative disc disease), lumbar    4. Other osteoarthritis of spine, lumbar region    5. H/O lumbosacral spine surgery         [place] Prescription Monitoring Program was reviewed which does not demonstrate aberrancies and/or inconsistencies with regard to the historical prescribing of controlled medications to this patient by other providers.     PLAN / Pt Instructions:  1. Continue current plan with no evidence of addiction or diversion. Stable on current medication without adverse events. 2. Continue hydrocodone 5/325 mg up to 2 times a day as needed for pain. Currently uses very sparingly and has plenty of medication left over with unfilled rx. 3. Refill Cymbalta 60 mg once daily. 5 refills   4. Refill OxyContin 10 mg every 8 hours   5. Continue Lyrica 150 mg capsule 3 times daily. 5 refills  6. Discussed risks of addiction, dependency, and opioid induced hyperalgesia. 7. Please remember to call at least 4-5 business days prior to your medication refill  8. Return to clinic in 3 months. Please call and cancel your appointment and pain management agreement if you do decide to transfer your care. Medications Ordered Today   Medications    oxyCODONE ER (OXYCONTIN) 10 mg ER tablet     Sig: Take 1 Tab by mouth every eight (8) hours. Max Daily Amount: 30 mg. Dispense:  90 Tab     Refill:  0    oxyCODONE ER (OXYCONTIN) 10 mg ER tablet     Sig: Take 1 Tab by mouth every eight (8) hours. Max Daily Amount: 30 mg. Dispense:  60 Tab     Refill:  0    oxyCODONE ER (OXYCONTIN) 10 mg ER tablet     Sig: Take 1 Tab by mouth every eight (8) hours for 30 days. Max Daily Amount: 30 mg. Dispense:  90 Tab     Refill:  0    DULoxetine (CYMBALTA) 60 mg capsule     Sig: Take 1 Cap by mouth nightly for 30 days. Dispense:  30 Cap     Refill:  5       DISPOSITION   Pain medications are prescribed with the objective of pain relief and improved physical and psychosocial function in this patient.  Patient has been counseled on proper use of prescribed medications.  Patient has been counseled about chronic medical conditions and their relationship to anxiety and depression and recommended mental health support as needed.    Reviewed with patient self-help tools, home exercise, and lifestyle changes to assist the patient in self-management of symptoms.  Reviewed with patient the treatment plan, goals of treatment plan, and limitations of treatment plan, to include the potential for side effects from medications and procedures. If side effects occur, it is the responsibility of the patient to inform the clinic so that a change in the treatment plan can be made in a safe manner. The patient is advised that stopping prescribed medication may cause an increase in symptoms and possible medication withdrawal symptoms. The patient is informed an emergency room evaluation may be necessary if this occurs. Spent 25 minutes with patient today which more than 50% of that time was spent on counseling and coordination of care. Marissa Day 5/22/2018     Note: Please excuse any typographical errors. Voice recognition software was used for this note and may cause mistakes.

## 2018-05-22 NOTE — PATIENT INSTRUCTIONS
1. Continue current plan with no evidence of addiction or diversion. Stable on current medication without adverse events. 2. Continue hydrocodone 5/325 mg up to 2 times a day as needed for pain. Currently uses very sparingly and has plenty of medication left over with unfilled rx. 3. Refill Cymbalta 60 mg once daily. 5 refills   4. Refill OxyContin 10 mg every 8 hours   5. Continue Lyrica 150 mg capsule 3 times daily. 5 refills  6. Discussed risks of addiction, dependency, and opioid induced hyperalgesia. 7. Please remember to call at least 4-5 business days prior to your medication refill  8. Return to clinic in 3 months. Please call and cancel your appointment and pain management agreement if you do decide to transfer your care.

## 2018-05-22 NOTE — PROGRESS NOTES
Nursing Notes    Patient presents to the office today in follow-up. Patient rates his pain at 6/10 on the numerical pain scale. Reviewed medications with counts as follows:    Rx Date filled Qty Dispensed Pill Count Last Dose Short   Norco 5 mg 01/02/18 30 30 Months ago no   Oxycontin 10 mg 05/04/18 90 26 This am  no         Comments: Patient is here today for a follow up appt today he states his pain level today is a 6  He states he needs a refill on Cymbalta also     POC UDS was not performed in office today    Any new labs or imaging since last appointment? NO    Have you been to an emergency room (ER) or urgent care clinic since your last visit? NO            Have you been hospitalized since your last visit? NO     If yes, where, when, and reason for visit? Have you seen or consulted any other health care providers outside of the [facility]  since your last visit? NO     If yes, where, when, and reason for visit? Mr. Krystina Estes has a reminder for a \"due or due soon\" health maintenance. I have asked that he contact his primary care provider for follow-up on this health maintenance.

## 2018-05-30 ENCOUNTER — HOSPITAL ENCOUNTER (OUTPATIENT)
Dept: LAB | Age: 68
Discharge: HOME | End: 2018-05-30
Payer: MEDICARE

## 2018-05-30 DIAGNOSIS — Z79.4 TYPE 2 DIABETES MELLITUS WITH COMPLICATION, WITH LONG-TERM CURRENT USE OF INSULIN (HCC): ICD-10-CM

## 2018-05-30 DIAGNOSIS — E11.8 TYPE 2 DIABETES MELLITUS WITH COMPLICATION, WITH LONG-TERM CURRENT USE OF INSULIN (HCC): ICD-10-CM

## 2018-05-30 LAB
ALBUMIN SERPL-MCNC: 4.2 G/DL (ref 3.4–5)
ALBUMIN/GLOB SERPL: 1.4 {RATIO} (ref 0.8–1.7)
ALP SERPL-CCNC: 51 U/L (ref 45–117)
ALT SERPL-CCNC: 39 U/L (ref 16–61)
ANION GAP SERPL CALC-SCNC: 10 MMOL/L (ref 3–18)
AST SERPL-CCNC: 21 U/L (ref 15–37)
BILIRUB SERPL-MCNC: 0.3 MG/DL (ref 0.2–1)
BUN SERPL-MCNC: 18 MG/DL (ref 7–18)
BUN/CREAT SERPL: 13 (ref 12–20)
CALCIUM SERPL-MCNC: 9.3 MG/DL (ref 8.5–10.1)
CHLORIDE SERPL-SCNC: 102 MMOL/L (ref 100–108)
CHOLEST SERPL-MCNC: 156 MG/DL
CO2 SERPL-SCNC: 29 MMOL/L (ref 21–32)
CREAT SERPL-MCNC: 1.36 MG/DL (ref 0.6–1.3)
CREAT UR-MCNC: 60.4 MG/DL (ref 30–125)
ERYTHROCYTE [DISTWIDTH] IN BLOOD BY AUTOMATED COUNT: 12.7 % (ref 11.6–14.5)
EST. AVERAGE GLUCOSE BLD GHB EST-MCNC: 163 MG/DL
GLOBULIN SER CALC-MCNC: 3 G/DL (ref 2–4)
GLUCOSE SERPL-MCNC: 160 MG/DL (ref 74–99)
HBA1C MFR BLD: 7.3 % (ref 4.2–5.6)
HCT VFR BLD AUTO: 44.6 % (ref 36–48)
HDLC SERPL-MCNC: 35 MG/DL (ref 40–60)
HDLC SERPL: 4.5 {RATIO} (ref 0–5)
HGB BLD-MCNC: 15.1 G/DL (ref 13–16)
LDLC SERPL CALC-MCNC: 75.2 MG/DL (ref 0–100)
LIPID PROFILE,FLP: ABNORMAL
MCH RBC QN AUTO: 28.9 PG (ref 24–34)
MCHC RBC AUTO-ENTMCNC: 33.9 G/DL (ref 31–37)
MCV RBC AUTO: 85.3 FL (ref 74–97)
MICROALBUMIN UR-MCNC: 1.12 MG/DL (ref 0–3)
MICROALBUMIN/CREAT UR-RTO: 19 MG/G (ref 0–30)
PLATELET # BLD AUTO: 274 K/UL (ref 135–420)
PMV BLD AUTO: 10.1 FL (ref 9.2–11.8)
POTASSIUM SERPL-SCNC: 4.8 MMOL/L (ref 3.5–5.5)
PROT SERPL-MCNC: 7.2 G/DL (ref 6.4–8.2)
RBC # BLD AUTO: 5.23 M/UL (ref 4.7–5.5)
SODIUM SERPL-SCNC: 141 MMOL/L (ref 136–145)
TRIGL SERPL-MCNC: 229 MG/DL (ref ?–150)
TSH SERPL DL<=0.05 MIU/L-ACNC: 1.48 UIU/ML (ref 0.36–3.74)
VLDLC SERPL CALC-MCNC: 45.8 MG/DL
WBC # BLD AUTO: 9 K/UL (ref 4.6–13.2)

## 2018-05-30 PROCEDURE — 85027 COMPLETE CBC AUTOMATED: CPT | Performed by: FAMILY MEDICINE

## 2018-05-30 PROCEDURE — 80053 COMPREHEN METABOLIC PANEL: CPT | Performed by: FAMILY MEDICINE

## 2018-05-30 PROCEDURE — 83036 HEMOGLOBIN GLYCOSYLATED A1C: CPT | Performed by: FAMILY MEDICINE

## 2018-05-30 PROCEDURE — 80061 LIPID PANEL: CPT | Performed by: FAMILY MEDICINE

## 2018-05-30 PROCEDURE — 84443 ASSAY THYROID STIM HORMONE: CPT | Performed by: FAMILY MEDICINE

## 2018-05-30 PROCEDURE — 36415 COLL VENOUS BLD VENIPUNCTURE: CPT | Performed by: FAMILY MEDICINE

## 2018-05-30 PROCEDURE — 82043 UR ALBUMIN QUANTITATIVE: CPT | Performed by: FAMILY MEDICINE

## 2018-05-30 NOTE — PROGRESS NOTES
Let pt know that diabetes test went up. Continue current plan and keep an appt to discuss it further. Improvement in triglyceride compare to prior labs but still elevated. Continue current plan and diet modification and will discuss further on follow up visit. Thanks.

## 2018-05-31 ENCOUNTER — TELEPHONE (OUTPATIENT)
Dept: FAMILY MEDICINE CLINIC | Age: 68
End: 2018-05-31

## 2018-05-31 NOTE — TELEPHONE ENCOUNTER
Called home number. Verified name and . Spoke with patient. Patient notified of resutls below per Dr. Aysha Jay. Patient stated new endocine provider  Krystal Javed and adjustment being made to his insulin. Patient understood the reason for call and had no further questions or concerns. ----- Message from Saúl Nava MD sent at 2018  1:09 PM EDT -----  Let pt know that diabetes test went up. Continue current plan and keep an appt to discuss it further. Improvement in triglyceride compare to prior labs but still elevated. Continue current plan and diet modification and will discuss further on follow up visit. Thanks.

## 2018-05-31 NOTE — TELEPHONE ENCOUNTER
Called home number. Verified name and . Spoke with patient. Patient notified of result below per Dr. Shawanda Cisneros. Patient understood the reason for call and had no further questions or concerns. Let pt know that BMP showed mild elevated renal function. Stay well hydrated. Chart Addendum created to add note.

## 2018-06-05 ENCOUNTER — OFFICE VISIT (OUTPATIENT)
Dept: FAMILY MEDICINE CLINIC | Age: 68
End: 2018-06-05

## 2018-06-05 VITALS
HEART RATE: 78 BPM | RESPIRATION RATE: 16 BRPM | SYSTOLIC BLOOD PRESSURE: 118 MMHG | WEIGHT: 277 LBS | HEIGHT: 71 IN | BODY MASS INDEX: 38.78 KG/M2 | DIASTOLIC BLOOD PRESSURE: 70 MMHG | OXYGEN SATURATION: 91 % | TEMPERATURE: 98.9 F

## 2018-06-05 DIAGNOSIS — E03.9 HYPOTHYROIDISM, UNSPECIFIED TYPE: ICD-10-CM

## 2018-06-05 DIAGNOSIS — M54.42 CHRONIC BILATERAL LOW BACK PAIN WITH BILATERAL SCIATICA: ICD-10-CM

## 2018-06-05 DIAGNOSIS — E78.5 HYPERLIPIDEMIA, UNSPECIFIED HYPERLIPIDEMIA TYPE: ICD-10-CM

## 2018-06-05 DIAGNOSIS — M54.41 CHRONIC BILATERAL LOW BACK PAIN WITH BILATERAL SCIATICA: ICD-10-CM

## 2018-06-05 DIAGNOSIS — E34.9 HYPOTESTOSTERONISM: ICD-10-CM

## 2018-06-05 DIAGNOSIS — I10 ESSENTIAL HYPERTENSION: ICD-10-CM

## 2018-06-05 DIAGNOSIS — G89.29 CHRONIC BILATERAL LOW BACK PAIN WITH BILATERAL SCIATICA: ICD-10-CM

## 2018-06-05 DIAGNOSIS — Z00.00 MEDICARE ANNUAL WELLNESS VISIT, SUBSEQUENT: Primary | ICD-10-CM

## 2018-06-05 DIAGNOSIS — K21.9 GASTROESOPHAGEAL REFLUX DISEASE WITHOUT ESOPHAGITIS: ICD-10-CM

## 2018-06-05 DIAGNOSIS — E11.8 TYPE 2 DIABETES MELLITUS WITH COMPLICATION, WITH LONG-TERM CURRENT USE OF INSULIN (HCC): ICD-10-CM

## 2018-06-05 DIAGNOSIS — Z79.4 TYPE 2 DIABETES MELLITUS WITH COMPLICATION, WITH LONG-TERM CURRENT USE OF INSULIN (HCC): ICD-10-CM

## 2018-06-05 DIAGNOSIS — J44.9 ASTHMA WITH COPD (CHRONIC OBSTRUCTIVE PULMONARY DISEASE) (HCC): ICD-10-CM

## 2018-06-05 DIAGNOSIS — N40.0 BENIGN PROSTATIC HYPERPLASIA WITHOUT LOWER URINARY TRACT SYMPTOMS: ICD-10-CM

## 2018-06-05 PROBLEM — E66.01 SEVERE OBESITY (BMI 35.0-39.9): Status: ACTIVE | Noted: 2018-06-05

## 2018-06-05 PROBLEM — E11.40 TYPE 2 DIABETES MELLITUS WITH DIABETIC NEUROPATHY (HCC): Status: ACTIVE | Noted: 2018-06-05

## 2018-06-05 PROBLEM — E11.21 TYPE 2 DIABETES WITH NEPHROPATHY (HCC): Status: ACTIVE | Noted: 2018-06-05

## 2018-06-05 RX ORDER — LISINOPRIL 5 MG/1
5 TABLET ORAL DAILY
Qty: 90 TAB | Refills: 1 | Status: SHIPPED | OUTPATIENT
Start: 2018-06-05 | End: 2018-11-13 | Stop reason: SDUPTHER

## 2018-06-05 NOTE — PATIENT INSTRUCTIONS
Medicare Part B Preventive Services Limitations Recommendation Scheduled   Bone Mass Measurement  (age 72 & older, biennial) Requires diagnosis related to osteoporosis or estrogen deficiency. Biennial benefit unless patient has history of long-term glucocorticoid tx or baseline is needed because initial test was by other method Not indicated    Cardiovascular Screening Blood Tests (every 5 years)  Total cholesterol, HDL, Triglycerides Order as a panel if possible 05/30/2018    Colorectal Cancer Screening  -Fecal occult blood test (annual)  -Flexible sigmoidoscopy (5y)  -Screening colonoscopy (10y)  -Barium Enema  04/03/2018 Q5 years   Counseling to Prevent Tobacco Use (up to 8 sessions per year)  - Counseling greater than 3 and up to 10 minutes  - Counseling greater than 10 minutes Patients must be asymptomatic of tobacco-related conditions to receive as preventive service Not indicated    Diabetes Screening Tests (at least every 3 years, Medicare covers annually or at 6-month intervals for prediabetic patients)    Fasting blood sugar (FBS) or glucose tolerance test (GTT) Patient must be diagnosed with one of the following:  -Hypertension, Dyslipidemia, obesity, previous impaired FBS or GTT  Or any two of the following: overweight, FH of diabetes, age ? 72, history of gestational diabetes, birth of baby weighing more than 9 pounds 05/30/2018    Diabetes Self-Management Training (DSMT) (no USPSTF recommendation) Requires referral by treating physician for patient with diabetes or renal disease. 10 hours of initial DSMT session of no less than 30 minutes each in a continuous 12-month period. 2 hours of follow-up DSMT in subsequent years.  UTD    Glaucoma Screening (no USPSTF recommendation) Diabetes mellitus, family history, , age 48 or over,  American, age 72 or over 07/11/17    Human Immunodeficiency Virus (HIV) Screening (annually for increased risk patients)  HIV-1 and HIV-2 by EIA, RAHUL, rapid antibody test, or oral mucosa transudate Patient must be at increased risk for HIV infection per USPSTF guidelines or pregnant. Tests covered annually for patients at increased risk. Pregnant patients may receive up to 3 test during pregnancy. Not indicated    Medical Nutrition Therapy (MNT) (for diabetes or renal disease not recommended schedule) Requires referral by treating physician for patient with diabetes or renal disease. Can be provided in same year as diabetes self-management training (DSMT), and CMS recommends medical nutrition therapy take place after DSMT. Up to 3 hours for initial year and 2 hours in subsequent years. UTD    Prostate Cancer Screening (annually up to age 76)  - Digital rectal exam (BOY)  - Prostate specific antigen (PSA) Annually (age 48 or over), BOY not paid separately when covered E/M service is provided on same date  Men up to age 76 may need a screening blood test for prostate cancer at certain intervals, depending on their personal and family history. This decision is between the patient and his provider. 03/08/2018    Seasonal Influenza Vaccination (annually)  11/16/2017        Pneumococcal Vaccination (once after 72)    Completed    Hepatitis B Vaccinations (if medium/high risk) Medium/high risk factors:  End-stage renal disease,  Hemophiliacs who received Factor VIII or IX concentrates, Clients of institutions for the mentally retarded, Persons who live in the same house as a HepB virus carrier, Homosexual men, Illicit injectable drug abusers. Not indicated    Shingles Vaccination A shingles vaccine is also recommended once in a lifetime after age 61 6/15/2017    Ultrasound Screening for Abdominal Aortic Aneurysm (AAA) (once) Patient must be referred through [facility] and not have had a screening for abdominal aortic aneurysm before under Medicare.   Limited to patients who meet one of the following criteria:  - Men who are 73-68 years old and have smoked more than 100 cigarettes in their lifetime.  -Anyone with a FH of AAA  -Anyone recommended for screening by USPSTF AAA screening done 2/24/15           Low Sodium Diet (2,000 Milligram): Care Instructions  Your Care Instructions    Too much sodium causes your body to hold on to extra water. This can raise your blood pressure and force your heart and kidneys to work harder. In very serious cases, this could cause you to be put in the hospital. It might even be life-threatening. By limiting sodium, you will feel better and lower your risk of serious problems. The most common source of sodium is salt. People get most of the salt in their diet from canned, prepared, and packaged foods. Fast food and restaurant meals also are very high in sodium. Your doctor will probably limit your sodium to less than 2,000 milligrams (mg) a day. This limit counts all the sodium in prepared and packaged foods and any salt you add to your food. Follow-up care is a key part of your treatment and safety. Be sure to make and go to all appointments, and call your doctor if you are having problems. It's also a good idea to know your test results and keep a list of the medicines you take. How can you care for yourself at home? Read food labels  · Read labels on cans and food packages. The labels tell you how much sodium is in each serving. Make sure that you look at the serving size. If you eat more than the serving size, you have eaten more sodium. · Food labels also tell you the Percent Daily Value for sodium. Choose products with low Percent Daily Values for sodium. · Be aware that sodium can come in forms other than salt, including monosodium glutamate (MSG), sodium citrate, and sodium bicarbonate (baking soda). MSG is often added to Asian food. When you eat out, you can sometimes ask for food without MSG or added salt.   Buy low-sodium foods  · Buy foods that are labeled \"unsalted\" (no salt added), \"sodium-free\" (less than 5 mg of sodium per serving), or \"low-sodium\" (less than 140 mg of sodium per serving). Foods labeled \"reduced-sodium\" and \"light sodium\" may still have too much sodium. Be sure to read the label to see how much sodium you are getting. · Buy fresh vegetables, or frozen vegetables without added sauces. Buy low-sodium versions of canned vegetables, soups, and other canned goods. Prepare low-sodium meals  · Cut back on the amount of salt you use in cooking. This will help you adjust to the taste. Do not add salt after cooking. One teaspoon of salt has about 2,300 mg of sodium. · Take the salt shaker off the table. · Flavor your food with garlic, lemon juice, onion, vinegar, herbs, and spices. Do not use soy sauce, lite soy sauce, steak sauce, onion salt, garlic salt, celery salt, mustard, or ketchup on your food. · Use low-sodium salad dressings, sauces, and ketchup. Or make your own salad dressings and sauces without adding salt. · Use less salt (or none) when recipes call for it. You can often use half the salt a recipe calls for without losing flavor. Other foods such as rice, pasta, and grains do not need added salt. · Rinse canned vegetables, and cook them in fresh water. This removes some-but not all-of the salt. · Avoid water that is naturally high in sodium or that has been treated with water softeners, which add sodium. Call your local water company to find out the sodium content of your water supply. If you buy bottled water, read the label and choose a sodium-free brand. Avoid high-sodium foods  · Avoid eating:  ¨ Smoked, cured, salted, and canned meat, fish, and poultry. ¨ Ham, kohler, hot dogs, and luncheon meats. ¨ Regular, hard, and processed cheese and regular peanut butter. ¨ Crackers with salted tops, and other salted snack foods such as pretzels, chips, and salted popcorn. ¨ Frozen prepared meals, unless labeled low-sodium.   ¨ Canned and dried soups, broths, and bouillon, unless labeled sodium-free or low-sodium. ¨ Canned vegetables, unless labeled sodium-free or low-sodium. ¨ [place] fries, pizza, tacos, and other fast foods. ¨ Pickles, olives, ketchup, and other condiments, especially soy sauce, unless labeled sodium-free or low-sodium. Where can you learn more? Go to http://slava-enrique.info/. Enter G827 in the search box to learn more about \"Low Sodium Diet (2,000 Milligram): Care Instructions. \"  Current as of: May 12, 2017  Content Version: 11.4  © 9857-2150 [employer]. Care instructions adapted under license by [employer] (which disclaims liability or warranty for this information). If you have questions about a medical condition or this instruction, always ask your healthcare professional. [address] any warranty or liability for your use of this information. Learning About Diabetes Food Guidelines  Your Care Instructions    Meal planning is important to manage diabetes. It helps keep your blood sugar at a target level (which you set with your doctor). You don't have to eat special foods. You can eat what your family eats, including sweets once in a while. But you do have to pay attention to how often you eat and how much you eat of certain foods. You may want to work with a dietitian or a certified diabetes educator (CDE) to help you plan meals and snacks. A dietitian or CDE can also help you lose weight if that is one of your goals. What should you know about eating carbs? Managing the amount of carbohydrate (carbs) you eat is an important part of healthy meals when you have diabetes. Carbohydrate is found in many foods. · Learn which foods have carbs. And learn the amounts of carbs in different foods. ¨ Bread, cereal, pasta, and rice have about 15 grams of carbs in a serving. A serving is 1 slice of bread (1 ounce), ½ cup of cooked cereal, or 1/3 cup of cooked pasta or rice.   ¨ Fruits have 15 grams of carbs in a serving. A serving is 1 small fresh fruit, such as an apple or orange; ½ of a banana; ½ cup of cooked or canned fruit; ½ cup of fruit juice; 1 cup of melon or raspberries; or 2 tablespoons of dried fruit. ¨ Milk and no-sugar-added yogurt have 15 grams of carbs in a serving. A serving is 1 cup of milk or 2/3 cup of no-sugar-added yogurt. ¨ Starchy vegetables have 15 grams of carbs in a serving. A serving is ½ cup of mashed potatoes or sweet potato; 1 cup winter squash; ½ of a small baked potato; ½ cup of cooked beans; or ½ cup cooked corn or green peas. · Learn how much carbs to eat each day and at each meal. A dietitian or CDE can teach you how to keep track of the amount of carbs you eat. This is called carbohydrate counting. · If you are not sure how to count carbohydrate grams, use the Plate Method to plan meals. It is a good, quick way to make sure that you have a balanced meal. It also helps you spread carbs throughout the day. ¨ Divide your plate by types of foods. Put non-starchy vegetables on half the plate, meat or other protein food on one-quarter of the plate, and a grain or starchy vegetable in the final quarter of the plate. To this you can add a small piece of fruit and 1 cup of milk or yogurt, depending on how many carbs you are supposed to eat at a meal.  · Try to eat about the same amount of carbs at each meal. Do not \"save up\" your daily allowance of carbs to eat at one meal.  · Proteins have very little or no carbs per serving. Examples of proteins are beef, chicken, turkey, fish, eggs, tofu, cheese, cottage cheese, and peanut butter. A serving size of meat is 3 ounces, which is about the size of a deck of cards. Examples of meat substitute serving sizes (equal to 1 ounce of meat) are 1/4 cup of cottage cheese, 1 egg, 1 tablespoon of peanut butter, and ½ cup of tofu. How can you eat out and still eat healthy? · Learn to estimate the serving sizes of foods that have carbohydrate.  If you measure food at home, it will be easier to estimate the amount in a serving of restaurant food. · If the meal you order has too much carbohydrate (such as potatoes, corn, or baked beans), ask to have a low-carbohydrate food instead. Ask for a salad or green vegetables. · If you use insulin, check your blood sugar before and after eating out to help you plan how much to eat in the future. · If you eat more carbohydrate at a meal than you had planned, take a walk or do other exercise. This will help lower your blood sugar. What else should you know? · Limit saturated fat, such as the fat from meat and dairy products. This is a healthy choice because people who have diabetes are at higher risk of heart disease. So choose lean cuts of meat and nonfat or low-fat dairy products. Use olive or canola oil instead of butter or shortening when cooking. · Don't skip meals. Your blood sugar may drop too low if you skip meals and take insulin or certain medicines for diabetes. · Check with your doctor before you drink alcohol. Alcohol can cause your blood sugar to drop too low. Alcohol can also cause a bad reaction if you take certain diabetes medicines. Follow-up care is a key part of your treatment and safety. Be sure to make and go to all appointments, and call your doctor if you are having problems. It's also a good idea to know your test results and keep a list of the medicines you take. Where can you learn more? Go to http://slava-enrique.info/. Enter K422 in the search box to learn more about \"Learning About Diabetes Food Guidelines. \"  Current as of: March 13, 2017  Content Version: 11.4  © 2423-4207 [employer]. Care instructions adapted under license by [employer] (which disclaims liability or warranty for this information).  If you have questions about a medical condition or this instruction, always ask your healthcare professional. [place] [address] any warranty or liability for your use of this information. Chronic Obstructive Pulmonary Disease (COPD): Care Instructions  Your Care Instructions    Chronic obstructive pulmonary disease (COPD) is a general term for a group of lung diseases, including emphysema and chronic bronchitis. People with COPD have decreased airflow in and out of the lungs, which makes it hard to breathe. The airways also can get clogged with thick mucus. Cigarette smoking is a major cause of COPD. Although there is no cure for COPD, you can slow its progress. Following your treatment plan and taking care of yourself can help you feel better and live longer. Follow-up care is a key part of your treatment and safety. Be sure to make and go to all appointments, and call your doctor if you are having problems. It's also a good idea to know your test results and keep a list of the medicines you take. How can you care for yourself at home? ?Staying healthy  ? · Do not smoke. This is the most important step you can take to prevent more damage to your lungs. If you need help quitting, talk to your doctor about stop-smoking programs and medicines. These can increase your chances of quitting for good. ? · Avoid colds and flu. Get a pneumococcal vaccine shot. If you have had one before, ask your doctor whether you need a second dose. Get the flu vaccine every fall. If you must be around people with colds or the flu, wash your hands often. ? · Avoid secondhand smoke, air pollution, and high altitudes. Also avoid cold, dry air and hot, humid air. Stay at home with your windows closed when air pollution is bad. ?Medicines and oxygen therapy  ? · Take your medicines exactly as prescribed. Call your doctor if you think you are having a problem with your medicine. ? · You may be taking medicines such as:  ¨ Bronchodilators. These help open your airways and make breathing easier.  Bronchodilators are either short-acting (work for 6 to 9 hours) or long-acting (work for 24 hours). You inhale most bronchodilators, so they start to act quickly. Always carry your quick-relief inhaler with you in case you need it while you are away from home. ¨ Corticosteroids (prednisone, budesonide). These reduce airway inflammation. They come in pill or inhaled form. You must take these medicines every day for them to work well. ? · A spacer may help you get more inhaled medicine to your lungs. Ask your doctor or pharmacist if a spacer is right for you. If it is, ask how to use it properly. ? · Do not take any vitamins, over-the-counter medicine, or herbal products without talking to your doctor first.   ? · If your doctor prescribed antibiotics, take them as directed. Do not stop taking them just because you feel better. You need to take the full course of antibiotics. ? · Oxygen therapy boosts the amount of oxygen in your blood and helps you breathe easier. Use the flow rate your doctor has recommended, and do not change it without talking to your doctor first.   Activity  ? · Get regular exercise. Walking is an easy way to get exercise. Start out slowly, and walk a little more each day. ? · Pay attention to your breathing. You are exercising too hard if you cannot talk while you are exercising. ? · Take short rest breaks when doing household chores and other activities. ? · Learn breathing methods-such as breathing through pursed lips-to help you become less short of breath. ? · If your doctor has not set you up with a pulmonary rehabilitation program, talk to him or her about whether rehab is right for you. Rehab includes exercise programs, education about your disease and how to manage it, help with diet and other changes, and emotional support. Diet  ? · Eat regular, healthy meals. Use bronchodilators about 1 hour before you eat to make it easier to eat. Eat several small meals instead of three large ones.  Drink beverages at the end of the meal. Avoid foods that are hard to chew. ? · Eat foods that contain protein so that you do not lose muscle mass. ? · Talk with your doctor if you gain too much weight or if you lose weight without trying. ?Mental health  ? · Talk to your family, friends, or a therapist about your feelings. It is normal to feel frightened, angry, hopeless, helpless, and even guilty. Talking openly about bad feelings can help you cope. If these feelings last, talk to your doctor. When should you call for help? Call 911 anytime you think you may need emergency care. For example, call if:  ? · You have severe trouble breathing. ?Call your doctor now or seek immediate medical care if:  ? · You have new or worse trouble breathing. ? · You cough up blood. ? · You have a fever. ? Watch closely for changes in your health, and be sure to contact your doctor if:  ? · You cough more deeply or more often, especially if you notice more mucus or a change in the color of your mucus. ? · You have new or worse swelling in your legs or belly. ? · You are not getting better as expected. Where can you learn more? Go to http://slava-enrique.info/. Pebbles Quiñones in the search box to learn more about \"Chronic Obstructive Pulmonary Disease (COPD): Care Instructions. \"  Current as of: May 12, 2017  Content Version: 11.4  © 1016-0261 [employer]. Care instructions adapted under license by [employer] (which disclaims liability or warranty for this information). If you have questions about a medical condition or this instruction, always ask your healthcare professional. [address] any warranty or liability for your use of this information.

## 2018-06-05 NOTE — MR AVS SNAPSHOT
1017 [facility] 250 200 [facility] 
[phone] Patient: Rodney Simon MRN: [id number] Visit Information Date & Time Provider Department Dept. Phone Encounter #  
 6/5/2018 10:00 AM Eddie Coles [facility] [phone] [id number] Follow-up Instructions Return in about 3 months (around 9/5/2018). Your Appointments 8/20/2018  9:40 AM  
Follow Up with PAUL Kiran [address] [place] [address] for Pain Management (REJI SCHEDULING) Appt Note: Return in about 3 months (around 8/22/2018). 30 [facility] [address]  
[phone] [address] [facility]  
  
    
 2/18/2019  9:20 AM  
Follow Up with Sonia Carlos MD  
[facility] ([address]) Appt Note: 1 year f/u kmb [facility] [address] [address] [address] [phone] [address]  
  
    
 3/7/2019  9:00 AM  
Office Visit with hPi Cabral MD  
[facility] [address]) Appt Note: check up [address] [address]  
[phone] [address] [address] Upcoming Health Maintenance Date Due  
 EYE EXAM RETINAL OR DILATED Q1 7/11/2018 Influenza Age 5 to Adult 8/1/2018 HEMOGLOBIN A1C Q6M 11/30/2018 FOOT EXAM Q1 1/26/2019 MICROALBUMIN Q1 5/30/2019 LIPID PANEL Q1 5/30/2019 MEDICARE YEARLY EXAM 6/6/2019 GLAUCOMA SCREENING Q2Y 7/11/2019 COLONOSCOPY 4/3/2023 DTaP/Tdap/Td series (2 - Td) 5/16/2026 Allergies as of 6/5/2018  Review Complete On: 6/5/2018 By: Shawnee Wilson LPN Severity Noted Reaction Type Reactions Ciprofloxacin High 11/16/2013    Anaphylaxis Other Plant, Animal, Environmental High 03/10/2016    Other (comments) Patient cannot have MRI. Patient states that he has metal screws in his left arm. Lamisil [Terbinafine]  10/18/2013    Swelling Tongue swelling Metformin  03/16/2016    Other (comments) Elevated cr 1.4 Morphine  10/18/2013    Other (comments) \"loss of blood pressure\" Other Medication  10/18/2013    Other (comments) Doxasylin causes extreme GERD Pcn [Penicillins]  10/18/2013    Rash Pentothal [Thiopental Sodium]  10/18/2013    Shortness of Breath Sulfa (Sulfonamide Antibiotics)  10/18/2013    Rash Current Immunizations  Reviewed on 7/20/2016 Name Date Influenza Vaccine 11/3/2014 Influenza Vaccine (Quad) PF 1/25/2017, 9/16/2015 Pneumococcal Conjugate (PCV-13) 7/20/2016 Pneumococcal Polysaccharide (PPSV-23) 3/30/2017 Tdap 5/16/2016 Zoster Vaccine, Live 6/15/2017 Not reviewed this visit You Were Diagnosed With   
  
 Codes Comments Medicare annual wellness visit, subsequent    -  Primary ICD-10-CM: Z00.00 ICD-9-CM: V70.0 Type 2 diabetes mellitus with complication, with long-term current use of insulin (HCC)     ICD-10-CM: E11.8, Z79.4 ICD-9-CM: 250.90, V58.67 Asthma with COPD (chronic obstructive pulmonary disease) ([facility]     ICD-10-CM: J44.9 ICD-9-CM: 493.20 Chronic bilateral low back pain with bilateral sciatica     ICD-10-CM: M54.42, M54.41, G89.29 ICD-9-CM: 724.2, 724.3, 338.29 Gastroesophageal reflux disease without esophagitis     ICD-10-CM: K21.9 ICD-9-CM: 530.81 Hyperlipidemia, unspecified hyperlipidemia type     ICD-10-CM: E78.5 ICD-9-CM: 272.4 Hypothyroidism, unspecified type     ICD-10-CM: E03.9 ICD-9-CM: 244.9 Benign prostatic hyperplasia without lower urinary tract symptoms     ICD-10-CM: N40.0 ICD-9-CM: 600.00 Hypotestosteronism     ICD-10-CM: E34.9 ICD-9-CM: 259.9 Essential hypertension     ICD-10-CM: I10 
ICD-9-CM: 401.9 Vitals BP Pulse Temp Resp Height(growth percentile) Weight(growth percentile) 118/70 (BP 1 Location: Left arm, BP Patient Position: Sitting) 78 98.9 °F (37.2 °C) (Oral) 16 5' 11\" (1.803 m) 277 lb (125.6 kg) SpO2 BMI Smoking Status 91% 38.63 kg/m2 Former Smoker BMI and BSA Data Body Mass Index Body Surface Area  
 38.63 kg/m 2 2.51 m 2 Preferred Pharmacy Pharmacy Name Phone [address] [facility] [address] [phone] Your Updated Medication List  
  
   
This list is accurate as of 6/5/18 10:23 AM.  Always use your most recent med list.  
  
  
  
  
 ACCU-CHEK JAZZY PLUS TEST STRP strip Generic drug:  glucose blood VI test strips [address] Generic drug:  Lancets ACULAR 0.5 % ophthalmic solution Generic drug:  ketorolac Administer 1 Drop to both eyes two (2) times a day. albuterol 2.5 mg /3 mL (0.083 %) nebulizer solution Commonly known as:  PROVENTIL VENTOLIN  
3 mL by Nebulization route every four (4) hours as needed for Wheezing or Shortness of Breath. atorvastatin 20 mg tablet Commonly known as:  LIPITOR Take 1 Tab by mouth daily. betamethasone dipropionate 0.05 % ointment Commonly known as:  DIPROLENE  
  
 budesonide-formoterol 160-4.5 mcg/actuation Hfaa Commonly known as:  SYMBICORT Take 2 Puffs by inhalation two (2) times a day. butalbital-acetaminophen-caffeine -40 mg per tablet Commonly known as:  Dannielle Fee Take 0.5 Tabs by mouth daily as needed for Pain. DULoxetine 60 mg capsule Commonly known as:  CYMBALTA Take 1 Cap by mouth nightly for 30 days. esomeprazole 40 mg capsule Commonly known as:  Anne Brod Take 40 mg by mouth two (2) times a day. fenofibrate nanocrystallized 145 mg tablet Commonly known as:  TRICOR  
TAKE ONE TABLET BY MOUTH ONCE DAILY  
  
 fluticasone 50 mcg/actuation nasal spray Commonly known as:  FLONASE  
USE ONE SPRAY(S) IN EACH NOSTRIL ONCE DAILY HYDROcodone-acetaminophen 5-325 mg per tablet Commonly known as:  Orren Jenise Take 1 Tab by mouth daily as needed for Pain. insulin glargine 100 unit/mL (3 mL) Inpn Commonly known as:  BASAGLAR KWIKPEN U-100 INSULIN  
52 units at bedtime  
  
 insulin glulisine U-100 100 unit/mL pen Commonly known as:  APIDRA SOLOSTAR U-100 INSULIN  
2 units per carb consumed. Max 30 / day JANUVIA 100 mg tablet Generic drug:  SITagliptin Take 50 mg by mouth daily. Indications: patient stated he was given 50 mg  
  
 levothyroxine 150 mcg tablet Commonly known as:  SYNTHROID  
TAKE ONE TABLET BY MOUTH ONCE DAILY BEFORE BREAKFAST  
  
 lisinopril 5 mg tablet Commonly known as:  Luisa Golder Take 1 Tab by mouth daily. metoprolol tartrate 25 mg tablet Commonly known as:  LOPRESSOR Take 1 Tab by mouth two (2) times a day. montelukast 10 mg tablet Commonly known as:  SINGULAIR  
TAKE ONE TABLET BY MOUTH ONCE DAILY  
  
 naloxone 4 mg/actuation nasal spray Commonly known as:  NARCAN  
4 mg by Nasal route as needed. OTHER  
EB-N3 once daily * oxyCODONE IR 10 mg Tab immediate release tablet Commonly known as:  Onita Estimable Take 10 mg by mouth three (3) times daily. * oxyCODONE ER 10 mg ER tablet Commonly known as:  OxyCONTIN Take 1 Tab by mouth every eight (8) hours for 30 days. Max Daily Amount: 30 mg.  
  
 * oxyCODONE ER 10 mg ER tablet Commonly known as:  OxyCONTIN Take 1 Tab by mouth every eight (8) hours. Max Daily Amount: 30 mg. Start taking on:  7/2/2018 * oxyCODONE ER 10 mg ER tablet Commonly known as:  OxyCONTIN Take 1 Tab by mouth every eight (8) hours. Max Daily Amount: 30 mg. Start taking on:  8/1/2018 * BD ULTRA-FINE FANI PEN NEEDLES  
by Does Not Apply route. 4mm x 32G * PEN NEEDLE 31 gauge x 5/16\" Ndle Generic drug:  Insulin Needles (Disposable) USE ONE PEN NEEDLE FOR LANTUS FLEXPEN AND 3 PEN NEEDLES FOR APIDRA WITH EACH MEAL * Mireya Pen Needle 32 gauge x 5/32\" Ndle Generic drug:  Insulin Needles (Disposable) USE AT BEDTIME  
  
 pregabalin 150 mg capsule Commonly known as:  Ruperto Majestic TAKE 1 CAPSULE BY MOUTH THREE TIMES DAILY FOR 30 DAYS. MAXIMUM 3 CAPSULES DAILY. * tamsulosin 0.4 mg capsule Commonly known as:  FLOMAX Take 1 Cap by mouth two (2) times a day. * tamsulosin 0.4 mg capsule Commonly known as:  FLOMAX Take 1 Cap by mouth daily. Indications: benign prostatic hyperplasia with lower urinary tract sx  
  
 tiotropium 18 mcg inhalation capsule Commonly known as:  Mart Kingman Take 1 Cap by inhalation daily. varicella-zoster vacine live 19,400 unit/0.65 mL Susr injection Generic drug:  varicella zoster vaccine live * Notice: This list has 9 medication(s) that are the same as other medications prescribed for you. Read the directions carefully, and ask your doctor or other care provider to review them with you. Prescriptions Sent to Pharmacy Refills  
 lisinopril (PRINIVIL, ZESTRIL) 5 mg tablet 1 Sig: Take 1 Tab by mouth daily. Class: Normal  
 Pharmacy: [address] [facility] [address] #: [phone] Route: Oral  
  
Follow-up Instructions Return in about 3 months (around 9/5/2018). Patient Instructions Medicare Part B Preventive Services Limitations Recommendation Scheduled Bone Mass Measurement 
(age 72 & older, biennial) Requires diagnosis related to osteoporosis or estrogen deficiency. Biennial benefit unless patient has history of long-term glucocorticoid tx or baseline is needed because initial test was by other method Not indicated Cardiovascular Screening Blood Tests (every 5 years) Total cholesterol, HDL, Triglycerides Order as a panel if possible 05/30/2018 Colorectal Cancer Screening 
-Fecal occult blood test (annual) -Flexible sigmoidoscopy (5y) 
-Screening colonoscopy (10y) -Barium Enema  04/03/2018 Q5 years Counseling to Prevent Tobacco Use (up to 8 sessions per year) - Counseling greater than 3 and up to 10 minutes - Counseling greater than 10 minutes Patients must be asymptomatic of tobacco-related conditions to receive as preventive service Not indicated Diabetes Screening Tests (at least every 3 years, Medicare covers annually or at 6-month intervals for prediabetic patients) Fasting blood sugar (FBS) or glucose tolerance test (GTT) Patient must be diagnosed with one of the following: 
-Hypertension, Dyslipidemia, obesity, previous impaired FBS or GTT 
Or any two of the following: overweight, FH of diabetes, age ? 72, history of gestational diabetes, birth of baby weighing more than 9 pounds 05/30/2018 Diabetes Self-Management Training (DSMT) (no USPSTF recommendation) Requires referral by treating physician for patient with diabetes or renal disease. 10 hours of initial DSMT session of no less than 30 minutes each in a continuous 12-month period. 2 hours of follow-up DSMT in subsequent years. UTD Glaucoma Screening (no USPSTF recommendation) Diabetes mellitus, family history, , age 48 or over,  American, age 72 or over 07/11/17 Human Immunodeficiency Virus (HIV) Screening (annually for increased risk patients) HIV-1 and HIV-2 by EIA, RAHUL, rapid antibody test, or oral mucosa transudate Patient must be at increased risk for HIV infection per USPSTF guidelines or pregnant. Tests covered annually for patients at increased risk. Pregnant patients may receive up to 3 test during pregnancy. Not indicated Medical Nutrition Therapy (MNT) (for diabetes or renal disease not recommended schedule) Requires referral by treating physician for patient with diabetes or renal disease. Can be provided in same year as diabetes self-management training (DSMT), and CMS recommends medical nutrition therapy take place after DSMT. Up to 3 hours for initial year and 2 hours in subsequent years. UTD Prostate Cancer Screening (annually up to age 76) - Digital rectal exam (BOY) - Prostate specific antigen (PSA) Annually (age 48 or over), BOY not paid separately when covered E/M service is provided on same date Men up to age 76 may need a screening blood test for prostate cancer at certain intervals, depending on their personal and family history. This decision is between the patient and his provider. 03/08/2018 Seasonal Influenza Vaccination (annually)  11/16/2017 Pneumococcal Vaccination (once after 65) Completed Hepatitis B Vaccinations (if medium/high risk) Medium/high risk factors:  End-stage renal disease, Hemophiliacs who received Factor VIII or IX concentrates, Clients of institutions for the mentally retarded, Persons who live in the same house as a HepB virus carrier, Homosexual men, Illicit injectable drug abusers. Not indicated Shingles Vaccination A shingles vaccine is also recommended once in a lifetime after age 61 6/15/2017 Ultrasound Screening for Abdominal Aortic Aneurysm (AAA) (once) Patient must be referred through IPPE and not have had a screening for abdominal aortic aneurysm before under Medicare. Limited to patients who meet one of the following criteria: 
- Men who are 73-68 years old and have smoked more than 100 cigarettes in their lifetime. 
-Anyone with a FH of AAA 
-Anyone recommended for screening by USPSTF AAA screening done 2/24/15 Low Sodium Diet (2,000 Milligram): Care Instructions Your Care Instructions Too much sodium causes your body to hold on to extra water. This can raise your blood pressure and force your heart and kidneys to work harder.  In very serious cases, this could cause you to be put in the hospital. It might even be life-threatening. By limiting sodium, you will feel better and lower your risk of serious problems. The most common source of sodium is salt. People get most of the salt in their diet from canned, prepared, and packaged foods. Fast food and restaurant meals also are very high in sodium. Your doctor will probably limit your sodium to less than 2,000 milligrams (mg) a day. This limit counts all the sodium in prepared and packaged foods and any salt you add to your food. Follow-up care is a key part of your treatment and safety. Be sure to make and go to all appointments, and call your doctor if you are having problems. It's also a good idea to know your test results and keep a list of the medicines you take. How can you care for yourself at home? Read food labels · Read labels on cans and food packages. The labels tell you how much sodium is in each serving. Make sure that you look at the serving size. If you eat more than the serving size, you have eaten more sodium. · Food labels also tell you the Percent Daily Value for sodium. Choose products with low Percent Daily Values for sodium. · Be aware that sodium can come in forms other than salt, including monosodium glutamate (MSG), sodium citrate, and sodium bicarbonate (baking soda). MSG is often added to Asian food. When you eat out, you can sometimes ask for food without MSG or added salt. Buy low-sodium foods · Buy foods that are labeled \"unsalted\" (no salt added), \"sodium-free\" (less than 5 mg of sodium per serving), or \"low-sodium\" (less than 140 mg of sodium per serving). Foods labeled \"reduced-sodium\" and \"light sodium\" may still have too much sodium. Be sure to read the label to see how much sodium you are getting. · Buy fresh vegetables, or frozen vegetables without added sauces. Buy low-sodium versions of canned vegetables, soups, and other canned goods. Prepare low-sodium meals · Cut back on the amount of salt you use in cooking. This will help you adjust to the taste. Do not add salt after cooking. One teaspoon of salt has about 2,300 mg of sodium. · Take the salt shaker off the table. · Flavor your food with garlic, lemon juice, onion, vinegar, herbs, and spices. Do not use soy sauce, lite soy sauce, steak sauce, onion salt, garlic salt, celery salt, mustard, or ketchup on your food. · Use low-sodium salad dressings, sauces, and ketchup. Or make your own salad dressings and sauces without adding salt. · Use less salt (or none) when recipes call for it. You can often use half the salt a recipe calls for without losing flavor. Other foods such as rice, pasta, and grains do not need added salt. · Rinse canned vegetables, and cook them in fresh water. This removes some-but not all-of the salt. · Avoid water that is naturally high in sodium or that has been treated with water softeners, which add sodium. Call your local water company to find out the sodium content of your water supply. If you buy bottled water, read the label and choose a sodium-free brand. Avoid high-sodium foods · Avoid eating: ¨ Smoked, cured, salted, and canned meat, fish, and poultry. ¨ Ham, kohler, hot dogs, and luncheon meats. ¨ Regular, hard, and processed cheese and regular peanut butter. ¨ Crackers with salted tops, and other salted snack foods such as pretzels, chips, and salted popcorn. ¨ Frozen prepared meals, unless labeled low-sodium. ¨ Canned and dried soups, broths, and bouillon, unless labeled sodium-free or low-sodium. ¨ Canned vegetables, unless labeled sodium-free or low-sodium. ¨ Western Lesley fries, pizza, tacos, and other fast foods. ¨ Pickles, olives, ketchup, and other condiments, especially soy sauce, unless labeled sodium-free or low-sodium. Where can you learn more? Go to http://mack.info/. Enter E535 in the search box to learn more about \"Low Sodium Diet (2,000 Milligram): Care Instructions. \" Current as of: May 12, 2017 Content Version: 11.4 © 2083-4169 [employer]. Care instructions adapted under license by [employer] (which disclaims liability or warranty for this information). If you have questions about a medical condition or this instruction, always ask your healthcare professional. [address] any warranty or liability for your use of this information. Learning About Diabetes Food Guidelines Your Care Instructions Meal planning is important to manage diabetes. It helps keep your blood sugar at a target level (which you set with your doctor). You don't have to eat special foods. You can eat what your family eats, including sweets once in a while. But you do have to pay attention to how often you eat and how much you eat of certain foods. You may want to work with a dietitian or a certified diabetes educator (CDE) to help you plan meals and snacks. A dietitian or CDE can also help you lose weight if that is one of your goals. What should you know about eating carbs? Managing the amount of carbohydrate (carbs) you eat is an important part of healthy meals when you have diabetes. Carbohydrate is found in many foods. · Learn which foods have carbs. And learn the amounts of carbs in different foods. ¨ Bread, cereal, pasta, and rice have about 15 grams of carbs in a serving. A serving is 1 slice of bread (1 ounce), ½ cup of cooked cereal, or 1/3 cup of cooked pasta or rice. ¨ Fruits have 15 grams of carbs in a serving. A serving is 1 small fresh fruit, such as an apple or orange; ½ of a banana; ½ cup of cooked or canned fruit; ½ cup of fruit juice; 1 cup of melon or raspberries; or 2 tablespoons of dried fruit. ¨ Milk and no-sugar-added yogurt have 15 grams of carbs in a serving.  A serving is 1 cup of milk or 2/3 cup of no-sugar-added yogurt. ¨ Starchy vegetables have 15 grams of carbs in a serving. A serving is ½ cup of mashed potatoes or sweet potato; 1 cup winter squash; ½ of a small baked potato; ½ cup of cooked beans; or ½ cup cooked corn or green peas. · Learn how much carbs to eat each day and at each meal. A dietitian or CDE can teach you how to keep track of the amount of carbs you eat. This is called carbohydrate counting. · If you are not sure how to count carbohydrate grams, use the Plate Method to plan meals. It is a good, quick way to make sure that you have a balanced meal. It also helps you spread carbs throughout the day. ¨ Divide your plate by types of foods. Put non-starchy vegetables on half the plate, meat or other protein food on one-quarter of the plate, and a grain or starchy vegetable in the final quarter of the plate. To this you can add a small piece of fruit and 1 cup of milk or yogurt, depending on how many carbs you are supposed to eat at a meal. 
· Try to eat about the same amount of carbs at each meal. Do not \"save up\" your daily allowance of carbs to eat at one meal. 
· Proteins have very little or no carbs per serving. Examples of proteins are beef, chicken, turkey, fish, eggs, tofu, cheese, cottage cheese, and peanut butter. A serving size of meat is 3 ounces, which is about the size of a deck of cards. Examples of meat substitute serving sizes (equal to 1 ounce of meat) are 1/4 cup of cottage cheese, 1 egg, 1 tablespoon of peanut butter, and ½ cup of tofu. How can you eat out and still eat healthy? · Learn to estimate the serving sizes of foods that have carbohydrate. If you measure food at home, it will be easier to estimate the amount in a serving of restaurant food. · If the meal you order has too much carbohydrate (such as potatoes, corn, or baked beans), ask to have a low-carbohydrate food instead. Ask for a salad or green vegetables. · If you use insulin, check your blood sugar before and after eating out to help you plan how much to eat in the future. · If you eat more carbohydrate at a meal than you had planned, take a walk or do other exercise. This will help lower your blood sugar. What else should you know? · Limit saturated fat, such as the fat from meat and dairy products. This is a healthy choice because people who have diabetes are at higher risk of heart disease. So choose lean cuts of meat and nonfat or low-fat dairy products. Use olive or canola oil instead of butter or shortening when cooking. · Don't skip meals. Your blood sugar may drop too low if you skip meals and take insulin or certain medicines for diabetes. · Check with your doctor before you drink alcohol. Alcohol can cause your blood sugar to drop too low. Alcohol can also cause a bad reaction if you take certain diabetes medicines. Follow-up care is a key part of your treatment and safety. Be sure to make and go to all appointments, and call your doctor if you are having problems. It's also a good idea to know your test results and keep a list of the medicines you take. Where can you learn more? Go to http://slava-enrique.info/. Enter Y506 in the search box to learn more about \"Learning About Diabetes Food Guidelines. \" Current as of: March 13, 2017 Content Version: 11.4 © 6181-6065 "[employer]. Care instructions adapted under license by [employer] (which disclaims liability or warranty for this information). If you have questions about a medical condition or this instruction, always ask your healthcare professional. [place] [address] any warranty or liability for your use of this information. Chronic Obstructive Pulmonary Disease (COPD): Care Instructions Your Care Instructions Chronic obstructive pulmonary disease (COPD) is a general term for a group of lung diseases, including emphysema and chronic bronchitis. People with COPD have decreased airflow in and out of the lungs, which makes it hard to breathe. The airways also can get clogged with thick mucus. Cigarette smoking is a major cause of COPD. Although there is no cure for COPD, you can slow its progress. Following your treatment plan and taking care of yourself can help you feel better and live longer. Follow-up care is a key part of your treatment and safety. Be sure to make and go to all appointments, and call your doctor if you are having problems. It's also a good idea to know your test results and keep a list of the medicines you take. How can you care for yourself at home? ?Staying healthy ? · Do not smoke. This is the most important step you can take to prevent more damage to your lungs. If you need help quitting, talk to your doctor about stop-smoking programs and medicines. These can increase your chances of quitting for good. ? · Avoid colds and flu. Get a pneumococcal vaccine shot. If you have had one before, ask your doctor whether you need a second dose. Get the flu vaccine every fall. If you must be around people with colds or the flu, wash your hands often. ? · Avoid secondhand smoke, air pollution, and high altitudes. Also avoid cold, dry air and hot, humid air. Stay at home with your windows closed when air pollution is bad. ?Medicines and oxygen therapy ? · Take your medicines exactly as prescribed. Call your doctor if you think you are having a problem with your medicine. ? · You may be taking medicines such as: ¨ Bronchodilators. These help open your airways and make breathing easier. Bronchodilators are either short-acting (work for 6 to 9 hours) or long-acting (work for 24 hours). You inhale most bronchodilators, so they start to act quickly. Always carry your quick-relief inhaler with you in case you need it while you are away from home. ¨ Corticosteroids (prednisone, budesonide). These reduce airway inflammation. They come in pill or inhaled form. You must take these medicines every day for them to work well. ? · A spacer may help you get more inhaled medicine to your lungs. Ask your doctor or pharmacist if a spacer is right for you. If it is, ask how to use it properly. ? · Do not take any vitamins, over-the-counter medicine, or herbal products without talking to your doctor first.  
? · If your doctor prescribed antibiotics, take them as directed. Do not stop taking them just because you feel better. You need to take the full course of antibiotics. ? · Oxygen therapy boosts the amount of oxygen in your blood and helps you breathe easier. Use the flow rate your doctor has recommended, and do not change it without talking to your doctor first.  
Activity ? · Get regular exercise. Walking is an easy way to get exercise. Start out slowly, and walk a little more each day. ? · Pay attention to your breathing. You are exercising too hard if you cannot talk while you are exercising. ? · Take short rest breaks when doing household chores and other activities. ? · Learn breathing methods-such as breathing through pursed lips-to help you become less short of breath. ? · If your doctor has not set you up with a pulmonary rehabilitation program, talk to him or her about whether rehab is right for you. Rehab includes exercise programs, education about your disease and how to manage it, help with diet and other changes, and emotional support. Diet ? · Eat regular, healthy meals. Use bronchodilators about 1 hour before you eat to make it easier to eat. Eat several small meals instead of three large ones. Drink beverages at the end of the meal. Avoid foods that are hard to chew. ? · Eat foods that contain protein so that you do not lose muscle mass. ? · Talk with your doctor if you gain too much weight or if you lose weight without trying. ?Mental health ? · Talk to your family, friends, or a therapist about your feelings. It is normal to feel frightened, angry, hopeless, helpless, and even guilty. Talking openly about bad feelings can help you cope. If these feelings last, talk to your doctor. When should you call for help? Call 911 anytime you think you may need emergency care. For example, call if: 
? · You have severe trouble breathing. ?Call your doctor now or seek immediate medical care if: 
? · You have new or worse trouble breathing. ? · You cough up blood. ? · You have a fever. ? Watch closely for changes in your health, and be sure to contact your doctor if: 
? · You cough more deeply or more often, especially if you notice more mucus or a change in the color of your mucus. ? · You have new or worse swelling in your legs or belly. ? · You are not getting better as expected. Where can you learn more? Go to http://slava-enrique.info/. Jesus Manuel Leak in the search box to learn more about \"Chronic Obstructive Pulmonary Disease (COPD): Care Instructions. \" Current as of: May 12, 2017 Content Version: 11.4 © 4053-4528 [employer]. Care instructions adapted under license by [employer] (which disclaims liability or warranty for this information). If you have questions about a medical condition or this instruction, always ask your healthcare professional. [place] [address] any warranty or liability for your use of this information. Introducing [facility]! [employer] introduces [employer] patient portal. Now you can access parts of your medical record, email your doctor's office, and request medication refills online. 1. In your internet browser, go to [email or web address] 2. Click on the First Time User? Click Here link in the Sign In box. You will see the New Member Sign Up page. 3. Enter your [employer] Access Code exactly as it appears below. You will not need to use this code after youve completed the sign-up process. If you do not sign up before the expiration date, you must request a new code. · [employer] Access Code: [email or web address] Expires: 9/3/2018 10:23 AM 
 
4. Enter the last four digits of your Social Security Number (xxxx) and Date of Birth (mm/dd/yyyy) as indicated and click Submit. You will be taken to the next sign-up page. 5. Create a Covagen ID. This will be your Covagen login ID and cannot be changed, so think of one that is secure and easy to remember. 6. Create a Cov[employer] password. You can change your password at any time. 7. Enter your Password Reset Question and Answer. This can be used at a later time if you forget your password. 8. Enter your e-mail address. You will receive e-mail notification when new information is available in [address] 9. Click Sign Up. You can now view and download portions of your medical record. 10. Click the Download Summary menu link to download a portable copy of your medical information. If you have questions, please visit the Frequently Asked Questions section of the [employer] website. Remember, [employer] is NOT to be used for urgent needs. For medical emergencies, dial 911. Now available from your iPhone and Android! Please provide this summary of care documentation to your next provider. Your primary care clinician is listed as Kelley Resendiz. If you have any questions after today's visit, please call [phone].

## 2018-06-05 NOTE — ACP (ADVANCE CARE PLANNING)
Advance Care Planning (ACP) Provider Conversation Snapshot    Date of ACP Conversation: 06/05/18  Persons included in Conversation:  patient  Length of ACP Conversation in minutes:  <16 minutes (Non-Billable)    Authorized Decision Maker (if patient is incapable of making informed decisions): This person is:   wife, daughter           For Patients with Decision Making Capacity:   pt has advance directive in the chart and no change since it is made     Conversation Outcomes / Follow-Up Plan:   has an advance directive. discussed again and has copy in the chart. no change since it is made. review it with pt again.

## 2018-06-05 NOTE — PROGRESS NOTES
HISTORY OF PRESENT ILLNESS  Duglas Domingo is a 69 y.o. male. HPI: Here for routine follow up and lab discussion. Has h/o diabetes. Following endo. Recent lab showed HBA1C went up compare to prior labs. Results as below. Per him Miss Richard Burns has adjusted his insulin and now fasting sugar is coming down to 130's. No hypoglycemia. On diet modification. No nausea or vomiting. No abdominal pain. Also has h/o renal insufficiency. Following nephrology. On ACEI. H/o asthma and copd. Fairly stable. On and off wheezing due to seasonal allergies. Fairly managed by nasal spray and Singulair. No wheezing at this time. No chest congestion or sob. No cough or cold symptoms. No chest pain. H/o hypothyroidism. Asymptomatic. Shows compliance with medication. H/o GERD. Fairly managed with diet modification and PPI. H./o gastritis by EGD. H/o BPH and low testosteron. Following urology. No urinary concern at this time. Chronic back pain. Following pain management. Now asking if he can see someone else as will not get OxyContin. Discussed [facility] pain management. He will let me know if he needs a referral.   Lumbar radiculopathy. Currently pain is fairly managed on current medication. No loss of urine or bowel control. H/o hypertension and hyperlipidemia. On medication. Tolerating it well. No side effects of statin. On lipitor and tricor. Discussed importance of diet modification and weight loss. Has limitation in exercise due to back pain. Will observe. Visit Vitals    /70 (BP 1 Location: Left arm, BP Patient Position: Sitting)    Pulse 78    Temp 98.9 °F (37.2 °C) (Oral)    Resp 16    Ht 5' 11\" (1.803 m)    Wt 277 lb (125.6 kg)    SpO2 91%    BMI 38.63 kg/m2     Review medication list, vitals, problem list,allergies.    Lab Results   Component Value Date/Time    WBC 9.0 05/30/2018 08:21 AM    HGB 15.1 05/30/2018 08:21 AM    HCT 44.6 05/30/2018 08:21 AM    PLATELET 814 82/48/9293 08:21 AM    MCV 85.3 05/30/2018 08:21 AM     Lab Results   Component Value Date/Time    Sodium 141 05/30/2018 08:21 AM    Potassium 4.8 05/30/2018 08:21 AM    Chloride 102 05/30/2018 08:21 AM    CO2 29 05/30/2018 08:21 AM    Anion gap 10 05/30/2018 08:21 AM    Glucose 160 (H) 05/30/2018 08:21 AM    BUN 18 05/30/2018 08:21 AM    Creatinine 1.36 (H) 05/30/2018 08:21 AM    BUN/Creatinine ratio 13 05/30/2018 08:21 AM    GFR est AA >60 05/30/2018 08:21 AM    GFR est non-AA 52 (L) 05/30/2018 08:21 AM    Calcium 9.3 05/30/2018 08:21 AM    Bilirubin, total 0.3 05/30/2018 08:21 AM    AST (SGOT) 21 05/30/2018 08:21 AM    Alk. phosphatase 51 05/30/2018 08:21 AM    Protein, total 7.2 05/30/2018 08:21 AM    Albumin 4.2 05/30/2018 08:21 AM    Globulin 3.0 05/30/2018 08:21 AM    A-G Ratio 1.4 05/30/2018 08:21 AM    ALT (SGPT) 39 05/30/2018 08:21 AM     Lab Results   Component Value Date/Time    TSH 1.48 05/30/2018 08:21 AM     Lab Results   Component Value Date/Time    Cholesterol, total 156 05/30/2018 08:21 AM    HDL Cholesterol 35 (L) 05/30/2018 08:21 AM    LDL, calculated 75.2 05/30/2018 08:21 AM    VLDL, calculated 45.8 05/30/2018 08:21 AM    Triglyceride 229 (H) 05/30/2018 08:21 AM    CHOL/HDL Ratio 4.5 05/30/2018 08:21 AM     Lab Results   Component Value Date/Time    Hemoglobin A1c 7.3 (H) 05/30/2018 08:21 AM    Hemoglobin A1c (POC) 6.9 12/16/2015 10:29 AM    Hemoglobin A1c, External 7.2 06/16/2017     Lab Results   Component Value Date/Time    Microalbumin/Creat ratio (mg/g creat) 19 05/30/2018 08:21 AM    Microalbumin,urine random 1.12 05/30/2018 08:21 AM       ROS: Denies any headache, dizziness, no chest pain or trouble breathing, no arm or leg weakness. No nausea or vomiting, no weight or appetite changes, no depression or anxiety. No urine or bowel complains, no palpitation, no diaphoresis. Physical Exam   Constitutional: He is oriented to person, place, and time. No distress. Neck: No thyromegaly present.    Cardiovascular: Normal rate, regular rhythm and normal heart sounds. Pulmonary/Chest:   CTA   Abdominal: Soft. Bowel sounds are normal. There is no tenderness. Musculoskeletal: He exhibits no edema. Neurological: He is oriented to person, place, and time. Psychiatric: His behavior is normal.       ASSESSMENT and PLAN  1. Type 2 diabetes mellitus with complication, with long-term current use of insulin ([facility]): HBA1C little above goal. Now working with endo. See HPI. Fasting blood sugar is improving gradually. Will observe and follow endo recommendations. E11.8 250.90 lisinopril (PRINIVIL, ZESTRIL) 5 mg tablet    Z79.4 V58.67    2. Asthma with COPD (chronic obstructive pulmonary disease) ([facility]): stable. On and off wheezing due to seasonal allergies. Symptomatic treatment with albuterol, singulair and nasal spray discussed. J44.9 493.20    3. Chronic bilateral low back pain with bilateral sciatica: following pain management. See HPI  M54.42 724.2     M54.41 724.3     G89.29 338.29    4. Gastroesophageal reflux disease without esophagitis: stable symptomatically on current medication dose and diet modification. K21.9 530.81    5. Hyperlipidemia, unspecified hyperlipidemia type: on lipitor and tricor. Discussed importance of diet modification. See HPI  E78.5 272.4    6. Hypothyroidism, unspecified type: continue current management. E03.9 244.9    7. Benign prostatic hyperplasia without lower urinary tract symptoms: stable. Will be following urology. N40.0 600.00    8. Hypotestosteronism: following urology recommendations. E34.9 259.9    9. Essential hypertension: stable at this time. Low salt diet. Exercise as tolerated. Will continue current plan. I10 401.9 lisinopril (PRINIVIL, ZESTRIL) 5 mg tablet   Pt understood and agree with the plan     Follow-up Disposition:  Return in about 3 months (around 9/5/2018).

## 2018-06-05 NOTE — PROGRESS NOTES
This is the Subsequent Medicare Annual Wellness Exam, performed 12 months or more after the Initial AWV or the last Subsequent AWV    I have reviewed the patient's medical history in detail and updated the computerized patient record. History     Past Medical History:   Diagnosis Date    Asthma     Cancer [facility])     BASAL     Cardiac catheterization 2009    [address]:  No significant CAD.  Cardiac echocardiogram 01/20/2014    EF 50-55%. No WMA. Gr 1 DDfx. RVSP 25-30 mmHg. Mild TR.  DM type 2 (diabetes mellitus, type 2) (HCC)     Enlarged prostate     Failed back syndrome     GERD (gastroesophageal reflux disease)     Hearing loss, bilateral     Hearing Aids    Hypertension     Hypothyroidism     Insomnia     Low serum testosterone level     Neuropathy     Osteoarthritis of multiple joints     S/P colonoscopy 8/14/13    mild diverticulosis in sigmoid colon w/o evidence of diverticulitis; f/u 5 years    SOB (shortness of breath)     chronic; 2' \"lung fever\"    Vertigo       Past Surgical History:   Procedure Laterality Date    COLONOSCOPY N/A 4/3/2018    COLONOS[facility] performed [facility] at [facility] HX BACK SURGERY  2000    removal of defective hardware    [address]    to remove bone fragments    HX LUMBAR FUSION  1999    fusion from L4-S1 and implantation of hardware    HX LUMBAR FUSION  1984    fusion on L5 area    HX ORTHOPAEDIC Bilateral 2004    trigger finger syndrome-all 4 fingers    HX ORTHOPAEDIC Left 2004    left arm torn muscle repair and placement of 2 screws    HX OTHER SURGICAL      skin cancer removal     Current Outpatient Prescriptions   Medication Sig Dispense Refill    [START ON 8/1/2018] oxyCODONE ER (OXYCONTIN) 10 mg ER tablet Take 1 Tab by mouth every eight (8) hours. Max Daily Amount: 30 mg. 90 Tab 0    [START ON 7/2/2018] oxyCODONE ER (OXYCONTIN) 10 mg ER tablet Take 1 Tab by mouth every eight (8) hours.  Max Daily Amount: 30 mg. 60 Tab 0    oxyCODONE ER (OXYCONTIN) 10 mg ER tablet Take 1 Tab by mouth every eight (8) hours for 30 days. Max Daily Amount: 30 mg. 90 Tab 0    DULoxetine (CYMBALTA) 60 mg capsule Take 1 Cap by mouth nightly for 30 days. 30 Cap 5    fenofibrate nanocrystallized (TRICOR) 145 mg tablet TAKE ONE TABLET BY MOUTH ONCE DAILY 90 Tab 1    oxyCODONE IR (ROXICODONE) 10 mg tab immediate release tablet Take 10 mg by mouth three (3) times daily.  fluticasone (FLONASE) 50 mcg/actuation nasal spray USE ONE SPRAY(S) IN EACH NOSTRIL ONCE DAILY 1 Bottle 0    levothyroxine (SYNTHROID) 150 mcg tablet TAKE ONE TABLET BY MOUTH ONCE DAILY BEFORE BREAKFAST 90 Tab 1    tamsulosin (FLOMAX) 0.4 mg capsule Take 1 Cap by mouth daily. Indications: benign prostatic hyperplasia with lower urinary tract sx 90 Cap 3    pregabalin (LYRICA) 150 mg capsule TAKE 1 CAPSULE BY MOUTH THREE TIMES DAILY FOR 30 DAYS. MAXIMUM 3 CAPSULES DAILY. 90 Cap 5    FANI PEN NEEDLE 32 gauge x 5/32\" ndle USE AT BEDTIME 100 Pen Needle 6    lisinopril (PRINIVIL, ZESTRIL) 5 mg tablet Take 1 Tab by mouth daily. 90 Tab 1    metoprolol tartrate (LOPRESSOR) 25 mg tablet Take 1 Tab by mouth two (2) times a day. 180 Tab 1    montelukast (SINGULAIR) 10 mg tablet TAKE ONE TABLET BY MOUTH ONCE DAILY 90 Tab 1    JANUVIA 100 mg tablet Take 50 mg by mouth daily. Indications: patient stated he was given 50 mg      VARICELLA-ZOSTER VACINE LIVE 19,400 unit/0.65 mL susr injection       atorvastatin (LIPITOR) 20 mg tablet Take 1 Tab by mouth daily. 90 Tab 1    naloxone 4 mg/actuation spry 4 mg by Nasal route as needed. 1 Box 1    ACCU-CHEK JAZZY PLUS TEST STRP strip       ACCU-CHEK SOFTCLIX LANCETS misc       albuterol (PROVENTIL VENTOLIN) 2.5 mg /3 mL (0.083 %) nebulizer solution 3 mL by Nebulization route every four (4) hours as needed for Wheezing or Shortness of Breath.  1 Package 5    PEN NEEDLE, DIABETIC (BD ULTRA-FINE FANI PEN NEEDLES) by Does Not Apply route. 4mm x 32G      insulin glulisine (APIDRA SOLOSTAR) 100 unit/mL pen 2 units per carb consumed. Max 30 / day (Patient taking differently: 2 units per carb consumed. Max 30 / day  Indications: patient states taking 10 units three times a day) 5 Pen 3    insulin glargine (BASAGLAR KWIKPEN) 100 unit/mL (3 mL) pen 52 units at bedtime 3 Pen 3    PEN NEEDLE 31 gauge x 5/16\" ndle USE ONE PEN NEEDLE FOR LANTUS FLEXPEN AND 3 PEN NEEDLES FOR APIDRA WITH EACH MEAL 1 Package 3    esomeprazole (NEXIUM) 40 mg capsule Take 40 mg by mouth two (2) times a day.  betamethasone dipropionate (DIPROLENE) 0.05 % ointment       butalbital-acetaminophen-caffeine (FIORICET, ESGIC) -40 mg per tablet Take 0.5 Tabs by mouth daily as needed for Pain. 10 Tab 0    tamsulosin (FLOMAX) 0.4 mg capsule Take 1 Cap by mouth two (2) times a day. 180 Cap 3    HYDROcodone-acetaminophen (NORCO) 5-325 mg per tablet Take 1 Tab by mouth daily as needed for Pain.  budesonide-formoterol (SYMBICORT) 160-4.5 mcg/actuation HFA inhaler Take 2 Puffs by inhalation two (2) times a day. 1 Inhaler 5    tiotropium (SPIRIVA) 18 mcg inhalation capsule Take 1 Cap by inhalation daily. 30 Cap 5    ketorolac (ACULAR) 0.5 % ophthalmic solution Administer 1 Drop to both eyes two (2) times a day. Allergies   Allergen Reactions    Ciprofloxacin Anaphylaxis    Other Plant, Animal, Environmental Other (comments)     Patient cannot have MRI. Patient states that he has metal screws in his left arm.     Lamisil [Terbinafine] Swelling     Tongue swelling    Metformin Other (comments)     Elevated cr 1.4    Morphine Other (comments)     \"loss of blood pressure\"    Other Medication Other (comments)     Doxasylin causes extreme GERD    Pcn [Penicillins] Rash    Pentothal [Thiopental Sodium] Shortness of Breath    Sulfa (Sulfonamide Antibiotics) Rash     Family History   Problem Relation Age of Onset    Cancer Mother      Bone and Brain Cancer    Diabetes Mother     Liver Disease Father      Lung Cancer    Kidney Disease Sister     Diabetes Daughter     Colon Cancer Brother      Social History   Substance Use Topics    Smoking status: Former Smoker     Packs/day: 1.00     Years: 31.00     Types: Pipe, Cigars     Quit date: 1/1/1995    Smokeless tobacco: Never Used    Alcohol use No     Patient Active Problem List   Diagnosis Code    Chronic bilateral low back pain with bilateral sciatica M54.42, M54.41, G89.29    COPD (chronic obstructive pulmonary disease) ([facility]) J44.9    Migraine headache G43.909    GERD (gastroesophageal reflux disease) K21.9    Hyperlipidemia E78.5    Hypothyroidism E03.9    BPH (benign prostatic hyperplasia) N40.0    Hypotestosteronism E34.9    Vertigo R44    Encounter for long-term (current) use of other medications Z79.899    Chronic pain syndrome G89.4    DJD (degenerative joint disease), lumbar M47.816    H/O lumbosacral spine surgery Z98.890    DDD (degenerative disc disease), lumbar M51.36    Peripheral neuropathy G62.9    Asthma with COPD (chronic obstructive pulmonary disease) ([facility] J44.9    Essential hypertension I10    Vitamin B12 deficiency E53.8    Chronic chest pain R07.9, G89.29    Advance directive in chart/ no change per patient. Z78.9    H/O colonoscopy/ due in 2018 Z98.890    Chronic low back pain M54.5, G89.29    Type 2 diabetes mellitus with complication, with long-term current use of insulin ([facility]) E11.8, Z79.4       Depression Risk Factor Screening:     PHQ over the last two weeks 5/22/2018   PHQ Not Done -   Little interest or pleasure in doing things Not at all   Feeling down, depressed or hopeless Not at all   Total Score PHQ 2 0     Alcohol Risk Factor Screening: You do not drink alcohol or very rarely. Functional Ability and Level of Safety:   Hearing Loss  The patient wears hearing aids.     Activities of Daily Living  The home contains: no safety equipment. Patient does total self care    Fall Risk  Fall Risk Assessment, last 12 mths 5/22/2018   Able to walk? Yes   Fall in past 12 months? Yes   Fall with injury? No   Number of falls in past 12 months 8 or more   Fall Risk Score 8       Abuse Screen  Patient is not abused    Cognitive Screening   Evaluation of Cognitive Function:  Has your family/caregiver stated any concerns about your memory: no  Normal    Patient Care Team   Patient Care Team:  Richard Abreu MD as PCP - General (Family Practice)  Analilia Bolaños MD (Otolaryngology)  Kraig Hurstma (Internal Medicine)  Flavio Gowers, MD as Physician (Urology)  Ziggy Mosquera as Physician (Podiatry)  Orquidea Lomas MD (Ophthalmology)  Peola Kayser, MD (Cardiology)  Alvarez Jiménez MD (Endocrinology)  Maria Del Rosario Mishra MD (Otolaryngology)  John Gaines MD as Hospitalist (Gastroenterology)    Assessment/Plan   Education and counseling provided:  Are appropriate based on today's review and evaluation  Review nurses note and assessment. Agree with that. Discussed 5 years health plan and given copy in AVS.  Discussed advance directive. Pt has advance directive on file. No change. See ACP note. Diagnoses and all orders for this visit:    1.  Medicare annual wellness visit, subsequent      Health Maintenance Due   Topic Date Due    MEDICARE YEARLY EXAM  04/25/2018

## 2018-06-14 DIAGNOSIS — E78.1 HYPERTRIGLYCERIDEMIA: ICD-10-CM

## 2018-06-15 RX ORDER — ATORVASTATIN CALCIUM 20 MG/1
TABLET, FILM COATED ORAL
Qty: 90 TAB | Refills: 1 | Status: SHIPPED | OUTPATIENT
Start: 2018-06-15 | End: 2020-09-16

## 2018-07-12 ENCOUNTER — TELEPHONE (OUTPATIENT)
Dept: PAIN MANAGEMENT | Age: 68
End: 2018-07-12

## 2018-07-12 NOTE — TELEPHONE ENCOUNTER
Schuylersurya Holloway has called requesting a refill of their controlled medication, oxycontin, for the management of his chronic lower back and leg pain. Last office visit date: 05/22/18    Date last  was pulled and reviewed : 07/12/18, last fill date for oxycontin was 06/04/18    Was the patient compliant when the above report was pulled? yes    Analgesia: pt reports a 50% relief of pain with his current opioid medication regimen    Aberrancies: none noted     ADL's: pt is able to perform daily duties with his medication    Adverse Reaction: no adverse reactions or side effects reported by the pt at this time. Provider's last note and plan of care reviewed? Yes,prescriptions are already printed  Request forwarded to provider for review.

## 2018-08-06 DIAGNOSIS — J44.9 ASTHMA WITH COPD (CHRONIC OBSTRUCTIVE PULMONARY DISEASE) (HCC): ICD-10-CM

## 2018-08-06 RX ORDER — MONTELUKAST SODIUM 10 MG/1
TABLET ORAL
Qty: 90 TAB | Refills: 1 | Status: SHIPPED | OUTPATIENT
Start: 2018-08-06 | End: 2018-11-13 | Stop reason: SDUPTHER

## 2018-08-20 ENCOUNTER — OFFICE VISIT (OUTPATIENT)
Dept: PAIN MANAGEMENT | Age: 68
End: 2018-08-20

## 2018-08-20 VITALS
RESPIRATION RATE: 16 BRPM | HEIGHT: 71 IN | TEMPERATURE: 97.9 F | BODY MASS INDEX: 38.78 KG/M2 | SYSTOLIC BLOOD PRESSURE: 136 MMHG | HEART RATE: 69 BPM | WEIGHT: 277 LBS | DIASTOLIC BLOOD PRESSURE: 78 MMHG

## 2018-08-20 DIAGNOSIS — Z79.899 ENCOUNTER FOR LONG-TERM (CURRENT) USE OF HIGH-RISK MEDICATION: ICD-10-CM

## 2018-08-20 DIAGNOSIS — G62.89 OTHER POLYNEUROPATHY: ICD-10-CM

## 2018-08-20 DIAGNOSIS — M47.896 OTHER OSTEOARTHRITIS OF SPINE, LUMBAR REGION: ICD-10-CM

## 2018-08-20 DIAGNOSIS — M54.42 CHRONIC BILATERAL LOW BACK PAIN WITH BILATERAL SCIATICA: Primary | ICD-10-CM

## 2018-08-20 DIAGNOSIS — G89.29 CHRONIC LOW BACK PAIN, UNSPECIFIED BACK PAIN LATERALITY, WITH SCIATICA PRESENCE UNSPECIFIED: ICD-10-CM

## 2018-08-20 DIAGNOSIS — G89.29 CHRONIC BILATERAL LOW BACK PAIN WITH BILATERAL SCIATICA: Primary | ICD-10-CM

## 2018-08-20 DIAGNOSIS — M54.5 CHRONIC LOW BACK PAIN, UNSPECIFIED BACK PAIN LATERALITY, WITH SCIATICA PRESENCE UNSPECIFIED: ICD-10-CM

## 2018-08-20 DIAGNOSIS — M54.41 CHRONIC BILATERAL LOW BACK PAIN WITH BILATERAL SCIATICA: Primary | ICD-10-CM

## 2018-08-20 DIAGNOSIS — M51.36 DDD (DEGENERATIVE DISC DISEASE), LUMBAR: ICD-10-CM

## 2018-08-20 LAB
ALCOHOL UR POC: NORMAL
AMPHETAMINES UR POC: NEGATIVE
BARBITURATES UR POC: NORMAL
BENZODIAZEPINES UR POC: NEGATIVE
BUPRENORPHINE UR POC: NEGATIVE
CANNABINOIDS UR POC: NEGATIVE
CARISOPRODOL UR POC: NORMAL
COCAINE UR POC: NEGATIVE
FENTANYL UR POC: NORMAL
MDMA/ECSTASY UR POC: NORMAL
METHADONE UR POC: NEGATIVE
METHAMPHETAMINE UR POC: NORMAL
METHYLPHENIDATE UR POC: NORMAL
OPIATES UR POC: NEGATIVE
OXYCODONE UR POC: NEGATIVE
PHENCYCLIDINE UR POC: NORMAL
PROPOXYPHENE UR POC: NORMAL
TRAMADOL UR POC: NORMAL
TRICYCLICS UR POC: NORMAL

## 2018-08-20 RX ORDER — HYDROCODONE BITARTRATE AND ACETAMINOPHEN 5; 325 MG/1; MG/1
1 TABLET ORAL
Qty: 50 TAB | Refills: 0 | Status: SHIPPED | OUTPATIENT
Start: 2018-10-18 | End: 2018-11-13 | Stop reason: DRUGHIGH

## 2018-08-20 RX ORDER — HYDROCODONE BITARTRATE AND ACETAMINOPHEN 5; 325 MG/1; MG/1
1 TABLET ORAL
Qty: 60 TAB | Refills: 0 | Status: SHIPPED | OUTPATIENT
Start: 2018-08-20 | End: 2018-09-19

## 2018-08-20 RX ORDER — PREGABALIN 150 MG/1
CAPSULE ORAL
Qty: 90 CAP | Refills: 5 | Status: SHIPPED | OUTPATIENT
Start: 2018-09-04 | End: 2019-02-12 | Stop reason: SDUPTHER

## 2018-08-20 RX ORDER — HYDROCODONE BITARTRATE AND ACETAMINOPHEN 5; 325 MG/1; MG/1
1 TABLET ORAL
Qty: 60 TAB | Refills: 0 | Status: SHIPPED | OUTPATIENT
Start: 2018-09-19 | End: 2018-10-19

## 2018-08-20 RX ORDER — DULOXETIN HYDROCHLORIDE 60 MG/1
60 CAPSULE, DELAYED RELEASE ORAL 2 TIMES DAILY
COMMUNITY
Start: 2018-08-06 | End: 2019-01-11 | Stop reason: SDUPTHER

## 2018-08-20 NOTE — PROGRESS NOTES
HISTORY OF PRESENT ILLNESS  Artis Elizalde is a 69 y.o. male. HPI Mr. Richar Laura returns today for followup of chronic low back and leg pain s/p work-related injury and h/o multiple lumbar surgeries and interventional procedures, including L4-S1 fusion, ESIs, MB RFAs, and spinal cord stimulator trial. Good improvement with Tens unit. He continues unchanged since last visit. He has been doing very well with his current treatment plan. He reports significant improvement with his Lyrica and TENS unit. We had a very lengthy conversation regarding changes to our practice last visit. Unfortunately he never received his refill prescriptions. He says that he called in several times but did not get a return call regarding his refills. I have apologized to him regarding this inconvenience. He says that he went ahead and tapered off of his OxyContin. He was able to stretch out his hydrocodone which she has been using very sparingly in the past.  We will going and formally discontinue his OxyContin. I have agreed to adjust his hydrocodone up to 2 times daily as needed ×2 months. We will then begin tapering his hydrocodone as previously discussed. I will have him follow-up in 3 months for further evaluation and recommendation or sooner if needed. Medication management consists of OxyContin 10 mg TID, Norco 5 bid prn, Lyrica 150 mg TID, and Cymbalta 60 mg/day. No concurrent benzodiazepines. He continues using a TENS unit which he describes as the \"best thing\" for his pain. Medications are helping with pain control and quality of life. his pain is 5-6/10 with medication and 10/10 without. Pt describes pain as constant, aching, sharp, tingling and radiating. Aggravating factors include ADLs, while alleviated by medication and activity modification.  Current treatment is helping to improve general activity, mood, walking, sleep, enjoyment of life    Mr. Richar Laura is tolerating medications well, with no side effects noted. He is able to stay more active with less discomfort with these current doses. In the past 30 days, the patient reports an average of 40-50% pain relief with current treatment/medications. He is informed of side effects, risks, and benefits of this regimen, and emphasizes that he derives a significant improvement in functionality and quality of life, and notes that non-opioid medications and therapies in the past have not offered significant benefit. Pt does report constipation but is well controlled   he is otherwise doing well with no other complaints today. he denies any adverse events including nausea, vomiting, dizziness, increased constipation, hallucinations, or seizures. MME: 54  COMM: 12  PMA updated: 2016  Last UDS reviewed      PRIOR IMAGIN.  Lumbar CT 2016: Moderate to severe canal narrowing at L1-L2 as result of facet arthropathy, broad-based disc bulge and epidural fat. Postoperative changes from L3-L4 through L5-S1 without evidence of complication. Allergies   Allergen Reactions    Ciprofloxacin Anaphylaxis    Other Plant, Animal, Environmental Other (comments)     Patient cannot have MRI. Patient states that he has metal screws in his left arm.     Lamisil [Terbinafine] Swelling     Tongue swelling    Metformin Other (comments)     Elevated cr 1.4    Morphine Other (comments)     \"loss of blood pressure\"    Other Medication Other (comments)     Doxasylin causes extreme GERD    Pcn [Penicillins] Rash    Pentothal [Thiopental Sodium] Shortness of Breath    Sulfa (Sulfonamide Antibiotics) Rash       Past Surgical History:   Procedure Laterality Date    COLONOSCOPY N/A 4/3/2018    COLONOSCOPY performed by Hilda Sorto MD at [facility] BACK SURGERY      removal of defective hardware    Yasmani Kelly 40    to remove bone fragments    HX LUMBAR FUSION      fusion from L4-S1 and implantation of hardware    [address] [facility] [place] fusion on L5 area    HX ORTHOPAEDIC Bilateral 2004    trigger finger syndrome-all 4 fingers    HX ORTHOPAEDIC Left 2004    left arm torn muscle repair and placement of 2 screws    HX OTHER SURGICAL      skin cancer removal         Review of Systems   Constitutional: Positive for malaise/fatigue. Negative for chills and fever. HENT: Positive for hearing loss. Respiratory: Negative for cough. Cardiovascular: Positive for leg swelling. Negative for palpitations. Gastrointestinal: Positive for constipation. Negative for diarrhea, heartburn, nausea and vomiting. Musculoskeletal: Positive for joint pain. Negative for falls. Skin: Negative for rash. Neurological: Negative for dizziness. Psychiatric/Behavioral: Positive for depression. Negative for suicidal ideas. The patient is nervous/anxious and has insomnia. Physical Exam   Constitutional: He is oriented to person, place, and time. He appears well-developed and well-nourished. No distress. HENT:   Head: Normocephalic and atraumatic. Pulmonary/Chest: Effort normal. No respiratory distress. Neurological: He is alert and oriented to person, place, and time. Skin: Skin is warm and dry. He is not diaphoretic. Psychiatric: He has a normal mood and affect. His speech is normal. Cognition and memory are normal.   Nursing note and vitals reviewed. ASSESSMENT:    1. Encounter for long-term (current) use of high-risk medication    2. Chronic bilateral low back pain with bilateral sciatica    3. DDD (degenerative disc disease), lumbar    4. Chronic low back pain, unspecified back pain laterality, with sciatica presence unspecified    5. Other osteoarthritis of spine, lumbar region    6. Other polyneuropathy         [place] Prescription Monitoring Program was reviewed which does not demonstrate aberrancies and/or inconsistencies with regard to the historical prescribing of controlled medications to this patient by other providers.     PLAN / Pt Instructions:  1. Modify current plan with no evidence of addiction or diversion. Stable on current medication without adverse events. 2. Refill and adjust hydrocodone 5/325 mg up to 2 times a day as needed for pain x 2 months, then adjust down to #50 per month until next visit. 3. Continue Cymbalta 60 mg once daily. 5 refills   4. Discontinue OxyContin 10 mg every 8 hours. 5. Refill Lyrica 150 mg capsule 3 times daily. 5 refills  6. Discussed risks of addiction, dependency, and opioid induced hyperalgesia. 7. Add naloxone 4 mg nasal spray for opioid induced respiratory depression emergency only. Extensive counseling was provided regarding this medication. Instructions were given to take home  8. Please remember to call at least 5 business days prior to your medication refill  9. Return to clinic in 3 months. Please call and cancel your appointment and pain management agreement if you do decide to transfer your care. Medications Ordered Today   Medications    HYDROcodone-acetaminophen (NORCO) 5-325 mg per tablet     Sig: Take 1 Tab by mouth two (2) times daily as needed for Pain for up to 30 days. Max Daily Amount: 2 Tabs. Dispense:  60 Tab     Refill:  0    HYDROcodone-acetaminophen (NORCO) 5-325 mg per tablet     Sig: Take 1 Tab by mouth two (2) times daily as needed for Pain for up to 30 days. Max Daily Amount: 2 Tabs. Dispense:  60 Tab     Refill:  0    HYDROcodone-acetaminophen (NORCO) 5-325 mg per tablet     Sig: Take 1 Tab by mouth two (2) times daily as needed for Pain for up to 30 days. Max Daily Amount: 2 Tabs. No more than #50 per month     Dispense:  50 Tab     Refill:  0    pregabalin (LYRICA) 150 mg capsule     Sig: TAKE 1 CAPSULE BY MOUTH THREE TIMES DAILY FOR 30 DAYS. MAXIMUM 3 CAPSULES DAILY.      Dispense:  90 Cap     Refill:  5       DISPOSITION   Pain medications are prescribed with the objective of pain relief and improved physical and psychosocial function in this patient.  Patient has been counseled on proper use of prescribed medications.  Patient has been counseled about chronic medical conditions and their relationship to anxiety and depression and recommended mental health support as needed.  Reviewed with patient self-help tools, home exercise, and lifestyle changes to assist the patient in self-management of symptoms.  Reviewed with patient the treatment plan, goals of treatment plan, and limitations of treatment plan, to include the potential for side effects from medications and procedures. If side effects occur, it is the responsibility of the patient to inform the clinic so that a change in the treatment plan can be made in a safe manner. The patient is advised that stopping prescribed medication may cause an increase in symptoms and possible medication withdrawal symptoms. The patient is informed an emergency room evaluation may be necessary if this occurs. Spent 25 minutes with patient today which more than 50% of that time was spent on counseling and coordination of care. Marissa Ryan 8/20/2018     Note: Please excuse any typographical errors. Voice recognition software was used for this note and may cause mistakes.

## 2018-08-20 NOTE — PATIENT INSTRUCTIONS
1. Modify current plan with no evidence of addiction or diversion. Stable on current medication without adverse events. 2. Refill and adjust hydrocodone 5/325 mg up to 2 times a day as needed for pain x 2 months, then adjust down to #50 per month until next visit. 3. Continue Cymbalta 60 mg once daily. 5 refills   4. Discontinue OxyContin 10 mg every 8 hours. 5. Refill Lyrica 150 mg capsule 3 times daily. 5 refills  6. Discussed risks of addiction, dependency, and opioid induced hyperalgesia. 7. Add naloxone 4 mg nasal spray for opioid induced respiratory depression emergency only. Extensive counseling was provided regarding this medication. Instructions were given to take home  8. Please remember to call at least 5 business days prior to your medication refill  9. Return to clinic in 3 months. Please call and cancel your appointment and pain management agreement if you do decide to transfer your care. Preventing Falls: Care Instructions  Your Care Instructions    Getting around your home safely can be a challenge if you have injuries or health problems that make it easy for you to fall. Loose rugs and furniture in walkways are among the dangers for many older people who have problems walking or who have poor eyesight. People who have conditions such as arthritis, osteoporosis, or dementia also have to be careful not to fall. You can make your home safer with a few simple measures. Follow-up care is a key part of your treatment and safety. Be sure to make and go to all appointments, and call your doctor if you are having problems. It's also a good idea to know your test results and keep a list of the medicines you take. How can you care for yourself at home? Taking care of yourself  · You may get dizzy if you do not drink enough water. To prevent dehydration, drink plenty of fluids, enough so that your urine is light yellow or clear like water.  Choose water and other caffeine-free clear liquids. If you have kidney, heart, or liver disease and have to limit fluids, talk with your doctor before you increase the amount of fluids you drink. · Exercise regularly to improve your strength, muscle tone, and balance. Walk if you can. Swimming may be a good choice if you cannot walk easily. · Have your vision and hearing checked each year or any time you notice a change. If you have trouble seeing and hearing, you might not be able to avoid objects and could lose your balance. · Know the side effects of the medicines you take. Ask your doctor or pharmacist whether the medicines you take can affect your balance. Sleeping pills or sedatives can affect your balance. · Limit the amount of alcohol you drink. Alcohol can impair your balance and other senses. · Ask your doctor whether calluses or corns on your feet need to be removed. If you wear loose-fitting shoes because of calluses or corns, you can lose your balance and fall. · Talk to your doctor if you have numbness in your feet. Preventing falls at home  · Remove raised doorway thresholds, throw rugs, and clutter. Repair loose carpet or raised areas in the floor. · Move furniture and electrical cords to keep them out of walking paths. · Use nonskid floor wax, and wipe up spills right away, especially on ceramic tile floors. · If you use a walker or cane, put rubber tips on it. If you use crutches, clean the bottoms of them regularly with an abrasive pad, such as steel wool. · Keep your house well lit, especially Asmita Lucio, and outside walkways. Use night-lights in areas such as hallways and bathrooms. Add extra light switches or use remote switches (such as switches that go on or off when you clap your hands) to make it easier to turn lights on if you have to get up during the night. · Install sturdy handrails on stairways.   · Move items in your cabinets so that the things you use a lot are on the lower shelves (about waist level). · Keep a cordless phone and a flashlight with new batteries by your bed. If possible, put a phone in each of the main rooms of your house, or carry a cell phone in case you fall and cannot reach a phone. Or, you can wear a device around your neck or wrist. You push a button that sends a signal for help. · Wear low-heeled shoes that fit well and give your feet good support. Use footwear with nonskid soles. Check the heels and soles of your shoes for wear. Repair or replace worn heels or soles. · Do not wear socks without shoes on wood floors. · Walk on the grass when the sidewalks are slippery. If you live in an area that gets snow and ice in the winter, sprinkle salt on slippery steps and sidewalks. Preventing falls in the bath  · Install grab bars and nonskid mats inside and outside your shower or tub and near the toilet and sinks. · Use shower chairs and bath benches. · Use a hand-held shower head that will allow you to sit while showering. · Get into a tub or shower by putting the weaker leg in first. Get out of a tub or shower with your strong side first.  · Repair loose toilet seats and consider installing a raised toilet seat to make getting on and off the toilet easier. · Keep your bathroom door unlocked while you are in the shower. Where can you learn more? Go to http://slava-enrique.info/. Enter [id number] [id number] in the search box to learn more about \"Preventing Falls: Care Instructions. \"  Current as of: May 12, 2017  Content Version: 11.7  © 3999-4164 [employer]. Care instructions adapted under license by [employer] (which disclaims liability or warranty for this information). If you have questions about a medical condition or this instruction, always ask your healthcare professional. [address] any warranty or liability for your use of this information.

## 2018-08-20 NOTE — PROGRESS NOTES
Nursing Notes    Patient presents to the office today in follow-up. Patient rates his pain at 9/10 on the numerical pain scale. Reviewed medications with counts as follows:    Rx Date filled Qty Dispensed Pill Count Last Dose Short   Norco 5 mg 01/02/18 30 0 Sat pm no         Comments:  Patient is here today for a follow up appt today he states his pain level today is a 9  PHQ 9 was done patient denies any depression. He states he stopped taking the Oxycontin   He will need a refill on the Lyrica     POC UDS was performed in office today    Any new labs or imaging since last appointment? NO    Have you been to an emergency room (ER) or urgent care clinic since your last visit? NO            Have you been hospitalized since your last visit? NO     If yes, where, when, and reason for visit? Have you seen or consulted any other health care providers outside of the [address] [place] [address]  since your last visit? NO     If yes, where, when, and reason for visit? Mr. Keiko Navarro has a reminder for a \"due or due soon\" health maintenance. I have asked that he contact his primary care provider for follow-up on this health maintenance.

## 2018-08-20 NOTE — MR AVS SNAPSHOT
[address] [facility] [address] 
[phone] Patient: Susan Chadwick MRN: [id number]:[id number]/[id number] Visit Information Date & Time Provider Department Dept. Phone Encounter #  
 8/20/2018  9:40 AM PAUL Michael [address] [facility] [phone] Follow-up Instructions Return in about 3 months (around 11/20/2018). Your Appointments 9/5/2018 10:45 AM  
FOLLOW UP EXAM with Jaxon Kaufman MD  
[facility] ([facility]) Appt Note: routine f/u 3mo [address] [facility] [address] [place] [address] [address] 200 [facility]  
  
    
 2/18/2019  9:20 AM  
Follow Up with Radha Watters MD  
[facility] ([facility]) Appt Note: 1 year f/u kmb [facility] [address] [address] [address] [phone] [facility]ly [facility] [address]  
  
    
 3/7/2019  9:00 AM  
Office Visit with Desiree Vela MD  
[facility] [address] [place] [address]) Appt Note: check up [address] [address] [phone] [address] [address] Upcoming Health Maintenance Date Due  
 EYE EXAM RETINAL OR DILATED Q1 7/11/2018 Influenza Age 5 to Adult 8/1/2018 HEMOGLOBIN A1C Q6M 11/30/2018 FOOT EXAM Q1 1/26/2019 MICROALBUMIN Q1 5/30/2019 LIPID PANEL Q1 5/30/2019 MEDICARE YEARLY EXAM 6/6/2019 GLAUCOMA SCREENING Q2Y 7/11/2019 COLONOSCOPY 4/3/2023 DTaP/Tdap/Td series (2 - Td) 5/16/2026 Allergies as of 8/20/2018  Review Complete On: 8/20/2018 By: PAUL Michael Severity Noted Reaction Type Reactions Ciprofloxacin High 11/16/2013    Anaphylaxis Other Plant, Animal, Environmental High 03/10/2016    Other (comments) Patient cannot have MRI. Patient states that he has metal screws in his left arm. Lamisil [Terbinafine]  10/18/2013    Swelling Tongue swelling Metformin  03/16/2016    Other (comments) Elevated cr 1.4 Morphine  10/18/2013    Other (comments) \"loss of blood pressure\" Other Medication  10/18/2013    Other (comments) Doxasylin causes extreme GERD Pcn [Penicillins]  10/18/2013    Rash Pentothal [Thiopental Sodium]  10/18/2013    Shortness of Breath Sulfa (Sulfonamide Antibiotics)  10/18/2013    Rash Current Immunizations  Reviewed on 7/20/2016 Name Date Influenza Vaccine 11/3/2014 Influenza Vaccine (Quad) PF 1/25/2017, 9/16/2015 Pneumococcal Conjugate (PCV-13) 7/20/2016 Pneumococcal Polysaccharide (PPSV-23) 3/30/2017 Tdap 5/16/2016 Zoster Vaccine, Live 6/15/2017 Not reviewed this visit You Were Diagnosed With   
  
 Codes Comments Encounter for long-term (current) use of high-risk medication    -  Primary ICD-10-CM: S40.076 ICD-9-CM: V58.69 Chronic bilateral low back pain with bilateral sciatica     ICD-10-CM: M54.42, M54.41, G89.29 ICD-9-CM: 724.2, 724.3, 338.29   
 DDD (degenerative disc disease), lumbar     ICD-10-CM: M51.36 
ICD-9-CM: 722.52 Chronic low back pain, unspecified back pain laterality, with sciatica presence unspecified     ICD-10-CM: M54.5, G89.29 ICD-9-CM: 724.2, 338.29 Other osteoarthritis of spine, lumbar region     ICD-10-CM: M47.896 ICD-9-CM: 721.3 Other polyneuropathy     ICD-10-CM: G62.89 ICD-9-CM: 357.89 Vitals BP Pulse Temp Resp Height(growth percentile) Weight(growth percentile) 136/78 (BP 1 Location: Right arm, BP Patient Position: Sitting) 69 97.9 °F (36.6 °C) 16 5' 11\" (1.803 m) 277 lb (125.6 kg) BMI Smoking Status 38.63 kg/m2 Former Smoker BMI and BSA Data Body Mass Index Body Surface Area  
 38.63 kg/m 2 2.51 m 2 Preferred Pharmacy Pharmacy Name Phone [address] [facility] [address] [phone] Your Updated Medication List  
  
   
This list is accurate as of 8/20/18 10:39 AM.  Always use your most recent med list.  
  
  
  
  
 ACCU-CHEK JAZZY PLUS TEST STRP strip Generic drug:  glucose blood VI test strips [address] Generic drug:  Lancets ACULAR 0.5 % ophthalmic solution Generic drug:  ketorolac Administer 1 Drop to both eyes two (2) times a day. albuterol 2.5 mg /3 mL (0.083 %) nebulizer solution Commonly known as:  PROVENTIL VENTOLIN  
3 mL by Nebulization route every four (4) hours as needed for Wheezing or Shortness of Breath. atorvastatin 20 mg tablet Commonly known as:  LIPITOR  
TAKE ONE TABLET BY MOUTH ONCE DAILY  
  
 betamethasone dipropionate 0.05 % ointment Commonly known as:  DIPROLENE  
  
 budesonide-formoterol 160-4.5 mcg/actuation Hfaa Commonly known as:  SYMBICORT Take 2 Puffs by inhalation two (2) times a day. butalbital-acetaminophen-caffeine -40 mg per tablet Commonly known as:  Olevia Isabell Take 0.5 Tabs by mouth daily as needed for Pain. DULoxetine 60 mg capsule Commonly known as:  CYMBALTA Take 60 mg by mouth two (2) times a day. esomeprazole 40 mg capsule Commonly known as:  [place] Dima Take 40 mg by mouth two (2) times a day. fenofibrate nanocrystallized 145 mg tablet Commonly known as:  TRICOR  
TAKE ONE TABLET BY MOUTH ONCE DAILY  
  
 fluticasone 50 mcg/actuation nasal spray Commonly known as:  FLONASE  
USE ONE SPRAY(S) IN EACH NOSTRIL ONCE DAILY  
  
 * HYDROcodone-acetaminophen 5-325 mg per tablet Commonly known as:  March Castles Take 1 Tab by mouth daily as needed for Pain. * HYDROcodone-acetaminophen 5-325 mg per tablet Commonly known as:  March Castles  
 Take 1 Tab by mouth two (2) times daily as needed for Pain for up to 30 days. Max Daily Amount: 2 Tabs. * HYDROcodone-acetaminophen 5-325 mg per tablet Commonly known as:  Dutch Culver Take 1 Tab by mouth two (2) times daily as needed for Pain for up to 30 days. Max Daily Amount: 2 Tabs. Start taking on:  9/19/2018  
  
 * HYDROcodone-acetaminophen 5-325 mg per tablet Commonly known as:  Dutch Culver Take 1 Tab by mouth two (2) times daily as needed for Pain for up to 30 days. Max Daily Amount: 2 Tabs. No more than #50 per month Start taking on:  10/18/2018  
  
 insulin glargine 100 unit/mL (3 mL) Inpn Commonly known as:  BASAGLAR KWIKPEN U-100 INSULIN  
52 units at bedtime * APIDRA SOLOSTAR U-100 INSULIN 100 unit/mL pen Generic drug:  insulin glulisine U-100  
by SubCUTAneous route. * insulin glulisine U-100 100 unit/mL pen Commonly known as:  APIDRA SOLOSTAR U-100 INSULIN  
2 units per carb consumed. Max 30 / day JANUVIA 100 mg tablet Generic drug:  SITagliptin Take 50 mg by mouth daily. Indications: patient stated he was given 50 mg  
  
 levothyroxine 150 mcg tablet Commonly known as:  SYNTHROID  
TAKE ONE TABLET BY MOUTH ONCE DAILY BEFORE BREAKFAST  
  
 lisinopril 5 mg tablet Commonly known as:  Perrin Edman Take 1 Tab by mouth daily. metoprolol tartrate 25 mg tablet Commonly known as:  LOPRESSOR Take 1 Tab by mouth two (2) times a day. montelukast 10 mg tablet Commonly known as:  SINGULAIR  
TAKE ONE TABLET BY MOUTH ONCE DAILY  
  
 naloxone 4 mg/actuation nasal spray Commonly known as:  NARCAN  
4 mg by Nasal route as needed. OTHER  
EB-N3 once daily * oxyCODONE IR 10 mg Tab immediate release tablet Commonly known as:  Nilesh Goldman Take 10 mg by mouth three (3) times daily. * oxyCODONE ER 10 mg ER tablet Commonly known as:  OxyCONTIN Take 1 Tab by mouth every eight (8) hours. Max Daily Amount: 30 mg. * BD ULTRA-FINE FANI PEN NEEDLES  
by Does Not Apply route. 4mm x 32G * PEN NEEDLE 31 gauge x 5/16\" Ndle Generic drug:  Insulin Needles (Disposable) USE ONE PEN NEEDLE FOR LANTUS FLEXPEN AND 3 PEN NEEDLES FOR APIDRA WITH EACH MEAL * Fani Pen Needle 32 gauge x 5/32\" Ndle Generic drug:  Insulin Needles (Disposable) USE AT BEDTIME  
  
 pregabalin 150 mg capsule Commonly known as:  Belinda Guerra TAKE 1 CAPSULE BY MOUTH THREE TIMES DAILY FOR 30 DAYS. MAXIMUM 3 CAPSULES DAILY. Start taking on:  9/4/2018  
  
 tamsulosin 0.4 mg capsule Commonly known as:  FLOMAX Take 1 Cap by mouth two (2) times a day. tiotropium 18 mcg inhalation capsule Commonly known as:  [employer] Take 1 Cap by inhalation daily. * Notice: This list has 11 medication(s) that are the same as other medications prescribed for you. Read the directions carefully, and ask your doctor or other care provider to review them with you. Prescriptions Printed Refills HYDROcodone-acetaminophen (NORCO) 5-325 mg per tablet 0 Sig: Take 1 Tab by mouth two (2) times daily as needed for Pain for up to 30 days. Max Daily Amount: 2 Tabs. Class: Print Route: Oral  
 HYDROcodone-acetaminophen (NORCO) 5-325 mg per tablet 0 Starting on: 9/19/2018 Sig: Take 1 Tab by mouth two (2) times daily as needed for Pain for up to 30 days. Max Daily Amount: 2 Tabs. Class: Print Route: Oral  
 HYDROcodone-acetaminophen (NORCO) 5-325 mg per tablet 0 Starting on: 10/18/2018 Sig: Take 1 Tab by mouth two (2) times daily as needed for Pain for up to 30 days. Max Daily Amount: 2 Tabs. No more than #50 per month Class: Print Route: Oral  
 pregabalin (LYRICA) 150 mg capsule 5 Starting on: 9/4/2018 Sig: TAKE 1 CAPSULE BY MOUTH THREE TIMES DAILY FOR 30 DAYS. MAXIMUM 3 CAPSULES DAILY. Class: Print We Performed the Following AMB POC DRUG SCREEN () [ HCP] DRUG SCREEN [[id number] Custom] Follow-up Instructions Return in about 3 months (around 11/20/2018). Patient Instructions 1. Modify current plan with no evidence of addiction or diversion. Stable on current medication without adverse events. 2. Refill and adjust hydrocodone 5/325 mg up to 2 times a day as needed for pain x 2 months, then adjust down to #50 per month until next visit. 3. Continue Cymbalta 60 mg once daily. 5 refills 4. Discontinue OxyContin 10 mg every 8 hours. 5. Refill Lyrica 150 mg capsule 3 times daily. 5 refills 6. Discussed risks of addiction, dependency, and opioid induced hyperalgesia. 7. Add naloxone 4 mg nasal spray for opioid induced respiratory depression emergency only. Extensive counseling was provided regarding this medication. Instructions were given to take home 8. Please remember to call at least 5 business days prior to your medication refill 9. Return to clinic in 3 months. Please call and cancel your appointment and pain management agreement if you do decide to transfer your care. Preventing Falls: Care Instructions Your Care Instructions Getting around your home safely can be a challenge if you have injuries or health problems that make it easy for you to fall. Loose rugs and furniture in walkways are among the dangers for many older people who have problems walking or who have poor eyesight. People who have conditions such as arthritis, osteoporosis, or dementia also have to be careful not to fall. You can make your home safer with a few simple measures. Follow-up care is a key part of your treatment and safety. Be sure to make and go to all appointments, and call your doctor if you are having problems. It's also a good idea to know your test results and keep a list of the medicines you take. How can you care for yourself at home? Taking care of yourself · You may get dizzy if you do not drink enough water.  To prevent dehydration, drink plenty of fluids, enough so that your urine is light yellow or clear like water. Choose water and other caffeine-free clear liquids. If you have kidney, heart, or liver disease and have to limit fluids, talk with your doctor before you increase the amount of fluids you drink. · Exercise regularly to improve your strength, muscle tone, and balance. Walk if you can. Swimming may be a good choice if you cannot walk easily. · Have your vision and hearing checked each year or any time you notice a change. If you have trouble seeing and hearing, you might not be able to avoid objects and could lose your balance. · Know the side effects of the medicines you take. Ask your doctor or pharmacist whether the medicines you take can affect your balance. Sleeping pills or sedatives can affect your balance. · Limit the amount of alcohol you drink. Alcohol can impair your balance and other senses. · Ask your doctor whether calluses or corns on your feet need to be removed. If you wear loose-fitting shoes because of calluses or corns, you can lose your balance and fall. · Talk to your doctor if you have numbness in your feet. Preventing falls at home · Remove raised doorway thresholds, throw rugs, and clutter. Repair loose carpet or raised areas in the floor. · Move furniture and electrical cords to keep them out of walking paths. · Use nonskid floor wax, and wipe up spills right away, especially on ceramic tile floors. · If you use a walker or cane, put rubber tips on it. If you use crutches, clean the bottoms of them regularly with an abrasive pad, such as steel wool. · Keep your house well lit, especially [employer], and outside walkways. Use night-lights in areas such as hallways and bathrooms. Add extra light switches or use remote switches (such as switches that go on or off when you clap your hands) to make it easier to turn lights on if you have to get up during the night. · Install sturdy handrails on stairways. · Move items in your cabinets so that the things you use a lot are on the lower shelves (about waist level). · Keep a cordless phone and a flashlight with new batteries by your bed. If possible, put a phone in each of the main rooms of your house, or carry a cell phone in case you fall and cannot reach a phone. Or, you can wear a device around your neck or wrist. You push a button that sends a signal for help. · Wear low-heeled shoes that fit well and give your feet good support. Use footwear with nonskid soles. Check the heels and soles of your shoes for wear. Repair or replace worn heels or soles. · Do not wear socks without shoes on wood floors. · Walk on the grass when the sidewalks are slippery. If you live in an area that gets snow and ice in the winter, sprinkle salt on slippery steps and sidewalks. Preventing falls in the bath · Install grab bars and nonskid mats inside and outside your shower or tub and near the toilet and sinks. · Use shower chairs and bath benches. · Use a hand-held shower head that will allow you to sit while showering. · Get into a tub or shower by putting the weaker leg in first. Get out of a tub or shower with your strong side first. 
· Repair loose toilet seats and consider installing a raised toilet seat to make getting on and off the toilet easier. · Keep your bathroom door unlocked while you are in the shower. Where can you learn more? Go to http://slava-enrique.info/. Enter [phone] in the search box to learn more about \"Preventing Falls: Care Instructions. \" Current as of: May 12, 2017 Content Version: 11.7 © 7424-5754 [email or web address], [employer]. Care instructions adapted under license by [employer] (which disclaims liability or warranty for this information).  If you have questions about a medical condition or this instruction, always ask your healthcare professional. [employer] disclaims any warranty or liability for your use of this information. Introducing [facility]! [employer] introduces Waveborn patient portal. Now you can access parts of your medical record, email your doctor's office, and request medication refills online. 1. In your internet browser, go to [email or web address] 2. Click on the First Time User? Click Here link in the Sign In box. You will see the New Member Sign Up page. 3. Enter your Waveborn Access Code exactly as it appears below. You will not need to use this code after youve completed the sign-up process. If you do not sign up before the expiration date, you must request a new code. · Waveborn Access Code: [email or web address] Expires: 9/3/2018 10:23 AM 
 
4. Enter the last four digits of your Social Security Number (xxxx) and Date of Birth (mm/dd/yyyy) as indicated and click Submit. You will be taken to the next sign-up page. 5. Create a Waveborn ID. This will be your Waveborn login ID and cannot be changed, so think of one that is secure and easy to remember. 6. Create a Waveborn password. You can change your password at any time. 7. Enter your Password Reset Question and Answer. This can be used at a later time if you forget your password. 8. Enter your e-mail address. You will receive e-mail notification when new information is available in [address] 9. Click Sign Up. You can now view and download portions of your medical record. 10. Click the Download Summary menu link to download a portable copy of your medical information. If you have questions, please visit the Frequently Asked Questions section of the Waveborn website. Remember, Waveborn is NOT to be used for urgent needs. For medical emergencies, dial 911. Now available from your iPhone and Android! Please provide this summary of care documentation to your next provider. Your primary care clinician is listed as Henrik Serna. If you have any questions after today's visit, please call [phone].

## 2018-08-24 ENCOUNTER — OFFICE VISIT (OUTPATIENT)
Dept: FAMILY MEDICINE CLINIC | Age: 68
End: 2018-08-24

## 2018-08-24 VITALS
HEART RATE: 73 BPM | RESPIRATION RATE: 16 BRPM | SYSTOLIC BLOOD PRESSURE: 112 MMHG | DIASTOLIC BLOOD PRESSURE: 60 MMHG | HEIGHT: 71 IN | BODY MASS INDEX: 38.53 KG/M2 | WEIGHT: 275.2 LBS | TEMPERATURE: 97.9 F | OXYGEN SATURATION: 98 %

## 2018-08-24 DIAGNOSIS — Z79.4 TYPE 2 DIABETES MELLITUS WITH COMPLICATION, WITH LONG-TERM CURRENT USE OF INSULIN (HCC): ICD-10-CM

## 2018-08-24 DIAGNOSIS — M54.41 CHRONIC BILATERAL LOW BACK PAIN WITH BILATERAL SCIATICA: ICD-10-CM

## 2018-08-24 DIAGNOSIS — E11.8 TYPE 2 DIABETES MELLITUS WITH COMPLICATION, WITH LONG-TERM CURRENT USE OF INSULIN (HCC): ICD-10-CM

## 2018-08-24 DIAGNOSIS — R29.898 WEAKNESS OF EXTREMITY: ICD-10-CM

## 2018-08-24 DIAGNOSIS — M54.42 CHRONIC BILATERAL LOW BACK PAIN WITH BILATERAL SCIATICA: ICD-10-CM

## 2018-08-24 DIAGNOSIS — I10 ESSENTIAL HYPERTENSION: ICD-10-CM

## 2018-08-24 DIAGNOSIS — R79.89 LOW TSH LEVEL: ICD-10-CM

## 2018-08-24 DIAGNOSIS — R25.1 SHAKINESS: ICD-10-CM

## 2018-08-24 DIAGNOSIS — R00.0 TACHYCARDIA: ICD-10-CM

## 2018-08-24 DIAGNOSIS — M25.551 PAIN OF RIGHT HIP JOINT: Primary | ICD-10-CM

## 2018-08-24 DIAGNOSIS — G89.29 CHRONIC BILATERAL LOW BACK PAIN WITH BILATERAL SCIATICA: ICD-10-CM

## 2018-08-24 DIAGNOSIS — J30.89 OTHER ALLERGIC RHINITIS: ICD-10-CM

## 2018-08-24 DIAGNOSIS — G62.89 OTHER POLYNEUROPATHY: ICD-10-CM

## 2018-08-24 RX ORDER — SITAGLIPTIN 50 MG/1
50 TABLET, FILM COATED ORAL DAILY
COMMUNITY
Start: 2018-07-16 | End: 2019-02-12

## 2018-08-24 RX ORDER — FLUTICASONE PROPIONATE 50 MCG
1 SPRAY, SUSPENSION (ML) NASAL DAILY
Qty: 1 BOTTLE | Refills: 0 | Status: SHIPPED | OUTPATIENT
Start: 2018-08-24 | End: 2018-11-13 | Stop reason: SDUPTHER

## 2018-08-24 RX ORDER — LEVOTHYROXINE SODIUM 150 UG/1
150 TABLET ORAL
Qty: 90 TAB | Refills: 1 | Status: SHIPPED | OUTPATIENT
Start: 2018-08-24 | End: 2018-11-13 | Stop reason: SDUPTHER

## 2018-08-24 RX ORDER — METOPROLOL TARTRATE 25 MG/1
25 TABLET, FILM COATED ORAL 2 TIMES DAILY
Qty: 180 TAB | Refills: 1 | Status: SHIPPED | OUTPATIENT
Start: 2018-08-24 | End: 2018-11-13 | Stop reason: SDUPTHER

## 2018-08-24 NOTE — MR AVS SNAPSHOT
[address] [address] 200 [facility] 
[phone] Patient: Gerber Lopez MRN: [id number] AVX:[id number] Visit Information Date & Time Provider Department Dept. Phone Encounter #  
 8/24/2018  1:30 PM Vanessa Lu, [facility] [phone] [id number] Follow-up Instructions Return in about 2 months (around 10/24/2018). Your Appointments 11/19/2018  9:20 AM  
Follow Up with PAUL Erazo [facility] ([facility] SCHEDULING) Appt Note: Return in about 3 months (around 11/20/2018). [address] [facility] [address]  
[phone] [address] [facility]  
  
    
 2/18/2019  9:20 AM  
Follow Up with Osiel Roman MD  
[facility] ([address]) Appt Note: 1 year f/u [facility] [address] [address] [address] [phone] [address] [place] [address]  
  
    
 3/7/2019  9:00 AM  
Office Visit with Breezy Norton MD  
[facility] [address] [facility]) Appt Note: check up [address] [address]  
[phone] [address] [address] Upcoming Health Maintenance Date Due  
 EYE EXAM RETINAL OR DILATED Q1 7/11/2018 Influenza Age 5 to Adult 8/1/2018 HEMOGLOBIN A1C Q6M 11/30/2018 FOOT EXAM Q1 1/26/2019 MICROALBUMIN Q1 5/30/2019 LIPID PANEL Q1 5/30/2019 GLAUCOMA SCREENING Q2Y 7/11/2019 COLONOSCOPY 4/3/2023 DTaP/Tdap/Td series (2 - Td) 5/16/2026 Allergies as of 8/24/2018  Review Complete On: 8/24/2018 By: Kassidy Manuel Severity Noted Reaction Type Reactions Ciprofloxacin High 11/16/2013    Anaphylaxis Other Plant, Animal, Environmental High 03/10/2016    Other (comments) Patient cannot have MRI. Patient states that he has metal screws in his left arm. Lamisil [Terbinafine]  10/18/2013    Swelling Tongue swelling Metformin  03/16/2016    Other (comments) Elevated cr 1.4 Morphine  10/18/2013    Other (comments) \"loss of blood pressure\" Other Medication  10/18/2013    Other (comments) Doxasylin causes extreme GERD Pcn [Penicillins]  10/18/2013    Rash Pentothal [Thiopental Sodium]  10/18/2013    Shortness of Breath Sulfa (Sulfonamide Antibiotics)  10/18/2013    Rash Current Immunizations  Reviewed on 7/20/2016 Name Date Influenza Vaccine 11/3/2014 Influenza Vaccine (Quad) PF 1/25/2017, 9/16/2015 Pneumococcal Conjugate (PCV-13) 7/20/2016 Pneumococcal Polysaccharide (PPSV-23) 3/30/2017 Tdap 5/16/2016 Zoster Vaccine, Live 6/15/2017 Not reviewed this visit You Were Diagnosed With   
  
 Codes Comments Pain of right hip joint    -  Primary ICD-10-CM: M25.551 ICD-9-CM: 719.45 Other allergic rhinitis     ICD-10-CM: J30.89 ICD-9-CM: 477.8 Low TSH level     ICD-10-CM: R94.6 ICD-9-CM: 794.5 Shakiness     ICD-10-CM: R25.1 ICD-9-CM: 781.0 Tachycardia     ICD-10-CM: R00.0 ICD-9-CM: 791. 0 Chronic bilateral low back pain with bilateral sciatica     ICD-10-CM: M54.42, M54.41, G89.29 ICD-9-CM: 724.2, 724.3, 338.29 Other polyneuropathy     ICD-10-CM: G62.89 ICD-9-CM: 357.89 Weakness of extremity     ICD-10-CM: R29.898 ICD-9-CM: 729.89 Type 2 diabetes mellitus with complication, with long-term current use of insulin (HCC)     ICD-10-CM: E11.8, Z79.4 ICD-9-CM: 250.90, V58.67 Essential hypertension     ICD-10-CM: I10 
ICD-9-CM: 401.9 Vitals BP Pulse Temp Resp Height(growth percentile) Weight(growth percentile) 112/60 (BP 1 Location: Left arm, BP Patient Position: Sitting) 73 97.9 °F (36.6 °C) (Oral) 16 5' 11\" (1.803 m) 275 lb 3.2 oz (124.8 kg) SpO2 BMI Smoking Status 98% 38.38 kg/m2 Former Smoker Vitals History BMI and BSA Data Body Mass Index Body Surface Area  
 38.38 kg/m 2 2.5 m 2 Preferred Pharmacy Pharmacy Name Phone [address] [facility] [phone] Your Updated Medication List  
  
   
This list is accurate as of 8/24/18  2:12 PM.  Always use your most recent med list.  
  
  
  
  
 ACCU-CHEK JAZZY PLUS TEST STRP strip Generic drug:  glucose blood VI test strips [address] Generic drug:  Lancets ACULAR 0.5 % ophthalmic solution Generic drug:  ketorolac Administer 1 Drop to both eyes two (2) times a day. albuterol 2.5 mg /3 mL (0.083 %) nebulizer solution Commonly known as:  PROVENTIL VENTOLIN  
3 mL by Nebulization route every four (4) hours as needed for Wheezing or Shortness of Breath. atorvastatin 20 mg tablet Commonly known as:  LIPITOR  
TAKE ONE TABLET BY MOUTH ONCE DAILY  
  
 betamethasone dipropionate 0.05 % ointment Commonly known as:  DIPROLENE  
  
 budesonide-formoterol 160-4.5 mcg/actuation Hfaa Commonly known as:  SYMBICORT Take 2 Puffs by inhalation two (2) times a day. butalbital-acetaminophen-caffeine -40 mg per tablet Commonly known as:  Meyer Mcburney Take 0.5 Tabs by mouth daily as needed for Pain. DULoxetine 60 mg capsule Commonly known as:  CYMBALTA Take 60 mg by mouth two (2) times a day. esomeprazole 40 mg capsule Commonly known as:  Allena Clas Take 40 mg by mouth two (2) times a day. fenofibrate nanocrystallized 145 mg tablet Commonly known as:  TRICOR  
TAKE ONE TABLET BY MOUTH ONCE DAILY  
  
 fluticasone 50 mcg/actuation nasal spray Commonly known as:  FLONASE  
1 [place] by Both Nostrils route daily. * HYDROcodone-acetaminophen 5-325 mg per tablet Commonly known as:  Randa Rodriguez Take 1 Tab by mouth daily as needed for Pain. * HYDROcodone-acetaminophen 5-325 mg per tablet Commonly known as:  Annamary Abril Take 1 Tab by mouth two (2) times daily as needed for Pain for up to 30 days. Max Daily Amount: 2 Tabs. * HYDROcodone-acetaminophen 5-325 mg per tablet Commonly known as:  Annamary Abril Take 1 Tab by mouth two (2) times daily as needed for Pain for up to 30 days. Max Daily Amount: 2 Tabs. Start taking on:  9/19/2018  
  
 * HYDROcodone-acetaminophen 5-325 mg per tablet Commonly known as:  Annamary Abril Take 1 Tab by mouth two (2) times daily as needed for Pain for up to 30 days. Max Daily Amount: 2 Tabs. No more than #50 per month Start taking on:  10/18/2018  
  
 insulin glargine 100 unit/mL (3 mL) Inpn Commonly known as:  BASAGLAR KWIKPEN U-100 INSULIN  
52 units at bedtime * APIDRA SOLOSTAR U-100 INSULIN 100 unit/mL pen Generic drug:  insulin glulisine U-100  
by SubCUTAneous route. * insulin glulisine U-100 100 unit/mL pen Commonly known as:  APIDRA SOLOSTAR U-100 INSULIN  
2 units per carb consumed. Max 30 / day * JANUVIA 100 mg tablet Generic drug:  SITagliptin Take 50 mg by mouth daily. Indications: patient stated he was given 50 mg  
  
 * JANUVIA 50 mg tablet Generic drug:  SITagliptin  
  
 levothyroxine 150 mcg tablet Commonly known as:  SYNTHROID Take 1 Tab by mouth Daily (before breakfast). lisinopril 5 mg tablet Commonly known as:  [place] Escort Take 1 Tab by mouth daily. metoprolol tartrate 25 mg tablet Commonly known as:  LOPRESSOR Take 1 Tab by mouth two (2) times a day. montelukast 10 mg tablet Commonly known as:  SINGULAIR  
TAKE ONE TABLET BY MOUTH ONCE DAILY  
  
 naloxone 4 mg/actuation nasal spray Commonly known as:  NARCAN  
4 mg by Nasal route as needed. OTHER  
EB-N3 once daily * oxyCODONE IR 10 mg Tab immediate release tablet Commonly known as:  [place] Take 10 mg by mouth three (3) times daily. * oxyCODONE ER 10 mg ER tablet Commonly known as:  OxyCONTIN Take 1 Tab by mouth every eight (8) hours. Max Daily Amount: 30 mg.  
  
 * BD ULTRA-FINE MIREYA PEN NEEDLES  
by Does Not Apply route. 4mm x 32G * PEN NEEDLE 31 gauge x 5/16\" Ndle Generic drug:  Insulin Needles (Disposable) USE ONE PEN NEEDLE FOR LANTUS FLEXPEN AND 3 PEN NEEDLES FOR APIDRA WITH EACH MEAL * Mireya Pen Needle 32 gauge x 5/32\" Ndle Generic drug:  Insulin Needles (Disposable) USE AT BEDTIME  
  
 pregabalin 150 mg capsule Commonly known as:  Reuel Hemanth TAKE 1 CAPSULE BY MOUTH THREE TIMES DAILY FOR 30 DAYS. MAXIMUM 3 CAPSULES DAILY. Start taking on:  2018  
  
 tamsulosin 0.4 mg capsule Commonly known as:  FLOMAX Take 1 Cap by mouth two (2) times a day. tiotropium 18 mcg inhalation capsule Commonly known as:  Pieter Dash Take 1 Cap by inhalation daily. * Notice: This list has 13 medication(s) that are the same as other medications prescribed for you. Read the directions carefully, and ask your doctor or other care provider to review them with you. Prescriptions Sent to Pharmacy Refills  
 fluticasone (FLONASE) 50 mcg/actuation nasal spray 0 [place] by Both Nostrils route daily. Class: Normal  
 Pharmacy: [facility] DR IRASEMA YOUSIF [address] [place] [address] Ph #: [phone] Route: Both Nostrils  
 levothyroxine (SYNTHROID) 150 mcg tablet 1 Sig: Take 1 Tab by mouth Daily (before breakfast). Class: Normal  
 Pharmacy: [facility] DR IRASEMA YOUSIF [address] [place] [address] Ph #: [phone] Route: Oral  
 metoprolol tartrate (LOPRESSOR) 25 mg tablet 1 Sig: Take 1 Tab by mouth two (2) times a day. Class: Normal  
 Pharmacy: [facility] DR IRASEMA YOUSIF [address] [place] [address] Ph #: [phone] Route: Oral  
  
We Performed the Following REFERRAL TO NEUROLOGY [[id number] [facility]] Comments: Please evaluate for ext weakness on and off. H/o lumbar radiculopathy and cervical radiculopathy. Lower ext weakness and numbness Or first available pr ovider or appt REFERRAL TO ORTHOPEDIC SURGERY [REF62 Custom] Follow-up Instructions Return in about 2 months (around 10/24/2018). To-Do List   
 08/24/2018 Imaging:  XR HIP RT W OR WO PELV 2-3 VWS Referral Information Referral ID Referred By Referred To  
  
 [id number] [facility], [address] [place] [address] [facility] NE[facility]OLOGY   
   [address] [facility] [address] Phone: [phone] Fax: [phone] Visits Status Start Date End Date 1 New Request 8/24/18 8/24/19 If your referral has a status of pending review or denied, additional information will be sent to support the outcome of this decision. Referral ID Referred By Referred To  
 [id number] [address] R Not Available Visits Status Start Date End Date 1 New Request 8/24/18 8/24/19 If your referral has a status of pending review or denied, additional information will be sent to support the outcome of this decision. Patient Instructions Hip Pain: Care Instructions Your Care Instructions Hip pain may be caused by many things, including overuse, a fall, or a twisting movement. Another cause of hip pain is arthritis. Your pain may increase when you stand up, walk, or squat. The pain may come and go or may be constant. Home treatment can help relieve hip pain, swelling, and stiffness. If your pain is ongoing, you may need more tests and treatment. Follow-up care is a key part of your treatment and safety. Be sure to make and go to all appointments, and call your doctor if you are having problems. It's also a good idea to know your test results and keep a list of the medicines you take. How can you care for yourself at home? · Take pain medicines exactly as directed. ¨ If the doctor gave you a prescription medicine for pain, take it as prescribed. ¨ If you are not taking a prescription pain medicine, ask your doctor if you can take an over-the-counter medicine. · Rest and protect your hip. Take a break from any activity, including standing or walking, that may cause pain. · Put ice or a cold pack against your hip for 10 to 20 minutes at a time. Try to do this every 1 to 2 hours for the next 3 days (when you are awake) or until the swelling goes down. Put a thin cloth between the ice and your skin. · Sleep on your healthy side with a pillow between your knees, or sleep on your back with pillows under your knees. · If there is no swelling, you can put moist heat, a heating pad, or a warm cloth on your hip. Do gentle stretching exercises to help keep your hip flexible. · Learn how to prevent falls. Have your vision and hearing checked regularly. Wear slippers or shoes with a nonskid sole. · Stay at a healthy weight. · Wear comfortable shoes. When should you call for help? Call 911 anytime you think you may need emergency care. For example, call if: 
  · You have sudden chest pain and shortness of breath, or you cough up blood.  
  · You are not able to stand or walk or bear weight.  
  · Your buttocks, legs, or feet feel numb or tingly.  
  · Your leg or foot is cool or pale or changes color.  
  · You have severe pain.  
 Call your doctor now or seek immediate medical care if: 
  · You have signs of infection, such as: 
¨ Increased pain, swelling, warmth, or redness in the hip area. ¨ Red streaks leading from the hip area. ¨ Pus draining from the hip area. ¨ A fever.  
  · You have signs of a blood clot, such as: 
¨ Pain in your calf, back of the knee, thigh, or groin. ¨ Redness and swelling in your leg or groin.  
  · You are not able to bend, straighten, or move your leg normally.  
  · You have trouble urinating or having bowel movements.  Watch closely for changes in your health, and be sure to contact your doctor if: 
  · You do not get better as expected. Where can you learn more? Go to http://salva-enrique.info/. Enter L267 in the search box to learn more about \"Hip Pain: Care Instructions. \" Current as of: November 20, 2017 Content Version: 11.7 © 0294-2570 [employer]. Care instructions adapted under license by [employer] (which disclaims liability or warranty for this information). If you have questions about a medical condition or this instruction, always ask your healthcare professional. [address] [address] any warranty or liability for your use of this information. Nutrition Tips for Diabetes: After Your Visit Your Care Instructions A healthy diet is important to manage diabetes. It helps you lose weight (if you need to) and keep it off. It gives you the nutrition and energy your body needs and helps prevent heart disease. But a diet for diabetes does not mean that you have to eat special foods. You can eat what your family eats, including occasional sweets and other favorites. But you do have to pay attention to how often you eat and how much you eat of certain foods. The right plan for you will give you meals that help you keep your blood sugar at healthy levels. Try to eat a variety of foods and to spread carbohydrate throughout the day. Carbohydrate raises blood sugar higher and more quickly than any other nutrient does. Carbohydrate is found in sugar, breads and cereals, fruit, starchy vegetables such as potatoes and corn, and milk and yogurt. You may want to work with a dietitian or diabetes educator to help you plan meals and snacks. A dietitian or diabetes educator also can help you lose weight if that is one of your goals. The following tips can help you enjoy your meals and stay healthy. Follow-up care is a key part of your treatment and safety. Be sure to make and go to all appointments, and call your doctor if you are having problems. Its also a good idea to know your test results and keep a list of the medicines you take. How can you care for yourself at home? · Learn which foods have carbohydrate and how much carbohydrate to eat. A dietitian or diabetes educator can help you learn to keep track of how much carbohydrate you eat. · Spread carbohydrate throughout the day. Eat some carbohydrate at all meals, but do not eat too much at any one time. · Plan meals to include food from all the food groups. These are the food groups and some example portion sizes: ¨ Grains: 1 slice of bread (1 ounce), ½ cup of cooked cereal, and 1/3 cup of cooked pasta or rice. These have about 15 grams of carbohydrate in a serving. Choose whole grains such as whole wheat bread or crackers, oatmeal, and brown rice more often than refined grains. ¨ Fruit: 1 small fresh fruit, such as an apple or orange; ½ of a banana; ½ cup of chopped, cooked, or canned fruit; ½ cup of fruit juice; 1 cup of melon or raspberries; and 2 tablespoons of dried fruit. These have about 15 grams of carbohydrate in a serving. ¨ Dairy: 1 cup of nonfat or low-fat milk and 2/3 cup of plain yogurt. These have about 15 grams of carbohydrate in a serving. ¨ Protein foods: Beef, chicken, turkey, fish, eggs, tofu, cheese, cottage cheese, and peanut butter. A serving size of meat is 3 ounces, which is about the size of a deck of cards. Examples of meat substitute serving sizes (equal to 1 ounce of meat) are 1/4 cup of cottage cheese, 1 egg, 1 tablespoon of peanut butter, and ½ cup of tofu. These have very little or no carbohydrate per serving. ¨ Vegetables: Starchy vegetables such as ½ cup of cooked dried beans, peas, potatoes, or corn have about 15 grams of carbohydrate.  Nonstarchy vegetables have very little carbohydrate, such as 1 cup of raw leafy vegetables (such as spinach), ½ cup of other vegetables (cooked or chopped), and 3/4 cup of vegetable juice. · Use the plate format to plan meals. It is a good, quick way to make sure that you have a balanced meal. It also helps you spread carbohydrate throughout the day. You divide your plate by types of foods. Put vegetables on half the plate, meat or meat substitutes on one-quarter of the plate, and a grain or starchy vegetable (such as brown rice or a potato) in the final quarter of the plate. To this you can add a small piece of fruit and 1 cup of milk or yogurt, depending on how much carbohydrate you are supposed to eat at a meal. 
· Talk to your dietitian or diabetes educator about ways to add limited amounts of sweets into your meal plan. You can eat these foods now and then, as long as you include the amount of carbohydrate they have in your daily carbohydrate allowance. · If you drink alcohol, limit it to no more than 1 drink a day for women and 2 drinks a day for men. If you are pregnant, no amount of alcohol is known to be safe. · Protein, fat, and fiber do not raise blood sugar as much as carbohydrate does. If you eat a lot of these nutrients in a meal, your blood sugar will rise more slowly than it would otherwise. · Limit saturated fats, such as those from meat and dairy products. Try to replace it with monounsaturated fat, such as olive oil. This is a healthier choice because people who have diabetes are at higher-than-average risk of heart disease. But use a modest amount of olive oil. A tablespoon of olive oil has 14 grams of fat and 120 calories. · Exercise lowers blood sugar. If you take insulin by shots or pump, you can use less than you would if you were not exercising. Keep in mind that timing matters.  If you exercise within 1 hour after a meal, your body may need less insulin for that meal than it would if you exercised 3 hours after the meal. Test your blood sugar to find out how exercise affects your need for insulin. · Exercise on most days of the week. Aim for at least 30 minutes. Exercise helps you stay at a healthy weight and helps your body use insulin. Walking is an easy way to get exercise. Gradually increase the amount you walk every day. You also may want to swim, bike, or do other activities. When you eat out · Learn to estimate the serving sizes of foods that have carbohydrate. If you measure food at home, it will be easier to estimate the amount in a serving of restaurant food. · If the meal you order has too much carbohydrate (such as potatoes, corn, or baked beans), ask to have a low-carbohydrate food instead. Ask for a salad or green vegetables. · If you use insulin, check your blood sugar before and after eating out to help you plan how much to eat in the future. · If you eat more carbohydrate at a meal than you had planned, take a walk or do other exercise. This will help lower your blood sugar. Where can you learn more? Go to [employer] Enter W574 in the search box to learn more about \"Nutrition Tips for Diabetes: After Your Visit. \"  
© 8817-3554 [employer]wise[employer]. Care instructions adapted under license by [address] [employer] (which disclaims liability or warranty for this information). This care instruction is for use with your licensed healthcare professional. If you have questions about a medical condition or this instruction, always ask your healthcare professional. [place] [address] any warranty or liability for your use of this information. Content Version: 91.2.950031; Current as of: June 4, 2014 Chronic Pain: Care Instructions Your Care Instructions Chronic pain is pain that lasts a long time (months or even years) and may or may not have a clear cause. It is different from acute pain, which usually does have a clear cause-like an injury or illness-and gets better over time. Chronic pain: 
· Lasts over time but may vary from day to day. · Does not go away despite efforts to end it. · May disrupt your sleep and lead to fatigue. · May cause depression or anxiety. · May make your muscles tense, causing more pain. · Can disrupt your work, hobbies, home life, and relationships with friends and family. Chronic pain is a very real condition. It is not just in your head. Treatment can help and usually includes several methods used together, such as medicines, physical therapy, exercise, and other treatments. Learning how to relax and changing negative thought patterns can also help you cope. Chronic pain is complex. Taking an active role in your treatment will help you better manage your pain. Tell your doctor if you have trouble dealing with your pain. You may have to try several things before you find what works best for you. Follow-up care is a key part of your treatment and safety. Be sure to make and go to all appointments, and call your doctor if you are having problems. It's also a good idea to know your test results and keep a list of the medicines you take. How can you care for yourself at home? · Pace yourself. Break up large jobs into smaller tasks. Save harder tasks for days when you have less pain, or go back and forth between hard tasks and easier ones. Take rest breaks. · Relax, and reduce stress. Relaxation techniques such as deep breathing or meditation can help. · Keep moving. Gentle, daily exercise can help reduce pain over the long run. Try low- or no-impact exercises such as walking, swimming, and stationary biking. Do stretches to stay flexible. · Try heat, cold packs, and massage. · Get enough sleep. Chronic pain can make you tired and drain your energy. Talk with your doctor if you have trouble sleeping because of pain. · Think positive. Your thoughts can affect your pain level. Do things that you enjoy to distract yourself when you have pain instead of focusing on the pain. See a movie, read a book, listen to music, or spend time with a friend. · If you think you are depressed, talk to your doctor about treatment. · Keep a daily pain diary. Record how your moods, thoughts, sleep patterns, activities, and medicine affect your pain. You may find that your pain is worse during or after certain activities or when you are feeling a certain emotion. Having a record of your pain can help you and your doctor find the best ways to treat your pain. · Take pain medicines exactly as directed. ¨ If the doctor gave you a prescription medicine for pain, take it as prescribed. ¨ If you are not taking a prescription pain medicine, ask your doctor if you can take an over-the-counter medicine. Reducing constipation caused by pain medicine · Include fruits, vegetables, beans, and whole grains in your diet each day. These foods are high in fiber. · Drink plenty of fluids, enough so that your urine is light yellow or clear like water. If you have kidney, heart, or liver disease and have to limit fluids, talk with your doctor before you increase the amount of fluids you drink. · If your doctor recommends it, get more exercise. Walking is a good choice. Bit by bit, increase the amount you walk every day. Try for at least 30 minutes on most days of the week. · Schedule time each day for a bowel movement. A daily routine may help. Take your time and do not strain when having a bowel movement. When should you call for help? Call your doctor now or seek immediate medical care if: 
  · Your pain gets worse or is out of control.  
  · You feel down or blue, or you do not enjoy things like you once did. You may be depressed, which is common in people with chronic pain. Depression can be treated.  
  · You have vomiting or cramps for more than 2 hours.  
 Watch closely for changes in your health, and be sure to contact your doctor if: 
  · You cannot sleep because of pain.  
  · You are very worried or anxious about your pain.  
  · You have trouble taking your pain medicine.  
  · You have any concerns about your pain medicine.  
  · You have trouble with bowel movements, such as: 
¨ No bowel movement in 3 days. ¨ Blood in the anal area, in your stool, or on the toilet paper. ¨ Diarrhea for more than 24 hours. Where can you learn more? Go to http://slava-enrique.info/. Enter N004 in the search box to learn more about \"Chronic Pain: Care Instructions. \" Current as of: October 9, 2017 Content Version: 11.7 © 9265-3495 [employer]. Care instructions adapted under license by [employer] (which disclaims liability or warranty for this information). If you have questions about a medical condition or this instruction, always ask your healthcare professional. [place] [address] any warranty or liability for your use of this information. Sciatica: Care Instructions Your Care Instructions Sciatica (say \"kcu-AV-zd-kuh\") is an irritation of one of the sciatic nerves, which come from the spinal cord in the lower back. The sciatic nerves and their branches extend down through the buttock to the foot. Sciatica can develop when an injured disc in the back presses against a spinal nerve root. Its main symptom is pain, numbness, or weakness that is often worse in the leg or foot than in the back. Sciatica often will improve and go away with time. Early treatment usually includes medicines and exercises to relieve pain. Follow-up care is a key part of your treatment and safety.  Be sure to make and go to all appointments, and call your doctor if you are having problems. It's also a good idea to know your test results and keep a list of the medicines you take. How can you care for yourself at home? · Take pain medicines exactly as directed. ¨ If the doctor gave you a prescription medicine for pain, take it as prescribed. ¨ If you are not taking a prescription pain medicine, ask your doctor if you can take an over-the-counter medicine. · Use heat or ice to relieve pain. ¨ To apply heat, put a warm water bottle, heating pad set on low, or warm cloth on your back. Do not go to sleep with a heating pad on your skin. ¨ To use ice, put ice or a cold pack on the area for 10 to 20 minutes at a time. Put a thin cloth between the ice and your skin. · Avoid sitting if possible, unless it feels better than standing. · Alternate lying down with short walks. Increase your walking distance as you are able to without making your symptoms worse. · Do not do anything that makes your symptoms worse. When should you call for help? Call 911 anytime you think you may need emergency care. For example, call if: 
  · You are unable to move a leg at all.  
[facility] your doctor now or seek immediate medical care if: 
  · You have new or worse symptoms in your legs or buttocks. Symptoms may include: ¨ Numbness or tingling. ¨ Weakness. ¨ Pain.  
  · You lose bladder or bowel control.  
 Watch closely for changes in your health, and be sure to contact your doctor if: 
  · You are not getting better as expected. Where can you learn more? Go to http://slava-enrique.info/. Enter [phone] in the search box to learn more about \"Sciatica: Care Instructions. \" Current as of: November 29, 2017 Content Version: 11.7 © 4808-7247 [employer]. Care instructions adapted under license by [employer] (which disclaims liability or warranty for this information).  If you have questions about a medical condition or this instruction, always ask your healthcare professional. [address] any warranty or liability for your use of this information. Introducing [facility]! [facility] introduces Zeel patient portal. Now you can access parts of your medical record, email your doctor's office, and request medication refills online. 1. In your internet browser, go to [email or web address] 2. Click on the First Time User? Click Here link in the Sign In box. You will see the New Member Sign Up page. 3. Enter your Zeel Access Code exactly as it appears below. You will not need to use this code after youve completed the sign-up process. If you do not sign up before the expiration date, you must request a new code. · Zeel Access Code: [email or web address] Expires: 9/3/2018 10:23 AM 
 
4. Enter the last four digits of your Social Security Number (xxxx) and Date of Birth (mm/dd/yyyy) as indicated and click Submit. You will be taken to the next sign-up page. 5. Create a Zeel ID. This will be your Zeel login ID and cannot be changed, so think of one that is secure and easy to remember. 6. Create a Zeel password. You can change your password at any time. 7. Enter your Password Reset Question and Answer. This can be used at a later time if you forget your password. 8. Enter your e-mail address. You will receive e-mail notification when new information is available in [address]. 9. Click Sign Up. You can now view and download portions of your medical record. 10. Click the Download Summary menu link to download a portable copy of your medical information. If you have questions, please visit the Frequently Asked Questions section of the Zeel website. Remember, Zeel is NOT to be used for urgent needs. For medical emergencies, dial 911. Now available from your iPhone and Android! Please provide this summary of care documentation to your next provider. Your primary care clinician is listed as Haley Flaherty. If you have any questions after today's visit, please call [phone].

## 2018-08-24 NOTE — PROGRESS NOTES
1. Have you been to the ER, urgent care clinic since your last visit? Hospitalized since your last visit? No    2. Have you seen or consulted any other health care providers outside of the [facility] since your last visit? Include any pap smears or colon screening. Dr. Candis Capone [address] [place]: last week. Endocrinology LOV: 6/2018.      Last flu vaccine 1/2017   Last dm eye exam - within last two months with Dr. Leonor Suresh

## 2018-08-24 NOTE — PROGRESS NOTES
HISTORY OF PRESENT ILLNESS  Paras Chávez is a 69 y.o. male. HPI: here for medication refill and had concern of rt lower back pain and hip discomfort getting worse. Has h/o chronic neck and back pain. H/o lumbar radiculopathy. Following pain management. Recently medication were changed and he has topped taking hydrocodone. His pain is worsen since change in pain medication. Also feeling his lower ext is getting more weak. Had one fall. Walking with support. No loss of urine or bowel control. Has chronic numbness in both lower ext due to h/o neuropathy. Feels his neuropathy pain is also getting worse since change in pain medication. H/o diabetes. Checking his fasting sugar around 130. Mild elevated compare to before. Following endocrinology and has made change in his insulin. H/o hypertension. Complaint with medication. No side effects. Today vitals stable. Visit Vitals    /60 (BP 1 Location: Left arm, BP Patient Position: Sitting)    Pulse 73    Temp 97.9 °F (36.6 °C) (Oral)    Resp 16    Ht 5' 11\" (1.803 m)    Wt 275 lb 3.2 oz (124.8 kg)    SpO2 98%    BMI 38.38 kg/m2     Review medication list, vitals, problem list,allergies.    Lab Results   Component Value Date/Time    Hemoglobin A1c 7.3 (H) 05/30/2018 08:21 AM    Hemoglobin A1c (POC) 6.9 12/16/2015 10:29 AM    Hemoglobin A1c, External 7.2 06/16/2017     Lab Results   Component Value Date/Time    Microalbumin/Creat ratio (mg/g creat) 19 05/30/2018 08:21 AM    Microalbumin,urine random 1.12 05/30/2018 08:21 AM     Lab Results   Component Value Date/Time    WBC 9.0 05/30/2018 08:21 AM    HGB 15.1 05/30/2018 08:21 AM    HCT 44.6 05/30/2018 08:21 AM    PLATELET 629 93/30/4604 08:21 AM    MCV 85.3 05/30/2018 08:21 AM     Lab Results   Component Value Date/Time    Sodium 141 05/30/2018 08:21 AM    Potassium 4.8 05/30/2018 08:21 AM    Chloride 102 05/30/2018 08:21 AM    CO2 29 05/30/2018 08:21 AM    Anion gap 10 05/30/2018 08:21 AM    Glucose 160 (H) 05/30/2018 08:21 AM    BUN 18 05/30/2018 08:21 AM    Creatinine 1.36 (H) 05/30/2018 08:21 AM    BUN/Creatinine ratio 13 05/30/2018 08:21 AM    GFR est AA >60 05/30/2018 08:21 AM    GFR est non-AA 52 (L) 05/30/2018 08:21 AM    Calcium 9.3 05/30/2018 08:21 AM    Bilirubin, total 0.3 05/30/2018 08:21 AM    AST (SGOT) 21 05/30/2018 08:21 AM    Alk. phosphatase 51 05/30/2018 08:21 AM    Protein, total 7.2 05/30/2018 08:21 AM    Albumin 4.2 05/30/2018 08:21 AM    Globulin 3.0 05/30/2018 08:21 AM    A-G Ratio 1.4 05/30/2018 08:21 AM    ALT (SGPT) 39 05/30/2018 08:21 AM     Lab Results   Component Value Date/Time    TSH 1.48 05/30/2018 08:21 AM     Lab Results   Component Value Date/Time    Cholesterol, total 156 05/30/2018 08:21 AM    HDL Cholesterol 35 (L) 05/30/2018 08:21 AM    LDL, calculated 75.2 05/30/2018 08:21 AM    VLDL, calculated 45.8 05/30/2018 08:21 AM    Triglyceride 229 (H) 05/30/2018 08:21 AM    CHOL/HDL Ratio 4.5 05/30/2018 08:21 AM     ROS: see HPI     Physical Exam   Constitutional: He is oriented to person, place, and time. No distress. Cardiovascular: Normal heart sounds. Pulmonary/Chest: No respiratory distress. He has no wheezes. Abdominal: Soft. There is no tenderness. Musculoskeletal: He exhibits no edema. Back: localized tenderness over rt lower back , paraspinal muscle area around L3-4, L4-5. Generalize tenderness over rt buttock area and inguinal area. ROM limited. Unable to perform SLR. Neurological: He is oriented to person, place, and time. Psychiatric: His behavior is normal.       ASSESSMENT and PLAN    ICD-10-CM ICD-9-CM    1. Pain of right hip joint: obtain x-ray. Also sending to ortho per his request. Discussed this could be radiculopathy from lumbar spine or sciatica. M25.551 719.45 XR HIP RT W OR WO PELV 2-3 VWS   2. Other allergic rhinitis: given medication refill. J30.89 477.8 fluticasone (FLONASE) 50 mcg/actuation nasal spray   3.  Low TSH level: given medication refill. R94.6 794.5 levothyroxine (SYNTHROID) 150 mcg tablet   4. Shakiness/ hand  R25.1 781.0 metoprolol tartrate (LOPRESSOR) 25 mg tablet   5. Tachycardia R00.0 785.0 metoprolol tartrate (LOPRESSOR) 25 mg tablet   6. Chronic bilateral low back pain with bilateral sciatica: for now under pain management. Chronic neck and back pain . Worsening of hip pain and sciatica pain. For now will obtain x-ray and sending to ortho. M54.42 724.2 REFERRAL TO NEUROLOGY    M54.41 724.3 REFERRAL TO ORTHOPEDIC SURGERY    G89.29 338.29    7. Other polyneuropathy/ due to chronic neck and back pain/ diabetes : worsening of limb numbness and weakness. No gross neurological abnormality. Will send him to neurology for further recommendatoins. G62.89 357.89 REFERRAL TO NEUROLOGY      REFERRAL TO ORTHOPEDIC SURGERY   8. Weakness of extremity R29.898 729.89 REFERRAL TO NEUROLOGY      REFERRAL TO ORTHOPEDIC SURGERY   9. Type 2 diabetes mellitus with complication, with long-term current use of insulin ([facility] [address]): elevated HBA1C. Discussed diet modification adn follow endo recommendations. E11.8 250.90     Z79.4 V58.67    10. Essential hypertension:stable at this time. Low salt diet. Exercise as tolerated. Will continue current plan. I10 401.9    Pt understood and agree with the plan   Review    Follow-up Disposition:  Return in about 2 months (around 10/24/2018).

## 2018-08-30 ENCOUNTER — HOSPITAL ENCOUNTER (OUTPATIENT)
Dept: GENERAL RADIOLOGY | Age: 68
Discharge: HOME | End: 2018-08-30
Payer: MEDICARE

## 2018-08-30 DIAGNOSIS — M25.551 PAIN OF RIGHT HIP JOINT: ICD-10-CM

## 2018-08-30 PROCEDURE — 73502 X-RAY EXAM HIP UNI 2-3 VIEWS: CPT

## 2018-09-13 ENCOUNTER — OFFICE VISIT (OUTPATIENT)
Dept: NEUROLOGY | Age: 68
End: 2018-09-13

## 2018-09-13 VITALS
DIASTOLIC BLOOD PRESSURE: 66 MMHG | HEART RATE: 79 BPM | WEIGHT: 276 LBS | BODY MASS INDEX: 38.64 KG/M2 | TEMPERATURE: 98 F | OXYGEN SATURATION: 97 % | RESPIRATION RATE: 18 BRPM | HEIGHT: 71 IN | SYSTOLIC BLOOD PRESSURE: 110 MMHG

## 2018-09-13 DIAGNOSIS — E11.42 DIABETIC POLYNEUROPATHY ASSOCIATED WITH TYPE 2 DIABETES MELLITUS (HCC): ICD-10-CM

## 2018-09-13 DIAGNOSIS — R42 DIZZINESS: ICD-10-CM

## 2018-09-13 DIAGNOSIS — M48.062 SPINAL STENOSIS OF LUMBAR REGION WITH NEUROGENIC CLAUDICATION: Primary | ICD-10-CM

## 2018-09-13 DIAGNOSIS — R20.0 ARM NUMBNESS: ICD-10-CM

## 2018-09-13 NOTE — MR AVS SNAPSHOT
[address] [facility] [address] 
 
 
 [address] [facility] [address] 
[phone] Patient: Rogelio Dalton MRN: [id number] ZNO:[id number] Visit Information Date & Time Provider Department Dept. Phone Encounter #  
 9/13/2018  1:00 PM Vaibhav Sullivan MD [address] at 777 [facility] [id number] Your Appointments 9/25/2018  1:00 PM  
EMG with EMG [address] at [address] [facility] [address]) Appt Note: EMG YULIA upper ext 1212 [facility] [address] [facility] [address] [facility] [address] [facility] [address]  
  
    
 10/5/2018  8:30 AM  
Follow Up with Vaibhav Sullivan MD  
[facility] [address] at [address] [facility] [address]) Appt Note: after emg and MRI  
 [address] [facility] 129 [address] [facility] 630 [facility] [address] [address] [address] [address]  
  
    
 10/24/2018 10:00 AM  
FOLLOW UP EXAM with David Karimi MD  
[facility] ([address]) Appt Note: 2 month f/u  
 [address] [place] [address] 1101 [facility] [address] [facility]  
  
    
 11/19/2018  9:20 AM  
Follow Up with PAUL Garcia [address] for Pain Management (REJI SCHEDULING) Appt Note: Return in about 3 months (around 11/20/2018). [address] [address]  
[phone] [address]  
  
    
 2/18/2019  9:20 AM  
Follow Up with Luigi Shaffer MD  
[facility] ([address]) Appt Note: 1 year f/u kmb [place] [address] [address] [address]  
[phone] [address]  
  
    
 3/7/2019  9:00 AM  
Office Visit with Patrice Sandhoff, MD  
[facility] [address]) Appt Note: check up [address] Dustin BERNARDINO 2520 [place] [address]  
[phone] [address] 600 Sonja Sanchez [address] Upcoming Health Maintenance Date Due  
 EYE EXAM RETINAL OR DILATED Q1 7/11/2018 Influenza Age 5 to Adult 8/1/2018 HEMOGLOBIN A1C Q6M 11/30/2018 FOOT EXAM Q1 1/26/2019 MICROALBUMIN Q1 5/30/2019 LIPID PANEL Q1 5/30/2019 MEDICARE YEARLY EXAM 6/6/2019 GLAUCOMA SCREENING Q2Y 7/11/2019 COLONOSCOPY 4/3/2023 DTaP/Tdap/Td series (2 - Td) 5/16/2026 Allergies as of 9/13/2018  Review Complete On: 9/13/2018 By: Karoline Acharya LPN Severity Noted Reaction Type Reactions Ciprofloxacin High 11/16/2013    Anaphylaxis Other Plant, Animal, Environmental High 03/10/2016    Other (comments) Patient cannot have MRI. Patient states that he has metal screws in his left arm. Lamisil [Terbinafine]  10/18/2013    Swelling Tongue swelling Metformin  03/16/2016    Other (comments) Elevated cr 1.4 Morphine  10/18/2013    Other (comments) \"loss of blood pressure\" Other Medication  10/18/2013    Other (comments) Doxasylin causes extreme GERD Pcn [Penicillins]  10/18/2013    Rash Pentothal [Thiopental Sodium]  10/18/2013    Shortness of Breath Sulfa (Sulfonamide Antibiotics)  10/18/2013    Rash Current Immunizations  Reviewed on 7/20/2016 Name Date Influenza Vaccine 11/3/2014 Influenza Vaccine (Quad) PF 1/25/2017, 9/16/2015 Pneumococcal Conjugate (PCV-13) 7/20/2016 Pneumococcal Polysaccharide (PPSV-23) 3/30/2017 Tdap 5/16/2016 Zoster Vaccine, Live 6/15/2017 Not reviewed this visit You Were Diagnosed With   
  
 Codes Comments Spinal stenosis of lumbar region with neurogenic claudication    -  Primary ICD-10-CM: G26.489 
ICD-9-CM: 724.03 Diabetic polyneuropathy associated with type 2 diabetes mellitus ([facility]     ICD-10-CM: E11.42 
ICD-9-CM: 250.60, 357.2 Arm numbness     ICD-10-CM: R20.0 ICD-9-CM: 782.0 Dizziness     ICD-10-CM: R26 ICD-9-CM: 780.4 Vitals BP Pulse Temp Resp Height(growth percentile) Weight(growth percentile) 110/66 (BP 1 Location: Left arm, BP Patient Position: Sitting) 79 98 °F (36.7 °C) (Oral) 18 5' 11\" (1.803 m) 276 lb (125.2 kg) SpO2 BMI Smoking Status 97% 38.49 kg/m2 Former Smoker Vitals History BMI and BSA Data Body Mass Index Body Surface Area  
 38.49 kg/m 2 2.5 m 2 Preferred Pharmacy Pharmacy Name Phone [address], Xin Myers 17 [facility] 22 [phone] [facility] [phone] Your Updated Medication List  
  
   
This list is accurate as of 9/13/18  2:07 PM.  Always use your most recent med list.  
  
  
  
  
 ACCU-CHEK JAZZY PLUS TEST STRP strip Generic drug:  glucose blood VI test strips [address] Generic drug:  Lancets ACULAR 0.5 % ophthalmic solution Generic drug:  ketorolac Administer 1 Drop to both eyes two (2) times a day. albuterol 2.5 mg /3 mL (0.083 %) nebulizer solution Commonly known as:  PROVENTIL VENTOLIN  
3 mL by Nebulization route every four (4) hours as needed for Wheezing or Shortness of Breath. atorvastatin 20 mg tablet Commonly known as:  LIPITOR  
TAKE ONE TABLET BY MOUTH ONCE DAILY  
  
 betamethasone dipropionate 0.05 % ointment Commonly known as:  DIPROLENE  
  
 budesonide-formoterol 160-4.5 mcg/actuation Hfaa Commonly known as:  SYMBICORT Take 2 Puffs by inhalation two (2) times a day. butalbital-acetaminophen-caffeine -40 mg per tablet Commonly known as:  Junior Caroli Take 0.5 Tabs by mouth daily as needed for Pain. DULoxetine 60 mg capsule Commonly known as:  CYMBALTA Take 60 mg by mouth two (2) times a day. esomeprazole 40 mg capsule Commonly known as:  Loletta Jono Take 40 mg by mouth two (2) times a day. fenofibrate nanocrystallized 145 mg tablet Commonly known as:  TRICOR  
TAKE ONE TABLET BY MOUTH ONCE DAILY  
  
 fluticasone 50 mcg/actuation nasal spray Commonly known as:  FLONASE  
1 Adrian by Both Nostrils route daily. * HYDROcodone-acetaminophen 5-325 mg per tablet Commonly known as:  Juanetta Rasp Take 1 Tab by mouth daily as needed for Pain. * HYDROcodone-acetaminophen 5-325 mg per tablet Commonly known as:  Juanetta Rasp Take 1 Tab by mouth two (2) times daily as needed for Pain for up to 30 days. Max Daily Amount: 2 Tabs. * HYDROcodone-acetaminophen 5-325 mg per tablet Commonly known as:  Juanetta Rasp Take 1 Tab by mouth two (2) times daily as needed for Pain for up to 30 days. Max Daily Amount: 2 Tabs. Start taking on:  9/19/2018  
  
 * HYDROcodone-acetaminophen 5-325 mg per tablet Commonly known as:  Juanetta Rasp Take 1 Tab by mouth two (2) times daily as needed for Pain for up to 30 days. Max Daily Amount: 2 Tabs. No more than #50 per month Start taking on:  10/18/2018  
  
 insulin glargine 100 unit/mL (3 mL) Inpn Commonly known as:  BASAGLAR KWIKPEN U-100 INSULIN  
52 units at bedtime * APIDRA SOLOSTAR U-100 INSULIN 100 unit/mL pen Generic drug:  insulin glulisine U-100  
by SubCUTAneous route. * insulin glulisine U-100 100 unit/mL pen Commonly known as:  APIDRA SOLOSTAR U-100 INSULIN  
2 units per carb consumed. Max 30 / day * JANUVIA 100 mg tablet Generic drug:  SITagliptin Take 50 mg by mouth daily. Indications: patient stated he was given 50 mg  
  
 * JANUVIA 50 mg tablet Generic drug:  SITagliptin  
  
 levothyroxine 150 mcg tablet Commonly known as:  SYNTHROID Take 1 Tab by mouth Daily (before breakfast). lisinopril 5 mg tablet Commonly known as:  Michaela Serena Take 1 Tab by mouth daily. metoprolol tartrate 25 mg tablet Commonly known as:  LOPRESSOR Take 1 Tab by mouth two (2) times a day. montelukast 10 mg tablet Commonly known as:  SINGULAIR  
TAKE ONE TABLET BY MOUTH ONCE DAILY  
  
 naloxone 4 mg/actuation nasal spray Commonly known as:  NARCAN  
4 mg by Nasal route as needed. OTHER  
EB-N3 once daily * oxyCODONE IR 10 mg Tab immediate release tablet Commonly known as:  Kassandra Fang Take 10 mg by mouth three (3) times daily. * oxyCODONE ER 10 mg ER tablet Commonly known as:  OxyCONTIN Take 1 Tab by mouth every eight (8) hours. Max Daily Amount: 30 mg.  
  
 * BD ULTRA-FINE MIREYA PEN NEEDLES  
by Does Not Apply route. 4mm x 32G * PEN NEEDLE 31 gauge x 5/16\" Ndle Generic drug:  Insulin Needles (Disposable) USE ONE PEN NEEDLE FOR LANTUS FLEXPEN AND 3 PEN NEEDLES FOR APIDRA WITH EACH MEAL * Mireya Pen Needle 32 gauge x 5/32\" Ndle Generic drug:  Insulin Needles (Disposable) USE AT BEDTIME  
  
 pregabalin 150 mg capsule Commonly known as:  Lear Basilio TAKE 1 CAPSULE BY MOUTH THREE TIMES DAILY FOR 30 DAYS. MAXIMUM 3 CAPSULES DAILY. tamsulosin 0.4 mg capsule Commonly known as:  FLOMAX Take 1 Cap by mouth two (2) times a day. tiotropium 18 mcg inhalation capsule Commonly known as:  Bevier Alyssa Take 1 Cap by inhalation daily. * Notice: This list has 13 medication(s) that are the same as other medications prescribed for you. Read the directions carefully, and ask your doctor or other care provider to review them with you. To-Do List   
 09/13/2018 Neurology:  EMG TWO EXTREMITIES UPPER   
  
 09/13/2018 Imaging:  MRI LUMB SPINE WO CONT   
  
 09/24/2018 3:45 PM  
  Appointment with [facility] MRI  1 at [address] [place] [address] ([phone]) GENERAL INSTRUCTIONS  Bring information (ID card) if you have any medically implanted devices. You will be required to lie still while the procedure is being performed. Remove any jewelry (including body piercing, hairpins) prior to MRI.   If you have had a creatinine level drawn within the past 30 days, please bring most recent results to your appt. Bring any films, CD's, and reports related to your study with you on the day of your exam.  This only includes studies done outside of [address] [place] [address], Melba Gonzalez, and Maryellen Gifford. Bring a complete list of all medications you are currently taking to include prescriptions, over-the-counter meds, herbals, vitamins & any dietary supplements. If you were given medications for claustrophobia or anxiety, please arrange to have someone drive you to your appointment. QUESTIONS  Notify the MRI Department if you have any questions concerning your study. Milansusannah Levin Johana [phone]  
  
 09/24/2018 5:15 PM  
  Appointment with [facility] MRI  1 at [address] [facility] ([phone]) GENERAL INSTRUCTIONS  Bring information (ID card) if you have any medically implanted devices. You will be required to lie still while the procedure is being performed. Remove any jewelry (including body piercing, hairpins) prior to MRI. If you have had a creatinine level drawn within the past 30 days, please bring most recent results to your appt. Bring any films, CD's, and reports related to your study with you on the day of your exam.  This only includes studies done outside of [address] [place] [address], Melba Gonzalez, and Maryellen Gifford. Bring a complete list of all medications you are currently taking to include prescriptions, over-the-counter meds, herbals, vitamins & any dietary supplements. If you were given medications for claustrophobia or anxiety, please arrange to have someone drive you to your appointment. QUESTIONS  Notify the MRI Department if you have any questions concerning your study. Melba Gonzalez - [phone] [facility] - [facility] Maryellen Gifford - [phone] [facility]! Raven Howard introduces Optimus patient portal. Now you can access parts of your medical record, email your doctor's office, and request medication refills online. 1. In your internet browser, go to [email or web address]. [email or web address] 2. Click on the First Time User? Click Here link in the Sign In box. You will see the New Member Sign Up page. 3. Enter your Optimus Access Code exactly as it appears below. You will not need to use this code after youve completed the sign-up process. If you do not sign up before the expiration date, you must request a new code. · Optimus Access Code: [email or web address] Expires: 12/3/2018  4:05 PM 
 
4. Enter the last four digits of your Social Security Number (xxxx) and Date of Birth (mm/dd/yyyy) as indicated and click Submit. You will be taken to the next sign-up page. 5. Create a Optimus ID. This will be your Optimus login ID and cannot be changed, so think of one that is secure and easy to remember. 6. Create a Optimus password. You can change your password at any time. 7. Enter your Password Reset Question and Answer. This can be used at a later time if you forget your password. 8. Enter your e-mail address. You will receive e-mail notification when new information is available in [address] 9. Click Sign Up. You can now view and download portions of your medical record. 10. Click the Download Summary menu link to download a portable copy of your medical information. If you have questions, please visit the Frequently Asked Questions section of the Optimus website. Remember, Optimus is NOT to be used for urgent needs. For medical emergencies, dial 911. Now available from your iPhone and Android! Please provide this summary of care documentation to your next provider. Your primary care clinician is listed as Vanessa Lu. If you have any questions after today's visit, please call [phone].

## 2018-09-13 NOTE — PROGRESS NOTES
Inge Dockery is a 69 y.o. male new patient referred by Dr. Kendra Mehta to discuss  Intermittent extremity weakness. Learning assessment previously completed 5/22/2018; primary language is [place].

## 2018-09-13 NOTE — PROGRESS NOTES
HPI: 55-year-old male referred by Dr. González Guillen for evaluation of leg weakness. He comes with his wife and his son-in-law. He has had problems with his lower back ever since he had a fracture back in 1978. He has had a history of 4 back surgeries, initially 36, then had a fusion in 1984, reportedly he reinjured it in [address] and he needed another fusion in 2000, and 2001 he had to have some hardware taken out because of complications from the 0576 fusion. After this experience he decided he did not want to have any further back surgery. He has dealt for years therefore with chronic back pain. He has had some problems with balance for a while, but this past July he fell and he noticed the day of the fall that he had a pain that extended from the right paraspinal muscle areas in the lower back along the lateral aspect of the right leg and into the foot. It extended to the throat only that day, but since then he has had a pain that is mostly centered in the right hip and gluteal area which extends down the lateral aspect of the right lower extremity into the knee. He finds himself at times walking in his legs tend to give away on him and he has a tendency to fall. It can happen with either leg. He does not have to be standing up or walking for a long period of time for this to happen. He has bilateral numbness in the feet and legs. Over the last several months he has also noticed numbness in the right fourth and fifth digits as well as the medial aspect of the palm, as well as numbness in the whole left hand that extends up his arm into the shoulder and neck. He denies any neck pain. He has a pending MRI of the pelvis this coming Tuesday. He has had a diagnosis of diabetes for 10 years. He reportedly has been checked for sleep apnea and he did not have it. He has episodes where he may become tremulous and dizzy.   Denies episodes of hemiparesis or visual changes that come acutely. He has had episodes of dizziness on and off for a number of years, reportedly has had past workup for this which has been unrevealing. Denies any episodes of loss of consciousness, or convulsions. Social History Social History  Marital status:  Spouse name: N/A  
 Number of children: N/A  
 Years of education: N/A Occupational History  Retired-[employer]   
 
Social History Main Topics  Smoking status: Former Smoker Packs/day: 1.00 Years: 31.00 Types: Pipe, Cigars Quit date: 1/1/1995  Smokeless tobacco: Never Used  Alcohol use No  
 Drug use: No  
 Sexual activity: Yes  
  Partners: Female Other Topics Concern  Not on file Social History Narrative Family History Problem Relation Age of Onset  Cancer Mother Bone and Brain Cancer  Diabetes Mother  Liver Disease Father Lung Cancer  Kidney Disease Sister  Diabetes Daughter  Colon Cancer Brother Current Outpatient Prescriptions Medication Sig Dispense Refill  JANUVIA 50 mg tablet  fluticasone (FLONASE) 50 mcg/actuation nasal spray 1 [place] by Both Nostrils route daily. 1 Bottle 0  
 levothyroxine (SYNTHROID) 150 mcg tablet Take 1 Tab by mouth Daily (before breakfast). 90 Tab 1  
 metoprolol tartrate (LOPRESSOR) 25 mg tablet Take 1 Tab by mouth two (2) times a day. 180 Tab 1  DULoxetine (CYMBALTA) 60 mg capsule Take 60 mg by mouth two (2) times a day.  insulin glulisine U-100 (APIDRA SOLOSTAR U-100 INSULIN) 100 unit/mL pen by SubCUTAneous route.  HYDROcodone-acetaminophen (NORCO) 5-325 mg per tablet Take 1 Tab by mouth two (2) times daily as needed for Pain for up to 30 days. Max Daily Amount: 2 Tabs. 60 Tab 0  
 [START ON 9/19/2018] HYDROcodone-acetaminophen (NORCO) 5-325 mg per tablet Take 1 Tab by mouth two (2) times daily as needed for Pain for up to 30 days. Max Daily Amount: 2 Tabs.  60 Tab 0  
  [START ON 10/18/2018] HYDROcodone-acetaminophen (NORCO) 5-325 mg per tablet Take 1 Tab by mouth two (2) times daily as needed for Pain for up to 30 days. Max Daily Amount: 2 Tabs. No more than #50 per month 50 Tab 0  pregabalin (LYRICA) 150 mg capsule TAKE 1 CAPSULE BY MOUTH THREE TIMES DAILY FOR 30 DAYS. MAXIMUM 3 CAPSULES DAILY. 90 Cap 5  
 montelukast (SINGULAIR) 10 mg tablet TAKE ONE TABLET BY MOUTH ONCE DAILY 90 Tab 1  
 atorvastatin (LIPITOR) 20 mg tablet TAKE ONE TABLET BY MOUTH ONCE DAILY 90 Tab 1  
 lisinopril (PRINIVIL, ZESTRIL) 5 mg tablet Take 1 Tab by mouth daily. 90 Tab 1  
 fenofibrate nanocrystallized (TRICOR) 145 mg tablet TAKE ONE TABLET BY MOUTH ONCE DAILY 90 Tab 1  
 FANI PEN NEEDLE 32 gauge x 5/32\" ndle USE AT BEDTIME 100 Pen Needle 6  JANUVIA 100 mg tablet Take 50 mg by mouth daily. Indications: patient stated he was given 50 mg    
 naloxone 4 mg/actuation spry 4 mg by Nasal route as needed. 1 Box 1  ACCU-CHEK JAZZY PLUS TEST STRP strip  ACCU-CHEK SOFTCLIX LANCETS misc  albuterol (PROVENTIL VENTOLIN) 2.5 mg /3 mL (0.083 %) nebulizer solution 3 mL by Nebulization route every four (4) hours as needed for Wheezing or Shortness of Breath. 1 Package 5  
 PEN NEEDLE, DIABETIC (BD ULTRA-FINE FANI PEN NEEDLES) by Does Not Apply route. 4mm x 32G  insulin glulisine (APIDRA SOLOSTAR) 100 unit/mL pen 2 units per carb consumed. Max 30 / day (Patient taking differently: 2 units per carb consumed. Max 30 / day  Indications: patient states taking 15-20 units three times a day) 5 Pen 3  
 insulin glargine (BASAGLAR KWIKPEN) 100 unit/mL (3 mL) pen 52 units at bedtime (Patient taking differently: 25 Units. 52 units at bedtime) 3 Pen 3  
 PEN NEEDLE 31 gauge x 5/16\" ndle USE ONE PEN NEEDLE FOR LANTUS FLEXPEN AND 3 PEN NEEDLES FOR APIDRA WITH EACH MEAL 1 Package 3  
 esomeprazole (NEXIUM) 40 mg capsule Take 40 mg by mouth two (2) times a day.  betamethasone dipropionate (DIPROLENE) 0.05 % ointment  butalbital-acetaminophen-caffeine (FIORICET, ESGIC) -40 mg per tablet Take 0.5 Tabs by mouth daily as needed for Pain. 10 Tab 0  
 tamsulosin (FLOMAX) 0.4 mg capsule Take 1 Cap by mouth two (2) times a day. 180 Cap 3  
 HYDROcodone-acetaminophen (NORCO) 5-325 mg per tablet Take 1 Tab by mouth daily as needed for Pain.  budesonide-formoterol (SYMBICORT) 160-4.5 mcg/actuation HFA inhaler Take 2 Puffs by inhalation two (2) times a day. 1 Inhaler 5  
 tiotropium (SPIRIVA) 18 mcg inhalation capsule Take 1 Cap by inhalation daily. 30 Cap 5  
 ketorolac (ACULAR) 0.5 % ophthalmic solution Administer 1 Drop to both eyes two (2) times a day.  OTHER EB-N3 once daily  oxyCODONE ER (OXYCONTIN) 10 mg ER tablet Take 1 Tab by mouth every eight (8) hours. Max Daily Amount: 30 mg. 90 Tab 0  
 oxyCODONE IR (ROXICODONE) 10 mg tab immediate release tablet Take 10 mg by mouth three (3) times daily. Past Medical History:  
Diagnosis Date  Arthritis  Asthma  Cancer ([facility] [address] BASAL  Cardiac catheterization 2009 [address] [facility]:  No significant CAD.  Cardiac echocardiogram 01/20/2014 EF 50-55%. No WMA. Gr 1 DDfx. RVSP 25-30 mmHg. Mild TR.  DM type 2 (diabetes mellitus, type 2) ([facility] [address]  Enlarged prostate  Failed back syndrome  GERD (gastroesophageal reflux disease)  Hearing loss, bilateral   
 Hearing Aids  Hypertension  Hypothyroidism  Insomnia  Low serum testosterone level  Neuropathy  Osteoarthritis of multiple joints  S/P colonoscopy 8/14/13  
 mild diverticulosis in sigmoid colon w/o evidence of diverticulitis; f/u 5 years  SOB (shortness of breath)   
 chronic; 2' \"lung fever\"  Vertigo Past Surgical History:  
Procedure Laterality Date  COLONOSCOPY N/A 4/3/2018 COLONOSCOPY performed by Bart Aguilar MD at [facility] ENDOSCOPY  HX BACK SURGERY  2000  
 removal of defective hardware [place]  
 to remove bone fragments  HX LUMBAR FUSION  1999  
 fusion from L4-S1 and implantation of hardware  HX LUMBAR FUSION  1984  
 fusion on L5 area  HX ORTHOPAEDIC Bilateral 2004  
 trigger finger syndrome-all 4 fingers  HX ORTHOPAEDIC Left 2004  
 left arm torn muscle repair and placement of 2 screws  HX OTHER SURGICAL    
 skin cancer removal  
 
 
Allergies Allergen Reactions  Ciprofloxacin Anaphylaxis  Other Plant, Animal, Environmental Other (comments) Patient cannot have MRI. Patient states that he has metal screws in his left arm.  Lamisil [Terbinafine] Swelling Tongue swelling  Metformin Other (comments) Elevated cr 1.4  Morphine Other (comments) \"loss of blood pressure\"  Other Medication Other (comments) Doxasylin causes extreme GERD  Pcn [Penicillins] Rash  Pentothal [Thiopental Sodium] Shortness of Breath  Sulfa (Sulfonamide Antibiotics) Rash Patient Active Problem List  
Diagnosis Code  Chronic bilateral low back pain with bilateral sciatica M54.42, M54.41, G89.29  
 COPD (chronic obstructive pulmonary disease) ([facility]) J44.9  Migraine headache G43.909  GERD (gastroesophageal reflux disease) K21.9  Hyperlipidemia E78.5  Hypothyroidism E03.9  BPH (benign prostatic hyperplasia) N40.0  Hypotestosteronism E34.9  Vertigo R42  Encounter for long-term (current) use of other medications Z79.899  Chronic pain syndrome G89.4  DJD (degenerative joint disease), lumbar M47.816  H/O lumbosacral spine surgery Z98.890  
 DDD (degenerative disc disease), lumbar M51.36  
 Peripheral neuropathy G62.9  Asthma with COPD (chronic obstructive pulmonary disease) ([facility] [address] J44.9  Essential hypertension I10  Vitamin B12 deficiency E53.8  Chronic chest pain R07.9, G89.29  
 Advance directive in chart/ no change per patient. Z78.9  H/O colonoscopy/ due in 2018 Z98.890  Chronic low back pain M54.5, G89.29  Type 2 diabetes mellitus with complication, with long-term current use of insulin ([facility]) E11.8, Z79.4  Type 2 diabetes with nephropathy ([facility]) E11.21  
 Type 2 diabetes mellitus with diabetic neuropathy ([facility]) E11.40  Severe obesity (BMI 35.0-39.9) ([facility]) E66.01 Review of Systems:  
Constitutional: no fever or chills Skin denies rash or itching HEENT:  Denies tinnitus, hearing loss, or visual changes Respiratory: denies shortness of breath Cardiovascular: denies chest pain, dyspnea on exertion Gastrointestinal: does not report nausea or vomiting Genitourinary: does not report dysuria or incontinence Musculoskeletal: does not report joint pain or swelling Endocrine: denies weight change Hematology: denies easy bruising or bleeding Neurological: as above in HPI PHYSICAL EXAMINATION:   
 
VITAL SIGNS:   
Visit Vitals  /66 (BP 1 Location: Left arm, BP Patient Position: Sitting)  Pulse 79  Temp 98 °F (36.7 °C) (Oral)  Resp 18  Ht 5' 11\" (1.803 m)  Wt 125.2 kg (276 lb)  SpO2 97%  BMI 38.49 kg/m2 GENERAL: Well developed, obese, in no apparent distress. HEART: RR, no murmurs heard, no carotid bruits EXTREMITIES: No clubbing, cyanosis, or edema is identified. Pulses 2+    and symmetrical. 
HEAD:   Normocephalic, atraumatic. NEUROLOGIC EXAMINATION 
 
MENTAL STATUS: Awake, alert, and oriented x 4. Attention and STM are grossly normal. There is no aphasia. Fund of knowledge is adequate. Mood and affect are appropriate CRANIAL NERVES: Visual fields are full to confrontation. Unable to see fundi at bedside. Pupils are reactive to light and accommodation. Extraocular movements are intact and there is no nystagmus. Facial sensation is normal 
Face is symmetrical.  
Hearing is reduced significantly bilaterally SCM/TPZ 5/5 
 Palate rises symmetrically. Tongue is in the midline. MOTOR:  Right hip flexors and quads are 4/out of 5, they are 5- out of 5 on the left. Tibialis anterior effort is 4+ out of 5 at least bilaterally, and there is good for dorsiflexion bilaterally. Bilateral upper extremity strength is normal and symmetric. CEREBELLAR: No tremors or dysmetria SENSORY: Decreased pinprick and vibratory sense up to ankles bilaterally, normal distal toe propioception. Romberg negative, positive right wrist and medial elbow Tinel signs DTR's: Trace ankle and knee jerks, no long tract signs GAIT:   Slow and wide-based gait, symmetric, unable to tandem gait. Impression: He has history of chronic and significant lumbosacral degenerative disease and he is status post 4 back surgeries, so my first suspicion is that he has had progression of his lumbar stenosis. However he has multiple other issues going on, including evidence of a diabetic neuropathy, a recent fall with issues going on on the right iliotibial band and right hip that are affecting him, and there is a question of an upper lumbosacral radiculopathy versus plexopathy given the prolonged weakness of the right hip flexors and quads. Regarding his upper extremity symptoms, he has signs on the right hand of a carpal tunnel syndrome that the distribution of numbness suggests he may have an ulnar neuropathy. On the left upper extremity to complicating matters he has symptoms of numbness extending up to the neck that may raise concern for cervical radiculopathy. Plan: 1 will add MRI of the lumbar spine to his upcoming MRI of the pelvis. 2 EMG and nerve conduction studies of the upper extremities after his MRI.  
3depending on the findings on the MRI of the lumbar spine and how he is doing clinically, and how he would like to proceed, as there is a possibility that we may find evidence of lumbar stenosis significant enough to consider surgery, will consider electrodiagnostic testing of the lower extremities at the right time. PLEASE NOTE:  
Portions of this document may have been produced using voice recognition software. Unrecognized errors in transcription may be present. This note will not be viewable in [address]

## 2018-09-24 ENCOUNTER — HOSPITAL ENCOUNTER (OUTPATIENT)
Age: 68
Discharge: HOME | End: 2018-09-24
Attending: ORTHOPAEDIC SURGERY
Payer: MEDICARE

## 2018-09-24 ENCOUNTER — HOSPITAL ENCOUNTER (OUTPATIENT)
Age: 68
Discharge: HOME | End: 2018-09-24
Attending: PSYCHIATRY & NEUROLOGY
Payer: MEDICARE

## 2018-09-24 DIAGNOSIS — M48.062 SPINAL STENOSIS OF LUMBAR REGION WITH NEUROGENIC CLAUDICATION: ICD-10-CM

## 2018-09-24 DIAGNOSIS — S76.011A STRAIN OF GLUTEUS MEDIUS, RIGHT, INITIAL ENCOUNTER: ICD-10-CM

## 2018-09-24 PROCEDURE — 72195 MRI PELVIS W/O DYE: CPT

## 2018-09-24 PROCEDURE — 72148 MRI LUMBAR SPINE W/O DYE: CPT

## 2018-09-25 ENCOUNTER — OFFICE VISIT (OUTPATIENT)
Dept: NEUROLOGY | Age: 68
End: 2018-09-25

## 2018-09-25 VITALS
RESPIRATION RATE: 20 BRPM | HEIGHT: 71 IN | SYSTOLIC BLOOD PRESSURE: 126 MMHG | OXYGEN SATURATION: 96 % | WEIGHT: 270 LBS | BODY MASS INDEX: 37.8 KG/M2 | HEART RATE: 70 BPM | DIASTOLIC BLOOD PRESSURE: 72 MMHG | TEMPERATURE: 98.3 F

## 2018-09-25 DIAGNOSIS — G56.21 ULNAR NEUROPATHY OF RIGHT UPPER EXTREMITY: ICD-10-CM

## 2018-09-25 DIAGNOSIS — M48.062 SPINAL STENOSIS OF LUMBAR REGION WITH NEUROGENIC CLAUDICATION: Primary | ICD-10-CM

## 2018-09-25 DIAGNOSIS — G56.03 CARPAL TUNNEL SYNDROME ON BOTH SIDES: ICD-10-CM

## 2018-09-25 DIAGNOSIS — E11.42 DIABETIC POLYNEUROPATHY ASSOCIATED WITH TYPE 2 DIABETES MELLITUS (HCC): ICD-10-CM

## 2018-09-25 NOTE — PROGRESS NOTES
9/25/2018 2:17 PM    SSN: [id number]    Subjective:   57-year-old male coming for follow-up of history of chronic and severe lumbar stenosis, diabetic neuropathy, and bilateral arm numbness and pain. He continues to have numbness in the right fourth and fifth digit that extends medially to the forearm. He is having more sporadic pain in the left upper extremity from the neck all the way down to the left hand. He has numbness of both hands at night. He continues to have bilateral numbness in his feet, back pain, leg sometimes giving away after walking a certain distance. Nerve conduction studies of the upper extremities today showed a probable developing right ulnar neuropathy at the right elbow and severe left worse than right median neuropathies at the wrist, superimposed on a diabetic polyneuropathy. There was no evidence of a cervical radiculopathy. He had his lumbar spine MRI yesterday. I have reviewed this and discussed it with patient. He has severe degenerative joint disease with broad-based disc bulges at T12-L1, L1-L2, L2-L3 with decompression from the L3-4 through the S1 levels. There is moderate to severe lumbar stenosis particularly at the L2-3 level.     Social History     Social History    Marital status:      Spouse name: N/A    Number of children: N/A    Years of education: N/A     Occupational History    Retired-[employer]      Social History Main Topics    Smoking status: Former Smoker     Packs/day: 1.00     Years: 31.00     Types: Pipe, Cigars     Quit date: 1/1/1995    Smokeless tobacco: Never Used    Alcohol use No    Drug use: No    Sexual activity: Yes     Partners: Female     Other Topics Concern    Not on file     Social History Narrative       Family History   Problem Relation Age of Onset    Cancer Mother      Bone and Brain Cancer    Diabetes Mother     Liver Disease Father      Lung Cancer    Kidney Disease Sister     Diabetes Daughter     Colon Cancer Brother        Current Outpatient Prescriptions   Medication Sig Dispense Refill    JANUVIA 50 mg tablet       fluticasone (FLONASE) 50 mcg/actuation nasal spray 1 [place] by Both Nostrils route daily. 1 Bottle 0    levothyroxine (SYNTHROID) 150 mcg tablet Take 1 Tab by mouth Daily (before breakfast). 90 Tab 1    metoprolol tartrate (LOPRESSOR) 25 mg tablet Take 1 Tab by mouth two (2) times a day. 180 Tab 1    DULoxetine (CYMBALTA) 60 mg capsule Take 60 mg by mouth two (2) times a day.  pregabalin (LYRICA) 150 mg capsule TAKE 1 CAPSULE BY MOUTH THREE TIMES DAILY FOR 30 DAYS. MAXIMUM 3 CAPSULES DAILY. 90 Cap 5    montelukast (SINGULAIR) 10 mg tablet TAKE ONE TABLET BY MOUTH ONCE DAILY 90 Tab 1    atorvastatin (LIPITOR) 20 mg tablet TAKE ONE TABLET BY MOUTH ONCE DAILY 90 Tab 1    OTHER EB-N3 once daily      lisinopril (PRINIVIL, ZESTRIL) 5 mg tablet Take 1 Tab by mouth daily. 90 Tab 1    fenofibrate nanocrystallized (TRICOR) 145 mg tablet TAKE ONE TABLET BY MOUTH ONCE DAILY 90 Tab 1    insulin glulisine (APIDRA SOLOSTAR) 100 unit/mL pen 2 units per carb consumed. Max 30 / day (Patient taking differently: 2 units per carb consumed. Max 30 / day  Indications: patient states taking 15-20 units three times a day) 5 Pen 3    insulin glargine (BASAGLAR KWIKPEN) 100 unit/mL (3 mL) pen 52 units at bedtime (Patient taking differently: 25 Units. 52 units at bedtime) 3 Pen 3    esomeprazole (NEXIUM) 40 mg capsule Take 40 mg by mouth two (2) times a day.  betamethasone dipropionate (DIPROLENE) 0.05 % ointment       tamsulosin (FLOMAX) 0.4 mg capsule Take 1 Cap by mouth two (2) times a day. 180 Cap 3    tiotropium (SPIRIVA) 18 mcg inhalation capsule Take 1 Cap by inhalation daily. 30 Cap 5    ketorolac (ACULAR) 0.5 % ophthalmic solution Administer 1 Drop to both eyes two (2) times a day.       insulin glulisine U-100 (APIDRA SOLOSTAR U-100 INSULIN) 100 unit/mL pen by SubCUTAneous route.  HYDROcodone-acetaminophen (NORCO) 5-325 mg per tablet Take 1 Tab by mouth two (2) times daily as needed for Pain for up to 30 days. Max Daily Amount: 2 Tabs. 60 Tab 0    [START ON 10/18/2018] HYDROcodone-acetaminophen (NORCO) 5-325 mg per tablet Take 1 Tab by mouth two (2) times daily as needed for Pain for up to 30 days. Max Daily Amount: 2 Tabs. No more than #50 per month 50 Tab 0    oxyCODONE ER (OXYCONTIN) 10 mg ER tablet Take 1 Tab by mouth every eight (8) hours. Max Daily Amount: 30 mg. 90 Tab 0    oxyCODONE IR (ROXICODONE) 10 mg tab immediate release tablet Take 10 mg by mouth three (3) times daily.  FANI PEN NEEDLE 32 gauge x 5/32\" ndle USE AT BEDTIME 100 Pen Needle 6    JANUVIA 100 mg tablet Take 50 mg by mouth daily. Indications: patient stated he was given 50 mg      naloxone 4 mg/actuation spry 4 mg by Nasal route as needed. 1 Box 1    ACCU-CHEK JAZZY PLUS TEST STRP strip       ACCU-CHEK SOFTCLIX LANCETS misc       albuterol (PROVENTIL VENTOLIN) 2.5 mg /3 mL (0.083 %) nebulizer solution 3 mL by Nebulization route every four (4) hours as needed for Wheezing or Shortness of Breath. 1 Package 5    PEN NEEDLE, DIABETIC (BD ULTRA-FINE FANI PEN NEEDLES) by Does Not Apply route. 4mm x 32G      PEN NEEDLE 31 gauge x 5/16\" ndle USE ONE PEN NEEDLE FOR LANTUS FLEXPEN AND 3 PEN NEEDLES FOR APIDRA WITH EACH MEAL 1 Package 3    butalbital-acetaminophen-caffeine (FIORICET, ESGIC) -40 mg per tablet Take 0.5 Tabs by mouth daily as needed for Pain. 10 Tab 0    HYDROcodone-acetaminophen (NORCO) 5-325 mg per tablet Take 1 Tab by mouth daily as needed for Pain.  budesonide-formoterol (SYMBICORT) 160-4.5 mcg/actuation HFA inhaler Take 2 Puffs by inhalation two (2) times a day. 1 Inhaler 5       Past Medical History:   Diagnosis Date    Arthritis     Asthma     Cancer ([facility] [address]     BASAL     Cardiac catheterization 2009    [address]:  No significant CAD.       Cardiac echocardiogram 01/20/2014    EF 50-55%. No WMA. Gr 1 DDfx. RVSP 25-30 mmHg. Mild TR.  DM type 2 (diabetes mellitus, type 2) ([facility])     Enlarged prostate     Failed back syndrome     GERD (gastroesophageal reflux disease)     Hearing loss, bilateral     Hearing Aids    Hypertension     Hypothyroidism     Insomnia     Low serum testosterone level     Neuropathy     Osteoarthritis of multiple joints     S/P colonoscopy 8/14/13    mild diverticulosis in sigmoid colon w/o evidence of diverticulitis; f/u 5 years    SOB (shortness of breath)     chronic; 2' \"lung fever\"    Vertigo        Past Surgical History:   Procedure Laterality Date    COLONOSCOPY N/A 4/3/2018    COLON[facility] C[facility] Dan [facility] at [facility] HX BACK SURGERY  2000    removal of defective hardware    [address]    to remove bone fragments    HX LUMBAR FUSION  1999    fusion from L4-S1 and implantation of hardware    HX LUMBAR FUSION  1984    fusion on L5 area    HX ORTHOPAEDIC Bilateral 2004    trigger finger syndrome-all 4 fingers    HX ORTHOPAEDIC Left 2004    left arm torn muscle repair and placement of 2 screws    HX OTHER SURGICAL      skin cancer removal       Allergies   Allergen Reactions    Ciprofloxacin Anaphylaxis    Other Plant, Animal, Environmental Other (comments)     Patient cannot have MRI. Patient states that he has metal screws in his left arm.     Lamisil [Terbinafine] Swelling     Tongue swelling    Metformin Other (comments)     Elevated cr 1.4    Morphine Other (comments)     \"loss of blood pressure\"    Other Medication Other (comments)     Doxasylin causes extreme GERD    Pcn [Penicillins] Rash    Pentothal [Thiopental Sodium] Shortness of Breath    Sulfa (Sulfonamide Antibiotics) Rash       Vital signs:    Visit Vitals    /72 (BP 1 Location: Left arm, BP Patient Position: Sitting)    Pulse 70    Temp 98.3 °F (36.8 °C) (Oral)    Resp 20    Ht 5' 11\" (1.803 m)    Wt 122.5 kg (270 lb)    SpO2 96%    BMI 37.66 kg/m2       Review of Systems:   GENERAL: Denies fever, reports fatigue   CARDIAC: No CP or SOB  PULMONARY: No cough of SOB  MUSCULOSKELETAL: Reports back pain  NEURO: SEE HPI      EXAM: Alert, in NAD. Heart is regular. Oriented x3, EOM's are full, PERRL, no facial asymmetries. Strength and tone are normal. DTR's absent, decreased distal pinprick in stocking distribution. Walks slowly and symmetrically with a cane. Assessment/Plan: He has progressive lumbar stenosis and has had 4 back surgeries already. I discussed the possibility of further interventions for his moderate to severe L2-3 stenosis. He does not wish to pursue surgical consultations at this time and we will observe it. Regarding his upper extremities, EMG did not corroborate a significant right ulnar neuropathy, he may have an early and evolving one. He also has bilateral carpal tunnel syndrome at least electrically, although this is not correlating well with his symptomatology. He does not want to pursue any surgical evaluations for these issues at this point and he will continue using the wrist braces. We agreed he will come to see me in 6 months and we will reevaluate where he is with these issues to consider repeat electrodiagnostic testing and consider potential surgical interventions. PLEASE NOTE:   Portions of this document may have been produced using voice recognition software. Unrecognized errors in transcription may be present. This note will not be viewable in [address]

## 2018-09-25 NOTE — MR AVS SNAPSHOT
[address] [facility] [address] 
 
 
 [address] 200 [facility] 
[phone] Patient: Evans Mooney MRN: [id number] Visit Information Date & Time Provider Department Dept. Phone Encounter #  
 9/25/2018  1:00 PM EMG [address] at 777 [facility] [id number] Follow-up Instructions Return in about 6 months (around 3/25/2019). Your Appointments 10/5/2018  8:30 AM  
Follow Up with Yudy Cabrera MD  
[facility] [address] at [address] [facility] [address] [place] [address]) Appt Note: after emg and MRI  
 [address] [facility] [address] [facility] 630 [facility] [address] [place] [address]  
  
    
 10/24/2018 10:00 AM  
FOLLOW UP EXAM with Abigail Merchant MD  
[facility] ([address]) Appt Note: 2 month f/u  
 [address] [facility] 250 [facility] 1101 [facility] [address] [facility]  
  
    
 11/19/2018  9:20 AM  
Follow Up with PAUL Landry [facility] [address] for Pain Management (REJI SCHEDULING) Appt Note: Return in about 3 months (around 11/20/2018). 30 [facility] [address]  
[phone] [address] [facility]  
  
    
 2/18/2019  9:20 AM  
Follow Up with Bard Lucie MD  
[facility] 1 ([address]) Appt Note: 1 year f/u [place] [id number]  
[phone] [address]  
  
    
 3/7/2019  9:00 AM  
Office Visit with Nesha Ramos MD  
[facility] [address] [place] [address]) Appt Note: check up [address] [address]  
[phone]  [address] [phone]  
  
    
 3/29/2019 11:00 AM  
Follow Up with Yudy Cabrera MD  
 [employer] at [address] [facility]) Appt Note: 6 month fu  
 [address] 200 [facility]  
[phone] Upcoming Health Maintenance Date Due Shingrix Vaccine Age 50> (1 of 2) 11/1/1998 EYE EXAM RETINAL OR DILATED Q1 7/11/2018 Influenza Age 5 to Adult 8/1/2018 HEMOGLOBIN A1C Q6M 11/30/2018 FOOT EXAM Q1 1/26/2019 MICROALBUMIN Q1 5/30/2019 LIPID PANEL Q1 5/30/2019 MEDICARE YEARLY EXAM 6/6/2019 GLAUCOMA SCREENING Q2Y 7/11/2019 COLONOSCOPY 4/3/2023 DTaP/Tdap/Td series (2 - Td) 5/16/2026 Allergies as of 9/25/2018  Review Complete On: 9/25/2018 By: Jorje Weinberg LPN Severity Noted Reaction Type Reactions Ciprofloxacin High 11/16/2013    Anaphylaxis Other Plant, Animal, Environmental High 03/10/2016    Other (comments) Patient cannot have MRI. Patient states that he has metal screws in his left arm. Lamisil [Terbinafine]  10/18/2013    Swelling Tongue swelling Metformin  03/16/2016    Other (comments) Elevated cr 1.4 Morphine  10/18/2013    Other (comments) \"loss of blood pressure\" Other Medication  10/18/2013    Other (comments) Doxasylin causes extreme GERD Pcn [Penicillins]  10/18/2013    Rash Pentothal [Thiopental Sodium]  10/18/2013    Shortness of Breath Sulfa (Sulfonamide Antibiotics)  10/18/2013    Rash Current Immunizations  Reviewed on 7/20/2016 Name Date Influenza Vaccine 11/3/2014 Influenza Vaccine (Quad) PF 1/25/2017, 9/16/2015 Pneumococcal Conjugate (PCV-13) 7/20/2016 Pneumococcal Polysaccharide (PPSV-23) 3/30/2017 Tdap 5/16/2016 Zoster Vaccine, Live 6/15/2017 Not reviewed this visit You Were Diagnosed With   
  
 Codes Comments Spinal stenosis of lumbar region with neurogenic claudication    -  Primary ICD-10-CM: G21.193 
ICD-9-CM: 724.03  Diabetic polyneuropathy associated with type 2 diabetes mellitus ([address] ICD-10-CM: E11.42 
ICD-9-CM: 250.60, 357.2 Carpal tunnel syndrome on both sides     ICD-10-CM: G56.03 
ICD-9-CM: 354.0 Ulnar neuropathy of right upper extremity     ICD-10-CM: G56.21 ICD-9-CM: 244. 2 Vitals BP Pulse Temp Resp Height(growth percentile) Weight(growth percentile) 126/72 (BP 1 Location: Left arm, BP Patient Position: Sitting) 70 98.3 °F (36.8 °C) (Oral) 20 5' 11\" (1.803 m) 270 lb (122.5 kg) SpO2 BMI Smoking Status 96% 37.66 kg/m2 Former Smoker Vitals History BMI and BSA Data Body Mass Index Body Surface Area  
 37.66 kg/m 2 2.48 m 2 Preferred Pharmacy Pharmacy Name Phone [address] [place] [address] 22 [phone] [facility] [phone] Your Updated Medication List  
  
   
This list is accurate as of 9/25/18  2:13 PM.  Always use your most recent med list.  
  
  
  
  
 ACCU-CHEK JAZZY PLUS TEST STRP strip Generic drug:  glucose blood VI test strips [address] Generic drug:  Lancets ACULAR 0.5 % ophthalmic solution Generic drug:  ketorolac Administer 1 Drop to both eyes two (2) times a day. albuterol 2.5 mg /3 mL (0.083 %) nebulizer solution Commonly known as:  PROVENTIL VENTOLIN  
3 mL by Nebulization route every four (4) hours as needed for Wheezing or Shortness of Breath. atorvastatin 20 mg tablet Commonly known as:  LIPITOR  
TAKE ONE TABLET BY MOUTH ONCE DAILY  
  
 betamethasone dipropionate 0.05 % ointment Commonly known as:  DIPROLENE  
  
 budesonide-formoterol 160-4.5 mcg/actuation Hfaa Commonly known as:  SYMBICORT Take 2 Puffs by inhalation two (2) times a day. butalbital-acetaminophen-caffeine -40 mg per tablet Commonly known as:  Junior Caroli Take 0.5 Tabs by mouth daily as needed for Pain. DULoxetine 60 mg capsule Commonly known as:  CYMBALTA Take 60 mg by mouth two (2) times a day. esomeprazole 40 mg capsule Commonly known as:  Maggie Legato Take 40 mg by mouth two (2) times a day. fenofibrate nanocrystallized 145 mg tablet Commonly known as:  TRICOR  
TAKE ONE TABLET BY MOUTH ONCE DAILY  
  
 fluticasone 50 mcg/actuation nasal spray Commonly known as:  FLONASE  
1 [place] by Both Nostrils route daily. * HYDROcodone-acetaminophen 5-325 mg per tablet Commonly known as:  Loli Petri Take 1 Tab by mouth daily as needed for Pain. * HYDROcodone-acetaminophen 5-325 mg per tablet Commonly known as:  Loli Petri Take 1 Tab by mouth two (2) times daily as needed for Pain for up to 30 days. Max Daily Amount: 2 Tabs. * HYDROcodone-acetaminophen 5-325 mg per tablet Commonly known as:  Loli Petri Take 1 Tab by mouth two (2) times daily as needed for Pain for up to 30 days. Max Daily Amount: 2 Tabs. No more than #50 per month Start taking on:  10/18/2018  
  
 insulin glargine 100 unit/mL (3 mL) Inpn Commonly known as:  BASAGLAR KWIKPEN U-100 INSULIN  
52 units at bedtime * APIDRA SOLOSTAR U-100 INSULIN 100 unit/mL pen Generic drug:  insulin glulisine U-100  
by SubCUTAneous route. * insulin glulisine U-100 100 unit/mL pen Commonly known as:  APIDRA SOLOSTAR U-100 INSULIN  
2 units per carb consumed. Max 30 / day * JANUVIA 100 mg tablet Generic drug:  SITagliptin Take 50 mg by mouth daily. Indications: patient stated he was given 50 mg  
  
 * JANUVIA 50 mg tablet Generic drug:  SITagliptin  
  
 levothyroxine 150 mcg tablet Commonly known as:  SYNTHROID Take 1 Tab by mouth Daily (before breakfast). lisinopril 5 mg tablet Commonly known as:  Gage Karen Take 1 Tab by mouth daily. metoprolol tartrate 25 mg tablet Commonly known as:  LOPRESSOR Take 1 Tab by mouth two (2) times a day. montelukast 10 mg tablet Commonly known as:  SINGULAIR  
TAKE ONE TABLET BY MOUTH ONCE DAILY  
  
 naloxone 4 mg/actuation nasal spray Commonly known as:  NARCAN  
4 mg by Nasal route as needed. OTHER  
EB-N3 once daily * oxyCODONE IR 10 mg Tab immediate release tablet Commonly known as:  Elridge Ditto Take 10 mg by mouth three (3) times daily. * oxyCODONE ER 10 mg ER tablet Commonly known as:  OxyCONTIN Take 1 Tab by mouth every eight (8) hours. Max Daily Amount: 30 mg.  
  
 * BD ULTRA-FINE FANI PEN NEEDLES  
by Does Not Apply route. 4mm x 32G * PEN NEEDLE 31 gauge x 5/16\" Ndle Generic drug:  Insulin Needles (Disposable) USE ONE PEN NEEDLE FOR LANTUS FLEXPEN AND 3 PEN NEEDLES FOR APIDRA WITH EACH MEAL * Fani Pen Needle 32 gauge x 5/32\" Ndle Generic drug:  Insulin Needles (Disposable) USE AT BEDTIME  
  
 pregabalin 150 mg capsule Commonly known as:  Dorothyann Kobs TAKE 1 CAPSULE BY MOUTH THREE TIMES DAILY FOR 30 DAYS. MAXIMUM 3 CAPSULES DAILY. tamsulosin 0.4 mg capsule Commonly known as:  FLOMAX Take 1 Cap by mouth two (2) times a day. tiotropium 18 mcg inhalation capsule Commonly known as:  Dot Standing Take 1 Cap by inhalation daily. * Notice: This list has 12 medication(s) that are the same as other medications prescribed for you. Read the directions carefully, and ask your doctor or other care provider to review them with you. Follow-up Instructions Return in about 6 months (around 3/25/2019). Introducing [facility]! Jaye Vela introduces [employer] patient portal. Now you can access parts of your medical record, email your doctor's office, and request medication refills online. 1. In your internet browser, go to [email or web address] 2. Click on the First Time User? Click Here link in the Sign In box. You will see the New Member Sign Up page. 3. Enter your [employer] Access Code exactly as it appears below. You will not need to use this code after youve completed the sign-up process.  If you do not sign up before the expiration date, you must request a new code. · [employer] Access Code: [email or web address] Expires: 12/3/2018  4:05 PM 
 
4. Enter the last four digits of your Social Security Number (xxxx) and Date of Birth (mm/dd/yyyy) as indicated and click Submit. You will be taken to the next sign-up page. 5. Create a [employer] ID. This will be your [employer] login ID and cannot be changed, so think of one that is secure and easy to remember. 6. Create a [employer] password. You can change your password at any time. 7. Enter your Password Reset Question and Answer. This can be used at a later time if you forget your password. 8. Enter your e-mail address. You will receive e-mail notification when new information is available in [address] 9. Click Sign Up. You can now view and download portions of your medical record. 10. Click the Download Summary menu link to download a portable copy of your medical information. If you have questions, please visit the Frequently Asked Questions section of the [employer] website. Remember, [employer] is NOT to be used for urgent needs. For medical emergencies, dial 911. Now available from your iPhone and Android! Please provide this summary of care documentation to your next provider. Your primary care clinician is listed as Janae Canas. If you have any questions after today's visit, please call [phone].

## 2018-09-25 NOTE — PROGRESS NOTES
[facility]  [address] [place] [address] [phone]    Neurophysiology Report    Patient: Salvador Reece     ID: [id number] Physician: Vianey Sousa MD   Gender: Male Ref Phys: Vianey Sousa MD   Handedness: Malgorzata Linn     Study Date: September 25, 2018         Patient History:  Right 4th and 5th digit numbness and sporadic left upper extremity pain and numbness.       Nerve Conduction Studies  Anti Sensory Summary Table     Stim Site NR Peak (ms) Norm Peak (ms) O-P Amp (µV) Norm O-P Amp Dist (cm) Aaron (m/s)   Left Median 2nd Digit Anti Sensory (2nd Digit)   Wrist    5.5 <3.5 11.6 >20 13.0 32.5   Right Median 2nd Digit Anti Sensory (2nd Digit)   Wrist    4.3 <3.5 15.0 >20 13.0 40.6   Left Ulnar Anti Sensory (5th Digit )   Wrist    3.4 <3.1 21.7 >17.0 11.0 57.9   Right Ulnar Anti Sensory (5th Digit )   Wrist    3.3 <3.1 16.6 >17.0 11.0 42.3     Motor Summary Table     Stim Site NR Onset (ms) Norm Onset (ms) O-P Amp (mV) Norm O-P Amp Dist (cm) Aaron (m/s) Norm Aaron (m/s)   Left Median Motor (Abd Poll Brev)   Wrist    5.2 <4.4 4.8 >4.0 26.0 51.0 >49   Elbow    10.3  4.1       Right Median Motor (Abd Poll Brev)   Wrist    4.5 <4.4 12.0 >4.0 26.0 52.0 >49   Elbow    9.5  6.7       Left Ulnar Motor (Abd Dig Minimi )   Wrist    3.2 <3.3 7.9 >6.0 21.0 53.8 >49   B Elbow    7.1  6.8  13.0 54.2 >50   A Elbow    9.5  6.9       Right Ulnar Motor (Abd Dig Minimi )   Wrist    2.9 <3.3 7.7 >6.0 23.0 56.1 >49   B Elbow    7.0  6.3  15.0 50.0 >50   A Elbow    10.0  6.2               EMG     Side Muscle Nerve Root Ins Act Fibs Psw Fasc Amp Dur Poly Recrt Int Pearle Distad Comment   Right Deltoid Axillary C5-6 Nml Nml Nml None Nml Nml 0 Nml Nml    Right Biceps Musculocut C5-6 Nml Nml Nml None Nml Nml 0 Nml Nml    Right Triceps Radial C6-7-8 Nml Nml Nml None Nml Nml 0 Nml Nml    Right FlexCarpiUln Ulnar C8,T1 Nml Nml Nml None Nml Nml 0 Nml Nml    Right 1stDorInt Ulnar C8-T1 Nml Nml Nml None Nml Nml 0 Nml Nml    Right Abd Poll Brev Median C8-T1 Nml Nml Nml None Nml Nml 0 Nml Nml    Left Deltoid Axillary C5-6 Nml Nml Nml None Nml Nml 0 Nml Nml    Left Biceps Musculocut C5-6 Nml Nml Nml None Nml Nml 0 Nml Nml    Left Triceps Radial C6-7-8 Nml Nml Nml None Nml Nml 0 Nml Nml    Left PronatorTeres Median C6-7 Nml Nml Nml None Nml Nml 0 Nml Nml    Left 1stDorInt Ulnar C8-T1 Nml Nml Nml None Nml Nml 0 Nml Nml    Left Abd Poll Brev Median C8-T1 Nml Nml Nml None Nml Nml 0 Nml Nml      NCS/EMG FINDINGS:     Evaluation of the Left median motor and the Right median motor nerves showed prolonged distal onset latency (L5.2, R4.5 ms).  The Left ulnar motor and the Right ulnar motor nerves were unremarkable.  The Left Median 2nd Digit sensory and the Right Median 2nd Digit sensory nerves showed prolonged distal peak latency (L5.5, R4.3 ms), reduced amplitude (L11.6, R15.0 µV), and decreased conduction velocity (Wrist-2nd Digit, L32.5, R40.6 m/s).  The Left ulnar sensory nerve showed prolonged distal peak latency (3.4 ms).  The Right ulnar sensory nerve showed prolonged distal peak latency (3.3 ms), reduced amplitude (16.6 µV), and decreased conduction velocity (Wrist-5th Digit, 42.3 m/s). INTERPRETATION:   1-Probable early right ulnar neuropathy across the elbow not confirmed by EMG. 2-Severe left and mild to moderate right median neuropathy at the wrists. 3-Above findings coupled with ulnar sensory distal latency prolongation bilaterally is consistent with an underlying polyneuropathy.        ___________________________  Jennifer Hampton MD          Waveforms: no

## 2018-09-25 NOTE — PROGRESS NOTES
Cici Montez is a 69 y.o. male in today for EMG BUE. Learning assessment previously completed 5/22/2018; primary language is [place].

## 2018-10-03 ENCOUNTER — TELEPHONE (OUTPATIENT)
Dept: PAIN MANAGEMENT | Age: 68
End: 2018-10-03

## 2018-10-03 NOTE — TELEPHONE ENCOUNTER
Andres Garcia has called requesting a refill of their controlled medication, hydrocodone, for the management of back pain. Last office visit date: 8/20/18 with Deanna Bunch, next 11/13/18 with Viktoriya Fret    Date last  was pulled and reviewed : 10/3/18 last filled 8/22/18    Was the patient compliant when the above report was pulled? yes    Analgesia: 60%    Aberrancies: none    ADL's: yes    Adverse Reaction: no    Provider's last note and plan of care reviewed? yes  Request forwarded to provider for review. prescriptions pre-printed by provider.

## 2018-10-03 NOTE — TELEPHONE ENCOUNTER
Attempted to contact patient regarding prescription pick-up ; no answer noted at number given; vm left to contact triage line.

## 2018-10-03 NOTE — TELEPHONE ENCOUNTER
Medicine request called in [id number]  2:27pm    1. Verified  2. Medicine - norco  3.  [phone]  4. Analgesia - 60%  5. ADL - yes  6.   Adverse reaction - no

## 2018-10-04 ENCOUNTER — DOCUMENTATION ONLY (OUTPATIENT)
Dept: PAIN MANAGEMENT | Age: 68
End: 2018-10-04

## 2018-10-04 NOTE — TELEPHONE ENCOUNTER
Patient was called informed that his script was ready for pickup. The patient verbalized understanding of the phone call. He also states that he had an MRI done through [employer] and would like to [facility] to look at it.

## 2018-11-13 ENCOUNTER — OFFICE VISIT (OUTPATIENT)
Dept: PAIN MANAGEMENT | Age: 68
End: 2018-11-13

## 2018-11-13 ENCOUNTER — OFFICE VISIT (OUTPATIENT)
Dept: FAMILY MEDICINE CLINIC | Age: 68
End: 2018-11-13

## 2018-11-13 VITALS
HEIGHT: 71 IN | TEMPERATURE: 98.1 F | RESPIRATION RATE: 14 BRPM | WEIGHT: 270 LBS | HEART RATE: 89 BPM | DIASTOLIC BLOOD PRESSURE: 80 MMHG | SYSTOLIC BLOOD PRESSURE: 134 MMHG | BODY MASS INDEX: 37.8 KG/M2

## 2018-11-13 VITALS
HEART RATE: 83 BPM | HEIGHT: 71 IN | WEIGHT: 270 LBS | SYSTOLIC BLOOD PRESSURE: 130 MMHG | TEMPERATURE: 98.6 F | RESPIRATION RATE: 16 BRPM | OXYGEN SATURATION: 96 % | DIASTOLIC BLOOD PRESSURE: 72 MMHG | BODY MASS INDEX: 37.8 KG/M2

## 2018-11-13 DIAGNOSIS — R20.0 NUMBNESS AND TINGLING: ICD-10-CM

## 2018-11-13 DIAGNOSIS — G89.29 CHRONIC BILATERAL LOW BACK PAIN WITH BILATERAL SCIATICA: Primary | ICD-10-CM

## 2018-11-13 DIAGNOSIS — M25.551 PAIN OF RIGHT HIP JOINT: ICD-10-CM

## 2018-11-13 DIAGNOSIS — R20.2 NUMBNESS AND TINGLING: ICD-10-CM

## 2018-11-13 DIAGNOSIS — R79.89 LOW TSH LEVEL: ICD-10-CM

## 2018-11-13 DIAGNOSIS — M54.42 CHRONIC BILATERAL LOW BACK PAIN WITH BILATERAL SCIATICA: Primary | ICD-10-CM

## 2018-11-13 DIAGNOSIS — M54.41 CHRONIC BILATERAL LOW BACK PAIN WITH BILATERAL SCIATICA: Primary | ICD-10-CM

## 2018-11-13 DIAGNOSIS — J30.89 OTHER ALLERGIC RHINITIS: ICD-10-CM

## 2018-11-13 DIAGNOSIS — G89.29 OTHER CHRONIC PAIN: Primary | ICD-10-CM

## 2018-11-13 DIAGNOSIS — E11.8 TYPE 2 DIABETES MELLITUS WITH COMPLICATION, WITH LONG-TERM CURRENT USE OF INSULIN (HCC): ICD-10-CM

## 2018-11-13 DIAGNOSIS — E78.1 HYPERTRIGLYCERIDEMIA: ICD-10-CM

## 2018-11-13 DIAGNOSIS — Z23 ENCOUNTER FOR IMMUNIZATION: ICD-10-CM

## 2018-11-13 DIAGNOSIS — M51.37 DEGENERATION OF LUMBAR OR LUMBOSACRAL INTERVERTEBRAL DISC: ICD-10-CM

## 2018-11-13 DIAGNOSIS — R00.0 TACHYCARDIA: ICD-10-CM

## 2018-11-13 DIAGNOSIS — R25.1 SHAKINESS: ICD-10-CM

## 2018-11-13 DIAGNOSIS — Z79.899 ENCOUNTER FOR LONG-TERM (CURRENT) USE OF MEDICATIONS: ICD-10-CM

## 2018-11-13 DIAGNOSIS — G96.198: ICD-10-CM

## 2018-11-13 DIAGNOSIS — J44.9 ASTHMA WITH COPD (CHRONIC OBSTRUCTIVE PULMONARY DISEASE) (HCC): ICD-10-CM

## 2018-11-13 DIAGNOSIS — M54.17 LUMBOSACRAL NEURITIS: ICD-10-CM

## 2018-11-13 DIAGNOSIS — Z79.4 TYPE 2 DIABETES MELLITUS WITH COMPLICATION, WITH LONG-TERM CURRENT USE OF INSULIN (HCC): ICD-10-CM

## 2018-11-13 DIAGNOSIS — I10 ESSENTIAL HYPERTENSION: ICD-10-CM

## 2018-11-13 RX ORDER — MONTELUKAST SODIUM 10 MG/1
10 TABLET ORAL DAILY
Qty: 90 TAB | Refills: 1 | Status: SHIPPED | OUTPATIENT
Start: 2018-11-13 | End: 2019-07-30 | Stop reason: SDUPTHER

## 2018-11-13 RX ORDER — HYDROCODONE BITARTRATE AND ACETAMINOPHEN 5; 325 MG/1; MG/1
1 TABLET ORAL
Qty: 50 TAB | Refills: 0 | Status: SHIPPED | OUTPATIENT
Start: 2018-11-13 | End: 2018-12-13

## 2018-11-13 RX ORDER — LISINOPRIL 5 MG/1
5 TABLET ORAL DAILY
Qty: 90 TAB | Refills: 1 | Status: SHIPPED | OUTPATIENT
Start: 2018-11-13 | End: 2019-08-01 | Stop reason: SINTOL

## 2018-11-13 RX ORDER — FLUTICASONE PROPIONATE 50 MCG
1 SPRAY, SUSPENSION (ML) NASAL DAILY
Qty: 1 BOTTLE | Refills: 0 | Status: SHIPPED | OUTPATIENT
Start: 2018-11-13 | End: 2019-01-11 | Stop reason: SDUPTHER

## 2018-11-13 RX ORDER — LEVOTHYROXINE SODIUM 150 UG/1
150 TABLET ORAL
Qty: 90 TAB | Refills: 1 | Status: SHIPPED | OUTPATIENT
Start: 2018-11-13 | End: 2019-05-30 | Stop reason: SDUPTHER

## 2018-11-13 RX ORDER — SENNOSIDES 8.6 MG/1
1 TABLET ORAL
Qty: 30 TAB | Refills: 2 | Status: SHIPPED | OUTPATIENT
Start: 2018-11-13 | End: 2019-05-08

## 2018-11-13 RX ORDER — METOPROLOL TARTRATE 25 MG/1
25 TABLET, FILM COATED ORAL 2 TIMES DAILY
Qty: 180 TAB | Refills: 1 | Status: SHIPPED | OUTPATIENT
Start: 2018-11-13 | End: 2020-02-11

## 2018-11-13 RX ORDER — FENOFIBRATE 145 MG/1
145 TABLET, COATED ORAL DAILY
Qty: 90 TAB | Refills: 1 | Status: SHIPPED | OUTPATIENT
Start: 2018-11-13 | End: 2019-05-08

## 2018-11-13 RX ORDER — DOCUSATE SODIUM 100 MG/1
100 CAPSULE, LIQUID FILLED ORAL
Qty: 60 CAP | Refills: 2 | Status: SHIPPED | OUTPATIENT
Start: 2018-11-13 | End: 2019-02-11

## 2018-11-13 NOTE — PROGRESS NOTES
Patient presents for High Dose flu vaccine. Consent obtained, Tolerated procedure well at Right deltoid. Patient remained in office 10 minutes post vaccine.  No side effects noted    Lot# [id number]  Exp: 05/31/2019  Aureliano KruegerMaritza Opałangelo 47: [id number]

## 2018-11-13 NOTE — PROGRESS NOTES
1. Have you been to the ER, urgent care clinic since your last visit? Hospitalized since your last visit? No    2. Have you seen or consulted any other health care providers outside of the [address] [place] [address] since your last visit? Include any pap smears or colon screening. Dr. Tanna Jimenez: 9/2018 for right hip pain. Last dm eye exam- 6/2018 Dr. Martha Jimenez  Last flu vaccine - 1/25/17; requests today.

## 2018-11-13 NOTE — PROGRESS NOTES
Social History Socioeconomic History  Marital status:  Spouse name: Not on file  Number of children: Not on file  Years of education: Not on file  Highest education level: Not on file Social Needs  Financial resource strain: Not on file  Food insecurity - worry: Not on file  Food insecurity - inability: Not on file  Transportation needs - medical: Not on file  Transportation needs - non-medical: Not on file Occupational History  Occupation: Retired-[employer]  
Tobacco Use  Smoking status: Former Smoker Packs/day: 1.00 Years: 31.00 Pack years: 31.00 Types: Pipe, Cigars Last attempt to quit: 1995 Years since quittin.8  Smokeless tobacco: Never Used Substance and Sexual Activity  Alcohol use: No  
 Drug use: No  
 Sexual activity: Yes  
  Partners: Female Other Topics Concern  Not on file Social History Narrative  Not on file Family History Problem Relation Age of Onset  Cancer Mother Bone and Brain Cancer  Diabetes Mother  Liver Disease Father Lung Cancer  Kidney Disease Sister  Diabetes Daughter  Colon Cancer Brother Allergies Allergen Reactions  Ciprofloxacin Anaphylaxis  Other Plant, Animal, Environmental Other (comments) Patient cannot have MRI. Patient states that he has metal screws in his left arm.  Lamisil [Terbinafine] Swelling Tongue swelling  Metformin Other (comments) Elevated cr 1.4  Morphine Other (comments) \"loss of blood pressure\"  Other Medication Other (comments) Doxasylin causes extreme GERD  Pcn [Penicillins] Rash  Pentothal [Thiopental Sodium] Shortness of Breath  Sulfa (Sulfonamide Antibiotics) Rash Past Medical History:  
Diagnosis Date  Arthritis  Asthma  Cancer ([facility] [address] BASAL  Cardiac catheterization  [address]:  No significant CAD.  Cardiac echocardiogram 01/20/2014 EF 50-55%. No WMA. Gr 1 DDfx. RVSP 25-30 mmHg. Mild TR.  DM type 2 (diabetes mellitus, type 2) ([address]  Enlarged prostate  Failed back syndrome  GERD (gastroesophageal reflux disease)  Hearing loss, bilateral   
 Hearing Aids  Hypertension  Hypothyroidism  Insomnia  Low serum testosterone level  Neuropathy  Osteoarthritis of multiple joints  S/P colonoscopy 8/14/13  
 mild diverticulosis in sigmoid colon w/o evidence of diverticulitis; f/u 5 years  SOB (shortness of breath)   
 chronic; 2' \"lung fever\"  Vertigo Past Surgical History:  
Procedure Laterality Date  HX BACK SURGERY  2000  
 removal of defective hardware [place]  
 to remove bone fragments  HX LUMBAR FUSION  1999  
 fusion from L4-S1 and implantation of hardware  HX LUMBAR FUSION  1984  
 fusion on L5 area  HX ORTHOPAEDIC Bilateral 2004  
 trigger finger syndrome-all 4 fingers  HX ORTHOPAEDIC Left 2004  
 left arm torn muscle repair and placement of 2 screws  HX OTHER SURGICAL    
 skin cancer removal  
 
Current Outpatient Medications on File Prior to Visit Medication Sig  JANUVIA 50 mg tablet Take 50 mg by mouth daily.  DULoxetine (CYMBALTA) 60 mg capsule Take 60 mg by mouth two (2) times a day.  insulin glulisine U-100 (APIDRA SOLOSTAR U-100 INSULIN) 100 unit/mL pen by SubCUTAneous route.  pregabalin (LYRICA) 150 mg capsule TAKE 1 CAPSULE BY MOUTH THREE TIMES DAILY FOR 30 DAYS. MAXIMUM 3 CAPSULES DAILY.  atorvastatin (LIPITOR) 20 mg tablet TAKE ONE TABLET BY MOUTH ONCE DAILY  OTHER EB-N3 once daily  FANI PEN NEEDLE 32 gauge x 5/32\" ndle USE AT BEDTIME  JANUVIA 100 mg tablet Take 50 mg by mouth daily. Indications: patient stated he was given 50 mg  ACCU-CHEK JAZZY PLUS TEST STRP strip  ACCU-CHEK SOFTCLIX LANCETS mis  albuterol (PROVENTIL VENTOLIN) 2.5 mg /3 mL (0.083 %) nebulizer solution 3 mL by Nebulization route every four (4) hours as needed for Wheezing or Shortness of Breath.  PEN NEEDLE, DIABETIC (BD ULTRA-FINE FANI PEN NEEDLES) by Does Not Apply route. 4mm x 32G  insulin glulisine (APIDRA SOLOSTAR) 100 unit/mL pen 2 units per carb consumed. Max 30 / day (Patient taking differently: 2 units per carb consumed. Max 30 / day  Indications: patient states taking 15-20 units three times a day)  insulin glargine (BASAGLAR KWIKPEN) 100 unit/mL (3 mL) pen 52 units at bedtime (Patient taking differently: 25 Units. 52 units at bedtime)  PEN NEEDLE 31 gauge x 5/16\" ndle USE ONE PEN NEEDLE FOR LANTUS FLEXPEN AND 3 PEN NEEDLES FOR APIDRA WITH EACH MEAL  esomeprazole (NEXIUM) 40 mg capsule Take 40 mg by mouth two (2) times a day.  betamethasone dipropionate (DIPROLENE) 0.05 % ointment  tamsulosin (FLOMAX) 0.4 mg capsule Take 1 Cap by mouth two (2) times a day.  budesonide-formoterol (SYMBICORT) 160-4.5 mcg/actuation HFA inhaler Take 2 Puffs by inhalation two (2) times a day.  tiotropium (SPIRIVA) 18 mcg inhalation capsule Take 1 Cap by inhalation daily.  ketorolac (ACULAR) 0.5 % ophthalmic solution Administer 1 Drop to both eyes two (2) times a day.  oxyCODONE ER (OXYCONTIN) 10 mg ER tablet Take 1 Tab by mouth every eight (8) hours. Max Daily Amount: 30 mg.  
 naloxone 4 mg/actuation spry 4 mg by Nasal route as needed. No current facility-administered medications on file prior to visit. Referred from [facility]. Pt was last seen here on August 20, 2018. Calculated MEQ -10-15 Naloxone rescue -yes Prophylactic bowel program -ordered Date of last OCA November 2018, updated today. Last UDS August 20, 2018, consistent , date checked today, findings consistent GIC-1 and 5 
 
DANO -52% COMM-11 
 
HPI: 
 Slade Rasheed is a 77 y.o. male here for f/u visit for ongoing evaluation of work-related low back pain with resultant surgeries including L4 through S1 fusion. He had lumbar surgery in 1980 and in 1984. He then had Acute onset of LBP following a full day of repeated heavy lifting in 1998. This led to a fusion of L4-S1 in 1999 then [facility] in 2000. Pt denies interval changes in the character or distribution of his chronic pain although he is dealing with subacute muscle strain. Chronic pain is located across the lumbosacral belt line as aching and burning pain with constant radiating stabbing and pins and needles down posterior portion of the right lower extremity ambulating posterior left thigh stopping at the knee. Pain ranges from 6-10/10. 
 
--cymbalta 60mg bid continues to be helpful 
--lyrica 150mg tid continues to be helpful. --OxyContin was discontinued quite a while back. He states he did not like how this made him feel. --norco 5/325 1-2 tabs daily as needed with 50 tablets budgeted for 30 days. Most days he takes only 1, some days he takes up to 3. Pt reports partial but incomplete analgesia with the current opioid regimen which helps to improve activity tolerance and function. Pt denies aberrant behaviors or any adverse effects. ROS:Review of systems is negative for fever, chills, nausea, vomiting,  chest pain, shortness of breath, abdominal pain, weakness, trouble swallowing, acute changes in vision, acute changes in hearing, falls,  bladder incontinence, bowel incontinence, depression, anxiety, suicidal ideation, homicidal ideation, alcohol use. Review of systems positive for diarrhea, constipation, dizziness. He reports chronic decreased hearing as he is former [employer] man Opioid specific risk:obesity, DM, asthma, GERD, sleep disturbances, Imaging: Report from MRI of the lumbar spine dated September 24, 2018 shows,\"\"\"\"\"\"\"\"\"\"\"\"\"\"\"\"\"\"\"\"\"\"\"\"\"\"\"\"\"\"\"\"FINDINGS:  
 Postsurgical findings: Previous wide posterior decompression L3-S1. Alignment: Lumbar straightening. Trace L2 retrolisthesis. Vertebral body height: Normal 
Marrow signal: No concerning signal change. Fatty endplate changes intermixed 
with discogenic sclerosis at L2-3. Small Schmorl's node inferior T12. Disc spaces: Severe narrowing of L2-3. Chronic ankylosis of L4-5. Moderately 
pronounced narrowing at the other levels with disc desiccation. Conus: Terminates at L1 
  
Axial imaging correlation: 
  
T12-L1: Broad-based disc osteophyte complex. Mild facet arthropathy. Posterior 
epidural fat bulging. Patent canal and foramina. 
  
L1-2: Broad-based disc osteophyte complex. More pronounced facet arthropathy. More prominent bulging of posterior epidural fat. Moderate to severe spinal 
stenosis in AP dimension with flattening of the thecal sac to 5 mm diameter. Adequately patent foramina. 
  
L2-3: Broad-based disc osteophyte complex. Facet arthropathy. Mild concentric 
spinal stenosis. Mild foraminal stenoses. 
  
L3-4: Decompression level. Bilateral facet arthropathy. Patent canal and right 
foramen. Mild left foraminal stenosis. 
  
L4-5: Decompression level. Mild broad-based ossific ridge. Bilateral facet 
arthropathy. Patent canal. Adequately patent foramina. 
  
L5-S1: Decompression level. Minor disc osteophyte complex formation. Bilateral 
facet arthropathy. 
  
Other structures: Unremarkable. 
  
  
IMPRESSION IMPRESSION: 
  
1. Multilevel degenerative findings along postsurgical lumbar spine. 
-Most notable findings are at L2-3 where there is a moderate to severe spinal 
stenosis from combined disc and facet pathology, with notable contribution from 
bulging posterior epidural fat. This and other levels as detailed above. \"\"\"\"\"\"\"\"\"\"\"\"\"\"\"\"\"\"\"\"\"\"\"\"\"\"\"\"\"\"\"\"\"\"\"\"' Vitals:  
 11/13/18 1107 BP: 134/80 Pulse: 89 Resp: 14 Temp: 98.1 °F (36.7 °C) TempSrc: Oral  
Weight: 122.5 kg (270 lb) Height: 5' 11\" (1.803 m) PainSc:   7 PainLoc: Back PE: 
AFVSS, no acute distress, endomorphic body habitus. A&OXs 3. 
normocephalic, atraumatic. Conjugate gaze, clear sclerae. Speech is clear and appropriate. Mood is pleasant and appropriate. Patient is cooperative. TTP at the L/S junction and bilat lumbar paraspinals, right lateral hip region. Gait is within functional limits with antalgia using a mono cane Balance is within functional limits. Primary Care Physician Eva COTTO 
[address] [facility] 
[phone] PHQ -- . PHQ over the last two weeks 11/13/2018 PHQ Not Done - Little interest or pleasure in doing things Not at all Feeling down, depressed, irritable, or hopeless Not at all Total Score PHQ 2 0  
 
 
DSM V-OUD Screen-- negative Assessment/Plan: ICD-10-CM ICD-9-CM 1. Chronic bilateral low back pain with bilateral sciatica M54.42 724.2 HYDROcodone-acetaminophen (NORCO) 5-325 mg per tablet M54.41 724.3 G89.29 338.29   
2. Encounter for long-term (current) use of medications Z79.899 V58.69 3. Lumbosacral neuritis M54.17 724.4 HYDROcodone-acetaminophen (NORCO) 5-325 mg per tablet 4. Degeneration of lumbar or lumbosacral intervertebral disc M51.37 722.52 HYDROcodone-acetaminophen (NORCO) 5-325 mg per tablet 5. Disorder of meninges G96.19 349.2   
  
 
--I do not recommend long-term opioid therapy for this person's chronic pain. I believe the risks outweigh any potential benefits. We will progress with a gradual opioid wean. Patient was educated on signs and symptoms of opioid withdrawal and advised to call the clinic should these symptoms arise so that we may provide support as needed. We will pause any further opioid reduction at this time. --refill norco 5/325 up to TID daily with 50 tabs budgeted over 30 days. Maintain this level until next office visit and reassess for wean. --consider LSO. 
--Requested TENS unit to help reduce pain, improve activity tolerance, improve function, decrease pharmaceutical intake, improve quality of life. -- continue lyrica 150mg tid 
--continue cymbalta 
--Follow-up with PCP for discussion on aggressive wt loss strategies GOALS: 
To establish complementary and integrative plan of care to address chronic pain issues while minimizing pharmaceuticals to maximize patient's function improve quality of life. Education: 
Patient again educated on the importance of strict compliance with the opioid care agreement while on opioid therapy. Patient also again educated that they should avoid driving while on chronic opioid therapy. Also advised to avoid alcohol and to avoid benzodiazepines while on opioid therapy. F/u:. Follow-up Disposition: 
Return in about 3 months (around 2/13/2019) for 30 min.

## 2018-11-13 NOTE — PATIENT INSTRUCTIONS
Back Pain: Care Instructions  Your Care Instructions    Back pain has many possible causes. It is often related to problems with muscles and ligaments of the back. It may also be related to problems with the nerves, discs, or bones of the back. Moving, lifting, standing, sitting, or sleeping in an awkward way can strain the back. Sometimes you don't notice the injury until later. Arthritis is another common cause of back pain. Although it may hurt a lot, back pain usually improves on its own within several weeks. Most people recover in 12 weeks or less. Using good home treatment and being careful not to stress your back can help you feel better sooner. Follow-up care is a key part of your treatment and safety. Be sure to make and go to all appointments, and call your doctor if you are having problems. It's also a good idea to know your test results and keep a list of the medicines you take. How can you care for yourself at home? · Sit or lie in positions that are most comfortable and reduce your pain. Try one of these positions when you lie down:  ? Lie on your back with your knees bent and supported by large pillows. ? Lie on the floor with your legs on the seat of a sofa or chair. ? Lie on your side with your knees and hips bent and a pillow between your legs. ? Lie on your stomach if it does not make pain worse. · Do not sit up in bed, and avoid soft couches and twisted positions. Bed rest can help relieve pain at first, but it delays healing. Avoid bed rest after the first day of back pain. · Change positions every 30 minutes. If you must sit for long periods of time, take breaks from sitting. Get up and walk around, or lie in a comfortable position. · Try using a heating pad on a low or medium setting for 15 to 20 minutes every 2 or 3 hours. Try a warm shower in place of one session with the heating pad. · You can also try an ice pack for 10 to 15 minutes every 2 to 3 hours.  Put a thin cloth between the ice pack and your skin. · Take pain medicines exactly as directed. ? If the doctor gave you a prescription medicine for pain, take it as prescribed. ? If you are not taking a prescription pain medicine, ask your doctor if you can take an over-the-counter medicine. · Take short walks several times a day. You can start with 5 to 10 minutes, 3 or 4 times a day, and work up to longer walks. Walk on level surfaces and avoid hills and stairs until your back is better. · Return to work and other activities as soon as you can. Continued rest without activity is usually not good for your back. · To prevent future back pain, do exercises to stretch and strengthen your back and stomach. Learn how to use good posture, safe lifting techniques, and proper body mechanics. When should you call for help? Call your doctor now or seek immediate medical care if:    · You have new or worsening numbness in your legs.     · You have new or worsening weakness in your legs. (This could make it hard to stand up.)     · You lose control of your bladder or bowels.    Watch closely for changes in your health, and be sure to contact your doctor if:    · You have a fever, lose weight, or don't feel well.     · You do not get better as expected. Where can you learn more? Go to http://slava-enrique.info/. Enter V248 in the search box to learn more about \"Back Pain: Care Instructions. \"  Current as of: November 29, 2017  Content Version: 11.8  © 6219-3868 [employer]. Care instructions adapted under license by [employer] (which disclaims liability or warranty for this information). If you have questions about a medical condition or this instruction, always ask your healthcare professional. [place] [address] any warranty or liability for your use of this information.          Hip Pain: Care Instructions  Your Care Instructions    Hip pain may be caused by many things, including overuse, a fall, or a twisting movement. Another cause of hip pain is arthritis. Your pain may increase when you stand up, walk, or squat. The pain may come and go or may be constant. Home treatment can help relieve hip pain, swelling, and stiffness. If your pain is ongoing, you may need more tests and treatment. Follow-up care is a key part of your treatment and safety. Be sure to make and go to all appointments, and call your doctor if you are having problems. It's also a good idea to know your test results and keep a list of the medicines you take. How can you care for yourself at home? · Take pain medicines exactly as directed. ? If the doctor gave you a prescription medicine for pain, take it as prescribed. ? If you are not taking a prescription pain medicine, ask your doctor if you can take an over-the-counter medicine. · Rest and protect your hip. Take a break from any activity, including standing or walking, that may cause pain. · Put ice or a cold pack against your hip for 10 to 20 minutes at a time. Try to do this every 1 to 2 hours for the next 3 days (when you are awake) or until the swelling goes down. Put a thin cloth between the ice and your skin. · Sleep on your healthy side with a pillow between your knees, or sleep on your back with pillows under your knees. · If there is no swelling, you can put moist heat, a heating pad, or a warm cloth on your hip. Do gentle stretching exercises to help keep your hip flexible. · Learn how to prevent falls. Have your vision and hearing checked regularly. Wear slippers or shoes with a nonskid sole. · Stay at a healthy weight. · Wear comfortable shoes. When should you call for help? Call 911 anytime you think you may need emergency care.  For example, call if:    · You have sudden chest pain and shortness of breath, or you cough up blood.     · You are not able to stand or walk or bear weight.     · Your buttocks, legs, or feet feel numb or tingly.     · Your leg or foot is cool or pale or changes color.     · You have severe pain.    Call your doctor now or seek immediate medical care if:    · You have signs of infection, such as:  ? Increased pain, swelling, warmth, or redness in the hip area. ? Red streaks leading from the hip area. ? Pus draining from the hip area. ? A fever.     · You have signs of a blood clot, such as:  ? Pain in your calf, back of the knee, thigh, or groin. ? Redness and swelling in your leg or groin.     · You are not able to bend, straighten, or move your leg normally.     · You have trouble urinating or having bowel movements.    Watch closely for changes in your health, and be sure to contact your doctor if:    · You do not get better as expected. Where can you learn more? Go to http://slava-enrique.info/. Enter F474 in the search box to learn more about \"Hip Pain: Care Instructions. \"  Current as of: November 20, 2017  Content Version: 11.8  © 3750-7182 [employer]. Care instructions adapted under license by [employer] (which disclaims liability or warranty for this information). If you have questions about a medical condition or this instruction, always ask your healthcare professional. [address] any warranty or liability for your use of this information.

## 2018-11-13 NOTE — PATIENT INSTRUCTIONS
Starting a Weight Loss Plan: Care Instructions Your Care Instructions If you are thinking about losing weight, it can be hard to know where to start. Your doctor can help you set up a weight loss plan that best meets your needs. You may want to take a class on nutrition or exercise, or join a weight loss support group. If you have questions about how to make changes to your eating or exercise habits, ask your doctor about seeing a registered dietitian or an exercise specialist. 
It can be a big challenge to lose weight. But you do not have to make huge changes at once. Make small changes, and stick with them. When those changes become habit, add a few more changes. If you do not think you are ready to make changes right now, try to pick a date in the future. Make an appointment to see your doctor to discuss whether the time is right for you to start a plan. Follow-up care is a key part of your treatment and safety. Be sure to make and go to all appointments, and call your doctor if you are having problems. It's also a good idea to know your test results and keep a list of the medicines you take. How can you care for yourself at home? · Set realistic goals. Many people expect to lose much more weight than is likely. A weight loss of 5% to 10% of your body weight may be enough to improve your health. · Get family and friends involved to provide support. Talk to them about why you are trying to lose weight, and ask them to help. They can help by participating in exercise and having meals with you, even if they may be eating something different. · Find what works best for you. If you do not have time or do not like to cook, a program that offers meal replacement bars or shakes may be better for you. Or if you like to prepare meals, finding a plan that includes daily menus and recipes may be best. 
· Ask your doctor about other health professionals who can help you achieve your weight loss goals. ? A dietitian can help you make healthy changes in your diet. ? An exercise specialist or  can help you develop a safe and effective exercise program. 
? A counselor or psychiatrist can help you cope with issues such as depression, anxiety, or family problems that can make it hard to focus on weight loss. · Consider joining a support group for people who are trying to lose weight. Your doctor can suggest groups in your area. Where can you learn more? Go to http://slava-enrique.info/. Enter Z467 in the search box to learn more about \"Starting a Weight Loss Plan: Care Instructions. \" Current as of: June 26, 2018 Content Version: 11.8 © 5829-8182 [employer], [employer]. Care instructions adapted under license by [employer] (which disclaims liability or warranty for this information). If you have questions about a medical condition or this instruction, always ask your healthcare professional. [address] any warranty or liability for your use of this information.

## 2018-11-13 NOTE — PROGRESS NOTES
HISTORY OF PRESENT ILLNESS  Lucius Boston is a 77 y.o. male. HPI: Here for follow up on rt hip pain. Had x-ray showed no acute process. Also sent him to ortho. Has h/o chronic back pain and on pain medication. Since last visit he had MRI back and hip. Result as below. Denies any urinary or bowel complain.s   Had ext weakness upper and lower. Seen neurology and had EMG done. Noted ulnar nerve neuropathy. Given option of surgery and he is willing to go with symptomatic and conservative management. Procedure: MRI of the pelvis without contrast.     Indications: Right hip and groin pain, onset July 2018; attention right gluteus  medius region.     Comparisons: Right hip x-rays August 2018, separate current lumbar spine MRI,  prior lumbar spine CT November 2016.     Findings:  Musculoskeletal:   Right hip: Intact alignment. No fracture, AVN, or marrow edema. No joint  effusion. Intact labrum.     There is moderate intensity soft tissue inflammation at the right greater  trochanter that does predominate posteriorly. This includes bursitis in close  proximity to the gluteus medius attachment. There is a focal area of partial  thickness tearing involving the gluteus medius attachment as on coronal image  21. Majority of tendon attachment is intact. More anterior gluteus minimus  attachment unremarkable.     Incidental imaging of left hip shows mild inflammation of the greater  trochanter, otherwise unremarkable.     Bony pelvis without significant finding. Postsurgical changes left posterior  iliac bone. Lumbar spine addressed separately.     Abdominal wall musculature with attention to the right groin region otherwise  unremarkable.     Reproductive organs: Unremarkable     Bowels/mesentery: No obstruction or mesenteric inflammation.      Urinary bladder: Unremarkable      Vascular: Aorta unremarkable for age. IVC unremarkable.     Lymph Nodes: No adenopathy.     IMPRESSION  IMPRESSION[de-identified]     1.  There is partial-thickness tear of the right hip gluteus medius tendon  attachment. Accompanying moderate trochanteric bursitis.     2. No intra-articular abnormality of the right hip.     3. See separate lumbar spine MRI.       Asking for medication refill which is given. ROS : see HPI     Physical Exam   Constitutional: He is oriented to person, place, and time. No distress. Cardiovascular: Normal heart sounds. Pulmonary/Chest: No respiratory distress. He has no wheezes. Neurological: He is oriented to person, place, and time. ASSESSMENT and PLAN    ICD-10-CM ICD-9-CM    1. Other chronic pain: lower back pain and rt hip pain. Improved some from last visit. Following specialist. Vitals stable. Will follow their recommendations. G89.29 338.29    2. Pain of right hip joint M25.551 719.45    3. Numbness and tingling R20.0 782.0     R20.2      upper ext. seen neurology. EMG showed neuropathy and surgical intervention but pt decided to wait and symptomatic treatment. 4. Other allergic rhinitis J30.89 477.8 fluticasone (FLONASE) 50 mcg/actuation nasal spray   5. Hypertriglyceridemia E78.1 272.1 fenofibrate nanocrystallized (TRICOR) 145 mg tablet   6. Essential hypertension I10 401.9 lisinopril (PRINIVIL, ZESTRIL) 5 mg tablet   7. Low TSH level R79.89 794.5 levothyroxine (SYNTHROID) 150 mcg tablet   8. Type 2 diabetes mellitus with complication, with long-term current use of insulin ([facility]) E11.8 250.90 lisinopril (PRINIVIL, ZESTRIL) 5 mg tablet    Z79.4 V58.67    9. Shakiness/ hand  R25.1 781.0 metoprolol tartrate (LOPRESSOR) 25 mg tablet   10. Tachycardia R00.0 785.0 metoprolol tartrate (LOPRESSOR) 25 mg tablet   11. Asthma with COPD (chronic obstructive pulmonary disease) ([facility]) J44.9 493.20 montelukast (SINGULAIR) 10 mg tablet   12.  Encounter for immunization Z23 V03.89 INFLUENZA VACCINE INACTIVATED (IIV), SUBUNIT, ADJUVANTED, IM      ADMIN INFLUENZA VIRUS VAC   Pt understood and agree with the plan     Follow-up Disposition:  Return in about 3 months (around 2/13/2019).

## 2018-11-14 DIAGNOSIS — N40.1 BENIGN PROSTATIC HYPERPLASIA WITH LOWER URINARY TRACT SYMPTOMS, SYMPTOM DETAILS UNSPECIFIED: ICD-10-CM

## 2018-11-14 RX ORDER — TAMSULOSIN HYDROCHLORIDE 0.4 MG/1
CAPSULE ORAL
Qty: 90 CAP | Refills: 3 | Status: SHIPPED | OUTPATIENT
Start: 2018-11-14 | End: 2020-04-03 | Stop reason: SDUPTHER

## 2018-11-19 ENCOUNTER — DOCUMENTATION ONLY (OUTPATIENT)
Dept: PAIN MANAGEMENT | Age: 68
End: 2018-11-19

## 2018-11-19 NOTE — PROGRESS NOTES
The pt's order for a tens unit was faxed over to [facility]. A fax confirmation was received and they will contact the pt.

## 2018-11-29 ENCOUNTER — TELEPHONE (OUTPATIENT)
Dept: FAMILY MEDICINE CLINIC | Age: 68
End: 2018-11-29

## 2018-12-06 NOTE — TELEPHONE ENCOUNTER
Contacted patient and verified identity using name and date of birth (2- identifiers)  Spoke with patient and he verbalized understanding of labs to be ordered at visit.

## 2019-01-11 DIAGNOSIS — M54.41 CHRONIC BILATERAL LOW BACK PAIN WITH BILATERAL SCIATICA: ICD-10-CM

## 2019-01-11 DIAGNOSIS — G89.29 CHRONIC LOW BACK PAIN, UNSPECIFIED BACK PAIN LATERALITY, WITH SCIATICA PRESENCE UNSPECIFIED: ICD-10-CM

## 2019-01-11 DIAGNOSIS — G89.29 CHRONIC BILATERAL LOW BACK PAIN WITH BILATERAL SCIATICA: ICD-10-CM

## 2019-01-11 DIAGNOSIS — Z79.899 ENCOUNTER FOR LONG-TERM (CURRENT) USE OF HIGH-RISK MEDICATION: ICD-10-CM

## 2019-01-11 DIAGNOSIS — M51.36 DDD (DEGENERATIVE DISC DISEASE), LUMBAR: ICD-10-CM

## 2019-01-11 DIAGNOSIS — M47.896 OTHER OSTEOARTHRITIS OF SPINE, LUMBAR REGION: ICD-10-CM

## 2019-01-11 DIAGNOSIS — J30.89 OTHER ALLERGIC RHINITIS: ICD-10-CM

## 2019-01-11 DIAGNOSIS — M54.42 CHRONIC BILATERAL LOW BACK PAIN WITH BILATERAL SCIATICA: ICD-10-CM

## 2019-01-11 DIAGNOSIS — M54.5 CHRONIC LOW BACK PAIN, UNSPECIFIED BACK PAIN LATERALITY, WITH SCIATICA PRESENCE UNSPECIFIED: ICD-10-CM

## 2019-01-11 RX ORDER — DULOXETIN HYDROCHLORIDE 60 MG/1
60 CAPSULE, DELAYED RELEASE ORAL 2 TIMES DAILY
Qty: 60 CAP | Refills: 3 | Status: SHIPPED | OUTPATIENT
Start: 2019-01-11 | End: 2019-06-17 | Stop reason: SDUPTHER

## 2019-01-11 RX ORDER — FLUTICASONE PROPIONATE 50 MCG
SPRAY, SUSPENSION (ML) NASAL
Qty: 1 BOTTLE | Refills: 0 | Status: SHIPPED | OUTPATIENT
Start: 2019-01-11

## 2019-01-11 RX ORDER — DULOXETIN HYDROCHLORIDE 60 MG/1
60 CAPSULE, DELAYED RELEASE ORAL DAILY
Qty: 60 CAP | Refills: 0 | OUTPATIENT
Start: 2019-01-11

## 2019-01-15 NOTE — TELEPHONE ENCOUNTER
Patient identified using two patient identifiers (name and ); patient advised prescriptions are ready for pick-up from pharmacy.

## 2019-02-12 ENCOUNTER — OFFICE VISIT (OUTPATIENT)
Dept: PAIN MANAGEMENT | Age: 69
End: 2019-02-12

## 2019-02-12 VITALS
HEART RATE: 81 BPM | BODY MASS INDEX: 37.8 KG/M2 | HEIGHT: 71 IN | WEIGHT: 270 LBS | SYSTOLIC BLOOD PRESSURE: 148 MMHG | TEMPERATURE: 97.7 F | DIASTOLIC BLOOD PRESSURE: 86 MMHG | RESPIRATION RATE: 16 BRPM | OXYGEN SATURATION: 97 %

## 2019-02-12 DIAGNOSIS — M51.37 DEGENERATION OF LUMBAR OR LUMBOSACRAL INTERVERTEBRAL DISC: ICD-10-CM

## 2019-02-12 DIAGNOSIS — G89.4 CHRONIC PAIN SYNDROME: ICD-10-CM

## 2019-02-12 DIAGNOSIS — G89.29 CHRONIC LOW BACK PAIN, UNSPECIFIED BACK PAIN LATERALITY, WITH SCIATICA PRESENCE UNSPECIFIED: Primary | ICD-10-CM

## 2019-02-12 DIAGNOSIS — M47.816 FACET ARTHROPATHY, LUMBAR: ICD-10-CM

## 2019-02-12 DIAGNOSIS — M54.5 CHRONIC LOW BACK PAIN, UNSPECIFIED BACK PAIN LATERALITY, WITH SCIATICA PRESENCE UNSPECIFIED: Primary | ICD-10-CM

## 2019-02-12 DIAGNOSIS — M54.17 LUMBOSACRAL NEURITIS: ICD-10-CM

## 2019-02-12 DIAGNOSIS — G96.198: ICD-10-CM

## 2019-02-12 DIAGNOSIS — Z79.899 ENCOUNTER FOR LONG-TERM (CURRENT) USE OF HIGH-RISK MEDICATION: ICD-10-CM

## 2019-02-12 DIAGNOSIS — G62.89 OTHER POLYNEUROPATHY: ICD-10-CM

## 2019-02-12 LAB
ALCOHOL UR POC: NORMAL
AMPHETAMINES UR POC: NEGATIVE
BARBITURATES UR POC: NORMAL
BENZODIAZEPINES UR POC: NEGATIVE
BUPRENORPHINE UR POC: NEGATIVE
CANNABINOIDS UR POC: NEGATIVE
CARISOPRODOL UR POC: NORMAL
COCAINE UR POC: NEGATIVE
FENTANYL UR POC: NORMAL
MDMA/ECSTASY UR POC: NORMAL
METHADONE UR POC: NEGATIVE
METHAMPHETAMINE UR POC: NORMAL
METHYLPHENIDATE UR POC: NORMAL
OPIATES UR POC: NORMAL
OXYCODONE UR POC: NEGATIVE
PHENCYCLIDINE UR POC: NORMAL
PROPOXYPHENE UR POC: NORMAL
TRAMADOL UR POC: NORMAL
TRICYCLICS UR POC: NORMAL

## 2019-02-12 RX ORDER — OXYCODONE AND ACETAMINOPHEN 5; 325 MG/1; MG/1
TABLET ORAL
Qty: 45 TAB | Refills: 0 | Status: SHIPPED | OUTPATIENT
Start: 2019-02-12 | End: 2019-07-30

## 2019-02-12 RX ORDER — PREGABALIN 150 MG/1
CAPSULE ORAL
Qty: 90 CAP | Refills: 2 | Status: SHIPPED | OUTPATIENT
Start: 2019-02-12 | End: 2019-07-30 | Stop reason: SDUPTHER

## 2019-02-12 NOTE — PATIENT INSTRUCTIONS
Learning About Sleeping Well What does sleeping well mean? Sleeping well means getting enough sleep. How much sleep is enough varies among people. The number of hours you sleep is not as important as how you feel when you wake up. If you do not feel refreshed, you probably need more sleep. Another sign of not getting enough sleep is feeling tired during the day. The average total nightly sleep time is 7½ to 8 hours. Healthy adults may need a little more or a little less than this. Why is getting enough sleep important? Getting enough quality sleep is a basic part of good health. When your sleep suffers, your mood and your thoughts can suffer too. You may find yourself feeling more grumpy or stressed. Not getting enough sleep also can lead to serious problems, including injury, accidents, anxiety, and depression. What might cause poor sleeping? Many things can cause sleep problems, including: · Stress. Stress can be caused by fear about a single event, such as giving a speech. Or you may have ongoing stress, such as worry about work or school. · Depression, anxiety, and other mental or emotional conditions. · Changes in your sleep habits or surroundings. This includes changes that happen where you sleep, such as noise, light, or sleeping in a different bed. It also includes changes in your sleep pattern, such as having jet lag or working a late shift. · Health problems, such as pain, breathing problems, and restless legs syndrome. · Lack of regular exercise. How can you help yourself? Here are some tips that may help you sleep more soundly and wake up feeling more refreshed. Your sleeping area · Use your bedroom only for sleeping and sex. A bit of light reading may help you fall asleep. But if it doesn't, do your reading elsewhere in the house. Don't watch TV in bed. · Be sure your bed is big enough to stretch out comfortably, especially if you have a sleep partner. · Keep your bedroom quiet, dark, and cool. Use curtains, blinds, or a sleep mask to block out light. To block out noise, use earplugs, soothing music, or a \"white noise\" machine. Your evening and bedtime routine · Create a relaxing bedtime routine. You might want to take a warm shower or bath, listen to soothing music, or drink a cup of noncaffeinated tea. · Go to bed at the same time every night. And get up at the same time every morning, even if you feel tired. What to avoid · Limit caffeine (coffee, tea, caffeinated sodas) during the day, and don't have any for at least 4 to 6 hours before bedtime. · Don't drink alcohol before bedtime. Alcohol can cause you to wake up more often during the night. · Don't smoke or use tobacco, especially in the evening. Nicotine can keep you awake. · Don't take naps during the day, especially close to bedtime. · Don't lie in bed awake for too long. If you can't fall asleep, or if you wake up in the middle of the night and can't get back to sleep within 15 minutes or so, get out of bed and go to another room until you feel sleepy. · Don't take medicine right before bed that may keep you awake or make you feel hyper or energized. Your doctor can tell you if your medicine may do this and if you can take it earlier in the day. If you can't sleep · Imagine yourself in a peaceful, pleasant scene. Focus on the details and feelings of being in a place that is relaxing. · Get up and do a quiet or boring activity until you feel sleepy. · Don't drink any liquids after 6 p.m. if you wake up often because you have to go to the bathroom. Where can you learn more? Go to http://slava-enrique.info/. Enter R596 in the search box to learn more about \"Learning About Sleeping Well. \" Current as of: September 11, 2018 Content Version: 11.9 © 6064-8247 [employer].  Care instructions adapted under license by [address] (which disclaims liability or warranty for this information). If you have questions about a medical condition or this instruction, always ask your healthcare professional. [address] any warranty or liability for your use of this information. Safe Use of Opioid Pain Medicine: Care Instructions Your Care Instructions Pain is your body's way of warning you that something is wrong. Pain feels different for everybody. Only you can describe your pain. A doctor can suggest or prescribe many types of medicines for pain. These range from over-the-counter medicines like acetaminophen (Tylenol) to powerful medicines called opioids. Examples of opioids are fentanyl, hydrocodone, morphine, and oxycodone. Heroin is an illegal opioid Opioids are strong medicines. They can help you manage pain when you use them the right way. But if you misuse them, they can cause serious harm and even death. For these reasons, doctors are very careful about how they prescribe opioids. If you decide to take opioids, here are some things to remember. · Keep your doctor informed. You can get addicted to opioids. The risk is higher if you have a history of substance use. Your doctor will monitor you closely for signs of misuse and addiction and to figure out when you no longer need to take opioids. · Make a treatment plan. The goal of your plan is to be able to function and do the things you need to do, even if you still have some pain. You might be able to manage your pain with other non-opioid options like physical therapy, relaxation, or over-the-counter pain medicines. · Be aware of the side effects. Opioids can cause serious side effects, such as constipation, dry mouth, and nausea. And over time, you may need a higher dose to get pain relief. This is called tolerance. Your body also gets used to opioids. This is called physical dependence.  If you suddenly stop taking them, you may have withdrawal symptoms. The doctor carefully considered what pain medicine is right for you. You may not have received opioids if your doctor was concerned about drug interactions or your safety, or if he or she had other concerns. It is best to have one doctor or clinic treat your pain. This way you will get the pain medicine that will help you the most. And a doctor will be able to watch for any problems that the medicine might cause. The doctor has checked you carefully, but problems can develop later. If you notice any problems or new symptoms,  get medical treatment right away. Follow-up care is a key part of your treatment and safety. Be sure to make and go to all appointments, and call your doctor if you are having problems. It's also a good idea to know your test results and keep a list of the medicines you take. How can you care for yourself at home? If you need to take opioids to manage your pain, remember these safety tips. · Follow directions carefully. It's easy to misuse opioids if you take a dose other than what's prescribed by your doctor. This can lead to overdose and even death. Even sharing them with someone they weren't meant for is misuse. · Be cautious. Opioids may affect your judgment and decision making. Do not drive or operate machinery until you can think clearly. Talk with your doctor about when it is safe to drive. · Reduce the risk of drug interactions. Opioids can be dangerous if you take them with alcohol or with certain drugs like sleeping pills and muscle relaxers. Make sure your doctor knows about all the other medicines you take, including over-the-counter medicines. Don't start any new medicines before you talk to your doctor or pharmacist. 
· Safely store and dispose of opioids. Store opioids in a safe and secure place.  Make sure that pets, children, friends, and family can't get to them. When you're done using opioids, make sure to dispose of them safely and as quickly as possible. The U.S. Food and Drug Administration (FDA) recommends these disposal options. ? The best option is to take your medicine to a drop-off box or take-back program that is authorized by the [employer] [address] ([employer]). ? If these programs aren't available in your area and your medicine doesn't have specific disposal instructions (such as flushing), you can throw them into your household trash if you follow the FDA's instructions. Visit fda.gov and search for \"unused medicine disposal.\" 
? If you have opioid patches (used or unused), your options are to take them to a [employer]-authorized site or flush them down the toilet. Do not throw them in the trash. ? Only flush your medicine down the toilet if you can't get to a [employer]-approved site or your medicine instructions state clearly to flush them. · Reduce the risk of overdose. Misuse of opioids can be very dangerous. Protect yourself by asking your doctor about a naloxone rescue kit. It can help youand even save your lifeif you take too much of an opioid. Try other ways to reduce pain. · Relax, and reduce stress. Relaxation techniques such as deep breathing or meditation can help. · Keep moving. Gentle, daily exercise can help reduce pain over the long run. Try low- or no-impact exercises such as walking, swimming, and stationary biking. Do stretches to stay flexible. · Try heat, cold packs, and massage. · Get enough sleep. Pain can make you tired and drain your energy. Talk with your doctor if you have trouble sleeping because of pain. · Think positive. Your thoughts can affect your pain level. Do things that you enjoy to distract yourself when you have pain instead of focusing on the pain. See a movie, read a book, listen to music, or spend time with a friend.  
If you are not taking a prescription pain medicine, ask your doctor if you can take an over-the-counter medicine. When should you call for help? Call your doctor now or seek immediate medical care if: 
  · You have a new kind of pain.  
  · You have new symptoms, such as a fever or rash, along with the pain.  
 Watch closely for changes in your health, and be sure to contact your doctor if: 
  · You think you might be using too much pain medicine, and you need help to use less or stop.  
  · Your pain gets worse.  
  · You would like a referral to a doctor or clinic that specializes in pain management. Where can you learn more? Go to http://slava-enrique.info/. Enter R108 in the search box to learn more about \"Safe Use of Opioid Pain Medicine: Care Instructions. \" Current as of: Desiree 3, 2018 Content Version: 11.9 © 2884-4465 [employer]. Care instructions adapted under license by [employer] (which disclaims liability or warranty for this information). If you have questions about a medical condition or this instruction, always ask your healthcare professional. [place] [address] any warranty or liability for your use of this information. Learning About Relief for Back Pain What is back tension and strain? Back strain happens when you overstretch, or pull, a muscle in your back. You may hurt your back in an accident or when you exercise or lift something. Most back pain will get better with rest and time. You can take care of yourself at home to help your back heal. 
What can you do first to relieve back pain? When you first feel back pain, try these steps: 
· Walk. Take a short walk (10 to 20 minutes) on a level surface (no slopes, hills, or stairs) every 2 to 3 hours. Walk only distances you can manage without pain, especially leg pain. · Relax.  Find a comfortable position for rest. Some people are comfortable on the floor or a medium-firm bed with a small pillow under their head and another under their knees. Some people prefer to lie on their side with a pillow between their knees. Don't stay in one position for too long. · Try heat or ice. Try using a heating pad on a low or medium setting, or take a warm shower, for 15 to 20 minutes every 2 to 3 hours. Or you can buy single-use heat wraps that last up to 8 hours. You can also try an ice pack for 10 to 15 minutes every 2 to 3 hours. You can use an ice pack or a bag of frozen vegetables wrapped in a thin towel. There is not strong evidence that either heat or ice will help, but you can try them to see if they help. You may also want to try switching between heat and cold. · Take pain medicine exactly as directed. ? If the doctor gave you a prescription medicine for pain, take it as prescribed. ? If you are not taking a prescription pain medicine, ask your doctor if you can take an over-the-counter medicine. What else can you do? · Stretch and exercise. Exercises that increase flexibility may relieve your pain and make it easier for your muscles to keep your spine in a good, neutral position. And don't forget to keep walking. · Do self-massage. You can use self-massage to unwind after work or school or to energize yourself in the morning. You can easily massage your feet, hands, or neck. Self-massage works best if you are in comfortable clothes and are sitting or lying in a comfortable position. Use oil or lotion to massage bare skin. · Reduce stress. Back pain can lead to a vicious [place]: Distress about the pain tenses the muscles in your back, which in turn causes more pain. Learn how to relax your mind and your muscles to lower your stress. Where can you learn more? Go to http://slava-enrique.info/. Enter R055 in the search box to learn more about \"Learning About Relief for Back Pain. \" Current as of: September 20, 2018 Content Version: 11.9 © 3200-9351 [employer]. Care instructions adapted under license by [employer] (which disclaims liability or warranty for this information). If you have questions about a medical condition or this instruction, always ask your healthcare professional. [address] any warranty or liability for your use of this information.

## 2019-02-12 NOTE — PROGRESS NOTES
Referral date 10/23/13, source Workmen's Compensation and for low back pain. HPI: 
Silvana Andrews is a 77 y.o. male here for f/u visit for ongoing evaluation of work-related low back pain which resulted in surgeries including L4 through S1 fusion. Patient has history of lumbar surgery in  and in . Had acute onset of low back pain following a full day of repeated heavy lifting in . This led to a fusion of L4-S1 and  then removal of hardware in . Pt was last seen here on 18. Pt denies interval changes on the character or distribution of pain. Pain is located lumbosacral beltline. The pain is described as aching and stinging. Pain at its best is 6/10. Pain at its worse is 10/10. The pain is worsened by standing, prolonged sitting at [facility]. Symptoms are improved by rocking, TENS unit, changes positions, heating pad. Pt has never tried SCS,compound cream. Since last visit, patient fell and hurt right side, patient is following up with PCP tomorrow. Social History Socioeconomic History  Marital status:  Spouse name: Not on file  Number of children: Not on file  Years of education: Not on file  Highest education level: Not on file Social Needs  Financial resource strain: Not on file  Food insecurity - worry: Not on file  Food insecurity - inability: Not on file  Transportation needs - medical: Not on file  Transportation needs - non-medical: Not on file Occupational History  Occupation: Retired-[employer]  
Tobacco Use  Smoking status: Former Smoker Packs/day: 1.00 Years: 31.00 Pack years: 31.00 Types: Pipe, Cigars Last attempt to quit: 1995 Years since quittin.1  Smokeless tobacco: Never Used Substance and Sexual Activity  Alcohol use: No  
 Drug use: No  
 Sexual activity: Yes  
  Partners: Female Other Topics Concern  Not on file Social History Narrative  Not on file Family History Problem Relation Age of Onset  Cancer Mother Bone and Brain Cancer  Diabetes Mother  Liver Disease Father Lung Cancer  Kidney Disease Sister  Diabetes Daughter  Colon Cancer Brother Allergies Allergen Reactions  Ciprofloxacin Anaphylaxis  Other Plant, Animal, Environmental Other (comments) Patient cannot have MRI. Patient states that he has metal screws in his left arm.  Lamisil [Terbinafine] Swelling Tongue swelling  Metformin Other (comments) Elevated cr 1.4  Morphine Other (comments) \"loss of blood pressure\"  Other Medication Other (comments) Doxasylin causes extreme GERD  Pcn [Penicillins] Rash  Pentothal [Thiopental Sodium] Shortness of Breath  Sulfa (Sulfonamide Antibiotics) Rash Past Medical History:  
Diagnosis Date  Arthritis  Asthma  Cancer ([facility] [address] BASAL  Cardiac catheterization 2009 [facility]:  No significant CAD.  Cardiac echocardiogram 01/20/2014 EF 50-55%. No WMA. Gr 1 DDfx. RVSP 25-30 mmHg. Mild TR.  DM type 2 (diabetes mellitus, type 2) ([facility] [address]  Enlarged prostate  Failed back syndrome  GERD (gastroesophageal reflux disease)  Hearing loss, bilateral   
 Hearing Aids  Hypertension  Hypothyroidism  Insomnia  Low serum testosterone level  Neuropathy  Osteoarthritis of multiple joints  S/P colonoscopy 8/14/13  
 mild diverticulosis in sigmoid colon w/o evidence of diverticulitis; f/u 5 years  SOB (shortness of breath)   
 chronic; 2' \"lung fever\"  Vertigo Past Surgical History:  
Procedure Laterality Date  COLONOSCOPY N/A 4/3/2018 COLONOSCOPY performed by Christa Lea MD at [address] [place] [address]  2000  
 removal of defective hardware [address]  
 to remove bone fragments  HX LUMBAR FUSION  1999  
 fusion from L4-S1 and implantation of hardware  HX LUMBAR FUSION  1984  
 fusion on L5 area  HX ORTHOPAEDIC Bilateral 2004  
 trigger finger syndrome-all 4 fingers  HX ORTHOPAEDIC Left 2004  
 left arm torn muscle repair and placement of 2 screws  HX OTHER SURGICAL    
 skin cancer removal  
 
Current Outpatient Medications on File Prior to Visit Medication Sig  
 fluticasone (FLONASE) 50 mcg/actuation nasal spray USE 1 SPRAY(S) IN EACH NOSTRIL ONCE DAILY  DULoxetine (CYMBALTA) 60 mg capsule Take 1 Cap by mouth two (2) times a day.  tamsulosin (FLOMAX) 0.4 mg capsule TAKE 1 CAPSULE BY MOUTH ONCE DAILY  fenofibrate nanocrystallized (TRICOR) 145 mg tablet Take 1 Tab by mouth daily.  levothyroxine (SYNTHROID) 150 mcg tablet Take 1 Tab by mouth Daily (before breakfast).  lisinopril (PRINIVIL, ZESTRIL) 5 mg tablet Take 1 Tab by mouth daily.  metoprolol tartrate (LOPRESSOR) 25 mg tablet Take 1 Tab by mouth two (2) times a day.  montelukast (SINGULAIR) 10 mg tablet Take 1 Tab by mouth daily.  senna (SENNA) 8.6 mg tablet Take 1 Tab by mouth daily as needed for Constipation.  TENS Units (TENS 504) chaz Please provide patient with a TENS unit to help improve function, improve quality of life, reduce medication use, improve ROM. 58-year-old male with chronic lower back pain secondary to previous work-related injuries and subsequent surgeries.  insulin glulisine U-100 (APIDRA SOLOSTAR U-100 INSULIN) 100 unit/mL pen by SubCUTAneous route.  pregabalin (LYRICA) 150 mg capsule TAKE 1 CAPSULE BY MOUTH THREE TIMES DAILY FOR 30 DAYS. MAXIMUM 3 CAPSULES DAILY.  atorvastatin (LIPITOR) 20 mg tablet TAKE ONE TABLET BY MOUTH ONCE DAILY  OTHER EB-N3 once daily  FANI PEN NEEDLE 32 gauge x 5/32\" ndle USE AT BEDTIME  JANUVIA 100 mg tablet Take 50 mg by mouth daily. Indications: patient stated he was given 50 mg  ACCU-CHEK JAZZY PLUS TEST STRP strip  ACCU-CHEK SOFTCLIX LANCETS [facility]  albuterol (PROVENTIL VENTOLIN) 2.5 mg /3 mL (0.083 %) nebulizer solution 3 mL by Nebulization route every four (4) hours as needed for Wheezing or Shortness of Breath.  PEN NEEDLE, DIABETIC (BD ULTRA-FINE FANI PEN NEEDLES) by Does Not Apply route. 4mm x 32G  insulin glulisine (APIDRA SOLOSTAR) 100 unit/mL pen 2 units per carb consumed. Max 30 / day (Patient taking differently: 2 units per carb consumed. Max 30 / day  Indications: patient states taking 15-20 units three times a day)  insulin glargine (BASAGLAR KWIKPEN) 100 unit/mL (3 mL) pen 52 units at bedtime (Patient taking differently: 25 Units. 52 units at bedtime)  PEN NEEDLE 31 gauge x 5/16\" ndle USE ONE PEN NEEDLE FOR LANTUS FLEXPEN AND 3 PEN NEEDLES FOR APIDRA WITH EACH MEAL  esomeprazole (NEXIUM) 40 mg capsule Take 40 mg by mouth two (2) times a day.  betamethasone dipropionate (DIPROLENE) 0.05 % ointment  budesonide-formoterol (SYMBICORT) 160-4.5 mcg/actuation HFA inhaler Take 2 Puffs by inhalation two (2) times a day.  tiotropium (SPIRIVA) 18 mcg inhalation capsule Take 1 Cap by inhalation daily.  ketorolac (ACULAR) 0.5 % ophthalmic solution Administer 1 Drop to both eyes two (2) times a day.  [] docusate sodium (COLACE) 100 mg capsule Take 1 Cap by mouth two (2) times daily as needed for Constipation for up to 90 days.  naloxone 4 mg/actuation spry 4 mg by Nasal route as needed. No current facility-administered medications on file prior to visit. ROS: 
Denies fever, chills, nausea, vomiting, diarrhea, constipation, abdominal pain, chest pain, shortness or breath/trouble breathing, weakness, trouble swallowing, changes in vision, changes in hearing, dizziness, bladder incontinence, bowel incontinence, depression, anxiety, suicidal ideations, homicidal ideations or alcohol use. Positive findings include recurrent falls. Opioid specific risk:obesity, DM, asthma, GERD, sleep disturbances. Vitals:  
 02/12/19 1118 BP: 148/86 Pulse: 81 Resp: 16 Temp: 97.7 °F (36.5 °C) TempSrc: Oral  
SpO2: 97% Weight: 122.5 kg (270 lb) Height: 5' 11\" (1.803 m) PainSc:   7 PainLoc: Back Imaging: 
\"\"\" 9/13/18 MRI spine lumbar without contrast 
IMPRESSION: 
  
1. Multilevel degenerative findings along postsurgical lumbar spine. 
-Most notable findings are at L2-3 where there is a moderate to severe spinal 
stenosis from combined disc and facet pathology, with notable contribution from 
bulging posterior epidural fat. This and other levels as detailed above. \"\"\"\" Labs: BUN/Cr: \"\"\" 18/1.36(H) 2/27/18\"\"\" AST/ALT: \"\"\" 21/39 2/27/18\"\"\" Physical exam: 
AFVS elevated BP, no acute distress, overweight body habitus. A&OXs 3. Normocephalic, atraumatic. Conjugate gaze, clear sclerae. Speech is clear and appropriate. Mood is appropriate and patient is cooperative. Gait and balance are within functional limits. Non-labored breathing. No acute distress noted. LE: Full AROM lumbar flexion decreased by 75% to endrange reproduction of primary pain. Full AROM lumbar extension with upright posture reproduction of primary pain. Strength for right lower extremity is 5/5 for hip flexion, knee extension, dorsiflexion, extensor hallucis longus, plantar flexion. Strength for left lower extremity is 5/5 for hip flexion, knee extension, dorsiflexion, extensor hallucis longus, plantar flexion. Sensation to light touch is intact for right L2, 3, 5 ,S2. Decreased sensation L4, S1 Sensation to light touch is intact for left L2-S2. Negative seated straight leg raise bilaterally. TTP right GT. Calculated MEQ - 10 Naloxone rescue - no Prophylactic bowel program - no Date of last OCA 11/13/18 Last UDS today, consistent POC, pending confirmatory testing  date checked today, findings consistent Primary Care Physician Alex Johnson 
 [address] [facility] [address] Al[place] Daugherty [address]rit[address] [facility] 
[phone] Today   Last Visit  Prior Visit ORT -       
PGIC - 2 and 4 1 and 5    
DANO - 60%  52% COMM - 7  11 PHQ -- . 
3 most recent PHQ Screens 2/12/2019 PHQ Not Done - Little interest or pleasure in doing things Not at all Feeling down, depressed, irritable, or hopeless Not at all Total Score PHQ 2 0  
 
 
DSM V-OUD Screen -negative Assessment/Plan: ICD-10-CM ICD-9-CM 1. Chronic low back pain, unspecified back pain laterality, with sciatica presence unspecified M54.5 724.2 G89.29 338.29   
2. Disorder of meninges G96.19 349.2 3. Degeneration of lumbar or lumbosacral intervertebral disc M51.37 722.52   
4. Encounter for long-term (current) use of high-risk medication Z79.899 V58.69 DRUG SCREEN  
   AMB POC DRUG SCREEN () 5. Chronic pain syndrome G89.4 338.4 6. Other polyneuropathy G62.89 357.89 Ordered compound cream to be used 3-4 times daily as needed. ([facility]) Referral for reach orthotics for  LSO to improve function, improve quality of life, possibly decrease the need for pharmaceuticals. Pt to continue TENS unit as needed for pain Pt to continue Follow-up with PCP for discussion on aggressive wt loss strategies Reordered Lyrica 150 mg 3 times daily Patient to continue Cymbalta 60 mg capsule 1 capsule twice daily for chronic pain Do not recommend long term opioid therapy for this patient at this time for their chronic pain; the risks outweigh the potential benefits. Pt currently taking oxycodone/acetaminophen 5/325 mg tablet 1 tablet up to twice daily as needed with a total of 50 tablets of budgeted over 30 days. Their MME is 10. Today, we will continue the weaning of patients opioid medication with a goal of being opioid free, pending safety and compliance.  Pt instructed to call if they experience any signs of withdrawal (diarrhea, nausea, vomiting, sweating or chills, agitation, itching). Today, pt given prescription for oxycodone/acetaminophen 5/325 mg tablet 1 tablet up to twice daily as needed with a total of 45 tablets of budgeted over 30 days. Their new MME is 10. Pt instructed to call 5-7 days before they run out of their medications for refill. At this time pt will be provided with oxycodone/acetaminophen 5/325 mg tablet 1 tablet up to twice daily as needed with a total of 40 tablets of budgeted over 30 days pending safety and compliance. At following refill, pt will be provided with oxycodone/acetaminophen 5/325 mg tablet 1 tablet up to twice daily as needed with a total of 35 tablets of budgeted over 30 days pending safety and compliance. At next office visit, the plan is to provide patient with oxycodone/acetaminophen 5/325 mg tablet 1 tablet up to twice daily as needed with a total of 30 tablets of budgeted over 30 days. Their new MME will be 5. If patient has difficulty with the wean or difficulty with cravings we will consider referral to mental health for ongoing assessment and treatment for opioid use disorder. Pt may benefit from massage therapy; list of [facility] locations given to patient that provide this service. Consider SCS trial, Follow up ongoing assessment and ongoing development of integrative and comprehensive plan of care for chronic pain. Goals: To establish complementary and integrative plan of care to address chronic pain issues while minimizing pharmaceuticals to maximize patient's function improve quality of life. Education: 
Patient again educated on the importance of strict compliance with the opioid care agreement while on opioid therapy. Patient also again educated that they should avoid driving while on chronic opioid therapy. Also advised to avoid alcohol and to avoid benzodiazepines while on opioid therapy. Handouts given regarding opioid safety ad sleep health. Patient given handout information on medical risk of long-term opioid use, spinal cord stimulator, treat your own back book by Trevon Bains. Patient Homework: 
Continue to independently investigate yoga for persons with chronic pain. Follow-up Disposition: 
Return in about 3 months (around 5/12/2019). [address] was used for portions of this report. Unintended errors may occur.

## 2019-02-12 NOTE — PROGRESS NOTES
Nursing Notes Patient presents to the office today in follow-up. Patient rates his pain at 7/10 on the numerical pain scale. Reviewed medications with counts as follows:   
Rx Date filled Qty Dispensed Pill Count Last Dose Short Hydrocodone 5-325 mg 11/13/18 50 9 yesterday no  
lyrica 150 mg 1/8/19 90 0 today no Last opioid agreement 11/13/18 Last urine drug screen 8/20/18 
3 most recent PHQ Screens 2/12/2019 PHQ Not Done - Little interest or pleasure in doing things Not at all Feeling down, depressed, irritable, or hopeless Not at all Total Score PHQ 2 0 Comments: POC UDS was performed in office today Any new labs or imaging since last appointment? NO Have you been to an emergency room (ER) or urgent care clinic since your last visit? NO Have you been hospitalized since your last visit? NO If yes, where, when, and reason for visit? Have you seen or consulted any other health care providers outside of the [address] [place] [address]  since your last visit? YES If yes, where, when, and reason for visit? Mr. Jennifer Mckinnon has a reminder for a \"due or due soon\" health maintenance. I have asked that he contact his primary care provider for follow-up on this health maintenance.

## 2019-02-13 ENCOUNTER — OFFICE VISIT (OUTPATIENT)
Dept: FAMILY MEDICINE CLINIC | Age: 69
End: 2019-02-13

## 2019-02-13 ENCOUNTER — HOSPITAL ENCOUNTER (OUTPATIENT)
Dept: GENERAL RADIOLOGY | Age: 69
Discharge: HOME | End: 2019-02-13
Payer: MEDICARE

## 2019-02-13 VITALS
OXYGEN SATURATION: 100 % | DIASTOLIC BLOOD PRESSURE: 70 MMHG | HEART RATE: 78 BPM | BODY MASS INDEX: 38.98 KG/M2 | SYSTOLIC BLOOD PRESSURE: 120 MMHG | WEIGHT: 278.4 LBS | TEMPERATURE: 98 F | RESPIRATION RATE: 16 BRPM | HEIGHT: 71 IN
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
## 2019-02-13 DIAGNOSIS — R07.81 RIB PAIN: ICD-10-CM

## 2019-02-13 DIAGNOSIS — R07.89 CHEST WALL PAIN: Primary | ICD-10-CM

## 2019-02-13 DIAGNOSIS — R07.89 CHEST WALL PAIN: ICD-10-CM

## 2019-02-13 PROCEDURE — 71100 X-RAY EXAM RIBS UNI 2 VIEWS: CPT

## 2019-02-13 NOTE — PROGRESS NOTES
1. Have you been to the ER, urgent care clinic since your last visit? Hospitalized since your last visit? No 
 
2. Have you seen or consulted any other health care providers outside of the [address] [place] [address] since your last visit? Include any pap smears or colon screening. Pain Management LOV: 11/13/18, Dr. Guillermina Rivers: 12/2018.

## 2019-02-13 NOTE — PATIENT INSTRUCTIONS
Musculoskeletal Chest Pain: Care Instructions Your Care Instructions Chest pain is not always a sign that something is wrong with your heart or that you have another serious problem. The doctor thinks your chest pain is caused by strained muscles or ligaments, inflamed chest cartilage, or another problem in your chest, rather than by your heart. You may need more tests to find the cause of your chest pain. Follow-up care is a key part of your treatment and safety. Be sure to make and go to all appointments, and call your doctor if you are having problems. It's also a good idea to know your test results and keep a list of the medicines you take. How can you care for yourself at home? · Take pain medicines exactly as directed. ? If the doctor gave you a prescription medicine for pain, take it as prescribed. ? If you are not taking a prescription pain medicine, ask your doctor if you can take an over-the-counter medicine. · Rest and protect the sore area. · Stop, change, or take a break from any activity that may be causing your pain or soreness. · Put ice or a cold pack on the sore area for 10 to 20 minutes at a time. Try to do this every 1 to 2 hours for the next 3 days (when you are awake) or until the swelling goes down. Put a thin cloth between the ice and your skin. · After 2 or 3 days, apply a heating pad set on low or a warm cloth to the area that hurts. Some doctors suggest that you go back and forth between hot and cold. · Do not wrap or tape your ribs for support. This may cause you to take smaller breaths, which could increase your risk of lung problems. · Mentholated creams such as Bengay or Icy Hot may soothe sore muscles. Follow the instructions on the package. · Follow your doctor's instructions for exercising. · Gentle stretching and massage may help you get better faster.  Stretch slowly to the point just before pain begins, and hold the stretch for at least 15 to 30 seconds. Do this 3 or 4 times a day. Stretch just after you have applied heat. · As your pain gets better, slowly return to your normal activities. Any increased pain may be a sign that you need to rest a while longer. When should you call for help? Call 911 anytime you think you may need emergency care. For example, call if: 
  · You have chest pain or pressure. This may occur with: ? Sweating. ? Shortness of breath. ? Nausea or vomiting. ? Pain that spreads from the chest to the neck, jaw, or one or both shoulders or arms. ? Dizziness or lightheadedness. ? A fast or uneven pulse. After calling 911, chew 1 adult-strength aspirin. Wait for an ambulance. Do not try to drive yourself.  
  · You have sudden chest pain and shortness of breath, or you cough up blood.  
 Call your doctor now or seek immediate medical care if: 
  · You have any trouble breathing.  
  · Your chest pain gets worse.  
  · Your chest pain occurs consistently with exercise and is relieved by rest.  
 Watch closely for changes in your health, and be sure to contact your doctor if: 
  · Your chest pain does not get better after 1 week. Where can you learn more? Go to http://slava-enrique.info/. Enter V293 in the search box to learn more about \"Musculoskeletal Chest Pain: Care Instructions. \" Current as of: September 23, 2018 Content Version: 11.9 © 4406-6933 [employer]. Care instructions adapted under license by [employer] (which disclaims liability or warranty for this information). If you have questions about a medical condition or this instruction, always ask your healthcare professional. [place] [address] any warranty or liability for your use of this information.

## 2019-02-13 NOTE — PROGRESS NOTES
HISTORY OF PRESENT ILLNESS James Gutierrez is a 77 y.o. male. HPI; Here with c/o left side chest wall pain, rib pain. Naveen Savory while coming out of bathroom, mechanical fall. Directly hit the floor over left side. No shoulder pain. No head pain. Localized rib pain over left side. No localized swelling or bruise noted. No sob. No nausea or vomiting. No ext weakness. No injury anywhere else. Visit Vitals /70 (BP 1 Location: Left arm, BP Patient Position: Sitting) Pulse 78 Temp 98 °F (36.7 °C) (Oral) Resp 16 Ht 5' 11\" (1.803 m) Wt 278 lb 6.4 oz (126.3 kg) SpO2 100% BMI 38.83 kg/m² Review medication list, vitals, problem list,allergies. He is on pain medication for his chronic back pain. Tried heating pad and it has been helping some. Average 7-8/10 intensity. No radiation of pain. Worse with taking deep breath. ROS: see HPI Physical Exam  
Constitutional: No distress. Cardiovascular: Normal heart sounds. Pulmonary/Chest: No respiratory distress. He has no wheezes. Localized tenderness over rib mid clavicular line just below nipple area. No swelling or redness. No bruise noted. ASSESSMENT and PLAN 
  ICD-10-CM ICD-9-CM 1. Chest wall pain: obtain rib x-ray and for now symptomatic treatment. Does not require any more pain medication. Heating pad. F/u next visit or sooner if needed. Discussed if develop any sob. Wheezing, cough need to call office sooner or go to ER.  R07.89 786.52 XR RIBS LT UNI 2 V  
2. Rib pain R07.81 786.50 XR RIBS LT UNI 2 V  
3. Fall, initial encounter [address] [facility] [address]. XXXA E888.9 XR RIBS LT UNI 2 V Pt understood and agree with the plan Follow-up Disposition: 
Return in about 1 month (around 3/13/2019).

## 2019-02-14 NOTE — PROGRESS NOTES
Contacted patient and verified identity using name and date of birth (2- identifiers)  Spoke with patient and he verbalized understanding of no acute process on xray. Advise our office if sx persist or worsen for further workup.

## 2019-02-14 NOTE — PROGRESS NOTES
Let pt know that rib x-ray showed no acute process. If pain persist or get worse will consider further work up.

## 2019-02-18 ENCOUNTER — OFFICE VISIT (OUTPATIENT)
Dept: CARDIOLOGY CLINIC | Age: 69
End: 2019-02-18

## 2019-02-18 VITALS — HEIGHT: 71 IN | WEIGHT: 275 LBS | OXYGEN SATURATION: 95 % | BODY MASS INDEX: 38.5 KG/M2 | HEART RATE: 77 BPM

## 2019-02-18 DIAGNOSIS — E78.5 HYPERLIPIDEMIA, UNSPECIFIED HYPERLIPIDEMIA TYPE: ICD-10-CM

## 2019-02-18 DIAGNOSIS — J44.9 CHRONIC OBSTRUCTIVE PULMONARY DISEASE, UNSPECIFIED COPD TYPE (HCC): ICD-10-CM

## 2019-02-18 DIAGNOSIS — Z79.4 TYPE 2 DIABETES MELLITUS WITH COMPLICATION, WITH LONG-TERM CURRENT USE OF INSULIN (HCC): ICD-10-CM

## 2019-02-18 DIAGNOSIS — E11.8 TYPE 2 DIABETES MELLITUS WITH COMPLICATION, WITH LONG-TERM CURRENT USE OF INSULIN (HCC): ICD-10-CM

## 2019-02-18 DIAGNOSIS — I10 ESSENTIAL HYPERTENSION: Primary | ICD-10-CM

## 2019-02-18 NOTE — PROGRESS NOTES
HISTORY OF PRESENT ILLNESS Joce Sutton is a 77 y.o. male. Follow-up Associated symptoms include shortness of breath. Pertinent negatives include no chest pain, no abdominal pain and no headaches. Hypertension Associated symptoms include shortness of breath. Pertinent negatives include no chest pain, no abdominal pain and no headaches. Shortness of Breath Pertinent negatives include no fever, no headaches, no cough, no wheezing, no PND, no orthopnea, no chest pain, no vomiting, no abdominal pain, no rash, no leg swelling and no claudication. Patient presents for a follow-up office visit. He has a past medical history significant for hypertension, dyslipidemia,  diabetes mellitus, and COPD. He was initially referred here for evaluation of dyspnea on exertion and tachycardia with activity. The patient does not have a previous cardiac history. He does have a history of atypical chest pain, and reports a significant cardiac workup while living in [address] [place] [address] back in 2009 which included a cardiac catheterization showing no significant coronary artery disease. The patient underwent an echocardiogram in January 2014, showing an LVEF of 27-01%, grade 1 diastolic dysfunction, in no significant valvular heart disease. Normal PA pressures. The patient was last seen in the office 12 months ago. Since last visit, he he has been feeling about the same. He still has shortness of breath with any exertional activity but has been present for many many years. No prolonged chest pain or pressure. No major change in his activity level. He occasionally notices heart palpitations at night which will last for a minute or 2 at most in duration. Past Medical History:  
Diagnosis Date  Arthritis  Asthma  Cancer ([facility] [address] BASAL  Cardiac catheterization 2009 [address] [place] [address]:  No significant CAD.  Cardiac echocardiogram 01/20/2014 EF 50-55%. No WMA. Gr 1 DDfx. RVSP 25-30 mmHg. Mild TR.  DM type 2 (diabetes mellitus, type 2) ([address]  Enlarged prostate  Failed back syndrome  GERD (gastroesophageal reflux disease)  Hearing loss, bilateral   
 Hearing Aids  Hypertension  Hypothyroidism  Insomnia  Low serum testosterone level  Neuropathy  Osteoarthritis of multiple joints  S/P colonoscopy 8/14/13  
 mild diverticulosis in sigmoid colon w/o evidence of diverticulitis; f/u 5 years  SOB (shortness of breath)   
 chronic; 2' \"lung fever\"  Vertigo Current Outpatient Medications Medication Sig Dispense Refill  pregabalin (LYRICA) 150 mg capsule TAKE 1 CAPSULE BY MOUTH THREE TIMES DAILY FOR 30 DAYS. MAXIMUM 3 CAPSULES DAILY. 90 Cap 2  
 oxyCODONE-acetaminophen (PERCOCET) 5-325 mg per tablet 1 tab up to twice daily as needed with a total of 45 tab to be budgeted over 30 days. 45 Tab 0  
 fluticasone (FLONASE) 50 mcg/actuation nasal spray USE 1 SPRAY(S) IN EACH NOSTRIL ONCE DAILY 1 Bottle 0  
 DULoxetine (CYMBALTA) 60 mg capsule Take 1 Cap by mouth two (2) times a day. 60 Cap 3  
 tamsulosin (FLOMAX) 0.4 mg capsule TAKE 1 CAPSULE BY MOUTH ONCE DAILY 90 Cap 3  
 fenofibrate nanocrystallized (TRICOR) 145 mg tablet Take 1 Tab by mouth daily. 90 Tab 1  
 levothyroxine (SYNTHROID) 150 mcg tablet Take 1 Tab by mouth Daily (before breakfast). 90 Tab 1  
 lisinopril (PRINIVIL, ZESTRIL) 5 mg tablet Take 1 Tab by mouth daily. 90 Tab 1  
 metoprolol tartrate (LOPRESSOR) 25 mg tablet Take 1 Tab by mouth two (2) times a day. 180 Tab 1  
 montelukast (SINGULAIR) 10 mg tablet Take 1 Tab by mouth daily. 90 Tab 1  
 senna (SENNA) 8.6 mg tablet Take 1 Tab by mouth daily as needed for Constipation. 30 Tab 2  
 TENS Units (TENS 504) chaz Please provide patient with a TENS unit to help improve function, improve quality of life, reduce medication use, improve ROM. 64-year-old male with chronic lower back pain secondary to previous work-related injuries and subsequent surgeries. 1 Device 0  
 insulin glulisine U-100 (APIDRA SOLOSTAR U-100 INSULIN) 100 unit/mL pen by SubCUTAneous route.  atorvastatin (LIPITOR) 20 mg tablet TAKE ONE TABLET BY MOUTH ONCE DAILY 90 Tab 1  
 OTHER EB-N3 once daily  FANI PEN NEEDLE 32 gauge x 5/32\" ndle USE AT BEDTIME 100 Pen Needle 6  JANUVIA 100 mg tablet Take 50 mg by mouth daily. Indications: patient stated he was given 50 mg    
 naloxone 4 mg/actuation spry 4 mg by Nasal route as needed. 1 Box 1  ACCU-CHEK JAZZY PLUS TEST STRP strip  ACCU-CHEK SOFTCLIX LANCETS misc  albuterol (PROVENTIL VENTOLIN) 2.5 mg /3 mL (0.083 %) nebulizer solution 3 mL by Nebulization route every four (4) hours as needed for Wheezing or Shortness of Breath. 1 Package 5  
 PEN NEEDLE, DIABETIC (BD ULTRA-FINE FANI PEN NEEDLES) by Does Not Apply route. 4mm x 32G  insulin glulisine (APIDRA SOLOSTAR) 100 unit/mL pen 2 units per carb consumed. Max 30 / day (Patient taking differently: 2 units per carb consumed. Max 30 / day  Indications: patient states taking 15-20 units three times a day) 5 Pen 3  
 insulin glargine (BASAGLAR KWIKPEN) 100 unit/mL (3 mL) pen 52 units at bedtime (Patient taking differently: 52 Units. 52 units at bedtime) 3 Pen 3  
 PEN NEEDLE 31 gauge x 5/16\" ndle USE ONE PEN NEEDLE FOR LANTUS FLEXPEN AND 3 PEN NEEDLES FOR APIDRA WITH EACH MEAL 1 Package 3  
 esomeprazole (NEXIUM) 40 mg capsule Take 40 mg by mouth two (2) times a day.  betamethasone dipropionate (DIPROLENE) 0.05 % ointment  budesonide-formoterol (SYMBICORT) 160-4.5 mcg/actuation HFA inhaler Take 2 Puffs by inhalation two (2) times a day. 1 Inhaler 5  
 tiotropium (SPIRIVA) 18 mcg inhalation capsule Take 1 Cap by inhalation daily. 30 Cap 5  
 ketorolac (ACULAR) 0.5 % ophthalmic solution Administer 1 Drop to both eyes two (2) times a day. Allergies Allergen Reactions  Ciprofloxacin Anaphylaxis  Other Plant, Animal, Environmental Other (comments) Patient cannot have MRI. Patient states that he has metal screws in his left arm.  Lamisil [Terbinafine] Swelling Tongue swelling  Metformin Other (comments) Elevated cr 1.4  Morphine Other (comments) \"loss of blood pressure\"  Other Medication Other (comments) Doxasylin causes extreme GERD  Pcn [Penicillins] Rash  Pentothal [Thiopental Sodium] Shortness of Breath  Sulfa (Sulfonamide Antibiotics) Rash Social History Tobacco Use  Smoking status: Former Smoker Packs/day: 1. Years: . Pack years: 31. Types: Pipe, Cigars Last attempt to quit: 1995 Years since quittin.1  Smokeless tobacco: Never Used Substance Use Topics  Alcohol use: No  
 Drug use: No  
   
  
 
Review of Systems Constitutional: Negative for chills, fever and weight loss. HENT: Negative for nosebleeds. Eyes: Negative for blurred vision and double vision. Respiratory: Positive for shortness of breath. Negative for cough and wheezing. Cardiovascular: Positive for palpitations. Negative for chest pain, orthopnea, claudication, leg swelling and PND. Gastrointestinal: Negative for abdominal pain, heartburn, nausea and vomiting. Genitourinary: Negative for dysuria and hematuria. Musculoskeletal: Negative for falls and myalgias. Skin: Negative for rash. Neurological: Negative for dizziness, focal weakness and headaches. Endo/Heme/Allergies: Does not bruise/bleed easily. Psychiatric/Behavioral: Negative for substance abuse. Visit Vitals Pulse 77 Ht 5' 11\" (1.803 m) Wt 124.7 kg (275 lb) SpO2 95% BMI 38.35 kg/m² Physical Exam  
Constitutional: He is oriented to person, place, and time. He appears well-developed and well-nourished. HENT:  
Head: Normocephalic and atraumatic. Eyes: Conjunctivae are normal.  
Neck: Neck supple. No JVD present. Carotid bruit is not present. Cardiovascular: Normal rate, regular rhythm, S1 normal, S2 normal and normal pulses. Exam reveals distant heart sounds. Exam reveals no gallop and no S3. No murmur heard. Pulmonary/Chest: Breath sounds normal. He has no wheezes. He has no rales. Abdominal: Soft. Bowel sounds are normal. There is no tenderness. Musculoskeletal: He exhibits no edema. Neurological: He is alert and oriented to person, place, and time. Skin: Skin is warm and dry. EKG: Normal sinus rhythm, borderline low voltage,  No ST or T wave abnormalities concerning for ischemia. Compared to the previous EKG, no significant interval change. ASSESSMENT and PLAN Essential hypertension. Patient's blood pressure remains well controlled on his current medical regimen. All of which I would continue. Mixed dyslipidemia. Patient is now taking both TriCor and atorvastatin 20 mg daily. This is being followed by his PCP. Heart palpitations. Symptoms only lasting a minute or 2 at most in duration. I suspect the patient is you having APCs or PVCs. These only occur at night. No further workup needed unless they become more prolonged or pronounced. Diabetes mellitus. According to the patient this is now better controlled. This is followed by endocrinology. Chronic dyspnea on exertion. This is felt to be related to chronic pulmonary disease  and deconditioning. No further cardiac workup needed. The patient can follow up annually, sooner if needed.

## 2019-02-18 NOTE — PROGRESS NOTES
Silvana Andrews presents today for Chief Complaint Patient presents with  Follow-up 1 year follow up Silvana Andrews preferred language for health care discussion is english/other. Is someone accompanying this pt? No 
 
Is the patient using any DME equipment during OV? No 
 
Depression Screening: 
3 most recent PHQ Screens 2/12/2019 PHQ Not Done - Little interest or pleasure in doing things Not at all Feeling down, depressed, irritable, or hopeless Not at all Total Score PHQ 2 0 Learning Assessment: 
Learning Assessment 5/22/2018 PRIMARY LEARNER Patient HIGHEST LEVEL OF EDUCATION - PRIMARY LEARNER  GRADUATED HIGH SCHOOL OR GED  
BARRIERS PRIMARY LEARNER NONE  
  -  
[address] CAREGIVER -  
CO-LEARNER NAME -  
CO-LEARNER HIGHEST LEVEL OF EDUCATION -  
[address] -  
PRIMARY LANGUAGE ENGLISH  
PRIMARY LANGUAGE CO-LEARNER -  
 NEED -  
LEARNER PREFERENCE PRIMARY DEMONSTRATION  
  -  
  -  
LEARNER [address] -  
ANSWERED BY patient RELATIONSHIP SELF  
ASSESSMENT COMMENT -  
 
 
Abuse Screening: 
Abuse Screening Questionnaire 5/22/2018 Do you ever feel afraid of your partner? Jane Bernal Are you in a relationship with someone who physically or mentally threatens you? Jane Bernal Is it safe for you to go home? Ilene Mortimer Fall Risk Fall Risk Assessment, last 12 mths 2/18/2019 Able to walk? Yes Fall in past 12 months? Yes Fall with injury? Yes  
Number of falls in past 12 months 8 or more Fall Risk Score 9 Pt currently taking Anticoagulant or Antiplatelet therapy? Yes  
Coordination of Care: 1. Have you been to the ER, urgent care clinic since your last visit? Hospitalized since your last visit? No 
 
2. Have you seen or consulted any other health care providers outside of the [address] [place] [address] since your last visit? Include any pap smears or colon screening. No 
 
Has patient had recent lab work or cardiac testing?  No 
 
 Patient verified medications verbally

## 2019-03-01 ENCOUNTER — HOSPITAL ENCOUNTER (OUTPATIENT)
Dept: LAB | Age: 69
Discharge: HOME | End: 2019-03-01
Payer: MEDICARE

## 2019-03-01 ENCOUNTER — LAB ONLY (OUTPATIENT)
Dept: UROLOGY | Age: 69
End: 2019-03-01

## 2019-03-01 DIAGNOSIS — N40.1 BPH ASSOCIATED WITH NOCTURIA: ICD-10-CM

## 2019-03-01 DIAGNOSIS — N40.1 BPH ASSOCIATED WITH NOCTURIA: Primary | ICD-10-CM

## 2019-03-01 DIAGNOSIS — R35.1 BPH ASSOCIATED WITH NOCTURIA: ICD-10-CM

## 2019-03-01 DIAGNOSIS — R35.1 BPH ASSOCIATED WITH NOCTURIA: Primary | ICD-10-CM

## 2019-03-01 LAB — PSA SERPL-MCNC: 0.3 NG/ML (ref 0–4)

## 2019-03-01 PROCEDURE — 84153 ASSAY OF PSA TOTAL: CPT

## 2019-03-04 ENCOUNTER — OFFICE VISIT (OUTPATIENT)
Dept: UROLOGY | Age: 69
End: 2019-03-04

## 2019-03-04 VITALS
DIASTOLIC BLOOD PRESSURE: 60 MMHG | HEART RATE: 68 BPM | HEIGHT: 71 IN | SYSTOLIC BLOOD PRESSURE: 128 MMHG | OXYGEN SATURATION: 94 % | BODY MASS INDEX: 37.52 KG/M2 | WEIGHT: 268 LBS

## 2019-03-04 DIAGNOSIS — N40.1 BENIGN PROSTATIC HYPERPLASIA WITH NOCTURIA: Primary | ICD-10-CM

## 2019-03-04 DIAGNOSIS — R35.1 BENIGN PROSTATIC HYPERPLASIA WITH NOCTURIA: Primary | ICD-10-CM

## 2019-03-04 LAB
BILIRUB UR QL STRIP: NEGATIVE
GLUCOSE UR-MCNC: NEGATIVE MG/DL
KETONES P FAST UR STRIP-MCNC: NEGATIVE MG/DL
PH UR STRIP: 5.5 [PH] (ref 4.6–8)
PROT UR QL STRIP: NEGATIVE
SP GR UR STRIP: 1.01 (ref 1–1.03)
UA UROBILINOGEN AMB POC: NORMAL (ref 0.2–1)
URINALYSIS CLARITY POC: CLEAR
URINALYSIS COLOR POC: YELLOW
URINE BLOOD POC: NEGATIVE
URINE LEUKOCYTES POC: NEGATIVE
URINE NITRITES POC: NEGATIVE

## 2019-03-04 RX ORDER — TAMSULOSIN HYDROCHLORIDE 0.4 MG/1
0.4 CAPSULE ORAL 2 TIMES DAILY
Qty: 180 CAP | Refills: 3 | Status: SHIPPED | OUTPATIENT
Start: 2019-03-04 | End: 2019-03-21 | Stop reason: SDUPTHER

## 2019-03-04 NOTE — PROGRESS NOTES
MrMaritza Powers has a reminder for a \"due or due soon\" health maintenance. I have asked that he contact his primary care provider for follow-up on this health maintenance.

## 2019-03-04 NOTE — PROGRESS NOTES
Felipe Rear 77 y.o. male Mr. Suzy Mckee seen today for annual follow-up symptomatic BPH currently on alpha-blocker therapy with Flomax 0.4 mg twice daily with patient reporting normal urination-solid steady stream with good control nocturia once per night No complaints regarding urination at this time Hematocrit 43.1  \" 
  
  
PSA 0.3 in September 2014 PSA 0.2 in March 2015 PSA 0.6 in March 2016 PSA 0.3 in March 2017 PSA 0.3 in March 2019 PVR 28 cc in 2018 PVR 60 cc in March 2019 
   
Review of Systems:   
CNS: No seizures syncope headaches or visual changes/ Vertigo/Peripheral neuropathy Respiratory: Shortness of breath Cardiovascular:Hypertension Intestinal:GERD Urinary: Hypogonadism Skeletal: No bone or joint pain Endocrine:Hypothyroidism Other: 
 
 
Allergies: Allergies Allergen Reactions  Ciprofloxacin Anaphylaxis  Other Plant, Animal, Environmental Other (comments) Patient cannot have MRI. Patient states that he has metal screws in his left arm.  Lamisil [Terbinafine] Swelling Tongue swelling  Metformin Other (comments) Elevated cr 1.4  Morphine Other (comments) \"loss of blood pressure\"  Other Medication Other (comments) Doxasylin causes extreme GERD  Pcn [Penicillins] Rash  Pentothal [Thiopental Sodium] Shortness of Breath  Sulfa (Sulfonamide Antibiotics) Rash Medications:   
Current Outpatient Medications Medication Sig Dispense Refill  tamsulosin (FLOMAX) 0.4 mg capsule Take 1 Cap by mouth two (2) times a day. 180 Cap 3  pregabalin (LYRICA) 150 mg capsule TAKE 1 CAPSULE BY MOUTH THREE TIMES DAILY FOR 30 DAYS. MAXIMUM 3 CAPSULES DAILY. 90 Cap 2  
 fluticasone (FLONASE) 50 mcg/actuation nasal spray USE 1 SPRAY(S) IN EACH NOSTRIL ONCE DAILY 1 Bottle 0  
 DULoxetine (CYMBALTA) 60 mg capsule Take 1 Cap by mouth two (2) times a day.  60 Cap 3  
 tamsulosin (FLOMAX) 0.4 mg capsule TAKE 1 CAPSULE BY MOUTH ONCE DAILY 90 Cap 3  
  fenofibrate nanocrystallized (TRICOR) 145 mg tablet Take 1 Tab by mouth daily. 90 Tab 1  
 levothyroxine (SYNTHROID) 150 mcg tablet Take 1 Tab by mouth Daily (before breakfast). 90 Tab 1  
 lisinopril (PRINIVIL, ZESTRIL) 5 mg tablet Take 1 Tab by mouth daily. 90 Tab 1  
 metoprolol tartrate (LOPRESSOR) 25 mg tablet Take 1 Tab by mouth two (2) times a day. 180 Tab 1  
 montelukast (SINGULAIR) 10 mg tablet Take 1 Tab by mouth daily. 90 Tab 1  TENS Units (TENS 504) chaz Please provide patient with a TENS unit to help improve function, improve quality of life, reduce medication use, improve ROM. 70-year-old male with chronic lower back pain secondary to previous work-related injuries and subsequent surgeries. 1 Device 0  
 insulin glulisine U-100 (APIDRA SOLOSTAR U-100 INSULIN) 100 unit/mL pen by SubCUTAneous route.  atorvastatin (LIPITOR) 20 mg tablet TAKE ONE TABLET BY MOUTH ONCE DAILY 90 Tab 1  
 OTHER EB-N3 once daily  FANI PEN NEEDLE 32 gauge x 5/32\" ndle USE AT BEDTIME 100 Pen Needle 6  JANUVIA 100 mg tablet Take 50 mg by mouth daily. Indications: patient stated he was given 50 mg  ACCU-CHEK JAZZY PLUS TEST STRP strip  ACCU-CHEK SOFTCLIX LANCETS misc  albuterol (PROVENTIL VENTOLIN) 2.5 mg /3 mL (0.083 %) nebulizer solution 3 mL by Nebulization route every four (4) hours as needed for Wheezing or Shortness of Breath. 1 Package 5  
 PEN NEEDLE, DIABETIC (BD ULTRA-FINE FANI PEN NEEDLES) by Does Not Apply route. 4mm x 32G  insulin glulisine (APIDRA SOLOSTAR) 100 unit/mL pen 2 units per carb consumed. Max 30 / day (Patient taking differently: 2 units per carb consumed. Max 30 / day  Indications: patient states taking 15-20 units three times a day) 5 Pen 3  
 insulin glargine (BASAGLAR KWIKPEN) 100 unit/mL (3 mL) pen 52 units at bedtime (Patient taking differently: 52 Units.  52 units at bedtime) 3 Pen 3  
  PEN NEEDLE 31 gauge x 5/16\" ndle USE ONE PEN NEEDLE FOR LANTUS FLEXPEN AND 3 PEN NEEDLES FOR APIDRA WITH EACH MEAL 1 Package 3  
 esomeprazole (NEXIUM) 40 mg capsule Take 40 mg by mouth two (2) times a day.  betamethasone dipropionate (DIPROLENE) 0.05 % ointment  oxyCODONE-acetaminophen (PERCOCET) 5-325 mg per tablet 1 tab up to twice daily as needed with a total of 45 tab to be budgeted over 30 days. 45 Tab 0  
 senna (SENNA) 8.6 mg tablet Take 1 Tab by mouth daily as needed for Constipation. 30 Tab 2  
 naloxone 4 mg/actuation spry 4 mg by Nasal route as needed. 1 Box 1  
 budesonide-formoterol (SYMBICORT) 160-4.5 mcg/actuation HFA inhaler Take 2 Puffs by inhalation two (2) times a day. 1 Inhaler 5  
 tiotropium (SPIRIVA) 18 mcg inhalation capsule Take 1 Cap by inhalation daily. 30 Cap 5  
 ketorolac (ACULAR) 0.5 % ophthalmic solution Administer 1 Drop to both eyes two (2) times a day. Past Medical History:  
Diagnosis Date  Arthritis  Asthma  Cancer ([facility] [address] BASAL  Cardiac catheterization 2009 [facility]:  No significant CAD.  Cardiac echocardiogram 01/20/2014 EF 50-55%. No WMA. Gr 1 DDfx. RVSP 25-30 mmHg. Mild TR.  DM type 2 (diabetes mellitus, type 2) ([facility] [address]  Enlarged prostate  Failed back syndrome  GERD (gastroesophageal reflux disease)  Hearing loss, bilateral   
 Hearing Aids  Hypertension  Hypothyroidism  Insomnia  Low serum testosterone level  Neuropathy  Osteoarthritis of multiple joints  S/P colonoscopy 8/14/13  
 mild diverticulosis in sigmoid colon w/o evidence of diverticulitis; f/u 5 years  SOB (shortness of breath)   
 chronic; 2' \"lung fever\"  Vertigo Past Surgical History:  
Procedure Laterality Date  COLONOSCOPY N/A 4/3/2018 COLONOSCOPY performed by Dominguez Coreas MD at [address] [place] [address] Street  2000  
 removal of defective hardware [place]  
 to remove bone fragments  HX LUMBAR FUSION    
 fusion from L4-S1 and implantation of hardware  HX LUMBAR FUSION  1984  
 fusion on L5 area  HX ORTHOPAEDIC Bilateral 2004  
 trigger finger syndrome-all 4 fingers  HX ORTHOPAEDIC Left 2004  
 left arm torn muscle repair and placement of 2 screws  HX OTHER SURGICAL    
 skin cancer removal  
 
Social History Socioeconomic History  Marital status:  Spouse name: Not on file  Number of children: Not on file  Years of education: Not on file  Highest education level: Not on file Social Needs  Financial resource strain: Not on file  Food insecurity - worry: Not on file  Food insecurity - inability: Not on file  Transportation needs - medical: Not on file  Transportation needs - non-medical: Not on file Occupational History  Occupation: Retired-[employer]  
Tobacco Use  Smoking status: Former Smoker Packs/day: 1.00 Years: 31.00 Pack years: 31.00 Types: Pipe, Cigars Last attempt to quit: 1995 Years since quittin.1  Smokeless tobacco: Never Used Substance and Sexual Activity  Alcohol use: No  
 Drug use: No  
 Sexual activity: Yes  
  Partners: Female Other Topics Concern  Not on file Social History Narrative  Not on file Family History Problem Relation Age of Onset  Cancer Mother Bone and Brain Cancer  Diabetes Mother  Liver Disease Father Lung Cancer  Kidney Disease Sister  Diabetes Daughter  Colon Cancer Brother Physical Examination: Well-nourished mature male in no apparent distress Prostate by BOY is large rounded smooth benign consistency nontender-no induration no nodularity No rectal masses induration or tenderness Urinalysis: Negative dipstick/nitrite negative/heme-negative PVR today 60 cc Impression: Traumatic BPH responding favorably to alpha-blocker Rx Plan: Flomax 0.4 mg twice daily #180 refill x3 RTC 1 year PSA BOY PVR More than 1/2 of this 15 minute visit was spent in counselling and coordination of care, as described above. Jamila Cardenas MD 
-electronically signed- 
 
PLEASE NOTE: 
This document has been produced using voice recognition software. Unrecognized errors in transcription may be present.

## 2019-03-04 NOTE — PATIENT INSTRUCTIONS
Prostate Cancer Screening: Care Instructions Your Care Instructions The prostate gland is an organ found just below a man's bladder. It is the size and shape of a walnut. It surrounds the tube that carries urine from the bladder out of the body through the penis. This tube is called the urethra. Prostate cancer is the abnormal growth of cells in the prostate. It is the second most common type of cancer in men. (Skin cancer is the most common.) Most cases of prostate cancer occur in men older than 72. The disease runs in families. And it's more common in -American men. When it's found and treated early, prostate cancer may be cured. But it is not always treated. This is because prostate cancer may not shorten your life, especially if you are older and the cancer is growing slowly. Follow-up care is a key part of your treatment and safety. Be sure to make and go to all appointments, and call your doctor if you are having problems. It's also a good idea to know your test results and keep a list of the medicines you take. What are the screening tests for prostate cancer? The main screening test for prostate cancer is the prostate-specific antigen (PSA) test. This is a blood test that measures how much PSA is in your blood. A high level may mean that you have an enlargement, an infection, or cancer. Along with the PSA test, you may have a digital rectal exam. The digital (finger) rectal exam checks for anything abnormal in your prostate. To do the exam, the doctor puts a lubricated, gloved finger into your rectum. If these tests suggest cancer, you may need a prostate biopsy. How is prostate cancer diagnosed? In a biopsy, the doctor takes small tissue samples from your prostate gland. Another doctor then looks at the tissue under a microscope to see if there are cancer cells, signs of infection, or other problems. The results help diagnose prostate cancer. What are the pros and cons of screening? Neither a PSA test nor a digital rectal exam can tell you for sure that you do or do not have cancer. But they can help you decide if you need more tests, such as a prostate biopsy. Screening tests may be useful because most men with prostate cancer don't have symptoms. It can be hard to know if you have cancer until it is more advanced. And then it's harder to treat. But having a PSA test can also cause harm. The test may show high levels of PSA that aren't caused by cancer. So you could have a prostate biopsy you didn't need. Or the PSA test might be normal when there is cancer, so a cancer might not be found early. The test can also find cancers that would never have caused a problem during your lifetime. So you might have treatment that was not needed. Prostate cancer usually develops late in life and grows slowly. For many men, it does not shorten their lives. Some experts advise screening only for men who are at high risk. Talk with your doctor to see if screening is right for you. Where can you learn more? Go to http://slava-enrique.info/. Enter R550 in the search box to learn more about \"Prostate Cancer Screening: Care Instructions. \" Current as of: March 27, 2018 Content Version: 11.9 © 2394-5090 Locally. Care instructions adapted under license by [employer] (which disclaims liability or warranty for this information). If you have questions about a medical condition or this instruction, always ask your healthcare professional. [place] [address] any warranty or liability for your use of this information.

## 2019-03-21 ENCOUNTER — OFFICE VISIT (OUTPATIENT)
Dept: FAMILY MEDICINE CLINIC | Age: 69
End: 2019-03-21

## 2019-03-21 VITALS
SYSTOLIC BLOOD PRESSURE: 118 MMHG | HEIGHT: 71 IN | OXYGEN SATURATION: 95 % | RESPIRATION RATE: 16 BRPM | TEMPERATURE: 98.1 F | BODY MASS INDEX: 38.56 KG/M2 | DIASTOLIC BLOOD PRESSURE: 64 MMHG | WEIGHT: 275.4 LBS | HEART RATE: 95 BPM

## 2019-03-21 DIAGNOSIS — G89.4 CHRONIC PAIN SYNDROME: ICD-10-CM

## 2019-03-21 DIAGNOSIS — J44.9 ASTHMA WITH COPD (CHRONIC OBSTRUCTIVE PULMONARY DISEASE) (HCC): ICD-10-CM

## 2019-03-21 DIAGNOSIS — E11.21 TYPE 2 DIABETES WITH NEPHROPATHY (HCC): Primary | ICD-10-CM

## 2019-03-21 DIAGNOSIS — K21.9 GASTROESOPHAGEAL REFLUX DISEASE WITHOUT ESOPHAGITIS: ICD-10-CM

## 2019-03-21 DIAGNOSIS — Z86.69 H/O MIGRAINE: ICD-10-CM

## 2019-03-21 DIAGNOSIS — I10 ESSENTIAL HYPERTENSION: ICD-10-CM

## 2019-03-21 DIAGNOSIS — E78.5 HYPERLIPIDEMIA, UNSPECIFIED HYPERLIPIDEMIA TYPE: ICD-10-CM

## 2019-03-21 RX ORDER — TOPIRAMATE 25 MG/1
25 TABLET ORAL
Qty: 30 TAB | Refills: 2 | Status: SHIPPED | OUTPATIENT
Start: 2019-03-21 | End: 2019-06-30 | Stop reason: SDUPTHER

## 2019-03-21 NOTE — PATIENT INSTRUCTIONS
Migraine Headache: Care Instructions  Your Care Instructions  Migraines are painful, throbbing headaches that often start on one side of the head. They may cause nausea and vomiting and make you sensitive to light, sound, or smell. Without treatment, migraines can last from 4 hours to a few days. Medicines can help prevent migraines or stop them after they have started. Your doctor can help you find which ones work best for you. Follow-up care is a key part of your treatment and safety. Be sure to make and go to all appointments, and call your doctor if you are having problems. It's also a good idea to know your test results and keep a list of the medicines you take. How can you care for yourself at home? · Do not drive if you have taken a prescription pain medicine. · Rest in a quiet, dark room until your headache is gone. Close your eyes, and try to relax or go to sleep. Don't watch TV or read. · Put a cold, moist cloth or cold pack on the painful area for 10 to 20 minutes at a time. Put a thin cloth between the cold pack and your skin. · Use a warm, moist towel or a heating pad set on low to relax tight shoulder and neck muscles. · Have someone gently massage your neck and shoulders. · Take your medicines exactly as prescribed. Call your doctor if you think you are having a problem with your medicine. You will get more details on the specific medicines your doctor prescribes. · Be careful not to take pain medicine more often than the instructions allow. You could get worse or more frequent headaches when the medicine wears off. To prevent migraines  · Keep a headache diary so you can figure out what triggers your headaches. Avoiding triggers may help you prevent headaches. Record when each headache began, how long it lasted, and what the pain was like.  (Was it throbbing, aching, stabbing, or dull?) Write down any other symptoms you had with the headache, such as nausea, flashing lights or dark spots, or sensitivity to bright light or loud noise. Note if the headache occurred near your period. List anything that might have triggered the headache. Triggers may include certain foods (chocolate, cheese, wine) or odors, smoke, bright light, stress, or lack of sleep. · If your doctor has prescribed medicine for your migraines, take it as directed. You may have medicine that you take only when you get a migraine and medicine that you take all the time to help prevent migraines. ? If your doctor has prescribed medicine for when you get a headache, take it at the first sign of a migraine, unless your doctor has given you other instructions. ? If your doctor has prescribed medicine to prevent migraines, take it exactly as prescribed. Call your doctor if you think you are having a problem with your medicine. · Find healthy ways to deal with stress. Migraines are most common during or right after stressful times. Take time to relax before and after you do something that has caused a migraine in the past.  · Try to keep your muscles relaxed by keeping good posture. Check your jaw, face, neck, and shoulder muscles for tension. Try to relax them. When you sit at a desk, change positions often. And make sure to stretch for 30 seconds each hour. · Get plenty of sleep and exercise. · Eat meals on a regular schedule. Avoid foods and drinks that often trigger migraines. These include chocolate, alcohol (especially red wine and port), aspartame, monosodium glutamate (MSG), and some additives found in foods (such as hot dogs, kohler, cold cuts, aged cheeses, and pickled foods). · Limit caffeine. Don't drink too much coffee, tea, or soda. But don't quit caffeine suddenly. That can also give you migraines. · Do not smoke or allow others to smoke around you. If you need help quitting, talk to your doctor about stop-smoking programs and medicines. These can increase your chances of quitting for good.   · If you are taking birth control pills or hormone therapy, talk to your doctor about whether they are triggering your migraines. When should you call for help? Call 911 anytime you think you may need emergency care. For example, call if:    · You have signs of a stroke. These may include:  ? Sudden numbness, paralysis, or weakness in your face, arm, or leg, especially on only one side of your body. ? Sudden vision changes. ? Sudden trouble speaking. ? Sudden confusion or trouble understanding simple statements. ? Sudden problems with walking or balance. ? A sudden, severe headache that is different from past headaches.    Call your doctor now or seek immediate medical care if:    · You have new or worse nausea and vomiting.     · You have a new or higher fever.     · Your headache gets much worse.    Watch closely for changes in your health, and be sure to contact your doctor if:    · You are not getting better after 2 days (48 hours). Where can you learn more? Go to http://slava-enrique.info/. Enter M945 in the search box to learn more about \"Migraine Headache: Care Instructions. \"  Current as of: Desiree 3, 2018  Content Version: 11.9  © 7292-2716 [employer]. Care instructions adapted under license by [email or web address] (which disclaims liability or warranty for this information). If you have questions about a medical condition or this instruction, always ask your healthcare professional. [address] any warranty or liability for your use of this information. Deciding About Taking Medicine to Prevent Migraines  How can you decide about taking medicine to prevent migraine headaches? What are migraines? Migraines are painful, throbbing headaches. They can last from 4 to 72 hours. They often occur on only one side of your head. But you may feel them on both sides. The pain may keep you from doing your daily activities.   You may take a daily medicine if you get bad migraines often. This can help prevent them. What are key points about this decision? · Medicines to prevent migraines may not stop them every time. But if you take them daily, you can reduce how many migraines you get by more than half. They can also reduce how long migraines last. And your symptoms may not be as bad. · Medicines that prevent migraines may cause side effects. You may have sleep and memory problems, upset stomach, dry mouth, or constipation. Some of these side effects may last for as long as you take the medicine. Or they may go away within a few weeks. Why might you choose to take medicine to prevent migraines? · You are willing to take medicine daily if it will help your symptoms. · You don't think the side effects of the medicine could be as bad as your migraine symptoms. · Your migraines get in the way of your work. Or they harm your relationships with friends and family. · Benefits of medicine include fewer or no migraines. And your migraines may not last as long or feel as bad. Why might you choose not to take medicine to prevent migraines? · You want to avoid the side effects of the medicine. · You don't want to take medicine every day. · Your migraines are not affecting your work and relationships. · If your symptoms don't improve with home treatment and other medicines, you can decide later to take medicine every day to help prevent migraines. Your decision  Thinking about the facts and your feelings can help you make a decision that is right for you. Be sure you understand the benefits and risks of your options, and think about what else you need to do before you make the decision. Where can you learn more? Go to http://slava-enrique.info/. Enter R640 in the search box to learn more about \"Deciding About Taking Medicine to Prevent Migraines. \"  Current as of: Desiree 3, 2018  Content Version: 11.9  © 2116-4299 [employer].  Care instructions adapted under license by [employer] (which disclaims liability or warranty for this information). If you have questions about a medical condition or this instruction, always ask your healthcare professional. [address] any warranty or liability for your use of this information. Learning About Diabetes Food Guidelines  Your Care Instructions    Meal planning is important to manage diabetes. It helps keep your blood sugar at a target level (which you set with your doctor). You don't have to eat special foods. You can eat what your family eats, including sweets once in a while. But you do have to pay attention to how often you eat and how much you eat of certain foods. You may want to work with a dietitian or a certified diabetes educator (CDE) to help you plan meals and snacks. A dietitian or CDE can also help you lose weight if that is one of your goals. What should you know about eating carbs? Managing the amount of carbohydrate (carbs) you eat is an important part of healthy meals when you have diabetes. Carbohydrate is found in many foods. · Learn which foods have carbs. And learn the amounts of carbs in different foods. ? Bread, cereal, pasta, and rice have about 15 grams of carbs in a serving. A serving is 1 slice of bread (1 ounce), ½ cup of cooked cereal, or 1/3 cup of cooked pasta or rice. ? Fruits have 15 grams of carbs in a serving. A serving is 1 small fresh fruit, such as an apple or orange; ½ of a banana; ½ cup of cooked or canned fruit; ½ cup of fruit juice; 1 cup of melon or raspberries; or 2 tablespoons of dried fruit. ? Milk and no-sugar-added yogurt have 15 grams of carbs in a serving. A serving is 1 cup of milk or 2/3 cup of no-sugar-added yogurt. ? Starchy vegetables have 15 grams of carbs in a serving.  A serving is ½ cup of mashed potatoes or sweet potato; 1 cup winter squash; ½ of a small baked potato; ½ cup of cooked beans; or ½ cup cooked corn or green peas. · Learn how much carbs to eat each day and at each meal. A dietitian or CDE can teach you how to keep track of the amount of carbs you eat. This is called carbohydrate counting. · If you are not sure how to count carbohydrate grams, use the Plate Method to plan meals. It is a good, quick way to make sure that you have a balanced meal. It also helps you spread carbs throughout the day. ? Divide your plate by types of foods. Put non-starchy vegetables on half the plate, meat or other protein food on one-quarter of the plate, and a grain or starchy vegetable in the final quarter of the plate. To this you can add a small piece of fruit and 1 cup of milk or yogurt, depending on how many carbs you are supposed to eat at a meal.  · Try to eat about the same amount of carbs at each meal. Do not \"save up\" your daily allowance of carbs to eat at one meal.  · Proteins have very little or no carbs per serving. Examples of proteins are beef, chicken, turkey, fish, eggs, tofu, cheese, cottage cheese, and peanut butter. A serving size of meat is 3 ounces, which is about the size of a deck of cards. Examples of meat substitute serving sizes (equal to 1 ounce of meat) are 1/4 cup of cottage cheese, 1 egg, 1 tablespoon of peanut butter, and ½ cup of tofu. How can you eat out and still eat healthy? · Learn to estimate the serving sizes of foods that have carbohydrate. If you measure food at home, it will be easier to estimate the amount in a serving of restaurant food. · If the meal you order has too much carbohydrate (such as potatoes, corn, or baked beans), ask to have a low-carbohydrate food instead. Ask for a salad or green vegetables. · If you use insulin, check your blood sugar before and after eating out to help you plan how much to eat in the future. · If you eat more carbohydrate at a meal than you had planned, take a walk or do other exercise. This will help lower your blood sugar.   What else should you know? · Limit saturated fat, such as the fat from meat and dairy products. This is a healthy choice because people who have diabetes are at higher risk of heart disease. So choose lean cuts of meat and nonfat or low-fat dairy products. Use olive or canola oil instead of butter or shortening when cooking. · Don't skip meals. Your blood sugar may drop too low if you skip meals and take insulin or certain medicines for diabetes. · Check with your doctor before you drink alcohol. Alcohol can cause your blood sugar to drop too low. Alcohol can also cause a bad reaction if you take certain diabetes medicines. Follow-up care is a key part of your treatment and safety. Be sure to make and go to all appointments, and call your doctor if you are having problems. It's also a good idea to know your test results and keep a list of the medicines you take. Where can you learn more? Go to http://slava-enrique.info/. Enter Z269 in the search box to learn more about \"Learning About Diabetes Food Guidelines. \"  Current as of: July 25, 2018  Content Version: 11.9  © 2429-5299 [employer]. Care instructions adapted under license by [employer] (which disclaims liability or warranty for this information). If you have questions about a medical condition or this instruction, always ask your healthcare professional. [address] any warranty or liability for your use of this information. Learning About Low-Fat Eating  What is low-fat eating? Most food has some fat in it. Your body needs some fat to be healthy. But some kinds of fats are healthier than others. In a low-fat eating plan, you try to choose healthier fats and eat fewer unhealthy fats. Healthy fats include olive and canola oil. Try to avoid eating too much saturated fat (such as in cheese and meats) and trans fat (a type of fat found in many packaged snack foods and other baked goods).   You do not need to cut all fat from your diet. But you can make healthier choices about the types and amount of fat you eat. Even though it is a good idea to choose healthier fats, it is still important to be careful of how much fat you eat, because all fats are high in calories. What are the different types of fats? Unhealthy fats  · Saturated fat. Saturated fats are mostly in animal foods, such as meat and dairy foods. Tropical oils, such as coconut oil, palm oil, and cocoa butter, are also saturated fats. · Trans fat. Trans fats include shortening, partially hydrogenated vegetable oils, and hydrogenated vegetable oils. Trans fats are made when a liquid fat is turned into a solid fat (for example, when corn oil is made into stick margarine). They are in many processed foods, such as cookies, crackers, and snack foods. Healthy fats  · Monounsaturated fat. Monounsaturated fats are liquid at room temperature but get solid when refrigerated. Eating foods that are high in this fat may help lower your \"bad\" (LDL) cholesterol, keep your \"good\" (HDL) cholesterol level up, and lower your chances of getting coronary artery disease. This fat is found in canola oil, olive oil, peanut oil, olives, avocados, nuts, and nut butters. · Polyunsaturated fat. Polyunsaturated fats are liquid at room temperature. They are in safflower, sunflower, and corn oils. They are also the main fat in seafood. Omega-3 fatty acids are types of polyunsaturated fat. Eating fish may lower your chances of getting coronary artery disease. Fatty fish such as salmon and mackerel contain these healthy fatty acids. So do ground flaxseeds and flaxseed oil, soybeans, walnuts, and seeds. Why cut down on unhealthy fats? Eating foods that contain saturated fats can raise the LDL (\"bad\") cholesterol in your blood.  Having a high level of LDL cholesterol increases your chance of hardening of the arteries (atherosclerosis), which can lead to heart disease, heart attack, and stroke. Trans fat raises the level of \"bad\" LDL cholesterol in your blood and may lower the \"good\" HDL cholesterol in your blood. Trans fat can raise your risk of heart disease, heart attack, and stroke. In general:  · No more than 10% of your daily calories should come from saturated fat. This is about 20 grams in a 2,000-calorie diet. · No more than 10% of your daily calories should come from polyunsaturated fat. This is about 20 grams in a 2,000-calorie diet. · Monounsaturated fats can be up to 15% of your daily calories. This is about 25 to 30 grams in a 2,000-calorie diet. If you're not sure how much fat you should be eating or how many calories you need each day to stay at a healthy weight, talk to a registered dietitian. He or she can help you create a plan that's right for you. What can you do to cut down on fat? Foods like cheese, butter, sausage, and desserts can have a lot of unhealthy fats. Try these tips for healthier meals at home and when you eat out. At home  · Fill up on fruits, vegetables, and whole grains. · Think of meat as a side dish instead of as the main part of your meal.  · When you do eat meat, make it extra-lean ground beef (97% lean), ground turkey breast (without skin added), meats with fat trimmed off before cooking, or skinless chicken. · Try main dishes that use whole wheat pasta, brown rice, dried beans, or vegetables. · Use cooking methods that use little or no fat, such as broiling, steaming, or grilling. Use cooking spray instead of oil. If you use oil, use a monounsaturated oil, such as canola or olive oil. · Read food labels on canned, bottled, or packaged foods. Choose those with little saturated fat and no trans fat. When eating out at a restaurant  · Order foods that are broiled or poached instead of fried or breaded. · Cut back on the amount of butter or margarine that you use on bread. Use small amounts of olive oil instead.   · Order sauces, gravies, and salad dressings on the side, and use only a little. · When you order pasta, choose tomato sauce instead of cream sauce. · Ask for salsa with your baked potato instead of sour cream, butter, cheese, or kohler. Where can you learn more? Go to http://slava-enrique.info/. Enter I453 in the search box to learn more about \"Learning About Low-Fat Eating. \"  Current as of: March 28, 2018  Content Version: 11.9  © 8875-9766 [employer]. Care instructions adapted under license by [employer] (which disclaims liability or warranty for this information). If you have questions about a medical condition or this instruction, always ask your healthcare professional. [address] any warranty or liability for your use of this information.

## 2019-03-21 NOTE — PROGRESS NOTES
HISTORY OF PRESENT ILLNESS  Elissa Daley is a 77 y.o. male. HPI: Here for follow up. Last visit he fell and was having left side rib pain. No other injury. Rib pain is improving gradually and significantly better. No sob. No cough or wheezing. No chest pain. Also during fall he has hit over left side of head. No swelling noted at that time and was not having any pain. Has h/o migraine headache but mentioned to me that since then he has been feeling more frequent headaches. Last for hours. No nausea or vomiting . no vision changes. No unusual weakness in ext. Has chronic lower back and that has been giving him discomfort with ambulation but he has not noticed any other change. No speech trouble. No upper ext weakness. No signs of increase intracranial pressure. Also h/o copd. Stable symptomatically. Complaint with taking spiriva. No wheezing. No cough or cold. Up to date with flu shot. Following pain management for his chronic back pain and since changed from long acting opioid  to short acting now gradually having better pain control. Walking with support. Visit Vitals  /64 (BP 1 Location: Left arm, BP Patient Position: Sitting)   Pulse 95   Temp 98.1 °F (36.7 °C) (Oral)   Resp 16   Ht 5' 11\" (1.803 m)   Wt 275 lb 6.4 oz (124.9 kg)   SpO2 95%   BMI 38.41 kg/m²     Review medication list, vitals, problem list,allergies. H/o diabetes. HBA1C is improving gradually. Following endocrinology. Trying to be active with his limitation with chronic back pain. Also working on diet modification. H/o hyperlipidemia. On statin and no concern. Again working on life style modification. Review labs.    Lab Results   Component Value Date/Time    WBC 9.0 05/30/2018 08:21 AM    HGB 15.1 05/30/2018 08:21 AM    HCT 44.6 05/30/2018 08:21 AM    PLATELET 554 72/37/5896 08:21 AM    MCV 85.3 05/30/2018 08:21 AM     Lab Results   Component Value Date/Time    Sodium 141 05/30/2018 08:21 AM    Potassium 4.8 05/30/2018 08:21 AM    Chloride 102 05/30/2018 08:21 AM    CO2 29 05/30/2018 08:21 AM    Anion gap 10 05/30/2018 08:21 AM    Glucose 160 (H) 05/30/2018 08:21 AM    BUN 18 05/30/2018 08:21 AM    Creatinine 1.36 (H) 05/30/2018 08:21 AM    BUN/Creatinine ratio 13 05/30/2018 08:21 AM    GFR est AA >60 05/30/2018 08:21 AM    GFR est non-AA 52 (L) 05/30/2018 08:21 AM    Calcium 9.3 05/30/2018 08:21 AM    Bilirubin, total 0.3 05/30/2018 08:21 AM    AST (SGOT) 21 05/30/2018 08:21 AM    Alk. phosphatase 51 05/30/2018 08:21 AM    Protein, total 7.2 05/30/2018 08:21 AM    Albumin 4.2 05/30/2018 08:21 AM    Globulin 3.0 05/30/2018 08:21 AM    A-G Ratio 1.4 05/30/2018 08:21 AM    ALT (SGPT) 39 05/30/2018 08:21 AM     Lab Results   Component Value Date/Time    Cholesterol, total 156 05/30/2018 08:21 AM    HDL Cholesterol 35 (L) 05/30/2018 08:21 AM    LDL, calculated 75.2 05/30/2018 08:21 AM    VLDL, calculated 45.8 05/30/2018 08:21 AM    Triglyceride 229 (H) 05/30/2018 08:21 AM    CHOL/HDL Ratio 4.5 05/30/2018 08:21 AM     Lab Results   Component Value Date/Time    TSH 1.48 05/30/2018 08:21 AM     Lab Results   Component Value Date/Time    Hemoglobin A1c 7.3 (H) 05/30/2018 08:21 AM    Hemoglobin A1c (POC) 6.9 12/16/2015 10:29 AM    Hemoglobin A1c, External 7.2 06/16/2017         Lab Results   Component Value Date/Time    Microalbumin/Creat ratio (mg/g creat) 19 05/30/2018 08:21 AM    Microalbumin,urine random 1.12 05/30/2018 08:21 AM       ROS: see hPI     Physical Exam   Constitutional: He is oriented to person, place, and time. No distress. Neck: No thyromegaly present. Cardiovascular: Normal rate, regular rhythm and normal heart sounds. Pulmonary/Chest:   CTA   Abdominal: Soft. Bowel sounds are normal. There is no tenderness. Musculoskeletal: He exhibits no edema. Lymphadenopathy:     He has no cervical adenopathy. Neurological: He is oriented to person, place, and time.    Psychiatric: His behavior is normal.       ASSESSMENT and PLAN    ICD-10-CM ICD-9-CM    1. Type 2 diabetes with nephropathy ([facility].): gradually improving HBA1C. No hypoglycemia. Working on life style modification. Up to date with yearly foot and an eye exam. Following endo. Will follow their recommendations. K84.95 207.45 METABOLIC PANEL, COMPREHENSIVE     583.81 LIPID PANEL      HEMOGLOBIN A1C WITH EAG      CBC W/O DIFF      TSH 3RD GENERATION      MICROALBUMIN, UR, RAND W/ MICROALB/CREAT RATIO   2. Asthma with COPD (chronic obstructive pulmonary disease) ([facility] 75.): stable. Will observe. J44.9 493.20    3. Essential hypertension: stable at this time. Low salt diet. Exercise as tolerated. Will continue current plan. I10 401.9    4. Chronic pain syndrome: following pain management. Fairly stable. G89.4 338.4    5. Hyperlipidemia, unspecified hyperlipidemia type: on medication. No side effects. E78.5 272.4 LIPID PANEL   6. H/O migraine: lately increased headaches since fall. Will add Topamax. Discussed medication side effects. F/u next visit. Z86.69 V12.49 topiramate (TOPAMAX) 25 mg tablet   7. Gastroesophageal reflux disease without esophagitis; stable. Will observe. K21.9 530.81    Pt understood and agree with the plan   Review HM   Follow-up and Dispositions    · Return in about 2 months (around 5/21/2019).

## 2019-03-21 NOTE — PROGRESS NOTES
1. Have you been to the ER, urgent care clinic since your last visit? Hospitalized since your last visit? No    2. Have you seen or consulted any other health care providers outside of the [address] [place] [address] since your last visit? Include any pap smears or colon screening.  Podiatry 1 Foot 2 Foot [place] LOV: last week Dr. Papo Connell    Last dm foot exam - 3/2019 [facility]

## 2019-03-29 ENCOUNTER — OFFICE VISIT (OUTPATIENT)
Dept: NEUROLOGY | Age: 69
End: 2019-03-29

## 2019-03-29 VITALS
RESPIRATION RATE: 16 BRPM | HEIGHT: 71 IN | OXYGEN SATURATION: 99 % | SYSTOLIC BLOOD PRESSURE: 124 MMHG | TEMPERATURE: 98 F | WEIGHT: 266 LBS | HEART RATE: 73 BPM | BODY MASS INDEX: 37.24 KG/M2 | DIASTOLIC BLOOD PRESSURE: 70 MMHG

## 2019-03-29 DIAGNOSIS — E11.42 DIABETIC POLYNEUROPATHY ASSOCIATED WITH TYPE 2 DIABETES MELLITUS (HCC): ICD-10-CM

## 2019-03-29 DIAGNOSIS — G56.21 ULNAR NEUROPATHY OF RIGHT UPPER EXTREMITY: ICD-10-CM

## 2019-03-29 DIAGNOSIS — G56.03 CARPAL TUNNEL SYNDROME ON BOTH SIDES: ICD-10-CM

## 2019-03-29 DIAGNOSIS — M48.062 SPINAL STENOSIS OF LUMBAR REGION WITH NEUROGENIC CLAUDICATION: Primary | ICD-10-CM

## 2019-03-29 NOTE — PROGRESS NOTES
3/29/2019 11:21 AM 
 
SSN: [id number] Subjective:   77-year-old male returning for chief complaint of follow-up of history of lumbar stenosis. He has had 4 lumbar surgeries in the past for moderate to severe mostly L2-3 stenosis. On his last visit about 6 months ago he did not wish to pursue any additional surgical consults for ongoing problems with bilateral numbness in his feet, back pain, and legs giving away after walking a certain distance. Additionally nerve conduction studies of the upper extremities in September showed a question of a developing right ulnar neuropathy at the right elbow and severe left worse than right median neuropathies at the wrists superimposed in a diabetic polyneuropathy with no evidence of a cervical radiculopathy. Her MRI from September showed severe degenerative joint disease with broad-based disc bulges at T12-L1, L1-L2, L2L3 with decompression from the L3-4 through the S1 levels. There is moderate to severe lumbar stenosis particularly at the L2-3 level. Since last here continues to have worsening back pain, other than this he is about the same. He is concern they are taking away her pain pills. He has to use a TENS all the time. He continues to have chronic numbness from the elbow down to the 3rd and 4th finger. His 3rd and 4th on the left do not extend all the way. 15-20 yrs ago had surgery in all fingers of both hands for trigger fingers. Social History Socioeconomic History  Marital status:  Spouse name: Not on file  Number of children: Not on file  Years of education: Not on file  Highest education level: Not on file Occupational History  Occupation: Retired-[employer]  
Social Needs  Financial resource strain: Not on file  Food insecurity:  
  Worry: Not on file Inability: Not on file  Transportation needs:  
  Medical: Not on file Non-medical: Not on file Tobacco Use  
  Smoking status: Former Smoker Packs/day: 1.00 Years: 31.00 Pack years: 31.00 Types: Pipe, Cigars Last attempt to quit: 1995 Years since quittin.2  Smokeless tobacco: Never Used Substance and Sexual Activity  Alcohol use: No  
 Drug use: No  
 Sexual activity: Yes  
  Partners: Female Lifestyle  Physical activity:  
  Days per week: Not on file Minutes per session: Not on file  Stress: Not on file Relationships  Social connections:  
  Talks on phone: Not on file Gets together: Not on file Attends [employer] service: Not on file Active member of club or organization: Not on file Attends meetings of clubs or organizations: Not on file Relationship status: Not on file  Intimate partner violence:  
  Fear of current or ex partner: Not on file Emotionally abused: Not on file Physically abused: Not on file Forced sexual activity: Not on file Other Topics Concern  Not on file Social History Narrative  Not on file Family History Problem Relation Age of Onset  Cancer Mother Bone and Brain Cancer  Diabetes Mother  Liver Disease Father Lung Cancer  Kidney Disease Sister  Diabetes Daughter  Colon Cancer Brother Current Outpatient Medications Medication Sig Dispense Refill  topiramate (TOPAMAX) 25 mg tablet Take 1 Tab by mouth daily (with breakfast). 30 Tab 2  pregabalin (LYRICA) 150 mg capsule TAKE 1 CAPSULE BY MOUTH THREE TIMES DAILY FOR 30 DAYS. MAXIMUM 3 CAPSULES DAILY. 90 Cap 2  
 oxyCODONE-acetaminophen (PERCOCET) 5-325 mg per tablet 1 tab up to twice daily as needed with a total of 45 tab to be budgeted over 30 days. 45 Tab 0  
 fluticasone (FLONASE) 50 mcg/actuation nasal spray USE 1 SPRAY(S) IN EACH NOSTRIL ONCE DAILY 1 Bottle 0  
 DULoxetine (CYMBALTA) 60 mg capsule Take 1 Cap by mouth two (2) times a day.  60 Cap 3  
  tamsulosin (FLOMAX) 0.4 mg capsule TAKE 1 CAPSULE BY MOUTH ONCE DAILY 90 Cap 3  
 fenofibrate nanocrystallized (TRICOR) 145 mg tablet Take 1 Tab by mouth daily. 90 Tab 1  
 levothyroxine (SYNTHROID) 150 mcg tablet Take 1 Tab by mouth Daily (before breakfast). 90 Tab 1  
 lisinopril (PRINIVIL, ZESTRIL) 5 mg tablet Take 1 Tab by mouth daily. 90 Tab 1  
 metoprolol tartrate (LOPRESSOR) 25 mg tablet Take 1 Tab by mouth two (2) times a day. 180 Tab 1  TENS Units (TENS 504) chaz Please provide patient with a TENS unit to help improve function, improve quality of life, reduce medication use, improve ROM. 78-year-old male with chronic lower back pain secondary to previous work-related injuries and subsequent surgeries. 1 Device 0  
 atorvastatin (LIPITOR) 20 mg tablet TAKE ONE TABLET BY MOUTH ONCE DAILY 90 Tab 1  
 OTHER EB-N3 once daily  FANI PEN NEEDLE 32 gauge x 5/32\" ndle USE AT BEDTIME 100 Pen Needle 6  JANUVIA 100 mg tablet Take 50 mg by mouth daily. Indications: patient stated he was given 50 mg    
 naloxone 4 mg/actuation spry 4 mg by Nasal route as needed. 1 Box 1  ACCU-CHEK JAZZY PLUS TEST STRP strip  ACCU-CHEK SOFTCLIX LANCETS misc  albuterol (PROVENTIL VENTOLIN) 2.5 mg /3 mL (0.083 %) nebulizer solution 3 mL by Nebulization route every four (4) hours as needed for Wheezing or Shortness of Breath. 1 Package 5  
 PEN NEEDLE, DIABETIC (BD ULTRA-FINE FANI PEN NEEDLES) by Does Not Apply route. 4mm x 32G  insulin glulisine (APIDRA SOLOSTAR) 100 unit/mL pen 2 units per carb consumed. Max 30 / day (Patient taking differently: 2 units per carb consumed. Max 30 / day  Indications: patient states taking 15-20 units three times a day) 5 Pen 3  
 insulin glargine (BASAGLAR KWIKPEN) 100 unit/mL (3 mL) pen 52 units at bedtime (Patient taking differently: 52 Units.  52 units at bedtime) 3 Pen 3  
 PEN NEEDLE 31 gauge x 5/16\" ndle USE ONE PEN NEEDLE FOR LANTUS FLEXPEN AND 3 PEN NEEDLES FOR APIDRA WITH EACH MEAL 1 Package 3  
 esomeprazole (NEXIUM) 40 mg capsule Take 40 mg by mouth two (2) times a day.  betamethasone dipropionate (DIPROLENE) 0.05 % ointment  budesonide-formoterol (SYMBICORT) 160-4.5 mcg/actuation HFA inhaler Take 2 Puffs by inhalation two (2) times a day. 1 Inhaler 5  
 tiotropium (SPIRIVA) 18 mcg inhalation capsule Take 1 Cap by inhalation daily. 30 Cap 5  
 ketorolac (ACULAR) 0.5 % ophthalmic solution Administer 1 Drop to both eyes two (2) times a day.  montelukast (SINGULAIR) 10 mg tablet Take 1 Tab by mouth daily. 90 Tab 1  
 senna (SENNA) 8.6 mg tablet Take 1 Tab by mouth daily as needed for Constipation. 30 Tab 2  
 insulin glulisine U-100 (APIDRA SOLOSTAR U-100 INSULIN) 100 unit/mL pen by SubCUTAneous route. Past Medical History:  
Diagnosis Date  Arthritis  Asthma  Cancer ([facility] [address] BASAL  Cardiac catheterization 2009 [facility]:  No significant CAD.  Cardiac echocardiogram 01/20/2014 EF 50-55%. No WMA. Gr 1 DDfx. RVSP 25-30 mmHg. Mild TR.  DM type 2 (diabetes mellitus, type 2) ([facility] [address]  Enlarged prostate  Failed back syndrome  GERD (gastroesophageal reflux disease)  Hearing loss, bilateral   
 Hearing Aids  Hypertension  Hypothyroidism  Insomnia  Low serum testosterone level  Neuropathy  Osteoarthritis of multiple joints  S/P colonoscopy 8/14/13  
 mild diverticulosis in sigmoid colon w/o evidence of diverticulitis; f/u 5 years  SOB (shortness of breath)   
 chronic; 2' \"lung fever\"  Vertigo Past Surgical History:  
Procedure Laterality Date  COLONOSCOPY N/A 4/3/2018 COLONOSCOPY performed by Supa Merritt MD at [address]  2000  
 removal of defective hardware [place]  
 to remove bone fragments  HX LUMBAR FUSION  1999  
 fusion from L4-S1 and implantation of hardware  HX LUMBAR FUSION  1984  
 fusion on L5 area  HX ORTHOPAEDIC Bilateral 2004  
 trigger finger syndrome-all 4 fingers  HX ORTHOPAEDIC Left 2004  
 left arm torn muscle repair and placement of 2 screws  HX OTHER SURGICAL    
 skin cancer removal  
 
 
Allergies Allergen Reactions  Ciprofloxacin Anaphylaxis  Other Plant, Animal, Environmental Other (comments) Patient cannot have MRI. Patient states that he has metal screws in his left arm.  Lamisil [Terbinafine] Swelling Tongue swelling  Metformin Other (comments) Elevated cr 1.4  Morphine Other (comments) \"loss of blood pressure\"  Other Medication Other (comments) Doxasylin causes extreme GERD  Pcn [Penicillins] Rash  Pentothal [Thiopental Sodium] Shortness of Breath  Sulfa (Sulfonamide Antibiotics) Rash Vital signs:   
Visit Vitals /70 (BP 1 Location: Left arm, BP Patient Position: Sitting) Pulse 73 Temp 98 °F (36.7 °C) (Oral) Resp 16 Ht 5' 11\" (1.803 m) Wt 120.7 kg (266 lb) SpO2 99% BMI 37.10 kg/m² Review of Systems:  
GENERAL: Denies fever or fatigue CARDIAC: No CP or SOB PULMONARY: No cough of SOB MUSCULOSKELETAL: No new joint pain NEURO: SEE HPI 
 
 
EXAM: Alert, in NAD. Heart is regular. Oriented x3, EOM's are full, PERRL, no facial asymmetries. Strength and tone are normal. DTR's +2, gait slow, painful, with cane Assessment/Plan: Chronic intractable lumbar stenosis, still with symptoms of neurogenic claudication after 4 lumbar surgeries. I recommended discussing with pain clinic epidural injections, he is not interested in surgical referrals. Counseled him about diabetic control, as some of his feet discomfort is from a diabetic polyneuropathy. I do not see progression of symptoms of early ulnar neuropathy left or median neuropathies to recommend surgery and he does not want to pursue this.  I think then 3rd-4th digit left hand ext problem is likely some sort of articular issue. He will return prn to neurology. PLEASE NOTE:  
Portions of this document may have been produced using voice recognition software. Unrecognized errors in transcription may be present. This note will not be viewable in [address]

## 2019-03-29 NOTE — PROGRESS NOTES
ROOM # 2 Justina Metz presents today for Chief Complaint Patient presents with  Follow-up  Results Justina Metz preferred language for health care discussion is english/other. Depression Screening: 
3 most recent [facility] Screens 3/29/2019 3/4/2019 2/12/2019 11/13/2018 9/13/2018 8/20/2018 5/22/2018 PHQ Not Done - - - - - - - Little interest or pleasure in doing things Not at all Not at all Not at all Not at all Not at all Not at all Not at all Feeling down, depressed, irritable, or hopeless Not at all Not at all Not at all Not at all Not at all Not at all Not at all Total Score PHQ 2 0 0 0 0 0 0 0 Learning Assessment: 
Learning Assessment 5/22/2018 2/20/2018 10/24/2017 8/21/2017 8/11/2016 4/11/2016 9/16/2015 PRIMARY LEARNER Patient Patient Patient Patient Patient Patient Patient HIGHEST LEVEL OF EDUCATION - PRIMARY LEARNER  GRADUATED HIGH SCHOOL OR GED SOME COLLEGE SOME COLLEGE - - - SOME COLLEGE  
BARRIERS PRIMARY LEARNER NONE NONE - - - - NONE  
  - - - - - - -  
CO-LEARNER CAREGIVER - - - - - - Yes CO-LEARNER NAME - - - - - - Leonelmarixa Thornton / Spouse [id number] [facility] OR [employer]  
BARRIERS CO-LEARNER - - - - - - NONE PRIMARY LANGUAGE ENGLISH ENGLISH ENGLISH ENGLISH ENGLISH ENGLISH ENGLISH  
PRIMARY LANGUAGE CO-LEARNER - - - - - - ENGLISH  NEED - - - - - - No  
LEARNER PREFERENCE PRIMARY DEMONSTRATION READING READING DEMONSTRATION DEMONSTRATION DEMONSTRATION READING  
  - - - - - - -  
  - - - - - - - LEARNER [address] [place] [address] - - - - - - No  
ANSWERED BY patient patient patient Patient Patient Patient patient RELATIONSHIP SELF SELF SELF SELF SELF SELF SELF  
ASSESSMENT COMMENT - - - - - - - Abuse Screening: 
Abuse Screening Questionnaire 3/4/2019 5/22/2018 2/20/2018 10/24/2017 3/16/2016 9/16/2015 Do you ever feel afraid of your partner?  N N N N N N  
 Are you in a relationship with someone who physically or mentally threatens you? N N N N N N Is it safe for you to go home? Rip Farooq Fall Risk Fall Risk Assessment, last 12 mths 3/29/2019 2/18/2019 2/13/2019 2/12/2019 9/13/2018 8/20/2018 5/22/2018 Able to walk? Yes Yes Yes Yes Yes Yes Yes Fall in past 12 months? Yes Yes Yes Yes Yes Yes Yes Fall with injury? Yes Yes Yes No No No No  
Number of falls in past 12 months 1 8 or more 8 or more 8 or more 7 8 or more 8 or more Fall Risk Score 2 9 9 8 7 8 8 Visit Vitals /70 (BP 1 Location: Left arm, BP Patient Position: Sitting) Pulse 73 Temp 98 °F (36.7 °C) (Oral) Resp 16 Ht 5' 11\" (1.803 m) Wt 266 lb (120.7 kg) SpO2 99% BMI 37.10 kg/m² Health Maintenance reviewed and discussed per provider. Yes Celena Junior is due for Health Maintenance Due Topic Date Due  Shingrix Vaccine Age 50> (1 of 2) 11/01/1998  
 HEMOGLOBIN A1C Q6M  11/30/2018 Please order/place referral if appropriate. Advance Directive: 1. Do you have an advance directive in place? Patient Reply: No 
 
2. If not, would you like material regarding how to put one in place? Patient Reply: NO 
 
Coordination of Care: 1. Have you been to the ER, urgent care clinic since your last visit? Hospitalized since your last visit?  No

## 2019-05-08 ENCOUNTER — DOCUMENTATION ONLY (OUTPATIENT)
Dept: PAIN MANAGEMENT | Age: 69
End: 2019-05-08

## 2019-05-08 ENCOUNTER — OFFICE VISIT (OUTPATIENT)
Dept: PAIN MANAGEMENT | Age: 69
End: 2019-05-08

## 2019-05-08 VITALS
DIASTOLIC BLOOD PRESSURE: 83 MMHG | TEMPERATURE: 97.1 F | RESPIRATION RATE: 16 BRPM | SYSTOLIC BLOOD PRESSURE: 128 MMHG | BODY MASS INDEX: 37.8 KG/M2 | OXYGEN SATURATION: 100 % | HEART RATE: 72 BPM | WEIGHT: 270 LBS | HEIGHT: 71 IN

## 2019-05-08 DIAGNOSIS — G62.89 OTHER POLYNEUROPATHY: ICD-10-CM

## 2019-05-08 DIAGNOSIS — G89.4 CHRONIC PAIN SYNDROME: ICD-10-CM

## 2019-05-08 DIAGNOSIS — M54.5 CHRONIC LOW BACK PAIN, UNSPECIFIED BACK PAIN LATERALITY, WITH SCIATICA PRESENCE UNSPECIFIED: Primary | ICD-10-CM

## 2019-05-08 DIAGNOSIS — G89.29 CHRONIC LOW BACK PAIN, UNSPECIFIED BACK PAIN LATERALITY, WITH SCIATICA PRESENCE UNSPECIFIED: Primary | ICD-10-CM

## 2019-05-08 DIAGNOSIS — G96.198: ICD-10-CM

## 2019-05-08 DIAGNOSIS — Z79.899 ENCOUNTER FOR LONG-TERM (CURRENT) USE OF HIGH-RISK MEDICATION: ICD-10-CM

## 2019-05-08 DIAGNOSIS — M51.37 DEGENERATION OF LUMBAR OR LUMBOSACRAL INTERVERTEBRAL DISC: ICD-10-CM

## 2019-05-08 DIAGNOSIS — M54.17 LUMBOSACRAL RADICULOPATHY: ICD-10-CM

## 2019-05-08 RX ORDER — OXYCODONE HYDROCHLORIDE 5 MG/1
5 TABLET ORAL
Qty: 40 TAB | Refills: 0 | Status: SHIPPED | OUTPATIENT
Start: 2019-05-08 | End: 2019-06-07

## 2019-05-08 NOTE — PATIENT INSTRUCTIONS
Preventing Falls: Care Instructions Your Care Instructions Getting around your home safely can be a challenge if you have injuries or health problems that make it easy for you to fall. Loose rugs and furniture in walkways are among the dangers for many older people who have problems walking or who have poor eyesight. People who have conditions such as arthritis, osteoporosis, or dementia also have to be careful not to fall. You can make your home safer with a few simple measures. Follow-up care is a key part of your treatment and safety. Be sure to make and go to all appointments, and call your doctor if you are having problems. It's also a good idea to know your test results and keep a list of the medicines you take. How can you care for yourself at home? Taking care of yourself · You may get dizzy if you do not drink enough water. To prevent dehydration, drink plenty of fluids, enough so that your urine is light yellow or clear like water. Choose water and other caffeine-free clear liquids. If you have kidney, heart, or liver disease and have to limit fluids, talk with your doctor before you increase the amount of fluids you drink. · Exercise regularly to improve your strength, muscle tone, and balance. Walk if you can. Swimming may be a good choice if you cannot walk easily. · Have your vision and hearing checked each year or any time you notice a change. If you have trouble seeing and hearing, you might not be able to avoid objects and could lose your balance. · Know the side effects of the medicines you take. Ask your doctor or pharmacist whether the medicines you take can affect your balance. Sleeping pills or sedatives can affect your balance. · Limit the amount of alcohol you drink. Alcohol can impair your balance and other senses. · Ask your doctor whether calluses or corns on your feet need to be removed.  If you wear loose-fitting shoes because of calluses or corns, you can lose your balance and fall. · Talk to your doctor if you have numbness in your feet. Preventing falls at home · Remove raised doorway thresholds, throw rugs, and clutter. Repair loose carpet or raised areas in the floor. · Move furniture and electrical cords to keep them out of walking paths. · Use nonskid floor wax, and wipe up spills right away, especially on ceramic tile floors. · If you use a walker or cane, put rubber tips on it. If you use crutches, clean the bottoms of them regularly with an abrasive pad, such as steel wool. · Keep your house well lit, especially [place], and outside walkways. Use night-lights in areas such as hallways and bathrooms. Add extra light switches or use remote switches (such as switches that go on or off when you clap your hands) to make it easier to turn lights on if you have to get up during the night. · Install sturdy handrails on stairways. · Move items in your cabinets so that the things you use a lot are on the lower shelves (about waist level). · Keep a cordless phone and a flashlight with new batteries by your bed. If possible, put a phone in each of the main rooms of your house, or carry a cell phone in case you fall and cannot reach a phone. Or, you can wear a device around your neck or wrist. You push a button that sends a signal for help. · Wear low-heeled shoes that fit well and give your feet good support. Use footwear with nonskid soles. Check the heels and soles of your shoes for wear. Repair or replace worn heels or soles. · Do not wear socks without shoes on wood floors. · Walk on the grass when the sidewalks are slippery. If you live in an area that gets snow and ice in the winter, sprinkle salt on slippery steps and sidewalks. Preventing falls in the bath · Install grab bars and nonskid mats inside and outside your shower or tub and near the toilet and sinks. · Use shower chairs and bath benches. · Use a hand-held shower head that will allow you to sit while showering. · Get into a tub or shower by putting the weaker leg in first. Get out of a tub or shower with your strong side first. 
· Repair loose toilet seats and consider installing a raised toilet seat to make getting on and off the toilet easier. · Keep your bathroom door unlocked while you are in the shower. Where can you learn more? Go to http://slava-enrique.info/. Enter [id number] in the search box to learn more about \"Preventing Falls: Care Instructions. \" Current as of: March 15, 2018 Content Version: 11.9 © 1398-6440 [employer]. Care instructions adapted under license by [employer] (which disclaims liability or warranty for this information). If you have questions about a medical condition or this instruction, always ask your healthcare professional. [place] [address] any warranty or liability for your use of this information. Learning About Sleeping Well What does sleeping well mean? Sleeping well means getting enough sleep. How much sleep is enough varies among people. The number of hours you sleep is not as important as how you feel when you wake up. If you do not feel refreshed, you probably need more sleep. Another sign of not getting enough sleep is feeling tired during the day. The average total nightly sleep time is 7½ to 8 hours. Healthy adults may need a little more or a little less than this. Why is getting enough sleep important? Getting enough quality sleep is a basic part of good health. When your sleep suffers, your mood and your thoughts can suffer too. You may find yourself feeling more grumpy or stressed. Not getting enough sleep also can lead to serious problems, including injury, accidents, anxiety, and depression. What might cause poor sleeping? Many things can cause sleep problems, including: · Stress. Stress can be caused by fear about a single event, such as giving a speech. Or you may have ongoing stress, such as worry about work or school. · Depression, anxiety, and other mental or emotional conditions. · Changes in your sleep habits or surroundings. This includes changes that happen where you sleep, such as noise, light, or sleeping in a different bed. It also includes changes in your sleep pattern, such as having jet lag or working a late shift. · Health problems, such as pain, breathing problems, and restless legs syndrome. · Lack of regular exercise. How can you help yourself? Here are some tips that may help you sleep more soundly and wake up feeling more refreshed. Your sleeping area · Use your bedroom only for sleeping and sex. A bit of light reading may help you fall asleep. But if it doesn't, do your reading elsewhere in the house. Don't watch TV in bed. · Be sure your bed is big enough to stretch out comfortably, especially if you have a sleep partner. · Keep your bedroom quiet, dark, and cool. Use curtains, blinds, or a sleep mask to block out light. To block out noise, use earplugs, soothing music, or a \"white noise\" machine. Your evening and bedtime routine · Create a relaxing bedtime routine. You might want to take a warm shower or bath, listen to soothing music, or drink a cup of noncaffeinated tea. · Go to bed at the same time every night. And get up at the same time every morning, even if you feel tired. What to avoid · Limit caffeine (coffee, tea, caffeinated sodas) during the day, and don't have any for at least 4 to 6 hours before bedtime. · Don't drink alcohol before bedtime. Alcohol can cause you to wake up more often during the night. · Don't smoke or use tobacco, especially in the evening. Nicotine can keep you awake. · Don't take naps during the day, especially close to bedtime. · Don't lie in bed awake for too long.  If you can't fall asleep, or if you wake up in the middle of the night and can't get back to sleep within 15 minutes or so, get out of bed and go to another room until you feel sleepy. · Don't take medicine right before bed that may keep you awake or make you feel hyper or energized. Your doctor can tell you if your medicine may do this and if you can take it earlier in the day. If you can't sleep · Imagine yourself in a peaceful, pleasant scene. Focus on the details and feelings of being in a place that is relaxing. · Get up and do a quiet or boring activity until you feel sleepy. · Don't drink any liquids after 6 p.m. if you wake up often because you have to go to the bathroom. Where can you learn more? Go to http://slava-enrique.info/. Enter U131 in the search box to learn more about \"Learning About Sleeping Well. \" Current as of: September 11, 2018 Content Version: 11.9 © 6329-3977 [employer]. Care instructions adapted under license by [employer] (which disclaims liability or warranty for this information). If you have questions about a medical condition or this instruction, always ask your healthcare professional. [place] [address] any warranty or liability for your use of this information. Safe Use of Opioid Pain Medicine: Care Instructions Your Care Instructions Pain is your body's way of warning you that something is wrong. Pain feels different for everybody. Only you can describe your pain. A doctor can suggest or prescribe many types of medicines for pain. These range from over-the-counter medicines like acetaminophen (Tylenol) to powerful medicines called opioids. Examples of opioids are fentanyl, hydrocodone, morphine, and oxycodone. Heroin is an illegal opioid Opioids are strong medicines. They can help you manage pain when you use them the right way.  But if you misuse them, they can cause serious harm and even death. For these reasons, doctors are very careful about how they prescribe opioids. If you decide to take opioids, here are some things to remember. · Keep your doctor informed. You can get addicted to opioids. The risk is higher if you have a history of substance use. Your doctor will monitor you closely for signs of misuse and addiction and to figure out when you no longer need to take opioids. · Make a treatment plan. The goal of your plan is to be able to function and do the things you need to do, even if you still have some pain. You might be able to manage your pain with other non-opioid options like physical therapy, relaxation, or over-the-counter pain medicines. · Be aware of the side effects. Opioids can cause serious side effects, such as constipation, dry mouth, and nausea. And over time, you may need a higher dose to get pain relief. This is called tolerance. Your body also gets used to opioids. This is called physical dependence. If you suddenly stop taking them, you may have withdrawal symptoms. The doctor carefully considered what pain medicine is right for you. You may not have received opioids if your doctor was concerned about drug interactions or your safety, or if he or she had other concerns. It is best to have one doctor or clinic treat your pain. This way you will get the pain medicine that will help you the most. And a doctor will be able to watch for any problems that the medicine might cause. The doctor has checked you carefully, but problems can develop later. If you notice any problems or new symptoms,  get medical treatment right away. Follow-up care is a key part of your treatment and safety. Be sure to make and go to all appointments, and call your doctor if you are having problems. It's also a good idea to know your test results and keep a list of the medicines you take. How can you care for yourself at home? If you need to take opioids to manage your pain, remember these safety tips. · Follow directions carefully. It's easy to misuse opioids if you take a dose other than what's prescribed by your doctor. This can lead to overdose and even death. Even sharing them with someone they weren't meant for is misuse. · Be cautious. Opioids may affect your judgment and decision making. Do not drive or operate machinery until you can think clearly. Talk with your doctor about when it is safe to drive. · Reduce the risk of drug interactions. Opioids can be dangerous if you take them with alcohol or with certain drugs like sleeping pills and muscle relaxers. Make sure your doctor knows about all the other medicines you take, including over-the-counter medicines. Don't start any new medicines before you talk to your doctor or pharmacist. 
· Safely store and dispose of opioids. Store opioids in a safe and secure place. Make sure that pets, children, friends, and family can't get to them. When you're done using opioids, make sure to dispose of them safely and as quickly as possible. The [place] Food and Drug Administration (FDA) recommends these disposal options. ? The best option is to take your medicine to a drop-off box or take-back program that is authorized by the [address] ([facility]). ? If these programs aren't available in your area and your medicine doesn't have specific disposal instructions (such as flushing), you can throw them into your household trash if you follow the FDA's instructions. Visit fda.gov and search for \"unused medicine disposal.\" 
? If you have opioid patches (used or unused), your options are to take them to a [facility]-authorized site or flush them down the toilet. Do not throw them in the trash. ? Only flush your medicine down the toilet if you can't get to a [facility]-approved site or your medicine instructions state clearly to flush them. · Reduce the risk of overdose. Misuse of opioids can be very dangerous. Protect yourself by asking your doctor about a naloxone rescue kit. It can help youand even save your lifeif you take too much of an opioid. Try other ways to reduce pain. · Relax, and reduce stress. Relaxation techniques such as deep breathing or meditation can help. · Keep moving. Gentle, daily exercise can help reduce pain over the long run. Try low- or no-impact exercises such as walking, swimming, and stationary biking. Do stretches to stay flexible. · Try heat, cold packs, and massage. · Get enough sleep. Pain can make you tired and drain your energy. Talk with your doctor if you have trouble sleeping because of pain. · Think positive. Your thoughts can affect your pain level. Do things that you enjoy to distract yourself when you have pain instead of focusing on the pain. See a movie, read a book, listen to music, or spend time with a friend. If you are not taking a prescription pain medicine, ask your doctor if you can take an over-the-counter medicine. When should you call for help? Call your doctor now or seek immediate medical care if: 
  · You have a new kind of pain.  
  · You have new symptoms, such as a fever or rash, along with the pain.  
 Watch closely for changes in your health, and be sure to contact your doctor if: 
  · You think you might be using too much pain medicine, and you need help to use less or stop.  
  · Your pain gets worse.  
  · You would like a referral to a doctor or clinic that specializes in pain management. Where can you learn more? Go to http://slava-enrique.info/. Enter R108 in the search box to learn more about \"Safe Use of Opioid Pain Medicine: Care Instructions. \" Current as of: Desiree 3, 2018 Content Version: 11.9 © 5540-2969 [employer].  Care instructions adapted under license by [employer] (which disclaims liability or warranty for this information). If you have questions about a medical condition or this instruction, always ask your healthcare professional. [place] [address] any warranty or liability for your use of this information.

## 2019-05-08 NOTE — PROGRESS NOTES
Referral date 10/23/13, source Workmen's Compensation and for low back pain. Calculated MEQ - 15 Naloxone rescue - yes Prophylactic bowel program - yes Date of last OCA 11/13/2018 Last UDS 2/12/2019, consistent  date checked today, findings consistent Today   Last Visit  Prior Visit(s) ORT -       
PGIC -1 and 7  2 and 4  1 and 5 
DANO -62%  60%   52% COMM - 16  7   11 HPI: 
Artis Elizalde is a 77 y.o. male here for f/u visit for ongoing evaluation of work/related to chronic pain which resulted in multiple surgeries including L4-S1 fusion. Pt was last seen here on 2/12/2019. Pt denies interval changes on the character or distribution of pain. Pain is located mid lower back and right gluteal region. The pain is described as aching, stabbing, burning, pins-and-needles. Pain at its best is 8/10. Pain at its worse is 10/10. The pain is worsened by standing, prolonged sitting at his [employer]. Symptoms are improved by rocking back and forth, TENS unit, changing positions, heating pad. Pt has never tried SCS trial, compound cream, implantable pain pump. Patient has history of lumbar surgery in 1980 and another in 1984 and acute onset of his low back pain following a full day of wrap fetid heavy lifting in 1998 which led to fusion of L4-S1 and in 1999 removal of hardware in 2000. Patient has recent deaths of two friends and his brother and feels he was depressed due to the loss and pain. Patient felt his TENS unit has been most beneficial for his pain, he obtained two from [facility]. Since last visit, Patient recently was started on Topamax for migraines and feels that has been very benefical. 
 
 
ROS: 
Findings include diarrhea and constipation chronic not new, weakness chronic not new, dizziness chronic not new, depression recently due to recent family loss.  
Denies fever, chills, nausea, vomiting, abdominal pain, chest pain, shortness or breath/trouble breathing, trouble swallowing, changes in vision, changes in hearing, falls, bladder incontinence, bowel incontinence, anxiety, suicidal ideations, homicidal ideations or alcohol use. Opioid specific risk:obesity, DM, asthma, GERD, sleep disturbances. Vitals:  
 05/08/19 1052 BP: 128/83 Pulse: 72 Resp: 16 Temp: 97.1 °F (36.2 °C) TempSrc: Oral  
SpO2: 100% Weight: 122.5 kg (270 lb) Height: 5' 11\" (1.803 m) PainSc:   9 PainLoc: Back Imaging: 
\"\"\" 9/13/18 MRI spine lumbar without contrast 
IMPRESSION: 
  
1. Multilevel degenerative findings along postsurgical lumbar spine. 
-Most notable findings are at L2-3 where there is a moderate to severe spinal 
stenosis from combined disc and facet pathology, with notable contribution from 
bulging posterior epidural fat. This and other levels as detailed above. \"\"\"\" 
  
Labs: BUN/Cr: \"\"\" 18/1.36(H) 2/27/18\"\"\" AST/ALT: \"\"\" 21/39     2/27/18\"\"\" Physical exam: 
AFVSS, no acute distress, obese body habitus. A&OXs 3. Normocephalic, atraumatic. Conjugate gaze, clear sclerae. EOM intact. Speech is clear and appropriate. Mood is appropriate and patient is cooperative. Right-handed mono cane to assist with gait and balance Balance is within functional limits. Non-labored breathing. Even rise of chest wall. LE: Full AROM lumbar flexion decreased by 75% to endrange reproduction of primary pain. Full AROM lumbar extension with straight upright posture reproduction of primary pain. Strength for right lower extremity is 5/5 for hip flexion, knee extension, dorsiflexion, extensor hallucis longus, plantar flexion. Strength for left lower extremity is 5/5 for hip flexion, knee extension, dorsiflexion, extensor hallucis longus, plantar flexion. Sensation to light touch is intact for right L2, 3, S2.  Decreased sensation L4, L5, S1 Sensation to light touch is intact for left L2-S2. Negative seated straight leg raise bilaterally. TTP midline lumbar and right GT. Primary Care Physician Brandon COTTO 
[address] [place] [facility] 
[phone] PHQ -- . 
3 most recent PHQ Screens 5/8/2019 PHQ Not Done - Little interest or pleasure in doing things Not at all Feeling down, depressed, irritable, or hopeless Several days Total Score PHQ 2 1 Assessment/Plan: ICD-10-CM ICD-9-CM 1. Chronic low back pain, unspecified back pain laterality, with sciatica presence unspecified M54.5 724.2 oxyCODONE IR (ROXICODONE) 5 mg immediate release tablet G89.29 338.29   
2. Disorder of meninges G96.19 349.2 oxyCODONE IR (ROXICODONE) 5 mg immediate release tablet 3. Lumbosacral radiculopathy M54.17 724.4 4. Degeneration of lumbar or lumbosacral intervertebral disc M51.37 722.52 oxyCODONE IR (ROXICODONE) 5 mg immediate release tablet 5. Other polyneuropathy G62.89 357.89   
6. Chronic pain syndrome G89.4 338.4 7. Encounter for long-term (current) use of high-risk medication Z79.899 V58.69   
  
 
--Ordered H-wave to be used for home use with no substitution --Reordered compound cream to be used 3-4 times daily as needed. ([facility]) --Patient to continue to wear his LSO as needed. 
  
--Pt to continue TENS unit as needed for pain which she reports has been very beneficial. 
  
--Patient to continue regular follow-ups with PCP 
 
--Patient declines greater trochanteric bursitis injection due to the elevation caused in his blood sugar. --continue Lyrica 150 mg 3 times daily, patient will call for refills 
  
--continue Cymbalta 60 mg capsule 1 capsule twice daily for chronic pain, patient will call for refills 
 
--Consider greater trochanteric bursitis injection, possible lumbar epidural steroid injection as needed. Do not recommend long term opioid therapy for this patient at this time for their chronic pain; the risks outweigh the potential benefits.  Pt currently taking oxycodone/acteminophen 5/325 1 tab up to twice daily as needed with a total of 45 tabs to be budgeted over 30 days. Their MME is 15. Today, we will hold the weaning of patients opioid medication with a goal of being opioid free, pending safety and compliance. Pt instructed to call if they experience any signs of withdrawal (diarrhea, nausea, vomiting, sweating or chills, agitation, itching). Today, pt given prescription for Oxycodone 5 mg tabs on tab up to twice daily as needed with a total of 40 tabs to be budgeted over 30 days. Their new MME is approximately 15. We will remove Tylenol from the oxycodone as patient reports his PCP is worried about his medications and the effect on his liver. At next office visit, the plan is to provide patient with oxycodone 5 mg tablet 1 tablet twice daily total of 35 tablets of budgeted over 30 days. Their new MME will be 15. If patient has difficulty with the wean or difficulty with cravings we will consider referral to mental health for ongoing assessment and treatment for opioid use disorder. Follow up ongoing assessment and ongoing development of integrative and comprehensive plan of care for chronic pain. Goals: To establish complementary and integrative plan of care to address chronic pain issues while minimizing pharmaceuticals to maximize patient's function improve quality of life. Education: 
Patient again educated on the importance of strict compliance with the opioid care agreement while on opioid therapy. Patient also again educated that they should avoid driving while on chronic opioid therapy. Also advised to avoid alcohol and to avoid benzodiazepines while on opioid therapy. Handouts given regarding opioid safety and sleep health. Follow-up and Dispositions · Return in about 3 months (around 8/8/2019). Social History Socioeconomic History  Marital status:  Spouse name: Not on file  Number of children: Not on file  Years of education: Not on file  Highest education level: Not on file Occupational History  Occupation: Retired-Federal  
Social Needs  Financial resource strain: Not on file  Food insecurity:  
  Worry: Not on file Inability: Not on file  Transportation needs:  
  Medical: Not on file Non-medical: Not on file Tobacco Use  Smoking status: Former Smoker Packs/day: 1.00 Years: 31.00 Pack years: 31.00 Types: Pipe, Cigars Last attempt to quit: 1995 Years since quittin.3  Smokeless tobacco: Never Used Substance and Sexual Activity  Alcohol use: No  
 Drug use: No  
 Sexual activity: Yes  
  Partners: Female Lifestyle  Physical activity:  
  Days per week: Not on file Minutes per session: Not on file  Stress: Not on file Relationships  Social connections:  
  Talks on phone: Not on file Gets together: Not on file Attends Jainism service: Not on file Active member of club or organization: Not on file Attends meetings of clubs or organizations: Not on file Relationship status: Not on file  Intimate partner violence:  
  Fear of current or ex partner: Not on file Emotionally abused: Not on file Physically abused: Not on file Forced sexual activity: Not on file Other Topics Concern  Not on file Social History Narrative  Not on file Family History Problem Relation Age of Onset  Cancer Mother Bone and Brain Cancer  Diabetes Mother  Liver Disease Father Lung Cancer  Kidney Disease Sister  Diabetes Daughter  Colon Cancer Brother Allergies Allergen Reactions  Ciprofloxacin Anaphylaxis  Other Plant, Animal, Environmental Other (comments) Patient cannot have MRI. Patient states that he has metal screws in his left arm.  Lamisil [Terbinafine] Swelling Tongue swelling  Metformin Other (comments) Elevated cr 1.4  Morphine Other (comments) \"loss of blood pressure\"  Other Medication Other (comments) Doxasylin causes extreme GERD  Pcn [Penicillins] Rash  Pentothal [Thiopental Sodium] Shortness of Breath  Sulfa (Sulfonamide Antibiotics) Rash Past Medical History:  
Diagnosis Date  Arthritis  Asthma  Cancer ([facility] [address] BASAL  Cardiac catheterization 2009 [facility] [facility]:  No significant CAD.  Cardiac echocardiogram 01/20/2014 EF 50-55%. No WMA. Gr 1 DDfx. RVSP 25-30 mmHg. Mild TR.  DM type 2 (diabetes mellitus, type 2) ([facility] [address]  Enlarged prostate  Failed back syndrome  GERD (gastroesophageal reflux disease)  Hearing loss, bilateral   
 Hearing Aids  Hypertension  Hypothyroidism  Insomnia  Low serum testosterone level  Neuropathy  Osteoarthritis of multiple joints  S/P colonoscopy 8/14/13  
 mild diverticulosis in sigmoid colon w/o evidence of diverticulitis; f/u 5 years  SOB (shortness of breath)   
 chronic; 2' \"lung fever\"  Vertigo Past Surgical History:  
Procedure Laterality Date  COLONOSCOPY N/A 4/3/2018 COLONOSCOPY performed by Angelo Don MD at [address] [place] [address]  2000  
 removal of defective hardware [place]  
 to remove bone fragments  HX LUMBAR FUSION  1999  
 fusion from L4-S1 and implantation of hardware  HX LUMBAR FUSION  1984  
 fusion on L5 area  HX ORTHOPAEDIC Bilateral 2004  
 trigger finger syndrome-all 4 fingers  HX ORTHOPAEDIC Left 2004  
 left arm torn muscle repair and placement of 2 screws  HX OTHER SURGICAL    
 skin cancer removal  
 
Current Outpatient Medications on File Prior to Visit Medication Sig  topiramate (TOPAMAX) 25 mg tablet Take 1 Tab by mouth daily (with breakfast).   
 pregabalin (LYRICA) 150 mg capsule TAKE 1 CAPSULE BY MOUTH THREE TIMES DAILY FOR 30 DAYS. MAXIMUM 3 CAPSULES DAILY.  oxyCODONE-acetaminophen (PERCOCET) 5-325 mg per tablet 1 tab up to twice daily as needed with a total of 45 tab to be budgeted over 30 days.  fluticasone (FLONASE) 50 mcg/actuation nasal spray USE 1 SPRAY(S) IN EACH NOSTRIL ONCE DAILY  DULoxetine (CYMBALTA) 60 mg capsule Take 1 Cap by mouth two (2) times a day.  tamsulosin (FLOMAX) 0.4 mg capsule TAKE 1 CAPSULE BY MOUTH ONCE DAILY  levothyroxine (SYNTHROID) 150 mcg tablet Take 1 Tab by mouth Daily (before breakfast).  lisinopril (PRINIVIL, ZESTRIL) 5 mg tablet Take 1 Tab by mouth daily.  metoprolol tartrate (LOPRESSOR) 25 mg tablet Take 1 Tab by mouth two (2) times a day.  montelukast (SINGULAIR) 10 mg tablet Take 1 Tab by mouth daily.  TENS Units (TENS 504) chaz Please provide patient with a TENS unit to help improve function, improve quality of life, reduce medication use, improve ROM. 68-year-old male with chronic lower back pain secondary to previous work-related injuries and subsequent surgeries.  insulin glulisine U-100 (APIDRA SOLOSTAR U-100 INSULIN) 100 unit/mL pen by SubCUTAneous route.  OTHER EB-N3 once daily  FANI PEN NEEDLE 32 gauge x 5/32\" ndle USE AT BEDTIME  JANUVIA 100 mg tablet Take 100 mg by mouth daily. Indications: patient stated he was given 50 mg  ACCU-CHEK JAZZY PLUS TEST STRP strip  ACCU-CHEK SOFTCLIX LANCETS misc  albuterol (PROVENTIL VENTOLIN) 2.5 mg /3 mL (0.083 %) nebulizer solution 3 mL by Nebulization route every four (4) hours as needed for Wheezing or Shortness of Breath.  PEN NEEDLE, DIABETIC (BD ULTRA-FINE FANI PEN NEEDLES) by Does Not Apply route. 4mm x 32G  insulin glulisine (APIDRA SOLOSTAR) 100 unit/mL pen 2 units per carb consumed. Max 30 / day (Patient taking differently: 2 units per carb consumed. Max 30 / day  Indications: patient states taking 15-20 units three times a day)  insulin glargine (BASAGLAR KWIKPEN) 100 unit/mL (3 mL) pen 52 units at bedtime (Patient taking differently: 52 Units. 52 units at bedtime)  PEN NEEDLE 31 gauge x 5/16\" ndle USE ONE PEN NEEDLE FOR LANTUS FLEXPEN AND 3 PEN NEEDLES FOR APIDRA WITH EACH MEAL  esomeprazole (NEXIUM) 40 mg capsule Take 40 mg by mouth two (2) times a day.  betamethasone dipropionate (DIPROLENE) 0.05 % ointment  budesonide-formoterol (SYMBICORT) 160-4.5 mcg/actuation HFA inhaler Take 2 Puffs by inhalation two (2) times a day.  tiotropium (SPIRIVA) 18 mcg inhalation capsule Take 1 Cap by inhalation daily.  ketorolac (ACULAR) 0.5 % ophthalmic solution Administer 1 Drop to both eyes two (2) times a day.  atorvastatin (LIPITOR) 20 mg tablet TAKE ONE TABLET BY MOUTH ONCE DAILY (Patient taking differently: take two TABLET BY MOUTH ONCE DAILY)  naloxone 4 mg/actuation spry 4 mg by Nasal route as needed. No current facility-administered medications on file prior to visit. [address] was used for portions of this report. Unintended errors may occur.

## 2019-05-08 NOTE — PROGRESS NOTES
Nursing Notes Patient presents to the office today in follow-up. Patient rates his pain at 9/10 on the numerical pain scale. Reviewed medications with counts as follows:   
Rx Date filled Qty Dispensed Pill Count Last Dose Short Oxycodone 5-325 mg 2/12/19 45 14 yesterday no  
       
       
       
          
 reviewed YES Any aberrancies noted on  NO Last opioid agreement 11/13/18 Last urine drug screen 2/16/19 
3 most recent PHQ Screens 5/8/2019 PHQ Not Done - Little interest or pleasure in doing things Not at all Feeling down, depressed, irritable, or hopeless Several days Total Score PHQ 2 1 Comments: POC UDS was not performed in office today Any new labs or imaging since last appointment? YES, lab Have you been to an emergency room (ER) or urgent care clinic since your last visit? NO Have you been hospitalized since your last visit? NO If yes, where, when, and reason for visit? Have you seen or consulted any other health care providers outside of the [address] [place] [address]  since your last visit? YES If yes, where, when, and reason for visit? Mr. Wei Del Valle has a reminder for a \"due or due soon\" health maintenance. I have asked that he contact his primary care provider for follow-up on this health maintenance.

## 2019-05-08 NOTE — PROGRESS NOTES
Sent orders for compound cream to [facility], [place] sent to [facility] and [address] [place] [address], confirmation received for all.

## 2019-05-24 ENCOUNTER — HOSPITAL ENCOUNTER (OUTPATIENT)
Dept: LAB | Age: 69
Discharge: HOME | End: 2019-05-24
Payer: MEDICARE

## 2019-05-24 DIAGNOSIS — E78.5 HYPERLIPIDEMIA, UNSPECIFIED HYPERLIPIDEMIA TYPE: ICD-10-CM

## 2019-05-24 DIAGNOSIS — E11.21 TYPE 2 DIABETES WITH NEPHROPATHY (HCC): ICD-10-CM

## 2019-05-24 LAB
ALBUMIN SERPL-MCNC: 3.7 G/DL (ref 3.4–5)
ALBUMIN/GLOB SERPL: 1.2 {RATIO} (ref 0.8–1.7)
ALP SERPL-CCNC: 50 U/L (ref 45–117)
ALT SERPL-CCNC: 39 U/L (ref 16–61)
ANION GAP SERPL CALC-SCNC: 7 MMOL/L (ref 3–18)
AST SERPL-CCNC: 15 U/L (ref 15–37)
BILIRUB SERPL-MCNC: 0.3 MG/DL (ref 0.2–1)
BUN SERPL-MCNC: 18 MG/DL (ref 7–18)
BUN/CREAT SERPL: 16 (ref 12–20)
CALCIUM SERPL-MCNC: 8.7 MG/DL (ref 8.5–10.1)
CHLORIDE SERPL-SCNC: 110 MMOL/L (ref 100–108)
CHOLEST SERPL-MCNC: 187 MG/DL
CO2 SERPL-SCNC: 25 MMOL/L (ref 21–32)
CREAT SERPL-MCNC: 1.16 MG/DL (ref 0.6–1.3)
CREAT UR-MCNC: 94.5 MG/DL (ref 30–125)
ERYTHROCYTE [DISTWIDTH] IN BLOOD BY AUTOMATED COUNT: 12.8 % (ref 11.6–14.5)
EST. AVERAGE GLUCOSE BLD GHB EST-MCNC: 148 MG/DL
GLOBULIN SER CALC-MCNC: 3 G/DL (ref 2–4)
GLUCOSE SERPL-MCNC: 126 MG/DL (ref 74–99)
HBA1C MFR BLD: 6.8 % (ref 4.2–5.6)
HCT VFR BLD AUTO: 42.4 % (ref 36–48)
HDLC SERPL-MCNC: 36 MG/DL (ref 40–60)
HDLC SERPL: 5.2 {RATIO} (ref 0–5)
HGB BLD-MCNC: 14.8 G/DL (ref 13–16)
LDLC SERPL CALC-MCNC: 91.2 MG/DL (ref 0–100)
LIPID PROFILE,FLP: ABNORMAL
MCH RBC QN AUTO: 29.4 PG (ref 24–34)
MCHC RBC AUTO-ENTMCNC: 34.9 G/DL (ref 31–37)
MCV RBC AUTO: 84.3 FL (ref 74–97)
MICROALBUMIN UR-MCNC: <0.5 MG/DL (ref 0–3)
MICROALBUMIN/CREAT UR-RTO: NORMAL MG/G (ref 0–30)
PLATELET # BLD AUTO: 228 K/UL (ref 135–420)
PMV BLD AUTO: 10.3 FL (ref 9.2–11.8)
POTASSIUM SERPL-SCNC: 4.5 MMOL/L (ref 3.5–5.5)
PROT SERPL-MCNC: 6.7 G/DL (ref 6.4–8.2)
RBC # BLD AUTO: 5.03 M/UL (ref 4.7–5.5)
SODIUM SERPL-SCNC: 142 MMOL/L (ref 136–145)
TRIGL SERPL-MCNC: 299 MG/DL (ref ?–150)
TSH SERPL DL<=0.05 MIU/L-ACNC: 0.21 UIU/ML (ref 0.36–3.74)
VLDLC SERPL CALC-MCNC: 59.8 MG/DL
WBC # BLD AUTO: 7.9 K/UL (ref 4.6–13.2)

## 2019-05-24 PROCEDURE — 84443 ASSAY THYROID STIM HORMONE: CPT

## 2019-05-24 PROCEDURE — 85027 COMPLETE CBC AUTOMATED: CPT

## 2019-05-24 PROCEDURE — 80053 COMPREHEN METABOLIC PANEL: CPT

## 2019-05-24 PROCEDURE — 80061 LIPID PANEL: CPT

## 2019-05-24 PROCEDURE — 82043 UR ALBUMIN QUANTITATIVE: CPT

## 2019-05-24 PROCEDURE — 83036 HEMOGLOBIN GLYCOSYLATED A1C: CPT

## 2019-05-24 PROCEDURE — 36415 COLL VENOUS BLD VENIPUNCTURE: CPT

## 2019-05-30 DIAGNOSIS — R79.89 LOW TSH LEVEL: ICD-10-CM

## 2019-05-30 DIAGNOSIS — E78.1 HYPERTRIGLYCERIDEMIA: Primary | ICD-10-CM

## 2019-05-30 DIAGNOSIS — E03.9 HYPOTHYROIDISM, UNSPECIFIED TYPE: ICD-10-CM

## 2019-05-30 RX ORDER — LEVOTHYROXINE SODIUM 137 UG/1
137 TABLET ORAL
Qty: 30 TAB | Refills: 1 | Status: SHIPPED | OUTPATIENT
Start: 2019-05-30 | End: 2019-09-27 | Stop reason: SDUPTHER

## 2019-05-30 RX ORDER — FENOFIBRATE 145 MG/1
145 TABLET, COATED ORAL DAILY
Qty: 30 TAB | Refills: 1 | Status: SHIPPED | OUTPATIENT
Start: 2019-05-30 | End: 2019-07-30

## 2019-05-31 NOTE — PROGRESS NOTES
Let pt know that elevated triglyceride. For now starting tricor along with statin. Repeat labs in 2 months. Also low tsh. Reduce synthroid dose. Repeat labs in 2 months.

## 2019-06-07 NOTE — PROGRESS NOTES
Contacted patient and verified identity using name and date of birth (2- identifiers)  Spoke with patient and he indicate that he was taking Tricor and his endocrinologist Macy Melo) took him off of it and increasted Lipitor to 40mg. Will obtain reports.

## 2019-06-17 DIAGNOSIS — M54.41 CHRONIC BILATERAL LOW BACK PAIN WITH BILATERAL SCIATICA: ICD-10-CM

## 2019-06-17 DIAGNOSIS — G89.29 CHRONIC LOW BACK PAIN, UNSPECIFIED BACK PAIN LATERALITY, WITH SCIATICA PRESENCE UNSPECIFIED: ICD-10-CM

## 2019-06-17 DIAGNOSIS — M54.42 CHRONIC BILATERAL LOW BACK PAIN WITH BILATERAL SCIATICA: ICD-10-CM

## 2019-06-17 DIAGNOSIS — M47.896 OTHER OSTEOARTHRITIS OF SPINE, LUMBAR REGION: ICD-10-CM

## 2019-06-17 DIAGNOSIS — M51.36 DDD (DEGENERATIVE DISC DISEASE), LUMBAR: ICD-10-CM

## 2019-06-17 DIAGNOSIS — M54.5 CHRONIC LOW BACK PAIN, UNSPECIFIED BACK PAIN LATERALITY, WITH SCIATICA PRESENCE UNSPECIFIED: ICD-10-CM

## 2019-06-17 DIAGNOSIS — G89.29 CHRONIC BILATERAL LOW BACK PAIN WITH BILATERAL SCIATICA: ICD-10-CM

## 2019-06-17 RX ORDER — DULOXETIN HYDROCHLORIDE 60 MG/1
60 CAPSULE, DELAYED RELEASE ORAL 2 TIMES DAILY
Qty: 60 CAP | Refills: 3 | Status: SHIPPED | OUTPATIENT
Start: 2019-06-17 | End: 2019-10-23 | Stop reason: SDUPTHER

## 2019-06-19 NOTE — PROGRESS NOTES
Contacted patient and verified identity using name and date of birth (2- identifiers)  Called patient and he is aware of change in thyroid dose.

## 2019-06-30 DIAGNOSIS — Z86.69 H/O MIGRAINE: ICD-10-CM

## 2019-07-08 NOTE — TELEPHONE ENCOUNTER
This pharmacy faxed over request for the following prescriptions to be filled:    Medication requested :   Requested Prescriptions     Pending Prescriptions Disp Refills    topiramate (TOPAMAX) 25 mg tablet [Pharmacy Med Name: TOPIRAMATE 25MG] 30 Tab 2     Sig: TAKE 1 TABLET BY MOUTH ONCE DAILY WITH BREAKFAST     PCP: René Sanchez or Print: Sophie  Mail order or Local pharmacy [address] [place] [address],[address] Floor      Scheduled appointment if not seen by current providers in office:  [address] [facility] 3/21/2019 f/u 8/1/2019 pt only has a couple more days left.

## 2019-07-09 RX ORDER — TOPIRAMATE 25 MG/1
TABLET ORAL
Qty: 30 TAB | Refills: 0 | Status: SHIPPED | OUTPATIENT
Start: 2019-07-09 | End: 2019-08-28 | Stop reason: SDUPTHER

## 2019-07-30 ENCOUNTER — OFFICE VISIT (OUTPATIENT)
Dept: PAIN MANAGEMENT | Age: 69
End: 2019-07-30

## 2019-07-30 VITALS
SYSTOLIC BLOOD PRESSURE: 125 MMHG | HEIGHT: 71 IN | TEMPERATURE: 97.8 F | WEIGHT: 270 LBS | DIASTOLIC BLOOD PRESSURE: 77 MMHG | BODY MASS INDEX: 37.8 KG/M2 | HEART RATE: 89 BPM | RESPIRATION RATE: 16 BRPM | OXYGEN SATURATION: 94 %

## 2019-07-30 DIAGNOSIS — M54.17 LUMBOSACRAL RADICULOPATHY: ICD-10-CM

## 2019-07-30 DIAGNOSIS — Z79.899 ENCOUNTER FOR LONG-TERM (CURRENT) USE OF HIGH-RISK MEDICATION: ICD-10-CM

## 2019-07-30 DIAGNOSIS — M54.5 CHRONIC LOW BACK PAIN, UNSPECIFIED BACK PAIN LATERALITY, WITH SCIATICA PRESENCE UNSPECIFIED: Primary | ICD-10-CM

## 2019-07-30 DIAGNOSIS — G89.4 CHRONIC PAIN SYNDROME: ICD-10-CM

## 2019-07-30 DIAGNOSIS — G62.89 OTHER POLYNEUROPATHY: ICD-10-CM

## 2019-07-30 DIAGNOSIS — G89.29 CHRONIC LOW BACK PAIN, UNSPECIFIED BACK PAIN LATERALITY, WITH SCIATICA PRESENCE UNSPECIFIED: Primary | ICD-10-CM

## 2019-07-30 DIAGNOSIS — G96.198: ICD-10-CM

## 2019-07-30 DIAGNOSIS — M51.37 DEGENERATION OF LUMBAR OR LUMBOSACRAL INTERVERTEBRAL DISC: ICD-10-CM

## 2019-07-30 DIAGNOSIS — J44.9 ASTHMA WITH COPD (CHRONIC OBSTRUCTIVE PULMONARY DISEASE) (HCC): ICD-10-CM

## 2019-07-30 RX ORDER — OXYCODONE HYDROCHLORIDE 5 MG/1
5 TABLET ORAL
COMMUNITY
End: 2019-10-23 | Stop reason: SDUPTHER

## 2019-07-30 RX ORDER — PREGABALIN 150 MG/1
CAPSULE ORAL
Qty: 90 CAP | Refills: 2 | Status: SHIPPED | OUTPATIENT
Start: 2019-07-30 | End: 2019-10-23 | Stop reason: SDUPTHER

## 2019-07-30 RX ORDER — MONTELUKAST SODIUM 10 MG/1
TABLET ORAL
Qty: 90 TAB | Refills: 1 | Status: SHIPPED | OUTPATIENT
Start: 2019-07-30 | End: 2020-01-08

## 2019-07-30 NOTE — PATIENT INSTRUCTIONS
Preventing Falls: Care Instructions  Your Care Instructions    Getting around your home safely can be a challenge if you have injuries or health problems that make it easy for you to fall. Loose rugs and furniture in walkways are among the dangers for many older people who have problems walking or who have poor eyesight. People who have conditions such as arthritis, osteoporosis, or dementia also have to be careful not to fall. You can make your home safer with a few simple measures. Follow-up care is a key part of your treatment and safety. Be sure to make and go to all appointments, and call your doctor if you are having problems. It's also a good idea to know your test results and keep a list of the medicines you take. How can you care for yourself at home? Taking care of yourself  · You may get dizzy if you do not drink enough water. To prevent dehydration, drink plenty of fluids, enough so that your urine is light yellow or clear like water. Choose water and other caffeine-free clear liquids. If you have kidney, heart, or liver disease and have to limit fluids, talk with your doctor before you increase the amount of fluids you drink. · Exercise regularly to improve your strength, muscle tone, and balance. Walk if you can. Swimming may be a good choice if you cannot walk easily. · Have your vision and hearing checked each year or any time you notice a change. If you have trouble seeing and hearing, you might not be able to avoid objects and could lose your balance. · Know the side effects of the medicines you take. Ask your doctor or pharmacist whether the medicines you take can affect your balance. Sleeping pills or sedatives can affect your balance. · Limit the amount of alcohol you drink. Alcohol can impair your balance and other senses. · Ask your doctor whether calluses or corns on your feet need to be removed.  If you wear loose-fitting shoes because of calluses or corns, you can lose your balance and fall. · Talk to your doctor if you have numbness in your feet. Preventing falls at home  · Remove raised doorway thresholds, throw rugs, and clutter. Repair loose carpet or raised areas in the floor. · Move furniture and electrical cords to keep them out of walking paths. · Use nonskid floor wax, and wipe up spills right away, especially on ceramic tile floors. · If you use a walker or cane, put rubber tips on it. If you use crutches, clean the bottoms of them regularly with an abrasive pad, such as steel wool. · Keep your house well lit, especially [place], and outside walkways. Use night-lights in areas such as hallways and bathrooms. Add extra light switches or use remote switches (such as switches that go on or off when you clap your hands) to make it easier to turn lights on if you have to get up during the night. · Install sturdy handrails on stairways. · Move items in your cabinets so that the things you use a lot are on the lower shelves (about waist level). · Keep a cordless phone and a flashlight with new batteries by your bed. If possible, put a phone in each of the main rooms of your house, or carry a cell phone in case you fall and cannot reach a phone. Or, you can wear a device around your neck or wrist. You push a button that sends a signal for help. · Wear low-heeled shoes that fit well and give your feet good support. Use footwear with nonskid soles. Check the heels and soles of your shoes for wear. Repair or replace worn heels or soles. · Do not wear socks without shoes on wood floors. · Walk on the grass when the sidewalks are slippery. If you live in an area that gets snow and ice in the winter, sprinkle salt on slippery steps and sidewalks. Preventing falls in the bath  · Install grab bars and nonskid mats inside and outside your shower or tub and near the toilet and sinks. · Use shower chairs and bath benches.   · Use a hand-held shower head that will allow you to sit while showering. · Get into a tub or shower by putting the weaker leg in first. Get out of a tub or shower with your strong side first.  · Repair loose toilet seats and consider installing a raised toilet seat to make getting on and off the toilet easier. · Keep your bathroom door unlocked while you are in the shower. Where can you learn more? Go to http://slava-enrique.info/. Enter 0476 [id number] in the search box to learn more about \"Preventing Falls: Care Instructions. \"  Current as of: November 7, 2018  Content Version: 12.1  © 7358-4368 [employer]. Care instructions adapted under license by [employer] (which disclaims liability or warranty for this information). If you have questions about a medical condition or this instruction, always ask your healthcare professional. [place] [address] any warranty or liability for your use of this information. Learning About Sleeping Well  What does sleeping well mean? Sleeping well means getting enough sleep. How much sleep is enough varies among people. The number of hours you sleep is not as important as how you feel when you wake up. If you do not feel refreshed, you probably need more sleep. Another sign of not getting enough sleep is feeling tired during the day. The average total nightly sleep time is 7½ to 8 hours. Healthy adults may need a little more or a little less than this. Why is getting enough sleep important? Getting enough quality sleep is a basic part of good health. When your sleep suffers, your mood and your thoughts can suffer too. You may find yourself feeling more grumpy or stressed. Not getting enough sleep also can lead to serious problems, including injury, accidents, anxiety, and depression. What might cause poor sleeping? Many things can cause sleep problems, including:  · Stress. Stress can be caused by fear about a single event, such as giving a speech.  Or you may have ongoing stress, such as worry about work or school. · Depression, anxiety, and other mental or emotional conditions. · Changes in your sleep habits or surroundings. This includes changes that happen where you sleep, such as noise, light, or sleeping in a different bed. It also includes changes in your sleep pattern, such as having jet lag or working a late shift. · Health problems, such as pain, breathing problems, and restless legs syndrome. · Lack of regular exercise. How can you help yourself? Here are some tips that may help you sleep more soundly and wake up feeling more refreshed. Your sleeping area  · Use your bedroom only for sleeping and sex. A bit of light reading may help you fall asleep. But if it doesn't, do your reading elsewhere in the house. Don't watch TV in bed. · Be sure your bed is big enough to stretch out comfortably, especially if you have a sleep partner. · Keep your bedroom quiet, dark, and cool. Use curtains, blinds, or a sleep mask to block out light. To block out noise, use earplugs, soothing music, or a \"white noise\" machine. Your evening and bedtime routine  · Create a relaxing bedtime routine. You might want to take a warm shower or bath, listen to soothing music, or drink a cup of noncaffeinated tea. · Go to bed at the same time every night. And get up at the same time every morning, even if you feel tired. What to avoid  · Limit caffeine (coffee, tea, caffeinated sodas) during the day, and don't have any for at least 4 to 6 hours before bedtime. · Don't drink alcohol before bedtime. Alcohol can cause you to wake up more often during the night. · Don't smoke or use tobacco, especially in the evening. Nicotine can keep you awake. · Don't take naps during the day, especially close to bedtime. · Don't lie in bed awake for too long.  If you can't fall asleep, or if you wake up in the middle of the night and can't get back to sleep within 15 minutes or so, get out of bed and go to another room until you feel sleepy. · Don't take medicine right before bed that may keep you awake or make you feel hyper or energized. Your doctor can tell you if your medicine may do this and if you can take it earlier in the day. If you can't sleep  · Imagine yourself in a peaceful, pleasant scene. Focus on the details and feelings of being in a place that is relaxing. · Get up and do a quiet or boring activity until you feel sleepy. · Don't drink any liquids after 6 p.m. if you wake up often because you have to go to the bathroom. Where can you learn more? Go to http://slavafluIT Biosystemsenrique.info/. Enter H346 in the search box to learn more about \"Learning About Sleeping Well. \"  Current as of: September 11, 2018  Content Version: 12.1  © 8220-7187 [employer]. Care instructions adapted under license by [employer] (which disclaims liability or warranty for this information). If you have questions about a medical condition or this instruction, always ask your healthcare professional. [place] [address] any warranty or liability for your use of this information. Safe Use of Opioid Pain Medicine: Care Instructions  Your Care Instructions  Pain is your body's way of warning you that something is wrong. Pain feels different for everybody. Only you can describe your pain. A doctor can suggest or prescribe many types of medicines for pain. These range from over-the-counter medicines like acetaminophen (Tylenol) to powerful medicines called opioids. Examples of opioids are fentanyl, hydrocodone, morphine, and oxycodone. Heroin is an illegal opioid  Opioids are strong medicines. They can help you manage pain when you use them the right way. But if you misuse them, they can cause serious harm and even death. For these reasons, doctors are very careful about how they prescribe opioids.   If you decide to take opioids, here are some things to remember. · Keep your doctor informed. You can develop opioid use disorder. Moderate to severe opioid use disorder is sometimes called addiction. The risk is higher if you have a history of substance use. Your doctor will monitor you closely for signs of opioid use disorder and to figure out when you no longer need to take opioids. · Make a treatment plan. The goal of your plan is to be able to function and do the things you need to do, even if you still have some pain. You might be able to manage your pain with other non-opioid options like physical therapy, relaxation, or over-the-counter pain medicines. · Be aware of the side effects. Opioids can cause serious side effects, such as constipation, dry mouth, and nausea. And over time, you may need a higher dose to get pain relief. This is called tolerance. Your body also gets used to opioids. This is called physical dependence. If you suddenly stop taking them, you may have withdrawal symptoms. The doctor carefully considered what pain medicine is right for you. You may not have received opioids if your doctor was concerned about drug interactions or your safety, or if he or she had other concerns. It is best to have one doctor or clinic treat your pain. This way you will get the pain medicine that will help you the most. And a doctor will be able to watch for any problems that the medicine might cause. The doctor has checked you carefully, but problems can develop later. If you notice any problems or new symptoms,  get medical treatment right away. Follow-up care is a key part of your treatment and safety. Be sure to make and go to all appointments, and call your doctor if you are having problems. It's also a good idea to know your test results and keep a list of the medicines you take. How can you care for yourself at home? If you need to take opioids to manage your pain, remember these safety tips. · Follow directions carefully.  It's easy to misuse opioids if you take a dose other than what's prescribed by your doctor. This can lead to overdose and even death. Even sharing them with someone they weren't meant for is misuse. · Be cautious. Opioids may affect your judgment and decision making. Do not drive or operate machinery until you can think clearly. Talk with your doctor about when it is safe to drive. · Reduce the risk of drug interactions. Opioids can be dangerous if you take them with alcohol or with certain drugs like sleeping pills and muscle relaxers. Make sure your doctor knows about all the other medicines you take, including over-the-counter medicines. Don't start any new medicines before you talk to your doctor or pharmacist.  · Safely store and dispose of opioids. Store opioids in a safe and secure place. Make sure that pets, children, friends, and family can't get to them. When you're done using opioids, make sure to dispose of them safely and as quickly as possible. The U[place]. Food and Drug Administration (FDA) recommends these disposal options. ? The best option is to take your medicine to a drop-off box or take-back program that is authorized by the [facility] ([facility]). ? If these programs aren't available in your area and your medicine doesn't have specific disposal instructions (such as flushing), you can throw them into your household trash if you follow the FDA's instructions. Visit fda.gov and search for \"unused medicine disposal.\"  ? If you have opioid patches (used or unused), your options are to take them to a [facility]-authorized site or flush them down the toilet. Do not throw them in the trash. ? Only flush your medicine down the toilet if you can't get to a [facility]-approved site or your medicine instructions state clearly to flush them. · Reduce the risk of overdose. Misuse of opioids can be very dangerous. Protect yourself by asking your doctor about a naloxone rescue kit.  It can help youand even save your lifeif you take too much of an opioid. Try other ways to reduce pain. · Relax, and reduce stress. Relaxation techniques such as deep breathing or meditation can help. · Keep moving. Gentle, daily exercise can help reduce pain over the long run. Try low- or no-impact exercises such as walking, swimming, and stationary biking. Do stretches to stay flexible. · Try heat, cold packs, and massage. · Get enough sleep. Pain can make you tired and drain your energy. Talk with your doctor if you have trouble sleeping because of pain. · Think positive. Your thoughts can affect your pain level. Do things that you enjoy to distract yourself when you have pain instead of focusing on the pain. See a movie, read a book, listen to music, or spend time with a friend. If you are not taking a prescription pain medicine, ask your doctor if you can take an over-the-counter medicine. When should you call for help? Call your doctor now or seek immediate medical care if:    · You have a new kind of pain.     · You have new symptoms, such as a fever or rash, along with the pain.    Watch closely for changes in your health, and be sure to contact your doctor if:    · You think you might be using too much pain medicine, and you need help to use less or stop.     · Your pain gets worse.     · You would like a referral to a doctor or clinic that specializes in pain management. Where can you learn more? Go to http://slava-enrique.info/. Enter R108 in the search box to learn more about \"Safe Use of Opioid Pain Medicine: Care Instructions. \"  Current as of: March 28, 2019  Content Version: 12.1  © 9983-0035 [employer]. Care instructions adapted under license by [employer] (which disclaims liability or warranty for this information).  If you have questions about a medical condition or this instruction, always ask your healthcare professional. [facility] disclaims any warranty or liability for your use of this information.

## 2019-07-30 NOTE — PROGRESS NOTES
Nursing Notes    Patient presents to the office today in follow-up. Patient rates his pain at 6/10 on the numerical pain scale. Reviewed medications with counts as follows:    Rx Date filled Qty Dispensed Pill Count Last Dose Short   Oxycodone Hcl 5 Mg Tablet  5/9/19 40 35 7/14/19 no                                       reviewed YES  Any aberrancies noted on  NO  Last opioid agreement 11/13/18  Last urine drug screen today    Comments:     POC UDS was performed in office today per verbal order and correct read back from provider. Any new labs or imaging since last appointment? YES, Chest Xray and Lab work    Have you been to an emergency room (ER) or urgent care clinic since your last visit? YES,[facility] for viral lung infection      Have you been hospitalized since your last visit? NO     If yes, where, when, and reason for visit? Have you seen or consulted any other health care providers outside of the [address] [place] [address]  since your last visit? YES     If yes, where, when, and reason for visit? Mr. Jared Doyle has a reminder for a \"due or due soon\" health maintenance. I have asked that he contact his primary care provider for follow-up on this health maintenance.     3 most recent PHQ Screens 7/30/2019   PHQ Not Done -   Little interest or pleasure in doing things Not at all   Feeling down, depressed, irritable, or hopeless Not at all   Total Score PHQ 2 0

## 2019-07-30 NOTE — PROGRESS NOTES
Referral date 10/23/13, source Workmen's Compensation and for low back pain. Calculated MEQ -up to 15  Naloxone rescue - yes  Prophylactic bowel program - yes  Date of last OCA 11/13/2018  Last UDS 07/30/19, consistent POC, awaiting confirmatory testing.  date checked today, findings consistent      Today   Last Visit  Prior Visit(s)  ORT -        PGIC - 1 and 5  1 and 7  2 and 4    DANO - 56%   62%   60%     COMM - 7  16   7         HPI:  Mykel Davies is a 77 y.o. male here for f/u visit for ongoing evaluation of work/related to chronic pain which resulted in multiple surgeries including L4-S1 fusion. Pt was last seen here on 05/08/19. Pt denies interval changes on the character or distribution of pain. Pain is located mid lower back and right gluteal region. The pain is described as aching, stabbing, burning, pins-and-needles. Pain at its best is 6/10. Pain at its worse is 10/10. The pain is worsened by standing, prolonged sitting at his [employer]. Symptoms are improved by H-wave, rocking back and forth, TENS unit, changing positions, heating pad. Pt has never tried SCS trial,implantable pain pump. Patient has history of lumbar surgery in 1980 and another in 1984 and acute onset of his low back pain following a full day of  heavy lifting in 1998 which led to a fusion of L4-S1 and in 1999 and then removal of hardware in 2000. He feels his TENS unit has been beneficial as well as his LSO. Patient reports he has only taken 5 oxycodone since his last office visit. Since last visit, Patient reports he obtained the H-wave unit and his relief of pain lasts between 4-6 hours after use depending on if he is just sitting in his recliner or up moving around, he reports it has been very beneficial.       ROS:  Findings include diarrhea and constipation chronic not new,shortness of breath due to recent URI,   dizziness chronic not new.   Denies fever, chills, nausea, vomiting, abdominal pain, chest pain, trouble breathing, trouble swallowing, changes in vision, changes in hearing,weakness,  falls, bladder incontinence, bowel incontinence, anxiety, depression, suicidal ideations, homicidal ideations or alcohol use. Opioid specific risk:obesity, DM, asthma, GERD, sleep disturbances. Vitals:    07/30/19 1128   BP: 125/77   Pulse: 89   Resp: 16   Temp: 97.8 °F (36.6 °C)   TempSrc: Oral   SpO2: 94%   Weight: 122.5 kg (270 lb)   Height: 5' 11\" (1.803 m)   PainSc:   6   PainLoc: Back        Imaging:  \"\"\" 9/13/18 MRI spine lumbar without contrast  IMPRESSION:     1. Multilevel degenerative findings along postsurgical lumbar spine.  -Most notable findings are at L2-3 where there is a moderate to severe spinal  stenosis from combined disc and facet pathology, with notable contribution from  bulging posterior epidural fat. This and other levels as detailed above. \"\"\"\"     Labs:   BUN/Cr: \"\"\" 18/1.36(H) 2/27/18\"\"\"  AST/ALT: \"\"\" 21/39     2/27/18\"\"\"      Physical exam:  AFVSS, no acute distress, obese body habitus. A&OXs 3. Normocephalic, atraumatic. Conjugate gaze, clear sclerae. EOM intact. Speech is clear and appropriate. Mood is appropriate and patient is cooperative. Right-handed mono cane to assist with gait and balance  Balance is within functional limits. Non-labored breathing. Even rise of chest wall. Primary Care Physician  Miguel A Jay [address]  [phone]      PHQ -- .  3 most recent [facility] Screens 7/30/2019   PHQ Not Done -   Little interest or pleasure in doing things Not at all   Feeling down, depressed, irritable, or hopeless Not at all   Total Score PHQ 2 0       Assessment/Plan:     ICD-10-CM ICD-9-CM    1. Chronic low back pain, unspecified back pain laterality, with sciatica presence unspecified M54.5 724.2     G89.29 338.29    2. Disorder of meninges G96.19 349.2    3. Lumbosacral radiculopathy M54.17 724.4    4. Degeneration of lumbar or lumbosacral intervertebral disc M51.37 722.52    5. Other polyneuropathy G62.89 357.89 pregabalin (LYRICA) 150 mg capsule   6. Chronic pain syndrome G89.4 338.4    7. Encounter for long-term (current) use of high-risk medication Z79.899 V58.69 DRUG SCREEN      AMB POC DRUG SCREEN ()        --Continue H-wave as needed as patient feels this has been very beneficial     --Continue compound cream to be used 3-4 times daily as needed. ([place])    --Continue to wear his LSO ans use TENS as needed       --Patient to continue regular follow-ups with PCP and follow up regarding positive findings on review of symptoms. --Refill Lyrica 150 mg 3 times daily     --Continue Cymbalta 60 mg capsule 1 capsule twice daily for chronic pain, patient will call for refills    --I do not recommend long term opioid therapy for this patient at this time for their chronic pain; the risks outweigh the potential benefits. Pt currently taking oxycodone 5 mg 1 tab up to twice daily as needed with a total of 40 tabs to be budgeted over 30 days. Their MME is 15. --Pt will call for refill and we will decrease to oxycodone 5 mg 1 tab up to once daily as needed with a total of 20 tabs to be budgeted over 30 days. Follow up ongoing assessment and ongoing development of integrative and comprehensive plan of care for chronic pain. Goals: To establish complementary and integrative plan of care to address chronic pain issues while minimizing pharmaceuticals to maximize patient's function improve quality of life. Education:  Patient again educated on the importance of strict compliance with the opioid care agreement while on opioid therapy. Patient also again educated that they should avoid driving while on chronic opioid therapy. Also advised to avoid alcohol and to avoid benzodiazepines while on opioid therapy. Handouts given regarding opioid safety and sleep health.       Follow-up and Dispositions    · Return in about 3 months (around 10/30/2019).               Social History     Socioeconomic History    Marital status:      Spouse name: Not on file    Number of children: Not on file    Years of education: Not on file    Highest education level: Not on file   Occupational History    Occupation: Retired-Federal   Social Needs    Financial resource strain: Not on file    Food insecurity:     Worry: Not on file     Inability: Not on file    Transportation needs:     Medical: Not on file     Non-medical: Not on file   Tobacco Use    Smoking status: Former Smoker     Packs/day: 1.00     Years: 31.00     Pack years: 31.00     Types: Pipe, Cigars     Last attempt to quit: 1995     Years since quittin.5    Smokeless tobacco: Never Used   Substance and Sexual Activity    Alcohol use: No    Drug use: No    Sexual activity: Yes     Partners: Female   Lifestyle    Physical activity:     Days per week: Not on file     Minutes per session: Not on file    Stress: Not on file   Relationships    Social connections:     Talks on phone: Not on file     Gets together: Not on file     Attends [employer] service: Not on file     Active member of club or organization: Not on file     Attends meetings of clubs or organizations: Not on file     Relationship status: Not on file    Intimate partner violence:     Fear of current or ex partner: Not on file     Emotionally abused: Not on file     Physically abused: Not on file     Forced sexual activity: Not on file   Other Topics Concern    Not on file   Social History Narrative    Not on file     Family History   Problem Relation Age of Onset    Cancer Mother         Bone and Brain Cancer    Diabetes Mother     Liver Disease Father         Lung Cancer    Kidney Disease Sister     Diabetes Daughter     Colon Cancer Brother      Allergies   Allergen Reactions    Ciprofloxacin Anaphylaxis    Other Plant, Animal, Environmental Other (comments)     Patient cannot have MRI. Patient states that he has metal screws in his left arm.  Lamisil [Terbinafine] Swelling     Tongue swelling    Metformin Other (comments)     Elevated cr 1.4    Morphine Other (comments)     \"loss of blood pressure\"    Other Medication Other (comments)     Doxasylin causes extreme GERD    Pcn [Penicillins] Rash    Pentothal [Thiopental Sodium] Shortness of Breath    Sulfa (Sulfonamide Antibiotics) Rash     Past Medical History:   Diagnosis Date    Arthritis     Asthma     Cancer ([facility] [address]     BASAL     Cardiac catheterization 2009    [facility]:  No significant CAD.  Cardiac echocardiogram 01/20/2014    EF 50-55%. No WMA. Gr 1 DDfx. RVSP 25-30 mmHg. Mild TR.       DM type 2 (diabetes mellitus, type 2) ([facility])     Enlarged prostate     Failed back syndrome     GERD (gastroesophageal reflux disease)     Hearing loss, bilateral     Hearing Aids    Hypertension     Hypothyroidism     Insomnia     Low serum testosterone level     Neuropathy     Osteoarthritis of multiple joints     S/P colonoscopy 8/14/13    mild diverticulosis in sigmoid colon w/o evidence of diverticulitis; f/u 5 years    SOB (shortness of breath)     chronic; 2' \"lung fever\"    Vertigo      Past Surgical History:   Procedure Laterality Date    COLONOSCOPY N/A 4/3/2018    COLONOSCOPY performed by Buddy Ritter MD at [address] [facility] BACK SURGERY  2000    removal of defective hardware    [address]    to remove bone fragments    HX LUMBAR FUSION  1999    fusion from L4-S1 and implantation of hardware    HX LUMBAR FUSION  1984    fusion on L5 area    HX ORTHOPAEDIC Bilateral 2004    trigger finger syndrome-all 4 fingers    HX ORTHOPAEDIC Left 2004    left arm torn muscle repair and placement of 2 screws    HX OTHER SURGICAL      skin cancer removal     Current Outpatient Medications on File Prior to Visit   Medication Sig    oxyCODONE IR (Nolan Zapata) 5 mg immediate release tablet Take 5 mg by mouth every four (4) hours as needed for Pain.  topiramate (TOPAMAX) 25 mg tablet TAKE 1 TABLET BY MOUTH ONCE DAILY WITH BREAKFAST    DULoxetine (CYMBALTA) 60 mg capsule Take 1 Cap by mouth two (2) times a day.  levothyroxine (SYNTHROID) 137 mcg tablet Take 137 mcg by mouth Daily (before breakfast).  fluticasone (FLONASE) 50 mcg/actuation nasal spray USE 1 SPRAY(S) IN EACH NOSTRIL ONCE DAILY    tamsulosin (FLOMAX) 0.4 mg capsule TAKE 1 CAPSULE BY MOUTH ONCE DAILY    lisinopril (PRINIVIL, ZESTRIL) 5 mg tablet Take 1 Tab by mouth daily.  metoprolol tartrate (LOPRESSOR) 25 mg tablet Take 1 Tab by mouth two (2) times a day.  montelukast (SINGULAIR) 10 mg tablet Take 1 Tab by mouth daily.  insulin glulisine U-100 (APIDRA SOLOSTAR U-100 INSULIN) 100 unit/mL pen by SubCUTAneous route.  atorvastatin (LIPITOR) 20 mg tablet TAKE ONE TABLET BY MOUTH ONCE DAILY (Patient taking differently: 40 mg.)    OTHER EB-N3 once daily    FANI PEN NEEDLE 32 gauge x 5/32\" ndle USE AT BEDTIME    JANUVIA 100 mg tablet Take 100 mg by mouth daily. Indications: patient stated he was given 50 mg    ACCU-CHEK JAZZY PLUS TEST STRP strip     ACCU-CHEK SOFTCLIX LANCETS misc     albuterol (PROVENTIL VENTOLIN) 2.5 mg /3 mL (0.083 %) nebulizer solution 3 mL by Nebulization route every four (4) hours as needed for Wheezing or Shortness of Breath.  PEN NEEDLE, DIABETIC (BD ULTRA-FINE FANI PEN NEEDLES) by Does Not Apply route. 4mm x 32G    insulin glulisine (APIDRA SOLOSTAR) 100 unit/mL pen 2 units per carb consumed. Max 30 / day (Patient taking differently: 2 units per carb consumed. Max 30 / day  Indications: patient states taking 15-20 units three times a day)    insulin glargine (BASAGLAR KWIKPEN) 100 unit/mL (3 mL) pen 52 units at bedtime (Patient taking differently: 52 Units.  52 units at bedtime)    PEN NEEDLE 31 gauge x 5/16\" ndle USE ONE PEN NEEDLE FOR LANTUS FLEXPEN AND 3 PEN NEEDLES FOR APIDRA WITH EACH MEAL    esomeprazole (NEXIUM) 40 mg capsule Take 40 mg by mouth two (2) times a day.  betamethasone dipropionate (DIPROLENE) 0.05 % ointment nightly.  tiotropium (SPIRIVA) 18 mcg inhalation capsule Take 1 Cap by inhalation daily. (Patient taking differently: Take  by inhalation as needed.)    ketorolac (ACULAR) 0.5 % ophthalmic solution Administer 1 Drop to both eyes two (2) times a day.  naloxone 4 mg/actuation spry 4 mg by Nasal route as needed.  budesonide-formoterol (SYMBICORT) 160-4.5 mcg/actuation HFA inhaler Take 2 Puffs by inhalation two (2) times a day. (Patient taking differently: Take 2 Puffs by inhalation two (2) times daily as needed.)     No current facility-administered medications on file prior to visit. [address] was used for portions of this report. Unintended errors may occur.

## 2019-08-01 ENCOUNTER — OFFICE VISIT (OUTPATIENT)
Dept: FAMILY MEDICINE CLINIC | Age: 69
End: 2019-08-01

## 2019-08-01 VITALS
RESPIRATION RATE: 16 BRPM | SYSTOLIC BLOOD PRESSURE: 122 MMHG | TEMPERATURE: 98.1 F | WEIGHT: 272 LBS | BODY MASS INDEX: 38.08 KG/M2 | HEIGHT: 71 IN | HEART RATE: 88 BPM | OXYGEN SATURATION: 98 % | DIASTOLIC BLOOD PRESSURE: 78 MMHG

## 2019-08-01 DIAGNOSIS — Z00.00 MEDICARE ANNUAL WELLNESS VISIT, SUBSEQUENT: Primary | ICD-10-CM

## 2019-08-01 DIAGNOSIS — R05.9 COUGH: ICD-10-CM

## 2019-08-01 DIAGNOSIS — R51.9 FREQUENT HEADACHES: ICD-10-CM

## 2019-08-01 DIAGNOSIS — R41.3 MEMORY CHANGES: ICD-10-CM

## 2019-08-01 DIAGNOSIS — G89.29 OTHER CHRONIC PAIN: ICD-10-CM

## 2019-08-01 DIAGNOSIS — R79.89 LOW TSH LEVEL: ICD-10-CM

## 2019-08-01 DIAGNOSIS — E13.9 DIABETES 1.5, MANAGED AS TYPE 2 (HCC): ICD-10-CM

## 2019-08-01 DIAGNOSIS — E11.9 WELL CONTROLLED DIABETES MELLITUS (HCC): ICD-10-CM

## 2019-08-01 DIAGNOSIS — I10 ESSENTIAL HYPERTENSION: ICD-10-CM

## 2019-08-01 RX ORDER — LOSARTAN POTASSIUM 25 MG/1
25 TABLET ORAL DAILY
Qty: 30 TAB | Refills: 2 | Status: SHIPPED | OUTPATIENT
Start: 2019-08-01 | End: 2019-08-26 | Stop reason: SDUPTHER

## 2019-08-01 RX ORDER — INSULIN GLARGINE 100 [IU]/ML
52 INJECTION, SOLUTION SUBCUTANEOUS
Qty: 5 PEN | Refills: 3 | Status: SHIPPED | OUTPATIENT
Start: 2019-08-01

## 2019-08-01 NOTE — PROGRESS NOTES
This is the Subsequent Medicare Annual Wellness Exam, performed 12 months or more after the Initial AWV or the last Subsequent AWV    I have reviewed the patient's medical history in detail and updated the computerized patient record. History     Past Medical History:   Diagnosis Date    Arthritis     Asthma     Cancer ([address]     BASAL     Cardiac catheterization 2009    [address]:  No significant CAD.  Cardiac echocardiogram 01/20/2014    EF 50-55%. No WMA. Gr 1 DDfx. RVSP 25-30 mmHg. Mild TR.  DM type 2 (diabetes mellitus, type 2) ([facility])     Enlarged prostate     Failed back syndrome     GERD (gastroesophageal reflux disease)     Hearing loss, bilateral     Hearing Aids    Hypertension     Hypothyroidism     Insomnia     Low serum testosterone level     Neuropathy     Osteoarthritis of multiple joints     S/P colonoscopy 8/14/13    mild diverticulosis in sigmoid colon w/o evidence of diverticulitis; f/u 5 years    SOB (shortness of breath)     chronic; 2' \"lung fever\"    Vertigo       Past Surgical History:   Procedure Laterality Date    COLONOSCOPY N/A 4/3/2018    COLONOSCOPY performed by Suleiman Pendleton MD at [facility] HX BACK SURGERY  2000    removal of defective hardware    [address]    to remove bone fragments    HX LUMBAR FUSION  1999    fusion from L4-S1 and implantation of hardware    HX LUMBAR FUSION  1984    fusion on L5 area    HX ORTHOPAEDIC Bilateral 2004    trigger finger syndrome-all 4 fingers    HX ORTHOPAEDIC Left 2004    left arm torn muscle repair and placement of 2 screws    HX OTHER SURGICAL      skin cancer removal     Current Outpatient Medications   Medication Sig Dispense Refill    montelukast (SINGULAIR) 10 mg tablet TAKE 1 TABLET BY MOUTH ONCE DAILY 90 Tab 1    oxyCODONE IR (ROXICODONE) 5 mg immediate release tablet Take 5 mg by mouth every four (4) hours as needed for Pain.       pregabalin (LYRICA) 150 mg capsule TAKE 1 CAPSULE BY MOUTH THREE TIMES DAILY FOR 30 DAYS. MAXIMUM 3 CAPSULES DAILY. 90 Cap 2    topiramate (TOPAMAX) 25 mg tablet TAKE 1 TABLET BY MOUTH ONCE DAILY WITH BREAKFAST 30 Tab 0    DULoxetine (CYMBALTA) 60 mg capsule Take 1 Cap by mouth two (2) times a day. 60 Cap 3    levothyroxine (SYNTHROID) 137 mcg tablet Take 137 mcg by mouth Daily (before breakfast). 30 Tab 1    fluticasone (FLONASE) 50 mcg/actuation nasal spray USE 1 SPRAY(S) IN EACH NOSTRIL ONCE DAILY 1 Bottle 0    tamsulosin (FLOMAX) 0.4 mg capsule TAKE 1 CAPSULE BY MOUTH ONCE DAILY 90 Cap 3    lisinopril (PRINIVIL, ZESTRIL) 5 mg tablet Take 1 Tab by mouth daily. 90 Tab 1    metoprolol tartrate (LOPRESSOR) 25 mg tablet Take 1 Tab by mouth two (2) times a day. 180 Tab 1    insulin glulisine U-100 (APIDRA SOLOSTAR U-100 INSULIN) 100 unit/mL pen by SubCUTAneous route.  atorvastatin (LIPITOR) 20 mg tablet TAKE ONE TABLET BY MOUTH ONCE DAILY (Patient taking differently: 40 mg.) 90 Tab 1    OTHER EB-N3 once daily      FANI PEN NEEDLE 32 gauge x 5/32\" ndle USE AT BEDTIME 100 Pen Needle 6    JANUVIA 100 mg tablet Take 100 mg by mouth daily. Indications: patient stated he was given 50 mg      naloxone 4 mg/actuation spry 4 mg by Nasal route as needed. 1 Box 1    ACCU-CHEK JAZZY PLUS TEST STRP strip       ACCU-CHEK SOFTCLIX LANCETS misc       albuterol (PROVENTIL VENTOLIN) 2.5 mg /3 mL (0.083 %) nebulizer solution 3 mL by Nebulization route every four (4) hours as needed for Wheezing or Shortness of Breath. 1 Package 5    PEN NEEDLE, DIABETIC (BD ULTRA-FINE FANI PEN NEEDLES) by Does Not Apply route. 4mm x 32G      insulin glulisine (APIDRA SOLOSTAR) 100 unit/mL pen 2 units per carb consumed. Max 30 / day (Patient taking differently: 2 units per carb consumed.   Max 30 / day  Indications: patient states taking 15-20 units three times a day) 5 Pen 3    insulin glargine (BASAGLAR KWIKPEN) 100 unit/mL (3 mL) pen 52 units at bedtime (Patient taking differently: 52 Units. 52 units at bedtime) 3 Pen 3    PEN NEEDLE 31 gauge x 5/16\" ndle USE ONE PEN NEEDLE FOR LANTUS FLEXPEN AND 3 PEN NEEDLES FOR APIDRA WITH EACH MEAL 1 Package 3    esomeprazole (NEXIUM) 40 mg capsule Take 40 mg by mouth two (2) times a day.  betamethasone dipropionate (DIPROLENE) 0.05 % ointment nightly.  budesonide-formoterol (SYMBICORT) 160-4.5 mcg/actuation HFA inhaler Take 2 Puffs by inhalation two (2) times a day. (Patient taking differently: Take 2 Puffs by inhalation two (2) times daily as needed.) 1 Inhaler 5    tiotropium (SPIRIVA) 18 mcg inhalation capsule Take 1 Cap by inhalation daily. (Patient taking differently: Take  by inhalation as needed.) 30 Cap 5    ketorolac (ACULAR) 0.5 % ophthalmic solution Administer 1 Drop to both eyes two (2) times a day. Allergies   Allergen Reactions    Ciprofloxacin Anaphylaxis    Other Plant, Animal, Environmental Other (comments)     Patient cannot have MRI. Patient states that he has metal screws in his left arm.     Lamisil [Terbinafine] Swelling     Tongue swelling    Metformin Other (comments)     Elevated cr 1.4    Morphine Other (comments)     \"loss of blood pressure\"    Other Medication Other (comments)     Doxasylin causes extreme GERD    Pcn [Penicillins] Rash    Pentothal [Thiopental Sodium] Shortness of Breath    Sulfa (Sulfonamide Antibiotics) Rash     Family History   Problem Relation Age of Onset    Cancer Mother         Bone and Brain Cancer    Diabetes Mother     Liver Disease Father         Lung Cancer    Kidney Disease Sister     Diabetes Daughter     Colon Cancer Brother      Social History     Tobacco Use    Smoking status: Former Smoker     Packs/day: 1.00     Years: 31.00     Pack years: 31.00     Types: Pipe, Cigars     Last attempt to quit: 1995     Years since quittin.5    Smokeless tobacco: Never Used   Substance Use Topics    Alcohol use: No     Patient Active Problem List   Diagnosis Code    Chronic bilateral low back pain with bilateral sciatica M54.42, M54.41, G89.29    COPD (chronic obstructive pulmonary disease) ([facility]) J44.9    Migraine headache G43.909    GERD (gastroesophageal reflux disease) K21.9    Hyperlipidemia E78.5    Hypothyroidism E03.9    BPH (benign prostatic hyperplasia) N40.0    Hypotestosteronism E34.9    Vertigo R44    Encounter for long-term (current) use of other medications Z79.899    Chronic pain syndrome G89.4    DJD (degenerative joint disease), lumbar M47.816    H/O lumbosacral spine surgery Z98.890    DDD (degenerative disc disease), lumbar M51.36    Peripheral neuropathy G62.9    Asthma with COPD (chronic obstructive pulmonary disease) ([facility]) J44.9    Essential hypertension I10    Vitamin B12 deficiency E53.8    Chronic chest pain R07.9, G89.29    Advance directive in chart/ no change per patient. Z78.9    H/O colonoscopy/ due in 2018 Z98.890    Chronic low back pain M54.5, G89.29    Type 2 diabetes mellitus with complication, with long-term current use of insulin ([facility]) E11.8, Z79.4    Type 2 diabetes with nephropathy ([facility]) E11.21    Type 2 diabetes mellitus with diabetic neuropathy ([facility]) E11.40    Severe obesity (BMI 35.0-39. 9) E66.01       Depression Risk Factor Screening:     3 most recent PHQ Screens 7/30/2019   PHQ Not Done -   Little interest or pleasure in doing things Not at all   Feeling down, depressed, irritable, or hopeless Not at all   Total Score PHQ 2 0     Alcohol Risk Factor Screening: You do not drink alcohol or very rarely. Functional Ability and Level of Safety:   Hearing Loss  Hearing is good. The patient wears hearing aids. - supposed    Activities of Daily Living  The home contains: handrails  Patient does total self care    Fall Risk  Fall Risk Assessment, last 12 mths 7/30/2019   Able to walk? Yes   Fall in past 12 months? Yes   Fall with injury?  No   Number of falls in past 12 months 8 or more Fall Risk Score 8       Abuse Screen  Patient is not abused    Cognitive Screening   Evaluation of Cognitive Function:  Has your family/caregiver stated any concerns about your memory: no but patient does have some concerns  Normal    Patient Care Team   Patient Care Team:  Duane Buttner, MD as PCP - General (Family Practice)  Maye Williamson MD (Otolaryngology)  Jorge Luis Chavez, [address] (Internal Medicine)  Aron Bugros MD as Physician (Urology)  Tre Mark as Physician (Podiatry)  Joao Mckinney MD (Ophthalmology)  Leticia Villela MD (Cardiology)  Suzanne Tubbs MD (Endocrinology)  Tiffanie Farley MD (Otolaryngology)  Darol Mcburney, MD as Hospitalist (Gastroenterology)    Assessment/Plan   Education and counseling provided:  Are appropriate based on today's review and evaluation  Review nurses note and assessment. Agree with that. Discussed 5 years health plan and given copy in AVS.  Discussed advance directive. Has an advance directive on the file and no change since made. See ACP note from today. Diagnoses and all orders for this visit:    1.  Medicare annual wellness visit, subsequent        Health Maintenance Due   Topic Date Due    Shingrix Vaccine Age 49> (1 of 2) 11/01/1998    MEDICARE YEARLY EXAM  06/06/2019    GLAUCOMA SCREENING Q2Y  07/11/2019    EYE EXAM RETINAL OR DILATED  07/11/2019    Influenza Age 9 to Adult  08/01/2019

## 2019-08-01 NOTE — ACP (ADVANCE CARE PLANNING)
Advance Care Planning (ACP) Provider Conversation Snapshot    Date of ACP Conversation: 08/01/19  Persons included in Conversation:  patient  Length of ACP Conversation in minutes:  <16 minutes (Non-Billable)    Authorized Decision Maker (if patient is incapable of making informed decisions):    This person is:   has advance directive on file and no change since it has been made   Wifemaryann         For Patients with Decision Making Capacity:   see above     Conversation Outcomes / Follow-Up Plan:   see above

## 2019-08-01 NOTE — PATIENT INSTRUCTIONS
Medicare Part B Preventive Services Limitations Recommendation Scheduled Bone Mass Measurement 
(age 72 & older, biennial) Requires diagnosis related to osteoporosis or estrogen deficiency. Biennial benefit unless patient has history of long-term glucocorticoid tx or baseline is needed because initial test was by other method Not indicated Cardiovascular Screening Blood Tests (every 5 years) Total cholesterol, HDL, Triglycerides and ECG Order blood work  as a panel if possible and  for adults with routine risk  an electrocardiogram (ECG) at intervals determined by the provider. 05/24/19 Colorectal Cancer Screening 
-Fecal occult blood test (annual) -Flexible sigmoidoscopy (5y) 
-Screening colonoscopy (10y) -Barium Enema Colorectal cancer screening should be done for adults age 54-65 with no increased risk factors for colorectal cancer. There are a number of acceptable methods of screening for this type of cancer. Each test has its own benefits and drawbacks. Discuss with your provider what is most appropriate for you during your annual wellness visit. The different tests include: colonoscopy (considered the best screening method), a fecal occult blood test, a fecal DNA test, and sigmoidoscopy 04/03/18 Q 5 years Counseling to Prevent Tobacco Use (up to 8 sessions per year) - Counseling greater than 3 and up to 10 minutes - Counseling greater than 10 minutes Patients must be asymptomatic of tobacco-related conditions to receive as preventive service NI Diabetes Screening Tests (at least every 3 years, Medicare covers annually or at 6-month intervals for prediabetic patients) Fasting blood sugar (FBS) or glucose tolerance test (GTT) All adults age 38-68 who are overweight should have a diabetes screening test once every three years.  
-Other screening tests & preventive services for persons with diabetes include: an eye exam to screen for diabetic retinopathy, a kidney function test, a foot exam, and stricter control over your cholesterol. 05/24/19 Diabetes Self-Management Training (DSMT) (no USPSTF recommendation) Requires referral by treating physician for patient with diabetes or renal disease. 10 hours of initial DSMT session of no less than 30 minutes each in a continuous 12-month period. 2 hours of follow-up DSMT in subsequent years. UTD Glaucoma Screening (no USPSTF recommendation) Diabetes mellitus, family history, , age 48 or over,  American, age 72 or over 07/11/17 Done last week Human Immunodeficiency Virus (HIV) Screening (annually for increased risk patients) HIV-1 and HIV-2 by EIA, RAHUL, rapid antibody test, or oral mucosa transudate Patient must be at increased risk for HIV infection per USPSTF guidelines or pregnant. Tests covered annually for patients at increased risk. Pregnant patients may receive up to 3 test during pregnancy. Not indicated Medical Nutrition Therapy (MNT) (for diabetes or renal disease not recommended schedule) Requires referral by treating physician for patient with diabetes or renal disease. Can be provided in same year as diabetes self-management training (DSMT), and CMS recommends medical nutrition therapy take place after DSMT. Up to 3 hours for initial year and 2 hours in subsequent years. UTD Prostate Cancer Screening (annually up to age 76) - Digital rectal exam (BOY) - Prostate specific antigen (PSA) Annually (age 48 or over), BOY not paid separately when covered E/M service is provided on same date Men up to age 76 may need a screening blood test for prostate cancer at certain intervals, depending on their personal and family history. This decision is between the patient and his provider. 3/1/19 Seasonal Influenza Vaccination (annually) All adults should have a flu vaccine yearly  11/13/18 Pneumococcal Vaccination (once after 72) All adults  over age 72 should receive the recommended pneumonia vaccines. Current USPSTF guidelines recommend a series of two vaccines for the best pneumonia protection. Completed Hepatitis B Vaccinations (if medium/high risk) Medium/high risk factors:  End-stage renal disease, Hemophiliacs who received Factor VIII or IX concentrates, Clients of institutions for the mentally retarded, Persons who live in the same house as a HepB virus carrier, Homosexual men, Illicit injectable drug abusers. Not indicated Shingles Vaccination A shingles vaccine is also recommended once in a lifetime after age 61 Due On waiting list for shingrix Ultrasound Screening for Abdominal Aortic Aneurysm (AAA) (once) An Abdominal Aortic Aneurysm (AAA) Screening is recommended for men age 73-68 who has ever smoked in their lifetime. of the following criteria: 
- Men who are 73-68 years old and have smoked more than 100 cigarettes in their lifetime. 
-Anyone with a FH of AAA 
-Anyone recommended for screening by USPSTF 02/24/15 Hep C All adults born between [birth year] and 1965 should be screened once for Hepatitis C 10/15/15 Tetanus  All adults should have a tetanus vaccine every 10 years 05/16/16 Eating Healthy Foods: Care Instructions Your Care Instructions Eating healthy foods can help lower your risk for disease. Healthy food gives you energy and keeps your heart strong, your brain active, your muscles working, and your bones strong. A healthy diet includes a variety of foods from the basic food groups: grains, vegetables, fruits, milk and milk products, and meat and beans. Some people may eat more of their favorite foods from only one food group and, as a result, miss getting the nutrients they need. So, it is important to pay attention not only to what you eat but also to what you are missing from your diet. You can eat a healthy, balanced diet by making a few small changes. Follow-up care is a key part of your treatment and safety. Be sure to make and go to all appointments, and call your doctor if you are having problems. It's also a good idea to know your test results and keep a list of the medicines you take. How can you care for yourself at home? Look at what you eat · Keep a food diary for a week or two and record everything you eat or drink. Track the number of servings you eat from each food group. · For a balanced diet every day, eat a variety of: 
? 6 or more ounce-equivalents of grains, such as cereals, breads, crackers, rice, or pasta, every day. An ounce-equivalent is 1 slice of bread, 1 cup of ready-to-eat cereal, or ½ cup of cooked rice, cooked pasta, or cooked cereal. 
? 2½ cups of vegetables, especially: § Dark-green vegetables such as broccoli and spinach. § Orange vegetables such as carrots and sweet potatoes. § Dry beans (such as brizuela and kidney beans) and peas (such as lentils). ? 2 cups of fresh, frozen, or canned fruit. A small apple or 1 banana or orange equals 1 cup. ? 3 cups of nonfat or low-fat milk, yogurt, or other milk products. ? 5½ ounces of meat and beans, such as chicken, fish, lean meat, beans, nuts, and seeds. One egg, 1 tablespoon of peanut butter, ½ ounce nuts or seeds, or ¼ cup of cooked beans equals 1 ounce of meat. · Learn how to read food labels for serving sizes and ingredients. Fast-food and convenience-food meals often contain few or no fruits or vegetables. Make sure you eat some fruits and vegetables to make the meal more nutritious. · Look at your food diary. For each food group, add up what you have eaten and then divide the total by the number of days. This will give you an idea of how much you are eating from each food group. See if you can find some ways to change your diet to make it more healthy. Start small · Do not try to make dramatic changes to your diet all at once.  You might feel that you are missing out on your favorite foods and then be more likely to fail. · Start slowly, and gradually change your habits. Try some of the following: ? Use whole wheat bread instead of white bread. ? Use nonfat or low-fat milk instead of whole milk. ? Eat brown rice instead of white rice, and eat whole wheat pasta instead of white-flour pasta. ? Try low-fat cheeses and low-fat yogurt. ? Add more fruits and vegetables to meals and have them for snacks. ? Add lettuce, tomato, cucumber, and onion to sandwiches. ? Add fruit to yogurt and cereal. 
Enjoy food · You can still eat your favorite foods. You just may need to eat less of them. If your favorite foods are high in fat, salt, and sugar, limit how often you eat them, but do not cut them out entirely. · Eat a wide variety of foods. Make healthy choices when eating out · The type of restaurant you choose can help you make healthy choices. Even fast-food chains are now offering more low-fat or healthier choices on the menu. · Choose smaller portions, or take half of your meal home. · When eating out, try: ? A veggie pizza with a whole wheat crust or grilled chicken (instead of sausage or pepperoni). ? Pasta with roasted vegetables, grilled chicken, or marinara sauce instead of cream sauce. ? A vegetable wrap or grilled chicken wrap. ? Broiled or poached food instead of fried or breaded items. Make healthy choices easy · Buy packaged, prewashed, ready-to-eat fresh vegetables and fruits, such as baby carrots, salad mixes, and chopped or shredded broccoli and cauliflower. · Buy packaged, presliced fruits, such as melon or pineapple. · Choose 100% fruit or vegetable juice instead of soda. Limit juice intake to 4 to 6 oz (½ to ¾ cup) a day. · Blend low-fat yogurt, fruit juice, and canned or frozen fruit to make a smoothie for breakfast or a snack. Where can you learn more? Go to http://slava-enrique.info/. Enter T756 in the search box to learn more about \"Eating Healthy Foods: Care Instructions. \" Current as of: November 7, 2018 Content Version: 12.1 © 6368-4644 [employer]. Care instructions adapted under license by [employer] (which disclaims liability or warranty for this information). If you have questions about a medical condition or this instruction, always ask your healthcare professional. [address] any warranty or liability for your use of this information. Advance Directives: Care Instructions Your Care Instructions An advance directive is a legal way to state your wishes at the end of your life. It tells your family and your doctor what to do if you can no longer say what you want. There are two main types of advance directives. You can change them any time that your wishes change. · A living will tells your family and your doctor your wishes about life support and other treatment. · A durable power of  for health care lets you name a person to make treatment decisions for you when you can't speak for yourself. This person is called a health care agent. If you do not have an advance directive, decisions about your medical care may be made by a doctor or a  who doesn't know you. It may help to think of an advance directive as a gift to the people who care for you. If you have one, they won't have to make tough decisions by themselves. Follow-up care is a key part of your treatment and safety. Be sure to make and go to all appointments, and call your doctor if you are having problems. It's also a good idea to know your test results and keep a list of the medicines you take. How can you care for yourself at home? · Discuss your wishes with your loved ones and your doctor. This way, there are no surprises. · Many states have a unique form.  Or you might use a universal form that has been approved by many states. This kind of form can sometimes be completed and stored online. Your electronic copy will then be available wherever you have a connection to the Internet. In most cases, doctors will respect your wishes even if you have a form from a different state. · You don't need a  to do an advance directive. But you may want to get legal advice. · Think about these questions when you prepare an advance directive: 
? Who do you want to make decisions about your medical care if you are not able to? Many people choose a family member or close friend. ? Do you know enough about life support methods that might be used? If not, talk to your doctor so you understand. ? What are you most afraid of that might happen? You might be afraid of having pain, losing your independence, or being kept alive by machines. ? Where would you prefer to die? Choices include your home, a hospital, or a nursing home. ? Would you like to have information about hospice care to support you and your family? ? Do you want to donate organs when you die? ? Do you want certain [employer] practices performed before you die? If so, put your wishes in the advance directive. · Read your advance directive every year, and make changes as needed. When should you call for help? Be sure to contact your doctor if you have any questions. Where can you learn more? Go to http://slvaa-enrique.info/. Enter R264 in the search box to learn more about \"Advance Directives: Care Instructions. \" Current as of: April 1, 2019 Content Version: 12.1 © 5812-7422 Healthwise[employer] Incorporated. Care instructions adapted under license by [employer] (which disclaims liability or warranty for this information).  If you have questions about a medical condition or this instruction, always ask your healthcare professional. [address] any warranty or liability for your use of this information. Cough: Care Instructions Your Care Instructions A cough is your body's response to something that bothers your throat or airways. Many things can cause a cough. You might cough because of a cold or the flu, bronchitis, or asthma. Smoking, postnasal drip, allergies, and stomach acid that backs up into your throat also can cause coughs. A cough is a symptom, not a disease. Most coughs stop when the cause, such as a cold, goes away. You can take a few steps at home to cough less and feel better. Follow-up care is a key part of your treatment and safety. Be sure to make and go to all appointments, and call your doctor if you are having problems. It's also a good idea to know your test results and keep a list of the medicines you take. How can you care for yourself at home? · Drink lots of water and other fluids. This helps thin the mucus and soothes a dry or sore throat. Honey or lemon juice in hot water or tea may ease a dry cough. · Take cough medicine as directed by your doctor. · Prop up your head on pillows to help you breathe and ease a dry cough. · Try cough drops to soothe a dry or sore throat. Cough drops don't stop a cough. Medicine-flavored cough drops are no better than candy-flavored drops or hard candy. · Do not smoke. Avoid secondhand smoke. If you need help quitting, talk to your doctor about stop-smoking programs and medicines. These can increase your chances of quitting for good. When should you call for help? Call 911 anytime you think you may need emergency care. For example, call if: 
  · You have severe trouble breathing.  
 Call your doctor now or seek immediate medical care if: 
  · You cough up blood.  
  · You have new or worse trouble breathing.  
  · You have a new or higher fever.  
  · You have a new rash.  
 Watch closely for changes in your health, and be sure to contact your doctor if:   · You cough more deeply or more often, especially if you notice more mucus or a change in the color of your mucus.  
  · You have new symptoms, such as a sore throat, an earache, or sinus pain.  
  · You do not get better as expected. Where can you learn more? Go to http://slava-enrique.info/. Enter D279 in the search box to learn more about \"Cough: Care Instructions. \" Current as of: September 5, 2018 Content Version: 12.1 © 4651-2115 [employer]. Care instructions adapted under license by [employer] (which disclaims liability or warranty for this information). If you have questions about a medical condition or this instruction, always ask your healthcare professional. [address] any warranty or liability for your use of this information. Nutrition Tips for Diabetes: After Your Visit Your Care Instructions A healthy diet is important to manage diabetes. It helps you lose weight (if you need to) and keep it off. It gives you the nutrition and energy your body needs and helps prevent heart disease. But a diet for diabetes does not mean that you have to eat special foods. You can eat what your family eats, including occasional sweets and other favorites. But you do have to pay attention to how often you eat and how much you eat of certain foods. The right plan for you will give you meals that help you keep your blood sugar at healthy levels. Try to eat a variety of foods and to spread carbohydrate throughout the day. Carbohydrate raises blood sugar higher and more quickly than any other nutrient does. Carbohydrate is found in sugar, breads and cereals, fruit, starchy vegetables such as potatoes and corn, and milk and yogurt. You may want to work with a dietitian or diabetes educator to help you plan meals and snacks.  A dietitian or diabetes educator also can help you lose weight if that is one of your goals. The following tips can help you enjoy your meals and stay healthy. Follow-up care is a key part of your treatment and safety. Be sure to make and go to all appointments, and call your doctor if you are having problems. Its also a good idea to know your test results and keep a list of the medicines you take. How can you care for yourself at home? · Learn which foods have carbohydrate and how much carbohydrate to eat. A dietitian or diabetes educator can help you learn to keep track of how much carbohydrate you eat. · Spread carbohydrate throughout the day. Eat some carbohydrate at all meals, but do not eat too much at any one time. · Plan meals to include food from all the food groups. These are the food groups and some example portion sizes: ¨ Grains: 1 slice of bread (1 ounce), ½ cup of cooked cereal, and 1/3 cup of cooked pasta or rice. These have about 15 grams of carbohydrate in a serving. Choose whole grains such as whole wheat bread or crackers, oatmeal, and brown rice more often than refined grains. ¨ Fruit: 1 small fresh fruit, such as an apple or orange; ½ of a banana; ½ cup of chopped, cooked, or canned fruit; ½ cup of fruit juice; 1 cup of melon or raspberries; and 2 tablespoons of dried fruit. These have about 15 grams of carbohydrate in a serving. ¨ Dairy: 1 cup of nonfat or low-fat milk and 2/3 cup of plain yogurt. These have about 15 grams of carbohydrate in a serving. ¨ Protein foods: Beef, chicken, turkey, fish, eggs, tofu, cheese, cottage cheese, and peanut butter. A serving size of meat is 3 ounces, which is about the size of a deck of cards. Examples of meat substitute serving sizes (equal to 1 ounce of meat) are 1/4 cup of cottage cheese, 1 egg, 1 tablespoon of peanut butter, and ½ cup of tofu. These have very little or no carbohydrate per serving.  
¨ Vegetables: Starchy vegetables such as ½ cup of cooked dried beans, peas, potatoes, or corn have about 15 grams of carbohydrate. Nonstarchy vegetables have very little carbohydrate, such as 1 cup of raw leafy vegetables (such as spinach), ½ cup of other vegetables (cooked or chopped), and 3/4 cup of vegetable juice. · Use the plate format to plan meals. It is a good, quick way to make sure that you have a balanced meal. It also helps you spread carbohydrate throughout the day. You divide your plate by types of foods. Put vegetables on half the plate, meat or meat substitutes on one-quarter of the plate, and a grain or starchy vegetable (such as brown rice or a potato) in the final quarter of the plate. To this you can add a small piece of fruit and 1 cup of milk or yogurt, depending on how much carbohydrate you are supposed to eat at a meal. 
· Talk to your dietitian or diabetes educator about ways to add limited amounts of sweets into your meal plan. You can eat these foods now and then, as long as you include the amount of carbohydrate they have in your daily carbohydrate allowance. · If you drink alcohol, limit it to no more than 1 drink a day for women and 2 drinks a day for men. If you are pregnant, no amount of alcohol is known to be safe. · Protein, fat, and fiber do not raise blood sugar as much as carbohydrate does. If you eat a lot of these nutrients in a meal, your blood sugar will rise more slowly than it would otherwise. · Limit saturated fats, such as those from meat and dairy products. Try to replace it with monounsaturated fat, such as olive oil. This is a healthier choice because people who have diabetes are at higher-than-average risk of heart disease. But use a modest amount of olive oil. A tablespoon of olive oil has 14 grams of fat and 120 calories. · Exercise lowers blood sugar. If you take insulin by shots or pump, you can use less than you would if you were not exercising.  Keep in mind that timing matters. If you exercise within 1 hour after a meal, your body may need less insulin for that meal than it would if you exercised 3 hours after the meal. Test your blood sugar to find out how exercise affects your need for insulin. · Exercise on most days of the week. Aim for at least 30 minutes. Exercise helps you stay at a healthy weight and helps your body use insulin. Walking is an easy way to get exercise. Gradually increase the amount you walk every day. You also may want to swim, bike, or do other activities. When you eat out · Learn to estimate the serving sizes of foods that have carbohydrate. If you measure food at home, it will be easier to estimate the amount in a serving of restaurant food. · If the meal you order has too much carbohydrate (such as potatoes, corn, or baked beans), ask to have a low-carbohydrate food instead. Ask for a salad or green vegetables. · If you use insulin, check your blood sugar before and after eating out to help you plan how much to eat in the future. · If you eat more carbohydrate at a meal than you had planned, take a walk or do other exercise. This will help lower your blood sugar. Where can you learn more? Go to [employer].be Enter W153 in the search box to learn more about \"Nutrition Tips for Diabetes: After Your Visit. \"  
© 0931-3763 Healthwise, Incorporated. Care instructions adapted under license by [employer] (which disclaims liability or warranty for this information). This care instruction is for use with your licensed healthcare professional. If you have questions about a medical condition or this instruction, always ask your healthcare professional. [place] [address] any warranty or liability for your use of this information. Content Version: 37.2.736816; Current as of: June 4, 2014 Low Sodium Diet (2,000 Milligram): Care Instructions Your Care Instructions Too much sodium causes your body to hold on to extra water. This can raise your blood pressure and force your heart and kidneys to work harder. In very serious cases, this could cause you to be put in the hospital. It might even be life-threatening. By limiting sodium, you will feel better and lower your risk of serious problems. The most common source of sodium is salt. People get most of the salt in their diet from canned, prepared, and packaged foods. Fast food and restaurant meals also are very high in sodium. Your doctor will probably limit your sodium to less than 2,000 milligrams (mg) a day. This limit counts all the sodium in prepared and packaged foods and any salt you add to your food. Follow-up care is a key part of your treatment and safety. Be sure to make and go to all appointments, and call your doctor if you are having problems. It's also a good idea to know your test results and keep a list of the medicines you take. How can you care for yourself at home? Read food labels · Read labels on cans and food packages. The labels tell you how much sodium is in each serving. Make sure that you look at the serving size. If you eat more than the serving size, you have eaten more sodium. · Food labels also tell you the Percent Daily Value for sodium. Choose products with low Percent Daily Values for sodium. · Be aware that sodium can come in forms other than salt, including monosodium glutamate (MSG), sodium citrate, and sodium bicarbonate (baking soda). MSG is often added to Asian food. When you eat out, you can sometimes ask for food without MSG or added salt. Buy low-sodium foods · Buy foods that are labeled \"unsalted\" (no salt added), \"sodium-free\" (less than 5 mg of sodium per serving), or \"low-sodium\" (less than 140 mg of sodium per serving). Foods labeled \"reduced-sodium\" and \"light sodium\" may still have too much sodium. Be sure to read the label to see how much sodium you are getting. · Buy fresh vegetables, or frozen vegetables without added sauces. Buy low-sodium versions of canned vegetables, soups, and other canned goods. Prepare low-sodium meals · Cut back on the amount of salt you use in cooking. This will help you adjust to the taste. Do not add salt after cooking. One teaspoon of salt has about 2,300 mg of sodium. · Take the salt shaker off the table. · Flavor your food with garlic, lemon juice, onion, vinegar, herbs, and spices. Do not use soy sauce, lite soy sauce, steak sauce, onion salt, garlic salt, celery salt, mustard, or ketchup on your food. · Use low-sodium salad dressings, sauces, and ketchup. Or make your own salad dressings and sauces without adding salt. · Use less salt (or none) when recipes call for it. You can often use half the salt a recipe calls for without losing flavor. Other foods such as rice, pasta, and grains do not need added salt. · Rinse canned vegetables, and cook them in fresh water. This removes somebut not allof the salt. · Avoid water that is naturally high in sodium or that has been treated with water softeners, which add sodium. Call your local water company to find out the sodium content of your water supply. If you buy bottled water, read the label and choose a sodium-free brand. Avoid high-sodium foods · Avoid eating: 
? Smoked, cured, salted, and canned meat, fish, and poultry. ? Ham, kohler, hot dogs, and luncheon meats. ? Regular, hard, and processed cheese and regular peanut butter. ? Crackers with salted tops, and other salted snack foods such as pretzels, chips, and salted popcorn. ? Frozen prepared meals, unless labeled low-sodium. ? Canned and dried soups, broths, and bouillon, unless labeled sodium-free or low-sodium. ? Canned vegetables, unless labeled sodium-free or low-sodium. ? [place] fries, pizza, tacos, and other fast foods.  
? Pickles, olives, ketchup, and other condiments, especially soy sauce, unless labeled sodium-free or low-sodium. Where can you learn more? Go to http://slava-enrique.info/. Enter M530 in the search box to learn more about \"Low Sodium Diet (2,000 Milligram): Care Instructions. \" Current as of: November 7, 2018 Content Version: 12.1 © 7905-1160 [employer]. Care instructions adapted under license by [employer] (which disclaims liability or warranty for this information). If you have questions about a medical condition or this instruction, always ask your healthcare professional. [address] any warranty or liability for your use of this information.

## 2019-09-27 DIAGNOSIS — R79.89 LOW TSH LEVEL: ICD-10-CM

## 2019-09-27 RX ORDER — LEVOTHYROXINE SODIUM 137 UG/1
TABLET ORAL
Qty: 30 TAB | Refills: 1 | Status: SHIPPED | OUTPATIENT
Start: 2019-09-27 | End: 2019-11-23 | Stop reason: SDUPTHER

## 2019-10-10 NOTE — TELEPHONE ENCOUNTER
Called patient and left message to advise Rx filled but to please complete labs, if reprint is needed to please call the office.

## 2019-10-23 ENCOUNTER — OFFICE VISIT (OUTPATIENT)
Dept: PAIN MANAGEMENT | Age: 69
End: 2019-10-23

## 2019-10-23 VITALS
TEMPERATURE: 97.7 F | HEIGHT: 71 IN | BODY MASS INDEX: 38.08 KG/M2 | WEIGHT: 272 LBS | OXYGEN SATURATION: 98 % | SYSTOLIC BLOOD PRESSURE: 168 MMHG | HEART RATE: 96 BPM | RESPIRATION RATE: 16 BRPM | DIASTOLIC BLOOD PRESSURE: 79 MMHG

## 2019-10-23 DIAGNOSIS — M51.37 DEGENERATION OF LUMBAR OR LUMBOSACRAL INTERVERTEBRAL DISC: Primary | ICD-10-CM

## 2019-10-23 DIAGNOSIS — Z79.899 ENCOUNTER FOR LONG-TERM (CURRENT) USE OF HIGH-RISK MEDICATION: ICD-10-CM

## 2019-10-23 DIAGNOSIS — M54.17 LUMBOSACRAL RADICULOPATHY: ICD-10-CM

## 2019-10-23 RX ORDER — PREGABALIN 150 MG/1
CAPSULE ORAL
Qty: 90 CAP | Refills: 2 | Status: SHIPPED | OUTPATIENT
Start: 2019-10-23 | End: 2020-04-10 | Stop reason: SDUPTHER

## 2019-10-23 RX ORDER — OXYCODONE HYDROCHLORIDE 5 MG/1
5 TABLET ORAL
Qty: 40 TAB | Refills: 0 | Status: SHIPPED | OUTPATIENT
Start: 2019-10-23 | End: 2019-11-22

## 2019-10-23 RX ORDER — DULOXETIN HYDROCHLORIDE 60 MG/1
60 CAPSULE, DELAYED RELEASE ORAL 2 TIMES DAILY
Qty: 60 CAP | Refills: 3 | Status: SHIPPED | OUTPATIENT
Start: 2019-10-23 | End: 2020-04-10 | Stop reason: SDUPTHER

## 2019-10-23 NOTE — PROGRESS NOTES
Nursing Notes    Patient presents to the office today in follow-up. Patient rates his pain at 7/10 on the numerical pain scale. Reviewed medications with counts as follows:    Rx Date filled Qty Dispensed Pill Count Last Dose Short   Oxycodone 5 mg tab 5/9/19 40 8 Monday no                                       reviewed YES  Any aberrancies noted on  NO  Last opioid agreement today   Last urine drug screen 7/30/19    Comments:     POC UDS was not performed in office today    Any new labs or imaging since last appointment? NO    Have you been to an emergency room (ER) or urgent care clinic since your last visit? NO            Have you been hospitalized since your last visit? NO     If yes, where, when, and reason for visit? Have you seen or consulted any other health care providers outside of the [address] [place] [address]  since your last visit? NO     If yes, where, when, and reason for visit? Mr. Liu Cantrell has a reminder for a \"due or due soon\" health maintenance. I have asked that he contact his primary care provider for follow-up on this health maintenance.       3 most recent PHQ Screens 10/23/2019   PHQ Not Done -   Little interest or pleasure in doing things Not at all   Feeling down, depressed, irritable, or hopeless Not at all   Total Score PHQ 2 0

## 2019-10-23 NOTE — PROGRESS NOTES
Referral date 10/23/13, source Work[employer]'s Compensation and for low back pain. Calculated MEQ -up to 15  Naloxone rescue - yes  Prophylactic bowel program - yes  Date of last OCA 10/23/19  Last UDS 07/30/19, consistent POC, awaiting confirmatory testing.  date checked today, findings consistent      Today   Last Visit  Prior Visit(s)  ORT -        [facility] - 1 and 5 1 and 5  1 and 7      DANO -60%   56%   62%        COMM -8   7  16            HPI:  Magda Franco is a 77 y.o. male here for f/u visit for ongoing evaluation of work/related to chronic pain which resulted in multiple surgeries including L4-S1 fusion. Pt was last seen here on 07/30/19. Pt denies interval changes on the character or distribution of pain. Pain is located mid lower back and right gluteal region and radiating down right lower extremity. The pain is described as aching, burning. Pain at its best is 6/10. Pain at its worse is 10/10. The pain is worsened by standing, prolonged sitting at his [employer]. Symptoms are improved by H-wave, rocking back and forth, TENS unit, changing positions, heating pad. Pt has never tried SCS trial,implantable pain pump. Patient has history of lumbar surgery in 1980 and another in 1984 and acute onset of his low back pain following a full day of  heavy lifting in 1998 which led to a fusion of L4-S1 and in 1999 and then removal of hardware in 2000. He feels his TENS unit has been beneficial as well as his LSO. Patient also reports his H wave used to give him 4 to 6 hours of relief but now only gives him 2 to 3 hours of relief, advised patient to follow-up with H wave rep for recommendations. Since last visit, denies changes other than decrease in H wave relief since last office visit. ROS:  Reports falls.   Denies fever, chills, nausea, vomiting, diarrhea, constipation, abdominal pain, chest pain, shortness or breath/trouble breathing, weakness, trouble swallowing, changes in vision, changes in hearing,dizziness, bladder incontinence, bowel incontinence, depression, anxiety, suicidal ideations, homicidal ideations or alcohol use. Opioid specific risk:obesity, DM, asthma, GERD, sleep disturbances. Vitals:    10/23/19 0957   BP: 168/79   Pulse: 96   Resp: 16   Temp: 97.7 °F (36.5 °C)   TempSrc: Oral   SpO2: 98%   Weight: 123.4 kg (272 lb)   Height: 5' 11\" (1.803 m)   PainSc:   7   PainLoc: Back            Physical exam:  AFVS elevated blood pressure, patient asymptomatic, no acute distress, obese body habitus. A&OXs 3. Normocephalic, atraumatic. Conjugate gaze, clear sclerae. EOM intact. Speech is clear and appropriate. Mood is appropriate and patient is cooperative. Right-handed mono cane to assist with gait and balance  Balance is within functional limits. Non-labored breathing. Even rise of chest wall. Primary Care Physician  Cade Alcala  [address] [facility] [address]  [address] [facility]  [phone]      PHQ -- .  3 most recent [facility] Screens 10/23/2019   PHQ Not Done -   Little interest or pleasure in doing things Not at all   Feeling down, depressed, irritable, or hopeless Not at all   Total Score PHQ 2 0       Assessment/Plan:     ICD-10-CM ICD-9-CM    1. Lumbosacral radiculopathy M54.17 724.4    2. Disorder of meninges G96.19 349.2    3. Degeneration of lumbar or lumbosacral intervertebral disc M51.37 722.52    4. Other polyneuropathy G62.89 357.89 pregabalin (LYRICA) 150 mg capsule   5. Chronic pain syndrome G89.4 338.4 oxyCODONE IR (ROXICODONE) 5 mg immediate release tablet      DULoxetine (CYMBALTA) 60 mg capsule   6. Encounter for long-term (current) use of high-risk medication Z79.899 V58.69         --Continue regular follow-ups with PCP and follow up regarding positive findings on review of symptoms and elevated blood pressure.     1) Medications (opiod and non-opiod)  --I do not recommend long term opioid therapy for this patient at this time for their chronic pain; the risks outweigh the potential benefits. Pt currently taking oxycodone 5 mg 1 tab up to once daily as needed with a total of 40 tabs to be budgeted over 30 days. Their MME is up to 15. --Today, we will hold the weaning of patients opioid medication with a goal of being opioid free, pending safety and compliance. Pt was educated on signs and symptoms of opioid withdrawal and advised to call the clinic should these symptoms arise so that we may provide support as needed. Pt given prescription for oxycodone 5 mg 1 tab up to once daily as needed with a total of 40 tabs to be budgeted over 30 days. Their MME is up to 15. --Will keep prescription the same for following refills. --Continue compound cream to be used 3-4 times daily as needed. ([place])    --Refill Lyrica 150 mg 3 times daily     --Refill Cymbalta 60 mg capsule 1 capsule twice daily for chronic pain, patient will call for refills      2) Restorative Therapies  --Continue H-wave as needed as patient feels this has been very beneficial     --Continue compound cream to be used 3-4 times daily as needed. ([place])    --Continue to wear his LSO ans use TENS as needed. 3) Interventional Procedures  --Ongoing consideration for consult for interventional procedures. 4) Behavorial Health Approaches  --Patient may benefit from pain psychology referral in the future. 5) Complementary & Integrative Health  --Patient benefits from his devout alejandrina and participating with his [employer] functions regularly. Follow up ongoing assessment and ongoing development of integrative and comprehensive plan of care for chronic pain. Goals: To establish complementary and integrative plan of care to address chronic pain issues while minimizing pharmaceuticals to maximize patient's function improve quality of life. Education:  Patient again educated on the importance of strict compliance with the opioid care agreement while on opioid therapy. Patient also again educated that they should avoid driving while on chronic opioid therapy. Also advised to avoid alcohol and to avoid benzodiazepines while on opioid therapy. Handouts given regarding opioid safety and sleep health. Follow-up and Dispositions    · Return in about 3 months (around 2020).               Social History     Socioeconomic History    Marital status:      Spouse name: Not on file    Number of children: Not on file    Years of education: Not on file    Highest education level: Not on file   Occupational History    Occupation: Retired-Federal   Social Needs    Financial resource strain: Not on file    Food insecurity:     Worry: Not on file     Inability: Not on file    Transportation needs:     Medical: Not on file     Non-medical: Not on file   Tobacco Use    Smoking status: Former Smoker     Packs/day: 1.00     Years: 31.00     Pack years: 31.00     Types: Pipe, Cigars     Last attempt to quit: 1995     Years since quittin.8    Smokeless tobacco: Never Used   Substance and Sexual Activity    Alcohol use: No    Drug use: No    Sexual activity: Yes     Partners: Female   Lifestyle    Physical activity:     Days per week: Not on file     Minutes per session: Not on file    Stress: Not on file   Relationships    Social connections:     Talks on phone: Not on file     Gets together: Not on file     Attends [employer] service: Not on file     Active member of club or organization: Not on file     Attends meetings of clubs or organizations: Not on file     Relationship status: Not on file    Intimate partner violence:     Fear of current or ex partner: Not on file     Emotionally abused: Not on file     Physically abused: Not on file     Forced sexual activity: Not on file   Other Topics Concern    Not on file   Social History Narrative    Not on file     Family History   Problem Relation Age of Onset    Cancer Mother         Bone and Brain Cancer  Diabetes Mother     Liver Disease Father         Lung Cancer    Kidney Disease Sister     Diabetes Daughter     Colon Cancer Brother      Allergies   Allergen Reactions    Ciprofloxacin Anaphylaxis    Other Plant, Animal, Environmental Other (comments)     Patient cannot have MRI. Patient states that he has metal screws in his left arm.  Lamisil [Terbinafine] Swelling     Tongue swelling    Metformin Other (comments)     Elevated cr 1.4    Morphine Other (comments)     \"loss of blood pressure\"    Other Medication Other (comments)     Doxasylin causes extreme GERD    Pcn [Penicillins] Rash    Pentothal [Thiopental Sodium] Shortness of Breath    Sulfa (Sulfonamide Antibiotics) Rash     Past Medical History:   Diagnosis Date    Arthritis     Asthma     Cancer ([facility] [address]     BASAL     Cardiac catheterization 2009    [facility]:  No significant CAD.  Cardiac echocardiogram 01/20/2014    EF 50-55%. No WMA. Gr 1 DDfx. RVSP 25-30 mmHg. Mild TR.       DM type 2 (diabetes mellitus, type 2) (HCC)     Enlarged prostate     Failed back syndrome     GERD (gastroesophageal reflux disease)     Hearing loss, bilateral     Hearing Aids    Hypertension     Hypothyroidism     Insomnia     Low serum testosterone level     Neuropathy     Osteoarthritis of multiple joints     S/P colonoscopy 8/14/13    mild diverticulosis in sigmoid colon w/o evidence of diverticulitis; f/u 5 years    SOB (shortness of breath)     chronic; 2' \"lung fever\"    Vertigo      Past Surgical History:   Procedure Laterality Date    COLONOSCOPY N/A 4/3/2018    COLONOS[facility] Dariusz Ga [facility] at [facility] HX BACK SURGERY  2000    removal of defective hardware    [address]    to remove bone fragments    HX LUMBAR FUSION  1999    fusion from L4-S1 and implantation of hardware    HX LUMBAR FUSION  1984    fusion on L5 area    HX ORTHOPAEDIC Bilateral 2004    trigger finger syndrome-all 4 fingers    HX ORTHOPAEDIC Left 2004    left arm torn muscle repair and placement of 2 screws    HX OTHER SURGICAL      skin cancer removal     Current Outpatient Medications on File Prior to Visit   Medication Sig    levothyroxine (SYNTHROID) 137 mcg tablet TAKE 1 TABLET BY MOUTH ONCE DAILY BEFORE BREAKFAST    topiramate (TOPAMAX) 25 mg tablet TAKE 1 TABLET BY MOUTH ONCE DAILY WITH BREAKFAST    losartan (COZAAR) 25 mg tablet Take 1 Tab by mouth daily.  insulin glargine (BASAGLAR KWIKPEN U-100 INSULIN) 100 unit/mL (3 mL) inpn 52 Units by SubCUTAneous route nightly. 52 units at bedtime    montelukast (SINGULAIR) 10 mg tablet TAKE 1 TABLET BY MOUTH ONCE DAILY    fluticasone (FLONASE) 50 mcg/actuation nasal spray USE 1 SPRAY(S) IN EACH NOSTRIL ONCE DAILY    tamsulosin (FLOMAX) 0.4 mg capsule TAKE 1 CAPSULE BY MOUTH ONCE DAILY    metoprolol tartrate (LOPRESSOR) 25 mg tablet Take 1 Tab by mouth two (2) times a day.  insulin glulisine U-100 (APIDRA SOLOSTAR U-100 INSULIN) 100 unit/mL pen by SubCUTAneous route.  atorvastatin (LIPITOR) 20 mg tablet TAKE ONE TABLET BY MOUTH ONCE DAILY (Patient taking differently: 40 mg.)    OTHER EB-N3 once daily    FANI PEN NEEDLE 32 gauge x 5/32\" ndle USE AT BEDTIME    JANUVIA 100 mg tablet Take 100 mg by mouth daily. Indications: patient stated he was given 50 mg    ACCU-CHEK JAZZY PLUS TEST STRP strip     ACCU-CHEK SOFTCLIX LANCETS misc     albuterol (PROVENTIL VENTOLIN) 2.5 mg /3 mL (0.083 %) nebulizer solution 3 mL by Nebulization route every four (4) hours as needed for Wheezing or Shortness of Breath.  PEN NEEDLE, DIABETIC (BD ULTRA-FINE FANI PEN NEEDLES) by Does Not Apply route. 4mm x 32G    insulin glulisine (APIDRA SOLOSTAR) 100 unit/mL pen 2 units per carb consumed. Max 30 / day (Patient taking differently: 2 units per carb consumed.   Max 30 / day  Indications: patient states taking 15-20 units three times a day)    PEN NEEDLE 31 gauge x 5/16\" ndle USE ONE PEN NEEDLE FOR LANTUS FLEXPEN AND 3 PEN NEEDLES FOR APIDRA WITH EACH MEAL    esomeprazole (NEXIUM) 40 mg capsule Take 40 mg by mouth two (2) times a day.  betamethasone dipropionate (DIPROLENE) 0.05 % ointment nightly.  budesonide-formoterol (SYMBICORT) 160-4.5 mcg/actuation HFA inhaler Take 2 Puffs by inhalation two (2) times a day. (Patient taking differently: Take 2 Puffs by inhalation two (2) times daily as needed.)    tiotropium (SPIRIVA) 18 mcg inhalation capsule Take 1 Cap by inhalation daily. (Patient taking differently: Take  by inhalation as needed.)    ketorolac (ACULAR) 0.5 % ophthalmic solution Administer 1 Drop to both eyes two (2) times a day.  [DISCONTINUED] oxyCODONE IR (ROXICODONE) 5 mg immediate release tablet Take 5 mg by mouth every four (4) hours as needed for Pain.  [DISCONTINUED] pregabalin (LYRICA) 150 mg capsule TAKE 1 CAPSULE BY MOUTH THREE TIMES DAILY FOR 30 DAYS. MAXIMUM 3 CAPSULES DAILY.  [DISCONTINUED] DULoxetine (CYMBALTA) 60 mg capsule Take 1 Cap by mouth two (2) times a day.  naloxone 4 mg/actuation spry 4 mg by Nasal route as needed. No current facility-administered medications on file prior to visit. [address] was used for portions of this report. Unintended errors may occur.

## 2019-10-23 NOTE — PATIENT INSTRUCTIONS
Learning About Sleeping Well What does sleeping well mean? Sleeping well means getting enough sleep. How much sleep is enough varies among people. The number of hours you sleep is not as important as how you feel when you wake up. If you do not feel refreshed, you probably need more sleep. Another sign of not getting enough sleep is feeling tired during the day. The average total nightly sleep time is 7½ to 8 hours. Healthy adults may need a little more or a little less than this. Why is getting enough sleep important? Getting enough quality sleep is a basic part of good health. When your sleep suffers, your mood and your thoughts can suffer too. You may find yourself feeling more grumpy or stressed. Not getting enough sleep also can lead to serious problems, including injury, accidents, anxiety, and depression. What might cause poor sleeping? Many things can cause sleep problems, including: · Stress. Stress can be caused by fear about a single event, such as giving a speech. Or you may have ongoing stress, such as worry about work or school. · Depression, anxiety, and other mental or emotional conditions. · Changes in your sleep habits or surroundings. This includes changes that happen where you sleep, such as noise, light, or sleeping in a different bed. It also includes changes in your sleep pattern, such as having jet lag or working a late shift. · Health problems, such as pain, breathing problems, and restless legs syndrome. · Lack of regular exercise. How can you help yourself? Here are some tips that may help you sleep more soundly and wake up feeling more refreshed. Your sleeping area · Use your bedroom only for sleeping and sex. A bit of light reading may help you fall asleep. But if it doesn't, do your reading elsewhere in the house. Don't watch TV in bed. · Be sure your bed is big enough to stretch out comfortably, especially if you have a sleep partner. · Keep your bedroom quiet, dark, and cool. Use curtains, blinds, or a sleep mask to block out light. To block out noise, use earplugs, soothing music, or a \"white noise\" machine. Your evening and bedtime routine · Create a relaxing bedtime routine. You might want to take a warm shower or bath, listen to soothing music, or drink a cup of noncaffeinated tea. · Go to bed at the same time every night. And get up at the same time every morning, even if you feel tired. What to avoid · Limit caffeine (coffee, tea, caffeinated sodas) during the day, and don't have any for at least 4 to 6 hours before bedtime. · Don't drink alcohol before bedtime. Alcohol can cause you to wake up more often during the night. · Don't smoke or use tobacco, especially in the evening. Nicotine can keep you awake. · Don't take naps during the day, especially close to bedtime. · Don't lie in bed awake for too long. If you can't fall asleep, or if you wake up in the middle of the night and can't get back to sleep within 15 minutes or so, get out of bed and go to another room until you feel sleepy. · Don't take medicine right before bed that may keep you awake or make you feel hyper or energized. Your doctor can tell you if your medicine may do this and if you can take it earlier in the day. If you can't sleep · Imagine yourself in a peaceful, pleasant scene. Focus on the details and feelings of being in a place that is relaxing. · Get up and do a quiet or boring activity until you feel sleepy. · Don't drink any liquids after 6 p.m. if you wake up often because you have to go to the bathroom. Where can you learn more? Go to http://slava-enrique.info/. Enter X630 in the search box to learn more about \"Learning About Sleeping Well. \" Current as of: May 28, 2019 Content Version: 12.2 © 1303-5906 [employer].  Care instructions adapted under license by [address] (which disclaims liability or warranty for this information). If you have questions about a medical condition or this instruction, always ask your healthcare professional. [address] any warranty or liability for your use of this information. Safe Use of Opioid Pain Medicine: Care Instructions Your Care Instructions Pain is your body's way of warning you that something is wrong. Pain feels different for everybody. Only you can describe your pain. A doctor can suggest or prescribe many types of medicines for pain. These range from over-the-counter medicines like acetaminophen (Tylenol) to powerful medicines called opioids. Examples of opioids are fentanyl, hydrocodone, morphine, and oxycodone. Heroin is an illegal opioid Opioids are strong medicines. They can help you manage pain when you use them the right way. But if you misuse them, they can cause serious harm and even death. For these reasons, doctors are very careful about how they prescribe opioids. If you decide to take opioids, here are some things to remember. · Keep your doctor informed. You can develop opioid use disorder. Moderate to severe opioid use disorder is sometimes called addiction. The risk is higher if you have a history of substance use. Your doctor will monitor you closely for signs of opioid use disorder and to figure out when you no longer need to take opioids. · Make a treatment plan. The goal of your plan is to be able to function and do the things you need to do, even if you still have some pain. You might be able to manage your pain with other non-opioid options like physical therapy, relaxation, or over-the-counter pain medicines. · Be aware of the side effects. Opioids can cause serious side effects, such as constipation, dry mouth, and nausea. And over time, you may need a higher dose to get pain relief. This is called tolerance.  Your body also gets used to opioids. This is called physical dependence. If you suddenly stop taking them, you may have withdrawal symptoms. The doctor carefully considered what pain medicine is right for you. You may not have received opioids if your doctor was concerned about drug interactions or your safety, or if he or she had other concerns. It is best to have one doctor or clinic treat your pain. This way you will get the pain medicine that will help you the most. And a doctor will be able to watch for any problems that the medicine might cause. The doctor has checked you carefully, but problems can develop later. If you notice any problems or new symptoms,  get medical treatment right away. Follow-up care is a key part of your treatment and safety. Be sure to make and go to all appointments, and call your doctor if you are having problems. It's also a good idea to know your test results and keep a list of the medicines you take. How can you care for yourself at home? If you need to take opioids to manage your pain, remember these safety tips. · Follow directions carefully. It's easy to misuse opioids if you take a dose other than what's prescribed by your doctor. This can lead to overdose and even death. Even sharing them with someone they weren't meant for is misuse. · Be cautious. Opioids may affect your judgment and decision making. Do not drive or operate machinery until you can think clearly. Talk with your doctor about when it is safe to drive. · Reduce the risk of drug interactions. Opioids can be dangerous if you take them with alcohol or with certain drugs like sleeping pills and muscle relaxers. Make sure your doctor knows about all the other medicines you take, including over-the-counter medicines. Don't start any new medicines before you talk to your doctor or pharmacist. 
· Safely store and dispose of opioids.  Store opioids in a safe and secure place. Make sure that pets, children, friends, and family can't get to them. When you're done using opioids, make sure to dispose of them safely and as quickly as possible. The [place] Food and Drug Administration (FDA) recommends these disposal options. ? The best option is to take your medicine to a drop-off box or take-back program that is authorized by the [address] ([facility]). ? If these programs aren't available in your area and your medicine doesn't have specific disposal instructions (such as flushing), you can throw them into your household trash if you follow the FDA's instructions. Visit fda.gov and search for \"unused medicine disposal.\" 
? If you have opioid patches (used or unused), your options are to take them to a [facility]-authorized site or flush them down the toilet. Do not throw them in the trash. ? Only flush your medicine down the toilet if you can't get to a [facility]-approved site or your medicine instructions state clearly to flush them. · Reduce the risk of overdose. Misuse of opioids can be very dangerous. Protect yourself by asking your doctor about a naloxone rescue kit. It can help youand even save your lifeif you take too much of an opioid. Try other ways to reduce pain. · Relax, and reduce stress. Relaxation techniques such as deep breathing or meditation can help. · Keep moving. Gentle, daily exercise can help reduce pain over the long run. Try low- or no-impact exercises such as walking, swimming, and stationary biking. Do stretches to stay flexible. · Try heat, cold packs, and massage. · Get enough sleep. Pain can make you tired and drain your energy. Talk with your doctor if you have trouble sleeping because of pain. · Think positive. Your thoughts can affect your pain level. Do things that you enjoy to distract yourself when you have pain instead of focusing on the pain. See a movie, read a book, listen to music, or spend time with a friend. If you are not taking a prescription pain medicine, ask your doctor if you can take an over-the-counter medicine. When should you call for help? Call your doctor now or seek immediate medical care if: 
  · You have a new kind of pain.  
  · You have new symptoms, such as a fever or rash, along with the pain.  
 Watch closely for changes in your health, and be sure to contact your doctor if: 
  · You think you might be using too much pain medicine, and you need help to use less or stop.  
  · Your pain gets worse.  
  · You would like a referral to a doctor or clinic that specializes in pain management. Where can you learn more? Go to http://slava-enrique.info/. Enter R108 in the search box to learn more about \"Safe Use of Opioid Pain Medicine: Care Instructions. \" Current as of: March 28, 2019 Content Version: 12.2 © 2408-4329 [employer]. Care instructions adapted under license by [employer] (which disclaims liability or warranty for this information). If you have questions about a medical condition or this instruction, always ask your healthcare professional. [address] any warranty or liability for your use of this information.

## 2019-11-25 ENCOUNTER — TELEPHONE (OUTPATIENT)
Dept: FAMILY MEDICINE CLINIC | Age: 69
End: 2019-11-25

## 2019-12-03 ENCOUNTER — OFFICE VISIT (OUTPATIENT)
Dept: FAMILY MEDICINE CLINIC | Age: 69
End: 2019-12-03

## 2019-12-03 ENCOUNTER — HOSPITAL ENCOUNTER (OUTPATIENT)
Dept: LAB | Age: 69
Discharge: HOME | End: 2019-12-03
Payer: MEDICARE

## 2019-12-03 VITALS
DIASTOLIC BLOOD PRESSURE: 68 MMHG | HEIGHT: 71 IN | BODY MASS INDEX: 38.92 KG/M2 | OXYGEN SATURATION: 98 % | WEIGHT: 278 LBS | TEMPERATURE: 98.1 F | SYSTOLIC BLOOD PRESSURE: 116 MMHG | HEART RATE: 98 BPM | RESPIRATION RATE: 16 BRPM

## 2019-12-03 DIAGNOSIS — I10 ESSENTIAL HYPERTENSION: ICD-10-CM

## 2019-12-03 DIAGNOSIS — R79.89 LOW TSH LEVEL: ICD-10-CM

## 2019-12-03 DIAGNOSIS — M54.42 CHRONIC BILATERAL LOW BACK PAIN WITH BILATERAL SCIATICA: ICD-10-CM

## 2019-12-03 DIAGNOSIS — G89.29 CHRONIC BILATERAL LOW BACK PAIN WITH BILATERAL SCIATICA: ICD-10-CM

## 2019-12-03 DIAGNOSIS — Z71.89 HEARING AID CONSULTATION: ICD-10-CM

## 2019-12-03 DIAGNOSIS — E03.9 HYPOTHYROIDISM, UNSPECIFIED TYPE: ICD-10-CM

## 2019-12-03 DIAGNOSIS — E03.9 HYPOTHYROIDISM, UNSPECIFIED TYPE: Primary | ICD-10-CM

## 2019-12-03 DIAGNOSIS — Z23 ENCOUNTER FOR IMMUNIZATION: ICD-10-CM

## 2019-12-03 DIAGNOSIS — J44.9 CHRONIC OBSTRUCTIVE PULMONARY DISEASE, UNSPECIFIED COPD TYPE (HCC): Primary | ICD-10-CM

## 2019-12-03 DIAGNOSIS — M54.41 CHRONIC BILATERAL LOW BACK PAIN WITH BILATERAL SCIATICA: ICD-10-CM

## 2019-12-03 DIAGNOSIS — E11.21 TYPE 2 DIABETES WITH NEPHROPATHY (HCC): ICD-10-CM

## 2019-12-03 DIAGNOSIS — G62.9 NEUROPATHY: ICD-10-CM

## 2019-12-03 DIAGNOSIS — E53.8 VITAMIN B12 DEFICIENCY: ICD-10-CM

## 2019-12-03 DIAGNOSIS — E66.01 MORBID OBESITY, UNSPECIFIED OBESITY TYPE (HCC): ICD-10-CM

## 2019-12-03 DIAGNOSIS — E78.1 HYPERTRIGLYCERIDEMIA: ICD-10-CM

## 2019-12-03 LAB
ALBUMIN SERPL-MCNC: 3.6 G/DL (ref 3.4–5)
ALBUMIN/GLOB SERPL: 1.2 {RATIO} (ref 0.8–1.7)
ALP SERPL-CCNC: 56 U/L (ref 45–117)
ALT SERPL-CCNC: 37 U/L (ref 16–61)
AST SERPL-CCNC: 18 U/L (ref 10–38)
BILIRUB DIRECT SERPL-MCNC: 0.1 MG/DL (ref 0–0.2)
BILIRUB SERPL-MCNC: 0.6 MG/DL (ref 0.2–1)
CHOLEST SERPL-MCNC: 169 MG/DL
GLOBULIN SER CALC-MCNC: 3 G/DL (ref 2–4)
HDLC SERPL-MCNC: 39 MG/DL (ref 40–60)
HDLC SERPL: 4.3 {RATIO} (ref 0–5)
LDLC SERPL CALC-MCNC: 88.4 MG/DL (ref 0–100)
LIPID PROFILE,FLP: ABNORMAL
PROT SERPL-MCNC: 6.6 G/DL (ref 6.4–8.2)
TRIGL SERPL-MCNC: 208 MG/DL (ref ?–150)
TSH SERPL DL<=0.05 MIU/L-ACNC: 0.33 UIU/ML (ref 0.36–3.74)
VLDLC SERPL CALC-MCNC: 41.6 MG/DL

## 2019-12-03 PROCEDURE — 80061 LIPID PANEL: CPT

## 2019-12-03 PROCEDURE — 36415 COLL VENOUS BLD VENIPUNCTURE: CPT

## 2019-12-03 PROCEDURE — 84443 ASSAY THYROID STIM HORMONE: CPT

## 2019-12-03 PROCEDURE — 80076 HEPATIC FUNCTION PANEL: CPT

## 2019-12-03 RX ORDER — LEVOTHYROXINE SODIUM 125 UG/1
125 TABLET ORAL
Qty: 30 TAB | Refills: 1 | Status: SHIPPED | OUTPATIENT
Start: 2019-12-03 | End: 2020-02-11

## 2019-12-03 NOTE — PATIENT INSTRUCTIONS
Vaccine Information Statement Influenza (Flu) Vaccine (Inactivated or Recombinant): What You Need to Know Many Vaccine Information Statements are available in Georgian and other languages. See www.immunize.org/vis Hojas de información sobre vacunas están disponibles en español y en muchos otros idiomas. Visite www.immunize.org/vis 1. Why get vaccinated? Influenza vaccine can prevent influenza (flu). Flu is a contagious disease that spreads around the [facility] every year, usually between October and May. Anyone can get the flu, but it is more dangerous for some people. Infants and young children, people 72years of age and older, pregnant women, and people with certain health conditions or a weakened immune system are at greatest risk of flu complications. Pneumonia, bronchitis, sinus infections and ear infections are examples of flu-related complications. If you have a medical condition, such as heart disease, cancer or diabetes, flu can make it worse. Flu can cause fever and chills, sore throat, muscle aches, fatigue, cough, headache, and runny or stuffy nose. Some people may have vomiting and diarrhea, though this is more common in children than adults. Each year thousands of people in the [facility] die from flu, and many more are hospitalized. Flu vaccine prevents millions of illnesses and flu-related visits to the doctor each year. 2. Influenza vaccines CDC recommends everyone 10months of age and older get vaccinated every flu season. Children 6 months through 6years of age may need 2 doses during a single flu season. Everyone else needs only 1 dose each flu season. It takes about 2 weeks for protection to develop after vaccination. There are many flu viruses, and they are always changing. Each year a new flu vaccine is made to protect against three or four viruses that are likely to cause disease in the upcoming flu season.  Even when the vaccine doesnt exactly match these viruses, it may still provide some protection. Influenza vaccine does not cause flu. Influenza vaccine may be given at the same time as other vaccines. 3. Talk with your health care provider Tell your vaccine provider if the person getting the vaccine: 
 Has had an allergic reaction after a previous dose of influenza vaccine, or has any severe, life-threatening allergies.  Has ever had Guillain-Barré Syndrome (also called GBS). In some cases, your health care provider may decide to postpone influenza vaccination to a future visit. People with minor illnesses, such as a cold, may be vaccinated. People who are moderately or severely ill should usually wait until they recover before getting influenza vaccine. Your health care provider can give you more information. 4. Risks of a reaction  Soreness, redness, and swelling where shot is given, fever, muscle aches, and headache can happen after influenza vaccine.  There may be a very small increased risk of Guillain-Barré Syndrome (GBS) after inactivated influenza vaccine (the flu shot). Jessie Navarrete children who get the flu shot along with pneumococcal vaccine (PCV13), and/or DTaP vaccine at the same time might be slightly more likely to have a seizure caused by fever. Tell your health care provider if a child who is getting flu vaccine has ever had a seizure. People sometimes faint after medical procedures, including vaccination. Tell your provider if you feel dizzy or have vision changes or ringing in the ears. As with any medicine, there is a very remote chance of a vaccine causing a severe allergic reaction, other serious injury, or death. 5. What if there is a serious problem? An allergic reaction could occur after the vaccinated person leaves the clinic.  If you see signs of a severe allergic reaction (hives, swelling of the face and throat, difficulty breathing, a fast heartbeat, dizziness, or weakness), call 9-1-1 and get the person to the nearest hospital. 
 
For other signs that concern you, call your health care provider. Adverse reactions should be reported to the Vaccine Adverse Event Reporting System (VAERS). Your health care provider will usually file this report, or you can do it yourself. Visit the VAERS website at www.vaers. hhs.gov or call 2-467.816.7202. VAERS is only for reporting reactions, and VAERS staff do not give medical advice. 6. The National Vaccine Injury Compensation Program 
 
The [facility] Vaccine Injury Compensation Program (VICP) is a federal program that was created to compensate people who may have been injured by certain vaccines. Visit the VICP website at www.[facility].gov/vaccinecompensation or call 7-777.201.9748 to learn about the program and about filing a claim. There is a time limit to file a claim for compensation. 7. How can I learn more?  Ask your health care provider.  Call your local or state health department.  Contact the Centers for Disease Control and Prevention (CDC): 
- Call 1-395.411.3012 (8-144-LWV-INFO) or 
- Visit CDCs influenza website at www.cdc.gov/flu Vaccine Information Statement (Interim) Inactivated Influenza Vaccine 8/15/2019 
[id number] Department of [facility] [place] Centers for Disease Control and Prevention Office Use Only Hypothyroidism: Care Instructions Your Care Instructions You have hypothyroidism, which means that your body is not making enough thyroid hormone. This hormone helps your body use energy. If your thyroid level is low, you may feel tired, be constipated, have an increase in your blood pressure, or have dry skin or memory problems. You may also get cold easily, even when it is warm. Women with low thyroid levels may have heavy menstrual periods.  
A blood test to find your thyroid-stimulating hormone (TSH) level is used to check for hypothyroidism. A high TSH level may mean that you have low thyroid. When your body is not making enough thyroid hormone, TSH levels rise in an effort to make the body produce more. The treatment for hypothyroidism is to take thyroid hormone pills. You should start to feel better in 1 to 2 weeks. But it can take several months to see changes in the TSH level. You will need regular visits with your doctor to make sure you have the right dose of medicine. Most people need treatment for the rest of their lives. You will need to see your doctor regularly to have blood tests and to make sure you are doing well. Follow-up care is a key part of your treatment and safety. Be sure to make and go to all appointments, and call your doctor if you are having problems. It's also a good idea to know your test results and keep a list of the medicines you take. How can you care for yourself at home? · Take your thyroid hormone medicine exactly as prescribed. Call your doctor if you think you are having a problem with your medicine. Most people do not have side effects if they take the right amount of medicine regularly. ? Take the medicine 30 minutes before breakfast, and do not take it with calcium, vitamins, or iron. ? Do not take extra doses of your thyroid medicine. It will not help you get better any faster, and it may cause side effects. ? If you forget to take a dose, do NOT take a double dose of medicine. Take your usual dose the next day. · Tell your doctor about all prescription, herbal, or over-the-counter products you take. · Take care of yourself. Eat a healthy diet, get enough sleep, and get regular exercise. When should you call for help? Call 911 anytime you think you may need emergency care. For example, call if: 
  · You passed out (lost consciousness).  
  · You have severe trouble breathing.  
  · You have a very slow heartbeat (less than 60 beats a minute).   · You have a low body temperature (95°F or below).  
 Call your doctor now or seek immediate medical care if: 
  · You feel tired, sluggish, or weak.  
  · You have trouble remembering things or concentrating.  
  · You do not begin to feel better 2 weeks after starting your medicine.  
 Watch closely for changes in your health, and be sure to contact your doctor if you have any problems. Where can you learn more? Go to http://slava-enrique.info/. Enter A410 in the search box to learn more about \"Hypothyroidism: Care Instructions. \" Current as of: November 6, 2018 Content Version: 12.2 © 2362-9296 [employer]. Care instructions adapted under license by [employer] (which disclaims liability or warranty for this information). If you have questions about a medical condition or this instruction, always ask your healthcare professional. [address] any warranty or liability for your use of this information. Nutrition Tips for Diabetes: After Your Visit Your Care Instructions A healthy diet is important to manage diabetes. It helps you lose weight (if you need to) and keep it off. It gives you the nutrition and energy your body needs and helps prevent heart disease. But a diet for diabetes does not mean that you have to eat special foods. You can eat what your family eats, including occasional sweets and other favorites. But you do have to pay attention to how often you eat and how much you eat of certain foods. The right plan for you will give you meals that help you keep your blood sugar at healthy levels. Try to eat a variety of foods and to spread carbohydrate throughout the day. Carbohydrate raises blood sugar higher and more quickly than any other nutrient does. Carbohydrate is found in sugar, breads and cereals, fruit, starchy vegetables such as potatoes and corn, and milk and yogurt. You may want to work with a dietitian or diabetes educator to help you plan meals and snacks. A dietitian or diabetes educator also can help you lose weight if that is one of your goals. The following tips can help you enjoy your meals and stay healthy. Follow-up care is a key part of your treatment and safety. Be sure to make and go to all appointments, and call your doctor if you are having problems. Its also a good idea to know your test results and keep a list of the medicines you take. How can you care for yourself at home? · Learn which foods have carbohydrate and how much carbohydrate to eat. A dietitian or diabetes educator can help you learn to keep track of how much carbohydrate you eat. · Spread carbohydrate throughout the day. Eat some carbohydrate at all meals, but do not eat too much at any one time. · Plan meals to include food from all the food groups. These are the food groups and some example portion sizes: ¨ Grains: 1 slice of bread (1 ounce), ½ cup of cooked cereal, and 1/3 cup of cooked pasta or rice. These have about 15 grams of carbohydrate in a serving. Choose whole grains such as whole wheat bread or crackers, oatmeal, and brown rice more often than refined grains. ¨ Fruit: 1 small fresh fruit, such as an apple or orange; ½ of a banana; ½ cup of chopped, cooked, or canned fruit; ½ cup of fruit juice; 1 cup of melon or raspberries; and 2 tablespoons of dried fruit. These have about 15 grams of carbohydrate in a serving. ¨ Dairy: 1 cup of nonfat or low-fat milk and 2/3 cup of plain yogurt. These have about 15 grams of carbohydrate in a serving. ¨ Protein foods: Beef, chicken, turkey, fish, eggs, tofu, cheese, cottage cheese, and peanut butter. A serving size of meat is 3 ounces, which is about the size of a deck of cards.  Examples of meat substitute serving sizes (equal to 1 ounce of meat) are 1/4 cup of cottage cheese, 1 egg, 1 tablespoon of peanut butter, and ½ cup of tofu. These have very little or no carbohydrate per serving. ¨ Vegetables: Starchy vegetables such as ½ cup of cooked dried beans, peas, potatoes, or corn have about 15 grams of carbohydrate. Nonstarchy vegetables have very little carbohydrate, such as 1 cup of raw leafy vegetables (such as spinach), ½ cup of other vegetables (cooked or chopped), and 3/4 cup of vegetable juice. · Use the plate format to plan meals. It is a good, quick way to make sure that you have a balanced meal. It also helps you spread carbohydrate throughout the day. You divide your plate by types of foods. Put vegetables on half the plate, meat or meat substitutes on one-quarter of the plate, and a grain or starchy vegetable (such as brown rice or a potato) in the final quarter of the plate. To this you can add a small piece of fruit and 1 cup of milk or yogurt, depending on how much carbohydrate you are supposed to eat at a meal. 
· Talk to your dietitian or diabetes educator about ways to add limited amounts of sweets into your meal plan. You can eat these foods now and then, as long as you include the amount of carbohydrate they have in your daily carbohydrate allowance. · If you drink alcohol, limit it to no more than 1 drink a day for women and 2 drinks a day for men. If you are pregnant, no amount of alcohol is known to be safe. · Protein, fat, and fiber do not raise blood sugar as much as carbohydrate does. If you eat a lot of these nutrients in a meal, your blood sugar will rise more slowly than it would otherwise. · Limit saturated fats, such as those from meat and dairy products. Try to replace it with monounsaturated fat, such as olive oil. This is a healthier choice because people who have diabetes are at higher-than-average risk of heart disease. But use a modest amount of olive oil. A tablespoon of olive oil has 14 grams of fat and 120 calories. · Exercise lowers blood sugar. If you take insulin by shots or pump, you can use less than you would if you were not exercising. Keep in mind that timing matters. If you exercise within 1 hour after a meal, your body may need less insulin for that meal than it would if you exercised 3 hours after the meal. Test your blood sugar to find out how exercise affects your need for insulin. · Exercise on most days of the week. Aim for at least 30 minutes. Exercise helps you stay at a healthy weight and helps your body use insulin. Walking is an easy way to get exercise. Gradually increase the amount you walk every day. You also may want to swim, bike, or do other activities. When you eat out · Learn to estimate the serving sizes of foods that have carbohydrate. If you measure food at home, it will be easier to estimate the amount in a serving of restaurant food. · If the meal you order has too much carbohydrate (such as potatoes, corn, or baked beans), ask to have a low-carbohydrate food instead. Ask for a salad or green vegetables. · If you use insulin, check your blood sugar before and after eating out to help you plan how much to eat in the future. · If you eat more carbohydrate at a meal than you had planned, take a walk or do other exercise. This will help lower your blood sugar. Where can you learn more? Go to [employer].be Enter J678 in the search box to learn more about \"Nutrition Tips for Diabetes: After Your Visit. \"  
© 3224-5496 [employer]. Care instructions adapted under license by [facility] (which disclaims liability or warranty for this information). This care instruction is for use with your licensed healthcare professional. If you have questions about a medical condition or this instruction, always ask your healthcare professional. [place] [address] any warranty or liability for your use of this information. Content Version: 22.1.163697; Current as of: June 4, 2014 Low Sodium Diet (2,000 Milligram): Care Instructions Your Care Instructions Too much sodium causes your body to hold on to extra water. This can raise your blood pressure and force your heart and kidneys to work harder. In very serious cases, this could cause you to be put in the hospital. It might even be life-threatening. By limiting sodium, you will feel better and lower your risk of serious problems. The most common source of sodium is salt. People get most of the salt in their diet from canned, prepared, and packaged foods. Fast food and restaurant meals also are very high in sodium. Your doctor will probably limit your sodium to less than 2,000 milligrams (mg) a day. This limit counts all the sodium in prepared and packaged foods and any salt you add to your food. Follow-up care is a key part of your treatment and safety. Be sure to make and go to all appointments, and call your doctor if you are having problems. It's also a good idea to know your test results and keep a list of the medicines you take. How can you care for yourself at home? Read food labels · Read labels on cans and food packages. The labels tell you how much sodium is in each serving. Make sure that you look at the serving size. If you eat more than the serving size, you have eaten more sodium. · Food labels also tell you the Percent Daily Value for sodium. Choose products with low Percent Daily Values for sodium. · Be aware that sodium can come in forms other than salt, including monosodium glutamate (MSG), sodium citrate, and sodium bicarbonate (baking soda). MSG is often added to Asian food. When you eat out, you can sometimes ask for food without MSG or added salt. Buy low-sodium foods · Buy foods that are labeled \"unsalted\" (no salt added), \"sodium-free\" (less than 5 mg of sodium per serving), or \"low-sodium\" (less than 140 mg of sodium per serving). Foods labeled \"reduced-sodium\" and \"light sodium\" may still have too much sodium. Be sure to read the label to see how much sodium you are getting. · Buy fresh vegetables, or frozen vegetables without added sauces. Buy low-sodium versions of canned vegetables, soups, and other canned goods. Prepare low-sodium meals · Cut back on the amount of salt you use in cooking. This will help you adjust to the taste. Do not add salt after cooking. One teaspoon of salt has about 2,300 mg of sodium. · Take the salt shaker off the table. · Flavor your food with garlic, lemon juice, onion, vinegar, herbs, and spices. Do not use soy sauce, lite soy sauce, steak sauce, onion salt, garlic salt, celery salt, mustard, or ketchup on your food. · Use low-sodium salad dressings, sauces, and ketchup. Or make your own salad dressings and sauces without adding salt. · Use less salt (or none) when recipes call for it. You can often use half the salt a recipe calls for without losing flavor. Other foods such as rice, pasta, and grains do not need added salt. · Rinse canned vegetables, and cook them in fresh water. This removes somebut not allof the salt. · Avoid water that is naturally high in sodium or that has been treated with water softeners, which add sodium. Call your local water company to find out the sodium content of your water supply. If you buy bottled water, read the label and choose a sodium-free brand. Avoid high-sodium foods · Avoid eating: 
? Smoked, cured, salted, and canned meat, fish, and poultry. ? Ham, kohler, hot dogs, and luncheon meats. ? Regular, hard, and processed cheese and regular peanut butter. ? Crackers with salted tops, and other salted snack foods such as pretzels, chips, and salted popcorn. ? Frozen prepared meals, unless labeled low-sodium. ? Canned and dried soups, broths, and bouillon, unless labeled sodium-free or low-sodium. ? Canned vegetables, unless labeled sodium-free or low-sodium. ? [place] fries, pizza, tacos, and other fast foods. ? Pickles, olives, ketchup, and other condiments, especially soy sauce, unless labeled sodium-free or low-sodium. Where can you learn more? Go to http://slava-enrique.info/. Enter F690 in the search box to learn more about \"Low Sodium Diet (2,000 Milligram): Care Instructions. \" Current as of: November 7, 2018 Content Version: 12.2 © 1778-1595 [employer]. Care instructions adapted under license by [employer] (which disclaims liability or warranty for this information). If you have questions about a medical condition or this instruction, always ask your healthcare professional. [address] any warranty or liability for your use of this information. Chronic Obstructive Pulmonary Disease (COPD): Care Instructions Your Care Instructions Chronic obstructive pulmonary disease (COPD) is a general term for a group of lung diseases, including emphysema and chronic bronchitis. People with COPD have decreased airflow in and out of the lungs, which makes it hard to breathe. The airways also can get clogged with thick mucus. Cigarette smoking is a major cause of COPD. Although there is no cure for COPD, you can slow its progress. Following your treatment plan and taking care of yourself can help you feel better and live longer. Follow-up care is a key part of your treatment and safety. Be sure to make and go to all appointments, and call your doctor if you are having problems. It's also a good idea to know your test results and keep a list of the medicines you take. How can you care for yourself at home? 
 Staying healthy 
  · Do not smoke. This is the most important step you can take to prevent more damage to your lungs.  If you need help quitting, talk to your doctor about stop-smoking programs and medicines. These can increase your chances of quitting for good.  
  · Avoid colds and flu. Get a pneumococcal vaccine shot. If you have had one before, ask your doctor whether you need a second dose. Get the flu vaccine every fall. If you must be around people with colds or the flu, wash your hands often.  
  · Avoid secondhand smoke, air pollution, and high altitudes. Also avoid cold, dry air and hot, humid air. Stay at home with your windows closed when air pollution is bad.  
 Medicines and oxygen therapy 
  · Take your medicines exactly as prescribed. Call your doctor if you think you are having a problem with your medicine.  
  · You may be taking medicines such as: 
? Bronchodilators. These help open your airways and make breathing easier. Bronchodilators are either short-acting (work for 6 to 9 hours) or long-acting (work for 24 hours). You inhale most bronchodilators, so they start to act quickly. Always carry your quick-relief inhaler with you in case you need it while you are away from home. ? Corticosteroids (prednisone, budesonide). These reduce airway inflammation. They come in pill or inhaled form. You must take these medicines every day for them to work well.  
  · A spacer may help you get more inhaled medicine to your lungs. Ask your doctor or pharmacist if a spacer is right for you. If it is, ask how to use it properly.  
  · Do not take any vitamins, over-the-counter medicine, or herbal products without talking to your doctor first.  
  · If your doctor prescribed antibiotics, take them as directed. Do not stop taking them just because you feel better. You need to take the full course of antibiotics.  
  · Oxygen therapy boosts the amount of oxygen in your blood and helps you breathe easier. Use the flow rate your doctor has recommended, and do not change it without talking to your doctor first.  
Activity   · Get regular exercise. Walking is an easy way to get exercise. Start out slowly, and walk a little more each day.  
  · Pay attention to your breathing. You are exercising too hard if you cannot talk while you are exercising.  
  · Take short rest breaks when doing household chores and other activities.  
  · Learn breathing methodssuch as breathing through pursed lipsto help you become less short of breath.  
  · If your doctor has not set you up with a pulmonary rehabilitation program, talk to him or her about whether rehab is right for you. Rehab includes exercise programs, education about your disease and how to manage it, help with diet and other changes, and emotional support. Diet 
  · Eat regular, healthy meals. Use bronchodilators about 1 hour before you eat to make it easier to eat. Eat several small meals instead of three large ones. Drink beverages at the end of the meal. Avoid foods that are hard to chew.  
  · Eat foods that contain protein so that you do not lose muscle mass.  
  · Talk with your doctor if you gain too much weight or if you lose weight without trying.  
 Mental health 
  · Talk to your family, friends, or a therapist about your feelings. It is normal to feel frightened, angry, hopeless, helpless, and even guilty. Talking openly about bad feelings can help you cope. If these feelings last, talk to your doctor. When should you call for help? Call 911 anytime you think you may need emergency care. For example, call if: 
  · You have severe trouble breathing.  
 Call your doctor now or seek immediate medical care if: 
  · You have new or worse trouble breathing.  
  · You cough up blood.  
  · You have a fever.  
 Watch closely for changes in your health, and be sure to contact your doctor if: 
  · You cough more deeply or more often, especially if you notice more mucus or a change in the color of your mucus.  
  · You have new or worse swelling in your legs or belly.   · You are not getting better as expected. Where can you learn more? Go to http://slava-enrique.info/. Raul Leader in the search box to learn more about \"Chronic Obstructive Pulmonary Disease (COPD): Care Instructions. \" Current as of: June 9, 2019 Content Version: 12.2 © 4258-3616 [employer]. Care instructions adapted under license by [employer] (which disclaims liability or warranty for this information). If you have questions about a medical condition or this instruction, always ask your healthcare professional. [address] any warranty or liability for your use of this information.

## 2019-12-03 NOTE — PROGRESS NOTES
HISTORY OF PRESENT ILLNESS  Alva Rodriges is a 68 y.o. male. HPI; Here for routine follow up. H/o chronic back pain, said lately feels little weakness in legs on and off. Has h/o neuropathy. Probably radiculopathy from lower back. Following pain management. Now working with company and insurance to get TENS unit as it is helping for back. No loss of urine or bowel control. Using support to walk. No fall or change in gait or unsteady gait. Agree to go to spine center as change in leg feelings. Sitting comfortable. No acute distress. Also h/o copd. No cough or wheezing. No chest pain or sob. No increase use of albuterol. Flu shot taking today. Has h/o diabetes. Said fasting sugar is around 130. Had taken oral steroid while had upper respiratory infection which made his blood sugar elevated. Now gradually improving. Said went to endo 3 wks ago and had HBA1C 7.1 as went up. Now increase lantus dose to 60 at bedtime and meal time insulin taking 20 units. Following endo recommendations. Some non compliance with diet and now back to his routine as thanksgiving holidays are over. Denies any headaches or dizziness, no chest pain or sob. No palpitation or diaphoresis. No abdominal pain. No urinary or bowel complains. No nausea or vomiting. No mood changes. No sleep trouble. No unusual fatigue. Has h/o hypothyroidism. Asymptomatic. compliant with taking medication. H/o hypertension. Complaint with medication. Vitals stable. Visit Vitals  /68 (BP 1 Location: Left arm, BP Patient Position: Sitting)   Pulse 98   Temp 98.1 °F (36.7 °C) (Oral)   Resp 16   Ht 5' 11\" (1.803 m)   Wt 278 lb (126.1 kg)   SpO2 98%   BMI 38.77 kg/m²     Review medication list, vitals, problem list,allergies. Review labs.    Lab Results   Component Value Date/Time    WBC 7.9 05/24/2019 08:38 AM    HGB 14.8 05/24/2019 08:38 AM    HCT 42.4 05/24/2019 08:38 AM    PLATELET 768 47/54/6844 08:38 AM    MCV 84.3 05/24/2019 08:38 AM Lab Results   Component Value Date/Time    Sodium 142 05/24/2019 08:38 AM    Potassium 4.5 05/24/2019 08:38 AM    Chloride 110 (H) 05/24/2019 08:38 AM    CO2 25 05/24/2019 08:38 AM    Anion gap 7 05/24/2019 08:38 AM    Glucose 126 (H) 05/24/2019 08:38 AM    BUN 18 05/24/2019 08:38 AM    Creatinine 1.16 05/24/2019 08:38 AM    BUN/Creatinine ratio 16 05/24/2019 08:38 AM    GFR est AA >60 05/24/2019 08:38 AM    GFR est non-AA >60 05/24/2019 08:38 AM    Calcium 8.7 05/24/2019 08:38 AM    Bilirubin, total 0.3 05/24/2019 08:38 AM    AST (SGOT) 15 05/24/2019 08:38 AM    Alk. phosphatase 50 05/24/2019 08:38 AM    Protein, total 6.7 05/24/2019 08:38 AM    Albumin 3.7 05/24/2019 08:38 AM    Globulin 3.0 05/24/2019 08:38 AM    A-G Ratio 1.2 05/24/2019 08:38 AM    ALT (SGPT) 39 05/24/2019 08:38 AM     Lab Results   Component Value Date/Time    Cholesterol, total 187 05/24/2019 08:38 AM    HDL Cholesterol 36 (L) 05/24/2019 08:38 AM    LDL, calculated 91.2 05/24/2019 08:38 AM    VLDL, calculated 59.8 05/24/2019 08:38 AM    Triglyceride 299 (H) 05/24/2019 08:38 AM    CHOL/HDL Ratio 5.2 (H) 05/24/2019 08:38 AM     Lab Results   Component Value Date/Time    TSH 0.21 (L) 05/24/2019 08:38 AM     Lab Results   Component Value Date/Time    Hemoglobin A1c 6.8 (H) 05/24/2019 08:38 AM    Hemoglobin A1c (POC) 6.9 12/16/2015 10:29 AM    Hemoglobin A1c, External 7.2 06/16/2017     Lab Results   Component Value Date/Time    Microalbumin/Creat ratio (mg/g creat)  05/24/2019 08:38 AM     Cannot calculate ratio due to microalbumin result outside reportable range. Microalbumin,urine random <0.50 05/24/2019 08:38 AM     He had done labs today morning and currently pending result for TSH, Lipid panel and hepatic function. Currently complaint with taking medication and instructions. Will obtain endo records for HBA1C result and review their recommendations. Also had an eye exam back in July. Will obtain records.      His hearing aid is broken. Asking for a referral to see ENT. Hard hearing without hearing aid. ROS: see HPI     Physical Exam  Constitutional:       General: He is not in acute distress. Neck:      Comments: No palpable enlarge thyroid gland. Cardiovascular:      Rate and Rhythm: Normal rate and regular rhythm. Heart sounds: Normal heart sounds. Abdominal:      General: Bowel sounds are normal.      Palpations: Abdomen is soft. Tenderness: There is no tenderness. Lymphadenopathy:      Cervical: No cervical adenopathy. Neurological:      Mental Status: He is oriented to person, place, and time. Psychiatric:         Behavior: Behavior normal.         ASSESSMENT and PLAN    ICD-10-CM ICD-9-CM    1. Chronic obstructive pulmonary disease, unspecified COPD type ([facility] 75.): fairly stable. No increase use of albuterol. Will observe. J44.9 496    2. Encounter for immunization Z23 V03.89 INFLUENZA VACCINE INACTIVATED (IIV), SUBUNIT, ADJUVANTED, IM      ADMIN INFLUENZA VIRUS VAC   3. Type 2 diabetes with nephropathy ([facility] 75.): lina morris. See hPI. For now gradually improving fasting blood sugar. Now around 130. Gradually going up on lantus and meal time insulin. Will be following endo recommendations. Diet modification discussed . Limitation in exercise. Also up to date on eye exam. Will obtain records. Obtain endo records for recent labs and HBA1C.  E11.21 250.40 HEMOGLOBIN A1C WITH EAG     730.97 METABOLIC PANEL, BASIC   4. Vitamin B12 deficiency: on supplement. E53.8 266.2    5. Essential hypertension: well controlled. Continue current dose of medication and low salt diet. Exercise as tolerated. I10 401.9    6. Chronic bilateral low back pain with bilateral sciatica: following pain management. Now on and off feels leg weakness. No fall or change in gait. Will send him to spine center. Said TENS unit helps. M54.42 724.2 REFERRAL TO SPINE SURGERY    M54.41 724.3     G89.29 338.29    7. Neuropathy: on cymbalta.  On b 12 supplement. Probably from back pain. G62.9 355.9 REFERRAL TO SPINE SURGERY   8. Hearing aid consultation: sending to ENT to have reconsult as broken hearing aid. Z71.89 V65.49 REFERRAL TO ENT-OTOLARYNGOLOGY   9. Hypothyroidism, unspecified type: today pending lab result. Last labs showed low tsh. Changed dose. Now f/u after result. E03.9 244.9    10. Morbid obesity, unspecified obesity type ([facility].): working on diet modification. Was off for short time due to holiday season. For now will be working on compliance. E66.01 278.01    11. BMI 38.0-38.9,adult Z68.38 V85.38    Pt understood and agree with the plan   Review hM   Follow-up and Dispositions    · Return in about 3 months (around 3/3/2020).

## 2019-12-03 NOTE — PROGRESS NOTES
Chief Complaint   Patient presents with    Diabetes    Hypertension    Pain (Chronic)    Thyroid Problem     1. Have you been to the ER, urgent care clinic since your last visit? Hospitalized since your last visit? No    2. Have you seen or consulted any other health care providers outside of the [address] [place] [address] since your last visit? Include any pap smears or colon screening. No     Patient presents for High Dose flu vaccine. Consent obtained, Tolerated procedure well at right deltoid. Patient remained in office 10 minutes post vaccine.  No side effects noted    Lot# [id number]  Exp: 06/30/20  [employer]  [address]: [id number]

## 2019-12-03 NOTE — PROGRESS NOTES
Also let him know that some improvement in lipid panel. Continue diet modification. Further discussion on follow up visit.

## 2019-12-04 ENCOUNTER — DOCUMENTATION ONLY (OUTPATIENT)
Dept: PAIN MANAGEMENT | Age: 69
End: 2019-12-04

## 2019-12-04 DIAGNOSIS — G96.198: ICD-10-CM

## 2019-12-04 DIAGNOSIS — M54.17 LUMBOSACRAL RADICULOPATHY: ICD-10-CM

## 2019-12-04 DIAGNOSIS — G62.89 OTHER POLYNEUROPATHY: ICD-10-CM

## 2019-12-04 DIAGNOSIS — G89.4 CHRONIC PAIN SYNDROME: ICD-10-CM

## 2019-12-04 DIAGNOSIS — M51.37 DEGENERATION OF LUMBAR OR LUMBOSACRAL INTERVERTEBRAL DISC: Primary | ICD-10-CM

## 2019-12-04 NOTE — TELEPHONE ENCOUNTER
Called [place] at [facility] regarding patient's [employer], but had to leave a message for the patient to leave a message for to call me back. Clinic number provided.

## 2019-12-04 NOTE — TELEPHONE ENCOUNTER
Called patient, Patient identified using two patient identifiers (name and ). I informed the patient that the clinic will be closed as of 2019, and therefore their upcoming appt will have to be cancelled. I also informed the patient that if they need a referral to another pain management facility and/or if they need a refill to let us know. Finally advised patient to f/u with their PCP to discuss pain management options. Patient verbalized understanding and stated they do not need any refills for their medication. (Including opioids, to which the patient stated that they don't want to re-start opioid regimen). Patient continued to state that they would like to continue to use their H-Wave machine, but was told worker's comp would no longer cover it. I told the patient I would call [employer] and see what the situation was. Patient also stated that he would prefer to referred to a pain clinic near his [facility] home, and is willing to wait longer to be seen, if it's closer to him. I told him our office would send a referral to a pain clinic near him, but to also to do his own research to see who would take his insurance. Patient verbalized understanding.

## 2019-12-05 ENCOUNTER — DOCUMENTATION ONLY (OUTPATIENT)
Dept: PAIN MANAGEMENT | Age: 69
End: 2019-12-05

## 2019-12-05 NOTE — PROGRESS NOTES
Order for referral, demographic sheet and last office notes faxed to Dr. Alejandra Snyder per request and confirmation received.

## 2019-12-05 NOTE — PROGRESS NOTES
Contacted patient and verified identity using name and date of birth (2- identifiers)  Spoke with patient and he verbalized understanding of still low TSH and high dose of Synthroid. Decrease dose to 125 mcg and repeat in 2 months. Also some improvement in cholesterol panel. Continue current diet/exercise and modification.

## 2019-12-06 RX ORDER — LOSARTAN POTASSIUM 25 MG/1
25 TABLET ORAL DAILY
Qty: 90 TAB | Refills: 1 | Status: SHIPPED | OUTPATIENT
Start: 2019-12-06 | End: 2020-06-01

## 2019-12-06 RX ORDER — LOSARTAN POTASSIUM 25 MG/1
TABLET ORAL
Qty: 90 TAB | Refills: 0 | OUTPATIENT
Start: 2019-12-06

## 2020-01-07 DIAGNOSIS — J44.9 ASTHMA WITH COPD (CHRONIC OBSTRUCTIVE PULMONARY DISEASE) (HCC): ICD-10-CM

## 2020-01-08 RX ORDER — MONTELUKAST SODIUM 10 MG/1
TABLET ORAL
Qty: 90 TAB | Refills: 0 | Status: SHIPPED | OUTPATIENT
Start: 2020-01-08 | End: 2020-05-07

## 2020-01-14 NOTE — PROGRESS NOTES
Debbie M[facility] [address].  [address], [address]  [place], [address] [place] [address] Street  Phone: [phone]  Fax: [phone]        Marika Ferguson  : 1950  PCP: Paz Tobin MD      NEW PATIENT      ASSESSMENT AND PLAN     Diagnoses and all orders for this visit:    1. Paresthesia and pain of both upper extremities  -     MRI CERV SPINE WO CONT; Future  -     AMB POC XRAY, SPINE, CERVICAL; 2 OR 3    2. Lumbar post-laminectomy syndrome    3. 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 Nonspecific pain in the lumbar region  -     AMB POC XRAY, SPINE, LUMBOSACRAL; 4+    5. Pain in thoracic spine  -     AMB POC XRAY, SPINE; THORACIC, 2 VIEW       1. Advised to stay active as tolerated. 2. Cervical MRI, 6 months upper extremity N/T and weakness  3. Continue Lyrica, Cymbalta, and Topamax  4. For lumbar spine, has stenosis and chronic neuropathic pain. Poss PERNELL. Given information on back care basics    F/U after MRI      [address] is seen today in consultation at the request of Dr. Albin Kline for complaints of bilateral upper and lower extremities tingling and trembling. HISTORY OF PRESENT ILLNESS  Hollis Webster is a 68 y.o. male. Today pt c/o BUE and BLE pain since . Pt has had trauma that caused pain. In  pt sustained a work-related injury where he was lifting 50 pound weights throughout the day, and the following morning he experienced significant back pain. Pt has had four lumbar surgeries. Pt's first surgery was in the early [de-identified]; later he sustained a work-related injury and underwent a lumbar fusion in the . Pt had another fusion later as the first did not take. His final surgery was to have the hardware removed. He notes increased back pain recently which he believes may be due to leaning forward while walking over the last 6 months. He also notes N/T in his BLE. Pt reports N/T in BUE and a loss of  strength recently.  He has had an increase in migraines but Topamax has helped. He also notes worsening dizziness. Pt was seen by pain management and was offered an H-wave but will return the device as it is not covered by insurance. Pt denies difficulty sleeping. Location of pain: Back   Does pain radiate into extremities: BUE and BLE  Does patient have weakness: Intrinsics and hip flexors R   Pt denies saddle paresthesias. Medications pt is on: Topamax 25 mg qD, Lyrica 150 mg TID with good benefit, no sedation. Treatments patient has tried:  Physical therapy:Yes, 2018 with minimal benefit  Doing HEP: Unknown  Non-opioid medications: Yes  Spinal injections: Yes, 2006 by Dr. Zhang Sites in [place], with benefit  Spinal surgery- Yes. 4 lumbar sxs, including 2 fusions. Last L MRI 2018: Moderate to severe stenosis at L1-L2 and a decompression from L3-S1. Last Pelvic MRI 2018: Partial thickness tear R gluteus medius. EMG 9/2018: R ulnar neuropathy, B/L CTS (L>R), diabetic polyneuropathy     reviewed. PMHx of diabetes, GERD, hypothyroid, vertigo, trigger finger B/L, biceps tendon repair on the left. Pain Assessment  1/15/2020   Location of Pain Back;Hand;Foot   Location Modifiers Right;Left   Severity of Pain 7   Quality of Pain Aching   Quality of Pain Comment NUMBNESS TINGLING   Duration of Pain Persistent   Frequency of Pain Constant   Aggravating Factors Standing;Walking;Straightening;Bending   Limiting Behavior Some   Relieving Factors Nothing   Result of Injury Yes   Work-Related Injury Yes         PAST MEDICAL HISTORY   Past Medical History:   Diagnosis Date    Arthritis     Asthma     Cancer ([facility] [address]     BASAL     Cardiac catheterization 2009    [address], [place]:  No significant CAD.  Cardiac echocardiogram 01/20/2014    EF 50-55%. No WMA. Gr 1 DDfx. RVSP 25-30 mmHg. Mild TR.       DM type 2 (diabetes mellitus, type 2) (HCC)     Enlarged prostate     Failed back syndrome     GERD (gastroesophageal reflux disease)     Hearing loss, bilateral     Hearing Aids    Hypertension     Hypothyroidism     Insomnia     Low serum testosterone level     Neuropathy     Osteoarthritis of multiple joints     S/P colonoscopy 8/14/13    mild diverticulosis in sigmoid colon w/o evidence of diverticulitis; f/u 5 years    SOB (shortness of breath)     chronic; 2' \"lung fever\"    Vertigo        Past Surgical History:   Procedure Laterality Date    COLONOSCOPY N/A 4/3/2018    COLONOSCOPY performed by Ronald Hawkins MD at [address]  [facility]    removal of defective hardware    [address]    to remove bone fragments    HX LUMBAR FUSION  1999    fusion from L4-S1 and implantation of hardware    [facility]    fusion on L5 area    HX ORTHOPAEDIC Bilateral 2004    trigger finger syndrome-all 4 fingers    HX ORTHOPAEDIC Left 2004    left arm torn muscle repair and placement of 2 screws    HX OTHER SURGICAL      skin cancer removal       MEDICATIONS      Current Outpatient Medications   Medication Sig Dispense Refill    montelukast (SINGULAIR) 10 mg tablet TAKE 1 TABLET BY MOUTH ONCE DAILY 90 Tab 0    losartan (COZAAR) 25 mg tablet Take 1 Tab by mouth daily. 90 Tab 1    levothyroxine (SYNTHROID) 125 mcg tablet Take 1 Tab by mouth Daily (before breakfast). 30 Tab 1    topiramate (TOPAMAX) 25 mg tablet TAKE 1 TABLET BY MOUTH ONCE DAILY WITH BREAKFAST 90 Tab 1    pregabalin (LYRICA) 150 mg capsule TAKE 1 CAPSULE BY MOUTH THREE TIMES DAILY FOR 30 DAYS. MAXIMUM 3 CAPSULES DAILY. Indications: neuropathy 90 Cap 2    DULoxetine (CYMBALTA) 60 mg capsule Take 1 Cap by mouth two (2) times a day. 60 Cap 3    insulin glargine (BASAGLAR KWIKPEN U-100 INSULIN) 100 unit/mL (3 mL) inpn 52 Units by SubCUTAneous route nightly. 52 units at bedtime (Patient taking differently: 60 Units by SubCUTAneous route nightly.  52 units at bedtime) 5 Pen 3    fluticasone (FLONASE) 50 mcg/actuation nasal spray USE 1 SPRAY(S) IN EACH NOSTRIL ONCE DAILY 1 Bottle 0    tamsulosin (FLOMAX) 0.4 mg capsule TAKE 1 CAPSULE BY MOUTH ONCE DAILY 90 Cap 3    metoprolol tartrate (LOPRESSOR) 25 mg tablet Take 1 Tab by mouth two (2) times a day. 180 Tab 1    insulin glulisine U-100 (APIDRA SOLOSTAR U-100 INSULIN) 100 unit/mL pen by SubCUTAneous route.  atorvastatin (LIPITOR) 20 mg tablet TAKE ONE TABLET BY MOUTH ONCE DAILY (Patient taking differently: 40 mg.) 90 Tab 1    OTHER EB-N3 once daily      FANI PEN NEEDLE 32 gauge x 5/32\" ndle USE AT BEDTIME 100 Pen Needle 6    JANUVIA 100 mg tablet Take 100 mg by mouth daily. Indications: patient stated he was given 50 mg      ACCU-CHEK JAZZY PLUS TEST STRP strip       ACCU-CHEK SOFTCLIX LANCETS misc       albuterol (PROVENTIL VENTOLIN) 2.5 mg /3 mL (0.083 %) nebulizer solution 3 mL by Nebulization route every four (4) hours as needed for Wheezing or Shortness of Breath. 1 Package 5    PEN NEEDLE, DIABETIC (BD ULTRA-FINE FANI PEN NEEDLES) by Does Not Apply route. 4mm x 32G      insulin glulisine (APIDRA SOLOSTAR) 100 unit/mL pen 2 units per carb consumed. Max 30 / day (Patient taking differently: 20 Units by SubCUTAneous route. 2 units per carb consumed. Max 30 / day  Indications: patient states taking 15-20 units three times a day) 5 Pen 3    PEN NEEDLE 31 gauge x 5/16\" ndle USE ONE PEN NEEDLE FOR LANTUS FLEXPEN AND 3 PEN NEEDLES FOR APIDRA WITH EACH MEAL 1 Package 3    esomeprazole (NEXIUM) 40 mg capsule Take 40 mg by mouth two (2) times a day.  betamethasone dipropionate (DIPROLENE) 0.05 % ointment nightly.  budesonide-formoterol (SYMBICORT) 160-4.5 mcg/actuation HFA inhaler Take 2 Puffs by inhalation two (2) times a day. (Patient taking differently: Take 2 Puffs by inhalation two (2) times daily as needed.) 1 Inhaler 5    tiotropium (SPIRIVA) 18 mcg inhalation capsule Take 1 Cap by inhalation daily.  (Patient taking differently: Take  by inhalation as needed.) 30 Cap 5    ketorolac (ACULAR) 0.5 % ophthalmic solution Administer 1 Drop to both eyes two (2) times a day.  naloxone 4 mg/actuation spry 4 mg by Nasal route as needed. 1 Box 1       ALLERGIES    Allergies   Allergen Reactions    Ciprofloxacin Anaphylaxis    Other Plant, Animal, Environmental Other (comments)     Patient cannot have MRI. Patient states that he has metal screws in his left arm.     Lamisil [Terbinafine] Swelling     Tongue swelling    Metformin Other (comments)     Elevated cr 1.4    Morphine Other (comments)     \"loss of blood pressure\"    Other Medication Other (comments)     Doxasylin causes extreme GERD    Pcn [Penicillins] Rash    Pentothal [Thiopental Sodium] Shortness of Breath    Sulfa (Sulfonamide Antibiotics) Rash          SOCIAL HISTORY    Social History     Socioeconomic History    Marital status:      Spouse name: Not on file    Number of children: Not on file    Years of education: Not on file    Highest education level: Not on file   Occupational History    Occupation: Retired-Federal   Social Needs    Financial resource strain: Not on file    Food insecurity:     Worry: Not on file     Inability: Not on file    Transportation needs:     Medical: Not on file     Non-medical: Not on file   Tobacco Use    Smoking status: Former Smoker     Packs/day: 1.00     Years: 31.00     Pack years: 31.00     Types: Pipe, Cigars     Last attempt to quit: 1995     Years since quittin.0    Smokeless tobacco: Never Used   Substance and Sexual Activity    Alcohol use: No    Drug use: No    Sexual activity: Yes     Partners: Female   Lifestyle    Physical activity:     Days per week: Not on file     Minutes per session: Not on file    Stress: Not on file   Relationships    Social connections:     Talks on phone: Not on file     Gets together: Not on file     Attends Taoism service: Not on file     Active member of club or organization: Not on file Attends meetings of clubs or organizations: Not on file     Relationship status: Not on file    Intimate partner violence:     Fear of current or ex partner: Not on file     Emotionally abused: Not on file     Physically abused: Not on file     Forced sexual activity: Not on file   Other Topics Concern    Not on file   Social History Narrative    Not on file     Socioeconomic History    Marital status:      Spouse name: Not on file    Number of children: Not on file    Years of education: Not on file    Highest education level: Not on file   Occupational History    Occupation: Retired-Federal   Social Needs    Financial resource strain: Not on file    Food insecurity:     Worry: Not on file     Inability: Not on file    Transportation needs:     Medical: Not on file     Non-medical: Not on file   Tobacco Use    Smoking status: Former Smoker     Packs/day: 1.00     Years: 31.00     Pack years: 31.00     Types: Pipe, Cigars     Last attempt to quit: 1995     Years since quittin.0    Smokeless tobacco: Never Used   Substance and Sexual Activity    Alcohol use: No    Drug use: No    Sexual activity: Yes     Partners: Female   Lifestyle    Physical activity:     Days per week: Not on file     Minutes per session: Not on file    Stress: Not on file   Relationships    Social connections:     Talks on phone: Not on file     Gets together: Not on file     Attends Episcopal service: Not on file     Active member of club or organization: Not on file     Attends meetings of clubs or organizations: Not on file     Relationship status: Not on file    Intimate partner violence:     Fear of current or ex partner: Not on file     Emotionally abused: Not on file     Physically abused: Not on file     Forced sexual activity: Not on file   Other Topics Concern    Not on file   Social History Narrative    Not on file      Problem Relation Age of Onset    Cancer Mother         Bone and Brain Cancer  Diabetes Mother     Liver Disease Father         Lung Cancer    Kidney Disease Sister     Diabetes Daughter     Colon Cancer Brother          REVIEW OF SYSTEMS  Review of Systems   Constitutional: Negative for chills, fever and weight loss. Respiratory: Negative for shortness of breath. Cardiovascular: Negative for chest pain. Gastrointestinal: Negative for constipation. Negative for fecal incontinence   Genitourinary: Negative for dysuria. Negative for urinary incontinence   Musculoskeletal:        Per HPI   Skin: Negative for rash. Neurological: Positive for dizziness, tingling, focal weakness and headaches. Negative for tremors. Endo/Heme/Allergies: Does not bruise/bleed easily. Psychiatric/Behavioral: The patient does not have insomnia. PHYSICAL EXAMINATION  Visit Vitals  Pulse 89   Temp 98 °F (36.7 °C) (Oral)   Resp 17   Ht 5' 11\" (1.803 m)   Wt 274 lb (124.3 kg)   BMI 38.22 kg/m²          Accompanied by spouse. Constitutional:  Well developed, well nourished, in no acute distress. Psychiatric: Affect and mood are appropriate. Integumentary: No rashes or abrasions noted on exposed areas. Cardiovascular/Peripheral Vascular: No peripheral edema is noted BLE. SPINE/MUSCULOSKELETAL EXAM    Cervical spine:  Neck is midline. Normal muscle tone. No focal atrophy is noted. Negative Spurling's sign. Negative Tinel's sign. Negative Junior's sign. Lumbar spine:  No rash, ecchymosis, or gross obliquity. No fasciculations. No focal atrophy is noted. Tenderness to palpation at the right lower lumbar paraspinals. Tenderness to palpation of at the right upper lateral gluteus. No tenderness to palpation at the sciatic notch. SI joints non-tender. Trochanters non tender.        MOTOR:      Biceps  Triceps Deltoids Wrist Ext Wrist Flex Hand Intrin   Right +4/5 +4/5 +4/5 +4/5 +4/5 4/5   Left +4/5 +4/5 +4/5 +4/5 +4/5 4/5        Hip Flex  Quads Hamstrings Ankle DF EHL Ankle PF   Right 4/5 +4/5 +4/5 +4/5 +4/5 +4/5   Left +4/5 +4/5 +4/5 +4/5 +4/5 +4/5     DTRs are hypoactive biceps, triceps, brachioradialis, patella, and Achilles. Ambulation with single point cane. FWB. RADIOGRAPHS  Thoracic xray films reviewed:  1) Diffuse thoracic spondylosis    Lumbar xray films reviewed:  1) Diffuse degenerative changes  2) No instability    Cervical xray films reviewed:      Written by Mariposa Martell, as dictated by Kevin Rice MD.    I, Dr. Kevin Rice MD, confirm that all documentation is accurate. Mr. Tiera Torres may have a reminder for a \"due or due soon\" health maintenance. I have asked that he contact his primary care provider for follow-up on this health maintenance.

## 2020-01-15 ENCOUNTER — OFFICE VISIT (OUTPATIENT)
Dept: ORTHOPEDIC SURGERY | Age: 70
End: 2020-01-15

## 2020-01-15 VITALS
TEMPERATURE: 98 F | HEART RATE: 89 BPM | BODY MASS INDEX: 38.36 KG/M2 | HEIGHT: 71 IN | WEIGHT: 274 LBS | RESPIRATION RATE: 17 BRPM

## 2020-01-15 DIAGNOSIS — M54.50 NONSPECIFIC PAIN IN THE LUMBAR REGION: ICD-10-CM

## 2020-01-15 DIAGNOSIS — R20.2 PARESTHESIA AND PAIN OF BOTH UPPER EXTREMITIES: Primary | ICD-10-CM

## 2020-01-15 DIAGNOSIS — M96.1 LUMBAR POST-LAMINECTOMY SYNDROME: ICD-10-CM

## 2020-01-15 DIAGNOSIS — M79.601 PARESTHESIA AND PAIN OF BOTH UPPER EXTREMITIES: Primary | ICD-10-CM

## 2020-01-15 DIAGNOSIS — M54.6 PAIN IN THORACIC SPINE: ICD-10-CM

## 2020-01-15 DIAGNOSIS — M79.602 PARESTHESIA AND PAIN OF BOTH UPPER EXTREMITIES: Primary | ICD-10-CM

## 2020-01-15 NOTE — PATIENT INSTRUCTIONS
Learning About How to Have a Healthy Back What causes back pain? Back pain is often caused by overuse, strain, or injury. For example, people often hurt their backs playing sports or working in the yard, being jolted in a car accident, or lifting something too heavy. Aging plays a part too. Your bones and muscles tend to lose strength as you age, which makes injury more likely. The spongy discs between the bones of the spine (vertebrae) may suffer from wear and tear and no longer provide enough cushion between the bones. A disc that bulges or breaks open (herniated disc) can press on nerves, causing back pain. In some people, back pain is the result of arthritis, broken vertebrae caused by bone loss (osteoporosis), illness, or a spine problem. Although most people have back pain at one time or another, there are steps you can take to make it less likely. How can you have a healthy back? Reduce stress on your back through good posture Slumping or slouching alone may not cause low back pain. But after the back has been strained or injured, bad posture can make pain worse. · Sleep in a position that maintains your back's normal curves and on a mattress that feels comfortable. Sleep on your side with a pillow between your knees, or sleep on your back with a pillow under your knees. These positions can reduce strain on your back. · Stand and sit up straight. \"Good posture\" generally means your ears, shoulders, and hips are in a straight line. · If you must stand for a long time, put one foot on a stool, ledge, or box. Switch feet every now and then. · Sit in a chair that is low enough to let you place both feet flat on the floor with both knees nearly level with your hips. If your chair or desk is too high, use a footrest to raise your knees. Place a small pillow, a rolled-up towel, or a lumbar roll in the curve of your back if you need extra support. · Try a kneeling chair, which helps tilt your hips forward. This takes pressure off your lower back. · Try sitting on an exercise ball. It can rock from side to side, which helps keep your back loose. · When driving, keep your knees nearly level with your hips. Sit straight, and drive with both hands on the steering wheel. Your arms should be in a slightly bent position. Reduce stress on your back through careful lifting · Squat down, bending at the hips and knees only. If you need to, put one knee to the floor and extend your other knee in front of you, bent at a right angle (half kneeling). · Press your chest straight forward. This helps keep your upper back straight while keeping a slight arch in your low back. · Hold the load as close to your body as possible, at the level of your belly button (navel). · Use your feet to change direction, taking small steps. · Lead with your hips as you change direction. Keep your shoulders in line with your hips as you move. · Set down your load carefully, squatting with your knees and hips only. Exercise and stretch your back · Do some exercise on most days of the week, if your doctor says it is okay. You can walk, run, swim, or cycle. · Stretch your back muscles. Here are a few exercises to try: 
? Lie on your back, and gently pull one bent knee to your chest. Put that foot back on the floor, and then pull the other knee to your chest. 
? Do pelvic tilts. Lie on your back with your knees bent. Tighten your stomach muscles. Pull your belly button (navel) in and up toward your ribs. You should feel like your back is pressing to the floor and your hips and pelvis are slightly lifting off the floor. Hold for 6 seconds while breathing smoothly. ? Sit with your back flat against a wall. · Keep your core muscles strong. The muscles of your back, belly (abdomen), and buttocks support your spine. ? Pull in your belly and imagine pulling your navel toward your spine. Hold this for 6 seconds, then relax. Remember to keep breathing normally as you tense your muscles. ? Do curl-ups. Always do them with your knees bent. Keep your low back on the floor, and curl your shoulders toward your knees using a smooth, slow motion. Keep your arms folded across your chest. If this bothers your neck, try putting your hands behind your neck (not your head), with your elbows spread apart. ? Lie on your back with your knees bent and your feet flat on the floor. Tighten your belly muscles, and then push with your feet and raise your buttocks up a few inches. Hold this position 6 seconds as you continue to breathe normally, then lower yourself slowly to the floor. Repeat 8 to 12 times. ? If you like group exercise, try Pilates or yoga. These classes have poses that strengthen the core muscles. Lead a healthy lifestyle · Stay at a healthy weight to avoid strain on your back. · Do not smoke. Smoking increases the risk of osteoporosis, which weakens the spine. If you need help quitting, talk to your doctor about stop-smoking programs and medicines. These can increase your chances of quitting for good. Where can you learn more? Go to http://slava-enrique.info/. Enter L315 in the search box to learn more about \"Learning About How to Have a Healthy Back. \" Current as of: June 26, 2019 Content Version: 12.2 © 8411-6044 [employer]. Care instructions adapted under license by [employer] (which disclaims liability or warranty for this information). If you have questions about a medical condition or this instruction, always ask your healthcare professional. [place] [address] any warranty or liability for your use of this information.

## 2020-01-15 NOTE — LETTER
1/17/20 Patient: Franny Ugalde YOB: 1950 Date of Visit: 1/15/2020 Joaquin Mendoza MD 
[address] [facility] [address] [id number] [address] [address] VIA In Basket [address] [address] [address] [address] VIA In Basket Dear MD Reese Wong MD, Thank you for referring Mr. Rian Jeffers to [address] [place] [address] for evaluation. My notes for this consultation are attached. If you have questions, please do not hesitate to call me. I look forward to following your patient along with you. Sincerely, Epifanio Galindo MD

## 2020-02-03 ENCOUNTER — HOSPITAL ENCOUNTER (OUTPATIENT)
Age: 70
Discharge: HOME | End: 2020-02-03
Attending: PHYSICAL MEDICINE & REHABILITATION
Payer: MEDICARE

## 2020-02-03 DIAGNOSIS — M79.601 PARESTHESIA AND PAIN OF BOTH UPPER EXTREMITIES: ICD-10-CM

## 2020-02-03 DIAGNOSIS — M79.602 PARESTHESIA AND PAIN OF BOTH UPPER EXTREMITIES: ICD-10-CM

## 2020-02-03 DIAGNOSIS — R20.2 PARESTHESIA AND PAIN OF BOTH UPPER EXTREMITIES: ICD-10-CM

## 2020-02-03 PROCEDURE — 72141 MRI NECK SPINE W/O DYE: CPT

## 2020-02-20 ENCOUNTER — OFFICE VISIT (OUTPATIENT)
Dept: ORTHOPEDIC SURGERY | Age: 70
End: 2020-02-20

## 2020-02-20 VITALS
HEIGHT: 71 IN | BODY MASS INDEX: 38.22 KG/M2 | RESPIRATION RATE: 24 BRPM | TEMPERATURE: 97.4 F | OXYGEN SATURATION: 97 % | SYSTOLIC BLOOD PRESSURE: 116 MMHG | HEART RATE: 80 BPM | DIASTOLIC BLOOD PRESSURE: 73 MMHG | WEIGHT: 273 LBS

## 2020-02-20 DIAGNOSIS — M47.812 CERVICAL SPONDYLOSIS: ICD-10-CM

## 2020-02-20 DIAGNOSIS — M25.551 RIGHT HIP PAIN: ICD-10-CM

## 2020-02-20 DIAGNOSIS — M96.1 LUMBAR POST-LAMINECTOMY SYNDROME: Primary | ICD-10-CM

## 2020-02-20 NOTE — PATIENT INSTRUCTIONS
Neck: Exercises Introduction Here are some examples of exercises for you to try. The exercises may be suggested for a condition or for rehabilitation. Start each exercise slowly. Ease off the exercises if you start to have pain. You will be told when to start these exercises and which ones will work best for you. How to do the exercises Neck stretch 1. This stretch works best if you keep your shoulder down as you lean away from it. To help you remember to do this, start by relaxing your shoulders and lightly holding on to your thighs or your chair. 2. Tilt your head toward your shoulder and hold for 15 to 30 seconds. Let the weight of your head stretch your muscles. 3. If you would like a little added stretch, use your hand to gently and steadily pull your head toward your shoulder. For example, keeping your right shoulder down, lean your head to the left. 4. Repeat 2 to 4 times toward each shoulder. Diagonal neck stretch 1. Turn your head slightly toward the direction you will be stretching, and tilt your head diagonally toward your chest and hold for 15 to 30 seconds. 2. If you would like a little added stretch, use your hand to gently and steadily pull your head forward on the diagonal. 
3. Repeat 2 to 4 times toward each side. Dorsal glide stretch 1. Sit or stand tall and look straight ahead. 2. Slowly tuck your chin as you glide your head backward over your body 3. Hold for a count of 6, and then relax for up to 10 seconds. 4. Repeat 8 to 12 times. Chest and shoulder stretch 1. Sit or stand tall and glide your head backward as in the dorsal glide stretch. 2. Raise both arms so that your hands are next to your ears. 3. Take a deep breath, and as you breathe out, lower your elbows down and behind your back. You will feel your shoulder blades slide down and together, and at the same time you will feel a stretch across your chest and the front of your shoulders. 4. Hold for about 6 seconds, and then relax for up to 10 seconds. 5. Repeat 8 to 12 times. Strengthening: Hands on head 1. Move your head backward, forward, and side to side against gentle pressure from your hands, holding each position for about 6 seconds. 2. Repeat 8 to 12 times. Follow-up care is a key part of your treatment and safety. Be sure to make and go to all appointments, and call your doctor if you are having problems. It's also a good idea to know your test results and keep a list of the medicines you take. Where can you learn more? Go to http://slava-enrique.info/. Enter P975 in the search box to learn more about \"Neck: Exercises. \" Current as of: June 26, 2019 Content Version: 12.2 © 9107-8129 [employer]. Care instructions adapted under license by [employer] (which disclaims liability or warranty for this information). If you have questions about a medical condition or this instruction, always ask your healthcare professional. [place] [address] any warranty or liability for your use of this information. Learning About Lumbar Epidural Steroid Injections What is a lumbar epidural steroid injection? A doctor may give you a lumbar epidural steroid injection to try to decrease pain, tingling, or numbness in your back, buttock, or leg. These might be the result of a back or disc problem. The injection goes directly into your epidural space. This is the area in your back around your spinal cord. This injection may have both a local anesthetic and a steroid medicine. Or it may only have a steroid. Local anesthetic medicines numb your nerves right away for a short time. Steroids reduce swelling and pain. But they take a few days to start working. Some people get a series of these injections over weeks or months. How is a lumbar epidural done? The doctor may use an imaging test before or during your injection.  This can be an MRI, a CT scan, or an X-ray. These tests can show where your nerve problems are. After finding the right spot, the doctor may inject a numbing medicine into the skin where you will get the steroid injection. Then he or she puts the needle for the steroid into the numbed area. You may feel some pressure. You could feel some stinging or burning during the injection. It takes about 10 to 15 minutes to get this injection. You will probably go home about 20 to 30 minutes after you get it. You will need someone to drive you home. What can you expect after a lumbar epidural? 
If your injection had local anesthetic and a steroid, your legs may feel heavy or numb right after. You will probably be able to walk. But you may need to be extra careful. Take care not to lose your balance and be sure to follow your doctor's instructions. If your injection contained local anesthetic, you may feel better right away. But this pain relief will last only a few hours. Your pain will probably return. This is because the steroids have not started working yet. Before the steroids start to work, your back may be sore for a few days. These injections don't always work. When they do, it takes 1 to 5 days. This pain relief can last for several days to a few months or longer. You may want to do less than normal for a few days. But you may also be able to return to your daily routine. Some people are dizzy or feel sick to their stomach after getting this injection. These symptoms usually do not last very long. If your pain is better, you may be able to keep doing your normal activities or physical therapy. But try not to overdo it, even if your back pain has improved a lot. If your pain is only a little better or if it comes back, your doctor may recommend another injection in a few weeks. If your pain has not changed, talk to your doctor about other treatment choices. Side effects from an epidural steroid injection include headache, fever, or infection. Serious side effects are rare. But they can include stroke, paralysis, or loss of vision. Follow-up care is a key part of your treatment and safety. Be sure to make and go to all appointments, and call your doctor if you are having problems. It's also a good idea to know your test results and keep a list of the medicines you take. Where can you learn more? Go to http://slava-enrique.info/. Enter Adela Smith in the search box to learn more about \"Learning About Lumbar Epidural Steroid Injections. \" Current as of: June 26, 2019 Content Version: 12.2 © 5692-4358 [employer]. Care instructions adapted under license by [employer] (which disclaims liability or warranty for this information). If you have questions about a medical condition or this instruction, always ask your healthcare professional. [place] [address] any warranty or liability for your use of this information. Cervical Spondylosis: Care Instructions Your Care Instructions Cervical spondylosis is a type of arthritis of the neck. It can happen as people get older. It may be caused by bone spurs or other problems. You may have neck pain and stiffness. Sometimes the space around the spinal cord narrows. When this happens, it is called spinal stenosis. Spinal stenosis can cause pain, numbness, or weakness in the arms, legs, feet, and rear end (buttocks). It can also cause loss of bowel and bladder control. You can treat some of your symptoms with over-the-counter pain medicine. But if you have spinal stenosis with severe symptoms, you may need surgery. Follow-up care is a key part of your treatment and safety. Be sure to make and go to all appointments, and call your doctor if you are having problems. It's also a good idea to know your test results and keep a list of the medicines you take. How can you care for yourself at home? · Take anti-inflammatory medicines to reduce neck pain. These include ibuprofen (Advil, Motrin) and naproxen (Aleve). Be safe with medicines. Read and follow all instructions on the label. · Follow your doctor's recommendation about activity. He or she may tell you not to do sports or activities that could injure your neck. When should you call for help? Call 911 anytime you think you may need emergency care. For example, call if: 
  · You are unable to move an arm or a leg at all.  
[facility] your doctor now or seek immediate medical care if: 
  · You have new or worse symptoms in your arms, legs, belly, or buttocks. Symptoms may include: 
? Numbness or tingling. ? Weakness. ? Pain.  
  · You lose bladder or bowel control.  
 Watch closely for changes in your health, and be sure to contact your doctor if: 
  · You do not get better as expected. Where can you learn more? Go to http://slava-enrique.info/. Enter C821 in the search box to learn more about \"Cervical Spondylosis: Care Instructions. \" Current as of: June 26, 2019 Content Version: 12.2 © 5476-2425 [employer].io, [employer]. Care instructions adapted under license by [employer] (which disclaims liability or warranty for this information). If you have questions about a medical condition or this instruction, always ask your healthcare professional. [place] [address] any warranty or liability for your use of this information.

## 2020-02-20 NOTE — PROGRESS NOTES
[facility] [address]  [address], [address]  [facility], [address]  Phone: [phone]  Fax: [phone]        Rubina Ruvalcaba  : 1950  PCP: Beata Falk MD    PROGRESS NOTE      ASSESSMENT AND PLAN    Diagnoses and all orders for this visit:    1. Lumbar post-laminectomy syndrome    2. Cervical spondylosis    3. Right hip pain       1. Advised to continue HEP. 2. No indications for cervical surgery or cervical spinal injections at this time. 3. Rx for HWAVE  4. Discussed life style modification, PT, medication, lumbar spinal injection, and lumbar surgery as treatment options   5. Discussed PERNELL. Pt will think about this. 6. Given information on cervical spondylosis, exercises, PERNELL    F/U Pt may call for PERNELL or have them through PM      [address] Jamie Uribe is a 68 y.o. male. Last visit pt was sent to have a cervical spine MRI. Images reviewed with the pt. Pt states he has the [facility], but is being told insurance won't pay for it. He is unwilling to pay for it, so he asks for a rx or he will return it. Wishes to avoid sx. He states his pain is constant, but his weakness is intermittent. Pt has upcoming appointment with Dr. Jared Almonte at [facility]. (had seen Dr. Jared Almonte at [facility]) He notes they have trouble with WC, so is using medicare for PM. Pt c/o sharp pains in his low back intermittently. C/o feet cramps - asks for advice. Location of pain: Back > neck  Does pain radiate into extremities: BUE and BLE. L hip pain. Feet cramps  Does patient have weakness: Intrinsics and hip flexors R intermittently  Pt denies saddle paresthesias. Medications pt is on: Cymbalta 60mg BID. Topamax 25 mg qD, Lyrica 150 mg TID with good benefit, no sedation. Denies persistent fevers, chills, weight changes, neurogenic bowel or bladder symptoms. Pt denies recent ED visits or hospitalizations.      Treatments patient has tried:  Physical therapy:Yes, 2018 with minimal benefit  Doing HEP: Unknown  Non-opioid medications: Yes  Spinal injections: Yes, 2006 by Dr. Nila Linn in [place], with benefit  Spinal surgery- Yes. 4 lumbar sxs, including 2 fusions. Last L MRI 2018: Moderate to severe stenosis at L1-L2 and a decompression from L3-S1. Last Pelvic MRI 2018: Partial thickness tear R gluteus medius. EMG 9/2018: R ulnar neuropathy, B/L CTS (L>R), diabetic polyneuropathy  Last C MRI 2/2020: mild stenosis multiple levels     reviewed. PMHx of diabetes, GERD, hypothyroid, vertigo, trigger finger B/L, biceps tendon repair on the left. Pt had work-related injury 1998 that caused BUE and BLE pain and tingling. Covered by [employer]. Pt is hard of hearing. Pain Assessment  2/20/2020   Location of Pain Back   Location Modifiers Inferior   Severity of Pain 6   Quality of Pain Aching   Quality of Pain Comment \"stabbing, numbnessb in feet\"   Duration of Pain Persistent   Frequency of Pain Constant   Aggravating Factors -   Limiting Behavior -   Relieving Factors -   Result of Injury No   Work-Related Injury -       MRI Results (most recent):  Results from Hospital Encounter encounter on 02/03/20   MRI CERV SPINE WO CONT    Narrative MRI of cervical spine without contrast    HISTORY: Paresthesias and pain of both upper extremities. COMPARISON: None    TECHNIQUE: T1 weighted, T2 FSE, FSE inversion recovery sagittal images are  supplemented by T2 weighted and GRE/medic axial images. FINDINGS: Rightward cervical curvature which could be positional. Normal  cervical lordosis is maintained. No spondylolisthesis. Normal vertebral body  heights. Small amount of degenerative marrow edema along the C5-6 endplates. Normal cord signal and caliber. No cerebellar tonsillar ectopia. Soft tissues  are unremarkable.     C2-3: Diffusely bulging disc with a focal left ventral epidural T2 hypointense  structure which is suspected to be a focal disc protrusion although not well  seen in the sagittal plane. This could potentially be contacting the ventral  nerve rootlet. No contact of the cord. Overall mild central spinal canal  stenosis. No significant foraminal stenosis. C3-4: Small disc bulge with left greater than right uncovertebral spurring. Facet and ligamentum flavum hypertrophy. Overall mild central spinal canal and  severe left foraminal stenosis. C4-5: No significant degenerative disc disease. Mildly hypertrophic facets and  ligamentum flavum. Borderline central spinal canal narrowing. No significant  foraminal stenosis. C5-6: Disc is narrowed with diffusely bulging disc and osteophyte complex. Facet  and ligamentum flavum hypertrophy. Mild spinal canal and bilateral foraminal  stenoses. C6-7: Small central disc protrusion. Facet and ligamentum flavum hypertrophy. No  spinal canal stenosis. Borderline to mild foraminal narrowing. C7-T1: No significant degenerative disc disease. Mild facet hypertrophy. No  spinal canal or foraminal stenosis. Impression IMPRESSION:    1. Left central disc protrusion at C2-3 may contact the left ventral nerve  rootlet. 2.  Multilevel borderline to mild central spinal canal stenosis. 3.  Severe left foraminal stenosis at C3-4.  4.  Borderline and mild foraminal stenoses at C5-6 and C6-7. PAST MEDICAL HISTORY   Past Medical History:   Diagnosis Date    Arthritis     Asthma     Cancer [facility])     BASAL     Cardiac catheterization 2009    [facility]:  No significant CAD.  Cardiac echocardiogram 01/20/2014    EF 50-55%. No WMA. Gr 1 DDfx. RVSP 25-30 mmHg. Mild TR.       DM type 2 (diabetes mellitus, type 2) (HCC)     Enlarged prostate     Failed back syndrome     GERD (gastroesophageal reflux disease)     Hearing loss, bilateral     Hearing Aids    Hypertension     Hypothyroidism     Insomnia     Low serum testosterone level     Neuropathy     Osteoarthritis of multiple joints     S/P colonoscopy 8/14/13    mild diverticulosis in sigmoid colon w/o evidence of diverticulitis; f/u 5 years    SOB (shortness of breath)     chronic; 2' \"lung fever\"    Vertigo        Past Surgical History:   Procedure Laterality Date    COLONOSCOPY N/A 4/3/2018    COLONOSCOPY performed by Pb Rodrigues MD at [address]  [facility]    removal of defective hardware    [address]    to remove bone fragments    HX LUMBAR FUSION  1999    fusion from L4-S1 and implantation of hardware    HX LUMBAR FUSION  1984    fusion on L5 area    HX ORTHOPAEDIC Bilateral 2004    trigger finger syndrome-all 4 fingers    HX ORTHOPAEDIC Left 2004    left arm torn muscle repair and placement of 2 screws    HX OTHER SURGICAL      skin cancer removal   .      MEDICATIONS      Current Outpatient Medications   Medication Sig Dispense Refill    metoprolol tartrate (LOPRESSOR) 25 mg tablet TAKE 1 TABLET BY MOUTH TWICE DAILY (Patient taking differently: daily. Sonja Po ) 180 Tab 1    levothyroxine (SYNTHROID) 125 mcg tablet TAKE 1 TABLET BY MOUTH ONCE DAILY BEFORE BREAKFAST 90 Tab 1    montelukast (SINGULAIR) 10 mg tablet TAKE 1 TABLET BY MOUTH ONCE DAILY 90 Tab 0    losartan (COZAAR) 25 mg tablet Take 1 Tab by mouth daily. 90 Tab 1    topiramate (TOPAMAX) 25 mg tablet TAKE 1 TABLET BY MOUTH ONCE DAILY WITH BREAKFAST 90 Tab 1    pregabalin (LYRICA) 150 mg capsule TAKE 1 CAPSULE BY MOUTH THREE TIMES DAILY FOR 30 DAYS. MAXIMUM 3 CAPSULES DAILY. Indications: neuropathy (Patient taking differently: Take  by mouth three (3) times daily. TAKE 1 CAPSULE BY MOUTH THREE TIMES DAILY FOR 30 DAYS. MAXIMUM 3 CAPSULES DAILY. Indications: neuropathy) 90 Cap 2    DULoxetine (CYMBALTA) 60 mg capsule Take 1 Cap by mouth two (2) times a day. 60 Cap 3    insulin glargine (BASAGLAR KWIKPEN U-100 INSULIN) 100 unit/mL (3 mL) inpn 52 Units by SubCUTAneous route nightly.  52 units at bedtime (Patient taking differently: 60 Units by SubCUTAneous route nightly. 52 units at bedtime) 5 Pen 3    fluticasone (FLONASE) 50 mcg/actuation nasal spray USE 1 SPRAY(S) IN EACH NOSTRIL ONCE DAILY 1 Bottle 0    tamsulosin (FLOMAX) 0.4 mg capsule TAKE 1 CAPSULE BY MOUTH ONCE DAILY (Patient taking differently: two (2) times a day.) 90 Cap 3    insulin glulisine U-100 (APIDRA SOLOSTAR U-100 INSULIN) 100 unit/mL pen by SubCUTAneous route.  atorvastatin (LIPITOR) 20 mg tablet TAKE ONE TABLET BY MOUTH ONCE DAILY (Patient taking differently: Take 40 mg by mouth daily.) 90 Tab 1    FANI PEN NEEDLE 32 gauge x 5/32\" ndle USE AT BEDTIME 100 Pen Needle 6    JANUVIA 100 mg tablet Take 100 mg by mouth daily. Indications: patient stated he was given 50 mg      ACCU-CHEK JAZZY PLUS TEST STRP strip       ACCU-CHEK SOFTCLIX LANCETS misc       albuterol (PROVENTIL VENTOLIN) 2.5 mg /3 mL (0.083 %) nebulizer solution 3 mL by Nebulization route every four (4) hours as needed for Wheezing or Shortness of Breath. 1 Package 5    PEN NEEDLE, DIABETIC (BD ULTRA-FINE FANI PEN NEEDLES) by Does Not Apply route. 4mm x 32G      insulin glulisine (APIDRA SOLOSTAR) 100 unit/mL pen 2 units per carb consumed. Max 30 / day (Patient taking differently: 20 Units by SubCUTAneous route. 2 units per carb consumed. Max 30 / day  Indications: patient states taking 15-20 units three times a day) 5 Pen 3    PEN NEEDLE 31 gauge x 5/16\" ndle USE ONE PEN NEEDLE FOR LANTUS FLEXPEN AND 3 PEN NEEDLES FOR APIDRA WITH EACH MEAL 1 Package 3    esomeprazole (NEXIUM) 40 mg capsule Take 40 mg by mouth two (2) times a day.  betamethasone dipropionate (DIPROLENE) 0.05 % ointment nightly.  budesonide-formoterol (SYMBICORT) 160-4.5 mcg/actuation HFA inhaler Take 2 Puffs by inhalation two (2) times a day. (Patient taking differently: Take 2 Puffs by inhalation two (2) times daily as needed.) 1 Inhaler 5    tiotropium (SPIRIVA) 18 mcg inhalation capsule Take 1 Cap by inhalation daily.  (Patient taking differently: Take  by inhalation as needed.) 30 Cap 5    ketorolac (ACULAR) 0.5 % ophthalmic solution Administer 1 Drop to both eyes two (2) times a day.  OTHER EB-N3 once daily      naloxone 4 mg/actuation spry 4 mg by Nasal route as needed. 1 Box 1        ALLERGIES    Allergies   Allergen Reactions    Ciprofloxacin Anaphylaxis    Other Plant, Animal, Environmental Other (comments)     Patient cannot have MRI. Patient states that he has metal screws in his left arm.     Lamisil [Terbinafine] Swelling     Tongue swelling    Metformin Other (comments)     Elevated cr 1.4    Morphine Other (comments)     \"loss of blood pressure\"    Other Medication Other (comments)     Doxasylin causes extreme GERD    Pcn [Penicillins] Rash    Pentothal [Thiopental Sodium] Shortness of Breath    Sulfa (Sulfonamide Antibiotics) Rash          SOCIAL HISTORY    Social History     Socioeconomic History    Marital status:      Spouse name: Not on file    Number of children: Not on file    Years of education: Not on file    Highest education level: Not on file   Occupational History    Occupation: Retired-Federal   Social Needs    Financial resource strain: Not on file    Food insecurity:     Worry: Not on file     Inability: Not on file    Transportation needs:     Medical: Not on file     Non-medical: Not on file   Tobacco Use    Smoking status: Former Smoker     Packs/day: 1.00     Years: 31.00     Pack years: 31.00     Types: Pipe, Cigars     Last attempt to quit: 1995     Years since quittin.1    Smokeless tobacco: Never Used   Substance and Sexual Activity    Alcohol use: No    Drug use: No    Sexual activity: Yes     Partners: Female   Lifestyle    Physical activity:     Days per week: Not on file     Minutes per session: Not on file    Stress: Not on file   Relationships    Social connections:     Talks on phone: Not on file     Gets together: Not on file     Attends Confucianism service: Not on file     Active member of club or organization: Not on file     Attends meetings of clubs or organizations: Not on file     Relationship status: Not on file    Intimate partner violence:     Fear of current or ex partner: Not on file     Emotionally abused: Not on file     Physically abused: Not on file     Forced sexual activity: Not on file   Other Topics Concern    Not on file   Social History Narrative    Not on file       FAMILY HISTORY    Family History   Problem Relation Age of Onset    Cancer Mother         Bone and Brain Cancer    Diabetes Mother     Liver Disease Father         Lung Cancer    Kidney Disease Sister     Diabetes Daughter     Colon Cancer Brother        REVIEW OF SYSTEMS  Review of Systems   Constitutional: Negative for chills, fever and weight loss. Respiratory: Negative for shortness of breath. Cardiovascular: Negative for chest pain. Gastrointestinal: Negative for constipation. Negative for fecal incontinence   Genitourinary: Negative for dysuria. Negative for urinary incontinence   Musculoskeletal: Positive for joint pain and myalgias. Per HPI   Skin: Negative for rash. Neurological: Positive for sensory change. Negative for dizziness, tingling, tremors, focal weakness and headaches. Endo/Heme/Allergies: Does not bruise/bleed easily. Psychiatric/Behavioral: The patient does not have insomnia. PHYSICAL EXAMINATION  Visit Vitals  /73 (BP 1 Location: Right arm, BP Patient Position: Sitting)   Pulse 80   Temp 97.4 °F (36.3 °C) (Oral)   Resp 24   Ht 5' 11\" (1.803 m)   Wt 273 lb (123.8 kg)   SpO2 97%   BMI 38.08 kg/m²         Accompanied by spouse. Constitutional:  Well developed, well nourished, in no acute distress. Psychiatric: Affect and mood are appropriate. Integumentary: No rashes or abrasions noted on exposed areas. Cardiovascular/Peripheral Vascular: No peripheral edema is noted BLE.       SPINE/MUSCULOSKELETAL EXAM    Cervical spine:  Neck is midline. Normal muscle tone. No focal atrophy is noted. Tenderness to palpation C7. Negative Spurling's sign. Negative Tinel's sign. Negative Junior's sign. Lumbar spine:  No rash, ecchymosis, or gross obliquity. No fasciculations. No focal atrophy is noted. Tenderness to palpation TL junction, L5-S1. No tenderness to palpation at the sciatic notch. SI joints non-tender. Trochanters non tender. MOTOR:       Hip Flex  Quads Hamstrings Ankle DF EHL Ankle PF   Right +4/5 +4/5 +4/5 +4/5 +4/5 +4/5   Left +4/5 +4/5 +4/5 +4/5 +4/5 +4/5       Straight Leg raise negative. Ambulation with single point cane. FWB. Written by Rony Izaguirre, as dictated by Dominik Mendoza MD.    I, Dr. Dominik Mendoza MD, confirm that all documentation is accurate. Mr. Laurence Larios may have a reminder for a \"due or due soon\" health maintenance. I have asked that he contact his primary care provider for follow-up on this health maintenance.

## 2020-02-21 ENCOUNTER — HOSPITAL ENCOUNTER (OUTPATIENT)
Dept: LAB | Age: 70
Discharge: HOME | End: 2020-02-21
Payer: MEDICARE

## 2020-02-21 DIAGNOSIS — E11.21 TYPE 2 DIABETES WITH NEPHROPATHY (HCC): ICD-10-CM

## 2020-02-21 LAB
ANION GAP SERPL CALC-SCNC: 6 MMOL/L (ref 3–18)
BUN SERPL-MCNC: 16 MG/DL (ref 7–18)
BUN/CREAT SERPL: 15 (ref 12–20)
CALCIUM SERPL-MCNC: 8.7 MG/DL (ref 8.5–10.1)
CHLORIDE SERPL-SCNC: 108 MMOL/L (ref 100–111)
CO2 SERPL-SCNC: 27 MMOL/L (ref 21–32)
CREAT SERPL-MCNC: 1.1 MG/DL (ref 0.6–1.3)
EST. AVERAGE GLUCOSE BLD GHB EST-MCNC: 148 MG/DL
GLUCOSE SERPL-MCNC: 121 MG/DL (ref 74–99)
HBA1C MFR BLD: 6.8 % (ref 4.2–5.6)
POTASSIUM SERPL-SCNC: 4.6 MMOL/L (ref 3.5–5.5)
SODIUM SERPL-SCNC: 141 MMOL/L (ref 136–145)

## 2020-02-21 PROCEDURE — 80048 BASIC METABOLIC PNL TOTAL CA: CPT

## 2020-02-21 PROCEDURE — 36415 COLL VENOUS BLD VENIPUNCTURE: CPT

## 2020-02-21 PROCEDURE — 83036 HEMOGLOBIN GLYCOSYLATED A1C: CPT

## 2020-02-25 ENCOUNTER — DOCUMENTATION ONLY (OUTPATIENT)
Dept: ORTHOPEDIC SURGERY | Age: 70
End: 2020-02-25

## 2020-02-25 NOTE — PROGRESS NOTES
I contacted the rep for [employer], [facility], about what is needed for the pt and his continued use of the H-wave device. She communicated through a protected e-mail that they have already received a new signed continuation order for the pt's H-wave and will not need anything further at this time. They will send a fax to the office when another one needs to be updated.

## 2020-03-17 ENCOUNTER — TELEPHONE (OUTPATIENT)
Dept: FAMILY MEDICINE CLINIC | Age: 70
End: 2020-03-17

## 2020-03-17 NOTE — TELEPHONE ENCOUNTER
Pt r/s appt for 3mo out and would like a gary about his results and have them mailed to him. Please advise.

## 2020-03-17 NOTE — PROGRESS NOTES
Contacted patient and verified identity using name and date of birth (2- identifiers)  Spoke with patient and he verbalized understanding of stable DM test at 6. 8.

## 2020-04-01 ENCOUNTER — TELEPHONE (OUTPATIENT)
Dept: ORTHOPEDIC SURGERY | Age: 70
End: 2020-04-01

## 2020-04-01 NOTE — TELEPHONE ENCOUNTER
We do not give him medications. He gets the lyrica and cymbalta from Joseph Mccallum NP according to the . He will need to continue to get them from her.

## 2020-04-01 NOTE — TELEPHONE ENCOUNTER
Patient is requesting Duloxetine 60 mg bid and Generic Lyrica 150 mg tid for 90 day supply. Patient cannot attend pain management until late May or June. He is requesting a refill. Please advise. Patient [phone] Curt Knox

## 2020-04-01 NOTE — TELEPHONE ENCOUNTER
Spoke with patient, informed of NP [place] message below. Patient stated understanding, he stated he thought NP Alexia Meredith was from the PM office, and apologized. No further actions needed at this time.

## 2020-04-07 ENCOUNTER — TELEPHONE (OUTPATIENT)
Dept: FAMILY MEDICINE CLINIC | Age: 70
End: 2020-04-07

## 2020-04-07 NOTE — TELEPHONE ENCOUNTER
Contacted patient and verified identity using name and date of birth (2- identifiers)  Spoke with patient and he is scheduled for [place] Virtual visit for Fri 4/10/20 @11:15am. Patient was given instructions on how to log on to [employer] and access visit. He will call if any difficulties.

## 2020-04-07 NOTE — TELEPHONE ENCOUNTER
Called patient and he is coming up pain management appointment with the Dr. Eula Giang office in first week of May. For now advised the patient that Lyrica is a controlled substance and it will be hard to feel up on the phone. He is going to contact his new pain management office and see that his new patient visit can be done virtually and if it would be difficult to arrange then I will try to do the virtual video visit and try to give refill till he sees the new pain management. Also ongoing current COVID 19 pandemic situation his appointment might be delayed but he will get back to us once a he hears somethingfrom Dr. Bin Miller office. He ran out of Lyrica has since to 3 weeks at this time. He is having hard time in sleeping and ambulating. For now if he requires a virtual visit please set up on a Friday.

## 2020-04-07 NOTE — TELEPHONE ENCOUNTER
Contacted patient and verified identity using name and date of birth (2- identifiers)  Spoke with patient and he called but they were at lunch. He stated he would try to get a hold of them to try to obtain an earlier appointment and will let our office know the outcome.

## 2020-04-10 ENCOUNTER — VIRTUAL VISIT (OUTPATIENT)
Dept: FAMILY MEDICINE CLINIC | Age: 70
End: 2020-04-10

## 2020-04-10 DIAGNOSIS — M54.42 CHRONIC BILATERAL LOW BACK PAIN WITH BILATERAL SCIATICA: Primary | ICD-10-CM

## 2020-04-10 DIAGNOSIS — Z79.899 ENCOUNTER FOR LONG-TERM (CURRENT) USE OF HIGH-RISK MEDICATION: ICD-10-CM

## 2020-04-10 DIAGNOSIS — G89.29 CHRONIC BILATERAL LOW BACK PAIN WITH BILATERAL SCIATICA: Primary | ICD-10-CM

## 2020-04-10 DIAGNOSIS — M51.36 DDD (DEGENERATIVE DISC DISEASE), LUMBAR: ICD-10-CM

## 2020-04-10 DIAGNOSIS — M54.17 LUMBOSACRAL RADICULOPATHY: ICD-10-CM

## 2020-04-10 DIAGNOSIS — M54.41 CHRONIC BILATERAL LOW BACK PAIN WITH BILATERAL SCIATICA: Primary | ICD-10-CM

## 2020-04-10 RX ORDER — DULOXETIN HYDROCHLORIDE 60 MG/1
60 CAPSULE, DELAYED RELEASE ORAL 2 TIMES DAILY
Qty: 60 CAP | Refills: 0 | Status: SHIPPED | OUTPATIENT
Start: 2020-04-10 | End: 2022-09-14 | Stop reason: SDUPTHER

## 2020-04-10 RX ORDER — PREGABALIN 150 MG/1
150 CAPSULE ORAL 3 TIMES DAILY
Qty: 90 CAP | Refills: 0 | Status: SHIPPED | OUTPATIENT
Start: 2020-04-10

## 2020-04-10 NOTE — PROGRESS NOTES
Consent: Cassandria Romberg, who was seen by synchronous (real-time) audio-video technology, and/or his healthcare decision maker, is aware that this patient-initiated, Telehealth encounter on 4/10/2020 is a billable service, with coverage as determined by his insurance carrier. He is aware that he may receive a bill and has provided verbal consent to proceed: Yes. Assessment & Plan:   Diagnoses and all orders for this visit:    1. Chronic bilateral low back pain with bilateral sciatica: Chronic lower back pain with radiculopathy pain and neuropathy in lower extremity. Was seen the pain management at the [facility] which got closed and now pending appointment with Dr. Giuseppe Yanes office. Due to ongoing pandemic situation and patient being new patient at the pain management it would be odd for them to continue the Lyrica at this time. Checked the . No red flag. Patient been out of the medicine since last 2 weeks. We will give him a month supply at this time and he has an appointment with the pain management on May 1. We will see how it goes depend on this current pandemic situation but meantime as patient pain gotten worse, ambulation is getting difficult. Going through ADL is getting difficult we will refill the medication. Patient understood and agree with above plan. -     pregabalin (Lyrica) 150 mg capsule; Take 1 Cap by mouth three (3) times daily. Max Daily Amount: 450 mg. TAKE 1 CAPSULE BY MOUTH THREE TIMES DAILY FOR 30 DAYS. MAXIMUM 3 CAPSULES DAILY. Indications: neuropathy  -     DULoxetine (CYMBALTA) 60 mg capsule; Take 1 Cap by mouth two (2) times a day. 2. DDD (degenerative disc disease), lumbar    3. Lumbosacral radiculopathy  -     pregabalin (Lyrica) 150 mg capsule; Take 1 Cap by mouth three (3) times daily. Max Daily Amount: 450 mg. TAKE 1 CAPSULE BY MOUTH THREE TIMES DAILY FOR 30 DAYS. MAXIMUM 3 CAPSULES DAILY. Indications: neuropathy  -     DULoxetine (CYMBALTA) 60 mg capsule;  Take 1 Cap by mouth two (2) times a day. 4. Encounter for long-term (current) use of high-risk medication  -     DULoxetine (CYMBALTA) 60 mg capsule; Take 1 Cap by mouth two (2) times a day. Patient understood and agreed with above plan. Patient was instructed to take the medicine only as prescribed. Checked the . No red flag sign. Follow-up and Dispositions    · Return for as scheduled . 712  Subjective:   Braden Harris is a 68 y.o. male who was seen for Pain (Chronic)  Done visit with Yakov Dawn. Patient had a consent. Chronic lower back pain with lower extremity radiculopathy. Also peripheral neuropathy in the both lower extremities. Patient was with both [facility] but office got closed so he was referred to a new pain management office. Due to ongoing pandemic situation he was not able to get a refill with the new provider as he has been not seen to their office. Patient been out of his Lyrica since last 2 weeks. Mentioned that his chronic pain is now moderate in intensity. Interfering with his  ambulation. Getting difficulty with his ADL like coming in and out of shower, dressing up. Prolonged standing and routine walking to complete his task at home has been getting difficult. Denies any loss of urine or bowel control. Denies any fall. He does use cane in the house for ambulation. He is taking Cymbalta with compliance which is helping for the pain. But asking for Lyrica refill. Checked the  and no red flag sign. Discussed the controlled substance requirement  during virtual visit. He understands that and advised patient to take the medicine only as prescribed. He has an appointment with the pain management on May 1. During virtual visit he was sitting on a couch and did not appear in any acute distress. Denies any cold or cough, fever, nausea or vomiting, abdominal pain, no unusual extremity weakness.     Prior to Admission medications Medication Sig Start Date End Date Taking? Authorizing Provider   pregabalin (Lyrica) 150 mg capsule Take 1 Cap by mouth three (3) times daily. Max Daily Amount: 450 mg. TAKE 1 CAPSULE BY MOUTH THREE TIMES DAILY FOR 30 DAYS. MAXIMUM 3 CAPSULES DAILY. Indications: neuropathy 4/10/20  Yes Shwetha Garcia MD   DULoxetine (CYMBALTA) 60 mg capsule Take 1 Cap by mouth two (2) times a day. 4/10/20  Yes Shwetha Garcia MD   tamsulosin (FLOMAX) 0.4 mg capsule TAKE 2 CAPSULES BY MOUTH DAILY 4/3/20  Yes Carlos Guerrero MD   metoprolol tartrate (LOPRESSOR) 25 mg tablet TAKE 1 TABLET BY MOUTH TWICE DAILY  Patient taking differently: daily. .  2/11/20  Yes Shwetha Garcia MD   levothyroxine (SYNTHROID) 125 mcg tablet TAKE 1 TABLET BY MOUTH ONCE DAILY BEFORE BREAKFAST 2/11/20  Yes Shwetha Garcia MD   montelukast (SINGULAIR) 10 mg tablet TAKE 1 TABLET BY MOUTH ONCE DAILY 1/8/20  Yes Shwetha Garcia MD   losartan (COZAAR) 25 mg tablet Take 1 Tab by mouth daily. 12/6/19  Yes Shwetha Garcia MD   topiramate (TOPAMAX) 25 mg tablet TAKE 1 TABLET BY MOUTH ONCE DAILY WITH BREAKFAST 11/25/19  Yes Shwetha Garcia MD   insulin glargine (BASAGLAR KWIKPEN U-100 INSULIN) 100 unit/mL (3 mL) inpn 52 Units by SubCUTAneous route nightly. 52 units at bedtime  Patient taking differently: 60 Units by SubCUTAneous route nightly. 52 units at bedtime 8/1/19  Yes Shwetha Garcia MD   fluticasone [facility]) 50 mcg/actuation nasal spray USE 1 SPRAY(S) IN EACH NOSTRIL ONCE DAILY 1/11/19  Yes Shwetha Garcia MD   insulin glulisine U-100 (APIDRA SOLOSTAR U-100 INSULIN) 100 unit/mL pen by SubCUTAneous route. Yes Provider, Historical   atorvastatin (LIPITOR) 20 mg tablet TAKE ONE TABLET BY MOUTH ONCE DAILY  Patient taking differently: Take 40 mg by mouth daily.  6/15/18  Yes Shwetha Garcia MD   OTHER EB-N3 once daily   Yes Provider, Historical   FANI PEN NEEDLE 32 gauge x 5/32\" ndle USE AT BEDTIME 2/9/18  Yes Shwetha Garcia MD   [facility] 100 mg tablet Take 100 mg by mouth daily. Indications: patient stated he was given 50 mg 7/25/17  Yes Provider, Historical   naloxone 4 mg/actuation spry 4 mg by Nasal route as needed. 4/27/17  Yes Bradford Mckeon MD   ACCU-CHEARTIE JAZZY PLUS TEST STRP strip  3/13/17  Yes Provider, Historical   ACCU-CHEK SOFTCLIX LANCETS misc  3/13/17  Yes Provider, Historical   albuterol (PROVENTIL VENTOLIN) 2.5 mg /3 mL (0.083 %) nebulizer solution 3 mL by Nebulization route every four (4) hours as needed for Wheezing or Shortness of Breath. 4/24/17  Yes Huy Farooq MD   PEN NEEDLE, DIABETIC (BD ULTRA-FINE FANI PEN NEEDLES) by Does Not Apply route. 4mm x 32G   Yes Provider, Historical   insulin glulisine (APIDRA SOLOSTAR) 100 unit/mL pen 2 units per carb consumed. Max 30 / day  Patient taking differently: 20 Units by SubCUTAneous route. 2 units per carb consumed. Max 30 / day  Indications: patient states taking 15-20 units three times a day 4/24/17  Yes Huy Farooq MD   PEN NEEDLE 31 gauge x 5/16\" ndle USE ONE PEN NEEDLE FOR LANTUS FLEXPEN AND 3 PEN NEEDLES FOR APIDRA WITH EACH MEAL 1/27/17  Yes Huy Farooq MD   esomeprazole (NEXIUM) 40 mg capsule Take 40 mg by mouth two (2) times a day. 11/14/16  Yes Provider, Historical   betamethasone dipropionate (DIPROLENE) 0.05 % ointment nightly. 1/5/17  Yes Provider, Historical   budesonide-formoterol (SYMBICORT) 160-4.5 mcg/actuation HFA inhaler Take 2 Puffs by inhalation two (2) times a day. Patient taking differently: Take 2 Puffs by inhalation two (2) times daily as needed. 2/5/14  Yes Alex Eckert MD   [facility]) 18 mcg inhalation capsule Take 1 Cap by inhalation daily. Patient taking differently: Take  by inhalation as needed. 2/5/14  Yes Alex Eckert MD   ketorolac (ACULAR) 0.5 % ophthalmic solution Administer 1 Drop to both eyes two (2) times a day.    Yes Provider, Historical   pregabalin (LYRICA) 150 mg capsule TAKE 1 CAPSULE BY MOUTH THREE TIMES DAILY FOR 30 DAYS. MAXIMUM 3 CAPSULES DAILY. Indications: neuropathy  Patient taking differently: Take  by mouth three (3) times daily. TAKE 1 CAPSULE BY MOUTH THREE TIMES DAILY FOR 30 DAYS. MAXIMUM 3 CAPSULES DAILY. Indications: neuropathy 10/23/19 4/10/20  Darrick Linn NP   DULoxetine (CYMBALTA) 60 mg capsule Take 1 Cap by mouth two (2) times a day. 10/23/19 4/10/20  Darrick Linn NP     Allergies   Allergen Reactions    Ciprofloxacin Anaphylaxis    Other Plant, Animal, Environmental Other (comments)     Patient cannot have MRI. Patient states that he has metal screws in his left arm.  Lamisil [Terbinafine] Swelling     Tongue swelling    Metformin Other (comments)     Elevated cr 1.4    Morphine Other (comments)     \"loss of blood pressure\"    Other Medication Other (comments)     Doxasylin causes extreme GERD    Pcn [Penicillins] Rash    Pentothal [Thiopental Sodium] Shortness of Breath    Sulfa (Sulfonamide Antibiotics) Rash       ROS: See HPI      Objective: There were no vitals taken for this visit. General: alert, cooperative, no distress   Mental  status: normal mood, behavior, speech, dress, motor activity, and thought processes, able to follow commands   HENT: NCAT   Neck: no visualized mass   Resp: no respiratory distress   Neuro: no gross deficits   Skin: no discoloration or lesions of concern on visible areas   Psychiatric: normal affect, consistent with stated mood, no evidence of hallucinations     Additional exam findings: We discussed the expected course, resolution and complications of the diagnosis(es) in detail. Medication risks, benefits, costs, interactions, and alternatives were discussed as indicated. I advised him to contact the office if his condition worsens, changes or fails to improve as anticipated. He expressed understanding with the diagnosis(es) and plan.        Abby Dick is a 68 y.o. male being evaluated by a video visit encounter for concerns as above. A caregiver was present when appropriate. Due to this being a TeleHealth encounter (During QHZ-05 public health emergency), evaluation of the following organ systems was limited: Vitals/Constitutional/EENT/Resp/CV/GI//MS/Neuro/Skin/Heme-Lymph-Imm. Pursuant to the emergency declaration under the [facility] waiver authority and the [employer] Act, this Virtual  Visit was conducted, with patient's (and/or legal guardian's) consent, to reduce the patient's risk of exposure to COVID-19 and provide necessary medical care. Services were provided through a video synchronous discussion virtually to substitute for in-person clinic visit. Patient and provider were located at their individual homes.         Madan Brown MD

## 2020-05-07 DIAGNOSIS — J44.9 ASTHMA WITH COPD (CHRONIC OBSTRUCTIVE PULMONARY DISEASE) (HCC): ICD-10-CM

## 2020-05-07 DIAGNOSIS — Z86.69 H/O MIGRAINE: ICD-10-CM

## 2020-05-07 RX ORDER — TOPIRAMATE 25 MG/1
TABLET ORAL
Qty: 90 TAB | Refills: 0 | Status: SHIPPED | OUTPATIENT
Start: 2020-05-07 | End: 2020-08-12

## 2020-05-07 RX ORDER — MONTELUKAST SODIUM 10 MG/1
TABLET ORAL
Qty: 90 TAB | Refills: 0 | Status: SHIPPED | OUTPATIENT
Start: 2020-05-07 | End: 2020-08-12

## 2020-06-01 RX ORDER — LOSARTAN POTASSIUM 25 MG/1
TABLET ORAL
Qty: 90 TAB | Refills: 1 | Status: SHIPPED | OUTPATIENT
Start: 2020-06-01 | End: 2020-10-30

## 2020-06-24 ENCOUNTER — E-VISIT (OUTPATIENT)
Dept: FAMILY MEDICINE CLINIC | Age: 70
End: 2020-06-24

## 2020-06-24 ENCOUNTER — VIRTUAL VISIT (OUTPATIENT)
Dept: FAMILY MEDICINE CLINIC | Age: 70
End: 2020-06-24

## 2020-06-24 DIAGNOSIS — E53.8 VITAMIN B12 DEFICIENCY: ICD-10-CM

## 2020-06-24 DIAGNOSIS — E03.9 HYPOTHYROIDISM, UNSPECIFIED TYPE: ICD-10-CM

## 2020-06-24 DIAGNOSIS — Z86.69 H/O MIGRAINE: ICD-10-CM

## 2020-06-24 DIAGNOSIS — I10 ESSENTIAL HYPERTENSION: ICD-10-CM

## 2020-06-24 DIAGNOSIS — M54.42 CHRONIC BILATERAL LOW BACK PAIN WITH BILATERAL SCIATICA: ICD-10-CM

## 2020-06-24 DIAGNOSIS — E11.21 TYPE 2 DIABETES WITH NEPHROPATHY (HCC): ICD-10-CM

## 2020-06-24 DIAGNOSIS — J44.9 ASTHMA WITH COPD (CHRONIC OBSTRUCTIVE PULMONARY DISEASE) (HCC): Primary | ICD-10-CM

## 2020-06-24 DIAGNOSIS — G89.29 CHRONIC BILATERAL LOW BACK PAIN WITH BILATERAL SCIATICA: ICD-10-CM

## 2020-06-24 DIAGNOSIS — M54.41 CHRONIC BILATERAL LOW BACK PAIN WITH BILATERAL SCIATICA: ICD-10-CM

## 2020-06-24 DIAGNOSIS — G62.9 NEUROPATHY: ICD-10-CM

## 2020-06-24 NOTE — PROGRESS NOTES
Jack Wilkerson is a 68 y.o. male who was seen by synchronous (real-time) audio-video technology on 6/24/2020. Consent: Jack Wilkerson, who was seen by synchronous (real-time) audio-video technology, and/or his healthcare decision maker, is aware that this patient-initiated, Telehealth encounter on 6/24/2020 is a billable service, with coverage as determined by his insurance carrier. He is aware that he may receive a bill and has provided verbal consent to proceed: Yes. Assessment & Plan:   Diagnoses and all orders for this visit:    Asthma with COPD (chronic obstructive pulmonary disease) ([facility]): fairly stable at this time. Not using symbicort or Spiriva daily. Using them as needed. Not seen pulmonary more than a year ago. Advised to make a follow up appt. H/O migraine: stable. Will observe     Chronic bilateral low back pain with bilateral sciatica: following specialist. On symptomatic treatment. Will follow their recommendations. Type 2 diabetes with nephropathy ([facility]): following Dr. Riya Hull. Foot exam and labs done at [facility]. Will obtain records. Essential hypertension: asymptomatic. Limitation in virtual visit to check the blood pressure. Will continue current plan    Vitamin B12 deficiency: On supplement. Will observe    Neuropathy: On symptomatic treatment. Lyrica helps. Will observe and follow specialist recommendations    Hypothyroidism, unspecified type: On Synthroid. Will continue current plan. He is following Dr. Riya Hull. Will obtain the records and labs. Patient understood and agree with above plan. He had an eye exam done within a year with Dr. Michael Naik  at [facility]. Will obtain the records. He has follow-up appointment in July as well. 712  Subjective:   Jack Wilkerson is a 68 y.o. male who was seen for Follow-up  Done visit with [facility]. Due to technical difficulties visit was converted on the phone later on on the visit.   More than 50% visit done by doxy. Multiple medical problems. Chronic back pain. Neuropathy. Radiculopathy. Currently fairly stable. Said he had fall couple of times due to  is clumsiness while walking. Using support all the time. We will be following pain management recommendations. History of asthma/COPD. He is been taking Symbicort and Spiriva only as needed. No cough, chest congestion, wheezing, fever. He has not seen pulmonary since more than a year. Asking for a new place referral.  We will do a referral during the visit. Sitting comfortable and did not appear in any acute distress during the virtual visit. History of diabetes. No hypo-or hyperglycemic symptoms. Checking blood sugar at home and it is between . He has been following Dr. Mary Fox. Said the full blood work and a foot exam done recently at their office in May. Will obtain the records. History of hypertension. Limitation in virtual visit to check the vitals. He was asymptomatic. Will continue current plan. He is compliant with medication. No side effects. Hyperlipidemia. He is on medication. Taking it with compliance. No side effects. History of migraine headaches. It has been fairly stable. We will review the labs once we received the records from Endo. History of hypothyroidism. Taking medication with compliance. Asymptomatic at this time. Denies any headache, dizziness, no chest pain or trouble breathing, no arm or leg weakness. No nausea or vomiting, no weight or appetite changes, no mood changes . No urine or bowel complains, no palpitation, no diaphoresis. No abdominal pain. No cold or cough. No leg swelling. No fever. No sleep trouble. Prior to Admission medications    Medication Sig Start Date End Date Taking?  Authorizing Provider   losartan (COZAAR) 25 mg tablet TAKE 1 TABLET DAILY 6/1/20  Yes Jayashree Ramos MD   montelukast (SINGULAIR) 10 mg tablet Take 1 tablet by mouth once daily 5/7/20  Yes Candice Gonzales Mónica Silverio MD   topiramate (TOPAMAX) 25 mg tablet Take 1 tablet by mouth once daily with breakfast 5/7/20  Yes Sherry Mckeon MD   pregabalin (Lyrica) 150 mg capsule Take 1 Cap by mouth three (3) times daily. Max Daily Amount: 450 mg. TAKE 1 CAPSULE BY MOUTH THREE TIMES DAILY FOR 30 DAYS. MAXIMUM 3 CAPSULES DAILY. Indications: neuropathy 4/10/20  Yes Sherry Mckeon MD   DULoxetine (CYMBALTA) 60 mg capsule Take 1 Cap by mouth two (2) times a day. 4/10/20  Yes Sherry Mckeon MD   tamsulosin (FLOMAX) 0.4 mg capsule TAKE 2 CAPSULES BY MOUTH DAILY 4/3/20  Yes Carlos Guerrero MD   metoprolol tartrate (LOPRESSOR) 25 mg tablet TAKE 1 TABLET BY MOUTH TWICE DAILY  Patient taking differently: daily. .  2/11/20  Yes Sherry Mckeon MD   levothyroxine (SYNTHROID) 125 mcg tablet TAKE 1 TABLET BY MOUTH ONCE DAILY BEFORE BREAKFAST 2/11/20  Yes Sherry Mckeon MD   insulin glargine (BASAGLAR KWIKPEN U-100 INSULIN) 100 unit/mL (3 mL) inpn 52 Units by SubCUTAneous route nightly. 52 units at bedtime  Patient taking differently: 60 Units by SubCUTAneous route nightly. 52 units at bedtime 8/1/19  Yes Sherry Mckeon MD   fluticasone [facility]) 50 mcg/actuation nasal spray USE 1 SPRAY(S) IN EACH NOSTRIL ONCE DAILY 1/11/19  Yes Sherry Mckeon MD   atorvastatin (LIPITOR) 20 mg tablet TAKE ONE TABLET BY MOUTH ONCE DAILY  Patient taking differently: Take 40 mg by mouth daily. 6/15/18  Yes Sherry Mckeon MD   OTHER EB-N3 once daily   Yes Provider, Historical   FANI PEN NEEDLE 32 gauge x 5/32\" ndle USE AT BEDTIME 2/9/18  Yes Sherry Mckeon MD   JANUVIA 100 mg tablet Take 100 mg by mouth daily.  Indications: patient stated he was given 50 mg 7/25/17  Yes Provider, Historical   ACCU-CHEK JAZZY PLUS TEST STRP strip  3/13/17  Yes Provider, Historical   ACCU-CHEK SOFTCLIX LANCETS [facility]  3/13/17  Yes Provider, Historical   albuterol (PROVENTIL VENTOLIN) 2.5 mg /3 mL (0.083 %) nebulizer solution 3 mL by Nebulization route every four (4) hours as needed for Wheezing or Shortness of Breath. 4/24/17  Yes Jayashree Ramos MD   PEN NEEDLE, DIABETIC (BD ULTRA-FINE FANI PEN NEEDLES) by Does Not Apply route. 4mm x 32G   Yes Provider, Historical   insulin glulisine (APIDRA SOLOSTAR) 100 unit/mL pen 2 units per carb consumed. Max 30 / day  Patient taking differently: 20 Units by SubCUTAneous route. 2 units per carb consumed. Max 30 / day  Indications: patient states taking 15-20 units three times a day 4/24/17  Yes Jayashree Ramos MD   PEN NEEDLE 31 gauge x 5/16\" ndle USE ONE PEN NEEDLE FOR LANTUS FLEXPEN AND 3 PEN NEEDLES FOR APIDRA WITH EACH MEAL 1/27/17  Yes Jayashree Ramos MD   esomeprazole (NEXIUM) 40 mg capsule Take 40 mg by mouth two (2) times a day. 11/14/16  Yes Provider, Historical   tiotropium (SPIRIVA) 18 mcg inhalation capsule Take 1 Cap by inhalation daily. Patient taking differently: Take  by inhalation as needed. 2/5/14  Yes Samira Hunter MD   ketorolac (ACULAR) 0.5 % ophthalmic solution Administer 1 Drop to both eyes two (2) times a day. Yes Provider, Historical   insulin glulisine U-100 (APIDRA SOLOSTAR U-100 INSULIN) 100 unit/mL pen by SubCUTAneous route. 6/24/20  Provider, Historical   naloxone 4 mg/actuation spry 4 mg by Nasal route as needed. 4/27/17   Vishal Marie MD   betamethasone dipropionate (DIPROLENE) 0.05 % ointment nightly. 1/5/17   Provider, Historical   budesonide-formoterol (SYMBICORT) 160-4.5 mcg/actuation HFA inhaler Take 2 Puffs by inhalation two (2) times a day. Patient taking differently: Take 2 Puffs by inhalation two (2) times daily as needed. 2/5/14   Samira Hunter MD     Allergies   Allergen Reactions    Ciprofloxacin Anaphylaxis    Other Plant, Animal, Environmental Other (comments)     Patient cannot have MRI. Patient states that he has metal screws in his left arm.     Lamisil [Terbinafine] Swelling     Tongue swelling    Metformin Other (comments)     Elevated cr 1.4  Morphine Other (comments)     \"loss of blood pressure\"    Other Medication Other (comments)     Doxasylin causes extreme GERD    Pcn [Penicillins] Rash    Pentothal [Thiopental Sodium] Shortness of Breath    Sulfa (Sulfonamide Antibiotics) Rash       Patient Active Problem List    Diagnosis Date Noted    Type 2 diabetes with nephropathy ([facility] 06/05/2018    Type 2 diabetes mellitus with diabetic neuropathy ([facility] 06/05/2018    Severe obesity (BMI 35.0-39.9) 06/05/2018    Type 2 diabetes mellitus with complication, with long-term current use of insulin [facility] 11/27/2017    Chronic low back pain 03/02/2017    Advance directive in chart/ no change per patient.   04/20/2016    H/O colonoscopy/ due in 2018 04/20/2016    Chronic chest pain 04/11/2016    Vitamin B12 deficiency 07/01/2014    Essential hypertension     Asthma with COPD (chronic obstructive pulmonary disease) ([facility] 12/31/2013    Peripheral neuropathy 12/18/2013    Encounter for long-term (current) use of other medications 10/23/2013    Chronic pain syndrome 10/23/2013    DJD (degenerative joint disease), lumbar 10/23/2013    H/O lumbosacral spine surgery 10/23/2013    DDD (degenerative disc disease), lumbar 10/23/2013    Chronic bilateral low back pain with bilateral sciatica 10/18/2013    COPD (chronic obstructive pulmonary disease) ([facility] 10/18/2013    Migraine headache 10/18/2013    GERD (gastroesophageal reflux disease) 10/18/2013    Hyperlipidemia 10/18/2013    Hypothyroidism 10/18/2013    BPH (benign prostatic hyperplasia) 10/18/2013    Hypotestosteronism 10/18/2013    Vertigo 10/18/2013     Current Outpatient Medications   Medication Sig Dispense Refill    losartan (COZAAR) 25 mg tablet TAKE 1 TABLET DAILY 90 Tab 1    montelukast (SINGULAIR) 10 mg tablet Take 1 tablet by mouth once daily 90 Tab 0    topiramate (TOPAMAX) 25 mg tablet Take 1 tablet by mouth once daily with breakfast 90 Tab 0    pregabalin (Lyrica) 150 mg capsule Take 1 Cap by mouth three (3) times daily. Max Daily Amount: 450 mg. TAKE 1 CAPSULE BY MOUTH THREE TIMES DAILY FOR 30 DAYS. MAXIMUM 3 CAPSULES DAILY. Indications: neuropathy 90 Cap 0    DULoxetine (CYMBALTA) 60 mg capsule Take 1 Cap by mouth two (2) times a day. 60 Cap 0    tamsulosin (FLOMAX) 0.4 mg capsule TAKE 2 CAPSULES BY MOUTH DAILY 180 Cap 3    metoprolol tartrate (LOPRESSOR) 25 mg tablet TAKE 1 TABLET BY MOUTH TWICE DAILY (Patient taking differently: daily. Michelle Hansen ) 180 Tab 1    levothyroxine (SYNTHROID) 125 mcg tablet TAKE 1 TABLET BY MOUTH ONCE DAILY BEFORE BREAKFAST 90 Tab 1    insulin glargine (BASAGLAR KWIKPEN U-100 INSULIN) 100 unit/mL (3 mL) inpn 52 Units by SubCUTAneous route nightly. 52 units at bedtime (Patient taking differently: 60 Units by SubCUTAneous route nightly. 52 units at bedtime) 5 Pen 3    fluticasone (FLONASE) 50 mcg/actuation nasal spray USE 1 SPRAY(S) IN EACH NOSTRIL ONCE DAILY 1 Bottle 0    atorvastatin (LIPITOR) 20 mg tablet TAKE ONE TABLET BY MOUTH ONCE DAILY (Patient taking differently: Take 40 mg by mouth daily.) 90 Tab 1    OTHER EB-N3 once daily      FANI PEN NEEDLE 32 gauge x 5/32\" ndle USE AT BEDTIME 100 Pen Needle 6    JANUVIA 100 mg tablet Take 100 mg by mouth daily. Indications: patient stated he was given 50 mg      ACCU-CHEK JAZZY PLUS TEST STRP strip       ACCU-CHEK SOFTCLIX LANCETS misc       albuterol (PROVENTIL VENTOLIN) 2.5 mg /3 mL (0.083 %) nebulizer solution 3 mL by Nebulization route every four (4) hours as needed for Wheezing or Shortness of Breath. 1 Package 5    PEN NEEDLE, DIABETIC (BD ULTRA-FINE FANI PEN NEEDLES) by Does Not Apply route. 4mm x 32G      insulin glulisine (APIDRA SOLOSTAR) 100 unit/mL pen 2 units per carb consumed. Max 30 / day (Patient taking differently: 20 Units by SubCUTAneous route. 2 units per carb consumed.   Max 30 / day  Indications: patient states taking 15-20 units three times a day) 5 Pen 3    PEN NEEDLE 31 gauge x 5/16\" ndle USE ONE PEN NEEDLE FOR LANTUS FLEXPEN AND 3 PEN NEEDLES FOR APIDRA WITH EACH MEAL 1 Package 3    esomeprazole (NEXIUM) 40 mg capsule Take 40 mg by mouth two (2) times a day.  tiotropium (SPIRIVA) 18 mcg inhalation capsule Take 1 Cap by inhalation daily. (Patient taking differently: Take  by inhalation as needed.) 30 Cap 5    ketorolac (ACULAR) 0.5 % ophthalmic solution Administer 1 Drop to both eyes two (2) times a day.  naloxone 4 mg/actuation spry 4 mg by Nasal route as needed. 1 Box 1    betamethasone dipropionate (DIPROLENE) 0.05 % ointment nightly.  budesonide-formoterol (SYMBICORT) 160-4.5 mcg/actuation HFA inhaler Take 2 Puffs by inhalation two (2) times a day. (Patient taking differently: Take 2 Puffs by inhalation two (2) times daily as needed.) 1 Inhaler 5     Allergies   Allergen Reactions    Ciprofloxacin Anaphylaxis    Other Plant, Animal, Environmental Other (comments)     Patient cannot have MRI. Patient states that he has metal screws in his left arm.  Lamisil [Terbinafine] Swelling     Tongue swelling    Metformin Other (comments)     Elevated cr 1.4    Morphine Other (comments)     \"loss of blood pressure\"    Other Medication Other (comments)     Doxasylin causes extreme GERD    Pcn [Penicillins] Rash    Pentothal [Thiopental Sodium] Shortness of Breath    Sulfa (Sulfonamide Antibiotics) Rash     Past Medical History:   Diagnosis Date    Arthritis     Asthma     Cancer ([facility]     BASAL     Cardiac catheterization 2009    [facility]:  No significant CAD.  Cardiac echocardiogram 01/20/2014    EF 50-55%. No WMA. Gr 1 DDfx. RVSP 25-30 mmHg. Mild TR.       DM type 2 (diabetes mellitus, type 2) ([facility])     Enlarged prostate     Failed back syndrome     GERD (gastroesophageal reflux disease)     Hearing loss, bilateral     Hearing Aids    Hypertension     Hypothyroidism     Insomnia     Low serum testosterone level     Neuropathy     Osteoarthritis of multiple joints     S/P colonoscopy 13    mild diverticulosis in sigmoid colon w/o evidence of diverticulitis; f/u 5 years    SOB (shortness of breath)     chronic; 2' \"lung fever\"    Vertigo      Social History     Tobacco Use    Smoking status: Former Smoker     Packs/day: 1.00     Years: 31.00     Pack years: 31.00     Types: Pipe, Cigars     Last attempt to quit: 1995     Years since quittin.4    Smokeless tobacco: Never Used   Substance Use Topics    Alcohol use: No       ROS: See HPI      Objective: There were no vitals taken for this visit. General: alert, cooperative, no distress   Mental  status: normal mood, behavior, speech, dress, motor activity, and thought processes, able to follow commands   HENT: NCAT   Neck: no visualized mass   Resp: no respiratory distress   Neuro: no gross deficits   Skin: no discoloration or lesions of concern on visible areas   Psychiatric: normal affect, consistent with stated mood, no evidence of hallucinations     Additional exam findings: We discussed the expected course, resolution and complications of the diagnosis(es) in detail. Medication risks, benefits, costs, interactions, and alternatives were discussed as indicated. I advised him to contact the office if his condition worsens, changes or fails to improve as anticipated. He expressed understanding with the diagnosis(es) and plan. Maira Ren is a 68 y.o. male who was evaluated by a video visit encounter for concerns as above. Patient identification was verified prior to start of the visit. A caregiver was present when appropriate. Due to this being a TeleHealth encounter (During [place] [address] public health emergency), evaluation of the following organ systems was limited: Vitals/Constitutional/EENT/Resp/CV/GI//MS/Neuro/Skin/Heme-Lymph-Imm.   Pursuant to the emergency declaration under the [facility] waiver authority and the [employer] Act, this Virtual  Visit was conducted, with patient's (and/or legal guardian's) consent, to reduce the patient's risk of exposure to COVID-19 and provide necessary medical care. Services were provided through a video synchronous discussion virtually to substitute for in-person clinic visit. Patient and provider were located at their individual homes.       Magnolia Rousseau MD

## 2020-06-25 ENCOUNTER — TELEPHONE (OUTPATIENT)
Dept: FAMILY MEDICINE CLINIC | Age: 70
End: 2020-06-25

## 2020-06-25 NOTE — TELEPHONE ENCOUNTER
[facility] medical records department at [facility] requesting patient office notes, labs, and foot exam.    Copy of eye exam requested from [facility].

## 2020-06-25 NOTE — TELEPHONE ENCOUNTER
----- Message from Vanessa Michel MD sent at 6/24/2020 11:14 AM EDT -----  Need records from Dr. Lenard Patino. Labs and foot exam from his office.    Eye exam done with Dr. Alvin Bey [facility]

## 2020-06-29 ENCOUNTER — OFFICE VISIT (OUTPATIENT)
Dept: CARDIOLOGY CLINIC | Age: 70
End: 2020-06-29

## 2020-06-29 VITALS
OXYGEN SATURATION: 97 % | SYSTOLIC BLOOD PRESSURE: 130 MMHG | HEART RATE: 92 BPM | DIASTOLIC BLOOD PRESSURE: 72 MMHG | BODY MASS INDEX: 37.66 KG/M2 | WEIGHT: 270 LBS

## 2020-06-29 DIAGNOSIS — E78.5 HYPERLIPIDEMIA, UNSPECIFIED HYPERLIPIDEMIA TYPE: ICD-10-CM

## 2020-06-29 DIAGNOSIS — I10 ESSENTIAL HYPERTENSION: ICD-10-CM

## 2020-06-29 DIAGNOSIS — E11.8 TYPE 2 DIABETES MELLITUS WITH COMPLICATION, WITH LONG-TERM CURRENT USE OF INSULIN (HCC): ICD-10-CM

## 2020-06-29 DIAGNOSIS — Z79.4 TYPE 2 DIABETES MELLITUS WITH COMPLICATION, WITH LONG-TERM CURRENT USE OF INSULIN (HCC): ICD-10-CM

## 2020-06-29 DIAGNOSIS — R00.2 PALPITATIONS: Primary | ICD-10-CM

## 2020-06-29 NOTE — PROGRESS NOTES
Chitra Thompson presents today for   Chief Complaint   Patient presents with    Hypertension     1 YEAR F/U    Palpitations     racing palpitations lasts 3-4 mins 3-4 episodes in the past 3-4 months     Shortness of Breath     with palpaitations       Chitra Thompson preferred language for health care discussion is english/other. Is someone accompanying this pt? no    Is the patient using any DME equipment during [address]? yes    Depression Screening:  3 most recent PHQ Screens 2/20/2020   PHQ Not Done -   Little interest or pleasure in doing things Not at all   Feeling down, depressed, irritable, or hopeless Not at all   Total Score PHQ 2 0       Learning Assessment:  Learning Assessment 5/22/2018   PRIMARY LEARNER Patient   HIGHEST LEVEL OF EDUCATION - PRIMARY LEARNER  GRADUATED HIGH SCHOOL OR GED   BARRIERS PRIMARY LEARNER NONE     -   [place] [address] LEVEL OF EDUCATION -   [address] -    NEED -   LEARNER PREFERENCE PRIMARY DEMONSTRATION     -     -   LEARNER [place]etta [address] -   ANSWERED BY patient   RELATIONSHIP SELF   ASSESSMENT COMMENT -       Abuse Screening:  Abuse Screening Questionnaire 3/4/2019   Do you ever feel afraid of your partner? N   Are you in a relationship with someone who physically or mentally threatens you? N   Is it safe for you to go home? Y       Fall Risk  Fall Risk Assessment, last 12 mths 2/20/2020   Able to walk? Yes   Fall in past 12 months? Yes   Fall with injury? No   Number of falls in past 12 months 4   Fall Risk Score 4       Pt currently taking Anticoagulant therapy? no    Coordination of Care:  1. Have you been to the ER, urgent care clinic since your last visit? Hospitalized since your last visit? no    2.  Have you seen or consulted any other health care providers outside of the [address] [place] [address] [place] since your last visit? Include any pap smears or colon screening.  no

## 2020-06-30 NOTE — PROGRESS NOTES
HISTORY OF PRESENT ILLNESS  Jaycee Bamberger is a 68 y.o. male. Follow-up   Associated symptoms include shortness of breath. Pertinent negatives include no chest pain, no abdominal pain and no headaches. Hypertension   Associated symptoms include shortness of breath. Pertinent negatives include no chest pain, no abdominal pain and no headaches. Shortness of Breath   Pertinent negatives include no fever, no headaches, no cough, no wheezing, no PND, no orthopnea, no chest pain, no vomiting, no abdominal pain, no rash, no leg swelling and no claudication. Palpitations    Associated symptoms include shortness of breath. Pertinent negatives include no fever, no chest pain, no claudication, no orthopnea, no PND, no abdominal pain, no nausea, no vomiting, no headaches, no dizziness and no cough. His past medical history is significant for hypertension. Patient presents for a follow-up office visit. He has a past medical history significant for hypertension, dyslipidemia,  diabetes mellitus, and COPD. He was initially referred here for evaluation of dyspnea on exertion and tachycardia with activity. The patient does not have a previous cardiac history. He does have a history of atypical chest pain, and reports a significant cardiac workup while living in [address] [place] [address] in 2009 which included a cardiac catheterization showing no significant coronary artery disease. The patient underwent an echocardiogram in January 2014, showing an LVEF of 52-15%, grade 1 diastolic dysfunction, in no significant valvular heart disease. Normal PA pressures. The patient was last seen in the office 12 months ago. Since last visit, he has noted some increase in his heart palpitations especially over the past few months.   He describes about 3-4 episodes each month lasting for several minutes in duration which he describes as a racing sensation in the left side of his chest.  He does have some associated dizziness but no diaphoresis or syncope. He also complains of chronic exertional dyspnea, which is not changed in severity over the past year. Past Medical History:   Diagnosis Date    Arthritis     Asthma     Cancer ([address]     BASAL     Cardiac catheterization 2009    [facility]:  No significant CAD.  Cardiac echocardiogram 01/20/2014    EF 50-55%. No WMA. Gr 1 DDfx. RVSP 25-30 mmHg. Mild TR.  DM type 2 (diabetes mellitus, type 2) (HCC)     Enlarged prostate     Failed back syndrome     GERD (gastroesophageal reflux disease)     Hearing loss, bilateral     Hearing Aids    Hypertension     Hypothyroidism     Insomnia     Low serum testosterone level     Neuropathy     Osteoarthritis of multiple joints     S/P colonoscopy 8/14/13    mild diverticulosis in sigmoid colon w/o evidence of diverticulitis; f/u 5 years    SOB (shortness of breath)     chronic; 2' \"lung fever\"    Vertigo       Current Outpatient Medications   Medication Sig Dispense Refill    losartan (COZAAR) 25 mg tablet TAKE 1 TABLET DAILY 90 Tab 1    montelukast (SINGULAIR) 10 mg tablet Take 1 tablet by mouth once daily 90 Tab 0    topiramate (TOPAMAX) 25 mg tablet Take 1 tablet by mouth once daily with breakfast 90 Tab 0    pregabalin (Lyrica) 150 mg capsule Take 1 Cap by mouth three (3) times daily. Max Daily Amount: 450 mg. TAKE 1 CAPSULE BY MOUTH THREE TIMES DAILY FOR 30 DAYS. MAXIMUM 3 CAPSULES DAILY. Indications: neuropathy 90 Cap 0    DULoxetine (CYMBALTA) 60 mg capsule Take 1 Cap by mouth two (2) times a day. 60 Cap 0    tamsulosin (FLOMAX) 0.4 mg capsule TAKE 2 CAPSULES BY MOUTH DAILY 180 Cap 3    metoprolol tartrate (LOPRESSOR) 25 mg tablet TAKE 1 TABLET BY MOUTH TWICE DAILY (Patient taking differently: daily. Lennox Dai  ) 180 Tab 1    levothyroxine (SYNTHROID) 125 mcg tablet TAKE 1 TABLET BY MOUTH ONCE DAILY BEFORE BREAKFAST 90 Tab 1    insulin glargine (BASAGLAR KWIKPEN U-100 INSULIN) 100 unit/mL (3 mL) inpn 52 Units by SubCUTAneous route nightly. 52 units at bedtime (Patient taking differently: 60 Units by SubCUTAneous route nightly. 52 units at bedtime) 5 Pen 3    fluticasone (FLONASE) 50 mcg/actuation nasal spray USE 1 SPRAY(S) IN EACH NOSTRIL ONCE DAILY 1 Bottle 0    atorvastatin (LIPITOR) 20 mg tablet TAKE ONE TABLET BY MOUTH ONCE DAILY (Patient taking differently: Take 40 mg by mouth daily.) 90 Tab 1    OTHER EB-N3 once daily      FANI PEN NEEDLE 32 gauge x 5/32\" ndle USE AT BEDTIME 100 Pen Needle 6    JANUVIA 100 mg tablet Take 100 mg by mouth daily. Indications: patient stated he was given 50 mg      naloxone 4 mg/actuation spry 4 mg by Nasal route as needed. 1 Box 1    ACCU-CHEK JAZZY PLUS TEST STRP strip       ACCU-CHEK SOFTCLIX LANCETS misc       albuterol (PROVENTIL VENTOLIN) 2.5 mg /3 mL (0.083 %) nebulizer solution 3 mL by Nebulization route every four (4) hours as needed for Wheezing or Shortness of Breath. 1 Package 5    PEN NEEDLE, DIABETIC (BD ULTRA-FINE FANI PEN NEEDLES) by Does Not Apply route. 4mm x 32G      insulin glulisine (APIDRA SOLOSTAR) 100 unit/mL pen 2 units per carb consumed. Max 30 / day (Patient taking differently: 20 Units by SubCUTAneous route. 2 units per carb consumed. Max 30 / day  Indications: patient states taking 15-20 units three times a day) 5 Pen 3    PEN NEEDLE 31 gauge x 5/16\" ndle USE ONE PEN NEEDLE FOR LANTUS FLEXPEN AND 3 PEN NEEDLES FOR APIDRA WITH EACH MEAL 1 Package 3    esomeprazole (NEXIUM) 40 mg capsule Take 40 mg by mouth two (2) times a day.  betamethasone dipropionate (DIPROLENE) 0.05 % ointment nightly.  budesonide-formoterol (SYMBICORT) 160-4.5 mcg/actuation HFA inhaler Take 2 Puffs by inhalation two (2) times a day. (Patient taking differently: Take 2 Puffs by inhalation two (2) times daily as needed.) 1 Inhaler 5    tiotropium (SPIRIVA) 18 mcg inhalation capsule Take 1 Cap by inhalation daily.  (Patient taking differently: Take by inhalation as needed.) 30 Cap 5    ketorolac (ACULAR) 0.5 % ophthalmic solution Administer 1 Drop to both eyes two (2) times a day. Allergies   Allergen Reactions    Ciprofloxacin Anaphylaxis    Other Plant, Animal, Environmental Other (comments)     Patient cannot have MRI. Patient states that he has metal screws in his left arm.  Lamisil [Terbinafine] Swelling     Tongue swelling    Metformin Other (comments)     Elevated cr 1.4    Morphine Other (comments)     \"loss of blood pressure\"    Other Medication Other (comments)     Doxasylin causes extreme GERD    Pcn [Penicillins] Rash    Pentothal [Thiopental Sodium] Shortness of Breath    Sulfa (Sulfonamide Antibiotics) Rash      Social History     Tobacco Use    Smoking status: Former Smoker     Packs/day: 1.00     Years: 31.00     Pack years: 31.00     Types: Pipe, Cigars     Last attempt to quit: 1995     Years since quittin.5    Smokeless tobacco: Never Used   Substance Use Topics    Alcohol use: No    Drug use: No            Review of Systems   Constitutional: Negative for chills, fever and weight loss. HENT: Negative for nosebleeds. Eyes: Negative for blurred vision and double vision. Respiratory: Positive for shortness of breath. Negative for cough and wheezing. Cardiovascular: Positive for palpitations. Negative for chest pain, orthopnea, claudication, leg swelling and PND. Gastrointestinal: Negative for abdominal pain, heartburn, nausea and vomiting. Genitourinary: Negative for dysuria and hematuria. Musculoskeletal: Negative for falls and myalgias. Skin: Negative for rash. Neurological: Negative for dizziness, focal weakness and headaches. Endo/Heme/Allergies: Does not bruise/bleed easily. Psychiatric/Behavioral: Negative for substance abuse.      Visit Vitals  /72   Pulse 92   Ht (P) 5' 11\" (1.803 m)   Wt 122.5 kg (270 lb)   SpO2 97%   BMI (P) 37.66 kg/m²      Physical Exam   Constitutional: He is oriented to person, place, and time. He appears well-developed and well-nourished. HENT:   Head: Normocephalic and atraumatic. Eyes: Conjunctivae are normal.   Neck: Neck supple. No JVD present. Carotid bruit is not present. Cardiovascular: Normal rate, regular rhythm, S1 normal, S2 normal and normal pulses. Exam reveals distant heart sounds. Exam reveals no gallop and no S3. No murmur heard. Pulmonary/Chest: Breath sounds normal. He has no wheezes. He has no rales. Abdominal: Soft. Bowel sounds are normal. There is no abdominal tenderness. Musculoskeletal:         General: No edema. Neurological: He is alert and oriented to person, place, and time. Skin: Skin is warm and dry. EKG: Sinus rhythm with marked sinus arrhythmia versus PACs, borderline low voltage,  No ST or T wave abnormalities concerning for ischemia. Compared to the previous EKG, no significant interval change. ASSESSMENT and PLAN    Heart palpitations. Patient symptoms are little more severe than they were last year, so I have recommended a 30-day event monitor for further evaluation. Essential hypertension. Patient's blood pressure remains well controlled on his current medical regimen. I will continue all his medications for this. Mixed dyslipidemia. This is being managed with atorvastatin by his PCP. Diabetes mellitus. According to the patient this is now better controlled. This is followed by endocrinology. Chronic dyspnea on exertion. This is felt to be related to chronic pulmonary disease  and deconditioning. No further cardiac workup needed. As long as his Holter monitor is unremarkable, the patient can follow up annually, sooner if needed.

## 2020-08-19 DIAGNOSIS — R00.2 PALPITATIONS: ICD-10-CM

## 2020-08-25 RX ORDER — OXYCODONE HYDROCHLORIDE 5 MG/1
TABLET ORAL
Status: ON HOLD | COMMUNITY
Start: 2020-05-18 | End: 2020-09-16 | Stop reason: SDUPTHER

## 2020-08-25 RX ORDER — AZITHROMYCIN 250 MG/1
TABLET, FILM COATED ORAL
COMMUNITY
End: 2020-09-01 | Stop reason: ALTCHOICE

## 2020-08-25 RX ORDER — OXYCODONE AND ACETAMINOPHEN 5; 325 MG/1; MG/1
TABLET ORAL
COMMUNITY
Start: 2020-09-01 | End: 2020-09-01

## 2020-08-25 RX ORDER — LISINOPRIL 5 MG/1
TABLET ORAL
COMMUNITY
Start: 2020-09-01 | End: 2020-09-01

## 2020-08-25 RX ORDER — DOXEPIN HYDROCHLORIDE 50 MG/G
CREAM TOPICAL
COMMUNITY
Start: 2021-09-22 | End: 2021-09-22

## 2020-08-25 RX ORDER — BENZONATATE 100 MG/1
CAPSULE ORAL
COMMUNITY
End: 2020-09-01 | Stop reason: ALTCHOICE

## 2020-08-25 RX ORDER — FENOFIBRATE 145 MG/1
TABLET, COATED ORAL
COMMUNITY
Start: 2020-09-01 | End: 2020-09-01

## 2020-09-01 ENCOUNTER — OFFICE VISIT (OUTPATIENT)
Dept: PULMONOLOGY | Age: 70
End: 2020-09-01

## 2020-09-01 VITALS
OXYGEN SATURATION: 98 % | HEART RATE: 71 BPM | TEMPERATURE: 97.6 F | HEIGHT: 71 IN | BODY MASS INDEX: 37.72 KG/M2 | WEIGHT: 269.4 LBS | RESPIRATION RATE: 18 BRPM | DIASTOLIC BLOOD PRESSURE: 80 MMHG | SYSTOLIC BLOOD PRESSURE: 138 MMHG

## 2020-09-01 DIAGNOSIS — R06.02 SHORTNESS OF BREATH: ICD-10-CM

## 2020-09-01 DIAGNOSIS — R05.9 COUGH: Primary | ICD-10-CM

## 2020-09-01 NOTE — PROGRESS NOTES
[facility]  Pulmonary, Critical Care, and Sleep Medicine      Dear Dr. Ban Guillen,    Chief complaint:  Cough shortness of breath    HPI:  Vero Navarro is 68years old and comes to the office today relating that he has had at least 10 years of shortness of breath on exertion with activity like working out in the yard but sometimes even walking. However it does require more effort to walk because of his arthritis. He also relates a dry cough which is usually at night. He denies any leg swelling and says he was tested for sleep apnea in the past and was told he did not have sleep apnea. He also relates that he has chest pain which is substernal once or twice a month and occurs at rest and not with activity and is been evaluated by cardiology and felt not to be cardiac in origin he describes the pain as burning pain at times. He also reports significant problem with gastroesophageal reflux. He also states that he has allergic reactions to pollen. He takes albuterol for his symptoms at times but has not been using albuterol recently  Allergies   Allergen Reactions    Ciprofloxacin Anaphylaxis    Other Plant, Animal, Environmental Other (comments)     Patient cannot have MRI. Patient states that he has metal screws in his left arm.     Lamisil [Terbinafine] Swelling     Tongue swelling    Metformin Other (comments)     Elevated cr 1.4    Morphine Other (comments)     \"loss of blood pressure\"    Other Medication Other (comments)     Doxasylin causes extreme GERD    Pcn [Penicillins] Rash    Pentothal [Thiopental Sodium] Shortness of Breath    Sulfa (Sulfonamide Antibiotics) Rash     Current Outpatient Medications   Medication Sig    doxepin (doxepin, ZONALON, PRUDOXIN, 5% cream) 5 % topical cream doxepin 5 % topical cream    oxyCODONE IR (ROXICODONE) 5 mg immediate release tablet TAKE 1 TABLET BY MOUTH ONCE DAILY AS NEEDED FOR 30 DAYS    topiramate (TOPAMAX) 25 mg tablet Take 1 tablet by mouth once daily with breakfast    montelukast (SINGULAIR) 10 mg tablet Take 1 tablet by mouth once daily    Euthyrox 125 mcg tablet TAKE 1 TABLET BY MOUTH ONCE DAILY BEFORE BREAKFAST    losartan (COZAAR) 25 mg tablet TAKE 1 TABLET DAILY    pregabalin (Lyrica) 150 mg capsule Take 1 Cap by mouth three (3) times daily. Max Daily Amount: 450 mg. TAKE 1 CAPSULE BY MOUTH THREE TIMES DAILY FOR 30 DAYS. MAXIMUM 3 CAPSULES DAILY. Indications: neuropathy    DULoxetine (CYMBALTA) 60 mg capsule Take 1 Cap by mouth two (2) times a day.  tamsulosin (FLOMAX) 0.4 mg capsule TAKE 2 CAPSULES BY MOUTH DAILY (Patient taking differently: Take 0.4 mg by mouth two (2) times a day. TAKE 2 CAPSULES BY MOUTH DAILY)    metoprolol tartrate (LOPRESSOR) 25 mg tablet TAKE 1 TABLET BY MOUTH TWICE DAILY (Patient taking differently: daily. Sofia Kessler )    insulin glargine (BASAGLAR KWIKPEN U-100 INSULIN) 100 unit/mL (3 mL) inpn 52 Units by SubCUTAneous route nightly. 52 units at bedtime (Patient taking differently: 60 Units by SubCUTAneous route nightly. 52 units at bedtime)    fluticasone (FLONASE) 50 mcg/actuation nasal spray USE 1 SPRAY(S) IN EACH NOSTRIL ONCE DAILY    atorvastatin (LIPITOR) 20 mg tablet TAKE ONE TABLET BY MOUTH ONCE DAILY (Patient taking differently: Take 40 mg by mouth two (2) times a day.)    OTHER EB-N3 once daily    FANI PEN NEEDLE 32 gauge x 5/32\" ndle USE AT BEDTIME    JANUVIA 100 mg tablet Take 100 mg by mouth daily. Indications: patient stated he was given 50 mg    naloxone 4 mg/actuation spry 4 mg by Nasal route as needed.  ACCU-CHEK JAZZY PLUS TEST STRP strip     ACCU-CHEK SOFTCLIX LANCETS misc     albuterol (PROVENTIL VENTOLIN) 2.5 mg /3 mL (0.083 %) nebulizer solution 3 mL by Nebulization route every four (4) hours as needed for Wheezing or Shortness of Breath.  PEN NEEDLE, DIABETIC (BD ULTRA-FINE FANI PEN NEEDLES) by Does Not Apply route.  4mm x 32G    insulin glulisine (APIDRA SOLOSTAR) 100 unit/mL pen 2 units per carb consumed. Max 30 / day (Patient taking differently: 20 Units by SubCUTAneous route. 2 units per carb consumed. Max 30 / day  Indications: patient states taking 15-20 units three times a day)    PEN NEEDLE 31 gauge x 5/16\" ndle USE ONE PEN NEEDLE FOR LANTUS FLEXPEN AND 3 PEN NEEDLES FOR APIDRA WITH EACH MEAL    esomeprazole (NEXIUM) 40 mg capsule Take 40 mg by mouth two (2) times a day.  betamethasone dipropionate (DIPROLENE) 0.05 % ointment Apply  to affected area nightly.  budesonide-formoterol (SYMBICORT) 160-4.5 mcg/actuation HFA inhaler Take 2 Puffs by inhalation two (2) times a day. (Patient taking differently: Take 2 Puffs by inhalation two (2) times daily as needed.)    tiotropium (SPIRIVA) 18 mcg inhalation capsule Take 1 Cap by inhalation daily. (Patient taking differently: Take  by inhalation as needed.)    ketorolac (ACULAR) 0.5 % ophthalmic solution Administer 1 Drop to both eyes two (2) times daily as needed.  fenofibrate nanocrystallized (TRICOR) 145 mg tablet fenofibrate nanocrystallized 145 mg tablet    lisinopriL (PRINIVIL, ZESTRIL) 5 mg tablet lisinopril 5 mg tablet    oxyCODONE-acetaminophen (PERCOCET) 5-325 mg per tablet oxycodone-acetaminophen 5 mg-325 mg tablet   TK 1 T PO  UP TO BID WITH A TOTAL OF 45 TS TO BE BUDGETED OVER 30 DAYS     No current facility-administered medications for this visit. Past Medical History:   Diagnosis Date    Arthritis     Asthma     [facility] [address] [address]     BASAL     Cardiac catheterization 2009    [facility]:  No significant CAD.  Cardiac echocardiogram 01/20/2014    EF 50-55%. No WMA. Gr 1 DDfx. RVSP 25-30 mmHg. Mild TR.       DM type 2 (diabetes mellitus, type 2) (HCC)     Enlarged prostate     Failed back syndrome     GERD (gastroesophageal reflux disease)     Hearing loss, bilateral     Hearing Aids    Hypertension     Hypothyroidism     Insomnia     Low serum testosterone level     Neuropathy     Osteoarthritis of multiple joints     S/P colonoscopy 8/14/13    mild diverticulosis in sigmoid colon w/o evidence of diverticulitis; f/u 5 years    SOB (shortness of breath)     chronic; 2' \"lung fever\"    Vertigo    He denies a history of coronary artery disease liver disease tuberculosis cancer and said he has been told he has COPD and has had a remote history of gastric ulcers mild kidney disease  Past Surgical History:   Procedure Laterality Date    COLONOSCOPY N/A 4/3/2018    COL[facility] at [facility] HX BACK SURGERY  2000    removal of defective hardware    [address]    to remove bone fragments    HX LUMBAR FUSION  1999    fusion from L4-S1 and implantation of hardware    HX LUMBAR FUSION  1984    fusion on L5 area    HX ORTHOPAEDIC Bilateral 2004    trigger finger syndrome-all 4 fingers    HX ORTHOPAEDIC Left 2004    left arm torn muscle repair and placement of 2 screws    HX OTHER SURGICAL      skin cancer removal       Family history: Brain cancer and lung cancer    Social History: He smokes cigars for 25 years up to 8 a day but no smoking for 25 years.     Review of systems:  He denies fever chills poor appetite weight loss cold intolerance easy bruising seizures focal muscle weakness or numbness trouble seeing trouble swallowing melena dysuria hematuria rashes and has significant arthritic complaints blood in his stools which he said has been evaluated significant hearing loss requiring hearing aids and also reports chronic abdominal discomfort in his lower abdomen of unknown origin    Physical Exam:  Visit Vitals  /80 (BP 1 Location: Right arm, BP Patient Position: Sitting)   Pulse 71   Temp 97.6 °F (36.4 °C) (Oral)   Resp 18   Ht 5' 11\" (1.803 m)   Wt 122.2 kg (269 lb 6.4 oz)   SpO2 98%   BMI 37.57 kg/m²     Well-developed and obese  HEENT: pupils equal, reactive, sclera, non-icteric   Oropharynx tongue: normal   Neck: Supple   Lymph Nodes: Supra clavicular and cervical nodes, negative   Chest: Equal symmetrical expansion, no dullness, no wheezes, rales or rubs   Heart: Regular, rhythm without gio or murmur no carotid bruits  Abdomen: soft, non-tender no masses or organomegaly   Extremities: no cyanosis, clubbing, no edema no calf tenderness  Musculoskeletal: No acute joint inflammation or muscle wasting  Skin: No rash   Neurological: alert, oriented, moves all extremities      LABS:  O2 sat 98% room air at rest    Impression:   The cause of the patient's shortness of breath is not clear and could be related to COPD from years of smoking cigars but this needs to be confirmed with a pulmonary function test.  He also requires a chest x-ray for a full evaluation  The cause of the cough is most likely related to gastroesophageal reflux at night    Plan:  Bed risers to elevate it bed andno eating for 2-1/2 hours before bedtime to treat reflux  Chest x-ray and spirometry with a follow-up visit in 6 weeks    Thanks for allowing me to share in this patient's evaluation    Sincerely,    Gillian Martin MD , [facility]    CC: Aristides Suggs MD    [address] [facility]  [place], [address] [address]     P: [phone]     F: [phone]

## 2020-09-01 NOTE — PATIENT INSTRUCTIONS
Always call for worsening symptoms such as shortness of breath Consider raising the head of your bed this can be accomplished with \"bed risers \". I think they can be obtained at [address] and on the Internet. You put the bed risers underneath your head posts. Also consider not eating for 2-1/2 hours before bedtime Obtain chest x-ray and blood test for the coronavirus 1 week or less before your next visit

## 2020-09-01 NOTE — PROGRESS NOTES
Martha Bernard presents today for   Chief Complaint   Patient presents with   [facility] Referral / Consult     referred by Dr. Mara Langston for Asthma & COPD       Is someone accompanying this pt? No    Is the patient using any DME equipment during OV? Yes. cane    -DME Company N/A    Depression Screening:  3 most recent PHQ Screens 9/1/2020   PHQ Not Done -   Little interest or pleasure in doing things Not at all   Feeling down, depressed, irritable, or hopeless Not at all   Total Score PHQ 2 0       Learning Assessment:  Learning Assessment 5/22/2018   PRIMARY LEARNER Patient   HIGHEST LEVEL OF EDUCATION - PRIMARY LEARNER  GRADUATED HIGH SCHOOL OR GED   BARRIERS PRIMARY LEARNER NONE     -   [address] -    NEED -   LEARNER PREFERENCE PRIMARY DEMONSTRATION     -     -   LEARNER [address] -   ANSWERED BY patient   RELATIONSHIP SELF   ASSESSMENT COMMENT -       Abuse Screening:  Abuse Screening Questionnaire 9/1/2020   Do you ever feel afraid of your partner? N   Are you in a relationship with someone who physically or mentally threatens you? N   Is it safe for you to go home? Y        Fall Risk  Fall Risk Assessment, last 12 mths 9/1/2020   Able to walk? Yes   Fall in past 12 months? Yes   Fall with injury? No   Number of falls in past 12 months 8 or more   Fall Risk Score 8         Coordination of Care:  1. Have you been to the ER, urgent care clinic since your last visit? Hospitalized since your last visit? No    2. Have you seen or consulted any other health care providers outside of the [address] [place] [address] since your last visit? Include any pap smears or colon screening. Yes.  Podiatrist, Dr. Pino Pickering, Ophthalmologist

## 2020-09-08 PROBLEM — K85.90 PANCREATITIS: Status: ACTIVE | Noted: 2020-09-08

## 2020-09-17 ENCOUNTER — TELEPHONE (OUTPATIENT)
Dept: FAMILY MEDICINE CLINIC | Age: 70
End: 2020-09-17

## 2020-09-17 NOTE — TELEPHONE ENCOUNTER
Called and spoke with Itezl Reynoso at [facility] and obtained PA for Protonix 1 year 8/18/20-9/17/21.  Contacted patient and verified identity using name and date of birth (2- identifiers)  Spoke with patient and advised of approval.

## 2020-09-18 ENCOUNTER — HOSPITAL ENCOUNTER (OUTPATIENT)
Dept: LAB | Age: 70
Discharge: HOME | End: 2020-09-18
Payer: MEDICARE

## 2020-09-18 LAB
ALBUMIN SERPL-MCNC: 3.2 G/DL (ref 3.4–5)
ALBUMIN/GLOB SERPL: 0.9 {RATIO} (ref 0.8–1.7)
ALP SERPL-CCNC: 113 U/L (ref 45–117)
ALT SERPL-CCNC: 142 U/L (ref 16–61)
ANION GAP SERPL CALC-SCNC: 7 MMOL/L (ref 3–18)
AST SERPL-CCNC: 40 U/L (ref 10–38)
BILIRUB SERPL-MCNC: 0.7 MG/DL (ref 0.2–1)
BUN SERPL-MCNC: 11 MG/DL (ref 7–18)
BUN/CREAT SERPL: 12 (ref 12–20)
CALCIUM SERPL-MCNC: 8.7 MG/DL (ref 8.5–10.1)
CHLORIDE SERPL-SCNC: 104 MMOL/L (ref 100–111)
CO2 SERPL-SCNC: 27 MMOL/L (ref 21–32)
CREAT SERPL-MCNC: 0.92 MG/DL (ref 0.6–1.3)
GLOBULIN SER CALC-MCNC: 3.6 G/DL (ref 2–4)
GLUCOSE SERPL-MCNC: 144 MG/DL (ref 74–99)
LIPASE SERPL-CCNC: 147 U/L (ref 73–393)
POTASSIUM SERPL-SCNC: 4.1 MMOL/L (ref 3.5–5.5)
PROT SERPL-MCNC: 6.8 G/DL (ref 6.4–8.2)
SODIUM SERPL-SCNC: 138 MMOL/L (ref 136–145)

## 2020-09-18 PROCEDURE — 36415 COLL VENOUS BLD VENIPUNCTURE: CPT

## 2020-09-18 PROCEDURE — 80053 COMPREHEN METABOLIC PANEL: CPT

## 2020-09-18 PROCEDURE — 83690 ASSAY OF LIPASE: CPT

## 2020-09-21 ENCOUNTER — VIRTUAL VISIT (OUTPATIENT)
Dept: FAMILY MEDICINE CLINIC | Age: 70
End: 2020-09-21

## 2020-09-21 DIAGNOSIS — R79.89 ELEVATED LFTS: ICD-10-CM

## 2020-09-21 DIAGNOSIS — E11.21 TYPE 2 DIABETES WITH NEPHROPATHY (HCC): ICD-10-CM

## 2020-09-21 DIAGNOSIS — K85.00 IDIOPATHIC ACUTE PANCREATITIS WITHOUT INFECTION OR NECROSIS: Primary | ICD-10-CM

## 2020-09-21 DIAGNOSIS — E03.9 HYPOTHYROIDISM, UNSPECIFIED TYPE: ICD-10-CM

## 2020-09-21 DIAGNOSIS — E78.00 PURE HYPERCHOLESTEROLEMIA: ICD-10-CM

## 2020-09-21 DIAGNOSIS — I10 ESSENTIAL HYPERTENSION: ICD-10-CM

## 2020-09-21 DIAGNOSIS — R53.83 OTHER FATIGUE: ICD-10-CM

## 2020-09-21 DIAGNOSIS — J44.9 ASTHMA WITH COPD (CHRONIC OBSTRUCTIVE PULMONARY DISEASE) (HCC): ICD-10-CM

## 2020-09-21 RX ORDER — BUDESONIDE AND FORMOTEROL FUMARATE DIHYDRATE 160; 4.5 UG/1; UG/1
2 AEROSOL RESPIRATORY (INHALATION) 2 TIMES DAILY
Qty: 1 INHALER | Refills: 5 | Status: SHIPPED | OUTPATIENT
Start: 2020-09-21

## 2020-09-21 RX ORDER — PANTOPRAZOLE SODIUM 40 MG/1
40 TABLET, DELAYED RELEASE ORAL DAILY
Qty: 90 TAB | Refills: 1 | Status: SHIPPED | OUTPATIENT
Start: 2020-09-21

## 2020-09-21 NOTE — PROGRESS NOTES
Osman Rowan is a 68 y.o. male who was seen by synchronous (real-time) audio-video technology on 9/21/2020 for Hospital Follow Up        Assessment & Plan:   Diagnoses and all orders for this visit:    Idiopathic acute pancreatitis without infection or necrosis: gradually improving pain and appetite. Has coming up appt with GI next week. Advised light diet and follow their recommendations. Probably pancreatitis due to [place],. Also per patient recently increased lisinopril dose by endo and that couple have added to the [place]. For now advised to follow GI recommendations. Also follow endocrine to adjust further diabetes medication. Random sugar goes up to 300. He is also on prednisone as he was having hypoxia. Now has pending appt with Dr. Tom Vilchis as well. Denies any cough or cold or wheezing. No sob or chest pain. Review hospital records CTA and chest x-ray showed no acute process. Atelectasis. Needed his Spiriva refill which was given during the visit. His constipation is well stable with the senna. Type 2 diabetes with nephropathy ([address]): For now Januvia has been stopped. Also on oral prednisone. Elevated blood sugar. Advised him to have a follow-up with the endocrinologist to adjust the medication. Asthma with COPD (chronic obstructive pulmonary disease) ([address]): Recently hypoxia during the hospitalization. CTA showed no acute process. Some atelectasis. Currently fairly stable. Has pending appointment with Dr. Tom Vilchis comments:  negative CTA     Essential hypertension: Limitation in virtual visit checking blood pressure. Continue current plan    Pure hypercholesterolemia: He is not on statin. Not able to tolerate in the past.  Reviewed the discharge summary and recommended statin but patient has poor tolerance to statin    Hypothyroidism, unspecified type: On supplement. Following Endo recommendation  Elevated LFTs: CT scan showed pancreatitis, Fatty infiltration.   Low-fat diet and we will be following gastroenterologist recommendations  Comments:  fatty liver on CT     Other fatigue: Has been feeling weak since hospitalization. Asking if he would need physical therapy. I have advised him to observe little bit at this time. As he has gradually tolerated oral diet. If by next visit he would be still not ambulating well and feeling better will consider PT/OT    Other orders  -     budesonide-formoteroL (Symbicort) 160-4.5 mcg/actuation HFAA; Take 2 Puffs by inhalation two (2) times a day., Normal, Disp-1 Inhaler,R-5  -     pantoprazole (PROTONIX) 40 mg tablet; Take 1 Tab by mouth daily. , Normal, Disp-90 Tab,R-1    Patient was given Protonix refill and DC Pepcid which was changed during the hospitalization. Patient and his wife both understood the plan and agree with it  712  Subjective:   Done appointment through doxy  Here for hospital discharge and follow-up after it. He was admitted for acute pancreatitis and hypoxia. Review the hospitalization record and discharge summary through the chart. Review all radiological studies and labs  Diagnosis of pancreatitis probably due to Januvia, lisinopril. Medication were discontinued during the hospitalization. He is gradually improving with the pain. Able to tolerate p.o. No nausea or vomiting. Pain is very mild and said significantly improving. No constipation or diarrhea. No headache or dizziness. Also his cultures were negative as were ordered due to symptoms of hypoxia. CTA was negative for any blood clot and noted atelectasis on results. Currently denies any cough cold or wheezing. No fever. Has a coming up appointment with Dr. Kevin Moura and also pending appointment with the GI for further recommendation next week.   EXAM: CT ABD PELV W CONT     INDICATION: Epigastric right upper quadrant right lower quadrant pain vomiting  elevated white count fever         TECHNIQUE: Axial CT scan of the abdomen and pelvis with   IV contrast including coronal and sagittal reconstructions. DICOM format image data is available to non-affiliated external healthcare  facilities or entities on a secure, media free, reciprocally searchable basis  with patient authorization for 12 months following the date of the study.        COMPARISON: None     FINDINGS:     ABDOMEN:  Liver: Fatty infiltration of the liver. Gallbladder and bile ducts: Unremarkable. No calcified stones. No ductal  dilatation. Pancreas: Peripancreatic stranding and edema. Spleen: Unremarkable. No splenomegaly. Adrenals: Unremarkable. No mass. Kidneys and ureters: Unremarkable. No hydronephrosis. No solid mass. Appendix: No findings to suggest appendicitis. Stomach and bowel: Stomach unremarkable. No obstruction. Reactive wall  thickening of the duodenal C-loop. Peritoneum: Unremarkable. No significant fluid collection. No free air. Lymph nodes: Unremarkable. No enlarged lymph nodes. Vasculature: Unremarkable. No aortic aneurysm. Bones: No acute fracture. Abdominal wall: Unremarkable. No evidence of a hernia.     PELVIS:  Bladder: Unremarkable. No mass. Reproductive: Unremarkable as visualized.     IMPRESSION  IMPRESSION:  1. Acute pancreatitis. 2. Fatty infiltration of the liver. CT Results (most recent):  Results from Hospital Encounter encounter on 09/08/20   CT CHEST W CON PE PROTOCOL    Narrative EXAM: CT CHEST W CON PE PROTOCOL    INDICATION: low o2 sat. TECHNIQUE: CT scan of the chest with IV contrast including coronal and sagittal  images. DICOM format image data is available to non-affiliated external healthcare  facilities or entities on a secure, media free, reciprocally searchable basis  with patient authorization for 12 months following the date of the study. COMPARISON:   No relevant prior studies available. FINDINGS:  Pulmonary arteries: Unremarkable. No pulmonary embolism. Aorta: No acute findings.  No thoracic aortic aneurysm or dissection. Lungs: Right lower lobe discoid atelectasis. Lymph nodes: Unremarkable. No enlarged lymph nodes. Esophagus: Normal  Pleural spaces: Unremarkable. No significant effusion. No pneumothorax. Heart: Unremarkable. No cardiomegaly. No significant pericardial effusion. No  evidence of right ventricular dysfunction. Thyroid: Normal  Chest wall: No abnormal findings. Bones: No fractures identified. Impression IMPRESSION:  Right lower lobe atelectasis. No evidence of pulmonary embolism. Reviewed labs. Lab Results   Component Value Date/Time    WBC 8.6 09/15/2020 03:45 AM    HGB 12.1 (L) 09/15/2020 03:45 AM    HCT 37.0 09/15/2020 03:45 AM    PLATELET 045 79/11/0487 03:45 AM    MCV 87.7 09/15/2020 03:45 AM     Lab Results   Component Value Date/Time    Sodium 138 09/18/2020 11:10 AM    Potassium 4.1 09/18/2020 11:10 AM    Chloride 104 09/18/2020 11:10 AM    CO2 27 09/18/2020 11:10 AM    Anion gap 7 09/18/2020 11:10 AM    Glucose 144 (H) 09/18/2020 11:10 AM    BUN 11 09/18/2020 11:10 AM    Creatinine 0.92 09/18/2020 11:10 AM    BUN/Creatinine ratio 12 09/18/2020 11:10 AM    GFR est AA >60 09/18/2020 11:10 AM    GFR est non-AA >60 09/18/2020 11:10 AM    Calcium 8.7 09/18/2020 11:10 AM    Bilirubin, total 0.7 09/18/2020 11:10 AM    Alk.  phosphatase 113 09/18/2020 11:10 AM    Protein, total 6.8 09/18/2020 11:10 AM    Albumin 3.2 (L) 09/18/2020 11:10 AM    Globulin 3.6 09/18/2020 11:10 AM    A-G Ratio 0.9 09/18/2020 11:10 AM    ALT (SGPT) 142 (H) 09/18/2020 11:10 AM    AST (SGOT) 40 (H) 09/18/2020 11:10 AM     Lab Results   Component Value Date/Time    Cholesterol, total 111 (L) 09/09/2020 03:49 AM    HDL Cholesterol 39 (L) 09/09/2020 03:49 AM    LDL, calculated 49 09/09/2020 03:49 AM    VLDL, calculated 41.6 12/03/2019 08:38 AM    Triglyceride 117 09/09/2020 03:49 AM    CHOL/HDL Ratio 2.8 09/09/2020 03:49 AM     Lab Results   Component Value Date/Time    TSH 0.33 (L) 12/03/2019 08:38 AM     Lab Results   Component Value Date/Time    Hemoglobin A1c 6.8 (H) 02/21/2020 07:58 AM    Hemoglobin A1c (POC) 6.9 12/16/2015 10:29 AM    Hemoglobin A1c, External 7.2 06/16/2017         Lab Results   Component Value Date/Time    Microalbumin/Creat ratio (mg/g creat)  05/24/2019 08:38 AM     Cannot calculate ratio due to microalbumin result outside reportable range. Microalbumin,urine random <0.50 05/24/2019 08:38 AM     Lab Results   Component Value Date/Time    Prostate Specific Ag 0.3 03/01/2019 01:46 PM    Prostate Specific Ag 0.2 03/08/2018 09:34 AM    Prostate Specific Ag 0.3 03/09/2017 09:00 AM    Prostate Specific Ag 0.2 03/11/2015 10:43 AM    Prostate Specific Ag 0.3 09/10/2014 01:51 PM    Prostate Specific Ag 0.3 01/07/2014 11:58 AM     Lab Results   Component Value Date/Time    Lipase 147 09/18/2020 11:10 AM         Prior to Admission medications    Medication Sig Start Date End Date Taking? Authorizing Provider   budesonide-formoteroL (Symbicort) 160-4.5 mcg/actuation HFAA Take 2 Puffs by inhalation two (2) times a day. 9/21/20  Yes Rocael Pritchett MD   pantoprazole (PROTONIX) 40 mg tablet Take 1 Tab by mouth daily. 9/21/20  Yes Rocael Pritchett MD   diphenhydrAMINE (BENADRYL) 25 mg capsule Take 1 Cap by mouth three (3) times daily for 10 days. 9/16/20 9/26/20 Yes Gaston Antonio MD   lipase-protease-amylase (CREON 6,000) 6,000-19,000 -30,000 unit capsule Take 1 Cap by mouth three (3) times daily (with meals) for 15 days. 9/16/20 10/1/20 Yes Gaston Antonio MD   senna (SENOKOT) 8.6 mg tablet Take 1 Tab by mouth daily. 9/17/20  Yes Gaston Antonio MD   methylPREDNISolone (MEDROL DOSEPACK) 4 mg tablet Take as directed on pack 9/16/20  Yes Gaston Antonio MD   oxyCODONE IR (ROXICODONE) 5 mg immediate release tablet Take 1 Tab by mouth every eight (8) hours as needed for Pain for up to 7 days. Max Daily Amount: 15 mg.  Indications: acute pancreatitis 9/16/20 9/23/20 Yes Bhavana, Jesse Kinney MD   topiramate (TOPAMAX) 25 mg tablet Take 1 tablet by mouth once daily with breakfast 8/12/20  Yes Thee Matamoros MD   montelukast (SINGULAIR) 10 mg tablet Take 1 tablet by mouth once daily 8/12/20  Yes Thee Matamoros MD   Euthyrox 125 mcg tablet TAKE 1 TABLET BY MOUTH ONCE DAILY BEFORE BREAKFAST 8/12/20  Yes Thee Matamoros MD   losartan (COZAAR) 25 mg tablet TAKE 1 TABLET DAILY 6/1/20  Yes Thee Matamoros MD   pregabalin (Lyrica) 150 mg capsule Take 1 Cap by mouth three (3) times daily. Max Daily Amount: 450 mg. TAKE 1 CAPSULE BY MOUTH THREE TIMES DAILY FOR 30 DAYS. MAXIMUM 3 CAPSULES DAILY. Indications: neuropathy 4/10/20  Yes Thee Matamoros MD   DULoxetine (CYMBALTA) 60 mg capsule Take 1 Cap by mouth two (2) times a day. 4/10/20  Yes Thee Matamoros MD   tamsulosin (FLOMAX) 0.4 mg capsule TAKE 2 CAPSULES BY MOUTH DAILY  Patient taking differently: Take 0.4 mg by mouth two (2) times a day. TAKE 2 CAPSULES BY MOUTH DAILY 4/3/20  Yes Carlos Guerrero MD   metoprolol tartrate (LOPRESSOR) 25 mg tablet TAKE 1 TABLET BY MOUTH TWICE DAILY  Patient taking differently: daily. .  2/11/20  Yes Thee Matamoros MD   insulin glargine (BASAGLAR KWIKPEN U-100 INSULIN) 100 unit/mL (3 mL) inpn 52 Units by SubCUTAneous route nightly. 52 units at bedtime  Patient taking differently: 60 Units by SubCUTAneous route nightly.  52 units at bedtime 8/1/19  Yes Thee Matamoros MD   fluticasone [facility]) 50 mcg/actuation nasal spray USE 1 SPRAY(S) IN EACH NOSTRIL ONCE DAILY 1/11/19  Yes Thee Matamoros MD   OTHER EB-N3 once daily   Yes Provider, Historical   FANI PEN NEEDLE 32 gauge x 5/32\" ndle USE AT BEDTIME 2/9/18  Yes Thee Matamoros MD   ACCU-CHEK JAZZY PLUS TEST STRP strip  3/13/17  Yes Provider, Historical   ACCU-CHEK SOFTCLIX LANCETS [facility]  3/13/17  Yes Provider, Historical   albuterol (PROVENTIL VENTOLIN) 2.5 mg /3 mL (0.083 %) nebulizer solution 3 mL by Nebulization route every four (4) hours as needed for Wheezing or Shortness of Breath. 4/24/17  Yes Eliel Guardado MD   insulin glulisine (APIDRA SOLOSTAR) 100 unit/mL pen 2 units per carb consumed. Max 30 / day  Patient taking differently: 20 Units by SubCUTAneous route. 2 units per carb consumed. Max 30 / day  Indications: patient states taking 15-20 units three times a day 4/24/17  Yes Eliel Guardado MD   PEN NEEDLE 31 gauge x 5/16\" ndle USE ONE PEN NEEDLE FOR LANTUS FLEXPEN AND 3 PEN NEEDLES FOR APIDRA WITH EACH MEAL 1/27/17  Yes Eliel Guardado MD   betamethasone dipropionate (DIPROLENE) 0.05 % ointment Apply  to affected area nightly. 1/5/17  Yes Provider, Historical   tiotropium (SPIRIVA) 18 mcg inhalation capsule Take 1 Cap by inhalation daily. Patient taking differently: Take  by inhalation as needed. 2/5/14  Yes Castillo Chua MD   ketorolac (ACULAR) 0.5 % ophthalmic solution Administer 1 Drop to both eyes two (2) times daily as needed. Yes Provider, Historical   diphenhydrAMINE-zinc acetate 1%-0.1% (BENADRYL) topical cream Apply  to affected area every eight (8) hours as needed for Itching. 9/16/20   Sushila Gautam MD   famotidine (PEPCID) 10 mg tablet Take 1 Tab by mouth two (2) times a day for 7 days. 9/16/20 9/21/20  Sushila Gautam MD   pantoprazole (PROTONIX) 40 mg tablet Take 1 Tab by mouth daily. 9/16/20 9/21/20  Sushila Gautam MD   doxepin (doxepin, ZONALON, PRUDOXIN, 5% cream) 5 % topical cream doxepin 5 % topical cream    Provider, Historical   naloxone 4 mg/actuation spry 4 mg by Nasal route as needed. 4/27/17   Emy Jimenez MD   budesonide-formoterol [facility]) 160-4.5 mcg/actuation HFA inhaler Take 2 Puffs by inhalation two (2) times a day. Patient taking differently: Take 2 Puffs by inhalation two (2) times daily as needed.  2/5/14 9/21/20  Castillo Chua MD     Patient Active Problem List    Diagnosis Date Noted    Pancreatitis 09/08/2020    Type 2 diabetes with nephropathy ([facility] 06/05/2018    Type 2 diabetes mellitus with diabetic neuropathy ([facility] 06/05/2018    Severe obesity (BMI 35.0-39.9) 06/05/2018    Type 2 diabetes mellitus with complication, with long-term current use of insulin ([email or web address] 11/27/2017    Chronic low back pain 03/02/2017    Advance directive in chart/ no change per patient. 04/20/2016    H/O colonoscopy/ due in 2018 04/20/2016    Chronic chest pain 04/11/2016    Vitamin B12 deficiency 07/01/2014    Essential hypertension     Asthma with COPD (chronic obstructive pulmonary disease) ([facility] 12/31/2013    Peripheral neuropathy 12/18/2013    Encounter for long-term (current) use of other medications 10/23/2013    Chronic pain syndrome 10/23/2013    DJD (degenerative joint disease), lumbar 10/23/2013    H/O lumbosacral spine surgery 10/23/2013    DDD (degenerative disc disease), lumbar 10/23/2013    Chronic bilateral low back pain with bilateral sciatica 10/18/2013    COPD (chronic obstructive pulmonary disease) ([facility] 10/18/2013    Migraine headache 10/18/2013    GERD (gastroesophageal reflux disease) 10/18/2013    Hyperlipidemia 10/18/2013    Hypothyroidism 10/18/2013    BPH (benign prostatic hyperplasia) 10/18/2013    Hypotestosteronism 10/18/2013    Vertigo 10/18/2013     Current Outpatient Medications   Medication Sig Dispense Refill    budesonide-formoteroL (Symbicort) 160-4.5 mcg/actuation HFAA Take 2 Puffs by inhalation two (2) times a day. 1 Inhaler 5    pantoprazole (PROTONIX) 40 mg tablet Take 1 Tab by mouth daily. 90 Tab 1    diphenhydrAMINE (BENADRYL) 25 mg capsule Take 1 Cap by mouth three (3) times daily for 10 days. 30 Cap 0    lipase-protease-amylase (CREON 6,000) 6,000-19,000 -30,000 unit capsule Take 1 Cap by mouth three (3) times daily (with meals) for 15 days. 45 Cap 0    senna (SENOKOT) 8.6 mg tablet Take 1 Tab by mouth daily.  30 Tab 0    methylPREDNISolone (MEDROL DOSEPACK) 4 mg tablet Take as directed on pack 1 Dose Pack 0    oxyCODONE IR (ROXICODONE) 5 mg immediate release tablet Take 1 Tab by mouth every eight (8) hours as needed for Pain for up to 7 days. Max Daily Amount: 15 mg. Indications: acute pancreatitis 21 Tab 0    topiramate (TOPAMAX) 25 mg tablet Take 1 tablet by mouth once daily with breakfast 90 Tab 1    montelukast (SINGULAIR) 10 mg tablet Take 1 tablet by mouth once daily 90 Tab 1    Euthyrox 125 mcg tablet TAKE 1 TABLET BY MOUTH ONCE DAILY BEFORE BREAKFAST 90 Tab 1    losartan (COZAAR) 25 mg tablet TAKE 1 TABLET DAILY 90 Tab 1    pregabalin (Lyrica) 150 mg capsule Take 1 Cap by mouth three (3) times daily. Max Daily Amount: 450 mg. TAKE 1 CAPSULE BY MOUTH THREE TIMES DAILY FOR 30 DAYS. MAXIMUM 3 CAPSULES DAILY. Indications: neuropathy 90 Cap 0    DULoxetine (CYMBALTA) 60 mg capsule Take 1 Cap by mouth two (2) times a day. 60 Cap 0    tamsulosin (FLOMAX) 0.4 mg capsule TAKE 2 CAPSULES BY MOUTH DAILY (Patient taking differently: Take 0.4 mg by mouth two (2) times a day. TAKE 2 CAPSULES BY MOUTH DAILY) 180 Cap 3    metoprolol tartrate (LOPRESSOR) 25 mg tablet TAKE 1 TABLET BY MOUTH TWICE DAILY (Patient taking differently: daily. Champ Rein ) 180 Tab 1    insulin glargine (BASAGLAR KWIKPEN U-100 INSULIN) 100 unit/mL (3 mL) inpn 52 Units by SubCUTAneous route nightly. 52 units at bedtime (Patient taking differently: 60 Units by SubCUTAneous route nightly. 52 units at bedtime) 5 Pen 3    fluticasone (FLONASE) 50 mcg/actuation nasal spray USE 1 SPRAY(S) IN EACH NOSTRIL ONCE DAILY 1 Bottle 0    OTHER EB-N3 once daily      FANI PEN NEEDLE 32 gauge x 5/32\" ndle USE AT BEDTIME 100 Pen Needle 6    ACCU-CHEK JAZZY PLUS TEST STRP strip       ACCU-CHEK SOFTCLIX LANCETS misc       albuterol (PROVENTIL VENTOLIN) 2.5 mg /3 mL (0.083 %) nebulizer solution 3 mL by Nebulization route every four (4) hours as needed for Wheezing or Shortness of Breath.  1 Package 5    insulin glulisine (APIDRA SOLOSTAR) 100 unit/mL pen 2 units per carb consumed. Max 30 / day (Patient taking differently: 20 Units by SubCUTAneous route. 2 units per carb consumed. Max 30 / day  Indications: patient states taking 15-20 units three times a day) 5 Pen 3    PEN NEEDLE 31 gauge x 5/16\" ndle USE ONE PEN NEEDLE FOR LANTUS FLEXPEN AND 3 PEN NEEDLES FOR APIDRA WITH EACH MEAL 1 Package 3    betamethasone dipropionate (DIPROLENE) 0.05 % ointment Apply  to affected area nightly.  tiotropium (SPIRIVA) 18 mcg inhalation capsule Take 1 Cap by inhalation daily. (Patient taking differently: Take  by inhalation as needed.) 30 Cap 5    ketorolac (ACULAR) 0.5 % ophthalmic solution Administer 1 Drop to both eyes two (2) times daily as needed.  diphenhydrAMINE-zinc acetate 1%-0.1% (BENADRYL) topical cream Apply  to affected area every eight (8) hours as needed for Itching. 30 g 0    doxepin (doxepin, ZONALON, PRUDOXIN, 5% cream) 5 % topical cream doxepin 5 % topical cream      naloxone 4 mg/actuation spry 4 mg by Nasal route as needed. 1 Box 1     Allergies   Allergen Reactions    Latex Rash    Ciprofloxacin Anaphylaxis    Other Plant, Animal, Environmental Other (comments)     Patient cannot have MRI. Patient states that he has metal screws in his left arm.  Atorvastatin Other (comments)     Possible Pancreatitis, which may also have been due to [place].  Januvia [Sitagliptin] Other (comments)     Possible pancreatitis.  Lamisil [Terbinafine] Swelling     Tongue swelling    Metformin Other (comments)     Elevated cr 1.4    Morphine Other (comments)     \"loss of blood pressure\"    Other Medication Other (comments)     Doxasylin causes extreme GERD    Pcn [Penicillins] Rash    Pentothal [Thiopental Sodium] Shortness of Breath    Sulfa (Sulfonamide Antibiotics) Rash     Past Medical History:   Diagnosis Date    Arthritis     Asthma     [facility]     BASAL     Cardiac catheterization 2009    [address] [facility] [address]:  No significant CAD.       Cardiac echocardiogram 2014    EF 50-55%. No WMA. Gr 1 DDfx. RVSP 25-30 mmHg. Mild TR.  DM type 2 (diabetes mellitus, type 2) (HCC)     Enlarged prostate     Failed back syndrome     GERD (gastroesophageal reflux disease)     Hearing loss, bilateral     Hearing Aids    Hypertension     Hypothyroidism     Insomnia     Low serum testosterone level     Neuropathy     Osteoarthritis of multiple joints     S/P colonoscopy 13    mild diverticulosis in sigmoid colon w/o evidence of diverticulitis; f/u 5 years    SOB (shortness of breath)     chronic; 2' \"lung fever\"    Vertigo      Social History     Tobacco Use    Smoking status: Former Smoker     Packs/day: 1.00     Years: 31.00     Pack years: 31.00     Types: Pipe, Cigars     Last attempt to quit: 1995     Years since quittin.7    Smokeless tobacco: Never Used    Tobacco comment: smoked cigar   Substance Use Topics    Alcohol use: No       ROS: See HPI    Objective:     Patient-Reported Vitals 2020   Patient-Reported Weight 169   Patient-Reported Height 510   Patient-Reported Pulse . Patient-Reported Temperature 97.9   Patient-Reported Systolic  940   Patient-Reported Diastolic 72      General: alert, cooperative, no distress   Mental  status: normal mood, behavior, speech, dress, motor activity, and thought processes, able to follow commands   HENT: NCAT   Neck: no visualized mass   Resp: no respiratory distress   Neuro: no gross deficits   Skin: no discoloration or lesions of concern on visible areas   Psychiatric: normal affect, consistent with stated mood, no evidence of hallucinations     Additional exam findings: We discussed the expected course, resolution and complications of the diagnosis(es) in detail. Medication risks, benefits, costs, interactions, and alternatives were discussed as indicated.   I advised him to contact the office if his condition worsens, changes or fails to improve as anticipated. He expressed understanding with the diagnosis(es) and plan. Isreal Borrero, who was evaluated through a patient-initiated, synchronous (real-time) audio-video encounter, and/or his healthcare decision maker, is aware that it is a billable service, with coverage as determined by his insurance carrier. He provided verbal consent to proceed: Yes, and patient identification was verified. It was conducted pursuant to the emergency declaration under the [address] [place] [address] and the [employer] Act. A caregiver was present when appropriate. Ability to conduct physical exam was limited. I was in the office. The patient was at home.       Дмитрий Briscoe MD

## 2020-09-28 ENCOUNTER — TELEPHONE (OUTPATIENT)
Dept: FAMILY MEDICINE CLINIC | Age: 70
End: 2020-09-28

## 2020-09-28 ENCOUNTER — HOSPITAL ENCOUNTER (OUTPATIENT)
Dept: GENERAL RADIOLOGY | Age: 70
Discharge: HOME | End: 2020-09-28
Payer: MEDICARE

## 2020-09-28 DIAGNOSIS — R05.9 COUGH: ICD-10-CM

## 2020-09-28 DIAGNOSIS — R06.02 SHORTNESS OF BREATH: ICD-10-CM

## 2020-09-28 PROCEDURE — 71046 X-RAY EXAM CHEST 2 VIEWS: CPT

## 2020-09-28 NOTE — TELEPHONE ENCOUNTER
Pt states that he already had a covid test done  on 9/8/2020 at [facility] and he is not wanting another one but needs to prove that it was done. He has filled out a release of info and I have sent it to [facility] for the pt. Please advise when received.

## 2020-10-21 DIAGNOSIS — R25.1 SHAKINESS: ICD-10-CM

## 2020-10-21 DIAGNOSIS — R00.0 TACHYCARDIA: ICD-10-CM

## 2020-10-21 RX ORDER — METOPROLOL TARTRATE 25 MG/1
TABLET, FILM COATED ORAL
Qty: 180 TAB | Refills: 0 | Status: SHIPPED | OUTPATIENT
Start: 2020-10-21 | End: 2021-02-15

## 2020-10-30 RX ORDER — LOSARTAN POTASSIUM 25 MG/1
TABLET ORAL
Qty: 90 TAB | Refills: 1 | Status: SHIPPED | OUTPATIENT
Start: 2020-10-30 | End: 2021-10-15

## 2020-11-24 DIAGNOSIS — R06.02 SHORTNESS OF BREATH: ICD-10-CM

## 2020-11-24 DIAGNOSIS — R05.9 COUGH: Primary | ICD-10-CM

## 2020-11-24 NOTE — PROGRESS NOTES
Order placed for covid screen test, per Verbal Order from Dr. Subha Sam on 11/24/2020. Last office visit: 9/01/2020  Follow up Visit: 1/06/2021    Provider is aware of last office visit and follow up. No further action requested from provider.

## 2020-12-09 DIAGNOSIS — R06.02 SHORTNESS OF BREATH: Primary | ICD-10-CM

## 2021-02-15 DIAGNOSIS — Z86.69 H/O MIGRAINE: ICD-10-CM

## 2021-02-15 DIAGNOSIS — R00.0 TACHYCARDIA: ICD-10-CM

## 2021-02-15 DIAGNOSIS — R25.1 SHAKINESS: ICD-10-CM

## 2021-02-15 RX ORDER — TOPIRAMATE 25 MG/1
TABLET ORAL
Qty: 90 TAB | Refills: 0 | Status: SHIPPED | OUTPATIENT
Start: 2021-02-15 | End: 2021-05-19

## 2021-02-15 RX ORDER — METOPROLOL TARTRATE 25 MG/1
TABLET, FILM COATED ORAL
Qty: 180 TAB | Refills: 0 | Status: SHIPPED | OUTPATIENT
Start: 2021-02-15 | End: 2021-05-19

## 2021-03-15 DIAGNOSIS — R79.89 LOW TSH LEVEL: ICD-10-CM

## 2021-03-15 RX ORDER — LEVOTHYROXINE SODIUM 125 UG/1
TABLET ORAL
Qty: 90 TAB | Refills: 0 | Status: SHIPPED | OUTPATIENT
Start: 2021-03-15 | End: 2021-06-25

## 2021-04-12 DIAGNOSIS — E11.9 WELL CONTROLLED DIABETES MELLITUS (HCC): Primary | ICD-10-CM

## 2021-06-16 ENCOUNTER — OFFICE VISIT (OUTPATIENT)
Dept: FAMILY MEDICINE CLINIC | Age: 71
End: 2021-06-16
Payer: MEDICARE

## 2021-06-16 VITALS
RESPIRATION RATE: 16 BRPM | HEART RATE: 94 BPM | HEIGHT: 70 IN | WEIGHT: 239 LBS | BODY MASS INDEX: 34.22 KG/M2 | OXYGEN SATURATION: 97 % | SYSTOLIC BLOOD PRESSURE: 120 MMHG | DIASTOLIC BLOOD PRESSURE: 82 MMHG | TEMPERATURE: 98.5 F

## 2021-06-16 DIAGNOSIS — J44.9 ASTHMA WITH COPD (CHRONIC OBSTRUCTIVE PULMONARY DISEASE) (HCC): ICD-10-CM

## 2021-06-16 DIAGNOSIS — G89.4 CHRONIC PAIN SYNDROME: ICD-10-CM

## 2021-06-16 DIAGNOSIS — R09.81 SINUS CONGESTION: ICD-10-CM

## 2021-06-16 DIAGNOSIS — E03.9 HYPOTHYROIDISM, UNSPECIFIED TYPE: ICD-10-CM

## 2021-06-16 DIAGNOSIS — E53.8 VITAMIN B12 DEFICIENCY: ICD-10-CM

## 2021-06-16 DIAGNOSIS — K29.30 CHRONIC SUPERFICIAL GASTRITIS WITHOUT BLEEDING: ICD-10-CM

## 2021-06-16 DIAGNOSIS — E66.01 MORBID OBESITY, UNSPECIFIED OBESITY TYPE (HCC): ICD-10-CM

## 2021-06-16 DIAGNOSIS — Z00.00 MEDICARE ANNUAL WELLNESS VISIT, SUBSEQUENT: ICD-10-CM

## 2021-06-16 DIAGNOSIS — E11.21 TYPE 2 DIABETES WITH NEPHROPATHY (HCC): Primary | ICD-10-CM

## 2021-06-16 DIAGNOSIS — G62.9 PERIPHERAL POLYNEUROPATHY: ICD-10-CM

## 2021-06-16 DIAGNOSIS — G43.009 MIGRAINE WITHOUT AURA AND WITHOUT STATUS MIGRAINOSUS, NOT INTRACTABLE: ICD-10-CM

## 2021-06-16 DIAGNOSIS — I10 ESSENTIAL HYPERTENSION: ICD-10-CM

## 2021-06-16 DIAGNOSIS — E78.00 PURE HYPERCHOLESTEROLEMIA: ICD-10-CM

## 2021-06-16 DIAGNOSIS — N40.0 BENIGN PROSTATIC HYPERPLASIA WITHOUT LOWER URINARY TRACT SYMPTOMS: ICD-10-CM

## 2021-06-16 LAB — HBA1C MFR BLD HPLC: 6.5 %

## 2021-06-16 PROCEDURE — G8417 CALC BMI ABV UP PARAM F/U: HCPCS | Performed by: FAMILY MEDICINE

## 2021-06-16 PROCEDURE — 2022F DILAT RTA XM EVC RTNOPTHY: CPT | Performed by: FAMILY MEDICINE

## 2021-06-16 PROCEDURE — G8510 SCR DEP NEG, NO PLAN REQD: HCPCS | Performed by: FAMILY MEDICINE

## 2021-06-16 PROCEDURE — 3044F HG A1C LEVEL LT 7.0%: CPT | Performed by: FAMILY MEDICINE

## 2021-06-16 PROCEDURE — 3288F FALL RISK ASSESSMENT DOCD: CPT | Performed by: FAMILY MEDICINE

## 2021-06-16 PROCEDURE — G0439 PPPS, SUBSEQ VISIT: HCPCS | Performed by: FAMILY MEDICINE

## 2021-06-16 PROCEDURE — 83036 HEMOGLOBIN GLYCOSYLATED A1C: CPT | Performed by: FAMILY MEDICINE

## 2021-06-16 PROCEDURE — G8754 DIAS BP LESS 90: HCPCS | Performed by: FAMILY MEDICINE

## 2021-06-16 PROCEDURE — 3017F COLORECTAL CA SCREEN DOC REV: CPT | Performed by: FAMILY MEDICINE

## 2021-06-16 PROCEDURE — G8752 SYS BP LESS 140: HCPCS | Performed by: FAMILY MEDICINE

## 2021-06-16 PROCEDURE — 1100F PTFALLS ASSESS-DOCD GE2>/YR: CPT | Performed by: FAMILY MEDICINE

## 2021-06-16 PROCEDURE — G0463 HOSPITAL OUTPT CLINIC VISIT: HCPCS | Performed by: FAMILY MEDICINE

## 2021-06-16 PROCEDURE — 99214 OFFICE O/P EST MOD 30 MIN: CPT | Performed by: FAMILY MEDICINE

## 2021-06-16 PROCEDURE — G8427 DOCREV CUR MEDS BY ELIG CLIN: HCPCS | Performed by: FAMILY MEDICINE

## 2021-06-16 PROCEDURE — G8536 NO DOC ELDER MAL SCRN: HCPCS | Performed by: FAMILY MEDICINE

## 2021-06-16 RX ORDER — PANCRELIPASE 24000; 76000; 120000 [USP'U]/1; [USP'U]/1; [USP'U]/1
2 CAPSULE, DELAYED RELEASE PELLETS ORAL
COMMUNITY
Start: 2021-06-07 | End: 2021-10-15

## 2021-06-16 NOTE — PATIENT INSTRUCTIONS
Medicare Wellness Visit, Male The best way to live healthy is to have a lifestyle where you eat a well-balanced diet, exercise regularly, limit alcohol use, and quit all forms of tobacco/nicotine, if applicable. Regular preventive services are another way to keep healthy. Preventive services (vaccines, screening tests, monitoring & exams) can help personalize your care plan, which helps you manage your own care. Screening tests can find health problems at the earliest stages, when they are easiest to treat. [facility] follows the current, evidence-based guidelines published by the [facility] ([facility]) when recommending preventive services for our patients. Because we follow these guidelines, sometimes recommendations change over time as research supports it. (For example, a prostate screening blood test is no longer routinely recommended for men with no symptoms). Of course, you and your doctor may decide to screen more often for some diseases, based on your risk and co-morbidities (chronic disease you are already diagnosed with). Preventive services for you include: - Medicare offers their members a free annual wellness visit, which is time for you and your primary care provider to discuss and plan for your preventive service needs. Take advantage of this benefit every year! 
-All adults over age 72 should receive the recommended pneumonia vaccines. Current USPSTF guidelines recommend a series of two vaccines for the best pneumonia protection.  
-All adults should have a flu vaccine yearly and tetanus vaccine every 10 years. 
-All adults age 48 and older should receive the shingles vaccines (series of two vaccines).       
-All adults age 38-68 who are overweight should have a diabetes screening test once every three years.  
-Other screening tests & preventive services for persons with diabetes include: an eye exam to screen for diabetic retinopathy, a kidney function test, a foot exam, and stricter control over your cholesterol.  
-Cardiovascular screening for adults with routine risk involves an electrocardiogram (ECG) at intervals determined by the provider.  
-Colorectal cancer screening should be done for adults age 54-65 with no increased risk factors for colorectal cancer. There are a number of acceptable methods of screening for this type of cancer. Each test has its own benefits and drawbacks. Discuss with your provider what is most appropriate for you during your annual wellness visit. The different tests include: colonoscopy (considered the best screening method), a fecal occult blood test, a fecal DNA test, and sigmoidoscopy. 
-All adults born between [facility] should be screened once for Hepatitis C. 
-An Abdominal Aortic Aneurysm (AAA) Screening is recommended for men age 73-68 who has ever smoked in their lifetime. Here is a list of your current Health Maintenance items (your personalized list of preventive services) with a due date: 
Health Maintenance Due Topic Date Due  
 COVID-19 Vaccine (1) Never done [facility]  03/14/2020  Albumin Urine Test  05/24/2020  Hemoglobin A1C    02/21/2021 Nutrition Tips for Diabetes: After Your Visit Your Care Instructions A healthy diet is important to manage diabetes. It helps you lose weight (if you need to) and keep it off. It gives you the nutrition and energy your body needs and helps prevent heart disease. But a diet for diabetes does not mean that you have to eat special foods. You can eat what your family eats, including occasional sweets and other favorites. But you do have to pay attention to how often you eat and how much you eat of certain foods. The right plan for you will give you meals that help you keep your blood sugar at healthy levels. Try to eat a variety of foods and to spread carbohydrate throughout the day.  Carbohydrate raises blood sugar higher and more quickly than any other nutrient does. Carbohydrate is found in sugar, breads and cereals, fruit, starchy vegetables such as potatoes and corn, and milk and yogurt. You may want to work with a dietitian or diabetes educator to help you plan meals and snacks. A dietitian or diabetes educator also can help you lose weight if that is one of your goals. The following tips can help you enjoy your meals and stay healthy. Follow-up care is a key part of your treatment and safety. Be sure to make and go to all appointments, and call your doctor if you are having problems. Its also a good idea to know your test results and keep a list of the medicines you take. How can you care for yourself at home? · Learn which foods have carbohydrate and how much carbohydrate to eat. A dietitian or diabetes educator can help you learn to keep track of how much carbohydrate you eat. · Spread carbohydrate throughout the day. Eat some carbohydrate at all meals, but do not eat too much at any one time. · Plan meals to include food from all the food groups. These are the food groups and some example portion sizes: ¨ Grains: 1 slice of bread (1 ounce), ½ cup of cooked cereal, and 1/3 cup of cooked pasta or rice. These have about 15 grams of carbohydrate in a serving. Choose whole grains such as whole wheat bread or crackers, oatmeal, and brown rice more often than refined grains. ¨ Fruit: 1 small fresh fruit, such as an apple or orange; ½ of a banana; ½ cup of chopped, cooked, or canned fruit; ½ cup of fruit juice; 1 cup of melon or raspberries; and 2 tablespoons of dried fruit. These have about 15 grams of carbohydrate in a serving. ¨ Dairy: 1 cup of nonfat or low-fat milk and 2/3 cup of plain yogurt. These have about 15 grams of carbohydrate in a serving. ¨ Protein foods: Beef, chicken, turkey, fish, eggs, tofu, cheese, cottage cheese, and peanut butter.  A serving size of meat is 3 ounces, which is about the size of a deck of cards. Examples of meat substitute serving sizes (equal to 1 ounce of meat) are 1/4 cup of cottage cheese, 1 egg, 1 tablespoon of peanut butter, and ½ cup of tofu. These have very little or no carbohydrate per serving. ¨ Vegetables: Starchy vegetables such as ½ cup of cooked dried beans, peas, potatoes, or corn have about 15 grams of carbohydrate. Nonstarchy vegetables have very little carbohydrate, such as 1 cup of raw leafy vegetables (such as spinach), ½ cup of other vegetables (cooked or chopped), and 3/4 cup of vegetable juice. · Use the plate format to plan meals. It is a good, quick way to make sure that you have a balanced meal. It also helps you spread carbohydrate throughout the day. You divide your plate by types of foods. Put vegetables on half the plate, meat or meat substitutes on one-quarter of the plate, and a grain or starchy vegetable (such as brown rice or a potato) in the final quarter of the plate. To this you can add a small piece of fruit and 1 cup of milk or yogurt, depending on how much carbohydrate you are supposed to eat at a meal. 
· Talk to your dietitian or diabetes educator about ways to add limited amounts of sweets into your meal plan. You can eat these foods now and then, as long as you include the amount of carbohydrate they have in your daily carbohydrate allowance. · If you drink alcohol, limit it to no more than 1 drink a day for women and 2 drinks a day for men. If you are pregnant, no amount of alcohol is known to be safe. · Protein, fat, and fiber do not raise blood sugar as much as carbohydrate does. If you eat a lot of these nutrients in a meal, your blood sugar will rise more slowly than it would otherwise. · Limit saturated fats, such as those from meat and dairy products. Try to replace it with monounsaturated fat, such as olive oil. This is a healthier choice because people who have diabetes are at higher-than-average risk of heart disease.  But use a modest amount of olive oil. A tablespoon of olive oil has 14 grams of fat and 120 calories. · Exercise lowers blood sugar. If you take insulin by shots or pump, you can use less than you would if you were not exercising. Keep in mind that timing matters. If you exercise within 1 hour after a meal, your body may need less insulin for that meal than it would if you exercised 3 hours after the meal. Test your blood sugar to find out how exercise affects your need for insulin. · Exercise on most days of the week. Aim for at least 30 minutes. Exercise helps you stay at a healthy weight and helps your body use insulin. Walking is an easy way to get exercise. Gradually increase the amount you walk every day. You also may want to swim, bike, or do other activities. When you eat out · Learn to estimate the serving sizes of foods that have carbohydrate. If you measure food at home, it will be easier to estimate the amount in a serving of restaurant food. · If the meal you order has too much carbohydrate (such as potatoes, corn, or baked beans), ask to have a low-carbohydrate food instead. Ask for a salad or green vegetables. · If you use insulin, check your blood sugar before and after eating out to help you plan how much to eat in the future. · If you eat more carbohydrate at a meal than you had planned, take a walk or do other exercise. This will help lower your blood sugar. Where can you learn more? Go to [employer]IR[employer].be Enter A015 in the search box to learn more about \"Nutrition Tips for Diabetes: After Your Visit. \"  
© 2177-2246 Healthwise, [employer]. Care instructions adapted under license by [employer] (which disclaims liability or warranty for this information).  This care instruction is for use with your licensed healthcare professional. If you have questions about a medical condition or this instruction, always ask your healthcare professional. [address] any warranty or liability for your use of this information. Content Version: 37.9.187459; Current as of: June 4, 2014 Chronic Pain: Care Instructions Your Care Instructions Chronic pain is pain that lasts a long time (months or even years) and may or may not have a clear cause. It is different from acute pain, which usually does have a clear causelike an injury or illnessand gets better over time. Chronic pain: 
· Lasts over time but may vary from day to day. · Does not go away despite efforts to end it. · May disrupt your sleep and lead to fatigue. · May cause depression or anxiety. · May make your muscles tense, causing more pain. · Can disrupt your work, hobbies, home life, and relationships with friends and family. Chronic pain is a very real condition. It is not just in your head. Treatment can help and usually includes several methods used together, such as medicines, physical therapy, exercise, and other treatments. Learning how to relax and changing negative thought patterns can also help you cope. Chronic pain is complex. Taking an active role in your treatment will help you better manage your pain. Tell your doctor if you have trouble dealing with your pain. You may have to try several things before you find what works best for you. Follow-up care is a key part of your treatment and safety. Be sure to make and go to all appointments, and call your doctor if you are having problems. It's also a good idea to know your test results and keep a list of the medicines you take. How can you care for yourself at home? · Pace yourself. Break up large jobs into smaller tasks. Save harder tasks for days when you have less pain, or go back and forth between hard tasks and easier ones. Take rest breaks. · Relax, and reduce stress. Relaxation techniques such as deep breathing or meditation can help. · Keep moving. Gentle, daily exercise can help reduce pain over the long run.  Try low- or no-impact exercises such as walking, swimming, and stationary biking. Do stretches to stay flexible. · Try heat, cold packs, and massage. · Get enough sleep. Chronic pain can make you tired and drain your energy. Talk with your doctor if you have trouble sleeping because of pain. · Think positive. Your thoughts can affect your pain level. Do things that you enjoy to distract yourself when you have pain instead of focusing on the pain. See a movie, read a book, listen to music, or spend time with a friend. · If you think you are depressed, talk to your doctor about treatment. · Keep a daily pain diary. Record how your moods, thoughts, sleep patterns, activities, and medicine affect your pain. You may find that your pain is worse during or after certain activities or when you are feeling a certain emotion. Having a record of your pain can help you and your doctor find the best ways to treat your pain. · Take pain medicines exactly as directed. ? If the doctor gave you a prescription medicine for pain, take it as prescribed. ? If you are not taking a prescription pain medicine, ask your doctor if you can take an over-the-counter medicine. Reducing constipation caused by pain medicine · Include fruits, vegetables, beans, and whole grains in your diet each day. These foods are high in fiber. · Drink plenty of fluids, enough so that your urine is light yellow or clear like water. If you have kidney, heart, or liver disease and have to limit fluids, talk with your doctor before you increase the amount of fluids you drink. · If your doctor recommends it, get more exercise. Walking is a good choice. Bit by bit, increase the amount you walk every day. Try for at least 30 minutes on most days of the week. · Schedule time each day for a bowel movement. A daily routine may help. Take your time and do not strain when having a bowel movement. When should you call for help?  
 Call your doctor now or seek immediate medical care if: 
  · Your pain gets worse or is out of control.  
  · You feel down or blue, or you do not enjoy things like you once did. You may be depressed, which is common in people with chronic pain. Depression can be treated.  
  · You have vomiting or cramps for more than 2 hours. Watch closely for changes in your health, and be sure to contact your doctor if: 
  · You cannot sleep because of pain.  
  · You are very worried or anxious about your pain.  
  · You have trouble taking your pain medicine.  
  · You have any concerns about your pain medicine.  
  · You have trouble with bowel movements, such as: 
? No bowel movement in 3 days. ? Blood in the anal area, in your stool, or on the toilet paper. ? Diarrhea for more than 24 hours. Where can you learn more? Go to http://www.gray.com/ Enter N004 in the search box to learn more about \"Chronic Pain: Care Instructions. \" Current as of: August 4, 2020               Content Version: 12.8 © 2006-2021 [employer]. Care instructions adapted under license by [employer] (which disclaims liability or warranty for this information). If you have questions about a medical condition or this instruction, always ask your healthcare professional. [place] [address] any warranty or liability for your use of this information. Low Sodium Diet (2,000 Milligram): Care Instructions Overview Limiting sodium can be an important part of managing some health problems. The most common source of sodium is salt. People get most of the salt in their diet from canned, prepared, and packaged foods. Fast food and restaurant meals also are very high in sodium. Your doctor will probably limit your sodium to less than 2,000 milligrams (mg) a day. This limit counts all the sodium in prepared and packaged foods and any salt you add to your food.  
Follow-up care is a key part of your treatment and safety. Be sure to make and go to all appointments, and call your doctor if you are having problems. It's also a good idea to know your test results and keep a list of the medicines you take. How can you care for yourself at home? Read food labels · Read labels on cans and food packages. The labels tell you how much sodium is in each serving. Make sure that you look at the serving size. If you eat more than the serving size, you have eaten more sodium. · Food labels also tell you the Percent Daily Value for sodium. Choose products with low Percent Daily Values for sodium. · Be aware that sodium can come in forms other than salt, including monosodium glutamate (MSG), sodium citrate, and sodium bicarbonate (baking soda). MSG is often added to Asian food. When you eat out, you can sometimes ask for food without MSG or added salt. Buy low-sodium foods · Buy foods that are labeled \"unsalted\" (no salt added), \"sodium-free\" (less than 5 mg of sodium per serving), or \"low-sodium\" (140 mg or less of sodium per serving). Foods labeled \"reduced-sodium\" and \"light sodium\" may still have too much sodium. Be sure to read the label to see how much sodium you are getting. · Buy fresh vegetables, or frozen vegetables without added sauces. Buy low-sodium versions of canned vegetables, soups, and other canned goods. Prepare low-sodium meals · Cut back on the amount of salt you use in cooking. This will help you adjust to the taste. Do not add salt after cooking. One teaspoon of salt has about 2,300 mg of sodium. · Take the salt shaker off the table. · Flavor your food with garlic, lemon juice, onion, vinegar, herbs, and spices. Do not use soy sauce, lite soy sauce, steak sauce, onion salt, garlic salt, celery salt, or ketchup on your food. · Use low-sodium salad dressings, sauces, and ketchup. Or make your own salad dressings and sauces without adding salt. · Use less salt (or none) when recipes call for it.  You can often use half the salt a recipe calls for without losing flavor. Other foods such as rice, pasta, and grains do not need added salt. · Rinse canned vegetables, and cook them in fresh water. This removes somebut not allof the salt. · Avoid water that is naturally high in sodium or that has been treated with water softeners, which add sodium. If you buy bottled water, read the label and choose a sodium-free brand. Avoid high-sodium foods · Avoid eating: 
? Smoked, cured, salted, and canned meat, fish, and poultry. ? Ham, kohler, hot dogs, and luncheon meats. ? Regular, hard, and processed cheese and regular peanut butter. ? Crackers with salted tops, and other salted snack foods such as pretzels, chips, and salted popcorn. ? Frozen prepared meals, unless labeled low-sodium. ? Canned and dried soups, broths, and bouillon, unless labeled sodium-free or low-sodium. ? Canned vegetables, unless labeled sodium-free or low-sodium. ? [place] fries, pizza, tacos, and other fast foods. ? Pickles, olives, ketchup, and other condiments, especially soy sauce, unless labeled sodium-free or low-sodium. Where can you learn more? Go to http://www.gray.com/ Enter C786 in the search box to learn more about \"Low Sodium Diet (2,000 Milligram): Care Instructions. \" Current as of: December 17, 2020               Content Version: 12.8 © 4323-4637 Healthwise, [facility]. Care instructions adapted under license by [employer] (which disclaims liability or warranty for this information). If you have questions about a medical condition or this instruction, always ask your healthcare professional. [place] [address] any warranty or liability for your use of this information.

## 2021-06-16 NOTE — PROGRESS NOTES
This is the Subsequent Medicare Annual Wellness Exam, performed 12 months or more after the Initial AWV or the last Subsequent AWV    I have reviewed the patient's medical history in detail and updated the computerized patient record. Assessment/Plan   Education and counseling provided:  Are appropriate based on today's review and evaluation  Discussed prior health plan  Discussed ACP. See ACP note from today  1. Type 2 diabetes with nephropathy (HCC)  -     AMB POC HEMOGLOBIN A1C  2. Medicare annual wellness visit, subsequent       Depression Risk Factor Screening     3 most recent PHQ Screens 9/1/2020   PHQ Not Done -   Little interest or pleasure in doing things Not at all   Feeling down, depressed, irritable, or hopeless Not at all   Total Score PHQ 2 0       Alcohol Risk Screen    Do you average more than 1 drink per night or more than 7 drinks a week: No    In the past three months have you have had more than 4 drinks containing alcohol on one occasion: No        Functional Ability and Level of Safety    Hearing: The patient wears hearing aids. Activities of Daily Living: The home contains: handrails, grab bars and Shower chair (doesn't use) and raised toilet  Patient does total self care      Ambulation: with mild difficulty     Fall Risk:  Fall Risk Assessment, last 12 mths 9/1/2020   Able to walk? Yes   Fall in past 12 months? Yes   Number of falls in past 12 months 8 or more   Fall with injury?  0      Abuse Screen:  Patient is not abused       Cognitive Screening    Has your family/caregiver stated any concerns about your memory: no     Cognitive Screening: Normal -      Health Maintenance Due     Health Maintenance Due   Topic Date Due    COVID-19 Vaccine (1) Never done    Foot Exam Q1  03/14/2020    MICROALBUMIN Q1  05/24/2020    A1C test (Diabetic or Prediabetic)  02/21/2021     A1c done today around 6.5  Pending microalbumin  He had foot exam done we will obtain the notes from podiatry  Patient Care Team   Patient Care Team:  Octavio Gonzalez MD as PCP - General (Family Medicine)  Octavio Gonzalez MD as PCP [facility]  Reinaldo Molina MD (Otolaryngology)  Marissa Catnu (Internal Medicine)  Jesus Mathis MD as Physician (Urology)  David Workman as Physician (Podiatry)  Jl Juarez MD (Ophthalmology)  Lavelle Edwards MD (Cardiology)  Barry Gasca MD (Endocrinology)  Grzegorz Bone MD (Otolaryngology)  Lissa June MD as Hospitalist (Gastroenterology)  Donal Amaral MD (Pulmonary Disease)  Jesús De Dios NP (Nurse Practitioner)    History     Patient Active Problem List   Diagnosis Code    Chronic bilateral low back pain with bilateral sciatica M54.42, M54.41, G89.29    COPD (chronic obstructive pulmonary disease) ([facility]) J44.9    Migraine headache G43.909    GERD (gastroesophageal reflux disease) K21.9    Hyperlipidemia E78.5    Hypothyroidism E03.9    BPH (benign prostatic hyperplasia) N40.0    Hypotestosteronism E34.9    Vertigo R42    Encounter for long-term (current) use of other medications Z79.899    Chronic pain syndrome G89.4    DJD (degenerative joint disease), lumbar M47.816    H/O lumbosacral spine surgery Z98.890    DDD (degenerative disc disease), lumbar M51.36    Peripheral neuropathy G62.9    Asthma with COPD (chronic obstructive pulmonary disease) ([facility] [address] J44.9    Essential hypertension I10    Vitamin B12 deficiency E53.8    Chronic chest pain R07.9, G89.29    Advance directive in chart/ no change per patient. Z78.9    H/O colonoscopy/ due in 2018 Z98.890    Chronic low back pain M54.5, G89.29    Type 2 diabetes mellitus with complication, with long-term current use of insulin ([facility]) E11.8, Z79.4    Type 2 diabetes with nephropathy (HCC) E11.21    Type 2 diabetes mellitus with diabetic neuropathy (HCC) E11.40    Severe obesity (BMI 35.0-39. 9) E66.01    Pancreatitis K85.90     Past Medical History: Diagnosis Date    Arthritis     Asthma     Cancer [facility])     BASAL     Cardiac catheterization 2009    [facility]:  No significant CAD.  Cardiac echocardiogram 01/20/2014    EF 50-55%. No WMA. Gr 1 DDfx. RVSP 25-30 mmHg. Mild TR.  DM type 2 (diabetes mellitus, type 2) (HCC)     Enlarged prostate     Failed back syndrome     GERD (gastroesophageal reflux disease)     Hearing loss, bilateral     Hearing Aids    Hypertension     Hypothyroidism     Insomnia     Low serum testosterone level     Neuropathy     Osteoarthritis of multiple joints     S/P colonoscopy 8/14/13    mild diverticulosis in sigmoid colon w/o evidence of diverticulitis; f/u 5 years    SOB (shortness of breath)     chronic; 2' \"lung fever\"    Vertigo       Past Surgical History:   Procedure Laterality Date    COLONOSCOPY N/A 4/3/2018    COLONOSCOPY performed by Naheed Haney [facility] at [facility] HX BACK SURGERY  2000    removal of defective hardware    [address]    to remove bone fragments    HX LUMBAR FUSION  1999    fusion from L4-S1 and implantation of hardware    HX LUMBAR FUSION  1984    fusion on L5 area    HX ORTHOPAEDIC Bilateral 2004    trigger finger syndrome-all 4 fingers    HX ORTHOPAEDIC Left 2004    left arm torn muscle repair and placement of 2 screws    HX OTHER SURGICAL      skin cancer removal     Current Outpatient Medications   Medication Sig Dispense Refill    topiramate (TOPAMAX) 25 mg tablet Take 1 tablet by mouth once daily with breakfast 30 Tablet 0    metoprolol tartrate (LOPRESSOR) 25 mg tablet Take 1 tablet by mouth twice daily 60 Tablet 0    tamsulosin (Flomax) 0.4 mg capsule Take 1 Cap by mouth daily.  90 Cap 0    Euthyrox 125 mcg tablet TAKE 1 TABLET BY MOUTH ONCE DAILY BEFORE BREAKFAST 90 Tab 0    losartan (COZAAR) 25 mg tablet TAKE 1 TABLET DAILY 90 Tab 1    budesonide-formoteroL (Symbicort) 160-4.5 mcg/actuation HFAA Take 2 Puffs by inhalation two (2) times a day. 1 Inhaler 5    pantoprazole (PROTONIX) 40 mg tablet Take 1 Tab by mouth daily. 90 Tab 1    diphenhydrAMINE-zinc acetate 1%-0.1% (BENADRYL) topical cream Apply  to affected area every eight (8) hours as needed for Itching. 30 g 0    senna (SENOKOT) 8.6 mg tablet Take 1 Tab by mouth daily. 30 Tab 0    methylPREDNISolone (MEDROL DOSEPACK) 4 mg tablet Take as directed on pack 1 Dose Pack 0    doxepin (doxepin, ZONALON, PRUDOXIN, 5% cream) 5 % topical cream doxepin 5 % topical cream      montelukast (SINGULAIR) 10 mg tablet Take 1 tablet by mouth once daily 90 Tab 1    pregabalin (Lyrica) 150 mg capsule Take 1 Cap by mouth three (3) times daily. Max Daily Amount: 450 mg. TAKE 1 CAPSULE BY MOUTH THREE TIMES DAILY FOR 30 DAYS. MAXIMUM 3 CAPSULES DAILY. Indications: neuropathy 90 Cap 0    DULoxetine (CYMBALTA) 60 mg capsule Take 1 Cap by mouth two (2) times a day. 60 Cap 0    tamsulosin (FLOMAX) 0.4 mg capsule TAKE 2 CAPSULES BY MOUTH DAILY (Patient taking differently: Take 0.4 mg by mouth two (2) times a day. TAKE 2 CAPSULES BY MOUTH DAILY) 180 Cap 3    insulin glargine (BASAGLAR KWIKPEN U-100 INSULIN) 100 unit/mL (3 mL) inpn 52 Units by SubCUTAneous route nightly. 52 units at bedtime (Patient taking differently: 60 Units by SubCUTAneous route nightly. 52 units at bedtime) 5 Pen 3    fluticasone (FLONASE) 50 mcg/actuation nasal spray USE 1 SPRAY(S) IN EACH NOSTRIL ONCE DAILY 1 Bottle 0    OTHER EB-N3 once daily      FANI PEN NEEDLE 32 gauge x 5/32\" ndle USE AT BEDTIME 100 Pen Needle 6    naloxone 4 mg/actuation spry 4 mg by Nasal route as needed. 1 Box 1    ACCU-CHEK JAZZY PLUS TEST STRP strip       ACCU-CHEK SOFTCLIX LANCETS misc       albuterol (PROVENTIL VENTOLIN) 2.5 mg /3 mL (0.083 %) nebulizer solution 3 mL by Nebulization route every four (4) hours as needed for Wheezing or Shortness of Breath.  1 Package 5    insulin glulisine (APIDRA SOLOSTAR) 100 unit/mL pen 2 units per carb consumed. Max 30 / day (Patient taking differently: 20 Units by SubCUTAneous route. 2 units per carb consumed. Max 30 / day  Indications: patient states taking 15-20 units three times a day) 5 Pen 3    PEN NEEDLE 31 gauge x 5/16\" ndle USE ONE PEN NEEDLE FOR LANTUS FLEXPEN AND 3 PEN NEEDLES FOR APIDRA WITH EACH MEAL 1 Package 3    betamethasone dipropionate (DIPROLENE) 0.05 % ointment Apply  to affected area nightly.  tiotropium (SPIRIVA) 18 mcg inhalation capsule Take 1 Cap by inhalation daily. (Patient taking differently: Take  by inhalation as needed.) 30 Cap 5    ketorolac (ACULAR) 0.5 % ophthalmic solution Administer 1 Drop to both eyes two (2) times daily as needed. Allergies   Allergen Reactions    Latex Rash    Ciprofloxacin Anaphylaxis    Other Plant, Animal, Environmental Other (comments)     Patient cannot have MRI. Patient states that he has metal screws in his left arm.  Atorvastatin Other (comments)     Possible Pancreatitis, which may also have been due to [facility].  Januvia [Sitagliptin] Other (comments)     Possible pancreatitis.     Lamisil [Terbinafine] Swelling     Tongue swelling    Metformin Other (comments)     Elevated cr 1.4    Morphine Other (comments)     \"loss of blood pressure\"    Other Medication Other (comments)     Doxasylin causes extreme GERD    Pcn [Penicillins] Rash    Pentothal [Thiopental Sodium] Shortness of Breath    Sulfa (Sulfonamide Antibiotics) Rash       Family History   Problem Relation Age of Onset    Cancer Mother         Bone and Brain Cancer    Diabetes Mother     Liver Disease Father         Lung Cancer    Kidney Disease Sister     Diabetes Daughter     Colon Cancer Brother      Social History     Tobacco Use    Smoking status: Former Smoker     Packs/day: 1.00     Years: 31.00     Pack years: 31.00     Types: Pipe, Cigars     Quit date: 1995     Years since quittin.4    Smokeless tobacco: Never Used    Tobacco comment: smoked cigar   Substance Use Topics    Alcohol use: No         Addie Roy, JOSUÉN

## 2021-06-16 NOTE — PROGRESS NOTES
HISTORY OF PRESENT ILLNESS  Barb Ramos is a 65 y.o. male. HPI: Here for follow-up. History of diabetes. A1c today 6.5. He has been working on diet modification and lost some weight. No hyper or hypoglycemic symptoms. Taking medication with compliance. No side effects. History of hypertension. Vitals been stable. Asymptomatic. Again taking medication with compliance and no side effects. History of BPH. Following neurology. Has stopped her Flomax for some time but reoccurrence of symptoms and now taking Flomax 1 tablet a day instead of 2. Sitting comfortable without any acute distress  Chronic pain. Managed by pain management. Currently off of all opioids. Fairly controlled pain. GERD symptoms has been stable. EGD shows gastritis. Now resumed to normal diet per GI. No concern at this time. Denies any headache, dizziness, no chest pain or trouble breathing, no arm or leg weakness. No nausea or vomiting, no weight or appetite changes, no mood changes . No urine or bowel complains, no palpitation, no diaphoresis. No abdominal pain. No cold or cough. No leg swelling. No fever. No sleep trouble. Asthma and copd. No increase use of albuterol. No cough or cold. No sob or chest pain. No wheezing. No fever. History of migraine headache. Fairly stable. No recent episode. Peripheral neuropathy and B12 deficiency. On B12 supplement. Neuropathy pain fairly stable on symptomatic treatment. Currently concerned with the left side sinus congestion. On and off. On seasonal allergy medication but not helping. Asking for ENT evaluation. Visit Vitals  /82 (BP 1 Location: Left arm, BP Patient Position: Sitting, BP Cuff Size: Adult)   Pulse 94   Temp 98.5 °F (36.9 °C) (Oral)   Resp 16   Ht 5' 10\" (1.778 m)   Wt 239 lb (108.4 kg)   SpO2 97%   BMI 34.29 kg/m²     Review medication list, vitals, problem list,allergies.    Lab Results   Component Value Date/Time    WBC 8.6 09/15/2020 03:45 AM    HGB 12.1 (L) 09/15/2020 03:45 AM    HCT 37.0 09/15/2020 03:45 AM    PLATELET 318 59/41/7848 03:45 AM    MCV 87.7 09/15/2020 03:45 AM     Lab Results   Component Value Date/Time    Sodium 138 09/18/2020 11:10 AM    Potassium 4.1 09/18/2020 11:10 AM    Chloride 104 09/18/2020 11:10 AM    CO2 27 09/18/2020 11:10 AM    Anion gap 7 09/18/2020 11:10 AM    Glucose 144 (H) 09/18/2020 11:10 AM    BUN 11 09/18/2020 11:10 AM    Creatinine 0.92 09/18/2020 11:10 AM    BUN/Creatinine ratio 12 09/18/2020 11:10 AM    GFR est AA >60 09/18/2020 11:10 AM    GFR est non-AA >60 09/18/2020 11:10 AM    Calcium 8.7 09/18/2020 11:10 AM    Bilirubin, total 0.7 09/18/2020 11:10 AM    Alk. phosphatase 113 09/18/2020 11:10 AM    Protein, total 6.8 09/18/2020 11:10 AM    Albumin 3.2 (L) 09/18/2020 11:10 AM    Globulin 3.6 09/18/2020 11:10 AM    A-G Ratio 0.9 09/18/2020 11:10 AM    ALT (SGPT) 142 (H) 09/18/2020 11:10 AM    AST (SGOT) 40 (H) 09/18/2020 11:10 AM     Lab Results   Component Value Date/Time    Cholesterol, total 111 (L) 09/09/2020 03:49 AM    HDL Cholesterol 39 (L) 09/09/2020 03:49 AM    LDL, calculated 49 09/09/2020 03:49 AM    VLDL, calculated 41.6 12/03/2019 08:38 AM    Triglyceride 117 09/09/2020 03:49 AM    CHOL/HDL Ratio 2.8 09/09/2020 03:49 AM     Lab Results   Component Value Date/Time    TSH 0.33 (L) 12/03/2019 08:38 AM     Lab Results   Component Value Date/Time    Hemoglobin A1c 6.8 (H) 02/21/2020 07:58 AM    Hemoglobin A1c (POC) 6.5 06/16/2021 10:52 AM    Hemoglobin A1c, External 7.2 06/16/2017 12:00 AM       Lab Results   Component Value Date/Time    Microalbumin/Creat ratio (mg/g creat)  05/24/2019 08:38 AM     Cannot calculate ratio due to microalbumin result outside reportable range.     Microalbumin,urine random <0.50 05/24/2019 08:38 AM         Lab Results   Component Value Date/Time    Vitamin B12 542 03/10/2015 12:00 AM         ROS: see HPI     Physical Exam  Constitutional:       General: He is not in acute distress. Cardiovascular:      Rate and Rhythm: Normal rate and regular rhythm. Heart sounds: Normal heart sounds. Abdominal:      General: Bowel sounds are normal.      Palpations: Abdomen is soft. Tenderness: There is no abdominal tenderness. Musculoskeletal:      Cervical back: Neck supple. Neurological:      Mental Status: He is oriented to person, place, and time. Psychiatric:         Behavior: Behavior normal.         ASSESSMENT and PLAN\    ICD-10-CM ICD-9-CM    1. Type 2 diabetes with nephropathy (HCC) : A1c today around 6.5. Fairly stable. Continue current plan. He has lost some weight he is trying to modify his diet. Continue current plan E11.21 250.40 AMB POC HEMOGLOBIN A1C     583.81    2. Medicare annual wellness visit, subsequent  Z00.00 V70.0    3. Ast[facility] with COPD [facility]chronic [facility] Fairly stable. We will continue current plan J44.9 493.20    4. Morbid obesity, unspecified obesity type ([facility]  E66.01 278.01    5. Essential hypertension :well controlled. Continue current dose of medication and low salt diet. Exercise as tolerated. I10 401.9    6. Vitamin B12 deficiency: On supplement E53.8 266.2    7. Peripheral polyneuropathy: Fairly stable on symptomatic treatment. Off pain medication G62.9 356.9    8. Pure hypercholesterolemia: On statin. No side effects E78.00 272.0    9. Hypothyroidism, unspecified type: Taking medication with compliance. Asymptomatic. Will recheck labs E03.9 244.9    10. Chronic pain syndrome: Chronic back pain. Has prior back surgery and rods. Currently off of all opioids. Fairly stable pain. We will observe G89.4 338.4    11. Migraine without aura and without status migrainosus, not intractable: Fairly stable. G43.009 346.10    12. Benign prostatic hyperplasia without lower urinary tract symptoms: Asymptomatic. Flomax helping. Following urology. Will follow the recommendation N40.0 600.00    13.  Chronic superficial gastritis without bleeding: EGD shows gastritis. Currently asymptomatic. PPI is helping. Resuming normal diet as per GI K29.30 535.10     seen GI. now improving. had colonoscopy and EGD done. 14. Sinus congestion: Mainly congestion over the left side on and off. When it occurs she starts from the nasal soreness to the sinus area and then goes to the forehead. Will do a referral per patient request R09.81 478.19 REFERRAL TO ENT-OTOLARYNGOLOGY   Pt understood and agree with the plan   Review HM  We will obtain podiatry records  Follow-up and Dispositions    · Return in about 3 months (around 9/16/2021). Please note that this dictation was completed with [employer], the computer voice recognition software. Quite often unanticipated grammatical, syntax, homophones, and other interpretive errors are inadvertently transcribed by the computer software. Please disregard these errors. Please excuse any errors that have escaped final proofreading.

## 2021-06-16 NOTE — ACP (ADVANCE CARE PLANNING)
Advance Care Planning     General Advance Care Planning (ACP) Conversation      Date of Conversation: 6/16/2021  Conducted with: Patient with Decision Making Capacity    Healthcare Decision Maker:     Click here to complete [address] including selection of the [address] Relationship (ie \"Primary\")  Today we documented Decision Maker(s) consistent with Legal Next of Kin hierarchy. Content/Action Overview:    Has ACP document(s) on file - reflects the patient's care preferences      Length of Voluntary ACP Conversation in minutes:  <16 minutes (Non-Billable)    Rissa Lance MD

## 2021-06-18 ENCOUNTER — HOSPITAL ENCOUNTER (OUTPATIENT)
Dept: LAB | Age: 71
Discharge: HOME | End: 2021-06-18
Payer: MEDICARE

## 2021-06-18 DIAGNOSIS — D58.2 ELEVATED HEMOGLOBIN (HCC): ICD-10-CM

## 2021-06-18 DIAGNOSIS — E11.9 WELL CONTROLLED DIABETES MELLITUS (HCC): ICD-10-CM

## 2021-06-18 DIAGNOSIS — E87.0 HYPERNATREMIA: Primary | ICD-10-CM

## 2021-06-18 LAB
ALBUMIN SERPL-MCNC: 3.9 G/DL (ref 3.4–5)
ALBUMIN/GLOB SERPL: 1.3 {RATIO} (ref 0.8–1.7)
ALP SERPL-CCNC: 71 U/L (ref 45–117)
ALT SERPL-CCNC: 22 U/L (ref 16–61)
ANION GAP SERPL CALC-SCNC: 6 MMOL/L (ref 3–18)
AST SERPL-CCNC: 10 U/L (ref 10–38)
BILIRUB SERPL-MCNC: 0.5 MG/DL (ref 0.2–1)
BUN SERPL-MCNC: 17 MG/DL (ref 7–18)
BUN/CREAT SERPL: 18 (ref 12–20)
CALCIUM SERPL-MCNC: 8.7 MG/DL (ref 8.5–10.1)
CHLORIDE SERPL-SCNC: 106 MMOL/L (ref 100–111)
CHOLEST SERPL-MCNC: 228 MG/DL
CO2 SERPL-SCNC: 27 MMOL/L (ref 21–32)
CREAT SERPL-MCNC: 0.97 MG/DL (ref 0.6–1.3)
CREAT UR-MCNC: 74 MG/DL (ref 30–125)
ERYTHROCYTE [DISTWIDTH] IN BLOOD BY AUTOMATED COUNT: 12.5 % (ref 11.6–14.5)
GLOBULIN SER CALC-MCNC: 3.1 G/DL (ref 2–4)
GLUCOSE SERPL-MCNC: 146 MG/DL (ref 74–99)
HCT VFR BLD AUTO: 50.2 % (ref 36–48)
HDLC SERPL-MCNC: 40 MG/DL (ref 40–60)
HDLC SERPL: 5.7 {RATIO} (ref 0–5)
HGB BLD-MCNC: 16.2 G/DL (ref 13–16)
LDLC SERPL CALC-MCNC: 142.4 MG/DL (ref 0–100)
LIPID PROFILE,FLP: ABNORMAL
MCH RBC QN AUTO: 27.5 PG (ref 24–34)
MCHC RBC AUTO-ENTMCNC: 32.3 G/DL (ref 31–37)
MCV RBC AUTO: 85.2 FL (ref 74–97)
MICROALBUMIN UR-MCNC: 1.72 MG/DL (ref 0–3)
MICROALBUMIN/CREAT UR-RTO: 23 MG/G (ref 0–30)
PLATELET # BLD AUTO: 261 K/UL (ref 135–420)
PMV BLD AUTO: 9.7 FL (ref 9.2–11.8)
POTASSIUM SERPL-SCNC: 4.6 MMOL/L (ref 3.5–5.5)
PROT SERPL-MCNC: 7 G/DL (ref 6.4–8.2)
RBC # BLD AUTO: 5.89 M/UL (ref 4.35–5.65)
SODIUM SERPL-SCNC: 139 MMOL/L (ref 136–145)
TRIGL SERPL-MCNC: 228 MG/DL (ref ?–150)
TSH SERPL DL<=0.05 MIU/L-ACNC: 0.98 UIU/ML (ref 0.36–3.74)
VLDLC SERPL CALC-MCNC: 45.6 MG/DL
WBC # BLD AUTO: 9.2 K/UL (ref 4.6–13.2)

## 2021-06-18 PROCEDURE — 36415 COLL VENOUS BLD VENIPUNCTURE: CPT

## 2021-06-18 PROCEDURE — 82043 UR ALBUMIN QUANTITATIVE: CPT

## 2021-06-18 PROCEDURE — 80053 COMPREHEN METABOLIC PANEL: CPT

## 2021-06-18 PROCEDURE — 84443 ASSAY THYROID STIM HORMONE: CPT

## 2021-06-18 PROCEDURE — 80061 LIPID PANEL: CPT

## 2021-06-18 PROCEDURE — 85027 COMPLETE CBC AUTOMATED: CPT

## 2021-06-18 NOTE — PROGRESS NOTES
Let patient know that lipid panel has went up. He needs to do the diet modification. Continue current plan. Also noted elevated sodium. We will send him to the nephrology. Elevated H&H.   Will consider hematology referral.  Further discussion on follow-up visit

## 2021-07-08 ENCOUNTER — OFFICE VISIT (OUTPATIENT)
Dept: CARDIOLOGY CLINIC | Age: 71
End: 2021-07-08
Payer: MEDICARE

## 2021-07-08 VITALS
HEIGHT: 70 IN | SYSTOLIC BLOOD PRESSURE: 138 MMHG | DIASTOLIC BLOOD PRESSURE: 88 MMHG | OXYGEN SATURATION: 100 % | HEART RATE: 83 BPM | WEIGHT: 235.6 LBS | BODY MASS INDEX: 33.73 KG/M2

## 2021-07-08 DIAGNOSIS — K85.30 DRUG-INDUCED ACUTE PANCREATITIS WITHOUT INFECTION OR NECROSIS: ICD-10-CM

## 2021-07-08 DIAGNOSIS — R00.2 PALPITATIONS: Primary | ICD-10-CM

## 2021-07-08 DIAGNOSIS — I10 ESSENTIAL HYPERTENSION: ICD-10-CM

## 2021-07-08 DIAGNOSIS — E11.21 TYPE 2 DIABETES WITH NEPHROPATHY (HCC): ICD-10-CM

## 2021-07-08 DIAGNOSIS — E78.00 PURE HYPERCHOLESTEROLEMIA: ICD-10-CM

## 2021-07-08 PROCEDURE — 3044F HG A1C LEVEL LT 7.0%: CPT | Performed by: INTERNAL MEDICINE

## 2021-07-08 PROCEDURE — G8752 SYS BP LESS 140: HCPCS | Performed by: INTERNAL MEDICINE

## 2021-07-08 PROCEDURE — 93000 ELECTROCARDIOGRAM COMPLETE: CPT | Performed by: INTERNAL MEDICINE

## 2021-07-08 PROCEDURE — 3017F COLORECTAL CA SCREEN DOC REV: CPT | Performed by: INTERNAL MEDICINE

## 2021-07-08 PROCEDURE — 3288F FALL RISK ASSESSMENT DOCD: CPT | Performed by: INTERNAL MEDICINE

## 2021-07-08 PROCEDURE — G8427 DOCREV CUR MEDS BY ELIG CLIN: HCPCS | Performed by: INTERNAL MEDICINE

## 2021-07-08 PROCEDURE — 1100F PTFALLS ASSESS-DOCD GE2>/YR: CPT | Performed by: INTERNAL MEDICINE

## 2021-07-08 PROCEDURE — 2022F DILAT RTA XM EVC RTNOPTHY: CPT | Performed by: INTERNAL MEDICINE

## 2021-07-08 PROCEDURE — G8417 CALC BMI ABV UP PARAM F/U: HCPCS | Performed by: INTERNAL MEDICINE

## 2021-07-08 PROCEDURE — G8754 DIAS BP LESS 90: HCPCS | Performed by: INTERNAL MEDICINE

## 2021-07-08 PROCEDURE — 99214 OFFICE O/P EST MOD 30 MIN: CPT | Performed by: INTERNAL MEDICINE

## 2021-07-08 PROCEDURE — G8510 SCR DEP NEG, NO PLAN REQD: HCPCS | Performed by: INTERNAL MEDICINE

## 2021-07-08 PROCEDURE — G8536 NO DOC ELDER MAL SCRN: HCPCS | Performed by: INTERNAL MEDICINE

## 2021-07-08 NOTE — PROGRESS NOTES
Theresa Mullen presents today for   Chief Complaint   Patient presents with    Follow-up     1 year follow up        hTeresa Mullen preferred language for health care discussion is english/other. Is someone accompanying this pt? no    Is the patient using any DME equipment during [address]? cane    Depression Screening:  3 most recent PHQ Screens 7/8/2021   PHQ Not Done -   Little interest or pleasure in doing things Not at all   Feeling down, depressed, irritable, or hopeless Not at all   Total Score PHQ 2 0       Learning Assessment:  Learning Assessment 5/22/2018   PRIMARY LEARNER Patient   HIGHEST LEVEL OF EDUCATION - PRIMARY LEARNER  GRADUATED HIGH SCHOOL OR GED   BARRIERS PRIMARY LEARNER NONE     -   [address] CAREGIVER -   [facility] LEVEL OF EDUCATION -   [address] -    NEED -   LEARNER PREFERENCE PRIMARY DEMONSTRATION     -     -   LEARNER Renetta [address] -   ANSWERED BY patient   RELATIONSHIP SELF   ASSESSMENT COMMENT -       Abuse Screening:  Abuse Screening Questionnaire 7/8/2021   Do you ever feel afraid of your partner? N   Are you in a relationship with someone who physically or mentally threatens you? N   Is it safe for you to go home? Y       Fall Risk  Fall Risk Assessment, last 12 mths 7/8/2021   Able to walk? Yes   Fall in past 12 months? 0   Do you feel unsteady? 0   Are you worried about falling 0   Is the gait abnormal? -   Number of falls in past 12 months -   Fall with injury? -       Pt currently taking Anticoagulant therapy? no    Coordination of Care:  1. Have you been to the ER, urgent care clinic since your last visit? Hospitalized since your last visit? no    2. Have you seen or consulted any other health care providers outside of the [address] [place] [address] since your last visit? Include any pap smears or colon screening.  no

## 2021-07-08 NOTE — PROGRESS NOTES
HISTORY OF PRESENT ILLNESS  Kehinde Guaman is a 65 y.o. male. Follow-up  Pertinent negatives include no chest pain, no abdominal pain, no headaches and no shortness of breath. Patient presents for a follow-up office visit. He has a past medical history significant for hypertension, dyslipidemia,  diabetes mellitus, and COPD. He was initially referred here for evaluation of dyspnea on exertion and tachycardia with activity. The patient does not have a previous cardiac history. He does have a history of atypical chest pain, and reports a significant cardiac workup while living in [address] [place] [address] back in 2009 which included a cardiac catheterization showing no significant coronary artery disease. The patient underwent an echocardiogram in January 2014, showing an LVEF of 24-67%, grade 1 diastolic dysfunction, in no significant valvular heart disease. Normal PA pressures. Patient underwent a 30-day event monitor in July 2020 which was a normal study demonstrated no significant arrhythmias. Shortly thereafter he states he was admitted to the hospital for an acute episode of pancreatitis was hospitalized for almost 2 weeks. As result he has lost about 30 pounds in weight. He is significantly changed his diet and his medications have been adjusted as well. The patient was last seen in the office 12 months ago. Patient reports that his heart palpitations have resolved since he was started on metoprolol tartrate by his PCP. He denies any leg swelling, no shortness of breath, no orthopnea or PND. Past Medical History:   Diagnosis Date    Arthritis     Asthma     Cancer ([facility] [address]     BASAL     Cardiac catheterization 2009    [address] [place] [address]:  No significant CAD.  Cardiac echocardiogram 01/20/2014    EF 50-55%. No WMA. Gr 1 DDfx. RVSP 25-30 mmHg. Mild TR.       DM type 2 (diabetes mellitus, type 2) (HCC)     Enlarged prostate     Failed back syndrome     GERD (gastroesophageal reflux disease)     Hearing loss, bilateral     Hearing Aids    Hypertension     Hypothyroidism     Insomnia     Low serum testosterone level     Neuropathy     Osteoarthritis of multiple joints     S/P colonoscopy 8/14/13    mild diverticulosis in sigmoid colon w/o evidence of diverticulitis; f/u 5 years    SOB (shortness of breath)     chronic; 2' \"lung fever\"    Vertigo       Current Outpatient Medications   Medication Sig Dispense Refill    topiramate (TOPAMAX) 25 mg tablet Take 1 tablet by mouth once daily with breakfast 90 Tablet 1    Euthyrox 125 mcg tablet TAKE 1 TABLET BY MOUTH ONCE DAILY BEFORE BREAKFAST 90 Tablet 1    Creon 24,000-76,000 -120,000 unit capsule TAKE 2 CAPSULES BY MOUTH TWICE DAILY      metoprolol tartrate (LOPRESSOR) 25 mg tablet Take 1 tablet by mouth twice daily 60 Tablet 0    tamsulosin (Flomax) 0.4 mg capsule Take 1 Cap by mouth daily. 90 Cap 0    losartan (COZAAR) 25 mg tablet TAKE 1 TABLET DAILY 90 Tab 1    budesonide-formoteroL (Symbicort) 160-4.5 mcg/actuation HFAA Take 2 Puffs by inhalation two (2) times a day. 1 Inhaler 5    pantoprazole (PROTONIX) 40 mg tablet Take 1 Tab by mouth daily. 90 Tab 1    diphenhydrAMINE-zinc acetate 1%-0.1% (BENADRYL) topical cream Apply  to affected area every eight (8) hours as needed for Itching. 30 g 0    senna (SENOKOT) 8.6 mg tablet Take 1 Tab by mouth daily. 30 Tab 0    doxepin (doxepin, ZONALON, PRUDOXIN, 5% cream) 5 % topical cream doxepin 5 % topical cream      montelukast (SINGULAIR) 10 mg tablet Take 1 tablet by mouth once daily 90 Tab 1    pregabalin (Lyrica) 150 mg capsule Take 1 Cap by mouth three (3) times daily. Max Daily Amount: 450 mg. TAKE 1 CAPSULE BY MOUTH THREE TIMES DAILY FOR 30 DAYS. MAXIMUM 3 CAPSULES DAILY. Indications: neuropathy 90 Cap 0    DULoxetine (CYMBALTA) 60 mg capsule Take 1 Cap by mouth two (2) times a day.  60 Cap 0    insulin glargine (BASAGLAR KWIKPEN U-100 INSULIN) 100 unit/mL (3 mL) inpn 52 Units by SubCUTAneous route nightly. 52 units at bedtime (Patient taking differently: 48 Units by SubCUTAneous route nightly. 52 units at bedtime) 5 Pen 3    fluticasone (FLONASE) 50 mcg/actuation nasal spray USE 1 SPRAY(S) IN EACH NOSTRIL ONCE DAILY 1 Bottle 0    OTHER EB-N3 once daily      FANI PEN NEEDLE 32 gauge x 5/32\" ndle USE AT BEDTIME 100 Pen Needle 6    ACCU-CHEK JAZZY PLUS TEST STRP strip       ACCU-CHEK SOFTCLIX LANCETS misc       albuterol (PROVENTIL VENTOLIN) 2.5 mg /3 mL (0.083 %) nebulizer solution 3 mL by Nebulization route every four (4) hours as needed for Wheezing or Shortness of Breath. 1 Package 5    insulin glulisine (APIDRA SOLOSTAR) 100 unit/mL pen 2 units per carb consumed. Max 30 / day (Patient taking differently: 16 Units by SubCUTAneous route Before breakfast, lunch, and dinner. 2 units per carb consumed. Max 30 / day  Indications: patient states taking 15-20 units three times a day) 5 Pen 3    PEN NEEDLE 31 gauge x 5/16\" ndle USE ONE PEN NEEDLE FOR LANTUS FLEXPEN AND 3 PEN NEEDLES FOR APIDRA WITH EACH MEAL 1 Package 3    betamethasone dipropionate (DIPROLENE) 0.05 % ointment Apply  to affected area nightly.  tiotropium (SPIRIVA) 18 mcg inhalation capsule Take 1 Cap by inhalation daily. (Patient taking differently: Take  by inhalation as needed.) 30 Cap 5    ketorolac (ACULAR) 0.5 % ophthalmic solution Administer 1 Drop to both eyes two (2) times daily as needed.  methylPREDNISolone (MEDROL DOSEPACK) 4 mg tablet Take as directed on pack (Patient not taking: Reported on 6/16/2021) 1 Dose Pack 0    naloxone 4 mg/actuation spry 4 mg by Nasal route as needed. (Patient not taking: Reported on 6/16/2021) 1 Box 1       Allergies   Allergen Reactions    Latex Rash    Ciprofloxacin Anaphylaxis    Other Plant, Animal, Environmental Other (comments)     Patient cannot have MRI. Patient states that he has metal screws in his left arm.     Atorvastatin Other (comments) Possible Pancreatitis, which may also have been due to [facility].  Januvia [Sitagliptin] Other (comments)     Possible pancreatitis.  Lamisil [Terbinafine] Swelling     Tongue swelling    Metformin Other (comments)     Elevated cr 1.4    Morphine Other (comments)     \"loss of blood pressure\"    Other Medication Other (comments)     Doxasylin causes extreme GERD    Pcn [Penicillins] Rash    Pentothal [Thiopental Sodium] Shortness of Breath    Sulfa (Sulfonamide Antibiotics) Rash      Social History     Tobacco Use    Smoking status: Former Smoker     Packs/day: 1.00     Years: 31.00     Pack years: 31.00     Types: Pipe, Cigars     Quit date: 1995     Years since quittin.5    Smokeless tobacco: Never Used    Tobacco comment: smoked cigar   Substance Use Topics    Alcohol use: No    Drug use: No            Review of Systems   Constitutional: Positive for weight loss. Negative for chills and fever. HENT: Negative for nosebleeds. Eyes: Negative for blurred vision and double vision. Respiratory: Negative for cough, shortness of breath and wheezing. Cardiovascular: Negative for chest pain, palpitations, orthopnea, claudication, leg swelling and PND. Gastrointestinal: Negative for abdominal pain, heartburn, nausea and vomiting. Genitourinary: Negative for dysuria and hematuria. Musculoskeletal: Negative for falls and myalgias. Skin: Negative for rash. Neurological: Negative for dizziness, focal weakness and headaches. Endo/Heme/Allergies: Does not bruise/bleed easily. Psychiatric/Behavioral: Negative for substance abuse. Visit Vitals  /88 (BP 1 Location: Left upper arm, BP Patient Position: Sitting, BP Cuff Size: Adult long)   Pulse 83   Ht 5' 10\" (1.778 m)   Wt 106.9 kg (235 lb 9.6 oz)   SpO2 100%   BMI 33.81 kg/m²      Physical Exam  Constitutional:       Appearance: He is well-developed. HENT:      Head: Normocephalic and atraumatic.    Eyes: Conjunctiva/sclera: Conjunctivae normal.   Neck:      Vascular: No carotid bruit or JVD. Cardiovascular:      Rate and Rhythm: Normal rate and regular rhythm. Pulses: Normal pulses. Heart sounds: S1 normal and S2 normal. Heart sounds are distant. No murmur heard. No gallop. No S3 sounds. Pulmonary:      Breath sounds: Normal breath sounds. No wheezing or rales. Abdominal:      General: Bowel sounds are normal.      Palpations: Abdomen is soft. Tenderness: There is no abdominal tenderness. Musculoskeletal:         General: No swelling or deformity. Cervical back: Neck supple. Skin:     General: Skin is warm and dry. Neurological:      General: No focal deficit present. Mental Status: He is alert and oriented to person, place, and time. Psychiatric:         Mood and Affect: Mood normal.         Behavior: Behavior normal.       EKG: Sinus rhythm with marked sinus arrhythmia versus PACs, borderline low voltage,  No ST or T wave abnormalities concerning for ischemia. Compared to the previous EKG, no significant interval change. ASSESSMENT and PLAN    Heart palpitations. Patient symptoms have completely resolved since starting on metoprolol. He underwent a 30-day event monitor in July 2020 which did not show any significant arrhythmias. No further work-up or treatment needed. Essential hypertension. Patient's blood pressure improved following his weight loss. He no longer takes losartan and has been managed with metoprolol as monotherapy. Mixed dyslipidemia. This has improved with weight loss. He no longer takes a statin. Diabetes mellitus. According to the patient this is now better controlled. This is followed by endocrinology. Pancreatitis. Patient was diagnosed with this back in 2020. As result he has lost over 30 pounds in weight. He has significantly changed his diet and is doing much better now.     Follow-up in 1 year, sooner if needed

## 2021-08-12 ENCOUNTER — DOCUMENTATION ONLY (OUTPATIENT)
Dept: PULMONOLOGY | Age: 71
End: 2021-08-12

## 2021-08-26 ENCOUNTER — OFFICE VISIT (OUTPATIENT)
Dept: ONCOLOGY | Age: 71
End: 2021-08-26
Payer: MEDICARE

## 2021-08-26 ENCOUNTER — HOSPITAL ENCOUNTER (OUTPATIENT)
Dept: LAB | Age: 71
Discharge: HOME | End: 2021-08-26
Payer: MEDICARE

## 2021-08-26 VITALS
BODY MASS INDEX: 34.3 KG/M2 | HEART RATE: 79 BPM | HEIGHT: 70 IN | RESPIRATION RATE: 16 BRPM | WEIGHT: 239.6 LBS | DIASTOLIC BLOOD PRESSURE: 70 MMHG | OXYGEN SATURATION: 96 % | TEMPERATURE: 97.9 F | SYSTOLIC BLOOD PRESSURE: 112 MMHG

## 2021-08-26 DIAGNOSIS — D75.1 POLYCYTHEMIA: Primary | ICD-10-CM

## 2021-08-26 DIAGNOSIS — D75.1 POLYCYTHEMIA: ICD-10-CM

## 2021-08-26 LAB
BASOPHILS # BLD: 0.1 K/UL (ref 0–0.1)
BASOPHILS NFR BLD: 1 % (ref 0–2)
DIFFERENTIAL METHOD BLD: NORMAL
EOSINOPHIL # BLD: 0.3 K/UL (ref 0–0.4)
EOSINOPHIL NFR BLD: 5 % (ref 0–5)
ERYTHROCYTE [DISTWIDTH] IN BLOOD BY AUTOMATED COUNT: 12.7 % (ref 11.6–14.5)
HCT VFR BLD AUTO: 47.6 % (ref 36–48)
HGB BLD-MCNC: 15.8 G/DL (ref 13–16)
LYMPHOCYTES # BLD: 1.7 K/UL (ref 0.9–3.6)
LYMPHOCYTES NFR BLD: 24 % (ref 21–52)
MCH RBC QN AUTO: 28.3 PG (ref 24–34)
MCHC RBC AUTO-ENTMCNC: 33.2 G/DL (ref 31–37)
MCV RBC AUTO: 85.3 FL (ref 78–100)
MONOCYTES # BLD: 0.6 K/UL (ref 0.05–1.2)
MONOCYTES NFR BLD: 9 % (ref 3–10)
NEUTS SEG # BLD: 4.4 K/UL (ref 1.8–8)
NEUTS SEG NFR BLD: 62 % (ref 40–73)
PLATELET # BLD AUTO: 249 K/UL (ref 135–420)
PMV BLD AUTO: 9.9 FL (ref 9.2–11.8)
RBC # BLD AUTO: 5.58 M/UL (ref 4.35–5.65)
WBC # BLD AUTO: 7.1 K/UL (ref 4.6–13.2)

## 2021-08-26 PROCEDURE — 82668 ASSAY OF ERYTHROPOIETIN: CPT

## 2021-08-26 PROCEDURE — 82375 ASSAY CARBOXYHB QUANT: CPT

## 2021-08-26 PROCEDURE — 81206 BCR/ABL1 GENE MAJOR BP: CPT

## 2021-08-26 PROCEDURE — 85025 COMPLETE CBC W/AUTO DIFF WBC: CPT

## 2021-08-26 PROCEDURE — 81338 MPL GENE COMMON VARIANTS: CPT

## 2021-08-26 PROCEDURE — 36415 COLL VENOUS BLD VENIPUNCTURE: CPT

## 2021-08-26 PROCEDURE — 81270 JAK2 GENE: CPT

## 2021-08-26 PROCEDURE — 99204 OFFICE O/P NEW MOD 45 MIN: CPT | Performed by: INTERNAL MEDICINE

## 2021-08-26 PROCEDURE — G0463 HOSPITAL OUTPT CLINIC VISIT: HCPCS | Performed by: INTERNAL MEDICINE

## 2021-08-26 PROCEDURE — 81219 CALR GENE COM VARIANTS: CPT

## 2021-08-26 NOTE — PROGRESS NOTES
Hematology/Oncology Consultation Note      Date: 2021    Name: Fernando George  : 1950        Ke Craig MD         Subjective:     Chief complaint: Polycythemia    History of Present Illness:   Mr. James Ibarra is a most pleasant 65y.o. year old male who was seen for consultation of polycythemia. The patient has a past medical history significant of asthma/COPD with chronic shortness of breath, hypertension, dyslipidemia,  diabetes mellitus, hypothyroidism, low serum testosterone (?). He was former smoker, quit since . He denied any testosterone injection. 2021 CBC reported hemoglobin 16.2, hematocrit 50.2%, WBC 9.2, platelet 729,764. He was referred to pulmonology for COPD management, pending appointment. He reported chronic SOB and occasional headache. The patient otherwise has no other complaints. Denied fever, chills, night sweat, unintentional weight loss, skin lumps or bumps, acute bleeding or bruising issues. Denied acute vision change, chest pain, palpitation, productive cough, nausea, vomiting, abdominal pain, altered bowel habits, dysuria, worsen bone pain or back pain, new focal numbness or weakness. Past Medical History, Family History, and Social History:    Past Medical History:   Diagnosis Date    Arthritis     Asthma     Cancer ([facility] [address]     BASAL     Cardiac catheterization     [address], [place]:  No significant CAD.  Cardiac echocardiogram 2014    EF 50-55%. No WMA. Gr 1 DDfx. RVSP 25-30 mmHg. Mild TR.       DM type 2 (diabetes mellitus, type 2) (HCC)     Enlarged prostate     Failed back syndrome     GERD (gastroesophageal reflux disease)     Hearing loss, bilateral     Hearing Aids    Hypertension     Hypothyroidism     Insomnia     Low serum testosterone level     Neuropathy     Osteoarthritis of multiple joints     S/P colonoscopy 13    mild diverticulosis in sigmoid colon w/o evidence of diverticulitis; f/u 5 years    SOB (shortness of breath)     chronic; 2' \"lung fever\"    Vertigo      Past Surgical History:   Procedure Laterality Date    COLONOSCOPY N/A 4/3/2018    COLONOSCOPY performed by Luisa Lamas MD at [place] [address] [address]      removal of defective hardware    [address]    to remove bone fragments    HX LUMBAR FUSION      fusion from L4-S1 and implantation of hardware    HX LUMBAR FUSION      fusion on L5 area    HX ORTHOPAEDIC Bilateral     trigger finger syndrome-all 4 fingers    HX ORTHOPAEDIC Left     left arm torn muscle repair and placement of 2 screws    HX OTHER SURGICAL      skin cancer removal     Social History     Socioeconomic History    Marital status:      Spouse name: Not on file    Number of children: Not on file    Years of education: Not on file    Highest education level: Not on file   Occupational History    Occupation: Retired-Federal   Tobacco Use    Smoking status: Former Smoker     Packs/day: 1.00     Years: 31.00     Pack years: 31.00     Types: Pipe, Cigars     Quit date: 1995     Years since quittin.6    Smokeless tobacco: Never Used    Tobacco comment: smoked cigar   Vaping Use    Vaping Use: Never used   Substance and Sexual Activity    Alcohol use: No    Drug use: No    Sexual activity: Yes     Partners: Female   Other Topics Concern    Not on file   Social History Narrative    Not on file     Social Determinants of Health     Financial Resource Strain:     Difficulty of Paying Living Expenses:    Food Insecurity:     Worried About Running Out of Food in the Last Year:     [address] in the Last Year:    Transportation Needs:     Lack of Transportation (Medical):      Lack of Transportation (Non-Medical):    Physical Activity:     Days of Exercise per Week:     Minutes of Exercise per Session:    Stress:     Feeling of Stress :    Social Connections:     Frequency of Communication with Friends and Family:     Frequency of Social Gatherings with Friends and Family:     Attends Gnosticism Services:     Active Member of Clubs or Organizations:     Attends Club or Organization Meetings:     Marital Status:    Intimate Partner Violence:     Fear of Current or Ex-Partner:     Emotionally Abused:     Physically Abused:     Sexually Abused:      Family History   Problem Relation Age of Onset    Cancer Mother         Bone and Brain Cancer    Diabetes Mother     Liver Disease Father         Lung Cancer    Kidney Disease Sister     Diabetes Daughter     Colon Cancer Brother      Current Outpatient Medications   Medication Sig Dispense Refill    metoprolol tartrate (LOPRESSOR) 25 mg tablet Take 1 tablet by mouth twice daily 180 Tablet 0    topiramate (TOPAMAX) 25 mg tablet Take 1 tablet by mouth once daily with breakfast 90 Tablet 1    Euthyrox 125 mcg tablet TAKE 1 TABLET BY MOUTH ONCE DAILY BEFORE BREAKFAST 90 Tablet 1    tamsulosin (Flomax) 0.4 mg capsule Take 1 Cap by mouth daily. 90 Cap 0    budesonide-formoteroL (Symbicort) 160-4.5 mcg/actuation HFAA Take 2 Puffs by inhalation two (2) times a day. 1 Inhaler 5    pantoprazole (PROTONIX) 40 mg tablet Take 1 Tab by mouth daily. 90 Tab 1    diphenhydrAMINE-zinc acetate 1%-0.1% (BENADRYL) topical cream Apply  to affected area every eight (8) hours as needed for Itching. 30 g 0    senna (SENOKOT) 8.6 mg tablet Take 1 Tab by mouth daily. 30 Tab 0    doxepin (doxepin, ZONALON, PRUDOXIN, 5% cream) 5 % topical cream doxepin 5 % topical cream      montelukast (SINGULAIR) 10 mg tablet Take 1 tablet by mouth once daily 90 Tab 1    pregabalin (Lyrica) 150 mg capsule Take 1 Cap by mouth three (3) times daily. Max Daily Amount: 450 mg. TAKE 1 CAPSULE BY MOUTH THREE TIMES DAILY FOR 30 DAYS. MAXIMUM 3 CAPSULES DAILY. Indications: neuropathy 90 Cap 0    DULoxetine (CYMBALTA) 60 mg capsule Take 1 Cap by mouth two (2) times a day.  60 Cap 0    insulin glargine (BASAGLAR KWIKPEN U-100 INSULIN) 100 unit/mL (3 mL) inpn 52 Units by SubCUTAneous route nightly. 52 units at bedtime (Patient taking differently: 48 Units by SubCUTAneous route nightly. 52 units at bedtime) 5 Pen 3    fluticasone (FLONASE) 50 mcg/actuation nasal spray USE 1 SPRAY(S) IN EACH NOSTRIL ONCE DAILY 1 Bottle 0    OTHER EB-N3 once daily      FANI PEN NEEDLE 32 gauge x 5/32\" ndle USE AT BEDTIME 100 Pen Needle 6    albuterol (PROVENTIL VENTOLIN) 2.5 mg /3 mL (0.083 %) nebulizer solution 3 mL by Nebulization route every four (4) hours as needed for Wheezing or Shortness of Breath. 1 Package 5    insulin glulisine (APIDRA SOLOSTAR) 100 unit/mL pen 2 units per carb consumed. Max 30 / day (Patient taking differently: 16 Units by SubCUTAneous route Before breakfast, lunch, and dinner. 2 units per carb consumed. Max 30 / day  Indications: patient states taking 15-20 units three times a day) 5 Pen 3    PEN NEEDLE 31 gauge x 5/16\" ndle USE ONE PEN NEEDLE FOR LANTUS FLEXPEN AND 3 PEN NEEDLES FOR APIDRA WITH EACH MEAL 1 Package 3    betamethasone dipropionate (DIPROLENE) 0.05 % ointment Apply  to affected area nightly.  tiotropium (SPIRIVA) 18 mcg inhalation capsule Take 1 Cap by inhalation daily. (Patient taking differently: Take  by inhalation as needed.) 30 Cap 5    ketorolac (ACULAR) 0.5 % ophthalmic solution Administer 1 Drop to both eyes two (2) times daily as needed.  Creon 24,000-76,000 -120,000 unit capsule TAKE 2 CAPSULES BY MOUTH TWICE DAILY (Patient not taking: Reported on 8/26/2021)      losartan (COZAAR) 25 mg tablet TAKE 1 TABLET DAILY (Patient not taking: Reported on 8/26/2021) 90 Tab 1    methylPREDNISolone (MEDROL DOSEPACK) 4 mg tablet Take as directed on pack (Patient not taking: Reported on 6/16/2021) 1 Dose Pack 0    naloxone 4 mg/actuation spry 4 mg by Nasal route as needed.  (Patient not taking: Reported on 6/16/2021) 1 Box 1    ACCU-CHEK JAZZY PLUS TEST STRP strip  ACCU-CHEK SOFTCLIX LANCETS [facility]          Review of Systems   Constitutional: Negative for chills, diaphoresis, fever, malaise/fatigue and weight loss. Respiratory: Positive for shortness of breath. Negative for cough, hemoptysis and wheezing. Cardiovascular: Negative for chest pain, palpitations and leg swelling. Gastrointestinal: Negative for abdominal pain, diarrhea, heartburn, nausea and vomiting. Genitourinary: Negative for dysuria, frequency, hematuria and urgency. Musculoskeletal: Negative for joint pain and myalgias. Skin: Negative for itching and rash. Neurological: Negative for dizziness, seizures, weakness and headaches. Psychiatric/Behavioral: Negative for depression. The patient does not have insomnia. Objective:     Visit Vitals  /70 (BP 1 Location: Right upper arm, BP Patient Position: Sitting)   Pulse 79   Temp 97.9 °F (36.6 °C) (Temporal)   Resp 16   Ht 5' 10\" (1.778 m)   Wt 108.7 kg (239 lb 9.6 oz)   SpO2 96%   BMI 34.38 kg/m²       ECOG Performance Status (grade): 1  0 - able to carry on all pre-disease activity w/out restriction  1 - restricted but able to carry out light work  2 - ambulatory and can self- care but unable to carry out work  3 - bed or chair >50% of waking hours  4 - completely disable, total care, confined to bed or chair    Physical Exam  Constitutional:       General: He is not in acute distress. HENT:      Head: Normocephalic and atraumatic. Eyes:      Pupils: Pupils are equal, round, and reactive to light. Cardiovascular:      Pulses: Normal pulses. Heart sounds: Normal heart sounds. No murmur heard. Pulmonary:      Effort: Pulmonary effort is normal. No respiratory distress. Breath sounds: Normal breath sounds. Abdominal:      General: Bowel sounds are normal. There is no distension. Palpations: Abdomen is soft. There is no mass. Tenderness: There is no abdominal tenderness. There is no guarding. Musculoskeletal:         General: No swelling or tenderness. Cervical back: Neck supple. No rigidity. Lymphadenopathy:      Cervical: No cervical adenopathy. Skin:     General: Skin is warm. Findings: No rash. Neurological:      Mental Status: He is alert and oriented to person, place, and time. Mental status is at baseline. Cranial Nerves: No cranial nerve deficit. Psychiatric:         Mood and Affect: Mood normal.          Diagnostics:      No results found for this or any previous visit (from the past 96 hour(s)). Imaging:  No results found for this or any previous visit. Results for orders placed during the hospital encounter of 09/28/20    XR CHEST PA LAT    Narrative  EXAMINATION: Chest 2 views    INDICATION: Shortness of breath, cough    COMPARISON: 5/17/2017    FINDINGS: Frontal and lateral views of the chest obtained. Mediastinal  silhouette and pulmonary vasculature unremarkable. No consolidation. No evidence  of pneumothorax. No acute osseous findings. Impression  IMPRESSION:    No acute findings. Results for orders placed during the hospital encounter of 09/08/20    CT CHEST W CON PE PROTOCOL    Narrative  EXAM: CT CHEST W CON PE PROTOCOL    INDICATION: low o2 sat. TECHNIQUE: CT scan of the chest with IV contrast including coronal and sagittal  images. DICOM format image data is available to non-affiliated external healthcare  facilities or entities on a secure, media free, reciprocally searchable basis  with patient authorization for 12 months following the date of the study. COMPARISON:  No relevant prior studies available. FINDINGS:  Pulmonary arteries: Unremarkable. No pulmonary embolism. Aorta: No acute findings. No thoracic aortic aneurysm or dissection. Lungs: Right lower lobe discoid atelectasis. Lymph nodes: Unremarkable. No enlarged lymph nodes. Esophagus: Normal  Pleural spaces: Unremarkable. No significant effusion.  No pneumothorax. Heart: Unremarkable. No cardiomegaly. No significant pericardial effusion. No  evidence of right ventricular dysfunction. Thyroid: Normal  Chest wall: No abnormal findings. Bones: No fractures identified. Impression  IMPRESSION:  Right lower lobe atelectasis. No evidence of pulmonary embolism. Pathology          Assessment:                                        1. Polycythemia        Plan:                                        # Polycythemia    -- Past medical history significant of asthma/COPD with chronic shortness of breath, hypertension, dyslipidemia,  diabetes mellitus, hypothyroidism, low serum testosterone (?). He was former smoker, quit since 1995. He denied any testosterone injection. -- 6/18/2021 CBC reported hemoglobin 16.2, hematocrit 50.2%, WBC 9.2, platelet 506,290.  -- Today I have reviewed with the patient about lab findings. I have explained to the patient that polycythemia generally could be related to either a primary bone marrow problem or a secondary cause leading to excessive formation of red blood cells. Among common reasons of secondary polycythemia, cigarette smoking, obstructive sleep apnea, asthma, chronic pulmonary diseases, or cardiopulmonary diseases are most commonly associated. Primary bone marrow disorders which are known to be associated with Polycythemia are chronic myeloproliferative disorders e.g. Polycythemia Vera (80% of P. Vera have detectable JAK2 mutation), Essential Thrombocytosis (60% of ET is associated with JAK2 mutation) and Primary Myelofibrosis (40% of PMF is associated with JAK2 mutation). -- I have explained to the patient that today we will obtain initial workup includes repeat CBC with differential counts, chemistry, serum erythropoietin, JAK2/MPL/CALR mutations, BCR/ABL1, Carbon monoxide. Further workup will be guided by results from aforementioned initial study. -- I will see the patient back in about 2-3 weeks.  Always sooner if required. Orders Placed This Encounter    METABOLIC PANEL, COMPREHENSIVE     Standing Status:   Future     Standing Expiration Date:   8/27/2022    ERYTHROPOIETIN     Standing Status:   Future     Standing Expiration Date:   8/26/2022    CBC WITH AUTOMATED DIFF     Standing Status:   Future     Standing Expiration Date:   8/27/2022    JAK2 MUTATION ANALYSIS     Standing Status:   Future     Standing Expiration Date:   8/26/2022    MPL MUTATION ANALYSIS     Standing Status:   Future     Standing Expiration Date:   8/26/2022    CALR MUTATION ANALYSIS     Standing Status:   Future     Standing Expiration Date:   8/26/2022    BCR-ABL1, PCR, QT     Standing Status:   Future     Standing Expiration Date:   8/26/2022    CARBON MONOXIDE LEVEL, WHOLE BLOOD     Standing Status:   Future     Standing Expiration Date:   8/27/2022           Mr. Daniel Perez has a reminder for a \"due or due soon\" health maintenance. I have asked that he contact his primary care provider for follow-up on this health maintenance. All of patient's questions answered to their apparent satisfaction. They verbally show understanding and agreement with aforementioned plan. Babra Vasquez MD  8/26/2021        Parts of this document has been produced using Dragon dictation system. Unrecognized errors in transcription may be present. Please do not hesitate to reach out for any questions or clarifications.       CC: Tamica Ott MD

## 2021-08-27 LAB — COHGB MFR BLD: 1.8 % (ref 0–3.6)

## 2021-08-28 LAB — EPO SERPL-ACNC: 11.6 MIU/ML (ref 2.6–18.5)

## 2021-08-29 LAB
BACKGROUND: 489207: NORMAL
DIRECTOR REVIEW: 489204: NORMAL
JAK2 P.V617F BLD/T QL: NORMAL

## 2021-08-31 LAB
BACKGROUND, BCR6T: NORMAL
INTERPRETATION: 480488: NEGATIVE
LAB DIRECTOR NAME PROVIDER: NORMAL
LAB DIRECTOR NAME PROVIDER: NORMAL
MPL GENE MUT TESTED BLD/T: NORMAL
MPL P.W515L+W515K+S505N BLD/T QL: NORMAL
REFERENCES, MPLM6T: NORMAL
SERVICE CMNT-IMP: NORMAL
T(ABL1,BCR)B2A2/CONTROL BLD/T: NORMAL %
T(ABL1,BCR)B2A2/CONTROL BLD/T: NORMAL %
T(ABL1,BCR)B3A2/CONTROL BLD/T: NORMAL %
T(ABL1,BCR)E1A2/CONTROL BLD/T: NORMAL %

## 2021-09-01 LAB
BACKGROUND, CALR2T: NORMAL
CALR MUTATION DETECTION RESULT, CALR1T: NORMAL
LAB DIRECTOR NAME PROVIDER: NORMAL
REF LAB TEST METHOD: NORMAL
REFERENCES, CALR4T: NORMAL

## 2021-09-15 NOTE — PROGRESS NOTES
Marlena Marrero is a 65 y.o. male, evaluated via audio-only technology on 9/16/2021 for Follow-up  Polycythemia. Assessment & Plan:   # Polycythemia    -- Past medical history significant of asthma/COPD with chronic shortness of breath, hypertension, dyslipidemia,  diabetes mellitus, hypothyroidism, low serum testosterone (?). He was former smoker, quit since 1995. He denied any testosterone injection. -- 6/18/2021 CBC reported hemoglobin 16.2, hematocrit 50.2%, WBC 9.2, platelet 699,198.  -- Today I have reviewed with the patient about recent lab reports. 8/26/2021 CBC reported hemoglobin 15.8, hematocrit 47.6%, WBC 7.1, platelet 780,082. Erythropoietin 11.6. Negative JAK2/MPL/CALR mutations/BCR/ABL1, CO level 1.8.  -- He reported he has referred to Pulmonary, pending appointment. He admitted he stopped using sleep machine for a while. -- His polycythemia was likely secondary, 2/2 COPD/Asthma with chronic shortness of breath. Plan:  -- Phlebotomy 0.5 unit (250ml) each time with the goal of inducing an iron deficient state and reducing the Hct < 45%. -- Encouraged patient to maintain hydration and avoid vigorous exercise within 24 hours of phlebotomy. -- He will need to follow up Pulmonary for COPD/Asthma management, sleep study. -- I will see the patient back in clinic in about 4 months. Always sooner if required. The patient can have lab done prior to our next clinic visit. 12  Subjective:   Mr. Tamie Spain is a most pleasant 65y.o. year old male who was seen for consultation of polycythemia. The patient has a past medical history significant of asthma/COPD with chronic shortness of breath, hypertension, dyslipidemia,  diabetes mellitus, hypothyroidism, low serum testosterone (?). He was former smoker, quit since 1995. He denied any testosterone injection. 6/18/2021 CBC reported hemoglobin 16.2, hematocrit 50.2%, WBC 9.2, platelet 049,496.   He was referred to pulmonology for COPD management, pending appointment. He reported chronic SOB and occasional headache. The patient otherwise has no other complaints since last visit. Denied fever, chills, night sweat, unintentional weight loss, skin lumps or bumps, acute bleeding or bruising issues. Denied acute vision change, chest pain, palpitation, productive cough, nausea, vomiting, abdominal pain, altered bowel habits, dysuria, worsen bone pain or back pain, new focal numbness or weakness. Prior to Admission medications    Medication Sig Start Date End Date Taking? Authorizing Provider   metoprolol tartrate (LOPRESSOR) 25 mg tablet Take 1 tablet by mouth twice daily 8/16/21   Jorje Shahid MD   topiramate (TOPAMAX) 25 mg tablet Take 1 tablet by mouth once daily with breakfast 6/28/21   Jorje Shahid MD   Euthyrox 125 mcg tablet TAKE 1 TABLET BY MOUTH ONCE DAILY BEFORE BREAKFAST 6/25/21   Jorje Shahid MD   Creon 24,000-76,000 -120,000 unit capsule TAKE 2 CAPSULES BY MOUTH TWICE DAILY  Patient not taking: Reported on 8/26/2021 6/7/21   Provider, Historical   metoprolol tartrate (LOPRESSOR) 25 mg tablet Take 1 tablet by mouth twice daily 5/19/21   Jorje Shahid MD   tamsulosin (Flomax) 0.4 mg capsule Take 1 Cap by mouth daily. 3/19/21   Cristo Healy MD   losartan (COZAAR) 25 mg tablet TAKE 1 TABLET DAILY  Patient not taking: Reported on 8/26/2021 10/30/20   Jorje Shahid MD   budesonide-formoteroL (Symbicort) 160-4.5 mcg/actuation HFAA Take 2 Puffs by inhalation two (2) times a day. 9/21/20   Jorje Shahid MD   pantoprazole (PROTONIX) 40 mg tablet Take 1 Tab by mouth daily. 9/21/20   Jorje Shahid MD   diphenhydrAMINE-zinc acetate 1%-0.1% (BENADRYL) topical cream Apply  to affected area every eight (8) hours as needed for Itching. 9/16/20   Va Emmanuel MD   senna (SENOKOT) 8.6 mg tablet Take 1 Tab by mouth daily.  9/17/20   Marcelo Bateman MD   methylPREDNISolone (MEDROL DOSEPACK) 4 mg tablet Take as directed on pack  Patient not taking: Reported on 6/16/2021 9/16/20   Efrain Cr MD   doxepin (doxepin, ZONALON, PRUDOXIN, 5% cream) 5 % topical cream doxepin 5 % topical cream    Provider, Historical   montelukast (SINGULAIR) 10 mg tablet Take 1 tablet by mouth once daily 8/12/20   Jason Mathias MD   pregabalin (Lyrica) 150 mg capsule Take 1 Cap by mouth three (3) times daily. Max Daily Amount: 450 mg. TAKE 1 CAPSULE BY MOUTH THREE TIMES DAILY FOR 30 DAYS. MAXIMUM 3 CAPSULES DAILY. Indications: neuropathy 4/10/20   Jason Mathias MD   DULoxetine (CYMBALTA) 60 mg capsule Take 1 Cap by mouth two (2) times a day. 4/10/20   Jason Mathias MD   insulin glargine (BASAGLAR KWIKPEN U-100 INSULIN) 100 unit/mL (3 mL) inpn 52 Units by SubCUTAneous route nightly. 52 units at bedtime  Patient taking differently: 48 Units by SubCUTAneous route nightly. 52 units at bedtime 8/1/19   Jason Mathias MD   fluticasone [facility]) 50 mcg/actuation nasal spray USE 1 SPRAY(S) IN EACH NOSTRIL ONCE DAILY 1/11/19   Jason Mathias MD   OTHER EB-N3 once daily    Provider, Historical   FANI PEN NEEDLE 32 gauge x 5/32\" ndle USE AT BEDTIME 2/9/18   Jason Mathias MD   naloxone 4 mg/actuation spry 4 mg by Nasal route as needed. Patient not taking: Reported on 6/16/2021 4/27/17   Samuel Nascimento MD   ACCU-CHEK JAZZY PLUS TEST STRP strip  3/13/17   Provider, Historical   ACCU-CHEK SOFTCLIX LANCETS misc  3/13/17   Provider, Historical   albuterol (PROVENTIL VENTOLIN) 2.5 mg /3 mL (0.083 %) nebulizer solution 3 mL by Nebulization route every four (4) hours as needed for Wheezing or Shortness of Breath. 4/24/17   Jason Mathias MD   insulin glulisine (APIDRA SOLOSTAR) 100 unit/mL pen 2 units per carb consumed. Max 30 / day  Patient taking differently: 16 Units by SubCUTAneous route Before breakfast, lunch, and dinner. 2 units per carb consumed.   Max 30 / day  Indications: patient states taking 15-20 units three times a day 4/24/17 Mike Boothe MD   PEN NEEDLE 31 gauge x 5/16\" ndle USE ONE PEN NEEDLE FOR LANTUS FLEXPEN AND 3 PEN NEEDLES FOR APIDRA WITH EACH MEAL 1/27/17   Mike Boothe MD   betamethasone dipropionate (DIPROLENE) 0.05 % ointment Apply  to affected area nightly. 1/5/17   Provider, Historical   tiotropium (SPIRIVA) 18 mcg inhalation capsule Take 1 Cap by inhalation daily. Patient taking differently: Take  by inhalation as needed. 2/5/14   Elizabeth Gaitan MD   ketorolac (ACULAR) 0.5 % ophthalmic solution Administer 1 Drop to both eyes two (2) times daily as needed. Provider, Historical         Review of Systems   Constitutional: Negative for chills, diaphoresis, fever, malaise/fatigue and weight loss. Respiratory: Negative for cough, hemoptysis, shortness of breath and wheezing. Cardiovascular: Negative for chest pain, palpitations and leg swelling. Gastrointestinal: Negative for abdominal pain, diarrhea, heartburn, nausea and vomiting. Genitourinary: Negative for dysuria, frequency, hematuria and urgency. Musculoskeletal: Negative for joint pain and myalgias. Skin: Negative for itching and rash. Neurological: Negative for dizziness, seizures, weakness and headaches. Psychiatric/Behavioral: Negative for depression. The patient does not have insomnia. Patient-Reported Vitals 6/24/2020   Patient-Reported Weight 169   Patient-Reported Height 510   Patient-Reported Pulse . Patient-Reported Temperature 97.9   Patient-Reported Systolic  553   Patient-Reported Diastolic [address], who was evaluated through a patient-initiated, synchronous (real-time) audio only encounter, and/or her healthcare decision maker, is aware that it is a billable service, with coverage as determined by his insurance carrier. He provided verbal consent to proceed: Yes. He has not had a related appointment within my department in the past 7 days or scheduled within the next 24 hours.     On this date 09/16/2021 I have spent 20 minutes reviewing previous notes, test results and face to face (virtual) with the patient discussing the diagnosis and importance of compliance with the treatment plan as well as documenting on the day of the visit.     Gilford Crumble, MD

## 2021-09-16 ENCOUNTER — VIRTUAL VISIT (OUTPATIENT)
Dept: ONCOLOGY | Age: 71
End: 2021-09-16
Payer: MEDICARE

## 2021-09-16 DIAGNOSIS — D75.1 POLYCYTHEMIA: Primary | ICD-10-CM

## 2021-09-16 DIAGNOSIS — D75.1 SECONDARY POLYCYTHEMIA: ICD-10-CM

## 2021-09-16 PROCEDURE — 99442 PR PHYS/QHP TELEPHONE EVALUATION 11-20 MIN: CPT | Performed by: INTERNAL MEDICINE

## 2021-09-17 RX ORDER — LIDOCAINE 50 MG/G
OINTMENT TOPICAL
COMMUNITY
Start: 2021-09-22 | End: 2021-09-22

## 2021-09-22 ENCOUNTER — OFFICE VISIT (OUTPATIENT)
Dept: PULMONOLOGY | Age: 71
End: 2021-09-22
Payer: MEDICARE

## 2021-09-22 ENCOUNTER — TELEPHONE (OUTPATIENT)
Dept: PULMONOLOGY | Age: 71
End: 2021-09-22

## 2021-09-22 VITALS
SYSTOLIC BLOOD PRESSURE: 136 MMHG | HEIGHT: 70 IN | DIASTOLIC BLOOD PRESSURE: 82 MMHG | WEIGHT: 239.2 LBS | RESPIRATION RATE: 18 BRPM | HEART RATE: 85 BPM | OXYGEN SATURATION: 97 % | BODY MASS INDEX: 34.24 KG/M2 | TEMPERATURE: 97 F

## 2021-09-22 DIAGNOSIS — I50.32 CHRONIC DIASTOLIC HEART FAILURE (HCC): ICD-10-CM

## 2021-09-22 DIAGNOSIS — R06.02 SHORTNESS OF BREATH: ICD-10-CM

## 2021-09-22 DIAGNOSIS — Z87.891 PERSONAL HISTORY OF TOBACCO USE, PRESENTING HAZARDS TO HEALTH: ICD-10-CM

## 2021-09-22 DIAGNOSIS — G47.33 OSA (OBSTRUCTIVE SLEEP APNEA): Primary | ICD-10-CM

## 2021-09-22 DIAGNOSIS — G47.33 OSA (OBSTRUCTIVE SLEEP APNEA): ICD-10-CM

## 2021-09-22 DIAGNOSIS — R05.9 COUGH: ICD-10-CM

## 2021-09-22 DIAGNOSIS — R53.81 PHYSICAL DECONDITIONING: ICD-10-CM

## 2021-09-22 DIAGNOSIS — J44.9 ASTHMA-COPD OVERLAP SYNDROME (HCC): ICD-10-CM

## 2021-09-22 DIAGNOSIS — D45 POLYCYTHEMIA VERA (HCC): ICD-10-CM

## 2021-09-22 DIAGNOSIS — D45 POLYCYTHEMIA VERA (HCC): Primary | ICD-10-CM

## 2021-09-22 DIAGNOSIS — R06.09 DYSPNEA ON EXERTION: ICD-10-CM

## 2021-09-22 DIAGNOSIS — E66.09 CLASS 1 OBESITY DUE TO EXCESS CALORIES WITH SERIOUS COMORBIDITY AND BODY MASS INDEX (BMI) OF 34.0 TO 34.9 IN ADULT: ICD-10-CM

## 2021-09-22 PROCEDURE — 3017F COLORECTAL CA SCREEN DOC REV: CPT | Performed by: INTERNAL MEDICINE

## 2021-09-22 PROCEDURE — G8752 SYS BP LESS 140: HCPCS | Performed by: INTERNAL MEDICINE

## 2021-09-22 PROCEDURE — G8536 NO DOC ELDER MAL SCRN: HCPCS | Performed by: INTERNAL MEDICINE

## 2021-09-22 PROCEDURE — 99214 OFFICE O/P EST MOD 30 MIN: CPT | Performed by: INTERNAL MEDICINE

## 2021-09-22 PROCEDURE — G8417 CALC BMI ABV UP PARAM F/U: HCPCS | Performed by: INTERNAL MEDICINE

## 2021-09-22 PROCEDURE — G8427 DOCREV CUR MEDS BY ELIG CLIN: HCPCS | Performed by: INTERNAL MEDICINE

## 2021-09-22 PROCEDURE — G8754 DIAS BP LESS 90: HCPCS | Performed by: INTERNAL MEDICINE

## 2021-09-22 PROCEDURE — G8510 SCR DEP NEG, NO PLAN REQD: HCPCS | Performed by: INTERNAL MEDICINE

## 2021-09-22 PROCEDURE — 1101F PT FALLS ASSESS-DOCD LE1/YR: CPT | Performed by: INTERNAL MEDICINE

## 2021-09-22 RX ORDER — ALBUTEROL SULFATE 90 UG/1
2 AEROSOL, METERED RESPIRATORY (INHALATION)
COMMUNITY
End: 2021-09-22

## 2021-09-22 RX ORDER — TRAMADOL HYDROCHLORIDE 50 MG/1
50 TABLET ORAL
COMMUNITY
End: 2021-10-15

## 2021-09-22 RX ORDER — ALBUTEROL SULFATE 90 UG/1
1-2 AEROSOL, METERED RESPIRATORY (INHALATION)
Qty: 1 EACH | Refills: 5 | Status: SHIPPED | OUTPATIENT
Start: 2021-09-22

## 2021-09-22 NOTE — PROGRESS NOTES
Francois Darden presents today for   Chief Complaint   Patient presents with    Cough     follow up per Dr. Neda Perkins, last seen Dr. Rizwana Saha on 9/1/2020    Shortness of Breath    Results     CXR 9/28/2020, CT 9/8/2021 [facility])       Is someone accompanying this pt? No     Is the patient using any DME equipment during OV? Yes. Cane, [facility] N/A    Depression Screening:  3 most recent PHQ Screens 9/22/2021   PHQ Not Done -   Little interest or pleasure in doing things Not at all   Feeling down, depressed, irritable, or hopeless Not at all   Total Score PHQ 2 0       Learning Assessment:  Learning Assessment 5/22/2018   PRIMARY LEARNER Patient   HIGHEST LEVEL OF EDUCATION - PRIMARY LEARNER  GRADUATED HIGH SCHOOL OR GED   BARRIERS PRIMARY LEARNER NONE     -   [facility] -   [address] -    NEED -   LEARNER PREFERENCE PRIMARY DEMONSTRATION     -     -   LEARNER Renetta 11 -   ANSWERED BY patient   RELATIONSHIP SELF   ASSESSMENT COMMENT -       Abuse Screening:  Abuse Screening Questionnaire 7/8/2021   Do you ever feel afraid of your partner? N   Are you in a relationship with someone who physically or mentally threatens you? N   Is it safe for you to go home? Y       Fall Risk  Fall Risk Assessment, last 12 mths 9/22/2021   Able to walk? Yes   Fall in past 12 months? 1   Do you feel unsteady? 1   Are you worried about falling 1   Is the gait abnormal? 1   Number of falls in past 12 months 2   Fall with injury? 1         Coordination of Care:  1. Have you been to the ER, urgent care clinic since your last visit? Hospitalized since your last visit? No    2. Have you seen or consulted any other health care providers outside of the [address] [place] [address] since your last visit?  Include any pap smears or colon screening. No    Did not get COVID vaccine. Influenza vaccine received from Dr. Guo Eis office on September 2020 per patient. Immunization record updated.

## 2021-09-22 NOTE — LETTER
9/23/2021    Patient: Gunnar Chakraborty   YOB: 1950   Date of Visit: 9/22/2021     Hardy Rouse MD  [address] [facility]  [address]  [address] [facility]  Via In [place], [facility], [facility]  [address] [facility] [place] [address]  Via In [facility] [address]    Dear MD Sejal Banda MD,      Thank you for referring Mr. Gunnar Chakraborty to [address] [place] [address] for evaluation. My notes for this consultation are attached. If you have questions, please do not hesitate to call me. I look forward to following your patient along with you.       Sincerely,      Carlyle Martinez MD/MPH     Pulmonary, Critical Care Medicine  [facility]

## 2021-09-22 NOTE — PROGRESS NOTES
[address] [facility]  Pulmonary, Critical Care, and Sleep Medicine    Pulmonary Office F/U  Name: Kehinde Guaman 65 y.o. male  MRN: [id number]  : 1950  Service Date: 21    Referring Provider: Dagoberto Carmichael 90  [address]  [place],  [address]  Chief Complaint:   Chief Complaint   Patient presents with    Cough     follow up per Dr. Desean Willams, last seen Dr. Amanda Corona on 2020    Shortness of Breath    Results     CXR 2020, CT 2021 [facility])       History of Present Illness:  Kehinde Guaman is a 65 y.o. male, who presents to Pulmonary clinic referred for polycythemia. Patient was last seen in our clinic on 2020 by Dr. Cruz Pack. In the interval, patient reports he was recently seen by hematology-Dr. 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 Patient reports a history of asthma. Pt reports no use of his PRN albuterol either HFA (3-4 years) or nebs (not used in 2 years). Pt reports not using symbicort in the last 2 years. Pt reports that he worsed when moving here due to pine trees in , but after that, he reported that his symptoms improved. Pt reports nasal congestion and sinusitis with seaonsal change - worse with mold --- improves with flonase. 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 Patient has gained weight since then. Pt denies any snoring but reports EDS, has to nap daily  Dyspnea with mild to moderate exeertion ---- pt reports that he has more issues with back pain, not respiratory complaints.     Past Medical History:   Diagnosis Date    Arthritis     Asthma     Cancer ([address]     BASAL     Cardiac catheterization [address], [place]:  No significant CAD.  Cardiac echocardiogram 01/20/2014    EF 50-55%. No WMA. Gr 1 DDfx. RVSP 25-30 mmHg. Mild TR.       DM type 2 (diabetes mellitus, type 2) (HCC)     Enlarged prostate     Failed back syndrome     GERD (gastroesophageal reflux disease)     Hearing loss, bilateral     Hearing Aids    Hypertension     Hypothyroidism     Insomnia     Low serum testosterone level     Neuropathy     Osteoarthritis of multiple joints     Polycythemia     S/P colonoscopy 8/14/13    mild diverticulosis in sigmoid colon w/o evidence of diverticulitis; f/u 5 years    SOB (shortness of breath)     chronic; 2' \"lung fever\"    Vertigo      Past Surgical History:   Procedure Laterality Date    COLONOSCOPY N/A 4/3/2018    COLONOSCOPY performed by Xavi Meng MD at [facility] HX BACK SURGERY  2000    removal of defective hardware    [address]    to remove bone fragments    HX LUMBAR FUSION  1999    fusion from L4-S1 and implantation of hardware    HX LUMBAR FUSION  1984    fusion on L5 area    HX ORTHOPAEDIC Bilateral 2004    trigger finger syndrome-all 4 fingers    HX ORTHOPAEDIC Left 2004    left arm torn muscle repair and placement of 2 screws    HX OTHER SURGICAL      skin cancer removal     Family History   Problem Relation Age of Onset    Cancer Mother         Bone and Brain Cancer    Diabetes Mother     Liver Disease Father         Lung Cancer    Kidney Disease Sister     Diabetes Daughter     Colon Cancer Brother      Social History     Socioeconomic History    Marital status:      Spouse name: Not on file    Number of children: Not on file    Years of education: Not on file    Highest education level: Not on file   Occupational History    Occupation: Retired-Federal   Tobacco Use    Smoking status: Former Smoker     Packs/day: 1.00     Years: 31.00     Pack years: 31.00     Types: Pipe, Cigars     Quit date: 1995     Years since quittin.7    Smokeless tobacco: Never Used    Tobacco comment: smoked cigar   Vaping Use    Vaping Use: Never used   Substance and Sexual Activity    Alcohol use: No    Drug use: No    Sexual activity: Yes     Partners: Female   Other Topics Concern    Not on file   Social History Narrative    Not on file     Social Determinants of Health     Financial Resource Strain:     Difficulty of Paying Living Expenses:    Food Insecurity:     Worried About Running Out of Food in the Last Year:     Ran Out of Food in the Last Year:    Transportation Needs:     Lack of Transportation (Medical):  Lack of Transportation (Non-Medical):    Physical Activity:     Days of Exercise per Week:     Minutes of Exercise per Session:    Stress:     Feeling of Stress :    Social Connections:     Frequency of Communication with Friends and Family:     Frequency of Social Gatherings with Friends and Family:     Attends Tenriism Services:     Active Member of Clubs or Organizations:     Attends Club or Organization Meetings:     Marital Status:    Intimate Partner Violence:     Fear of Current or Ex-Partner:     Emotionally Abused:     Physically Abused:     Sexually Abused: Allergies   Allergen Reactions    Latex Rash    Ciprofloxacin Anaphylaxis    Other Plant, Animal, Environmental Other (comments)     Patient cannot have MRI. Patient states that he has metal screws in his left arm.  Atorvastatin Other (comments)     Possible Pancreatitis, which may also have been due to [place].  Januvia [Sitagliptin] Other (comments)     Possible pancreatitis.     Lamisil [Terbinafine] Swelling     Tongue swelling    Metformin Other (comments)     Elevated cr 1.4    Morphine Other (comments)     \"loss of blood pressure\"    Other Medication Other (comments)     Doxasylin causes extreme GERD    Pcn [Penicillins] Rash    Pentothal [Thiopental Sodium] Shortness of Breath    Sulfa (Sulfonamide Antibiotics) Rash     Prior to Admission medications    Medication Sig Start Date End Date Taking? Authorizing Provider   albuterol (PROVENTIL HFA, VENTOLIN HFA, PROAIR HFA) 90 mcg/actuation inhaler Take 2 Puffs by inhalation every six (6) hours as needed for Wheezing. Yes Provider, Historical   traMADoL (ULTRAM) 50 mg tablet Take 50 mg by mouth every six (6) hours as needed for Pain. Yes Provider, Historical   metoprolol tartrate (LOPRESSOR) 25 mg tablet Take 1 tablet by mouth twice daily  Patient taking differently: Take 25 mg by mouth two (2) times a day. .  8/16/21  Yes Pratibha Medeiros MD   topiramate (TOPAMAX) 25 mg tablet Take 1 tablet by mouth once daily with breakfast  Patient taking differently: Take 25 mg by mouth daily (with breakfast). 6/28/21  Yes Pratibha Medeiros MD   Euthyrox 125 mcg tablet TAKE 1 TABLET BY MOUTH ONCE DAILY BEFORE BREAKFAST  Patient taking differently: Take 125 mcg by mouth Daily (before breakfast). 6/25/21  Yes Pratibha Medeiros MD   Creon 24,000-76,000 -120,000 unit capsule Take 2 Capsules by mouth three (3) times daily (with meals). 6/7/21  Yes Provider, Historical   tamsulosin (Flomax) 0.4 mg capsule Take 1 Cap by mouth daily. 3/19/21  Yes Carlos Guerrero MD   budesonide-formoteroL (Symbicort) 160-4.5 mcg/actuation HFAA Take 2 Puffs by inhalation two (2) times a day. Patient taking differently: Take 2 Puffs by inhalation two (2) times daily as needed. 9/21/20  Yes Pratibha Medeiros MD   pantoprazole (PROTONIX) 40 mg tablet Take 1 Tab by mouth daily. Patient taking differently: Take 40 mg by mouth two (2) times a day. 9/21/20  Yes Pratibha Medeiros MD   diphenhydrAMINE-zinc acetate 1%-0.1% (BENADRYL) topical cream Apply  to affected area every eight (8) hours as needed for Itching. 9/16/20  Yes Omid Bateman MD   montelukast (SINGULAIR) 10 mg tablet Take 1 tablet by mouth once daily  Patient taking differently: Take 10 mg by mouth daily. 8/12/20  Yes Ita Urbina MD   pregabalin (Lyrica) 150 mg capsule Take 1 Cap by mouth three (3) times daily. Max Daily Amount: 450 mg. TAKE 1 CAPSULE BY MOUTH THREE TIMES DAILY FOR 30 DAYS. MAXIMUM 3 CAPSULES DAILY. Indications: neuropathy 4/10/20  Yes Ita Urbina MD   DULoxetine (CYMBALTA) 60 mg capsule Take 1 Cap by mouth two (2) times a day. 4/10/20  Yes Ita Urbina MD   insulin glargine (BASAGLAR KWIKPEN U-100 INSULIN) 100 unit/mL (3 mL) inpn 52 Units by SubCUTAneous route nightly. 52 units at bedtime  Patient taking differently: 48 Units by SubCUTAneous route nightly. 52 units at bedtime 8/1/19  Yes Ita Urbina MD   fluticasone (FLONASE) 50 mcg/actuation nasal spray USE 1 SPRAY(S) IN EACH NOSTRIL ONCE DAILY  Patient taking differently: 2 Sprays by Both Nostrils route nightly. 1/11/19  Yes Ita Urbina MD   OTHER EB-N3 once daily   Yes Provider, Historical   FANI PEN NEEDLE 32 gauge x 5/32\" ndle USE AT BEDTIME 2/9/18  Yes Ita Urbina MD   ACCU-CHEK JAZZY PLUS TEST STRP strip  3/13/17  Yes Provider, Historical   ACCU-CHEK SOFTCLIX LANCETS misc  3/13/17  Yes Provider, Historical   albuterol (PROVENTIL VENTOLIN) 2.5 mg /3 mL (0.083 %) nebulizer solution 3 mL by Nebulization route every four (4) hours as needed for Wheezing or Shortness of Breath. 4/24/17  Yes Ita Urbina MD   insulin glulisine (APIDRA SOLOSTAR) 100 unit/mL pen 2 units per carb consumed. Max 30 / day  Patient taking differently: 10 Units by SubCUTAneous route Before breakfast, lunch, and dinner. 2 units per carb consumed. Max 30 / day  Indications: patient states taking 15-20 units three times a day 4/24/17  Yes Ita Urbina MD   PEN NEEDLE 31 gauge x 5/16\" ndle USE ONE PEN NEEDLE FOR LANTUS FLEXPEN AND 3 PEN NEEDLES FOR APIDRA WITH EACH MEAL 1/27/17  Yes Ita Urbina MD   betamethasone dipropionate (DIPROLENE) 0.05 % ointment Apply  to affected area nightly.  1/5/17  Yes Provider, Historical   ketorolac (ACULAR) 0.5 % ophthalmic solution Administer 1 Drop to both eyes two (2) times daily as needed. Yes Provider, Historical   lidocaine (XYLOCAINE) 5 % ointment lidocaine 5 % topical ointment  Patient not taking: No sig reported 9/22/21 9/22/21  Provider, Historical   metoprolol tartrate (LOPRESSOR) 25 mg tablet Take 1 tablet by mouth twice daily 5/19/21   Judy Huitron MD   losartan (COZAAR) 25 mg tablet TAKE 1 TABLET DAILY  Patient not taking: Reported on 8/26/2021 10/30/20   Judy Huitron MD   senna (SENOKOT) 8.6 mg tablet Take 1 Tab by mouth daily. Patient not taking: Reported on 9/22/2021 9/17/20   Denisse Cabrera MD   doxepin (doxepin, ZONALON, PRUDOXIN, 5% cream) 5 % topical cream doxepin 5 % topical cream  Patient not taking: Reported on 9/22/2021 9/22/21 9/22/21  Provider, Historical   naloxone 4 mg/actuation spry 4 mg by Nasal route as needed. Patient not taking: Reported on 9/22/2021 4/27/17   Ivette Kirkpatrick MD   tiotropium [facility]) 18 mcg inhalation capsule Take 1 Cap by inhalation daily. Patient not taking: Reported on 9/22/2021 2/5/14   Mark Meza MD       Immunizations:  I have reviewed the patient's immunizations  Immunization History   Administered Date(s) Administered    Influenza High Dose Vaccine PF 09/22/2020    Influenza Vaccine 11/03/2014    Influenza Vaccine (Quad) PF (>6 Mo Flulaval, Fluarix, and >3 Yrs Afluria, Fluzone 08063) 09/16/2015, 01/25/2017    Influenza Vaccine (Tri) Adjuvanted (>65 Yrs FLUAD TRI 72350) 11/13/2018, 12/03/2019    Pneumococcal Conjugate (PCV-13) 07/20/2016    Pneumococcal Polysaccharide (PPSV-23) 03/30/2017    Tdap 05/16/2016    Zoster Recombinant 08/17/2019, 10/30/2019    Zoster Vaccine, Live 06/15/2017       Review of Systems:  A complete review of systems was performed as stated in the HPI, all others are negative.       Objective:    Physical Exam:  /82 (BP 1 Location: Left upper arm, BP Patient Position: Sitting, BP Cuff Size: Adult long)   Pulse 85   Temp 97 °F (36.1 °C) (Temporal)   Resp 18   Ht 5' 10\" (1.778 m)   Wt 108.5 kg (239 lb 3.2 oz)   SpO2 97%   BMI 34.32 kg/m²   Vitals were personally reviewed  Gen: no acute distress, pleasant and cooperative, sitting up in chair, able to climb to exam table with some difficulty, ambulates with cane  HEENT: normocephalic/atraumatic, no ocular drainage, EOMI, no scleral icterus, nasal bridge midline, unable to assess nasal and oral cavities due to patient wearing mask in the setting of COVID-19 pandemic  Neck: supple, trachea midline, no JVD, no cervical and supraclavicular adenopathy  CVS: regular rate rhythm, S1/S2, no murmurs/rubs/gallops  Lungs: good air entry B/L, CTABL, no wheezes/rales/rhonchi  Psych: normal memory, thought content, and processing    Labs: I have reviewed the patient's available labs  Lab Results   Component Value Date/Time    WBC 7.1 08/26/2021 10:23 AM    HGB 15.8 08/26/2021 10:23 AM    HCT 47.6 08/26/2021 10:23 AM    PLATELET 772 03/47/9640 10:23 AM    MCV 85.3 08/26/2021 10:23 AM     Lab Results   Component Value Date/Time    Sodium 139 06/18/2021 09:17 AM    Potassium 4.6 06/18/2021 09:17 AM    Chloride 106 06/18/2021 09:17 AM    CO2 27 06/18/2021 09:17 AM    Anion gap 6 06/18/2021 09:17 AM    Glucose 146 (H) 06/18/2021 09:17 AM    BUN 17 06/18/2021 09:17 AM    Creatinine 0.97 06/18/2021 09:17 AM    BUN/Creatinine ratio 18 06/18/2021 09:17 AM    GFR est AA >60 06/18/2021 09:17 AM    GFR est non-AA >60 06/18/2021 09:17 AM    Calcium 8.7 06/18/2021 09:17 AM    Bilirubin, total 0.5 06/18/2021 09:17 AM    Alk.  phosphatase 71 06/18/2021 09:17 AM    Protein, total 7.0 06/18/2021 09:17 AM    Albumin 3.9 06/18/2021 09:17 AM    Globulin 3.1 06/18/2021 09:17 AM    A-G Ratio 1.3 06/18/2021 09:17 AM    ALT (SGPT) 22 06/18/2021 09:17 AM    AST (SGOT) 10 06/18/2021 09:17 AM       Imaging:  I have personally reviewed patient's imaging as follows: Chest x-ray PA and lateral from 9/28/2020 shows clear lung fields bilaterally without infiltrate, mass, effusion, consolidation. CT chest from 9/8/2020 unavailable for review, report below  Official report per radiology:  XR Results (most recent):  Results from [place] encounter on 09/28/20    XR CHEST PA LAT    Narrative  EXAMINATION: Chest 2 views    INDICATION: Shortness of breath, cough    COMPARISON: 5/17/2017    FINDINGS: Frontal and lateral views of the chest obtained. Mediastinal  silhouette and pulmonary vasculature unremarkable. No consolidation. No evidence  of pneumothorax. No acute osseous findings. Impression  IMPRESSION:    No acute findings. CT Results (most recent):  Results from Hospital Encounter encounter on 09/08/20    CT CHEST W CON PE PROTOCOL    Narrative  EXAM: CT CHEST W CON PE PROTOCOL    INDICATION: low o2 sat. TECHNIQUE: CT scan of the chest with IV contrast including coronal and sagittal  images. DICOM format image data is available to non-affiliated external healthcare  facilities or entities on a secure, media free, reciprocally searchable basis  with patient authorization for 12 months following the date of the study. COMPARISON:  No relevant prior studies available. FINDINGS:  Pulmonary arteries: Unremarkable. No pulmonary embolism. Aorta: No acute findings. No thoracic aortic aneurysm or dissection. Lungs: Right lower lobe discoid atelectasis. Lymph nodes: Unremarkable. No enlarged lymph nodes. Esophagus: Normal  Pleural spaces: Unremarkable. No significant effusion. No pneumothorax. Heart: Unremarkable. No cardiomegaly. No significant pericardial effusion. No  evidence of right ventricular dysfunction. Thyroid: Normal  Chest wall: No abnormal findings. Bones: No fractures identified. Impression  IMPRESSION:  Right lower lobe atelectasis. No evidence of pulmonary embolism.         PFTs:  I have reviewed the patient's PFTs from 12/4/2013, spirometry shows a mild obstructive defect, however given patient's age likely normal based on the lower limit of normal.  There is not a significant BD response although close. Lung volumes are normal, and diffusion capacity is mildly reduced. Patient noted to have inconsistent efforts    TTE:  I have reviewed the patient's TTE results  Results for orders placed or performed during the hospital encounter of 14   2D ECHO COMPLETE ADULT (TTE)     Status: None    [facility]  [facility], [address]  [phone]    Transthoracic Echocardiogram    Patient: Lisa Duran  MRN: [id number]  [address] #: [de-identified]  : 1950  Age: 70 years  Gender: Male  Height: 73 in  Weight: 259 lb  BSA: 2.4 m squared  Study date: 2014  BP: 132 / 82 mmHg  Status: Routine  Location: [facility] ACC #: [id number]    Allergies: PENICILLINS, SULFA (SULFONAMIDE ANTIBIOTICS), MORPHINE,  TERBINAFINE, OTHER MEDICATION, THIOPENTAL SODIUM, CIPROFLOXACIN    Referring:  Sumeet Marie. Wilfredo Crow MD  Interpreting Group:  [facility]  Interpreting Physician:  Gail Goncalves DO  Technologist:  Pierce Dyson    SUMMARY:  Left ventricle: Size was at the upper limits of normal. Systolic function  was at the lower limits of normal by visual assessment. Ejection fraction  was estimated in the range of 50 % to 55 %. No obvious wall motion  abnormalities identified in the views obtained. Doppler parameters were  consistent with abnormal left ventricular relaxation (grade 1 diastolic  dysfunction). Right ventricle: Systolic pressure was at the upper limits of normal.  Estimated peak pressure was in the range of 25 mmHg to 30 mmHg. Tricuspid valve: There was mild regurgitation. INDICATIONS: Chest pain, Shortness of breath. HISTORY: Prior history: COPD, Asthma, diabetes, hyperlipidemia. PROCEDURE: This was a routine study. The study included complete 2D  imaging, M-mode, complete spectral Doppler, and color Doppler. The heart  rate was 87 bpm, at the start of the study. Systolic blood pressure was  132 mmHg, at the start of the study. Diastolic blood pressure was 82 mmHg,  at the start of the study. Images were obtained from the parasternal,  apical, subcostal, and suprasternal notch acoustic windows. This was a  technically difficult study with fair image quality. LEFT VENTRICLE: Size was at the upper limits of normal. Systolic function  was at the lower limits of normal by visual assessment. Ejection fraction  was estimated in the range of 50 % to 55 %. No obvious wall motion  abnormalities identified in the views obtained. Wall thickness was normal.  DOPPLER: Doppler parameters were consistent with abnormal left ventricular  relaxation (grade 1 diastolic dysfunction). RIGHT VENTRICLE: The size was normal. Systolic function was normal. Wall  thickness was normal. DOPPLER: Systolic pressure was at the upper limits  of normal. Estimated peak pressure was in the range of 25 mmHg to 30 mmHg. LEFT ATRIUM: Size was normal. LA volume index was 25 ml/m squared. RIGHT ATRIUM: Size was normal.    MITRAL VALVE: Normal valve structure. DOPPLER: There was trivial  regurgitation. AORTIC VALVE: The valve was trileaflet. Leaflets exhibited normal  thickness and normal cuspal separation. DOPPLER: Transaortic velocity was  within the normal range. There was no stenosis. There was no significant  regurgitation. TRICUSPID VALVE: Normal valve structure. There was normal leaflet  separation. DOPPLER: The transtricuspid velocity was within the normal  range. There was no evidence for tricuspid stenosis. There was mild  regurgitation. PULMONIC VALVE: Not well visualized, but normal Doppler findings. AORTA: The root exhibited normal size. SYSTEMIC VEINS: IVC: The inferior vena cava was normal in size and course. Respirophasic changes were normal.    PERICARDIUM: There was no pericardial effusion.  The pericardium was normal  in appearance. MEASUREMENT TABLES    2D measurements  Left ventricle   (Reference normals)  LVID ed, PLAX   51 mm   (35-60)  LVID es, PLAX   39 mm   (21-40)  FS, PLAX   23.53 %   (25-46)  LVPW thickness ed   12 mm   (6-11)  Ratio, IVS/LVPW   0.83    (<1.3)  Ventricular septum   (Reference normals)  IVS thickness ed   10 mm   (6-11)  Aorta   (Reference normals)  Root diam   31 mm   (--)  Right atrium   (Reference normals)  Area sys   15 cm squared   (8.3-19. 5)    Doppler measurements  Left ventricle   (Reference normals)  E/Ea, lat arun, tiss DP   8.8    (--)  E/Ea, med arun, tiss DP   9.5    (--)  Aortic valve   (Reference normals)  Peak yousuf   132 cm/s   (--)  Mean yousuf   89.8 cm/s   (--)  VTI   25 cm   (--)  Peak gradient   7 mmHg   (--)  Mean gradient   4 mmHg   (--)  Mitral valve   (Reference normals)  Peak E yousuf   92.8 cm/s   (--)  Peak A yousuf   108 cm/s   (--)  Peak E/A ratio   0.86    (--)  Tricuspid valve   (Reference normals)  Regurg peak yousuf   241 cm/s   (--)    Prepared and E-signed by    Emma Bell DO  Signed 20-Jan-2014 18:12:47             Assessment and Plan:  65 y.o. male with:    Impression:  1. Secondary polycythemia: Diagnosed by PCP and hematology, planning to start treatment with therapeutic phlebotomy. Work-up for primary polycythemia vera negative  2. Asthma COPD overlap: Patient likely has mild intermittent asthma now, symptoms were deviously severe approximately 7 years ago. COPD is also mild given PFTs from 2013  3. CHFpEF: Previous CT from 2014 shows mild diastolic dysfunction  4. Deconditioning: Secondary to chronic back issues  5. History of tobacco use: Smoked pipes and cigars, quit in 1996  6. Suspect GERSON: Given excessive daytime sleepiness in the setting of CHFpEF, hypertension, polycythemia, and obesity  7. Obesity[de-identified] Body mass index is 34.32 kg/m².   8.  Dyspnea on exertion/shortness of breath: Mild, mainly limited by back issues, multifactorial due to above    Plan:  -Management of polycythemia per hematology, patient anemic last year, now polycythemic. I advised patient that although he is to receive therapeutic phlebotomy, he may not require this for an extended period given previous labs  -Full PFTs  -6-minute walk test  -Transthoracic echo with bubble study  -Given recent CT and chest imaging, no further imaging required, no intraparenchymal pulmonary pathology to explain polycythemia  -Ordered split-night/diagnostic polysomnography due to risk of having GERSON. If negative, may consider nocturnal pulse oximetry given that nocturnal hypoxemia may be a contributor to polycythemia  -Counseled patient against sleepy driving  -Continue albuterol HFA 1-2puffs q4-6h PRN. Counseled patient that this is their rescue inhaler. Also counseled patient regarding premedication 15-30m prior to exercise or exposure to very cold air  -Counseled patient on proper inhaler technique  -Counseled patient to avoid potential triggers of asthma.  -Advised patient to increase activity and consider graded exercise. Given severe back issues, advised patient consider aqua therapy or equivalent low impact cardiovascular exercise  -Immunizations reviewed, pneumococcal vaccination up-to-date  -Counseled patient regarding lifestyle precautions in COVID-19 pandemic including wearing mask in public and confined spaces, social/physical distancing, frequent hand hygiene, etc.  Fern Mouse pt to receive COVID-19 vaccination when possible    Follow-up and Dispositions    · Return in about 3 months (around 12/22/2021).        Orders Placed This Encounter    AMB POC PFT COMPLETE W/BRONCHODILATOR    AMB POC PFT COMPLETE W/O BRONCHODILATOR    GAS DILUT/WASHOUT LUNG VOL W/WO DISTRIB VENT&VOL    DIFFUSING CAPACITY    PRO PULMONARY STRESS TESTING    SPLIT CPAP/PSG    TTE with bubble study    albuterol (PROVENTIL HFA, VENTOLIN HFA, PROAIR HFA) 90 mcg/actuation inhaler       Daniel Summers MD/MPH     Pulmonary, Critical Care Medicine  [facility]

## 2021-09-22 NOTE — TELEPHONE ENCOUNTER
Order placed for covid test , per Verbal Order from Dr Michelle Delcid on 9/22/2021. Last office visit: 9/22/2021  Follow up Visit: Due December 2021    Provider is aware of last office visit and follow up. No further action requested from provider.

## 2021-09-27 ENCOUNTER — HOSPITAL ENCOUNTER (OUTPATIENT)
Dept: LAB | Age: 71
Discharge: HOME | End: 2021-09-27
Payer: MEDICARE

## 2021-09-27 DIAGNOSIS — Z87.891 PERSONAL HISTORY OF TOBACCO USE, PRESENTING HAZARDS TO HEALTH: ICD-10-CM

## 2021-09-27 DIAGNOSIS — R05.9 COUGH: ICD-10-CM

## 2021-09-27 DIAGNOSIS — I50.32 CHRONIC DIASTOLIC HEART FAILURE (HCC): ICD-10-CM

## 2021-09-27 DIAGNOSIS — R06.02 SHORTNESS OF BREATH: ICD-10-CM

## 2021-09-27 DIAGNOSIS — G47.33 OSA (OBSTRUCTIVE SLEEP APNEA): ICD-10-CM

## 2021-09-27 DIAGNOSIS — J44.9 ASTHMA-COPD OVERLAP SYNDROME (HCC): ICD-10-CM

## 2021-09-27 DIAGNOSIS — D45 POLYCYTHEMIA VERA (HCC): ICD-10-CM

## 2021-09-27 PROCEDURE — 36415 COLL VENOUS BLD VENIPUNCTURE: CPT

## 2021-09-27 PROCEDURE — U0003 INFECTIOUS AGENT DETECTION BY NUCLEIC ACID (DNA OR RNA); SEVERE ACUTE RESPIRATORY SYNDROME CORONAVIRUS 2 (SARS-COV-2) (CORONAVIRUS DISEASE [COVID-19]), AMPLIFIED PROBE TECHNIQUE, MAKING USE OF HIGH THROUGHPUT TECHNOLOGIES AS DESCRIBED BY CMS-2020-01-R: HCPCS

## 2021-09-28 LAB — SARS-COV-2, COV2NT: NOT DETECTED

## 2021-10-01 ENCOUNTER — HOSPITAL ENCOUNTER (OUTPATIENT)
Dept: SLEEP MEDICINE | Age: 71
Discharge: HOME | End: 2021-10-01
Payer: MEDICARE

## 2021-10-01 PROCEDURE — 95810 POLYSOM 6/> YRS 4/> PARAM: CPT

## 2021-10-02 VITALS
BODY MASS INDEX: 34.07 KG/M2 | WEIGHT: 238 LBS | DIASTOLIC BLOOD PRESSURE: 82 MMHG | SYSTOLIC BLOOD PRESSURE: 120 MMHG | HEIGHT: 70 IN

## 2021-10-05 ENCOUNTER — HOSPITAL ENCOUNTER (OUTPATIENT)
Dept: INFUSION THERAPY | Age: 71
Discharge: HOME | End: 2021-10-05
Payer: MEDICARE

## 2021-10-05 ENCOUNTER — DOCUMENTATION ONLY (OUTPATIENT)
Dept: PULMONOLOGY | Age: 71
End: 2021-10-05

## 2021-10-05 VITALS
OXYGEN SATURATION: 94 % | SYSTOLIC BLOOD PRESSURE: 124 MMHG | TEMPERATURE: 99.1 F | DIASTOLIC BLOOD PRESSURE: 70 MMHG | RESPIRATION RATE: 18 BRPM | HEART RATE: 92 BPM

## 2021-10-05 LAB
BASO+EOS+MONOS # BLD AUTO: 0.9 K/UL (ref 0–2.3)
BASO+EOS+MONOS NFR BLD AUTO: 13 % (ref 0.1–17)
DIFFERENTIAL METHOD BLD: ABNORMAL
ERYTHROCYTE [DISTWIDTH] IN BLOOD BY AUTOMATED COUNT: 12.8 % (ref 11.5–14.5)
HCT VFR BLD AUTO: 48 % (ref 36–48)
HGB BLD-MCNC: 16.1 G/DL (ref 12–16)
LYMPHOCYTES # BLD: 1.8 K/UL (ref 1.1–5.9)
LYMPHOCYTES NFR BLD: 27 % (ref 14–44)
MCH RBC QN AUTO: 28.4 PG (ref 25–35)
MCHC RBC AUTO-ENTMCNC: 33.5 G/DL (ref 31–37)
MCV RBC AUTO: 84.8 FL (ref 78–102)
NEUTS SEG # BLD: 3.8 K/UL (ref 1.8–9.5)
NEUTS SEG NFR BLD: 59 % (ref 40–70)
PLATELET # BLD AUTO: 213 K/UL (ref 140–440)
RBC # BLD AUTO: 5.66 M/UL (ref 4.1–5.1)
WBC # BLD AUTO: 6.5 K/UL (ref 4.5–13)

## 2021-10-05 PROCEDURE — 85025 COMPLETE CBC W/AUTO DIFF WBC: CPT

## 2021-10-05 PROCEDURE — 36415 COLL VENOUS BLD VENIPUNCTURE: CPT

## 2021-10-05 PROCEDURE — 99195 PHLEBOTOMY: CPT

## 2021-10-05 NOTE — PROGRESS NOTES
[facility] Progress Note    Date: 2021    Name: Katarina Chapa    MRN: [id number]         : 1950    Therapeutic Phlebotomy (Monthly)    Mr. Ishmael Maradiaga to [facility], ambulatory using his cane at 1010. Pt was assessed and education was provided. Mr. Norton Headings vitals were reviewed and patient was observed for 5 minutes prior to treatment. Visit Vitals  /76 (BP 1 Location: Left upper arm, BP Patient Position: Sitting)   Pulse 76   Temp 98.1 °F (36.7 °C)   Resp 18   SpO2 95%     CBC drawn from patient's RAC x1 attempt. Gauze and coban to site. Speciman processed in house. HCT 48.0    20 g PIV placed in RFA x 1 attempt. Brisk blood return noted. Phlebotomy started at 1041 and ended at 1052 after draining off 250 ml of blood per order.  ml hung to infuse over 30 minutes as ordered. Snacks and fluids offered during procedure. Mr. Ishmael Maradiaga tolerated the procedure, and had no complaints. VS remained stable. Patient stayed 30 minutes post procedure. No signs or symptoms of complication noted. PIV flushed with NS 10 ml and removed. No bleeding or hematoma noted at site. Guaze and coban applied. Reviewed discharge instructions with patient. Copy of procdure given to patient to take home. Patient verbalized understanding of discharge instructions. Patient armband removed and shredded. Mr. Ishmael Maradiaga was discharged from [place] [address] in stable condition at 1200. He is return at 10/13/21 at 1100 for his next Therapeutic Phlebotomy (Weekly Status).       Erla Hodgkin  2021

## 2021-10-06 DIAGNOSIS — D45 POLYCYTHEMIA VERA (HCC): ICD-10-CM

## 2021-10-06 DIAGNOSIS — J44.9 ASTHMA-COPD OVERLAP SYNDROME (HCC): ICD-10-CM

## 2021-10-06 DIAGNOSIS — Z87.891 PERSONAL HISTORY OF TOBACCO USE, PRESENTING HAZARDS TO HEALTH: ICD-10-CM

## 2021-10-06 DIAGNOSIS — G47.33 OSA (OBSTRUCTIVE SLEEP APNEA): ICD-10-CM

## 2021-10-06 NOTE — PROGRESS NOTES
[address] [facility]  [address] [facility], [facility], [address], [address],  [phone]     Polysomnography Report    Katheryn Morgan       Patient: Earl Cruz  Study Type: Diagnostic PSG   : 1950 Patient Details: Male, 65 years, Height 5' 10\",   MR#: [id number]  Weight 238 lbs, BMI 34.15   Physician: Zee Gomez DO Ref. Physician: Derrek Shoemaker MD   Recording Technician: PRABHAKAR AlcantarEEGMELBA, [facility] Recording Details: Recorded at 10:59:13 PM on 10/1/2021    Scoring Technician:   for 487 minutes. STUDY PROTOCOL: The study consisted of 14 channels, including left and right EOG and EEG, submental and extremity EMG, EKG, airflow, respiratory effort and oximetry measurement. The study report was generated by personal review of the raw data from the patient's sleep study. PROCEDURE: Diagnostic Sleep Study      MEDICATIONS: Ultram, albuterol inhaler, Lopressor, Topamax, Euthyrox, Flomax, Symbicort, Protonix, Benadryl, Senokot, Singulair, Lyrica, Cynbalta, Badaglar, Flonase, Mireya pen Naloxone spray, Apidra Solostar. Diprolene, Spiriva, Acular    SUMMARY: The blood pressure readings: pre   sleep BP:  128/74 post sleep BP; 120/82. The sleep efficiency was 89%. Total Sleep Time: 6.5 hours. The patient spent 1.0 minutes awake after sleep onset. During the night, 26 awakenings were recorded. The arousal index was 21.6 per hour. The sleep onset latency was 5.0 minutes; the stage 2 latency was 6.5 minutes. REM sleep was 6 percent of sleep time; slow wave sleep was 47 percent. Snoring was noted to be mild and intermittent. The overall AHI was 1.4 per hour. The RDI was 2.2 per hour. The PLM index was 30.0, with 5 associated with arousal hourly, on average. The time with oxygen saturation below 88% was 48 minutes. The minimum oxygen saturation was 86%. The oxygen desaturation index was 2.2.  The average respiratory event lasted 18.7 seconds. The longest event was 34.7 seconds. The respiratory rate was typically between 12 and 20 breaths per minute; Connie Bear pattern was not seen. Mouth breathing was occasionally noted by recording tech. No hypnotic was reportedly taken. The patient reported insufficient sleep the previous night (<7 hours). Sleep hygiene violation:  caffeine. Sleep was interrupted by one bathroom trip. The predominant cardiac tracing appeared to be normal sinus rhythm. Occasional PACs and PVCs along with bursts of significant sinus arrythmia were noted. No alpha intrusion, parasomnias or EEG abnormalities occurred. Supplemental oxygen was not used during this study. Positive Airway Pressure was not used during this study. Overall, the patient appeared to tolerate study but the sleep efficiency was mildly reduced. The patient experience sleep fragmentation which includes micro arousals and arousals from periodic limb movements. The periodic limb movement index was elevated. In some instances, this can suggest an underlying periodic limb movement disorder or other pain process such as radiculopathy. Further clinical correlated of this later finding is recommended. Nocturnal hypoxia was also noted. The patient did not meet the diagnostic criterial for GERSON. REM sleep was not achieved until the end of this study and the patient did not achieve REM supine sleep. The majority of the few apneas that were observed occurred during REM sleep. Overall, the patient reported sleeping worse than his typical sleep pattern. Due to reduced sleep efficiency and REM sleep, it is possible that a sleep breathing disorder exits but was not observed during this study. Cardiac findings noted above.      <U>DIAGNOSIS: 1) Primary Snoring  2) Nocturnal Hypoxia  3) Periodic Limb Movement Disorder  4) Premature Atrial Contractions (PACs)  5) Premature Ventricular Contractions (PVCs)  6) Sinus Arrythmia    RECOMMENDATIONS:  Consider repeating sleep study if there is a strong suspicion for GERSON.  Encourage patient to avoid sleeping in either a flat and/or supine position.  Follow up with referring nocturnal hypoxia.  If above cardiac findings are new, formal cardiology consultation may be warranted   Clinical correlation for elevated PLM index with arousals.  If snoring is disruptive a trial of antihistamines, external nasal strips, and/or nasal steroids may be beneficial.   Other non-invasive treatment options are recommended were applicable and include the following: weight reduction, smoking cessation, and modification of alcohol ingestion and/or sedating agents.  If sino-nasal anatomic abnormalities are present, an ENT evaluation for possible surgical correction may be considered.  Healthy sleep habit education and reinforcement will be reviewed with the patient.  Individuals are encouraged to obtain 7-9 hours of sleep per day.   safety is encouraged. Drowsy and/or inattentive driving should be avoided.  Follow up with referring provider.            Lindsey Nix DO Electronically signed at 11:09:12 PM, October 5, 2021

## 2021-10-13 ENCOUNTER — HOSPITAL ENCOUNTER (OUTPATIENT)
Dept: INFUSION THERAPY | Age: 71
Discharge: HOME | End: 2021-10-13
Payer: MEDICARE

## 2021-10-13 VITALS
SYSTOLIC BLOOD PRESSURE: 117 MMHG | OXYGEN SATURATION: 98 % | RESPIRATION RATE: 20 BRPM | TEMPERATURE: 99.1 F | DIASTOLIC BLOOD PRESSURE: 74 MMHG | HEART RATE: 85 BPM

## 2021-10-13 LAB
BASO+EOS+MONOS # BLD AUTO: 0.9 K/UL (ref 0–2.3)
BASO+EOS+MONOS NFR BLD AUTO: 9 % (ref 0.1–17)
DIFFERENTIAL METHOD BLD: ABNORMAL
ERYTHROCYTE [DISTWIDTH] IN BLOOD BY AUTOMATED COUNT: 12.9 % (ref 11.5–14.5)
HCT VFR BLD AUTO: 43.6 % (ref 36–48)
HGB BLD-MCNC: 14.6 G/DL (ref 12–16)
LYMPHOCYTES # BLD: 2.2 K/UL (ref 1.1–5.9)
LYMPHOCYTES NFR BLD: 23 % (ref 14–44)
MCH RBC QN AUTO: 28.5 PG (ref 25–35)
MCHC RBC AUTO-ENTMCNC: 33.5 G/DL (ref 31–37)
MCV RBC AUTO: 85.2 FL (ref 78–102)
NEUTS SEG # BLD: 6.3 K/UL (ref 1.8–9.5)
NEUTS SEG NFR BLD: 68 % (ref 40–70)
PLATELET # BLD AUTO: 290 K/UL (ref 140–440)
RBC # BLD AUTO: 5.12 M/UL (ref 4.1–5.1)
WBC # BLD AUTO: 9.4 K/UL (ref 4.5–13)

## 2021-10-13 PROCEDURE — 36415 COLL VENOUS BLD VENIPUNCTURE: CPT

## 2021-10-13 PROCEDURE — 85025 COMPLETE CBC W/AUTO DIFF WBC: CPT

## 2021-10-13 NOTE — PROGRESS NOTES
[facility] Progress Note    Date: 2021    Name: Shoaib Coyne    MRN: [id number]         : 1950    Mr. Aamir Noyola arrived in the [facility] today at 0, in stable condition, here for CBC/Phlebotomy (Weekly Status). He was assessed and education was provided. Mr. Misha Messer vitals were reviewed. Visit Vitals  /74 (BP 1 Location: Right upper arm, BP Patient Position: Sitting)   Pulse 85   Temp 99.1 °F (37.3 °C)   Resp 20   SpO2 98%         Blood for the ordered CBC was drawn from his right AC at 1328 without incident, and was processed on site. Lab results were obtained and reviewed, and were as follows:    Recent Results (from the past 12 hour(s))   CBC WITH 3 PART DIFF    Collection Time: 10/13/21  1:28 PM   Result Value Ref Range    WBC 9.4 4.5 - 13.0 K/uL    RBC 5.12 (H) 4.10 - 5.10 M/uL    HGB 14.6 12.0 - 16 g/dL    HCT 43.6 36 - 48 %    MCV 85.2 78 - 102 FL    MCH 28.5 25.0 - 35.0 PG    MCHC 33.5 31 - 37 g/dL    RDW 12.9 11.5 - 14.5 %    PLATELET 737 797 - 420 K/uL    NEUTROPHILS 68 40 - 70 %    MIXED CELLS 9 0.1 - 17 %    LYMPHOCYTES 23 14 - 44 %    ABS. NEUTROPHILS 6.3 1.8 - 9.5 K/UL    ABS. MIXED CELLS 0.9 0.0 - 2.3 K/uL    ABS. LYMPHOCYTES 2.2 1.1 - 5.9 K/UL    DF AUTOMATED             Phlebotomy was HELD today per order, for HCT < 45.0 (weekly status parameters). Mr. Aamir Noyola tolerated well and voiced no complaints. Mr. Aamir Noyola was discharged from [place] [address] in stable condition at 1340. Philip Simmons He is to return in 1 month, on Wednesday, 11-10-21 at 1300, for his next appointment, for CBC/Phlebotomy (Monthly Status).      Francine Lord RN  2021  1:24 PM

## 2021-10-15 ENCOUNTER — OFFICE VISIT (OUTPATIENT)
Dept: FAMILY MEDICINE CLINIC | Age: 71
End: 2021-10-15
Payer: MEDICARE

## 2021-10-15 VITALS
TEMPERATURE: 97.7 F | WEIGHT: 238 LBS | SYSTOLIC BLOOD PRESSURE: 120 MMHG | RESPIRATION RATE: 16 BRPM | DIASTOLIC BLOOD PRESSURE: 78 MMHG | HEART RATE: 84 BPM | OXYGEN SATURATION: 98 % | BODY MASS INDEX: 34.07 KG/M2 | HEIGHT: 70 IN

## 2021-10-15 DIAGNOSIS — K21.9 GASTROESOPHAGEAL REFLUX DISEASE WITHOUT ESOPHAGITIS: ICD-10-CM

## 2021-10-15 DIAGNOSIS — J44.9 ASTHMA WITH COPD (CHRONIC OBSTRUCTIVE PULMONARY DISEASE) (HCC): ICD-10-CM

## 2021-10-15 DIAGNOSIS — E11.21 TYPE 2 DIABETES WITH NEPHROPATHY (HCC): Primary | ICD-10-CM

## 2021-10-15 DIAGNOSIS — I10 ESSENTIAL HYPERTENSION: ICD-10-CM

## 2021-10-15 DIAGNOSIS — D75.1 POLYCYTHEMIA: ICD-10-CM

## 2021-10-15 DIAGNOSIS — G62.89 OTHER POLYNEUROPATHY: ICD-10-CM

## 2021-10-15 DIAGNOSIS — Z98.890 H/O LUMBOSACRAL SPINE SURGERY: ICD-10-CM

## 2021-10-15 DIAGNOSIS — E03.9 HYPOTHYROIDISM, UNSPECIFIED TYPE: ICD-10-CM

## 2021-10-15 DIAGNOSIS — N40.0 BENIGN PROSTATIC HYPERPLASIA WITHOUT LOWER URINARY TRACT SYMPTOMS: ICD-10-CM

## 2021-10-15 DIAGNOSIS — E78.00 PURE HYPERCHOLESTEROLEMIA: ICD-10-CM

## 2021-10-15 LAB — HBA1C MFR BLD HPLC: 6.2 %

## 2021-10-15 PROCEDURE — G0463 HOSPITAL OUTPT CLINIC VISIT: HCPCS | Performed by: FAMILY MEDICINE

## 2021-10-15 PROCEDURE — G8754 DIAS BP LESS 90: HCPCS | Performed by: FAMILY MEDICINE

## 2021-10-15 PROCEDURE — 3044F HG A1C LEVEL LT 7.0%: CPT | Performed by: FAMILY MEDICINE

## 2021-10-15 PROCEDURE — 99214 OFFICE O/P EST MOD 30 MIN: CPT | Performed by: FAMILY MEDICINE

## 2021-10-15 PROCEDURE — G8427 DOCREV CUR MEDS BY ELIG CLIN: HCPCS | Performed by: FAMILY MEDICINE

## 2021-10-15 PROCEDURE — G8752 SYS BP LESS 140: HCPCS | Performed by: FAMILY MEDICINE

## 2021-10-15 PROCEDURE — 2022F DILAT RTA XM EVC RTNOPTHY: CPT | Performed by: FAMILY MEDICINE

## 2021-10-15 PROCEDURE — G8417 CALC BMI ABV UP PARAM F/U: HCPCS | Performed by: FAMILY MEDICINE

## 2021-10-15 PROCEDURE — G8536 NO DOC ELDER MAL SCRN: HCPCS | Performed by: FAMILY MEDICINE

## 2021-10-15 PROCEDURE — G8510 SCR DEP NEG, NO PLAN REQD: HCPCS | Performed by: FAMILY MEDICINE

## 2021-10-15 PROCEDURE — 3017F COLORECTAL CA SCREEN DOC REV: CPT | Performed by: FAMILY MEDICINE

## 2021-10-15 PROCEDURE — 1101F PT FALLS ASSESS-DOCD LE1/YR: CPT | Performed by: FAMILY MEDICINE

## 2021-10-15 PROCEDURE — 83036 HEMOGLOBIN GLYCOSYLATED A1C: CPT | Performed by: FAMILY MEDICINE

## 2021-10-15 NOTE — PROGRESS NOTES
HISTORY OF PRESENT ILLNESS  Imtiaz Ma is a 65 y.o. male. HPI: Here for follow-up. Diabetes. Well controlled. A1c at the office was in prediabetic range. No hyper or hypoglycemic symptoms. Been following endocrinology and the recommendation. Checking blood sugar at home. Mostly under 120. Working on diet modification. Due to chronic back pain limitation in exercise. Back pain has been fairly stable on symptomatic treatment. Following spine center. History of COPD/asthma/sleep apnea. Due to polycythemia he was sent to Dr. Maco Osullivan for sleep apnea evaluation. According to patient sleep study was negative. Will follow specialist recommendation. No increased use of albuterol. No cough or wheezing or any fever or chest congestion. Hypertension. Vitals been stable. Asymptomatic. Taking medication with compliance. No urinary complaint. GERD symptom has been stable. Radiculopathy and/or neuropathy symptoms has been stable on symptomatic treatment. On statin. No side effects. Again working on diet modification. Discussed still elevated lipid panel. He will work with more compliance. Denies any headache, dizziness, no chest pain or trouble breathing, no arm or leg weakness. No nausea or vomiting, no weight or appetite changes, no mood changes . No urine or bowel complains, no palpitation, no diaphoresis. No abdominal pain. No cold or cough. No leg swelling. No fever. No sleep trouble. Visit Vitals  /78 (BP 1 Location: Left arm, BP Patient Position: Sitting, BP Cuff Size: Adult)   Pulse 84   Temp 97.7 °F (36.5 °C) (Temporal)   Resp 16   Ht 5' 10\" (1.778 m)   Wt 238 lb (108 kg)   SpO2 98%   BMI 34.15 kg/m²     Review medication list, vitals, problem list,allergies.    Lab Results   Component Value Date/Time    WBC 9.4 10/13/2021 01:28 PM    HGB 14.6 10/13/2021 01:28 PM    HCT 43.6 10/13/2021 01:28 PM    PLATELET 169 63/53/0822 01:28 PM    MCV 85.2 10/13/2021 01:28 PM     Lab Results   Component Value Date/Time    Sodium 139 06/18/2021 09:17 AM    Potassium 4.6 06/18/2021 09:17 AM    Chloride 106 06/18/2021 09:17 AM    CO2 27 06/18/2021 09:17 AM    Anion gap 6 06/18/2021 09:17 AM    Glucose 146 (H) 06/18/2021 09:17 AM    BUN 17 06/18/2021 09:17 AM    Creatinine 0.97 06/18/2021 09:17 AM    BUN/Creatinine ratio 18 06/18/2021 09:17 AM    GFR est AA >60 06/18/2021 09:17 AM    GFR est non-AA >60 06/18/2021 09:17 AM    Calcium 8.7 06/18/2021 09:17 AM    Bilirubin, total 0.5 06/18/2021 09:17 AM    Alk. phosphatase 71 06/18/2021 09:17 AM    Protein, total 7.0 06/18/2021 09:17 AM    Albumin 3.9 06/18/2021 09:17 AM    Globulin 3.1 06/18/2021 09:17 AM    A-G Ratio 1.3 06/18/2021 09:17 AM    ALT (SGPT) 22 06/18/2021 09:17 AM    AST (SGOT) 10 06/18/2021 09:17 AM     Lab Results   Component Value Date/Time    Cholesterol, total 228 (H) 06/18/2021 09:17 AM    HDL Cholesterol 40 06/18/2021 09:17 AM    LDL, calculated 142.4 (H) 06/18/2021 09:17 AM    VLDL, calculated 45.6 06/18/2021 09:17 AM    Triglyceride 228 (H) 06/18/2021 09:17 AM    CHOL/HDL Ratio 5.7 (H) 06/18/2021 09:17 AM     Lab Results   Component Value Date/Time    TSH 0.98 06/18/2021 09:17 AM     Lab Results   Component Value Date/Time    Hemoglobin A1c 6.8 (H) 02/21/2020 07:58 AM    Hemoglobin A1c (POC) 6.2 10/15/2021 10:08 AM    Hemoglobin A1c, External 7.2 06/16/2017 12:00 AM         Lab Results   Component Value Date/Time    Prostate Specific Ag 0.3 03/01/2019 01:46 PM    Prostate Specific Ag 0.2 03/08/2018 09:34 AM    Prostate Specific Ag 0.3 03/09/2017 09:00 AM       ROS: See HPI    Physical Exam  Constitutional:       General: He is not in acute distress. Cardiovascular:      Rate and Rhythm: Normal rate and regular rhythm. Heart sounds: Normal heart sounds. Abdominal:      General: Bowel sounds are normal.      Palpations: Abdomen is soft. Tenderness: There is no abdominal tenderness. Musculoskeletal:         General: No swelling.       Cervical back: Neck supple. Neurological:      Mental Status: He is oriented to person, place, and time. Psychiatric:         Behavior: Behavior normal.         ASSESSMENT and PLAN    ICD-10-CM ICD-9-CM    1. Type 2 diabetes with nephropathy ([facility].): A1c in prediabetic range. Continue current plan. Keep follow-up with endocrinology as the recommendation E11.21 250.40 AMB POC HEMOGLOBIN A1C     583.81    2. Asthma with COPD (chronic obstructive pulmonary disease) ([facility] 75.): Fairly stable. No increased use of albuterol J44.9 493.20    3. Essential hypertension:well controlled. Continue current dose of medication and low salt diet. Exercise as tolerated. . I10 401.9    4. Other polyneuropathy: Stable on symptomatic treatment G62.89 357.89    5. Gastroesophageal reflux disease without esophagitis: Stable on symptomatic treatment. He is working with compliance on diet modification K21.9 530.81    6. Pure hypercholesterolemia: On statin. Working on diet modification: E78.00 272.0    7. Hypothyroidism, unspecified type asymptomatic taking medication with compliance. Following Endo E03.9 244.9    8. Benign prostatic hyperplasia without lower urinary tract symptoms: No urinary complaint. Fairly stable N40.0 600.00    9. H/O lumbosacral spine surgery  Z98.890 V15.29    10. Polycythemia: Has been following hematology. Recently no need for phlebotomy. Will observe and follow specialist recommendation D75.1 238.4    Patient understood agreed with the plan  Has not taken Covid shot or flu shot. He is worried. He will think about it and if needed will take it from the pharmacy  Following podiatry. Will obtain the records. Had done the eye exam. Will obtain the records  Follow-up and Dispositions    · Return in about 4 months (around 2/15/2022). Please note that this dictation was completed with [employer], the [employer] voice recognition software.   Quite often unanticipated grammatical, syntax, homophones, and other interpretive errors are inadvertently transcribed by the computer software. Please disregard these errors. Please excuse any errors that have escaped final proofreading.

## 2021-10-15 NOTE — PATIENT INSTRUCTIONS
Nutrition Tips for Diabetes: After Your Visit  Your Care Instructions  A healthy diet is important to manage diabetes. It helps you lose weight (if you need to) and keep it off. It gives you the nutrition and energy your body needs and helps prevent heart disease. But a diet for diabetes does not mean that you have to eat special foods. You can eat what your family eats, including occasional sweets and other favorites. But you do have to pay attention to how often you eat and how much you eat of certain foods. The right plan for you will give you meals that help you keep your blood sugar at healthy levels. Try to eat a variety of foods and to spread carbohydrate throughout the day. Carbohydrate raises blood sugar higher and more quickly than any other nutrient does. Carbohydrate is found in sugar, breads and cereals, fruit, starchy vegetables such as potatoes and corn, and milk and yogurt. You may want to work with a dietitian or diabetes educator to help you plan meals and snacks. A dietitian or diabetes educator also can help you lose weight if that is one of your goals. The following tips can help you enjoy your meals and stay healthy. Follow-up care is a key part of your treatment and safety. Be sure to make and go to all appointments, and call your doctor if you are having problems. Its also a good idea to know your test results and keep a list of the medicines you take. How can you care for yourself at home? · Learn which foods have carbohydrate and how much carbohydrate to eat. A dietitian or diabetes educator can help you learn to keep track of how much carbohydrate you eat. · Spread carbohydrate throughout the day. Eat some carbohydrate at all meals, but do not eat too much at any one time. · Plan meals to include food from all the food groups.  These are the food groups and some example portion sizes:  ¨ Grains: 1 slice of bread (1 ounce), ½ cup of cooked cereal, and 1/3 cup of cooked pasta or rice. These have about 15 grams of carbohydrate in a serving. Choose whole grains such as whole wheat bread or crackers, oatmeal, and brown rice more often than refined grains. ¨ Fruit: 1 small fresh fruit, such as an apple or orange; ½ of a banana; ½ cup of chopped, cooked, or canned fruit; ½ cup of fruit juice; 1 cup of melon or raspberries; and 2 tablespoons of dried fruit. These have about 15 grams of carbohydrate in a serving. ¨ Dairy: 1 cup of nonfat or low-fat milk and 2/3 cup of plain yogurt. These have about 15 grams of carbohydrate in a serving. ¨ Protein foods: Beef, chicken, turkey, fish, eggs, tofu, cheese, cottage cheese, and peanut butter. A serving size of meat is 3 ounces, which is about the size of a deck of cards. Examples of meat substitute serving sizes (equal to 1 ounce of meat) are 1/4 cup of cottage cheese, 1 egg, 1 tablespoon of peanut butter, and ½ cup of tofu. These have very little or no carbohydrate per serving. ¨ Vegetables: Starchy vegetables such as ½ cup of cooked dried beans, peas, potatoes, or corn have about 15 grams of carbohydrate. Nonstarchy vegetables have very little carbohydrate, such as 1 cup of raw leafy vegetables (such as spinach), ½ cup of other vegetables (cooked or chopped), and 3/4 cup of vegetable juice. · Use the plate format to plan meals. It is a good, quick way to make sure that you have a balanced meal. It also helps you spread carbohydrate throughout the day. You divide your plate by types of foods. Put vegetables on half the plate, meat or meat substitutes on one-quarter of the plate, and a grain or starchy vegetable (such as brown rice or a potato) in the final quarter of the plate. To this you can add a small piece of fruit and 1 cup of milk or yogurt, depending on how much carbohydrate you are supposed to eat at a meal.  · Talk to your dietitian or diabetes educator about ways to add limited amounts of sweets into your meal plan.  You can eat these foods now and then, as long as you include the amount of carbohydrate they have in your daily carbohydrate allowance. · If you drink alcohol, limit it to no more than 1 drink a day for women and 2 drinks a day for men. If you are pregnant, no amount of alcohol is known to be safe. · Protein, fat, and fiber do not raise blood sugar as much as carbohydrate does. If you eat a lot of these nutrients in a meal, your blood sugar will rise more slowly than it would otherwise. · Limit saturated fats, such as those from meat and dairy products. Try to replace it with monounsaturated fat, such as olive oil. This is a healthier choice because people who have diabetes are at higher-than-average risk of heart disease. But use a modest amount of olive oil. A tablespoon of olive oil has 14 grams of fat and 120 calories. · Exercise lowers blood sugar. If you take insulin by shots or pump, you can use less than you would if you were not exercising. Keep in mind that timing matters. If you exercise within 1 hour after a meal, your body may need less insulin for that meal than it would if you exercised 3 hours after the meal. Test your blood sugar to find out how exercise affects your need for insulin. · Exercise on most days of the week. Aim for at least 30 minutes. Exercise helps you stay at a healthy weight and helps your body use insulin. Walking is an easy way to get exercise. Gradually increase the amount you walk every day. You also may want to swim, bike, or do other activities. When you eat out  · Learn to estimate the serving sizes of foods that have carbohydrate. If you measure food at home, it will be easier to estimate the amount in a serving of restaurant food. · If the meal you order has too much carbohydrate (such as potatoes, corn, or baked beans), ask to have a low-carbohydrate food instead. Ask for a salad or green vegetables.   · If you use insulin, check your blood sugar before and after eating out to help you plan how much to eat in the future. · If you eat more carbohydrate at a meal than you had planned, take a walk or do other exercise. This will help lower your blood sugar. Where can you learn more? Go to Jumbas[employer]be  Enter S260 in the search box to learn more about \"Nutrition Tips for Diabetes: After Your Visit. \"   © 7631-6074 Healthwise, [employer]. Care instructions adapted under license by [facility] (which disclaims liability or warranty for this information). This care instruction is for use with your licensed healthcare professional. If you have questions about a medical condition or this instruction, always ask your healthcare professional. [place] [address] any warranty or liability for your use of this information. Content Version: 45.8.197073; Current as of: June 4, 2014                 Low Sodium Diet (2,000 Milligram): Care Instructions  Overview     Limiting sodium can be an important part of managing some health problems. The most common source of sodium is salt. People get most of the salt in their diet from canned, prepared, and packaged foods. Fast food and restaurant meals also are very high in sodium. Your doctor will probably limit your sodium to less than 2,000 milligrams (mg) a day. This limit counts all the sodium in prepared and packaged foods and any salt you add to your food. Follow-up care is a key part of your treatment and safety. Be sure to make and go to all appointments, and call your doctor if you are having problems. It's also a good idea to know your test results and keep a list of the medicines you take. How can you care for yourself at home? Read food labels  · Read labels on cans and food packages. The labels tell you how much sodium is in each serving. Make sure that you look at the serving size. If you eat more than the serving size, you have eaten more sodium.   · Food labels also tell you the Percent Daily Value for sodium. Choose products with low Percent Daily Values for sodium. · Be aware that sodium can come in forms other than salt, including monosodium glutamate (MSG), sodium citrate, and sodium bicarbonate (baking soda). MSG is often added to Asian food. When you eat out, you can sometimes ask for food without MSG or added salt. Buy low-sodium foods  · Buy foods that are labeled \"unsalted\" (no salt added), \"sodium-free\" (less than 5 mg of sodium per serving), or \"low-sodium\" (140 mg or less of sodium per serving). Foods labeled \"reduced-sodium\" and \"light sodium\" may still have too much sodium. Be sure to read the label to see how much sodium you are getting. · Buy fresh vegetables, or frozen vegetables without added sauces. Buy low-sodium versions of canned vegetables, soups, and other canned goods. Prepare low-sodium meals  · Cut back on the amount of salt you use in cooking. This will help you adjust to the taste. Do not add salt after cooking. One teaspoon of salt has about 2,300 mg of sodium. · Take the salt shaker off the table. · Flavor your food with garlic, lemon juice, onion, vinegar, herbs, and spices. Do not use soy sauce, lite soy sauce, steak sauce, onion salt, garlic salt, celery salt, or ketchup on your food. · Use low-sodium salad dressings, sauces, and ketchup. Or make your own salad dressings and sauces without adding salt. · Use less salt (or none) when recipes call for it. You can often use half the salt a recipe calls for without losing flavor. Other foods such as rice, pasta, and grains do not need added salt. · Rinse canned vegetables, and cook them in fresh water. This removes somebut not allof the salt. · Avoid water that is naturally high in sodium or that has been treated with water softeners, which add sodium. If you buy bottled water, read the label and choose a sodium-free brand.   Avoid high-sodium foods  · Avoid eating:  ? Smoked, cured, salted, and canned meat, fish, and poultry. ? Ham, kohler, hot dogs, and luncheon meats. ? Regular, hard, and processed cheese and regular peanut butter. ? Crackers with salted tops, and other salted snack foods such as pretzels, chips, and salted popcorn. ? Frozen prepared meals, unless labeled low-sodium. ? Canned and dried soups, broths, and bouillon, unless labeled sodium-free or low-sodium. ? Canned vegetables, unless labeled sodium-free or low-sodium. ? [place] fries, pizza, tacos, and other fast foods. ? Pickles, olives, ketchup, and other condiments, especially soy sauce, unless labeled sodium-free or low-sodium. Where can you learn more? Go to http://www.gray.com/  Enter V843 in the search box to learn more about \"Low Sodium Diet (2,000 Milligram): Care Instructions. \"  Current as of: December 17, 2020               Content Version: 13.0  © 2006-2021 Healthwise, [facility]. Care instructions adapted under license by [employer] (which disclaims liability or warranty for this information). If you have questions about a medical condition or this instruction, always ask your healthcare professional. [place] [address] any warranty or liability for your use of this information.

## 2021-10-15 NOTE — PROGRESS NOTES
Chief Complaint   Patient presents with    Diabetes    Asthma     w/COPD    Hypertension    Cholesterol Problem    Benign Prostatic Hypertrophy    Pain (Chronic)     1. \"Have you been to the ER, urgent care clinic since your last visit? Hospitalized since your last visit? \" No    2. \"Have you seen or consulted any other health care providers outside of the [address] [place] [address] since your last visit? \" No     3. For patients aged 39-70: Has the patient had a colonoscopy?  Yes, HM satisfied with blue hyperlink

## 2021-10-19 ENCOUNTER — APPOINTMENT (OUTPATIENT)
Dept: INFUSION THERAPY | Age: 71
End: 2021-10-19

## 2021-10-25 ENCOUNTER — TELEPHONE (OUTPATIENT)
Dept: ONCOLOGY | Age: 71
End: 2021-10-25

## 2021-10-25 NOTE — TELEPHONE ENCOUNTER
Patients daughter contacted office after speaking if her dad about his last office visit.  She report he is a little confused and feels like he has cancer etc.   Patients daughter would like to speak to NP or MD

## 2021-11-10 ENCOUNTER — HOSPITAL ENCOUNTER (OUTPATIENT)
Dept: INFUSION THERAPY | Age: 71
Discharge: HOME | End: 2021-11-10
Payer: MEDICARE

## 2021-11-10 VITALS
DIASTOLIC BLOOD PRESSURE: 68 MMHG | RESPIRATION RATE: 20 BRPM | OXYGEN SATURATION: 97 % | TEMPERATURE: 97.5 F | HEART RATE: 88 BPM | SYSTOLIC BLOOD PRESSURE: 135 MMHG

## 2021-11-10 LAB
BASO+EOS+MONOS # BLD AUTO: 0.3 K/UL (ref 0–2.3)
BASO+EOS+MONOS NFR BLD AUTO: 4 % (ref 0.1–17)
DIFFERENTIAL METHOD BLD: ABNORMAL
ERYTHROCYTE [DISTWIDTH] IN BLOOD BY AUTOMATED COUNT: 12.9 % (ref 11.5–14.5)
HCT VFR BLD AUTO: 45.1 % (ref 36–48)
HGB BLD-MCNC: 15.1 G/DL (ref 12–16)
LYMPHOCYTES # BLD: 2.8 K/UL (ref 1.1–5.9)
LYMPHOCYTES NFR BLD: 33 % (ref 14–44)
MCH RBC QN AUTO: 28.9 PG (ref 25–35)
MCHC RBC AUTO-ENTMCNC: 33.5 G/DL (ref 31–37)
MCV RBC AUTO: 86.2 FL (ref 78–102)
NEUTS SEG # BLD: 5.6 K/UL (ref 1.8–9.5)
NEUTS SEG NFR BLD: 64 % (ref 40–70)
PLATELET # BLD AUTO: 248 K/UL (ref 140–440)
RBC # BLD AUTO: 5.23 M/UL (ref 4.1–5.1)
WBC # BLD AUTO: 8.7 K/UL (ref 4.5–13)

## 2021-11-10 PROCEDURE — 36415 COLL VENOUS BLD VENIPUNCTURE: CPT

## 2021-11-10 PROCEDURE — 85025 COMPLETE CBC W/AUTO DIFF WBC: CPT

## 2021-11-10 PROCEDURE — 74011250636 HC RX REV CODE- 250/636: Performed by: INTERNAL MEDICINE

## 2021-11-10 PROCEDURE — 99195 PHLEBOTOMY: CPT

## 2021-11-10 RX ORDER — SODIUM CHLORIDE 9 MG/ML
25 INJECTION, SOLUTION INTRAVENOUS CONTINUOUS
Status: DISCONTINUED | OUTPATIENT
Start: 2021-11-10 | End: 2021-11-11 | Stop reason: HOSPADM

## 2021-11-10 RX ORDER — SODIUM CHLORIDE 0.9 % (FLUSH) 0.9 %
10-40 SYRINGE (ML) INJECTION AS NEEDED
Status: DISCONTINUED | OUTPATIENT
Start: 2021-11-10 | End: 2021-11-14 | Stop reason: HOSPADM

## 2021-11-10 RX ADMIN — Medication 10 ML: at 13:25

## 2021-11-10 RX ADMIN — SODIUM CHLORIDE 25 ML/HR: 9 INJECTION, SOLUTION INTRAVENOUS at 13:49

## 2021-11-10 NOTE — PROGRESS NOTES
\[facility]\"" Progress Note    Date: November 10, 2021    Name: Raymon Nissen    MRN: [id number]         : 1950    Mr. Maryellen Pierre arrived in the [facility] today at 1300, in stable condition, here for CBC/Phlebotomy (Weekly Status). He was assessed and education was provided. Mr. Hien Hernandez vitals were reviewed. Visit Vitals  /68 (BP 1 Location: Right upper arm, BP Patient Position: Sitting)   Pulse 88   Temp 97.5 °F (36.4 °C)   Resp 20   SpO2 97%     Blood for the ordered CBC was drawn from his right [facility] without incident, and was processed on site. Lab results were obtained and reviewed, and were as follows:    Recent Results (from the past 12 hour(s))   CBC WITH 3 PART DIFF    Collection Time: 11/10/21  1:10 PM   Result Value Ref Range    WBC 8.7 4.5 - 13.0 K/uL    RBC 5.23 (H) 4.10 - 5.10 M/uL    HGB 15.1 12.0 - 16 g/dL    HCT 45.1 36 - 48 %    MCV 86.2 78 - 102 FL    MCH 28.9 25.0 - 35.0 PG    MCHC 33.5 31 - 37 g/dL    RDW 12.9 11.5 - 14.5 %    PLATELET 590 068 - 661 K/uL    NEUTROPHILS 64 40 - 70 %    MIXED CELLS 4 0.1 - 17 %    LYMPHOCYTES 33 14 - 44 %    ABS. NEUTROPHILS 5.6 1.8 - 9.5 K/UL    ABS. MIXED CELLS 0.3 0.0 - 2.3 K/uL    ABS. LYMPHOCYTES 2.8 1.1 - 5.9 K/UL    DF AUTOMATED       PIV #20g started in right forearm without difficulty, brisk blood return and flushed well. Patient tolerated well. Phlebotomy started at 1330 and ended at 0145 after draining off 250 ml of blood per order. (HCT 45.1.)      ml IV given.     Snacks and fluids offered during procedure.      Mr. Velásquez tolerated the procedure, and had no complaints. VS remained stable.      Patient stayed 30 minutes post procedure. No signs or symptoms of complication noted.     PIV flushed with NS 10 ml and removed. No bleeding or hematoma noted at site. Guaze and coban applied.     Reviewed discharge instructions with patient. Copy of procdure given to patient to take home.  Patient verbalized understanding of discharge instructions.     Patient armband removed and shredded. Mr. Charl Riedel tolerated well and voiced no complaints. Mr. Charl Riedel was discharged from [place] [address] in stable condition at 1350. He is to return on 11-17-2 at 1400, for his next appointment.   Travis Renner RN  November 10, 2021  1:50 PM

## 2021-11-17 ENCOUNTER — HOSPITAL ENCOUNTER (OUTPATIENT)
Dept: INFUSION THERAPY | Age: 71
Discharge: HOME | End: 2021-11-17
Payer: MEDICARE

## 2021-11-17 VITALS
TEMPERATURE: 98.2 F | HEART RATE: 73 BPM | SYSTOLIC BLOOD PRESSURE: 126 MMHG | DIASTOLIC BLOOD PRESSURE: 76 MMHG | OXYGEN SATURATION: 98 % | RESPIRATION RATE: 18 BRPM

## 2021-11-17 LAB
BASO+EOS+MONOS # BLD AUTO: 0.7 K/UL (ref 0–2.3)
BASO+EOS+MONOS NFR BLD AUTO: 9 % (ref 0.1–17)
DIFFERENTIAL METHOD BLD: NORMAL
ERYTHROCYTE [DISTWIDTH] IN BLOOD BY AUTOMATED COUNT: 12.9 % (ref 11.5–14.5)
HCT VFR BLD AUTO: 42.4 % (ref 36–48)
HGB BLD-MCNC: 14.5 G/DL (ref 12–16)
LYMPHOCYTES # BLD: 2.2 K/UL (ref 1.1–5.9)
LYMPHOCYTES NFR BLD: 27 % (ref 14–44)
MCH RBC QN AUTO: 29.4 PG (ref 25–35)
MCHC RBC AUTO-ENTMCNC: 34.2 G/DL (ref 31–37)
MCV RBC AUTO: 85.8 FL (ref 78–102)
NEUTS SEG # BLD: 5.2 K/UL (ref 1.8–9.5)
NEUTS SEG NFR BLD: 64 % (ref 40–70)
PLATELET # BLD AUTO: 243 K/UL (ref 140–440)
RBC # BLD AUTO: 4.94 M/UL (ref 4.1–5.1)
WBC # BLD AUTO: 8.1 K/UL (ref 4.5–13)

## 2021-11-17 PROCEDURE — 85025 COMPLETE CBC W/AUTO DIFF WBC: CPT

## 2021-11-17 PROCEDURE — 36415 COLL VENOUS BLD VENIPUNCTURE: CPT

## 2021-11-22 ENCOUNTER — TELEPHONE (OUTPATIENT)
Dept: FAMILY MEDICINE CLINIC | Age: 71
End: 2021-11-22

## 2021-11-22 NOTE — TELEPHONE ENCOUNTER
Pt's wife states that pt has had up and down fever with chills and achy. She states that she doesn't think that he was exposed to the [address] but worries since he has not had the vaccine. Did suggest pharmacy drive through's for him to be test as well as the [facility]. Please  Advise.

## 2021-12-06 NOTE — TELEPHONE ENCOUNTER
Cuco Jamil \"Don\"  Alejandro Zuñiga MD 3 days ago     Good morning, I just want to inform you that I have had COVID-19 since last week and going into the second week my symptoms appear to be a little worse but not to the point I need to go to the ER. Im requesting more albuterol for my breathing machine and I would like to get some sort of antibiotics as well.

## 2021-12-07 RX ORDER — ALBUTEROL SULFATE 0.83 MG/ML
2.5 SOLUTION RESPIRATORY (INHALATION)
Qty: 50 NEBULE | Refills: 3 | Status: SHIPPED | OUTPATIENT
Start: 2021-12-07

## 2021-12-15 ENCOUNTER — APPOINTMENT (OUTPATIENT)
Dept: INFUSION THERAPY | Age: 71
End: 2021-12-15
Payer: MEDICARE

## 2021-12-22 ENCOUNTER — HOSPITAL ENCOUNTER (OUTPATIENT)
Dept: INFUSION THERAPY | Age: 71
Discharge: HOME | End: 2021-12-22
Payer: MEDICARE

## 2021-12-22 VITALS
DIASTOLIC BLOOD PRESSURE: 71 MMHG | HEART RATE: 84 BPM | RESPIRATION RATE: 20 BRPM | OXYGEN SATURATION: 98 % | TEMPERATURE: 97.6 F | SYSTOLIC BLOOD PRESSURE: 138 MMHG

## 2021-12-22 LAB
BASO+EOS+MONOS # BLD AUTO: 0.6 K/UL (ref 0–2.3)
BASO+EOS+MONOS NFR BLD AUTO: 11 % (ref 0.1–17)
DIFFERENTIAL METHOD BLD: ABNORMAL
ERYTHROCYTE [DISTWIDTH] IN BLOOD BY AUTOMATED COUNT: 13.6 % (ref 11.5–14.5)
HCT VFR BLD AUTO: 44.7 % (ref 36–48)
HGB BLD-MCNC: 14.8 G/DL (ref 12–16)
LYMPHOCYTES # BLD: 2 K/UL (ref 1.1–5.9)
LYMPHOCYTES NFR BLD: 32 % (ref 14–44)
MCH RBC QN AUTO: 28.7 PG (ref 25–35)
MCHC RBC AUTO-ENTMCNC: 33.1 G/DL (ref 31–37)
MCV RBC AUTO: 86.6 FL (ref 78–102)
NEUTS SEG # BLD: 3.5 K/UL (ref 1.8–9.5)
NEUTS SEG NFR BLD: 57 % (ref 40–70)
PLATELET # BLD AUTO: 227 K/UL (ref 140–440)
RBC # BLD AUTO: 5.16 M/UL (ref 4.1–5.1)
WBC # BLD AUTO: 6.1 K/UL (ref 4.5–13)

## 2021-12-22 PROCEDURE — 85025 COMPLETE CBC W/AUTO DIFF WBC: CPT

## 2021-12-22 PROCEDURE — 36415 COLL VENOUS BLD VENIPUNCTURE: CPT

## 2021-12-22 NOTE — PROGRESS NOTES
[facility] Progress Note    Date: 2021    Name: Hellen Haro    MRN: [id number]         : 1950    Mr. Brii Burgos arrived in the [facility] today at , in stable condition, here for CBC/Phlebotomy (Monthly Status). He was assessed and education was provided. Mr. Faith Castrejon vitals were reviewed. Visit Vitals  /71 (BP 1 Location: Right upper arm, BP Patient Position: Sitting)   Pulse 84   Temp 97.6 °F (36.4 °C)   Resp 20   SpO2 98%         Blood for the ordered CBC was drawn from his right [facility] at 1314 without incident per [facility], and was processed on site. Lab results were obtained and reviewed, and were as follows:    Recent Results (from the past 12 hour(s))   CBC WITH 3 PART DIFF    Collection Time: 21  1:14 PM   Result Value Ref Range    WBC 6.1 4.5 - 13.0 K/uL    RBC 5.16 (H) 4.10 - 5.10 M/uL    HGB 14.8 12.0 - 16 g/dL    HCT 44.7 36 - 48 %    MCV 86.6 78 - 102 FL    MCH 28.7 25.0 - 35.0 PG    MCHC 33.1 31 - 37 g/dL    RDW 13.6 11.5 - 14.5 %    PLATELET 440 352 - 883 K/uL    NEUTROPHILS 57 40 - 70 %    MIXED CELLS 11 0.1 - 17 %    LYMPHOCYTES 32 14 - 44 %    ABS. NEUTROPHILS 3.5 1.8 - 9.5 K/UL    ABS. MIXED CELLS 0.6 0.0 - 2.3 K/uL    ABS. LYMPHOCYTES 2.0 1.1 - 5.9 K/UL    DF AUTOMATED             Phlebotomy was HELD today per order, for HCT < 45.0 ( monthly status parameters). Mr. Brii Burgos tolerated well and voiced no complaints. Mr. Brii Burgos was discharged from [place] [address] in stable condition at 1325. Lennox Dai He is to return in 1 month, on Wednesday, 22 at 1300, for his next appointment, for CBC/Phlebotomy (Monthly Status).      Marjorie Aguillon RN  2021  1:24 PM

## 2022-01-12 ENCOUNTER — HOSPITAL ENCOUNTER (OUTPATIENT)
Dept: LAB | Age: 72
Discharge: HOME | End: 2022-01-12
Payer: MEDICARE

## 2022-01-12 ENCOUNTER — LAB ONLY (OUTPATIENT)
Dept: ONCOLOGY | Age: 72
End: 2022-01-12

## 2022-01-12 DIAGNOSIS — D75.1 POLYCYTHEMIA: Primary | ICD-10-CM

## 2022-01-12 DIAGNOSIS — D75.1 SECONDARY POLYCYTHEMIA: ICD-10-CM

## 2022-01-12 LAB
ALBUMIN SERPL-MCNC: 3.6 G/DL (ref 3.4–5)
ALBUMIN/GLOB SERPL: 1.2 {RATIO} (ref 0.8–1.7)
ALP SERPL-CCNC: 54 U/L (ref 45–117)
ALT SERPL-CCNC: 18 U/L (ref 16–61)
ANION GAP SERPL CALC-SCNC: 1 MMOL/L (ref 3–18)
AST SERPL-CCNC: 12 U/L (ref 10–38)
BASOPHILS # BLD: 0.1 K/UL (ref 0–0.1)
BASOPHILS NFR BLD: 1 % (ref 0–2)
BILIRUB SERPL-MCNC: 0.4 MG/DL (ref 0.2–1)
BUN SERPL-MCNC: 19 MG/DL (ref 7–18)
BUN/CREAT SERPL: 17 (ref 12–20)
CALCIUM SERPL-MCNC: 9.1 MG/DL (ref 8.5–10.1)
CHLORIDE SERPL-SCNC: 108 MMOL/L (ref 100–111)
CO2 SERPL-SCNC: 32 MMOL/L (ref 21–32)
CREAT SERPL-MCNC: 1.11 MG/DL (ref 0.6–1.3)
DIFFERENTIAL METHOD BLD: ABNORMAL
EOSINOPHIL # BLD: 0.5 K/UL (ref 0–0.4)
EOSINOPHIL NFR BLD: 6 % (ref 0–5)
ERYTHROCYTE [DISTWIDTH] IN BLOOD BY AUTOMATED COUNT: 13.2 % (ref 11.6–14.5)
GLOBULIN SER CALC-MCNC: 2.9 G/DL (ref 2–4)
GLUCOSE SERPL-MCNC: 171 MG/DL (ref 74–99)
HCT VFR BLD AUTO: 47.6 % (ref 36–48)
HGB BLD-MCNC: 15 G/DL (ref 13–16)
IMM GRANULOCYTES # BLD AUTO: 0 K/UL (ref 0–0.04)
IMM GRANULOCYTES NFR BLD AUTO: 0 % (ref 0–0.5)
LYMPHOCYTES # BLD: 1.7 K/UL (ref 0.9–3.6)
LYMPHOCYTES NFR BLD: 23 % (ref 21–52)
MCH RBC QN AUTO: 27.5 PG (ref 24–34)
MCHC RBC AUTO-ENTMCNC: 31.5 G/DL (ref 31–37)
MCV RBC AUTO: 87.3 FL (ref 78–100)
MONOCYTES # BLD: 0.5 K/UL (ref 0.05–1.2)
MONOCYTES NFR BLD: 7 % (ref 3–10)
NEUTS SEG # BLD: 4.7 K/UL (ref 1.8–8)
NEUTS SEG NFR BLD: 63 % (ref 40–73)
NRBC # BLD: 0 K/UL (ref 0–0.01)
NRBC BLD-RTO: 0 PER 100 WBC
PLATELET # BLD AUTO: 240 K/UL (ref 135–420)
PMV BLD AUTO: 9.9 FL (ref 9.2–11.8)
POTASSIUM SERPL-SCNC: 5.2 MMOL/L (ref 3.5–5.5)
PROT SERPL-MCNC: 6.5 G/DL (ref 6.4–8.2)
RBC # BLD AUTO: 5.45 M/UL (ref 4.35–5.65)
SODIUM SERPL-SCNC: 141 MMOL/L (ref 136–145)
WBC # BLD AUTO: 7.4 K/UL (ref 4.6–13.2)

## 2022-01-12 PROCEDURE — 36415 COLL VENOUS BLD VENIPUNCTURE: CPT

## 2022-01-12 PROCEDURE — 80053 COMPREHEN METABOLIC PANEL: CPT

## 2022-01-12 PROCEDURE — 85025 COMPLETE CBC W/AUTO DIFF WBC: CPT

## 2022-01-18 ENCOUNTER — VIRTUAL VISIT (OUTPATIENT)
Dept: ONCOLOGY | Age: 72
End: 2022-01-18
Payer: MEDICARE

## 2022-01-18 DIAGNOSIS — D75.1 SECONDARY POLYCYTHEMIA: ICD-10-CM

## 2022-01-18 DIAGNOSIS — D75.1 POLYCYTHEMIA: Primary | ICD-10-CM

## 2022-01-18 PROCEDURE — 99442 PR PHYS/QHP TELEPHONE EVALUATION 11-20 MIN: CPT | Performed by: INTERNAL MEDICINE

## 2022-01-18 NOTE — PROGRESS NOTES
Avelino Cody is a 66 y.o. male, evaluated via audio-only technology on 1/18/2022 for Follow-up  . Assessment & Plan:   Diagnoses and all orders for this visit:    1. Polycythemia    2. Secondary polycythemia      The complexity of medical decision making for this visit is moderate. # Secondary Polycythemia    -- Past medical history significant of asthma/COPD with chronic shortness of breath, hypertension, dyslipidemia,  diabetes mellitus, hypothyroidism, low serum testosterone (?). He was former smoker, quit since 1995. He denied any testosterone injection. -- 6/18/2021 CBC reported hemoglobin 16.2, hematocrit 50.2%, WBC 9.2, platelet 315,306.  -- 8/26/2021 CBC reported hemoglobin 15.8, hematocrit 47.6%, WBC 7.1, platelet [phone],576. Erythropoietin 11.6. Negative JAK2/MPL/CALR mutations/BCR/ABL1, CO level 1.8.  -- He has f/u with Pulmonary. He admitted he stopped using sleep machine for a while. -- His polycythemia was likely secondary, 2/2 COPD/Asthma with chronic shortness of breath. -- He has COVID infection recently. He states he already contacted his PCP and Pulmonary about his infection. Today he reports feeling better. Today I have reviewed with the patient about recent lab reports. 01/12/2022 CBC reported hemoglobin 15, hematocrit 47.6%, WBC of 7.4, platelet [phone],876. Plan:  -- He will continue Phlebotomy 0.5 unit (250ml) each time with the goal of inducing an iron deficient state and reducing the Hct < 45%. -- Encouraged patient to maintain hydration and avoid vigorous exercise within 24 hours of phlebotomy. -- He will need to follow up Pulmonary for COPD/Asthma management, sleep study. -- I will see the patient back in clinic in about 4 months. Always sooner if required. The patient can have lab done prior to our next clinic visit. 12  Subjective:   Mr. Salvador Godwin is a most pleasant 66y.o. year old male who was seen for consultation of polycythemia.   The patient has a past medical history significant of asthma/COPD with chronic shortness of breath, hypertension, dyslipidemia,  diabetes mellitus, hypothyroidism, low serum testosterone (?). He was former smoker, quit since 1995. He denied any testosterone injection. 6/18/2021 CBC reported hemoglobin 16.2, hematocrit 50.2%, WBC 9.2, platelet 824,153. He was referred to pulmonology for COPD management, pending appointment. He reported chronic SOB and occasional headache. He has COVID infection recently. He states he already contacted his PCP and Pulmonary about his infection. Today he reports feeling better. He otherwise has no other complaints since last visit. Denied fever, chills, night sweat, unintentional weight loss, skin lumps or bumps, acute bleeding or bruising issues. Denied acute vision change, chest pain, palpitation, worsening productive cough, nausea, vomiting, abdominal pain, altered bowel habits, dysuria, worsen bone pain or back pain, new focal numbness or weakness. Prior to Admission medications    Medication Sig Start Date End Date Taking? Authorizing Provider   albuterol (PROVENTIL VENTOLIN) 2.5 mg /3 mL (0.083 %) nebu 3 mL by Nebulization route every four (4) hours as needed for Wheezing or Shortness of Breath. 12/7/21   Yvonne Jade MD   levothyroxine (Euthyrox) 125 mcg tablet Take 1 Tablet by mouth Daily (before breakfast). 11/30/21   Trudy Jarquin MD   metoprolol tartrate (LOPRESSOR) 25 mg tablet Take 1 Tablet by mouth two (2) times a day. . 11/30/21   Trudy Jarquin MD   topiramate (TOPAMAX) 25 mg tablet Take 1 Tablet by mouth daily (with breakfast). 10/29/21   Trudy Jarquin MD   tamsulosin [facility]) 0.4 mg capsule Take 1 capsule by mouth once daily 10/7/21   Carlos Guerrero MD   albuterol (PROVENTIL HFA, VENTOLIN HFA, PROAIR HFA) 90 mcg/actuation inhaler Take 1-2 Puffs by inhalation every four (4) hours as needed for Wheezing or Shortness of Breath.  9/22/21   Yvonne Jade MD budesonide-formoteroL (Symbicort) 160-4.5 mcg/actuation HFAA Take 2 Puffs by inhalation two (2) times a day. Patient taking differently: Take 2 Puffs by inhalation two (2) times daily as needed. 9/21/20   Jorge James MD   pantoprazole (PROTONIX) 40 mg tablet Take 1 Tab by mouth daily. 9/21/20   Jorge James MD   diphenhydrAMINE-zinc acetate 1%-0.1% (BENADRYL) topical cream Apply  to affected area every eight (8) hours as needed for Itching. 9/16/20   Alivia Bateman MD   montelukast (SINGULAIR) 10 mg tablet Take 1 tablet by mouth once daily  Patient taking differently: Take 10 mg by mouth daily. 8/12/20   Jorge James MD   pregabalin (Lyrica) 150 mg capsule Take 1 Cap by mouth three (3) times daily. Max Daily Amount: 450 mg. TAKE 1 CAPSULE BY MOUTH THREE TIMES DAILY FOR 30 DAYS. MAXIMUM 3 CAPSULES DAILY. Indications: neuropathy 4/10/20   Jorge James MD   DULoxetine (CYMBALTA) 60 mg capsule Take 1 Cap by mouth two (2) times a day. 4/10/20   Jorge James MD   insulin glargine (BASAGLAR KWIKPEN U-100 INSULIN) 100 unit/mL (3 mL) inpn 52 Units by SubCUTAneous route nightly. 52 units at bedtime  Patient taking differently: 48 Units by SubCUTAneous route nightly. 52 units at bedtime 8/1/19   Jorge James MD   fluticasone (FLONASE) 50 mcg/actuation nasal spray USE 1 SPRAY(S) IN EACH NOSTRIL ONCE DAILY  Patient taking differently: 2 Sprays by Both Nostrils route nightly. 1/11/19   Jorge James MD   OTHER EB-N3 once daily    Provider, Historical   FANI PEN NEEDLE 32 gauge x 5/32\" ndle USE AT BEDTIME 2/9/18   Jorge James MD   naloxone 4 mg/actuation spry 4 mg by Nasal route as needed. Patient not taking: Reported on 9/22/2021 4/27/17   Crow Gasca MD   ACCU-CHEARTIE JAZZY PLUS TEST STRP strip  3/13/17   Provider, Historical   ACCU-CHEK SOFTCLIX LANCETS misc  3/13/17   Provider, Historical   insulin glulisine (APIDRA SOLOSTAR) 100 unit/mL pen 2 units per carb consumed.   Max 30 / day  Patient taking differently: 10 Units by SubCUTAneous route Before breakfast, lunch, and dinner. 2 units per carb consumed. Max 30 / day  Indications: patient states taking 15-20 units three times a day 4/24/17   Karyna Xavier MD   PEN NEEDLE 31 gauge x 5/16\" ndle USE ONE PEN NEEDLE FOR LANTUS FLEXPEN AND 3 PEN NEEDLES FOR APIDRA WITH EACH MEAL 1/27/17   Karyna Xavier MD   betamethasone dipropionate (DIPROLENE) 0.05 % ointment Apply  to affected area nightly. 1/5/17   Provider, Historical   ketorolac (ACULAR) 0.5 % ophthalmic solution Administer 1 Drop to both eyes two (2) times daily as needed. Provider, Historical     Patient Active Problem List   Diagnosis Code    Chronic bilateral low back pain with bilateral sciatica M54.42, M54.41, G89.29    Migraine headache G43.909    GERD (gastroesophageal reflux disease) K21.9    Hyperlipidemia E78.5    Hypothyroidism E03.9    BPH (benign prostatic hyperplasia) N40.0    Hypotestosteronism E34.9    Vertigo R44    Encounter for long-term (current) use of other medications Z79.899    Chronic pain syndrome G89.4    DJD (degenerative joint disease), lumbar M47.816    H/O lumbosacral spine surgery Z98.890    DDD (degenerative disc disease), lumbar M51.36    Peripheral neuropathy G62.9    Asthma with COPD (chronic obstructive pulmonary disease) ([facility]) J44.9    Essential hypertension I10    Vitamin B12 deficiency E53.8    Chronic chest pain R07.9, G89.29    Advance directive in chart/ no change per patient. Z78.9    H/O colonoscopy/ due in 2018 Z98.890    Chronic low back pain M54.50, G89.29    Type 2 diabetes mellitus with complication, with long-term current use of insulin ([facility]) E11.8, Z79.4    Type 2 diabetes with nephropathy ([facility]) E11.21    Type 2 diabetes mellitus with diabetic neuropathy ([facility]) E11.40    Severe obesity (BMI 35.0-39. 9) E66.01    Pancreatitis K85.90       Review of Systems   Constitutional: Positive for malaise/fatigue. Negative for chills, diaphoresis, fever and weight loss. Respiratory: Negative for cough, hemoptysis, shortness of breath and wheezing. Cardiovascular: Negative for chest pain, palpitations and leg swelling. Gastrointestinal: Negative for abdominal pain, diarrhea, heartburn, nausea and vomiting. Genitourinary: Negative for dysuria, frequency, hematuria and urgency. Musculoskeletal: Negative for joint pain and myalgias. Skin: Negative for itching and rash. Neurological: Negative for dizziness, seizures, weakness and headaches. Psychiatric/Behavioral: Negative for depression. The patient does not have insomnia. Patient-Reported Vitals 1/18/2022   Patient-Reported Weight 228   Patient-Reported Height 510   Patient-Reported Pulse -   Patient-Reported Temperature 98.8   Patient-Reported Systolic  778   Patient-Reported Diastolic 74         The patient was advised that our priority at this time is to keep the patients safe, and therefore we are reaching out to patients virtually to prevent them coming into the office unless necessarily. Patient verbalized understanding and was agreeable for virtual visit. Jebjosep Yanci, who was evaluated through a patient-initiated, synchronous (real-time) audio only encounter, and/or her healthcare decision maker, is aware that it is a billable service, with coverage as determined by his insurance carrier. He provided verbal consent to proceed: Yes. He has not had a related appointment within my department in the past 7 days or scheduled within the next 24 hours. On this date 01/18/2022 I have spent 20 minutes reviewing previous notes, test results and face to face (virtual) with the patient discussing the diagnosis and importance of compliance with the treatment plan as well as documenting on the day of the visit.     Stefanie Messer MD

## 2022-01-26 ENCOUNTER — HOSPITAL ENCOUNTER (OUTPATIENT)
Dept: INFUSION THERAPY | Age: 72
Discharge: HOME | End: 2022-01-26
Payer: MEDICARE

## 2022-01-26 VITALS
HEART RATE: 78 BPM | RESPIRATION RATE: 18 BRPM | SYSTOLIC BLOOD PRESSURE: 102 MMHG | TEMPERATURE: 98.5 F | DIASTOLIC BLOOD PRESSURE: 65 MMHG | OXYGEN SATURATION: 98 %

## 2022-01-26 LAB
BASO+EOS+MONOS # BLD AUTO: 0.9 K/UL (ref 0–2.3)
BASO+EOS+MONOS NFR BLD AUTO: 15 % (ref 0.1–17)
DIFFERENTIAL METHOD BLD: ABNORMAL
ERYTHROCYTE [DISTWIDTH] IN BLOOD BY AUTOMATED COUNT: 12.8 % (ref 11.5–14.5)
HCT VFR BLD AUTO: 44.6 % (ref 36–48)
HGB BLD-MCNC: 14.8 G/DL (ref 12–16)
LYMPHOCYTES # BLD: 1.8 K/UL (ref 1.1–5.9)
LYMPHOCYTES NFR BLD: 28 % (ref 14–44)
MCH RBC QN AUTO: 28.5 PG (ref 25–35)
MCHC RBC AUTO-ENTMCNC: 33.2 G/DL (ref 31–37)
MCV RBC AUTO: 85.9 FL (ref 78–102)
NEUTS SEG # BLD: 3.8 K/UL (ref 1.8–9.5)
NEUTS SEG NFR BLD: 58 % (ref 40–70)
PLATELET # BLD AUTO: 220 K/UL (ref 140–440)
RBC # BLD AUTO: 5.19 M/UL (ref 4.1–5.1)
WBC # BLD AUTO: 6.5 K/UL (ref 4.5–13)

## 2022-01-26 PROCEDURE — 36415 COLL VENOUS BLD VENIPUNCTURE: CPT

## 2022-01-26 PROCEDURE — 85025 COMPLETE CBC W/AUTO DIFF WBC: CPT

## 2022-01-26 RX ORDER — SODIUM CHLORIDE 9 MG/ML
250 INJECTION, SOLUTION INTRAVENOUS AS NEEDED
Status: DISCONTINUED | OUTPATIENT
Start: 2022-01-26 | End: 2022-01-30 | Stop reason: HOSPADM

## 2022-02-15 ENCOUNTER — OFFICE VISIT (OUTPATIENT)
Dept: FAMILY MEDICINE CLINIC | Age: 72
End: 2022-02-15
Payer: MEDICARE

## 2022-02-15 VITALS
WEIGHT: 237 LBS | RESPIRATION RATE: 16 BRPM | HEART RATE: 86 BPM | HEIGHT: 70 IN | SYSTOLIC BLOOD PRESSURE: 110 MMHG | DIASTOLIC BLOOD PRESSURE: 62 MMHG | OXYGEN SATURATION: 100 % | TEMPERATURE: 97.4 F | BODY MASS INDEX: 33.93 KG/M2

## 2022-02-15 DIAGNOSIS — K21.9 GASTROESOPHAGEAL REFLUX DISEASE WITHOUT ESOPHAGITIS: ICD-10-CM

## 2022-02-15 DIAGNOSIS — N40.0 BENIGN PROSTATIC HYPERPLASIA WITHOUT LOWER URINARY TRACT SYMPTOMS: ICD-10-CM

## 2022-02-15 DIAGNOSIS — I10 ESSENTIAL HYPERTENSION: ICD-10-CM

## 2022-02-15 DIAGNOSIS — E11.21 TYPE 2 DIABETES WITH NEPHROPATHY (HCC): Primary | ICD-10-CM

## 2022-02-15 DIAGNOSIS — E53.8 VITAMIN B12 DEFICIENCY: ICD-10-CM

## 2022-02-15 DIAGNOSIS — I50.32 CHRONIC DIASTOLIC HEART FAILURE (HCC): ICD-10-CM

## 2022-02-15 DIAGNOSIS — E03.9 HYPOTHYROIDISM, UNSPECIFIED TYPE: ICD-10-CM

## 2022-02-15 DIAGNOSIS — M51.36 DDD (DEGENERATIVE DISC DISEASE), LUMBAR: ICD-10-CM

## 2022-02-15 DIAGNOSIS — J44.9 ASTHMA WITH COPD (CHRONIC OBSTRUCTIVE PULMONARY DISEASE) (HCC): ICD-10-CM

## 2022-02-15 DIAGNOSIS — E34.9 HYPOTESTOSTERONISM: ICD-10-CM

## 2022-02-15 DIAGNOSIS — E78.00 PURE HYPERCHOLESTEROLEMIA: ICD-10-CM

## 2022-02-15 DIAGNOSIS — D45 POLYCYTHEMIA VERA (HCC): ICD-10-CM

## 2022-02-15 LAB — HBA1C MFR BLD HPLC: 6.8 %

## 2022-02-15 PROCEDURE — 3017F COLORECTAL CA SCREEN DOC REV: CPT | Performed by: FAMILY MEDICINE

## 2022-02-15 PROCEDURE — 99214 OFFICE O/P EST MOD 30 MIN: CPT | Performed by: FAMILY MEDICINE

## 2022-02-15 PROCEDURE — 3044F HG A1C LEVEL LT 7.0%: CPT | Performed by: FAMILY MEDICINE

## 2022-02-15 PROCEDURE — G8536 NO DOC ELDER MAL SCRN: HCPCS | Performed by: FAMILY MEDICINE

## 2022-02-15 PROCEDURE — G8754 DIAS BP LESS 90: HCPCS | Performed by: FAMILY MEDICINE

## 2022-02-15 PROCEDURE — 1101F PT FALLS ASSESS-DOCD LE1/YR: CPT | Performed by: FAMILY MEDICINE

## 2022-02-15 PROCEDURE — G8427 DOCREV CUR MEDS BY ELIG CLIN: HCPCS | Performed by: FAMILY MEDICINE

## 2022-02-15 PROCEDURE — 83036 HEMOGLOBIN GLYCOSYLATED A1C: CPT | Performed by: FAMILY MEDICINE

## 2022-02-15 PROCEDURE — G8417 CALC BMI ABV UP PARAM F/U: HCPCS | Performed by: FAMILY MEDICINE

## 2022-02-15 PROCEDURE — G8752 SYS BP LESS 140: HCPCS | Performed by: FAMILY MEDICINE

## 2022-02-15 PROCEDURE — 2022F DILAT RTA XM EVC RTNOPTHY: CPT | Performed by: FAMILY MEDICINE

## 2022-02-15 PROCEDURE — G0463 HOSPITAL OUTPT CLINIC VISIT: HCPCS | Performed by: FAMILY MEDICINE

## 2022-02-15 PROCEDURE — G8510 SCR DEP NEG, NO PLAN REQD: HCPCS | Performed by: FAMILY MEDICINE

## 2022-02-15 NOTE — PROGRESS NOTES
Chief Complaint   Patient presents with    Diabetes    Hypertension     has been elevated since having Covid    Asthma     w/COPD    Cholesterol Problem    Thyroid Problem     1. \"Have you been to the ER, urgent care clinic since your last visit? Hospitalized since your last visit? \" No    2. \"Have you seen or consulted any other health care providers outside of the [address] [place] [address] since your last visit? \" No     3. For patients aged 39-70: Has the patient had a colonoscopy / FIT/ Cologuard?  Yes - no Care Gap present

## 2022-02-15 NOTE — PATIENT INSTRUCTIONS
Low Sodium Diet (2,000 Milligram): Care Instructions  Overview     Limiting sodium can be an important part of managing some health problems. The most common source of sodium is salt. People get most of the salt in their diet from canned, prepared, and packaged foods. Fast food and restaurant meals also are very high in sodium. Your doctor will probably limit your sodium to less than 2,000 milligrams (mg) a day. This limit counts all the sodium in prepared and packaged foods and any salt you add to your food. Follow-up care is a key part of your treatment and safety. Be sure to make and go to all appointments, and call your doctor if you are having problems. It's also a good idea to know your test results and keep a list of the medicines you take. How can you care for yourself at home? Read food labels  · Read labels on cans and food packages. The labels tell you how much sodium is in each serving. Make sure that you look at the serving size. If you eat more than the serving size, you have eaten more sodium. · Food labels also tell you the Percent Daily Value for sodium. Choose products with low Percent Daily Values for sodium. · Be aware that sodium can come in forms other than salt, including monosodium glutamate (MSG), sodium citrate, and sodium bicarbonate (baking soda). MSG is often added to Asian food. When you eat out, you can sometimes ask for food without MSG or added salt. Buy low-sodium foods  · Buy foods that are labeled \"unsalted\" (no salt added), \"sodium-free\" (less than 5 mg of sodium per serving), or \"low-sodium\" (140 mg or less of sodium per serving). Foods labeled \"reduced-sodium\" and \"light sodium\" may still have too much sodium. Be sure to read the label to see how much sodium you are getting. · Buy fresh vegetables, or frozen vegetables without added sauces. Buy low-sodium versions of canned vegetables, soups, and other canned goods.   Prepare low-sodium meals  · Cut back on the amount of salt you use in cooking. This will help you adjust to the taste. Do not add salt after cooking. One teaspoon of salt has about 2,300 mg of sodium. · Take the salt shaker off the table. · Flavor your food with garlic, lemon juice, onion, vinegar, herbs, and spices. Do not use soy sauce, lite soy sauce, steak sauce, onion salt, garlic salt, celery salt, or ketchup on your food. · Use low-sodium salad dressings, sauces, and ketchup. Or make your own salad dressings and sauces without adding salt. · Use less salt (or none) when recipes call for it. You can often use half the salt a recipe calls for without losing flavor. Other foods such as rice, pasta, and grains do not need added salt. · Rinse canned vegetables, and cook them in fresh water. This removes some--but not all--of the salt. · Avoid water that is naturally high in sodium or that has been treated with water softeners, which add sodium. If you buy bottled water, read the label and choose a sodium-free brand. Avoid high-sodium foods  · Avoid eating:  ? Smoked, cured, salted, and canned meat, fish, and poultry. ? Ham, kohler, hot dogs, and luncheon meats. ? Regular, hard, and processed cheese and regular peanut butter. ? Crackers with salted tops, and other salted snack foods such as pretzels, chips, and salted popcorn. ? Frozen prepared meals, unless labeled low-sodium. ? Canned and dried soups, broths, and bouillon, unless labeled sodium-free or low-sodium. ? Canned vegetables, unless labeled sodium-free or low-sodium. ? [place] fries, pizza, tacos, and other fast foods. ? Pickles, olives, ketchup, and other condiments, especially soy sauce, unless labeled sodium-free or low-sodium. Where can you learn more? Go to http://www.gray.com/  Enter V843 in the search box to learn more about \"Low Sodium Diet (2,000 Milligram): Care Instructions. \"  Current as of: December 17, 2020               Content Version: 13.0  © 2006-2021 [employer]. Care instructions adapted under license by [employer] (which disclaims liability or warranty for this information). If you have questions about a medical condition or this instruction, always ask your healthcare professional. [address] any warranty or liability for your use of this information. Nutrition Tips for Diabetes: After Your Visit  Your Care Instructions  A healthy diet is important to manage diabetes. It helps you lose weight (if you need to) and keep it off. It gives you the nutrition and energy your body needs and helps prevent heart disease. But a diet for diabetes does not mean that you have to eat special foods. You can eat what your family eats, including occasional sweets and other favorites. But you do have to pay attention to how often you eat and how much you eat of certain foods. The right plan for you will give you meals that help you keep your blood sugar at healthy levels. Try to eat a variety of foods and to spread carbohydrate throughout the day. Carbohydrate raises blood sugar higher and more quickly than any other nutrient does. Carbohydrate is found in sugar, breads and cereals, fruit, starchy vegetables such as potatoes and corn, and milk and yogurt. You may want to work with a dietitian or diabetes educator to help you plan meals and snacks. A dietitian or diabetes educator also can help you lose weight if that is one of your goals. The following tips can help you enjoy your meals and stay healthy. Follow-up care is a key part of your treatment and safety. Be sure to make and go to all appointments, and call your doctor if you are having problems. Its also a good idea to know your test results and keep a list of the medicines you take. How can you care for yourself at home? · Learn which foods have carbohydrate and how much carbohydrate to eat.  A dietitian or diabetes educator can help you learn to keep track of how much carbohydrate you eat. · Spread carbohydrate throughout the day. Eat some carbohydrate at all meals, but do not eat too much at any one time. · Plan meals to include food from all the food groups. These are the food groups and some example portion sizes:  ¨ Grains: 1 slice of bread (1 ounce), ½ cup of cooked cereal, and 1/3 cup of cooked pasta or rice. These have about 15 grams of carbohydrate in a serving. Choose whole grains such as whole wheat bread or crackers, oatmeal, and brown rice more often than refined grains. ¨ Fruit: 1 small fresh fruit, such as an apple or orange; ½ of a banana; ½ cup of chopped, cooked, or canned fruit; ½ cup of fruit juice; 1 cup of melon or raspberries; and 2 tablespoons of dried fruit. These have about 15 grams of carbohydrate in a serving. ¨ Dairy: 1 cup of nonfat or low-fat milk and 2/3 cup of plain yogurt. These have about 15 grams of carbohydrate in a serving. ¨ Protein foods: Beef, chicken, turkey, fish, eggs, tofu, cheese, cottage cheese, and peanut butter. A serving size of meat is 3 ounces, which is about the size of a deck of cards. Examples of meat substitute serving sizes (equal to 1 ounce of meat) are 1/4 cup of cottage cheese, 1 egg, 1 tablespoon of peanut butter, and ½ cup of tofu. These have very little or no carbohydrate per serving. ¨ Vegetables: Starchy vegetables such as ½ cup of cooked dried beans, peas, potatoes, or corn have about 15 grams of carbohydrate. Nonstarchy vegetables have very little carbohydrate, such as 1 cup of raw leafy vegetables (such as spinach), ½ cup of other vegetables (cooked or chopped), and 3/4 cup of vegetable juice. · Use the plate format to plan meals. It is a good, quick way to make sure that you have a balanced meal. It also helps you spread carbohydrate throughout the day. You divide your plate by types of foods.  Put vegetables on half the plate, meat or meat substitutes on one-quarter of the plate, and a grain or starchy vegetable (such as brown rice or a potato) in the final quarter of the plate. To this you can add a small piece of fruit and 1 cup of milk or yogurt, depending on how much carbohydrate you are supposed to eat at a meal.  · Talk to your dietitian or diabetes educator about ways to add limited amounts of sweets into your meal plan. You can eat these foods now and then, as long as you include the amount of carbohydrate they have in your daily carbohydrate allowance. · If you drink alcohol, limit it to no more than 1 drink a day for women and 2 drinks a day for men. If you are pregnant, no amount of alcohol is known to be safe. · Protein, fat, and fiber do not raise blood sugar as much as carbohydrate does. If you eat a lot of these nutrients in a meal, your blood sugar will rise more slowly than it would otherwise. · Limit saturated fats, such as those from meat and dairy products. Try to replace it with monounsaturated fat, such as olive oil. This is a healthier choice because people who have diabetes are at higher-than-average risk of heart disease. But use a modest amount of olive oil. A tablespoon of olive oil has 14 grams of fat and 120 calories. · Exercise lowers blood sugar. If you take insulin by shots or pump, you can use less than you would if you were not exercising. Keep in mind that timing matters. If you exercise within 1 hour after a meal, your body may need less insulin for that meal than it would if you exercised 3 hours after the meal. Test your blood sugar to find out how exercise affects your need for insulin. · Exercise on most days of the week. Aim for at least 30 minutes. Exercise helps you stay at a healthy weight and helps your body use insulin. Walking is an easy way to get exercise. Gradually increase the amount you walk every day. You also may want to swim, bike, or do other activities.   When you eat out  · Learn to estimate the serving sizes of foods that have carbohydrate. If you measure food at home, it will be easier to estimate the amount in a serving of restaurant food. · If the meal you order has too much carbohydrate (such as potatoes, corn, or baked beans), ask to have a low-carbohydrate food instead. Ask for a salad or green vegetables. · If you use insulin, check your blood sugar before and after eating out to help you plan how much to eat in the future. · If you eat more carbohydrate at a meal than you had planned, take a walk or do other exercise. This will help lower your blood sugar. Where can you learn more? Go to [employer]  Enter Q494 in the search box to learn more about \"Nutrition Tips for Diabetes: After Your Visit. \"   © 3398-2405 [employer]. Care instructions adapted under license by [address] [place] [address] (which disclaims liability or warranty for this information). This care instruction is for use with your licensed healthcare professional. If you have questions about a medical condition or this instruction, always ask your healthcare professional. [place] [address] any warranty or liability for your use of this information. Content Version: 06.6.214002; Current as of: June 4, 2014                 Hypothyroidism: Care Instructions  Your Care Instructions     When you have hypothyroidism, your body doesn't make enough thyroid hormone. This hormone helps your body use energy. If your thyroid level is low, you may feel tired, be constipated, have an increase in your blood pressure, or have dry skin or memory problems. You may also get cold easily, even when it is warm. Women with low thyroid levels may have heavy menstrual periods. A blood test to find your thyroid-stimulating hormone (TSH) level is used to check for hypothyroidism. A high TSH level may mean that you have it. The treatment for hypothyroidism is thyroid hormone pills. You should start to feel better in 1 to 2 weeks.  Most people need treatment for the rest of their lives. You will need regular visits with your doctor to make sure you are doing well and that you have the right dose of medicine. Follow-up care is a key part of your treatment and safety. Be sure to make and go to all appointments, and call your doctor if you are having problems. It's also a good idea to know your test results and keep a list of the medicines you take. How can you care for yourself at home? · Take your thyroid hormone medicine exactly as prescribed. Call your doctor if you think you are having a problem with your medicine. Most people do not have side effects if they take the right amount of medicine regularly. ? Take the medicine 30 minutes before breakfast, and do not take it with calcium, vitamins, or iron. ? Do not take extra doses of your thyroid medicine. It will not help you get better any faster, and it may cause side effects. ? If you forget to take a dose, do NOT take a double dose of medicine. Take your usual dose the next day. · Tell your doctor about all prescription, herbal, or over-the-counter products you take. · Take care of yourself. Eat a healthy diet, get enough sleep, and get regular exercise. When should you call for help? Call 911 anytime you think you may need emergency care. For example, call if:    · You passed out (lost consciousness).     · You have severe trouble breathing.     · You have a very slow heartbeat (less than 60 beats a minute).     · You have a low body temperature (95°F or below). Call your doctor now or seek immediate medical care if:    · You feel tired, sluggish, or weak.     · You have trouble remembering things or concentrating.     · You do not begin to feel better 2 weeks after starting your medicine. Watch closely for changes in your health, and be sure to contact your doctor if you have any problems. Where can you learn more?   Go to http://www.gray.com/  Enter U067 in the search box to learn more about \"Hypothyroidism: Care Instructions. \"  Current as of: December 2, 2020               Content Version: 13.0  © 2006-2021 [employer]. Care instructions adapted under license by [employer] (which disclaims liability or warranty for this information). If you have questions about a medical condition or this instruction, always ask your healthcare professional. [address] any warranty or liability for your use of this information. Chronic Obstructive Pulmonary Disease (COPD): Care Instructions  Overview     Chronic obstructive pulmonary disease (COPD) is a lung disease that makes it hard to breathe. With COPD, the airways that lead to the lungs are narrowed, and the tiny air sacs in the lungs are damaged and lose their stretch. People with COPD have decreased airflow in and out of the lungs, which makes it hard to breathe. The airways also can get clogged with thick mucus. Cigarette smoking is a major cause of COPD. Although there is no cure for COPD, you can slow its progress. Following your treatment plan and taking care of yourself can help you feel better and live longer. Follow-up care is a key part of your treatment and safety. Be sure to make and go to all appointments, and call your doctor if you are having problems. It's also a good idea to know your test results and keep a list of the medicines you take. How can you care for yourself at home? Staying healthy    · Do not smoke. This is the most important step you can take to prevent more damage to your lungs. If you need help quitting, talk to your doctor about stop-smoking programs and medicines. These can increase your chances of quitting for good.     · Avoid colds and other infections. Get the pneumococcal and whooping cough (pertussis) vaccines. If you have had these vaccines before, ask your doctor if you need another dose.  Get the flu vaccine every fall. If you must be around people with colds or the flu, wash your hands often.     · Avoid secondhand smoke and air pollution. Try to stay inside with your windows closed when air pollution is bad. Medicines and oxygen therapy    · Take your medicines exactly as prescribed. Call your doctor if you think you are having a problem with your medicine. You may be taking medicines such as:  ? Bronchodilators. These help open your airways and make breathing easier. They are either short-acting (work for 4 to 9 hours) or long-acting (work for 12 to 24 hours). You inhale most bronchodilators, so they start to act quickly. Always carry your quick-relief inhaler with you in case you need it. ? Corticosteroids (prednisone, budesonide). These reduce airway inflammation. They come in inhaled or pill form.     · Ask your doctor or pharmacist if you are using each type of inhaler correctly. With correct use, the medicine is more likely to get to your lungs.     · See if a spacer is right for you. A spacer may also help you get more inhaled medicine to your lungs. If you use one, ask how to use it properly.     · Do not take any vitamins, over-the-counter medicine, or herbal products without talking to your doctor first.     · If your doctor prescribed antibiotics, take them as directed. Do not stop taking them just because you feel better. You need to take the full course of antibiotics.     · If you use oxygen therapy, use the flow rate your doctor has recommended. Don't change it without talking to your doctor first. Oxygen therapy boosts the amount of oxygen in your blood and helps you breathe easier. Activity    · Get regular exercise. Walking is an easy way to get exercise. Start out slowly, and walk a little more each day.     · Pay attention to your breathing.  You are exercising too hard if you can't talk while you exercise.     · Take short rest breaks when doing household chores and other activities.     · Learn breathing methods--such as breathing through pursed lips--to help you become less short of breath.     · If your doctor has not set you up with a pulmonary rehabilitation program, ask if rehab is right for you. Rehab includes exercise programs, education about your disease and how to manage it, help with diet and other changes, and emotional support. Diet    · Eat regular, healthy meals.     · Use bronchodilators about 1 hour before you eat to make it easier to eat.     · Eat several smaller, frequent meals to prevent getting too full. A full stomach can push on the muscle that helps you breathe (your diaphragm) and make it harder to breathe.     · Drink beverages at the end of the meal.     · Avoid foods that are hard to chew.     · Eat foods that contain protein so you don't lose muscle mass.     · Talk with your doctor if you gain too much weight or if you lose weight without trying. Mental health    · Talk to your family, friends, or a therapist about your feelings. Some people feel frightened, angry, hopeless, helpless, and even guilty. Talking openly about feelings may help you cope. If these feelings last, talk to your doctor. When should you call for help? Call 911 anytime you think you may need emergency care. For example, call if:    · You have severe trouble breathing.     · You are having chest pain that is different or worse than usual.   Call your doctor now or seek immediate medical care if:    · You have new or worse trouble breathing.     · You cough up blood.     · You have a fever.     · You have feelings of anxiety or depression. Watch closely for changes in your health, and be sure to contact your doctor if:    · You cough more deeply or more often, especially if you notice more mucus or a change in the color of your mucus.     · You have new or worse swelling in your legs or belly.     · You are not getting better as expected. Where can you learn more?   Go to http://www.gray.com/  Enter A188 in the search box to learn more about \"Chronic Obstructive Pulmonary Disease (COPD): Care Instructions. \"  Current as of: July 6, 2021               Content Version: 13.0  © 8072-1489 Healthwise, Incorporated. Care instructions adapted under license by [employer] (which disclaims liability or warranty for this information). If you have questions about a medical condition or this instruction, always ask your healthcare professional. [address] any warranty or liability for your use of this information.

## 2022-02-15 NOTE — PROGRESS NOTES
HISTORY OF PRESENT ILLNESS  aNtali Denis is a 66 y.o. male. HPI: Here for follow-up. Hypertension, hyperlipidemia, COPD, chronic lower back pain, BPH, hypothyroidism, GERD symptom, diabetes, neuropathy, B12 deficiency, chronic diastolic dysfunction  Had done evaluation for sleep apnea in the past and a negative sleep study. .  On medication. Being compliant with taking medication. No hyper or hypoglycemic symptoms. No side effects of the medication. Sitting comfortable without any acute distress. Following cardiology, pulmonary, endocrinology. Used to follow pain management and now off pain medication. Taking symptomatic treatment for his back. Fairly stable. No GERD symptoms. Stable PPI. Avoiding spicy and fried food as well. Checking blood sugar at home. It used to fluctuant when he had a COVID infection but now today morning was under 130. Again no hyper or hypoglycemic symptoms. He had a COVID infection back in December. Recovered well but having still on and off dry cough which he is using albuterol nebulizer and it has been helping. Dry cough is more at nighttime. Could be postnasal drip. He is on symptomatic treatment as well. Taking vitamin supplement. BPH symptoms. Fairly stable. Following urology recommendations. Denies any headache, dizziness, no chest pain or trouble breathing, no arm or leg weakness. No nausea or vomiting, no weight or appetite changes, no mood changes . No urine or bowel complains, no palpitation, no diaphoresis. No abdominal pain. No cold or cough. No leg swelling. No fever. No sleep trouble. Visit Vitals  /62 (BP 1 Location: Left arm, BP Patient Position: Sitting, BP Cuff Size: Large adult)   Pulse 86   Temp 97.4 °F (36.3 °C) (Temporal)   Resp 16   Ht 5' 10\" (1.778 m)   Wt 237 lb (107.5 kg)   SpO2 100%   BMI 34.01 kg/m²     Review medication list, vitals, problem list,allergies.    Lab Results   Component Value Date/Time    WBC 6.5 01/26/2022 01:12 PM HGB 14.8 01/26/2022 01:12 PM    HCT 44.6 01/26/2022 01:12 PM    PLATELET 738 44/28/5512 01:12 PM    MCV 85.9 01/26/2022 01:12 PM     Lab Results   Component Value Date/Time    Sodium 141 01/12/2022 02:14 PM    Potassium 5.2 01/12/2022 02:14 PM    Chloride 108 01/12/2022 02:14 PM    CO2 32 01/12/2022 02:14 PM    Anion gap 1 (L) 01/12/2022 02:14 PM    Glucose 171 (H) 01/12/2022 02:14 PM    BUN 19 (H) 01/12/2022 02:14 PM    Creatinine 1.11 01/12/2022 02:14 PM    BUN/Creatinine ratio 17 01/12/2022 02:14 PM    GFR est AA >60 01/12/2022 02:14 PM    GFR est non-AA >60 01/12/2022 02:14 PM    Calcium 9.1 01/12/2022 02:14 PM    Bilirubin, total 0.4 01/12/2022 02:14 PM    Alk.  phosphatase 54 01/12/2022 02:14 PM    Protein, total 6.5 01/12/2022 02:14 PM    Albumin 3.6 01/12/2022 02:14 PM    Globulin 2.9 01/12/2022 02:14 PM    A-G Ratio 1.2 01/12/2022 02:14 PM    ALT (SGPT) 18 01/12/2022 02:14 PM    AST (SGOT) 12 01/12/2022 02:14 PM     Lab Results   Component Value Date/Time    Cholesterol, total 228 (H) 06/18/2021 09:17 AM    HDL Cholesterol 40 06/18/2021 09:17 AM    LDL-C, External 82 06/16/2017 12:00 AM    LDL, calculated 142.4 (H) 06/18/2021 09:17 AM    VLDL, calculated 45.6 06/18/2021 09:17 AM    Triglyceride 228 (H) 06/18/2021 09:17 AM    CHOL/HDL Ratio 5.7 (H) 06/18/2021 09:17 AM     Lab Results   Component Value Date/Time    TSH 0.98 06/18/2021 09:17 AM     Lab Results   Component Value Date/Time    Hemoglobin A1c 6.8 (H) 02/21/2020 07:58 AM    Hemoglobin A1c (POC) 6.8 02/15/2022 10:40 AM    Hemoglobin A1c, External 7.2 06/16/2017 12:00 AM     Lab Results   Component Value Date/Time    Prostate Specific Ag 0.3 03/01/2019 01:46 PM    Prostate Specific Ag 0.2 03/08/2018 09:34 AM    Prostate Specific Ag 0.3 03/09/2017 09:00 AM     Lab Results   Component Value Date/Time    Microalbumin/Creat ratio (mg/g creat) 23 06/18/2021 09:17 AM    Microalbumin,urine random 1.72 06/18/2021 09:17 AM         ROS: See HPI    Physical Exam  Constitutional:       General: He is not in acute distress. Cardiovascular:      Rate and Rhythm: Normal rate and regular rhythm. Heart sounds: Normal heart sounds. Abdominal:      General: Bowel sounds are normal.      Palpations: Abdomen is soft. Tenderness: There is no abdominal tenderness. Musculoskeletal:         General: No swelling. Cervical back: Neck supple. Neurological:      Mental Status: He is oriented to person, place, and time. Psychiatric:         Behavior: Behavior normal.         ASSESSMENT and PLAN    ICD-10-CM ICD-9-CM    1. Type 2 diabetes with nephropathy ([facility].): A1c went up compared to before around 6.7. Following Endo. He will work more compliance with the diet modification sugar has been fluctuating since Covid infection. Will observe little bit longer E11.21 250.40 AMB POC HEMOGLOBIN A1C     583.81    2. Asthma with COPD (chronic obstructive pulmonary disease) ([facility].): Fairly stable. No increased use of albuterol. Since Covid infection having cough at nighttime sometimes and stable with the albuterol nebulizer J44.9 493.20    3. Chronic diastolic heart failure ([facility].): No signs of volume overload I50.32 428.32    4. Polycythemia vera ([facility].): Getting phlebotomy. Following hematology and the recommendation D45 238.4     getting phelbotomy    5. Vitamin B12 deficiency: On supplement E53.8 266.2    6. Essential hypertension:well controlled. Continue current dose of medication and low salt diet. Exercise as tolerated. I10 401.9    7. DDD (degenerative disc disease), lumbar fairly stable. Will observe M51.36 722.52    8. Gastroesophageal reflux disease without esophagitis: Fairly stable with the diet modification and PPI K21.9 530.81    9. Pure hypercholesterolemia: On statin. Working on diet modification. Has lost some weight E78.00 272.0    10. Hypothyroidism, unspecified type: Asymptomatic. Following Endo E03.9 244.9    11.  Benign prostatic hyperplasia without lower urinary tract symptoms: Stable symptomatic. Following urology N40.0 600.00    12. Hypotestosteronism: Following urology and the recommendation E34.9 259.9    Pt understood and agree with the plan   Review HM  Up to date with podiatry and eye exam. Will obtain records. Follow-up and Dispositions    · Return in about 4 months (around 6/15/2022). Please note that this dictation was completed with [employer], the computer voice recognition software. Quite often unanticipated grammatical, syntax, homophones, and other interpretive errors are inadvertently transcribed by the computer software. Please disregard these errors. Please excuse any errors that have escaped final proofreading.

## 2022-02-23 ENCOUNTER — HOSPITAL ENCOUNTER (OUTPATIENT)
Dept: INFUSION THERAPY | Age: 72
Discharge: HOME | End: 2022-02-23
Payer: MEDICARE

## 2022-02-23 VITALS
DIASTOLIC BLOOD PRESSURE: 62 MMHG | RESPIRATION RATE: 20 BRPM | OXYGEN SATURATION: 99 % | HEART RATE: 52 BPM | SYSTOLIC BLOOD PRESSURE: 98 MMHG | TEMPERATURE: 97.4 F

## 2022-02-23 LAB
BASO+EOS+MONOS # BLD AUTO: 0.8 K/UL (ref 0–2.3)
BASO+EOS+MONOS NFR BLD AUTO: 10 % (ref 0.1–17)
DIFFERENTIAL METHOD BLD: ABNORMAL
ERYTHROCYTE [DISTWIDTH] IN BLOOD BY AUTOMATED COUNT: 13.7 % (ref 11.5–14.5)
HCT VFR BLD AUTO: 46.2 % (ref 36–48)
HGB BLD-MCNC: 15.1 G/DL (ref 12–16)
LYMPHOCYTES # BLD: 2.3 K/UL (ref 1.1–5.9)
LYMPHOCYTES NFR BLD: 29 % (ref 14–44)
MCH RBC QN AUTO: 28.2 PG (ref 25–35)
MCHC RBC AUTO-ENTMCNC: 32.7 G/DL (ref 31–37)
MCV RBC AUTO: 86.2 FL (ref 78–102)
NEUTS SEG # BLD: 4.7 K/UL (ref 1.8–9.5)
NEUTS SEG NFR BLD: 61 % (ref 40–70)
PLATELET # BLD AUTO: 242 K/UL (ref 140–440)
RBC # BLD AUTO: 5.36 M/UL (ref 4.1–5.1)
WBC # BLD AUTO: 7.8 K/UL (ref 4.5–13)

## 2022-02-23 PROCEDURE — 74011250636 HC RX REV CODE- 250/636: Performed by: INTERNAL MEDICINE

## 2022-02-23 PROCEDURE — 36415 COLL VENOUS BLD VENIPUNCTURE: CPT

## 2022-02-23 PROCEDURE — 99195 PHLEBOTOMY: CPT

## 2022-02-23 PROCEDURE — 85025 COMPLETE CBC W/AUTO DIFF WBC: CPT

## 2022-02-23 RX ORDER — SODIUM CHLORIDE 9 MG/ML
250 INJECTION, SOLUTION INTRAVENOUS ONCE
Status: COMPLETED | OUTPATIENT
Start: 2022-02-23 | End: 2022-02-23

## 2022-02-23 RX ADMIN — SODIUM CHLORIDE 250 ML: 9 INJECTION, SOLUTION INTRAVENOUS at 13:55

## 2022-02-23 NOTE — PROGRESS NOTES
[facility] Progress Note    Date: 2022    Name: Fuentes Feldman    MRN: [id number]         : 1950    Mr. Jacklyn Magaña arrived in the [facility] today at , in stable condition, here for CBC/Phlebotomy (Monthly Status). He was assessed and education was provided. Mr. Moises Flores vitals were reviewed. Visit Vitals  /63 (BP 1 Location: Right upper arm, BP Patient Position: Sitting)   Pulse 74   Temp 97.4 °F (36.3 °C)   Resp 20   SpO2 99%         Blood for the ordered CBC was drawn from his right AC at 1324 without incident and was processed on site. Lab results were obtained and reviewed, and were as follows:    Recent Results (from the past 12 hour(s))   CBC WITH 3 PART DIFF    Collection Time: 22  1:24 PM   Result Value Ref Range    WBC 7.8 4.5 - 13.0 K/uL    RBC 5.36 (H) 4.10 - 5.10 M/uL    HGB 15.1 12.0 - 16 g/dL    HCT 46.2 36 - 48 %    MCV 86.2 78 - 102 FL    MCH 28.2 25.0 - 35.0 PG    MCHC 32.7 31 - 37 g/dL    RDW 13.7 11.5 - 14.5 %    PLATELET 282 776 - 027 K/uL    NEUTROPHILS 61 40 - 70 %    Mixed cells 10 0.1 - 17 %    LYMPHOCYTES 29 14 - 44 %    ABS. NEUTROPHILS 4.7 1.8 - 9.5 K/UL    ABS. MIXED CELLS 0.8 0.0 - 2.3 K/uL    ABS. LYMPHOCYTES 2.3 1.1 - 5.9 K/UL    DF AUTOMATED               Phlebotomy was indicated today per order, for HCT > 45.0 (monthly status parameters). PIV # 21 G was established in his LEFT AC at 1340 without incident, and brisk blood return was obtained, and phlebotomy was started. Snack was offered, but politely declined, because he stated that he had just finished eating lunch just prior to coming. Phlebotomy was completed without incident at 1355, after having obtained 250 ml blood per order.  ml IV Bolus was administered post phlebotomy, per order and without incident. After completion of the NS Bolus, his PIV was removed and gauze/coban was applied. Mr. Jacklyn Magaña tolerated well and voiced no complaints.      Mr. Jacklyn Magaña was discharged from [facility] in stable condition at 1415. Carlee Restrepo He is to return in 1 week, on next Wednesday, 3-2-22 at 1300, for his next appointment, for CBC/Phlebotomy (Weekly Status).      Gloria Mars RN  February 23, 2022  1:24 PM

## 2022-03-01 ENCOUNTER — APPOINTMENT (OUTPATIENT)
Dept: INFUSION THERAPY | Age: 72
End: 2022-03-01
Payer: MEDICARE

## 2022-03-02 ENCOUNTER — HOSPITAL ENCOUNTER (OUTPATIENT)
Dept: INFUSION THERAPY | Age: 72
Discharge: HOME | End: 2022-03-02
Payer: MEDICARE

## 2022-03-02 VITALS
SYSTOLIC BLOOD PRESSURE: 135 MMHG | DIASTOLIC BLOOD PRESSURE: 63 MMHG | OXYGEN SATURATION: 96 % | HEART RATE: 89 BPM | TEMPERATURE: 98.3 F | RESPIRATION RATE: 20 BRPM

## 2022-03-02 LAB
BASO+EOS+MONOS # BLD AUTO: 0.7 K/UL (ref 0–2.3)
BASO+EOS+MONOS NFR BLD AUTO: 8 % (ref 0.1–17)
DIFFERENTIAL METHOD BLD: NORMAL
ERYTHROCYTE [DISTWIDTH] IN BLOOD BY AUTOMATED COUNT: 13.5 % (ref 11.5–14.5)
HCT VFR BLD AUTO: 42.3 % (ref 36–48)
HGB BLD-MCNC: 14 G/DL (ref 12–16)
LYMPHOCYTES # BLD: 2.2 K/UL (ref 1.1–5.9)
LYMPHOCYTES NFR BLD: 24 % (ref 14–44)
MCH RBC QN AUTO: 28.6 PG (ref 25–35)
MCHC RBC AUTO-ENTMCNC: 33.1 G/DL (ref 31–37)
MCV RBC AUTO: 86.3 FL (ref 78–102)
NEUTS SEG # BLD: 6.2 K/UL (ref 1.8–9.5)
NEUTS SEG NFR BLD: 68 % (ref 40–70)
PLATELET # BLD AUTO: 263 K/UL (ref 140–440)
RBC # BLD AUTO: 4.9 M/UL (ref 4.1–5.1)
WBC # BLD AUTO: 9.1 K/UL (ref 4.5–13)

## 2022-03-02 PROCEDURE — 85025 COMPLETE CBC W/AUTO DIFF WBC: CPT

## 2022-03-02 PROCEDURE — 36415 COLL VENOUS BLD VENIPUNCTURE: CPT

## 2022-03-02 NOTE — PROGRESS NOTES
[address] Lab Visit:    Forrest De Los Santos  1950  [id number]    0779 Pt arrived ambulatory w/o assist. Labs drawn per order via Left AC venipuncture x1 attempt. Pt tolerated well without complaints. Gauze/ coban to site. Pt departed [facility] ambulatory and in no distress at 1320.      Visit Vitals  /63 (BP 1 Location: Right upper arm, BP Patient Position: Sitting)   Pulse 89   Temp 98.3 °F (36.8 °C)   Resp 20   SpO2 96%

## 2022-03-18 PROBLEM — E11.8 TYPE 2 DIABETES MELLITUS WITH COMPLICATION, WITH LONG-TERM CURRENT USE OF INSULIN (HCC): Status: ACTIVE | Noted: 2017-11-27

## 2022-03-18 PROBLEM — Z79.4 TYPE 2 DIABETES MELLITUS WITH COMPLICATION, WITH LONG-TERM CURRENT USE OF INSULIN (HCC): Status: ACTIVE | Noted: 2017-11-27

## 2022-03-19 PROBLEM — E11.21 TYPE 2 DIABETES WITH NEPHROPATHY (HCC): Status: ACTIVE | Noted: 2018-06-05

## 2022-03-19 PROBLEM — G89.29 CHRONIC LOW BACK PAIN: Status: ACTIVE | Noted: 2017-03-02

## 2022-03-19 PROBLEM — M54.50 CHRONIC LOW BACK PAIN: Status: ACTIVE | Noted: 2017-03-02

## 2022-03-19 PROBLEM — K85.90 PANCREATITIS: Status: ACTIVE | Noted: 2020-09-08

## 2022-03-19 PROBLEM — E11.40 TYPE 2 DIABETES MELLITUS WITH DIABETIC NEUROPATHY (HCC): Status: ACTIVE | Noted: 2018-06-05

## 2022-04-06 ENCOUNTER — HOSPITAL ENCOUNTER (OUTPATIENT)
Dept: INFUSION THERAPY | Age: 72
Discharge: HOME | End: 2022-04-06
Payer: MEDICARE

## 2022-04-06 VITALS
SYSTOLIC BLOOD PRESSURE: 101 MMHG | TEMPERATURE: 98.3 F | HEART RATE: 86 BPM | OXYGEN SATURATION: 98 % | RESPIRATION RATE: 18 BRPM | DIASTOLIC BLOOD PRESSURE: 66 MMHG

## 2022-04-06 LAB
BASO+EOS+MONOS # BLD AUTO: 0.9 K/UL (ref 0–2.3)
BASO+EOS+MONOS NFR BLD AUTO: 12 % (ref 0.1–17)
DIFFERENTIAL METHOD BLD: NORMAL
ERYTHROCYTE [DISTWIDTH] IN BLOOD BY AUTOMATED COUNT: 13.4 % (ref 11.5–14.5)
HCT VFR BLD AUTO: 42.4 % (ref 36–48)
HGB BLD-MCNC: 14 G/DL (ref 12–16)
LYMPHOCYTES # BLD: 2.2 K/UL (ref 1.1–5.9)
LYMPHOCYTES NFR BLD: 31 % (ref 14–44)
MCH RBC QN AUTO: 28.6 PG (ref 25–35)
MCHC RBC AUTO-ENTMCNC: 33 G/DL (ref 31–37)
MCV RBC AUTO: 86.7 FL (ref 78–102)
NEUTS SEG # BLD: 4.2 K/UL (ref 1.8–9.5)
NEUTS SEG NFR BLD: 58 % (ref 40–70)
PLATELET # BLD AUTO: 211 K/UL (ref 140–440)
RBC # BLD AUTO: 4.89 M/UL (ref 4.1–5.1)
WBC # BLD AUTO: 7.3 K/UL (ref 4.5–13)

## 2022-04-06 PROCEDURE — 85025 COMPLETE CBC W/AUTO DIFF WBC: CPT

## 2022-04-06 PROCEDURE — 36415 COLL VENOUS BLD VENIPUNCTURE: CPT

## 2022-04-06 NOTE — PROGRESS NOTES
[facility] Progress Note    Date: 2022    Name: Nora Barba    MRN: [id number]         : 1950    Mr. Farshad Wallis arrived in the [facility] today at , in stable condition, here for CBC/Phlebotomy (Monthly Status). He was assessed and education was provided. Mr. Liu Medina vitals were reviewed. Visit Vitals  /66 (BP 1 Location: Right upper arm, BP Patient Position: Sitting)   Pulse 86   Temp 98.3 °F (36.8 °C)   Resp 18   SpO2 98%       Ordered CBC was drawn from his right [facility] and was processed on site. Lab results were obtained and reviewed, and were as follows:    Recent Results (from the past 12 hour(s))   CBC WITH 3 PART DIFF    Collection Time: 22  1:15 PM   Result Value Ref Range    WBC 7.3 4.5 - 13.0 K/uL    RBC 4.89 4. 10 - 5.10 M/uL    HGB 14.0 12.0 - 16 g/dL    HCT 42.4 36 - 48 %    MCV 86.7 78 - 102 FL    MCH 28.6 25.0 - 35.0 PG    MCHC 33.0 31 - 37 g/dL    RDW 13.4 11.5 - 14.5 %    PLATELET 841 538 - 096 K/uL    NEUTROPHILS 58 40 - 70 %    Mixed cells 12 0.1 - 17 %    LYMPHOCYTES 31 14 - 44 %    ABS. NEUTROPHILS 4.2 1.8 - 9.5 K/UL    ABS. MIXED CELLS 0.9 0.0 - 2.3 K/uL    ABS. LYMPHOCYTES 2.2 1.1 - 5.9 K/UL    DF AUTOMATED               Phlebotomy was NOT indicated today per monthly status order parameters. Mr. Farshad Wallis was discharged from [place] [address] in stable condition at 1320. He is to return on 22 at 1300, for his next appointment, for CBC/Phlebotomy (Monthly Status).      Monica Aguiar RN  2022  1:24 PM

## 2022-05-04 ENCOUNTER — HOSPITAL ENCOUNTER (OUTPATIENT)
Dept: INFUSION THERAPY | Age: 72
Discharge: HOME | End: 2022-05-04
Payer: MEDICARE

## 2022-05-04 VITALS
DIASTOLIC BLOOD PRESSURE: 66 MMHG | SYSTOLIC BLOOD PRESSURE: 113 MMHG | RESPIRATION RATE: 18 BRPM | TEMPERATURE: 98.7 F | HEART RATE: 85 BPM | OXYGEN SATURATION: 98 %

## 2022-05-04 LAB
BASO+EOS+MONOS # BLD AUTO: 0.6 K/UL (ref 0–2.3)
BASO+EOS+MONOS NFR BLD AUTO: 8 % (ref 0.1–17)
DIFFERENTIAL METHOD BLD: NORMAL
ERYTHROCYTE [DISTWIDTH] IN BLOOD BY AUTOMATED COUNT: 13 % (ref 11.5–14.5)
HCT VFR BLD AUTO: 42.5 % (ref 36–48)
HGB BLD-MCNC: 13.9 G/DL (ref 12–16)
LYMPHOCYTES # BLD: 2.3 K/UL (ref 1.1–5.9)
LYMPHOCYTES NFR BLD: 33 % (ref 14–44)
MCH RBC QN AUTO: 28.7 PG (ref 25–35)
MCHC RBC AUTO-ENTMCNC: 32.7 G/DL (ref 31–37)
MCV RBC AUTO: 87.8 FL (ref 78–102)
NEUTS SEG # BLD: 4.1 K/UL (ref 1.8–9.5)
NEUTS SEG NFR BLD: 59 % (ref 40–70)
PLATELET # BLD AUTO: 231 K/UL (ref 140–440)
RBC # BLD AUTO: 4.84 M/UL (ref 4.1–5.1)
WBC # BLD AUTO: 7 K/UL (ref 4.5–13)

## 2022-05-04 PROCEDURE — 85025 COMPLETE CBC W/AUTO DIFF WBC: CPT

## 2022-05-04 PROCEDURE — 36415 COLL VENOUS BLD VENIPUNCTURE: CPT

## 2022-05-04 NOTE — PROGRESS NOTES
[facility] Progress Note    Date: May 4, 2022    Name: Mathieu Parson    MRN: [id number]         : 1950    Mr. Tana West arrived in the [facility] today at , in stable condition, here for CBC/Phlebotomy (Monthly Status). He was assessed and education was provided. Patient stated he has received new hearing aides and they are still working on adjusting them. Mr. Cesar Sandhu vitals were reviewed. Visit Vitals  /66 (BP 1 Location: Right upper arm, BP Patient Position: Sitting)   Pulse 85   Temp 98.7 °F (37.1 °C)   Resp 18   SpO2 98%     Ordered CBC was drawn from his Left AC by Jaqu[facility] Phlebotomist and was processed on site. Lab results were obtained and reviewed, and were as follows:    Recent Results (from the past 12 hour(s))   CBC WITH 3 PART DIFF    Collection Time: 22  1:10 PM   Result Value Ref Range    WBC 7.0 4.5 - 13.0 K/uL    RBC 4.84 4.10 - 5.10 M/uL    HGB 13.9 12.0 - 16 g/dL    HCT 42.5 36 - 48 %    MCV 87.8 78 - 102 FL    MCH 28.7 25.0 - 35.0 PG    MCHC 32.7 31 - 37 g/dL    RDW 13.0 11.5 - 14.5 %    PLATELET 115 611 - 060 K/uL    NEUTROPHILS 59 40 - 70 %    Mixed cells 8 0.1 - 17 %    LYMPHOCYTES 33 14 - 44 %    ABS. NEUTROPHILS 4.1 1.8 - 9.5 K/UL    ABS. MIXED CELLS 0.6 0.0 - 2.3 K/uL    ABS. LYMPHOCYTES 2.3 1.1 - 5.9 K/UL    DF AUTOMATED       HCT 42.5    Phlebotomy was NOT indicated today per monthly status order parameters. Mr. Tana West was discharged from [place] [id number] in stable condition at 1320. He is to return on 22 at 1300, for his next appointment, for CBC/Phlebotomy (Monthly Status).      Charlene Benitez RN  May 4, 2022

## 2022-05-05 ENCOUNTER — HOSPITAL ENCOUNTER (OUTPATIENT)
Dept: LAB | Age: 72
Discharge: HOME | End: 2022-05-05
Payer: MEDICARE

## 2022-05-05 ENCOUNTER — LAB ONLY (OUTPATIENT)
Dept: ONCOLOGY | Age: 72
End: 2022-05-05

## 2022-05-05 DIAGNOSIS — D75.1 POLYCYTHEMIA: ICD-10-CM

## 2022-05-05 DIAGNOSIS — D75.1 SECONDARY POLYCYTHEMIA: Primary | ICD-10-CM

## 2022-05-05 LAB
ALBUMIN SERPL-MCNC: 3.8 G/DL (ref 3.4–5)
ALBUMIN/GLOB SERPL: 1.3 {RATIO} (ref 0.8–1.7)
ALP SERPL-CCNC: 52 U/L (ref 45–117)
ALT SERPL-CCNC: 20 U/L (ref 16–61)
ANION GAP SERPL CALC-SCNC: 2 MMOL/L (ref 3–18)
AST SERPL-CCNC: 11 U/L (ref 10–38)
BASOPHILS # BLD: 0.1 K/UL (ref 0–0.1)
BASOPHILS NFR BLD: 1 % (ref 0–2)
BILIRUB SERPL-MCNC: 0.5 MG/DL (ref 0.2–1)
BUN SERPL-MCNC: 19 MG/DL (ref 7–18)
BUN/CREAT SERPL: 16 (ref 12–20)
CALCIUM SERPL-MCNC: 9.2 MG/DL (ref 8.5–10.1)
CHLORIDE SERPL-SCNC: 110 MMOL/L (ref 100–111)
CO2 SERPL-SCNC: 31 MMOL/L (ref 21–32)
CREAT SERPL-MCNC: 1.22 MG/DL (ref 0.6–1.3)
DIFFERENTIAL METHOD BLD: NORMAL
EOSINOPHIL # BLD: 0.3 K/UL (ref 0–0.4)
EOSINOPHIL NFR BLD: 4 % (ref 0–5)
ERYTHROCYTE [DISTWIDTH] IN BLOOD BY AUTOMATED COUNT: 13.2 % (ref 11.6–14.5)
FERRITIN SERPL-MCNC: 15 NG/ML (ref 8–388)
GLOBULIN SER CALC-MCNC: 2.9 G/DL (ref 2–4)
GLUCOSE SERPL-MCNC: 223 MG/DL (ref 74–99)
HCT VFR BLD AUTO: 44.9 % (ref 36–48)
HGB BLD-MCNC: 14.2 G/DL (ref 13–16)
IMM GRANULOCYTES # BLD AUTO: 0 K/UL (ref 0–0.04)
IMM GRANULOCYTES NFR BLD AUTO: 0 % (ref 0–0.5)
LYMPHOCYTES # BLD: 1.9 K/UL (ref 0.9–3.6)
LYMPHOCYTES NFR BLD: 25 % (ref 21–52)
MCH RBC QN AUTO: 28.7 PG (ref 24–34)
MCHC RBC AUTO-ENTMCNC: 31.6 G/DL (ref 31–37)
MCV RBC AUTO: 90.7 FL (ref 78–100)
MONOCYTES # BLD: 0.8 K/UL (ref 0.05–1.2)
MONOCYTES NFR BLD: 10 % (ref 3–10)
NEUTS SEG # BLD: 4.6 K/UL (ref 1.8–8)
NEUTS SEG NFR BLD: 60 % (ref 40–73)
NRBC # BLD: 0 K/UL (ref 0–0.01)
NRBC BLD-RTO: 0 PER 100 WBC
PLATELET # BLD AUTO: 232 K/UL (ref 135–420)
PMV BLD AUTO: 10.3 FL (ref 9.2–11.8)
POTASSIUM SERPL-SCNC: 5.5 MMOL/L (ref 3.5–5.5)
PROT SERPL-MCNC: 6.7 G/DL (ref 6.4–8.2)
RBC # BLD AUTO: 4.95 M/UL (ref 4.35–5.65)
SODIUM SERPL-SCNC: 143 MMOL/L (ref 136–145)
WBC # BLD AUTO: 7.7 K/UL (ref 4.6–13.2)

## 2022-05-05 PROCEDURE — 80053 COMPREHEN METABOLIC PANEL: CPT

## 2022-05-05 PROCEDURE — 82728 ASSAY OF FERRITIN: CPT

## 2022-05-05 PROCEDURE — 36415 COLL VENOUS BLD VENIPUNCTURE: CPT

## 2022-05-05 PROCEDURE — 85025 COMPLETE CBC W/AUTO DIFF WBC: CPT

## 2022-05-19 ENCOUNTER — VIRTUAL VISIT (OUTPATIENT)
Dept: ONCOLOGY | Age: 72
End: 2022-05-19
Payer: MEDICARE

## 2022-05-19 DIAGNOSIS — D75.1 SECONDARY POLYCYTHEMIA: Primary | ICD-10-CM

## 2022-05-19 PROCEDURE — 99442 PR PHYS/QHP TELEPHONE EVALUATION 11-20 MIN: CPT | Performed by: INTERNAL MEDICINE

## 2022-05-19 NOTE — PROGRESS NOTES
Shweta Barlow is a 66 y.o. male, evaluated via audio-only technology on 5/19/2022 for Follow-up  . Assessment & Plan:   Diagnoses and all orders for this visit:    1. Secondary polycythemia        # Secondary Polycythemia    -- Past medical history significant of asthma/COPD with chronic shortness of breath, hypertension, dyslipidemia,  diabetes mellitus, hypothyroidism, low serum testosterone (?). He was former smoker, quit since 1995. He denied any testosterone injection. -- 6/18/2021 CBC reported hemoglobin 16.2, hematocrit 50.2%, WBC 9.2, platelet 056,200.  -- 8/26/2021 CBC reported hemoglobin 15.8, hematocrit 47.6%, WBC 7.1, platelet 111,382. Erythropoietin 11.6. Negative JAK2/MPL/CALR mutations/BCR/ABL1, CO level 1.8.  -- He has f/u with Pulmonary. He admitted he stopped using sleep machine for a while. -- His polycythemia was likely secondary, 2/2 COPD/Asthma with chronic shortness of breath. -- He has COVID infection in the past. He states he already contacted his PCP and Pulmonary about his infection. Today he reports feeling better. -- Today I have reviewed with the patient about recent lab reports. 01/12/2022 CBC reported hemoglobin 15, hematocrit 47.6%, WBC of 7.4, platelet 028,148.  94/37/3007 H/H 14.2/44. 9. His last phlebotomy was about 2 months ago. Plan:  -- He will continue Phlebotomy 0.5 unit (250ml) each time with the goal of inducing an iron deficient state and reducing the Hct < 45%. Will space out phlebotomy to every 2-month schedule. -- Encouraged patient to maintain hydration and avoid vigorous exercise within 24 hours of phlebotomy. -- He will need to follow up Pulmonary for COPD/Asthma management, sleep study. -- I will see the patient back in clinic in about 6 months. Always sooner if required. The patient can have lab done prior to our next clinic visit.     12  Subjective:   Mr. Rohan Healy is a most pleasant 66y.o. year old male who was seen for consultation of polycythemia. The patient has a past medical history significant of asthma/COPD with chronic shortness of breath, hypertension, dyslipidemia,  diabetes mellitus, hypothyroidism, low serum testosterone (?). He was former smoker, quit since 1995. He denied any testosterone injection. He reported chronic SOB and occasional headache. Today he reports feeling fairly well. He has chronic back pain from osteoarthritis and f/u PCP for management. He otherwise has no other complaints since last visit. Denied fever, chills, night sweat, unintentional weight loss, skin lumps or bumps, acute bleeding or bruising issues. Denied acute vision change, chest pain, palpitation, worsening productive cough, nausea, vomiting, abdominal pain, altered bowel habits, dysuria, worsen bone pain, new focal numbness or weakness. Prior to Admission medications    Medication Sig Start Date End Date Taking? Authorizing Provider   doxycycline (VIBRAMYCIN) 100 mg capsule TAKE 1 CAPSULE BY MOUTH TWICE DAILY FOR 10 DAYS 2/4/22   Provider, Historical   Creon 24,000-76,000 -120,000 unit capsule TAKE 2 CAPSULES BY MOUTH TWICE DAILY 2/11/22   Provider, Historical   tamsulosin (FLOMAX) 0.4 mg capsule Take 2 Capsules by mouth daily (after dinner). 3/15/22   Stacey Guerrero MD   albuterol (PROVENTIL VENTOLIN) 2.5 mg /3 mL (0.083 %) nebu 3 mL by Nebulization route every four (4) hours as needed for Wheezing or Shortness of Breath. 12/7/21   Brooke Troncoso MD   levothyroxine (Euthyrox) 125 mcg tablet Take 1 Tablet by mouth Daily (before breakfast). 11/30/21   Cynthia Mclean MD   metoprolol tartrate (LOPRESSOR) 25 mg tablet Take 1 Tablet by mouth two (2) times a day. . 11/30/21   Cynthia Mclean MD   topiramate (TOPAMAX) 25 mg tablet Take 1 Tablet by mouth daily (with breakfast).  10/29/21   Cynthia Mclean MD   tamsulosin [facility]) 0.4 mg capsule Take 1 capsule by mouth once daily 10/7/21   Abraham Snell MD   albuterol (PROVENTIL HFA, VENTOLIN HFA, PROAIR HFA) 90 mcg/actuation inhaler Take 1-2 Puffs by inhalation every four (4) hours as needed for Wheezing or Shortness of Breath. 9/22/21   Mandy Stark MD   budesonide-formoteroL (Symbicort) 160-4.5 mcg/actuation HFAA Take 2 Puffs by inhalation two (2) times a day. Patient taking differently: Take 2 Puffs by inhalation two (2) times daily as needed. 9/21/20   Massimo Linn MD   pantoprazole (PROTONIX) 40 mg tablet Take 1 Tab by mouth daily. 9/21/20   Massimo Linn MD   diphenhydrAMINE-zinc acetate 1%-0.1% (BENADRYL) topical cream Apply  to affected area every eight (8) hours as needed for Itching. 9/16/20   Brandy Bateman MD   montelukast (SINGULAIR) 10 mg tablet Take 1 tablet by mouth once daily  Patient taking differently: Take 10 mg by mouth daily. 8/12/20   Massimo Linn MD   pregabalin (Lyrica) 150 mg capsule Take 1 Cap by mouth three (3) times daily. Max Daily Amount: 450 mg. TAKE 1 CAPSULE BY MOUTH THREE TIMES DAILY FOR 30 DAYS. MAXIMUM 3 CAPSULES DAILY. Indications: neuropathy 4/10/20   Massimo Linn MD   DULoxetine (CYMBALTA) 60 mg capsule Take 1 Cap by mouth two (2) times a day. 4/10/20   Massimo Linn MD   insulin glargine (BASAGLAR KWIKPEN U-100 INSULIN) 100 unit/mL (3 mL) inpn 52 Units by SubCUTAneous route nightly. 52 units at bedtime  Patient taking differently: 48 Units by SubCUTAneous route nightly. 8/1/19   Massimo Linn MD   fluticasone (FLONASE) 50 mcg/actuation nasal spray USE 1 SPRAY(S) IN EACH NOSTRIL ONCE DAILY  Patient taking differently: 2 Sprays by Both Nostrils route nightly.  1/11/19   aMssimo Linn MD   OTHER EB-N3 once daily    Provider, Historical   FANI PEN NEEDLE 32 gauge x 5/32\" ndle USE AT BEDTIME 2/9/18   Massimo Linn MD   ACCU-CHEK JAZZY PLUS TEST STRP strip  3/13/17   Provider, Historical   ACCU-CHEK SOFTCLIX LANCETS misc  3/13/17   Provider, Historical   insulin glulisine (APIDRA SOLOSTAR) 100 unit/mL pen 2 units per carb consumed. Max 30 / day  Patient taking differently: 10 Units by SubCUTAneous route Before breakfast, lunch, and dinner. 2 units per carb consumed. Max 30 / day  Indications: patient states taking 15-20 units three times a day 4/24/17   Placido Horowitz MD   PEN NEEDLE 31 gauge x 5/16\" ndle USE ONE PEN NEEDLE FOR LANTUS FLEXPEN AND 3 PEN NEEDLES FOR APIDRA WITH EACH MEAL 1/27/17   Placido Horowitz MD   betamethasone dipropionate (DIPROLENE) 0.05 % ointment Apply  to affected area nightly. 1/5/17   Provider, Historical   ketorolac (ACULAR) 0.5 % ophthalmic solution Administer 1 Drop to both eyes two (2) times daily as needed. Provider, Historical     Patient Active Problem List   Diagnosis Code    Chronic bilateral low back pain with bilateral sciatica M54.42, M54.41, G89.29    Migraine headache G43.909    GERD (gastroesophageal reflux disease) K21.9    Hyperlipidemia E78.5    Hypothyroidism E03.9    BPH (benign prostatic hyperplasia) N40.0    Hypotestosteronism E34.9    Vertigo R44    Encounter for long-term (current) use of other medications Z79.899    Chronic pain syndrome G89.4    DJD (degenerative joint disease), lumbar M47.816    H/O lumbosacral spine surgery Z98.890    DDD (degenerative disc disease), lumbar M51.36    Peripheral neuropathy G62.9    Asthma with COPD (chronic obstructive pulmonary disease) ([facility]) J44.9    Essential hypertension I10    Vitamin B12 deficiency E53.8    Chronic chest pain R07.9, G89.29    Advance directive in chart/ no change per patient. Z78.9    H/O colonoscopy/ due in 2018 Z98.890    Chronic low back pain M54.50, G89.29    Type 2 diabetes mellitus with complication, with long-term current use of insulin ([facility]) E11.8, Z79.4    Type 2 diabetes with nephropathy ([facility]) E11.21    Type 2 diabetes mellitus with diabetic neuropathy ([facility]) E11.40    Severe obesity (BMI 35.0-39. 9) E66.01    Pancreatitis K85.90       Review of Systems   Constitutional: Negative for chills, diaphoresis, fever, malaise/fatigue and weight loss. Respiratory: Negative for cough, hemoptysis, shortness of breath and wheezing. Cardiovascular: Negative for chest pain, palpitations and leg swelling. Gastrointestinal: Negative for abdominal pain, diarrhea, heartburn, nausea and vomiting. Genitourinary: Negative for dysuria, frequency, hematuria and urgency. Musculoskeletal: Positive for back pain. Negative for joint pain and myalgias. Skin: Negative for itching and rash. Neurological: Negative for dizziness, seizures, weakness and headaches. Psychiatric/Behavioral: Negative for depression. The patient does not have insomnia. Patient-Reported Vitals 5/19/2022   Patient-Reported Weight 237lb   Patient-Reported Height -   Patient-Reported Pulse -   Patient-Reported Temperature -   Patient-Reported Systolic  -   Patient-Reported Diastolic -         The patient was advised that our priority at this time is to keep the patients safe, and therefore we are reaching out to patients virtually to prevent them coming into the office unless necessarily. Patient verbalized understanding and was agreeable for virtual visit. Busby Deniz, who was evaluated through a patient-initiated, synchronous (real-time) audio only encounter, and/or her healthcare decision maker, is aware that it is a billable service, with coverage as determined by his insurance carrier. He provided verbal consent to proceed: Yes. He has not had a related appointment within my department in the past 7 days or scheduled within the next 24 hours. I have spent 15 minutes reviewing previous notes, test results and discussing the diagnosis and importance of compliance with the treatment plan as well as documenting on the day of the visit.     Lawrence Stout MD        CC: Stiven Alegre MD

## 2022-05-24 ENCOUNTER — TELEPHONE (OUTPATIENT)
Dept: FAMILY MEDICINE CLINIC | Age: 72
End: 2022-05-24

## 2022-05-24 NOTE — TELEPHONE ENCOUNTER
Left voicemail for patient to return call to the practice and press option 3. I did leave my name so he can ask for me and I can give him info requested below.

## 2022-05-24 NOTE — TELEPHONE ENCOUNTER
----- Message from Qing Broussard sent at 5/19/2022  9:14 AM EDT -----  Subject: Message to Provider    QUESTIONS  Information for Provider? Patients wife Marlin Englnad called in and they are   wanting to know if the patient has received both of his shingle   vaccinations. They would like a call back :)   ---------------------------------------------------------------------------  --------------  CALL BACK INFO  What is the best way for the office to contact you? Do not leave any   message, patient will call back for answer  Preferred Call Back Phone Number?  [phone]  ---------------------------------------------------------------------------  --------------  SCRIPT ANSWERS  undefined

## 2022-05-27 DIAGNOSIS — R25.1 SHAKINESS: ICD-10-CM

## 2022-05-27 DIAGNOSIS — R00.0 TACHYCARDIA: ICD-10-CM

## 2022-05-27 RX ORDER — METOPROLOL TARTRATE 25 MG/1
TABLET, FILM COATED ORAL
Qty: 180 TABLET | Refills: 0 | Status: SHIPPED | OUTPATIENT
Start: 2022-05-27 | End: 2022-09-14 | Stop reason: SDUPTHER

## 2022-06-01 ENCOUNTER — APPOINTMENT (OUTPATIENT)
Dept: INFUSION THERAPY | Age: 72
End: 2022-06-01

## 2022-06-15 ENCOUNTER — OFFICE VISIT (OUTPATIENT)
Dept: FAMILY MEDICINE CLINIC | Age: 72
End: 2022-06-15
Payer: MEDICARE

## 2022-06-15 VITALS
OXYGEN SATURATION: 97 % | RESPIRATION RATE: 16 BRPM | BODY MASS INDEX: 35.53 KG/M2 | SYSTOLIC BLOOD PRESSURE: 110 MMHG | DIASTOLIC BLOOD PRESSURE: 66 MMHG | TEMPERATURE: 97.1 F | WEIGHT: 248.2 LBS | HEART RATE: 81 BPM | HEIGHT: 70 IN

## 2022-06-15 DIAGNOSIS — R45.89 FEELING SAD: ICD-10-CM

## 2022-06-15 DIAGNOSIS — E78.00 PURE HYPERCHOLESTEROLEMIA: ICD-10-CM

## 2022-06-15 DIAGNOSIS — M54.17 LUMBOSACRAL RADICULOPATHY: ICD-10-CM

## 2022-06-15 DIAGNOSIS — G43.019 INTRACTABLE MIGRAINE WITHOUT AURA AND WITHOUT STATUS MIGRAINOSUS: ICD-10-CM

## 2022-06-15 DIAGNOSIS — J44.9 ASTHMA WITH COPD (CHRONIC OBSTRUCTIVE PULMONARY DISEASE) (HCC): ICD-10-CM

## 2022-06-15 DIAGNOSIS — R09.81 SINUS CONGESTION: ICD-10-CM

## 2022-06-15 DIAGNOSIS — E11.8 TYPE 2 DIABETES MELLITUS WITH COMPLICATION, WITH LONG-TERM CURRENT USE OF INSULIN (HCC): ICD-10-CM

## 2022-06-15 DIAGNOSIS — Z00.00 MEDICARE ANNUAL WELLNESS VISIT, SUBSEQUENT: ICD-10-CM

## 2022-06-15 DIAGNOSIS — E53.8 B12 DEFICIENCY: ICD-10-CM

## 2022-06-15 DIAGNOSIS — E03.9 HYPOTHYROIDISM, UNSPECIFIED TYPE: ICD-10-CM

## 2022-06-15 DIAGNOSIS — G89.4 CHRONIC PAIN SYNDROME: ICD-10-CM

## 2022-06-15 DIAGNOSIS — E55.9 VITAMIN D DEFICIENCY: ICD-10-CM

## 2022-06-15 DIAGNOSIS — E53.8 VITAMIN B12 DEFICIENCY: ICD-10-CM

## 2022-06-15 DIAGNOSIS — G62.89 OTHER POLYNEUROPATHY: ICD-10-CM

## 2022-06-15 DIAGNOSIS — D75.1 SECONDARY POLYCYTHEMIA: ICD-10-CM

## 2022-06-15 DIAGNOSIS — Z79.4 TYPE 2 DIABETES MELLITUS WITH COMPLICATION, WITH LONG-TERM CURRENT USE OF INSULIN (HCC): ICD-10-CM

## 2022-06-15 DIAGNOSIS — I10 ESSENTIAL HYPERTENSION: ICD-10-CM

## 2022-06-15 DIAGNOSIS — N18.30 STAGE 3 CHRONIC KIDNEY DISEASE, UNSPECIFIED WHETHER STAGE 3A OR 3B CKD (HCC): ICD-10-CM

## 2022-06-15 DIAGNOSIS — M79.10 MUSCLE ACHE: Primary | ICD-10-CM

## 2022-06-15 PROBLEM — E66.01 SEVERE OBESITY (BMI 35.0-39.9): Status: RESOLVED | Noted: 2018-06-05 | Resolved: 2022-06-15

## 2022-06-15 PROCEDURE — G8752 SYS BP LESS 140: HCPCS | Performed by: FAMILY MEDICINE

## 2022-06-15 PROCEDURE — G0439 PPPS, SUBSEQ VISIT: HCPCS | Performed by: FAMILY MEDICINE

## 2022-06-15 PROCEDURE — G8417 CALC BMI ABV UP PARAM F/U: HCPCS | Performed by: FAMILY MEDICINE

## 2022-06-15 PROCEDURE — G8510 SCR DEP NEG, NO PLAN REQD: HCPCS | Performed by: FAMILY MEDICINE

## 2022-06-15 PROCEDURE — 1123F ACP DISCUSS/DSCN MKR DOCD: CPT | Performed by: FAMILY MEDICINE

## 2022-06-15 PROCEDURE — G8536 NO DOC ELDER MAL SCRN: HCPCS | Performed by: FAMILY MEDICINE

## 2022-06-15 PROCEDURE — 3044F HG A1C LEVEL LT 7.0%: CPT | Performed by: FAMILY MEDICINE

## 2022-06-15 PROCEDURE — G8427 DOCREV CUR MEDS BY ELIG CLIN: HCPCS | Performed by: FAMILY MEDICINE

## 2022-06-15 PROCEDURE — G8754 DIAS BP LESS 90: HCPCS | Performed by: FAMILY MEDICINE

## 2022-06-15 PROCEDURE — 2022F DILAT RTA XM EVC RTNOPTHY: CPT | Performed by: FAMILY MEDICINE

## 2022-06-15 PROCEDURE — 1101F PT FALLS ASSESS-DOCD LE1/YR: CPT | Performed by: FAMILY MEDICINE

## 2022-06-15 PROCEDURE — 3017F COLORECTAL CA SCREEN DOC REV: CPT | Performed by: FAMILY MEDICINE

## 2022-06-15 RX ORDER — METRONIDAZOLE 10 MG/G
GEL TOPICAL
COMMUNITY
Start: 2022-06-04 | End: 2022-09-14

## 2022-06-15 RX ORDER — AMITRIPTYLINE HYDROCHLORIDE 10 MG/1
10 TABLET, FILM COATED ORAL
Qty: 30 TABLET | Refills: 2 | Status: SHIPPED | OUTPATIENT
Start: 2022-06-15 | End: 2022-09-14 | Stop reason: SDUPTHER

## 2022-06-15 NOTE — PROGRESS NOTES
HISTORY OF PRESENT ILLNESS  Yolis Cisneros is a 66 y.o. male. HPI: Here for follow-up. Multiple medical problem. Currently having a worsening of the back pain and feeling generalized muscle ache. Stated he has been feeling more pain than usual and never has placed the severe pain. It is generalized. Some worsening of back pain and radiculopathy pain as well. Has not tried taking any medication. He is on Cymbalta. Also taking Lyrica. Came off of pain medication since long time. No loss of urine or bowel control. No recent cold cough or any fever. Stated since [place] infection he has been feeling loss of taste and smell and has not came back. It was a in December 2021. No GERD symptoms at this time. Diabetes. No hyper or hypoglycemic symptoms. No low sugars. Today morning fasting sugar is 118. Compliant with taking medication and diet modification. Polycythemia. Has been stable. Following Dr. Haylie Pereira. No new recommendations at this time. Hypertension. Vitals been stable. No concern. Not on statin is not able to tolerate. Taking vitamin D and B12 supplement. Chronic kidney disease. Fairly stable. Avoiding NSAIDs. Asthma and COPD. No increased use of albuterol. No wheezing or any cough cold. No chest congestion. No shortness of breath or any anginal symptoms. No chest pain or diaphoresis. No palpitation. No nausea vomiting or abdominal pain. No urinary or bowel complaint. Never felt depressed but he has been feeling sad since the pain has been worse. Discussed Elavil and he agrees to try it. Discussed to take it at nighttime. Discussed medication side effects. Visit Vitals  /66 (BP 1 Location: Left upper arm, BP Patient Position: Sitting, BP Cuff Size: Adult)   Pulse 81   Temp 97.1 °F (36.2 °C) (Temporal)   Resp 16   Ht 5' 10\" (1.778 m)   Wt 248 lb 3.2 oz (112.6 kg)   SpO2 97%   BMI 35.61 kg/m²     Review medication list, vitals, problem list,allergies.    Lab Results Component Value Date/Time    WBC 7.7 05/05/2022 10:04 AM    HGB 14.2 05/05/2022 10:04 AM    HCT 44.9 05/05/2022 10:04 AM    PLATELET 199 99/17/9597 10:04 AM    MCV 90.7 05/05/2022 10:04 AM     Lab Results   Component Value Date/Time    Sodium 143 05/05/2022 10:04 AM    Potassium 5.5 05/05/2022 10:04 AM    Chloride 110 05/05/2022 10:04 AM    CO2 31 05/05/2022 10:04 AM    Anion gap 2 (L) 05/05/2022 10:04 AM    Glucose 223 (H) 05/05/2022 10:04 AM    BUN 19 (H) 05/05/2022 10:04 AM    Creatinine 1.22 05/05/2022 10:04 AM    BUN/Creatinine ratio 16 05/05/2022 10:04 AM    GFR est AA >60 05/05/2022 10:04 AM    GFR est non-AA 58 (L) 05/05/2022 10:04 AM    Calcium 9.2 05/05/2022 10:04 AM    Bilirubin, total 0.5 05/05/2022 10:04 AM    Alk.  phosphatase 52 05/05/2022 10:04 AM    Protein, total 6.7 05/05/2022 10:04 AM    Albumin 3.8 05/05/2022 10:04 AM    Globulin 2.9 05/05/2022 10:04 AM    A-G Ratio 1.3 05/05/2022 10:04 AM    ALT (SGPT) 20 05/05/2022 10:04 AM    AST (SGOT) 11 05/05/2022 10:04 AM     Lab Results   Component Value Date/Time    Cholesterol, total 228 (H) 06/18/2021 09:17 AM    HDL Cholesterol 40 06/18/2021 09:17 AM    LDL-C, External 82 06/16/2017 12:00 AM    LDL, calculated 142.4 (H) 06/18/2021 09:17 AM    VLDL, calculated 45.6 06/18/2021 09:17 AM    Triglyceride 228 (H) 06/18/2021 09:17 AM    CHOL/HDL Ratio 5.7 (H) 06/18/2021 09:17 AM     Lab Results   Component Value Date/Time    TSH 0.98 06/18/2021 09:17 AM     Lab Results   Component Value Date/Time    Hemoglobin A1c 6.8 (H) 02/21/2020 07:58 AM    Hemoglobin A1c (POC) 6.8 02/15/2022 10:40 AM    Hemoglobin A1c, External 7.2 06/16/2017 12:00 AM     Lab Results   Component Value Date/Time    Prostate Specific Ag 0.3 03/01/2019 01:46 PM    Prostate Specific Ag 0.2 03/08/2018 09:34 AM    Prostate Specific Ag 0.3 03/09/2017 09:00 AM      Lab Results   Component Value Date/Time    Microalbumin/Creat ratio (mg/g creat) 23 06/18/2021 09:17 AM    Microalbumin,urine random 1.72 06/18/2021 09:17 AM         ROS: See HPI    Physical Exam  Constitutional:       General: He is not in acute distress. Cardiovascular:      Rate and Rhythm: Normal rate and regular rhythm. Heart sounds: Normal heart sounds. Abdominal:      General: Bowel sounds are normal.      Palpations: Abdomen is soft. Tenderness: There is no abdominal tenderness. Musculoskeletal:         General: No swelling. Cervical back: Neck supple. Neurological:      Mental Status: He is oriented to person, place, and time. Psychiatric:         Behavior: Behavior normal.         ASSESSMENT and PLAN    ICD-10-CM ICD-9-CM    1. Muscle ache: Generalized. Giving trial of Elavil. Also worsening of neuropathy pain and back pain. Might get help. Continue other medication and advised to take Tylenol arthritis as needed for pain. Take it with the food. Follow-up next visit T82.47 850.4 METABOLIC PANEL, COMPREHENSIVE      TSH 3RD GENERATION      amitriptyline (ELAVIL) 10 mg tablet   2. Stage 3 chronic kidney disease, unspecified whether stage 3a or 3b CKD ([facility] [address]): Avoid NSAID C53.98 136.6 METABOLIC PANEL, COMPREHENSIVE   3. BMI 35.0-35.9,adult: Working on diet modification has limitation in Z68.35 V85.35    4. Lumbosacral radiculopathy as: On symptomatic treatment and adding Elavil. M54.17 724.4    5. Hypothyroidism, unspecified type: Asymptomatic. Checking lab E03.9 244.9 TSH 3RD GENERATION   6. Essential hypertension:well controlled. Continue current dose of medication and low salt diet. Exercise as tolerated. I10 401.9    7. Intractable migraine without aura and without status migrainosus: On and off along with sinus congestion. Starting Elavil might have multiple medical problem. Will observe G43.019 346.11    8. Other polyneuropathy: On Lyrica and Cymbalta. Adding Elavil. Worsening of pain is making him feel depressed.   Will observe and follow-up next visit G62.89 357.89 amitriptyline (ELAVIL) 10 mg tablet   9. Asthma with COPD (chronic obstructive pulmonary disease) ([facility] [address]): Fairly stable. No trouble breathing or any increased use of albuterol J44.9 493.20    10. Type 2 diabetes mellitus with complication, with long-term current use of insulin ([facility] [address]): A1c fairly controlled. Fasting sugar today secondary to needing it at home. We will continue current plan and follow-up after results E11.8 250.90 MICROALBUMIN, UR, RAND W/ MICROALB/CREAT RATIO    Z79.4 V58.67 HEMOGLOBIN A1C WITH EAG   11. Sinus congestion: On symptomatic treatment and following ENT R09.81 478.19     following ENt    12. Vitamin B12 deficiency: On supplement E53.8 266.2    13. Chronic pain syndrome and: Lately worsening of pain. Will add Elavil. See above G89.4 338.4 amitriptyline (ELAVIL) 10 mg tablet   14. Pure hypercholesterolemia: Unable to tolerate statin. Working on diet moderate E78.00 272.0 LIPID PANEL   15. Secondary polycythemia which: Seen hematology. Now phlebotomy every 2 to 3 months as needed D75.1 289.0    16. Vitamin D deficiency: On supplement E55.9 268.9 VITAMIN D, 25 HYDROXY   17. B12 deficiency: On supplement  E53.8 266.2 VITAMIN B12   18. Medicare annual wellness visit, subsequent  Z00.00 V70.0    19. Feeling sad: Since pain has been worse she has been feeling depressed. Anushka Gilmore this is the worst pain he ever had in his life. Adding Elavil and advised Tylenol arthritis if any worsening of symptoms advised him to come back sooner R45.89 300.4    Patient understood agreed with thePlan. See Medicare wellness form for HM  Follow-up and Dispositions    · Return in about 3 months (around 9/15/2022). Please note that this dictation was completed with [employer], the computer voice recognition software. Quite often unanticipated grammatical, syntax, homophones, and other interpretive errors are inadvertently transcribed by the computer software. Please disregard these errors.   Please excuse any errors that have escaped final proofreading.

## 2022-06-15 NOTE — PATIENT INSTRUCTIONS
Medicare Wellness Visit, Male    The best way to live healthy is to have a lifestyle where you eat a well-balanced diet, exercise regularly, limit alcohol use, and quit all forms of tobacco/nicotine, if applicable. Regular preventive services are another way to keep healthy. Preventive services (vaccines, screening tests, monitoring & exams) can help personalize your care plan, which helps you manage your own care. Screening tests can find health problems at the earliest stages, when they are easiest to treat. [facility] follows the current, evidence-based guidelines published by the [facility] ([facility]) when recommending preventive services for our patients. Because we follow these guidelines, sometimes recommendations change over time as research supports it. (For example, a prostate screening blood test is no longer routinely recommended for men with no symptoms). Of course, you and your doctor may decide to screen more often for some diseases, based on your risk and co-morbidities (chronic disease you are already diagnosed with). Preventive services for you include:  - Medicare offers their members a free annual wellness visit, which is time for you and your primary care provider to discuss and plan for your preventive service needs. Take advantage of this benefit every year!  -All adults over age 72 should receive the recommended pneumonia vaccines. Current USPSTF guidelines recommend a series of two vaccines for the best pneumonia protection.   -All adults should have a flu vaccine yearly and tetanus vaccine every 10 years.  -All adults age 48 and older should receive the shingles vaccines (series of two vaccines).        -All adults age 38-68 who are overweight should have a diabetes screening test once every three years.   -Other screening tests & preventive services for persons with diabetes include: an eye exam to screen for diabetic retinopathy, a kidney function test, a foot exam, and stricter control over your cholesterol.   -Cardiovascular screening for adults with routine risk involves an electrocardiogram (ECG) at intervals determined by the provider.   -Colorectal cancer screening should be done for adults age 54-65 with no increased risk factors for colorectal cancer. There are a number of acceptable methods of screening for this type of cancer. Each test has its own benefits and drawbacks. Discuss with your provider what is most appropriate for you during your annual wellness visit. The different tests include: colonoscopy (considered the best screening method), a fecal occult blood test, a fecal DNA test, and sigmoidoscopy.  -All adults born between [facility] should be screened once for Hepatitis C.  -An Abdominal Aortic Aneurysm (AAA) Screening is recommended for men age 73-68 who has ever smoked in their lifetime.      Here is a list of your current Health Maintenance items (your personalized list of preventive services) with a due date:  Health Maintenance Due   Topic Date Due    Diabetic Foot Care  03/14/2020    Eye Exam  07/23/2021    Albumin Urine Test  06/18/2022    Cholesterol Test   06/18/2022

## 2022-06-15 NOTE — PROGRESS NOTES
This is the Subsequent Medicare Annual Wellness Exam, performed 12 months or more after the Initial AWV or the last Subsequent AWV    I have reviewed the patient's medical history in detail and updated the computerized patient record. Assessment/Plan   Education and counseling provided:  Are appropriate based on today's review and evaluation  discussed 5 years health. Discussed advance directive. See ACP note from today   1. Muscle ache  -     METABOLIC PANEL, COMPREHENSIVE; Future  -     TSH 3RD GENERATION; Future  2. Stage 3 chronic kidney disease, unspecified whether stage 3a or 3b CKD (HCC)  -     METABOLIC PANEL, COMPREHENSIVE; Future  3. BMI 35.0-35.9,adult  4. Lumbosacral radiculopathy  5. Hypothyroidism, unspecified type  -     TSH 3RD GENERATION; Future  6. Essential hypertension  7. Intractable migraine without aura and without status migrainosus  8. Other polyneuropathy  9. Asthma with COPD (chronic obstructive pulmonary disease) ([facility] [address]  10. Type 2 diabetes mellitus with complication, with long-term current use of insulin (HCC)  -     MICROALBUMIN, UR, RAND W/ MICROALB/CREAT RATIO; Future  -     HEMOGLOBIN A1C WITH EAG; Future  11. Sinus congestion  Comments:  following ENt   12. Vitamin B12 deficiency  13. Chronic pain syndrome  14. Pure hypercholesterolemia  -     LIPID PANEL; Future  15. Secondary polycythemia  16. Vitamin D deficiency  -     VITAMIN D, 25 HYDROXY; Future  17. B12 deficiency  -     VITAMIN B12; Future  18.  Medicare annual wellness visit, subsequent       Depression Risk Factor Screening     3 most recent PHQ Screens 6/15/2022   PHQ Not Done -   Little interest or pleasure in doing things Not at all   Feeling down, depressed, irritable, or hopeless Several days   Total Score PHQ 2 1       Alcohol & Drug Abuse Risk Screen    Do you average more than 1 drink per night or more than 7 drinks a week: No    In the past three months have you have had more than 4 drinks containing alcohol on one occasion: No          Functional Ability and Level of Safety    Hearing: does wear hearing AID. just got new one . still adjusting       Activities of Daily Living: The home contains: handrails and grab bars  Patient does total self care      Ambulation: with mild difficulty. Use cane      Fall Risk:  Fall Risk Assessment, last 12 mths 6/15/2022   Able to walk? Yes   Fall in past 12 months? 1   Do you feel unsteady? 1   Are you worried about falling -   Is the gait abnormal? -   Number of falls in past 12 months 2   Fall with injury?  0      Abuse Screen:  Patient is not abused       Cognitive Screening    Has your family/caregiver stated any concerns about your memory: no         Health Maintenance Due     Health Maintenance Due   Topic Date Due    Foot Exam Q1  03/14/2020    Eye Exam Retinal or Dilated  07/23/2021    MICROALBUMIN Q1  06/18/2022    Lipid Screen  06/18/2022     Will obtain records for an eye exam  He has an appointment for foot exam in September  Obtain labs  Patient Care Team   Patient Care Team:  Thuan Cisse MD as PCP - General (Family Medicine)  Thuan Cisse MD as PCP - [address] [place] [address] Provider  Cindi Singh MD (Otolaryngology)  Marissa Chambers (Internal Medicine Physician)  Vargas Jeter MD as Physician (Urology)  James Floyd as Physician (Podiatry)  Stephen Mak MD (Ophthalmology)  Larry Castro MD (Cardiovascular Disease Physician)  Valerie Coto MD (Endocrinology Physician)  Rosa Nam MD (Otolaryngology)  Parrish Middleton MD as Hospitalist (Gastroenterology)  Judy Pedersen NP (Nurse Practitioner)  Azalia Norton MD (Hematology Physician)  Stephen Mak MD as Physician (Ophthalmology)    History     Patient Active Problem List   Diagnosis Code    Chronic bilateral low back pain with bilateral sciatica M54.42, M54.41, G89.29    Migraine headache G43.909    GERD (gastroesophageal reflux disease) K21.9    Hyperlipidemia E78.5  Hypothyroidism E03.9    BPH (benign prostatic hyperplasia) N40.0    Hypotestosteronism E34.9    Vertigo R42    Encounter for long-term (current) use of other medications Z79.899    Chronic pain syndrome G89.4    DJD (degenerative joint disease), lumbar M47.816    H/O lumbosacral spine surgery Z98.890    DDD (degenerative disc disease), lumbar M51.36    Peripheral neuropathy G62.9    Asthma with COPD (chronic obstructive pulmonary disease) ([facility]) J44.9    Essential hypertension I10    Vitamin B12 deficiency E53.8    Chronic chest pain R07.9, G89.29    Advance directive in chart/ no change per patient. Z78.9    H/O colonoscopy/ due in 2018 Z98.890    Chronic low back pain M54.50, G89.29    Type 2 diabetes mellitus with complication, with long-term current use of insulin ([facility]) E11.8, Z79.4    Type 2 diabetes with nephropathy ([facility]) E11.21    Type 2 diabetes mellitus with diabetic neuropathy ([facility]) E11.40    Pancreatitis K85.90    Chronic renal disease, stage III N18.30     Past Medical History:   Diagnosis Date    Arthritis     Asthma     Cancer ([facility] [address]     BASAL     Cardiac catheterization 2009    [address], [place]:  No significant CAD.  Cardiac echocardiogram 01/20/2014    EF 50-55%. No WMA. Gr 1 DDfx. RVSP 25-30 mmHg. Mild TR.       DM type 2 (diabetes mellitus, type 2) ([facility])     Enlarged prostate     Failed back syndrome     GERD (gastroesophageal reflux disease)     Hearing loss, bilateral     Hearing Aids    Hypertension     Hypothyroidism     Insomnia     Low serum testosterone level     Neuropathy     Osteoarthritis of multiple joints     Polycythemia     S/P colonoscopy 8/14/13    mild diverticulosis in sigmoid colon w/o evidence of diverticulitis; f/u 5 years    SOB (shortness of breath)     chronic; 2' \"lung fever\"    Vertigo       Past Surgical History:   Procedure Laterality Date    COLONOSCOPY N/A 4/3/2018    COLONOS[facility]da [facility]ista [facility] HX BACK SURGERY  2000    removal of defective hardware    HX BACK SURGERY  1980    to remove bone fragments    HX LUMBAR FUSION  1999    fusion from L4-S1 and implantation of hardware    HX LUMBAR FUSION  1984    fusion on L5 area    HX ORTHOPAEDIC Bilateral 2004    trigger finger syndrome-all 4 fingers    HX ORTHOPAEDIC Left 2004    left arm torn muscle repair and placement of 2 screws    HX OTHER SURGICAL      skin cancer removal     Current Outpatient Medications   Medication Sig Dispense Refill    metroNIDAZOLE (METROGEL) 1 % topical gel APPLY TOPICALLY TO THE FACE NIGHTLY AS NEEDED      OTHER Methyfolate      topiramate (TOPAMAX) 25 mg tablet Take 1 tablet by mouth once daily with breakfast 90 Tablet 1    montelukast (SINGULAIR) 10 mg tablet Take 1 Tablet by mouth daily. 90 Tablet 1    metoprolol tartrate (LOPRESSOR) 25 mg tablet Take 1 tablet by mouth twice daily 180 Tablet 0    Euthyrox 125 mcg tablet TAKE 1 TABLET BY MOUTH ONCE DAILY BEFORE BREAKFAST 90 Tablet 1    Creon 24,000-76,000 -120,000 unit capsule TAKE 2 CAPSULES BY MOUTH TWICE DAILY      tamsulosin (FLOMAX) 0.4 mg capsule Take 2 Capsules by mouth daily (after dinner). 180 Capsule 3    albuterol (PROVENTIL VENTOLIN) 2.5 mg /3 mL (0.083 %) nebu 3 mL by Nebulization route every four (4) hours as needed for Wheezing or Shortness of Breath. 50 Nebule 3    tamsulosin (FLOMAX) 0.4 mg capsule Take 1 capsule by mouth once daily 90 Capsule 0    albuterol (PROVENTIL HFA, VENTOLIN HFA, PROAIR HFA) 90 mcg/actuation inhaler Take 1-2 Puffs by inhalation every four (4) hours as needed for Wheezing or Shortness of Breath. 1 Each 5    budesonide-formoteroL (Symbicort) 160-4.5 mcg/actuation HFAA Take 2 Puffs by inhalation two (2) times a day. (Patient taking differently: Take 2 Puffs by inhalation two (2) times daily as needed.) 1 Inhaler 5    pantoprazole (PROTONIX) 40 mg tablet Take 1 Tab by mouth daily.  90 Tab 1    diphenhydrAMINE-zinc acetate 1%-0.1% (BENADRYL) topical cream Apply  to affected area every eight (8) hours as needed for Itching. 30 g 0    pregabalin (Lyrica) 150 mg capsule Take 1 Cap by mouth three (3) times daily. Max Daily Amount: 450 mg. TAKE 1 CAPSULE BY MOUTH THREE TIMES DAILY FOR 30 DAYS. MAXIMUM 3 CAPSULES DAILY. Indications: neuropathy 90 Cap 0    DULoxetine (CYMBALTA) 60 mg capsule Take 1 Cap by mouth two (2) times a day. 60 Cap 0    insulin glargine (BASAGLAR KWIKPEN U-100 INSULIN) 100 unit/mL (3 mL) inpn 52 Units by SubCUTAneous route nightly. 52 units at bedtime (Patient taking differently: 48 Units by SubCUTAneous route nightly.) 5 Pen 3    fluticasone (FLONASE) 50 mcg/actuation nasal spray USE 1 SPRAY(S) IN EACH NOSTRIL ONCE DAILY (Patient taking differently: 2 Sprays by Both Nostrils route nightly.) 1 Bottle 0    OTHER EB-N3 once daily      FANI PEN NEEDLE 32 gauge x 5/32\" ndle USE AT BEDTIME 100 Pen Needle 6    ACCU-CHEK JAZZY PLUS TEST STRP strip       ACCU-CHEK SOFTCLIX LANCETS misc       insulin glulisine (APIDRA SOLOSTAR) 100 unit/mL pen 2 units per carb consumed. Max 30 / day (Patient taking differently: 10 Units by SubCUTAneous route Before breakfast, lunch, and dinner. 2 units per carb consumed. Max 30 / day  Indications: patient states taking 15-20 units three times a day) 5 Pen 3    PEN NEEDLE 31 gauge x 5/16\" ndle USE ONE PEN NEEDLE FOR LANTUS FLEXPEN AND 3 PEN NEEDLES FOR APIDRA WITH EACH MEAL 1 Package 3    betamethasone dipropionate (DIPROLENE) 0.05 % ointment Apply  to affected area nightly.  ketorolac (ACULAR) 0.5 % ophthalmic solution Administer 1 Drop to both eyes two (2) times daily as needed.       doxycycline (VIBRAMYCIN) 100 mg capsule TAKE 1 CAPSULE BY MOUTH TWICE DAILY FOR 10 DAYS (Patient not taking: Reported on 6/15/2022)       Allergies   Allergen Reactions    Latex Rash    Ciprofloxacin Anaphylaxis    Other Plant, Animal, Environmental Other (comments)     Patient cannot have MRI. Patient states that he has metal screws in his left arm.  Atorvastatin Other (comments)     Possible Pancreatitis, which may also have been due to [place] and [place].  Januvia [Sitagliptin] Other (comments)     Possible pancreatitis.     Lamisil [Terbinafine] Swelling     Tongue swelling    Metformin Other (comments)     Elevated cr 1.4    Morphine Other (comments)     \"loss of blood pressure\"    Other Medication Other (comments) and Unknown (comments)     Doxasylin causes extreme GERD    Penicillins Rash and Unknown (comments)    Pentothal [Thiopental Sodium] Shortness of Breath    Sulfa (Sulfonamide Antibiotics) Rash       Family History   Problem Relation Age of Onset    Cancer Mother         Bone and Brain Cancer    Diabetes Mother     Liver Disease Father         Lung Cancer    Kidney Disease Sister     Diabetes Daughter     Colon Cancer Brother      Social History     Tobacco Use    Smoking status: Former Smoker     Packs/day: 1.00     Years: 31.00     Pack years: 31.00     Types: Pipe, Cigars     Quit date: 1995     Years since quittin.4    Smokeless tobacco: Never Used    Tobacco comment: smoked cigar   Substance Use Topics    Alcohol use: No         Jeff Stallworth MD

## 2022-06-15 NOTE — PROGRESS NOTES
1. Have you been to the ER, urgent care clinic since your last visit? Hospitalized since your last visit? No    2. Have you seen or consulted any other health care providers outside of the [address] [place] [address] since your last visit? Include any pap smears or colon screening.  No    Chief Complaint   Patient presents with    Annual Wellness Visit    Diabetes    Hypertension    Cholesterol Problem    Fall     fell today at home    Generalized Body Aches

## 2022-06-15 NOTE — ACP (ADVANCE CARE PLANNING)
Advance Care Planning     General Advance Care Planning (ACP) Conversation      Date of Conversation: 6/15/2022  Conducted with: Patient with Decision Making Capacity    Healthcare Decision Maker:     Primary Decision Maker: Zhanna Velásquez - Spouse - [phone]    Primary Decision Maker: Dann Amato Daughter - [phone]    Primary Decision Maker: Juwan Garcia - Son-in-Law - [phone]  Click here to complete [address] including selection of the Healthcare Decision Maker Relationship (ie \"Primary\")      Today we discussed advance directive. she has an advance directive on file.  he does want to get initial resuccitation     Content/Action Overview:   see above. he has an advance directive on file   Reviewed DNR/DNI and patient elects Full Code (Attempt Resuscitation)    Length of Voluntary ACP Conversation in minutes:  <16 minutes (Non-Billable)    Drea Mayes MD

## 2022-06-21 ENCOUNTER — HOSPITAL ENCOUNTER (OUTPATIENT)
Dept: LAB | Age: 72
Discharge: HOME | End: 2022-06-21
Payer: MEDICARE

## 2022-06-21 DIAGNOSIS — M79.10 MUSCLE ACHE: ICD-10-CM

## 2022-06-21 DIAGNOSIS — E03.9 HYPOTHYROIDISM, UNSPECIFIED TYPE: ICD-10-CM

## 2022-06-21 DIAGNOSIS — E78.00 PURE HYPERCHOLESTEROLEMIA: ICD-10-CM

## 2022-06-21 DIAGNOSIS — E53.8 B12 DEFICIENCY: ICD-10-CM

## 2022-06-21 DIAGNOSIS — Z79.4 TYPE 2 DIABETES MELLITUS WITH COMPLICATION, WITH LONG-TERM CURRENT USE OF INSULIN (HCC): ICD-10-CM

## 2022-06-21 DIAGNOSIS — E55.9 VITAMIN D DEFICIENCY: ICD-10-CM

## 2022-06-21 DIAGNOSIS — N18.30 STAGE 3 CHRONIC KIDNEY DISEASE, UNSPECIFIED WHETHER STAGE 3A OR 3B CKD (HCC): ICD-10-CM

## 2022-06-21 DIAGNOSIS — E11.8 TYPE 2 DIABETES MELLITUS WITH COMPLICATION, WITH LONG-TERM CURRENT USE OF INSULIN (HCC): ICD-10-CM

## 2022-06-21 LAB
25(OH)D3 SERPL-MCNC: 18.8 NG/ML (ref 30–100)
ALBUMIN SERPL-MCNC: 4.1 G/DL (ref 3.4–5)
ALBUMIN/GLOB SERPL: 1.3 {RATIO} (ref 0.8–1.7)
ALP SERPL-CCNC: 55 U/L (ref 45–117)
ALT SERPL-CCNC: 24 U/L (ref 16–61)
ANION GAP SERPL CALC-SCNC: 4 MMOL/L (ref 3–18)
AST SERPL-CCNC: 17 U/L (ref 10–38)
BILIRUB SERPL-MCNC: 0.4 MG/DL (ref 0.2–1)
BUN SERPL-MCNC: 15 MG/DL (ref 7–18)
BUN/CREAT SERPL: 12 (ref 12–20)
CALCIUM SERPL-MCNC: 8.9 MG/DL (ref 8.5–10.1)
CHLORIDE SERPL-SCNC: 108 MMOL/L (ref 100–111)
CHOLEST SERPL-MCNC: 244 MG/DL
CO2 SERPL-SCNC: 28 MMOL/L (ref 21–32)
CREAT SERPL-MCNC: 1.27 MG/DL (ref 0.6–1.3)
CREAT UR-MCNC: 56 MG/DL (ref 30–125)
EST. AVERAGE GLUCOSE BLD GHB EST-MCNC: 134 MG/DL
GLOBULIN SER CALC-MCNC: 3.1 G/DL (ref 2–4)
GLUCOSE SERPL-MCNC: 104 MG/DL (ref 74–99)
HBA1C MFR BLD: 6.3 % (ref 4.2–5.6)
HDLC SERPL-MCNC: 46 MG/DL (ref 40–60)
HDLC SERPL: 5.3 {RATIO} (ref 0–5)
LDLC SERPL CALC-MCNC: 148 MG/DL (ref 0–100)
LIPID PROFILE,FLP: ABNORMAL
MICROALBUMIN UR-MCNC: 1.29 MG/DL (ref 0–3)
MICROALBUMIN/CREAT UR-RTO: 23 MG/G (ref 0–30)
POTASSIUM SERPL-SCNC: 4.5 MMOL/L (ref 3.5–5.5)
PROT SERPL-MCNC: 7.2 G/DL (ref 6.4–8.2)
SODIUM SERPL-SCNC: 140 MMOL/L (ref 136–145)
TRIGL SERPL-MCNC: 250 MG/DL (ref ?–150)
TSH SERPL DL<=0.05 MIU/L-ACNC: 2.23 UIU/ML (ref 0.36–3.74)
VIT B12 SERPL-MCNC: 1348 PG/ML (ref 211–911)
VLDLC SERPL CALC-MCNC: 50 MG/DL

## 2022-06-21 PROCEDURE — 82043 UR ALBUMIN QUANTITATIVE: CPT

## 2022-06-21 PROCEDURE — 80061 LIPID PANEL: CPT

## 2022-06-21 PROCEDURE — 84443 ASSAY THYROID STIM HORMONE: CPT

## 2022-06-21 PROCEDURE — 36415 COLL VENOUS BLD VENIPUNCTURE: CPT

## 2022-06-21 PROCEDURE — 82607 VITAMIN B-12: CPT

## 2022-06-21 PROCEDURE — 82306 VITAMIN D 25 HYDROXY: CPT

## 2022-06-21 PROCEDURE — 80053 COMPREHEN METABOLIC PANEL: CPT

## 2022-06-21 PROCEDURE — 83036 HEMOGLOBIN GLYCOSYLATED A1C: CPT

## 2022-06-22 NOTE — PROGRESS NOTES
A1C in prediabetic range. Elevated lipid panel little bit up compare to last labs. More complaince with diet modification. Low vitamin D. Take xto1939 units daily  B12 high. Can hold off on supplement at this time. Further discussion on follow up visit.

## 2022-07-07 ENCOUNTER — HOSPITAL ENCOUNTER (OUTPATIENT)
Dept: INFUSION THERAPY | Age: 72
Discharge: HOME | End: 2022-07-07
Payer: MEDICARE

## 2022-07-07 ENCOUNTER — OFFICE VISIT (OUTPATIENT)
Dept: CARDIOLOGY CLINIC | Age: 72
End: 2022-07-07
Payer: MEDICARE

## 2022-07-07 VITALS
OXYGEN SATURATION: 98 % | SYSTOLIC BLOOD PRESSURE: 132 MMHG | HEIGHT: 70 IN | DIASTOLIC BLOOD PRESSURE: 82 MMHG | HEART RATE: 74 BPM | BODY MASS INDEX: 35.07 KG/M2 | WEIGHT: 245 LBS

## 2022-07-07 VITALS
RESPIRATION RATE: 18 BRPM | SYSTOLIC BLOOD PRESSURE: 104 MMHG | DIASTOLIC BLOOD PRESSURE: 72 MMHG | HEART RATE: 81 BPM | OXYGEN SATURATION: 96 % | TEMPERATURE: 98.4 F

## 2022-07-07 DIAGNOSIS — R00.2 PALPITATIONS: Primary | ICD-10-CM

## 2022-07-07 DIAGNOSIS — E11.21 TYPE 2 DIABETES WITH NEPHROPATHY (HCC): ICD-10-CM

## 2022-07-07 DIAGNOSIS — E78.00 PURE HYPERCHOLESTEROLEMIA: ICD-10-CM

## 2022-07-07 DIAGNOSIS — I10 ESSENTIAL HYPERTENSION: ICD-10-CM

## 2022-07-07 PROCEDURE — 3017F COLORECTAL CA SCREEN DOC REV: CPT | Performed by: INTERNAL MEDICINE

## 2022-07-07 PROCEDURE — 2022F DILAT RTA XM EVC RTNOPTHY: CPT | Performed by: INTERNAL MEDICINE

## 2022-07-07 PROCEDURE — G8754 DIAS BP LESS 90: HCPCS | Performed by: INTERNAL MEDICINE

## 2022-07-07 PROCEDURE — 1101F PT FALLS ASSESS-DOCD LE1/YR: CPT | Performed by: INTERNAL MEDICINE

## 2022-07-07 PROCEDURE — G8536 NO DOC ELDER MAL SCRN: HCPCS | Performed by: INTERNAL MEDICINE

## 2022-07-07 PROCEDURE — G8417 CALC BMI ABV UP PARAM F/U: HCPCS | Performed by: INTERNAL MEDICINE

## 2022-07-07 PROCEDURE — 93000 ELECTROCARDIOGRAM COMPLETE: CPT | Performed by: INTERNAL MEDICINE

## 2022-07-07 PROCEDURE — G8427 DOCREV CUR MEDS BY ELIG CLIN: HCPCS | Performed by: INTERNAL MEDICINE

## 2022-07-07 PROCEDURE — G8510 SCR DEP NEG, NO PLAN REQD: HCPCS | Performed by: INTERNAL MEDICINE

## 2022-07-07 PROCEDURE — 1123F ACP DISCUSS/DSCN MKR DOCD: CPT | Performed by: INTERNAL MEDICINE

## 2022-07-07 PROCEDURE — 36415 COLL VENOUS BLD VENIPUNCTURE: CPT

## 2022-07-07 PROCEDURE — G8752 SYS BP LESS 140: HCPCS | Performed by: INTERNAL MEDICINE

## 2022-07-07 PROCEDURE — 99214 OFFICE O/P EST MOD 30 MIN: CPT | Performed by: INTERNAL MEDICINE

## 2022-07-07 PROCEDURE — 3044F HG A1C LEVEL LT 7.0%: CPT | Performed by: INTERNAL MEDICINE

## 2022-07-07 NOTE — PROGRESS NOTES
[facility] Progress Note    Date: 2022    Name: Mariella Black    MRN: [id number]         : 1950    Mr. Tracey Cheadle arrived in the [facility] today at [phone], in stable condition, here for CBC/Phlebotomy (bi-monthly Status). He was assessed and education was provided. Patient stated he has received new hearing aides and they are still working on adjusting them. States back pain 6/10. States he took pain medication per home schedule. Mr. Karlos Jesus vitals were reviewed. Visit Vitals  /72 (BP 1 Location: Left upper arm, BP Patient Position: Sitting)   Pulse 81   Temp 98.4 °F (36.9 °C)   Resp 18   SpO2 96%     Blood for ordered CBC was drawn from his right AC x 1 attempt. Lab results were obtained and reviewed, and were as follows:    HCT 44.9    Phlebotomy was NOT indicated today per monthly status order parameters. Mr. Tracey Cheadle was discharged from [place] [address] in stable condition at 1320. He is to return on 22 at 1300, for his next appointment of CBC/Phlebotomy (Bi-Monthly Status).      Sherrie Landon RN  2022

## 2022-07-07 NOTE — PROGRESS NOTES
HISTORY OF PRESENT ILLNESS  Obinna Brooks is a 66 y.o. male. Follow-up  Pertinent negatives include no chest pain, no abdominal pain, no headaches and no shortness of breath. Patient presents for a follow-up office visit. He has a past medical history significant for hypertension, dyslipidemia,  diabetes mellitus, and COPD. He was initially referred here for evaluation of dyspnea on exertion and tachycardia with activity. The patient does not have a previous cardiac history. He does have a history of atypical chest pain, and reports a significant cardiac workup while living in [address] [place] [address] back in 2009 which included a cardiac catheterization showing no significant coronary artery disease. The patient underwent an echocardiogram in January 2014, showing an LVEF of 60-62%, grade 1 diastolic dysfunction, in no significant valvular heart disease. Normal PA pressures. Patient underwent a 30-day event monitor in July 2020 which was a normal study demonstrated no significant arrhythmias. Shortly thereafter he states he was admitted to the hospital for an acute episode of pancreatitis was hospitalized for almost 2 weeks. Patient was last seen in our office 1 year ago. Since last visit, his biggest complaint is increased lower back pain. He states that when he is in excessive pain he notes that his heart and still race a little more. He denies any chest pain, shortness of breath or leg swelling. Past Medical History:   Diagnosis Date    Arthritis     Asthma     Cancer ([address]     BASAL     Cardiac catheterization 2009    [address] [place] [address]:  No significant CAD.  Cardiac echocardiogram 01/20/2014    EF 50-55%. No WMA. Gr 1 DDfx. RVSP 25-30 mmHg. Mild TR.       DM type 2 (diabetes mellitus, type 2) (HCC)     Enlarged prostate     Failed back syndrome     GERD (gastroesophageal reflux disease)     Hearing loss, bilateral     Hearing Aids    Hypertension     Hypothyroidism  Insomnia     Low serum testosterone level     Neuropathy     Osteoarthritis of multiple joints     Polycythemia     S/P colonoscopy 8/14/13    mild diverticulosis in sigmoid colon w/o evidence of diverticulitis; f/u 5 years    SOB (shortness of breath)     chronic; 2' \"lung fever\"    Vertigo       Current Outpatient Medications   Medication Sig Dispense Refill    OTHER daily. Methyfolate       amitriptyline (ELAVIL) 10 mg tablet Take 1 Tablet by mouth nightly. 30 Tablet 2    topiramate (TOPAMAX) 25 mg tablet Take 1 tablet by mouth once daily with breakfast 90 Tablet 1    montelukast (SINGULAIR) 10 mg tablet Take 1 Tablet by mouth daily. 90 Tablet 1    metoprolol tartrate (LOPRESSOR) 25 mg tablet Take 1 tablet by mouth twice daily 180 Tablet 0    Euthyrox 125 mcg tablet TAKE 1 TABLET BY MOUTH ONCE DAILY BEFORE BREAKFAST 90 Tablet 1    Creon 24,000-76,000 -120,000 unit capsule TAKE 2 CAPSULES BY MOUTH TWICE DAILY      tamsulosin (FLOMAX) 0.4 mg capsule Take 2 Capsules by mouth daily (after dinner). 180 Capsule 3    albuterol (PROVENTIL VENTOLIN) 2.5 mg /3 mL (0.083 %) nebu 3 mL by Nebulization route every four (4) hours as needed for Wheezing or Shortness of Breath. 50 Nebule 3    tamsulosin (FLOMAX) 0.4 mg capsule Take 1 capsule by mouth once daily 90 Capsule 0    albuterol (PROVENTIL HFA, VENTOLIN HFA, PROAIR HFA) 90 mcg/actuation inhaler Take 1-2 Puffs by inhalation every four (4) hours as needed for Wheezing or Shortness of Breath. 1 Each 5    budesonide-formoteroL (Symbicort) 160-4.5 mcg/actuation HFAA Take 2 Puffs by inhalation two (2) times a day. (Patient taking differently: Take 2 Puffs by inhalation two (2) times daily as needed.) 1 Inhaler 5    pantoprazole (PROTONIX) 40 mg tablet Take 1 Tab by mouth daily. 90 Tab 1    pregabalin (Lyrica) 150 mg capsule Take 1 Cap by mouth three (3) times daily. Max Daily Amount: 450 mg. TAKE 1 CAPSULE BY MOUTH THREE TIMES DAILY FOR 30 DAYS.  MAXIMUM 3 CAPSULES DAILY. Indications: neuropathy 90 Cap 0    DULoxetine (CYMBALTA) 60 mg capsule Take 1 Cap by mouth two (2) times a day. 60 Cap 0    insulin glargine (BASAGLAR KWIKPEN U-100 INSULIN) 100 unit/mL (3 mL) inpn 52 Units by SubCUTAneous route nightly. 52 units at bedtime (Patient taking differently: 48 Units by SubCUTAneous route nightly.) 5 Pen 3    fluticasone (FLONASE) 50 mcg/actuation nasal spray USE 1 SPRAY(S) IN EACH NOSTRIL ONCE DAILY (Patient taking differently: 2 Sprays by Both Nostrils route nightly.) 1 Bottle 0    FANI PEN NEEDLE 32 gauge x 5/32\" ndle USE AT BEDTIME 100 Pen Needle 6    ACCU-CHEK JAZZY PLUS TEST STRP strip       ACCU-CHEK SOFTCLIX LANCETS misc       insulin glulisine (APIDRA SOLOSTAR) 100 unit/mL pen 2 units per carb consumed. Max 30 / day (Patient taking differently: 10 Units by SubCUTAneous route Before breakfast, lunch, and dinner. 2 units per carb consumed. Max 30 / day  Indications: patient states taking 15-20 units three times a day) 5 Pen 3    PEN NEEDLE 31 gauge x 5/16\" ndle USE ONE PEN NEEDLE FOR LANTUS FLEXPEN AND 3 PEN NEEDLES FOR APIDRA WITH EACH MEAL 1 Package 3    metroNIDAZOLE (METROGEL) 1 % topical gel APPLY TOPICALLY TO THE FACE NIGHTLY AS NEEDED (Patient not taking: Reported on 7/7/2022)      doxycycline (VIBRAMYCIN) 100 mg capsule TAKE 1 CAPSULE BY MOUTH TWICE DAILY FOR 10 DAYS (Patient not taking: Reported on 6/15/2022)      diphenhydrAMINE-zinc acetate 1%-0.1% (BENADRYL) topical cream Apply  to affected area every eight (8) hours as needed for Itching. (Patient not taking: Reported on 7/7/2022) 30 g 0    OTHER EB-N3 once daily (Patient not taking: Reported on 7/7/2022)      betamethasone dipropionate (DIPROLENE) 0.05 % ointment Apply  to affected area nightly. (Patient not taking: Reported on 7/7/2022)      ketorolac (ACULAR) 0.5 % ophthalmic solution Administer 1 Drop to both eyes two (2) times daily as needed.  (Patient not taking: Reported on 2022)         Allergies   Allergen Reactions    Latex Rash    Ciprofloxacin Anaphylaxis    Other Plant, Animal, Environmental Other (comments)     Patient cannot have MRI. Patient states that he has metal screws in his left arm.  Atorvastatin Other (comments)     Possible Pancreatitis, which may also have been due to [place].  Januvia [Sitagliptin] Other (comments)     Possible pancreatitis.  Lamisil [Terbinafine] Swelling     Tongue swelling    Metformin Other (comments)     Elevated cr 1.4    Morphine Other (comments)     \"loss of blood pressure\"    Other Medication Other (comments) and Unknown (comments)     Doxasylin causes extreme GERD    Penicillins Rash and Unknown (comments)    Pentothal [Thiopental Sodium] Shortness of Breath    Sulfa (Sulfonamide Antibiotics) Rash      Social History     Tobacco Use    Smoking status: Former Smoker     Packs/day: 1.00     Years: 31.00     Pack years: 31.00     Types: Pipe, Cigars     Quit date: 1995     Years since quittin.5    Smokeless tobacco: Never Used    Tobacco comment: smoked cigar   Vaping Use    Vaping Use: Never used   Substance Use Topics    Alcohol use: No    Drug use: No          Family History   Problem Relation Age of Onset    Cancer Mother         Bone and Brain Cancer    Diabetes Mother     Liver Disease Father         Lung Cancer    Kidney Disease Sister     Diabetes Daughter     Colon Cancer Brother          Review of Systems   Constitutional: Negative for chills, fever and weight loss. HENT: Negative for nosebleeds. Eyes: Negative for blurred vision and double vision. Respiratory: Negative for cough, shortness of breath and wheezing. Cardiovascular: Negative for chest pain, palpitations, orthopnea, claudication, leg swelling and PND. Gastrointestinal: Negative for abdominal pain, heartburn, nausea and vomiting. Genitourinary: Negative for dysuria and hematuria.    Musculoskeletal: Positive for back pain. Negative for falls and myalgias. Skin: Negative for rash. Neurological: Negative for dizziness, focal weakness and headaches. Endo/Heme/Allergies: Does not bruise/bleed easily. Psychiatric/Behavioral: Negative for substance abuse. Visit Vitals  /82 (BP 1 Location: Left upper arm, BP Patient Position: Sitting, BP Cuff Size: Large adult)   Pulse 74   Ht 5' 10\" (1.778 m)   Wt 111.1 kg (245 lb)   SpO2 98%   BMI 35.15 kg/m²      Physical Exam  Constitutional:       Appearance: He is well-developed. HENT:      Head: Normocephalic and atraumatic. Eyes:      Conjunctiva/sclera: Conjunctivae normal.   Neck:      Vascular: No carotid bruit or JVD. Cardiovascular:      Rate and Rhythm: Normal rate and regular rhythm. Pulses: Normal pulses. Heart sounds: S1 normal and S2 normal. Heart sounds are distant. No murmur heard. No gallop. No S3 sounds. Pulmonary:      Breath sounds: Normal breath sounds. No wheezing or rales. Abdominal:      General: Bowel sounds are normal.      Palpations: Abdomen is soft. Tenderness: There is no abdominal tenderness. Musculoskeletal:         General: No swelling or deformity. Cervical back: Neck supple. Skin:     General: Skin is warm and dry. Neurological:      General: No focal deficit present. Mental Status: He is alert and oriented to person, place, and time. Psychiatric:         Mood and Affect: Mood normal.         Behavior: Behavior normal.       EKG: Sinus rhythm with sinus arrhythmia, borderline low voltage,  No ST or T wave abnormalities concerning for ischemia. Compared to the previous EKG, no significant interval change. ASSESSMENT and PLAN    Heart palpitations. Patient symptoms significantly improved since starting on metoprolol. He underwent a 30-day event monitor in July 2020 which did not show any significant arrhythmias. No further work-up or additional treatment needed.   I did advise him that he could take an extra tablet during a 24-hour period if he notices tachycardia. Essential hypertension. Patient's blood pressure improved following his weight loss. He no longer takes losartan and has been managed with metoprolol as monotherapy. Mixed dyslipidemia. This has improved with weight loss. He no longer takes a statin. Diabetes mellitus. According to the patient this is now better controlled. This is followed by endocrinology.       Follow-up in 1 year, sooner if needed

## 2022-07-07 NOTE — PROGRESS NOTES
Nga Callahan presents today for   Chief Complaint   Patient presents with    Follow-up     1 year       Nga Callahan preferred language for health care discussion is english/other. Is someone accompanying this pt? no    Is the patient using any DME equipment during [address]? cane    Depression Screening:  3 most recent PHQ Screens 7/7/2022   PHQ Not Done -   Little interest or pleasure in doing things Not at all   Feeling down, depressed, irritable, or hopeless Not at all   Total Score PHQ 2 0       Learning Assessment:  Learning Assessment 7/7/2022   PRIMARY LEARNER Patient   HIGHEST LEVEL OF EDUCATION - PRIMARY LEARNER  -   BARRIERS PRIMARY LEARNER -     -   [address] -   ANSWERED BY patient   RELATIONSHIP SELF   ASSESSMENT COMMENT -       Abuse Screening:  Abuse Screening Questionnaire 7/7/2022   Do you ever feel afraid of your partner? N   Are you in a relationship with someone who physically or mentally threatens you? N   Is it safe for you to go home? Y       Fall Risk  Fall Risk Assessment, last 12 mths 7/7/2022   Able to walk? Yes   Fall in past 12 months? 0   Do you feel unsteady? 0   Are you worried about falling 0   Is the gait abnormal? -   Number of falls in past 12 months -   Fall with injury? -           Pt currently taking Anticoagulant therapy? no    Pt currently taking Antiplatelet therapy ? no      Coordination of Care:  1. Have you been to the ER, urgent care clinic since your last visit? Hospitalized since your last visit? no    2. Have you seen or consulted any other health care providers outside of the [address] [place] [address] since your last visit? Include any pap smears or colon screening.  no

## 2022-07-12 NOTE — PROGRESS NOTES
Called pt and discussed below result with patient after varified . Pt understood the plan well. He will do vitamin B12 supplement twice a week and will start taking vitamin D 5000 units. Low fried and fatty food. Less snacking. Will work on it.

## 2022-09-01 ENCOUNTER — HOSPITAL ENCOUNTER (OUTPATIENT)
Dept: INFUSION THERAPY | Age: 72
Discharge: HOME | End: 2022-09-01
Payer: MEDICARE

## 2022-09-01 VITALS
TEMPERATURE: 99 F | SYSTOLIC BLOOD PRESSURE: 110 MMHG | RESPIRATION RATE: 18 BRPM | DIASTOLIC BLOOD PRESSURE: 76 MMHG | OXYGEN SATURATION: 94 % | HEART RATE: 80 BPM

## 2022-09-01 LAB
BASO+EOS+MONOS # BLD AUTO: 0.6 K/UL (ref 0–2.3)
BASO+EOS+MONOS NFR BLD AUTO: 8 % (ref 0.1–17)
DIFFERENTIAL METHOD BLD: ABNORMAL
ERYTHROCYTE [DISTWIDTH] IN BLOOD BY AUTOMATED COUNT: 12.4 % (ref 11.5–14.5)
HCT VFR BLD AUTO: 44.2 % (ref 36–48)
HGB BLD-MCNC: 14.9 G/DL (ref 12–16)
LYMPHOCYTES # BLD: 2 K/UL (ref 1.1–5.9)
LYMPHOCYTES NFR BLD: 26 % (ref 14–44)
MCH RBC QN AUTO: 28.9 PG (ref 25–35)
MCHC RBC AUTO-ENTMCNC: 33.7 G/DL (ref 31–37)
MCV RBC AUTO: 85.8 FL (ref 78–102)
NEUTS SEG # BLD: 5.1 K/UL (ref 1.8–9.5)
NEUTS SEG NFR BLD: 66 % (ref 40–70)
PLATELET # BLD AUTO: 222 K/UL (ref 140–440)
RBC # BLD AUTO: 5.15 M/UL (ref 4.1–5.1)
WBC # BLD AUTO: 7.7 K/UL (ref 4.5–13)

## 2022-09-01 PROCEDURE — 85025 COMPLETE CBC W/AUTO DIFF WBC: CPT

## 2022-09-01 PROCEDURE — 36415 COLL VENOUS BLD VENIPUNCTURE: CPT

## 2022-09-01 NOTE — PROGRESS NOTES
[facility] Progress Note    Date: 2022    Name: Glenis Duque    MRN: [id number]         : 1950    Mr. Adelso Bernard arrived in the [facility] today at 18, in stable condition, here for CBC/Phlebotomy (bi-monthly Status). He was assessed and education was provided. Patient stated he has received new hearing aides and they are still working on adjusting them. States back pain 6/10. States he took pain medication per home schedule. Mr. Laura Greenberg vitals were reviewed. Visit Vitals  /76 (BP 1 Location: Right upper arm, BP Patient Position: Sitting)   Pulse 80   Temp 99 °F (37.2 °C)   Resp 18   SpO2 94%     Blood for ordered CBC was drawn from his LEFT AC x 1 attempt. Lab results were obtained and reviewed, and were as follows:    Recent Results (from the past 12 hour(s))   CBC WITH 3 PART DIFF    Collection Time: 22  1:22 PM   Result Value Ref Range    WBC 7.7 4.5 - 13.0 K/uL    RBC 5.15 (H) 4.10 - 5.10 M/uL    HGB 14.9 12.0 - 16 g/dL    HCT 44.2 36 - 48 %    MCV 85.8 78 - 102 FL    MCH 28.9 25.0 - 35.0 PG    MCHC 33.7 31 - 37 g/dL    RDW 12.4 11.5 - 14.5 %    PLATELET 010 708 - 138 K/uL    NEUTROPHILS 66 40 - 70 %    Mixed cells 8 0.1 - 17 %    LYMPHOCYTES 26 14 - 44 %    ABS. NEUTROPHILS 5.1 1.8 - 9.5 K/UL    ABS. MIXED CELLS 0.6 0.0 - 2.3 K/uL    ABS. LYMPHOCYTES 2.0 1.1 - 5.9 K/UL    DF AUTOMATED         HCT 44.2    Phlebotomy was NOT indicated today per monthly status order parameters. Mr. Adelso Bernard was discharged from [place] [address] in stable condition at 1330. He is to return on 22 at 1300, for his next appointment of CBC/Phlebotomy (Bi-Monthly Status).        Sarai Reed  2022

## 2022-09-14 ENCOUNTER — OFFICE VISIT (OUTPATIENT)
Dept: FAMILY MEDICINE CLINIC | Age: 72
End: 2022-09-14
Payer: MEDICARE

## 2022-09-14 VITALS
DIASTOLIC BLOOD PRESSURE: 70 MMHG | RESPIRATION RATE: 16 BRPM | OXYGEN SATURATION: 99 % | TEMPERATURE: 96.8 F | WEIGHT: 247.2 LBS | BODY MASS INDEX: 35.39 KG/M2 | HEART RATE: 71 BPM | SYSTOLIC BLOOD PRESSURE: 112 MMHG | HEIGHT: 70 IN

## 2022-09-14 DIAGNOSIS — R00.0 TACHYCARDIA: ICD-10-CM

## 2022-09-14 DIAGNOSIS — H91.93 HEARING DEFICIT, BILATERAL: ICD-10-CM

## 2022-09-14 DIAGNOSIS — Z79.4 TYPE 2 DIABETES MELLITUS WITH COMPLICATION, WITH LONG-TERM CURRENT USE OF INSULIN (HCC): Primary | ICD-10-CM

## 2022-09-14 DIAGNOSIS — R25.1 SHAKINESS: ICD-10-CM

## 2022-09-14 DIAGNOSIS — M54.41 CHRONIC BILATERAL LOW BACK PAIN WITH BILATERAL SCIATICA: ICD-10-CM

## 2022-09-14 DIAGNOSIS — M54.17 LUMBOSACRAL RADICULOPATHY: ICD-10-CM

## 2022-09-14 DIAGNOSIS — E03.9 HYPOTHYROIDISM, UNSPECIFIED TYPE: ICD-10-CM

## 2022-09-14 DIAGNOSIS — Z79.899 ENCOUNTER FOR LONG-TERM (CURRENT) USE OF HIGH-RISK MEDICATION: ICD-10-CM

## 2022-09-14 DIAGNOSIS — G89.4 CHRONIC PAIN SYNDROME: ICD-10-CM

## 2022-09-14 DIAGNOSIS — M79.10 MUSCLE ACHE: ICD-10-CM

## 2022-09-14 DIAGNOSIS — E78.00 PURE HYPERCHOLESTEROLEMIA: ICD-10-CM

## 2022-09-14 DIAGNOSIS — E53.8 VITAMIN B12 DEFICIENCY: ICD-10-CM

## 2022-09-14 DIAGNOSIS — E11.8 TYPE 2 DIABETES MELLITUS WITH COMPLICATION, WITH LONG-TERM CURRENT USE OF INSULIN (HCC): Primary | ICD-10-CM

## 2022-09-14 DIAGNOSIS — G62.89 OTHER POLYNEUROPATHY: ICD-10-CM

## 2022-09-14 DIAGNOSIS — G89.29 CHRONIC BILATERAL LOW BACK PAIN WITH BILATERAL SCIATICA: ICD-10-CM

## 2022-09-14 DIAGNOSIS — I10 ESSENTIAL HYPERTENSION: ICD-10-CM

## 2022-09-14 DIAGNOSIS — M54.42 CHRONIC BILATERAL LOW BACK PAIN WITH BILATERAL SCIATICA: ICD-10-CM

## 2022-09-14 DIAGNOSIS — E55.9 VITAMIN D DEFICIENCY: ICD-10-CM

## 2022-09-14 DIAGNOSIS — J44.9 ASTHMA WITH COPD (CHRONIC OBSTRUCTIVE PULMONARY DISEASE) (HCC): ICD-10-CM

## 2022-09-14 PROCEDURE — 99214 OFFICE O/P EST MOD 30 MIN: CPT | Performed by: FAMILY MEDICINE

## 2022-09-14 PROCEDURE — G8536 NO DOC ELDER MAL SCRN: HCPCS | Performed by: FAMILY MEDICINE

## 2022-09-14 PROCEDURE — G8510 SCR DEP NEG, NO PLAN REQD: HCPCS | Performed by: FAMILY MEDICINE

## 2022-09-14 PROCEDURE — 3017F COLORECTAL CA SCREEN DOC REV: CPT | Performed by: FAMILY MEDICINE

## 2022-09-14 PROCEDURE — G8754 DIAS BP LESS 90: HCPCS | Performed by: FAMILY MEDICINE

## 2022-09-14 PROCEDURE — G8427 DOCREV CUR MEDS BY ELIG CLIN: HCPCS | Performed by: FAMILY MEDICINE

## 2022-09-14 PROCEDURE — 1123F ACP DISCUSS/DSCN MKR DOCD: CPT | Performed by: FAMILY MEDICINE

## 2022-09-14 PROCEDURE — G0463 HOSPITAL OUTPT CLINIC VISIT: HCPCS | Performed by: FAMILY MEDICINE

## 2022-09-14 PROCEDURE — 2022F DILAT RTA XM EVC RTNOPTHY: CPT | Performed by: FAMILY MEDICINE

## 2022-09-14 PROCEDURE — G8417 CALC BMI ABV UP PARAM F/U: HCPCS | Performed by: FAMILY MEDICINE

## 2022-09-14 PROCEDURE — 1101F PT FALLS ASSESS-DOCD LE1/YR: CPT | Performed by: FAMILY MEDICINE

## 2022-09-14 PROCEDURE — 3044F HG A1C LEVEL LT 7.0%: CPT | Performed by: FAMILY MEDICINE

## 2022-09-14 PROCEDURE — G8752 SYS BP LESS 140: HCPCS | Performed by: FAMILY MEDICINE

## 2022-09-14 RX ORDER — AMITRIPTYLINE HYDROCHLORIDE 10 MG/1
10 TABLET, FILM COATED ORAL
Qty: 30 TABLET | Refills: 2 | Status: SHIPPED | OUTPATIENT
Start: 2022-09-14

## 2022-09-14 RX ORDER — GLUCAGON 1 MG
VIAL (EA) INJECTION
COMMUNITY
Start: 2022-08-09

## 2022-09-14 RX ORDER — DULOXETIN HYDROCHLORIDE 60 MG/1
60 CAPSULE, DELAYED RELEASE ORAL 2 TIMES DAILY
Qty: 180 CAPSULE | Refills: 1 | Status: SHIPPED | OUTPATIENT
Start: 2022-09-14

## 2022-09-14 RX ORDER — MONTELUKAST SODIUM 10 MG/1
10 TABLET ORAL DAILY
Qty: 90 TABLET | Refills: 1 | Status: SHIPPED | OUTPATIENT
Start: 2022-09-14

## 2022-09-14 RX ORDER — METOPROLOL TARTRATE 25 MG/1
25 TABLET, FILM COATED ORAL 2 TIMES DAILY
Qty: 180 TABLET | Refills: 1 | Status: SHIPPED | OUTPATIENT
Start: 2022-09-14

## 2022-09-14 NOTE — PROGRESS NOTES
HISTORY OF PRESENT ILLNESS  Terra Mcdermott is a 66 y.o. male. HPI: Here for routine follow-up. Diabetes. A1c in prediabetic range. Following endocrinology. Lately decrease the short acting insulin dose. Advised to follow specialist recommendation. No hyper or hypoglycemic symptoms. Sitting comfortable without any acute distress. Vitals been stable. Chronic back pain, neuropathy. Radiculopathy. currently going through acupuncture and that has made the pain stable. No loss of urine or bowel control. On statin. No side effects. Taking it with compliance. Discussed importance of diet and lifestyle modification which he is working on. GERD symptom has been stable since change the diet modification. Denies any headache, dizziness, no chest pain or trouble breathing, no arm or leg weakness. No nausea or vomiting, no weight or appetite changes, no mood changes . No urine or bowel complains, no palpitation, no diaphoresis. No abdominal pain. No cold or cough. No leg swelling. No fever. No sleep trouble. [facility] and [facility] with [facility]% hearing [facility] and disabled. Currently had a new hearing aids which is helping  Hypothyroidism. Asymptomatic. BPH. No urinary complaint. Taking Flomax. Visit Vitals  /70 (BP 1 Location: Left upper arm, BP Patient Position: Sitting, BP Cuff Size: Adult)   Pulse 71   Temp 96.8 °F (36 °C) (Temporal)   Resp 16   Ht 5' 10\" (1.778 m)   Wt 247 lb 3.2 oz (112.1 kg)   SpO2 99%   BMI 35.47 kg/m²     Review medication list, vitals, problem list,allergies.    Lab Results   Component Value Date/Time    WBC 7.7 09/01/2022 01:22 PM    HGB 14.9 09/01/2022 01:22 PM    HCT 44.2 09/01/2022 01:22 PM    PLATELET 682 35/71/4466 01:22 PM    MCV 85.8 09/01/2022 01:22 PM     Lab Results   Component Value Date/Time    Sodium 140 06/21/2022 09:31 AM    Potassium 4.5 06/21/2022 09:31 AM    Chloride 108 06/21/2022 09:31 AM    CO2 28 06/21/2022 09:31 AM    Anion gap 4 06/21/2022 09:31 AM    Glucose 104 (H) 06/21/2022 09:31 AM    BUN 15 06/21/2022 09:31 AM    Creatinine 1.27 06/21/2022 09:31 AM    BUN/Creatinine ratio 12 06/21/2022 09:31 AM    GFR est AA >60 06/21/2022 09:31 AM    GFR est non-AA 56 (L) 06/21/2022 09:31 AM    Calcium 8.9 06/21/2022 09:31 AM    Bilirubin, total 0.4 06/21/2022 09:31 AM    Alk. phosphatase 55 06/21/2022 09:31 AM    Protein, total 7.2 06/21/2022 09:31 AM    Albumin 4.1 06/21/2022 09:31 AM    Globulin 3.1 06/21/2022 09:31 AM    A-G Ratio 1.3 06/21/2022 09:31 AM    ALT (SGPT) 24 06/21/2022 09:31 AM    AST (SGOT) 17 06/21/2022 09:31 AM     Lab Results   Component Value Date/Time    Cholesterol, total 244 (H) 06/21/2022 09:31 AM    HDL Cholesterol 46 06/21/2022 09:31 AM    LDL-C, External 82 06/16/2017 12:00 AM    LDL, calculated 148 (H) 06/21/2022 09:31 AM    VLDL, calculated 50 06/21/2022 09:31 AM    Triglyceride 250 (H) 06/21/2022 09:31 AM    CHOL/HDL Ratio 5.3 (H) 06/21/2022 09:31 AM     Lab Results   Component Value Date/Time    TSH 2.23 06/21/2022 09:31 AM     Lab Results   Component Value Date/Time    Hemoglobin A1c 6.3 (H) 06/21/2022 09:31 AM    Hemoglobin A1c (POC) 6.8 02/15/2022 10:40 AM    Hemoglobin A1c, External 7.2 06/16/2017 12:00 AM     Lab Results   Component Value Date/Time    Microalbumin/Creat ratio (mg/g creat) 23 06/21/2022 09:31 AM    Microalbumin,urine random 1.29 06/21/2022 09:31 AM     Lab Results   Component Value Date/Time    Vitamin D 25-Hydroxy 18.8 (L) 06/21/2022 09:31 AM       Lab Results   Component Value Date/Time    Vitamin B12 1,348 (H) 06/21/2022 09:31 AM       Lab Results   Component Value Date/Time    Prostate Specific Ag 0.3 03/01/2019 01:46 PM    Prostate Specific Ag 0.2 03/08/2018 09:34 AM    Prostate Specific Ag 0.3 03/09/2017 09:00 AM         ROS: See HPI    Physical Exam  Constitutional:       General: He is not in acute distress. Cardiovascular:      Rate and Rhythm: Normal rate and regular rhythm.       Heart sounds: Normal heart sounds. Abdominal:      General: Bowel sounds are normal.      Palpations: Abdomen is soft. Tenderness: There is no abdominal tenderness. Musculoskeletal:         General: No swelling. Cervical back: Neck supple. Neurological:      Mental Status: He is oriented to person, place, and time. Psychiatric:         Behavior: Behavior normal.       ASSESSMENT and PLAN    ICD-10-CM ICD-9-CM    1. Type 2 diabetes mellitus with complication, with long-term current use of insulin ([facility].): A1c last in prediabetic range. Continue lifestyle and diet modification. We will recheck labs. He is following Endo as well. Increased number and a sliding scale insulin. No hypo or hyperglycemic symptoms: E11.8 250.90 HEMOGLOBIN A1C WITH EAG    R07.7 T67.64 METABOLIC PANEL, COMPREHENSIVE      2. Shakiness/ hand: Stable on symptomatic treatment R25.1 781.0 metoprolol tartrate (LOPRESSOR) 25 mg tablet      3. Tachycardia  R00.0 785.0 metoprolol tartrate (LOPRESSOR) 25 mg tablet      4. Lumbosacral radiculopathy: Stable on symptomatic treatment. Currently going to acupuncture and it has been helping significantly M54.17 724.4 DULoxetine (CYMBALTA) 60 mg capsule    had an acupunture and helped significantly fo pain       5. Encounter for long-term (current) use of high-risk medication  Z79.899 V58.69 DULoxetine (CYMBALTA) 60 mg capsule      6. Chronic bilateral low back pain with bilateral sciatica  M54.42 724.2 DULoxetine (CYMBALTA) 60 mg capsule    M54.41 724.3     G89.29 338.29       7. Muscle ache  M79.10 729.1 amitriptyline (ELAVIL) 10 mg tablet      8. Other polyneuropathy: On symptomatic treatment. Given medication refill G62.89 357.89 amitriptyline (ELAVIL) 10 mg tablet      9. Chronic pain syndrome  G89.4 338.4 amitriptyline (ELAVIL) 10 mg tablet      10. Asthma with COPD (chronic obstructive pulmonary disease) ([facility]): Fairly stable.   No increased use of albuterol J44.9 493.20 montelukast (SINGULAIR) 10 mg tablet      11. Pure hypercholesterolemia: On statin. Working on diet and lifestyle E78.00 272.0       12. BMI 35.0-35.9,adult: Gradually improving weight. Working on lifestyle and diet modification Z68.35 V85.35       13. Vitamin D deficiency: On supplement: [facility] E55.9 268.9       14. Hearing deficit, bilateral  H91.93 V41.2     left more than rt. has hearing aids. happy with it. [facility]       15. Hypothyroidism, unspecified type: Asymptomatic. Taking medication with compliance E03.9 244.9       16. Vitamin B12 deficiency: Recent B12 was elevated. Decrease dose of B12 supplement E53.8 266.2       17. Essential hypertension:well controlled. Continue current dose of medication and low salt diet. Exercise as tolerated. I10 401.9       Patient understood agreed with the plan  Will get due immunization from the pharmacy  Follow-up and Dispositions    Return in about 6 months (around 3/14/2023). Please note that this dictation was completed with [employer], the computer voice recognition software. Quite often unanticipated grammatical, syntax, homophones, and other interpretive errors are inadvertently transcribed by the computer software. Please disregard these errors. Please excuse any errors that have escaped final proofreading.

## 2022-09-14 NOTE — PROGRESS NOTES
1. Have you been to the ER, urgent care clinic since your last visit? Hospitalized since your last visit? No    2. Have you seen or consulted any other health care providers outside of the [address] [place] [address] since your last visit? Include any pap smears or colon screening. Endocrinology Rasta Evangelista NP LOV: last week.       Chief Complaint   Patient presents with    Hypertension    Diabetes    Cholesterol Problem    Chronic Kidney Disease

## 2022-09-19 ENCOUNTER — HOSPITAL ENCOUNTER (OUTPATIENT)
Dept: LAB | Age: 72
Discharge: HOME | End: 2022-09-19
Payer: MEDICARE

## 2022-09-19 DIAGNOSIS — E11.8 TYPE 2 DIABETES MELLITUS WITH COMPLICATION, WITH LONG-TERM CURRENT USE OF INSULIN (HCC): ICD-10-CM

## 2022-09-19 DIAGNOSIS — Z79.4 TYPE 2 DIABETES MELLITUS WITH COMPLICATION, WITH LONG-TERM CURRENT USE OF INSULIN (HCC): ICD-10-CM

## 2022-09-19 LAB
ALBUMIN SERPL-MCNC: 3.7 G/DL (ref 3.4–5)
ALBUMIN/GLOB SERPL: 1.2 {RATIO} (ref 0.8–1.7)
ALP SERPL-CCNC: 54 U/L (ref 45–117)
ALT SERPL-CCNC: 25 U/L (ref 16–61)
ANION GAP SERPL CALC-SCNC: 5 MMOL/L (ref 3–18)
AST SERPL-CCNC: 13 U/L (ref 10–38)
BILIRUB SERPL-MCNC: 0.5 MG/DL (ref 0.2–1)
BUN SERPL-MCNC: 18 MG/DL (ref 7–18)
BUN/CREAT SERPL: 14 (ref 12–20)
CALCIUM SERPL-MCNC: 8.8 MG/DL (ref 8.5–10.1)
CHLORIDE SERPL-SCNC: 106 MMOL/L (ref 100–111)
CO2 SERPL-SCNC: 29 MMOL/L (ref 21–32)
CREAT SERPL-MCNC: 1.25 MG/DL (ref 0.6–1.3)
EST. AVERAGE GLUCOSE BLD GHB EST-MCNC: 137 MG/DL
GLOBULIN SER CALC-MCNC: 3.1 G/DL (ref 2–4)
GLUCOSE SERPL-MCNC: 139 MG/DL (ref 74–99)
HBA1C MFR BLD: 6.4 % (ref 4.2–5.6)
POTASSIUM SERPL-SCNC: 4.3 MMOL/L (ref 3.5–5.5)
PROT SERPL-MCNC: 6.8 G/DL (ref 6.4–8.2)
SODIUM SERPL-SCNC: 140 MMOL/L (ref 136–145)

## 2022-09-19 PROCEDURE — 36415 COLL VENOUS BLD VENIPUNCTURE: CPT

## 2022-09-19 PROCEDURE — 83036 HEMOGLOBIN GLYCOSYLATED A1C: CPT

## 2022-09-19 PROCEDURE — 80053 COMPREHEN METABOLIC PANEL: CPT

## 2022-11-03 ENCOUNTER — HOSPITAL ENCOUNTER (OUTPATIENT)
Dept: INFUSION THERAPY | Age: 72
Discharge: HOME | End: 2022-11-03
Payer: MEDICARE

## 2022-11-03 VITALS
DIASTOLIC BLOOD PRESSURE: 60 MMHG | SYSTOLIC BLOOD PRESSURE: 94 MMHG | TEMPERATURE: 98.3 F | OXYGEN SATURATION: 95 % | RESPIRATION RATE: 16 BRPM | HEART RATE: 72 BPM

## 2022-11-03 LAB
BASO+EOS+MONOS # BLD AUTO: 0.9 K/UL (ref 0–2.3)
BASO+EOS+MONOS NFR BLD AUTO: 10 % (ref 0.1–17)
DIFFERENTIAL METHOD BLD: ABNORMAL
ERYTHROCYTE [DISTWIDTH] IN BLOOD BY AUTOMATED COUNT: 12.9 % (ref 11.5–14.5)
HCT VFR BLD AUTO: 45.2 % (ref 36–48)
HGB BLD-MCNC: 15.1 G/DL (ref 12–16)
LYMPHOCYTES # BLD: 2.2 K/UL (ref 1.1–5.9)
LYMPHOCYTES NFR BLD: 25 % (ref 14–44)
MCH RBC QN AUTO: 28.9 PG (ref 25–35)
MCHC RBC AUTO-ENTMCNC: 33.4 G/DL (ref 31–37)
MCV RBC AUTO: 86.4 FL (ref 78–102)
NEUTS SEG # BLD: 5.8 K/UL (ref 1.8–9.5)
NEUTS SEG NFR BLD: 65 % (ref 40–70)
PLATELET # BLD AUTO: 236 K/UL (ref 140–440)
RBC # BLD AUTO: 5.23 M/UL (ref 4.1–5.1)
WBC # BLD AUTO: 8.9 K/UL (ref 4.5–13)

## 2022-11-03 PROCEDURE — 36415 COLL VENOUS BLD VENIPUNCTURE: CPT

## 2022-11-03 PROCEDURE — 99195 PHLEBOTOMY: CPT

## 2022-11-03 PROCEDURE — 85025 COMPLETE CBC W/AUTO DIFF WBC: CPT

## 2022-11-03 NOTE — PROGRESS NOTES
[facility] Progress Note    Date: November 3, 2022    Name: Maco Guillermo    MRN: [id number]         : 1950    Mr. Tunde Smith arrived in the [facility] today at 56, in stable condition, here for CBC/Phlebotomy (Monthly Status). He was assessed and education was provided. Mr. Gavi Ruiz vitals were reviewed. Visit Vitals  BP 94/60 (BP 1 Location: Right upper arm, BP Patient Position: Sitting)   Pulse 72   Temp 98.3 °F (36.8 °C)   Resp 16   SpO2 95%     Blood for the ordered CBC was drawn from his right [facility] by Jaqueline Phlebotomist processed on site. Lab results were obtained and reviewed, and were as follows:    Recent Results (from the past 12 hour(s))   CBC WITH 3 PART DIFF    Collection Time: 22  1:05 PM   Result Value Ref Range    WBC 8.9 4.5 - 13.0 K/uL    RBC 5.23 (H) 4.10 - 5.10 M/uL    HGB 15.1 12.0 - 16 g/dL    HCT 45.2 36 - 48 %    MCV 86.4 78 - 102 FL    MCH 28.9 25.0 - 35.0 PG    MCHC 33.4 31 - 37 g/dL    RDW 12.9 11.5 - 14.5 %    PLATELET 976 739 - 821 K/uL    NEUTROPHILS 65 40 - 70 %    Mixed cells 10 0.1 - 17 %    LYMPHOCYTES 25 14 - 44 %    ABS. NEUTROPHILS 5.8 1.8 - 9.5 K/UL    ABS. MIXED CELLS 0.9 0.0 - 2.3 K/uL    ABS. LYMPHOCYTES 2.2 1.1 - 5.9 K/UL    DF AUTOMATED         Phlebotomy was indicated today per order, for HCT > 45.0 (monthly status parameters). PIV # 21 G was established in his LEFT FA at 1325 and brisk blood return was obtained, and phlebotomy was started. Snack was offered, but politely declined. Phlebotomy was completed without incident at 1336, after having obtained 500 ml blood. This writer discovered after completion of TP that patient's pre treatment b/p was 85/57 and patient was asymptomatic. Pt's post TP was b/p was 84/57 prior to NS bolus.  ml IV Bolus was administered post phlebotomy without incident. Patient's order is to remove 250 ml of blood and give 250 ml of NS.     Dr. Dione Ruiz was made of aware of patient's vital signs and that patient was asymptomatic. Patient instructed to follow up with his PCP and Cardiologist re: low b/p on his visit today prior to treatment. Patient Vitals for the past 12 hrs:   Temp Pulse Resp BP SpO2   11/03/22 1400 98.3 °F (36.8 °C) 72 16 94/60 95 %   11/03/22 1351 98.3 °F (36.8 °C) 74 16 101/70 --   11/03/22 1345 -- 66 16 106/69 --   11/03/22 1338 97.8 °F (36.6 °C) 79 18 (!) 84/57 96 %   11/03/22 1300 98.2 °F (36.8 °C) 83 18 (!) 85/57 98 %     After completion of the NS Bolus, his PIV was removed and gauze/coban was applied. Mr. Олег Torres tolerated well and voiced no complaints. Mr. Олег Torres was discharged from [place] [address] in stable condition at 1400. He is to return on 11/09/22 at 1300, for his next appointment, for CBC/Phlebotomy (Weekly Status). Follow up call made to patient at 1600 and patient verbalized he is doing fine just feeling tired. Encouraged patient to continue to drink fluids and if he feels symptomatic with dizziness, faintness or headaches to get the nearest ED Dept. Patient verbalized understanding. Sacha0-Sara Ruiz NP made of aware of amount of blood removed and replaced with NS. Informed of how patient was doing. Dr. Manav Christensen already left for the day.     [facility]  November 3, 2022

## 2022-11-07 ENCOUNTER — LAB ONLY (OUTPATIENT)
Dept: ONCOLOGY | Age: 72
End: 2022-11-07

## 2022-11-07 ENCOUNTER — HOSPITAL ENCOUNTER (OUTPATIENT)
Dept: LAB | Age: 72
Discharge: HOME | End: 2022-11-07
Payer: MEDICARE

## 2022-11-07 DIAGNOSIS — D75.1 SECONDARY POLYCYTHEMIA: ICD-10-CM

## 2022-11-07 DIAGNOSIS — D75.1 SECONDARY POLYCYTHEMIA: Primary | ICD-10-CM

## 2022-11-07 DIAGNOSIS — D75.1 POLYCYTHEMIA: ICD-10-CM

## 2022-11-07 LAB
ALBUMIN SERPL-MCNC: 3.5 G/DL (ref 3.4–5)
ALBUMIN/GLOB SERPL: 1.3 {RATIO} (ref 0.8–1.7)
ALP SERPL-CCNC: 51 U/L (ref 45–117)
ALT SERPL-CCNC: 24 U/L (ref 16–61)
ANION GAP SERPL CALC-SCNC: 5 MMOL/L (ref 3–18)
AST SERPL-CCNC: 12 U/L (ref 10–38)
BILIRUB SERPL-MCNC: 0.4 MG/DL (ref 0.2–1)
BUN SERPL-MCNC: 14 MG/DL (ref 7–18)
BUN/CREAT SERPL: 11 (ref 12–20)
CALCIUM SERPL-MCNC: 8.7 MG/DL (ref 8.5–10.1)
CHLORIDE SERPL-SCNC: 110 MMOL/L (ref 100–111)
CO2 SERPL-SCNC: 26 MMOL/L (ref 21–32)
CREAT SERPL-MCNC: 1.31 MG/DL (ref 0.6–1.3)
GLOBULIN SER CALC-MCNC: 2.7 G/DL (ref 2–4)
GLUCOSE SERPL-MCNC: 187 MG/DL (ref 74–99)
POTASSIUM SERPL-SCNC: 4.8 MMOL/L (ref 3.5–5.5)
PROT SERPL-MCNC: 6.2 G/DL (ref 6.4–8.2)
SODIUM SERPL-SCNC: 141 MMOL/L (ref 136–145)

## 2022-11-07 PROCEDURE — 80053 COMPREHEN METABOLIC PANEL: CPT

## 2022-11-07 PROCEDURE — 36415 COLL VENOUS BLD VENIPUNCTURE: CPT

## 2022-11-10 ENCOUNTER — HOSPITAL ENCOUNTER (OUTPATIENT)
Dept: INFUSION THERAPY | Age: 72
Discharge: HOME | End: 2022-11-10
Payer: MEDICARE

## 2022-11-10 VITALS
HEART RATE: 78 BPM | TEMPERATURE: 98.1 F | DIASTOLIC BLOOD PRESSURE: 74 MMHG | OXYGEN SATURATION: 95 % | RESPIRATION RATE: 20 BRPM | SYSTOLIC BLOOD PRESSURE: 115 MMHG

## 2022-11-10 LAB
BASO+EOS+MONOS # BLD AUTO: 0.8 K/UL (ref 0–2.3)
BASO+EOS+MONOS NFR BLD AUTO: 9 % (ref 0.1–17)
DIFFERENTIAL METHOD BLD: NORMAL
ERYTHROCYTE [DISTWIDTH] IN BLOOD BY AUTOMATED COUNT: 12.8 % (ref 11.5–14.5)
HCT VFR BLD AUTO: 41.3 % (ref 36–48)
HGB BLD-MCNC: 13.5 G/DL (ref 12–16)
LYMPHOCYTES # BLD: 2.2 K/UL (ref 1.1–5.9)
LYMPHOCYTES NFR BLD: 23 % (ref 14–44)
MCH RBC QN AUTO: 28.3 PG (ref 25–35)
MCHC RBC AUTO-ENTMCNC: 32.7 G/DL (ref 31–37)
MCV RBC AUTO: 86.6 FL (ref 78–102)
NEUTS SEG # BLD: 6.3 K/UL (ref 1.8–9.5)
NEUTS SEG NFR BLD: 68 % (ref 40–70)
PLATELET # BLD AUTO: 217 K/UL (ref 140–440)
RBC # BLD AUTO: 4.77 M/UL (ref 4.1–5.1)
WBC # BLD AUTO: 9.3 K/UL (ref 4.5–13)

## 2022-11-10 PROCEDURE — 85025 COMPLETE CBC W/AUTO DIFF WBC: CPT

## 2022-11-10 PROCEDURE — 36415 COLL VENOUS BLD VENIPUNCTURE: CPT

## 2022-11-10 NOTE — PROGRESS NOTES
[facility] Progress Note    Date: November 10, 2022    Name: Silver Bautista    MRN: [id number]         : 1950    Mr. Lubna Hill arrived in the [facility] today at 54, in stable condition, here for CBC/Phlebotomy (Weekly Status). He was assessed and education was provided. Mr. Ally Mabry vitals were reviewed. Visit Vitals  /74 (BP 1 Location: Right upper arm, BP Patient Position: Sitting)   Pulse 78   Temp 98.1 °F (36.7 °C)   Resp 20   SpO2 95%         Blood for the ordered CBC was drawn from his LEFT AC at 1318 without incident and was processed on site. The CBC results from today were as follows:    Recent Results (from the past 12 hour(s))   CBC WITH 3 PART DIFF    Collection Time: 11/10/22  1:18 PM   Result Value Ref Range    WBC 9.3 4.5 - 13.0 K/uL    RBC 4.77 4.10 - 5.10 M/uL    HGB 13.5 12.0 - 16 g/dL    HCT 41.3 36 - 48 %    MCV 86.6 78 - 102 FL    MCH 28.3 25.0 - 35.0 PG    MCHC 32.7 31 - 37 g/dL    RDW 12.8 11.5 - 14.5 %    PLATELET 331 746 - 513 K/uL    NEUTROPHILS 68 40 - 70 %    Mixed cells 9 0.1 - 17 %    LYMPHOCYTES 23 14 - 44 %    ABS. NEUTROPHILS 6.3 1.8 - 9.5 K/UL    ABS. MIXED CELLS 0.8 0.0 - 2.3 K/uL    ABS. LYMPHOCYTES 2.2 1.1 - 5.9 K/UL    DF AUTOMATED             Phlebotomy was HELD today per order, for HCT < 45.0 (weekly status parameters). Mr. Lubna Hill tolerated well and voiced no complaints. Mr. Lubna Hill was discharged from [place] [address] in stable condition at 1330. Jasvir Harper He is to return in 2 months, on Thursday, 23, at 1300, for his next appointment, for CBC/Phlebotomy (Every 2 Months Status).      Carmen Whitten RN  November 10, 2022  1:24 PM

## 2022-11-17 NOTE — PROGRESS NOTES
Gustabo Waller presents today for   Chief Complaint   Patient presents with    New Patient       Is someone accompanying this pt? no    Is the patient using any DME equipment during OV? no    Coordination of Care:  1. Have you been to the ER, urgent care clinic since your last visit? Hospitalized since your last visit? no    2. Have you seen or consulted any other health care providers outside of the [address] [place] [address] since your last visit? Include any pap smears or colon screening.  no Treatment Time / Fractionation (Optional- Include Units): 0.44 min

## 2022-11-21 ENCOUNTER — OFFICE VISIT (OUTPATIENT)
Dept: ONCOLOGY | Age: 72
End: 2022-11-21
Payer: MEDICARE

## 2022-11-21 VITALS
OXYGEN SATURATION: 98 % | BODY MASS INDEX: 35.39 KG/M2 | HEIGHT: 70 IN | DIASTOLIC BLOOD PRESSURE: 67 MMHG | SYSTOLIC BLOOD PRESSURE: 99 MMHG | HEART RATE: 82 BPM | WEIGHT: 247.2 LBS | RESPIRATION RATE: 16 BRPM

## 2022-11-21 DIAGNOSIS — D75.1 SECONDARY POLYCYTHEMIA: Primary | ICD-10-CM

## 2022-11-21 PROCEDURE — G8427 DOCREV CUR MEDS BY ELIG CLIN: HCPCS | Performed by: INTERNAL MEDICINE

## 2022-11-21 PROCEDURE — G8417 CALC BMI ABV UP PARAM F/U: HCPCS | Performed by: INTERNAL MEDICINE

## 2022-11-21 PROCEDURE — G8536 NO DOC ELDER MAL SCRN: HCPCS | Performed by: INTERNAL MEDICINE

## 2022-11-21 PROCEDURE — 1123F ACP DISCUSS/DSCN MKR DOCD: CPT | Performed by: INTERNAL MEDICINE

## 2022-11-21 PROCEDURE — G8754 DIAS BP LESS 90: HCPCS | Performed by: INTERNAL MEDICINE

## 2022-11-21 PROCEDURE — G8432 DEP SCR NOT DOC, RNG: HCPCS | Performed by: INTERNAL MEDICINE

## 2022-11-21 PROCEDURE — 1101F PT FALLS ASSESS-DOCD LE1/YR: CPT | Performed by: INTERNAL MEDICINE

## 2022-11-21 PROCEDURE — 3074F SYST BP LT 130 MM HG: CPT | Performed by: INTERNAL MEDICINE

## 2022-11-21 PROCEDURE — 99213 OFFICE O/P EST LOW 20 MIN: CPT | Performed by: INTERNAL MEDICINE

## 2022-11-21 PROCEDURE — 3078F DIAST BP <80 MM HG: CPT | Performed by: INTERNAL MEDICINE

## 2022-11-21 PROCEDURE — G8752 SYS BP LESS 140: HCPCS | Performed by: INTERNAL MEDICINE

## 2022-11-21 PROCEDURE — 3017F COLORECTAL CA SCREEN DOC REV: CPT | Performed by: INTERNAL MEDICINE

## 2022-11-21 PROCEDURE — G0463 HOSPITAL OUTPT CLINIC VISIT: HCPCS | Performed by: INTERNAL MEDICINE

## 2022-11-21 NOTE — PROGRESS NOTES
Hematology/Oncology Note      Date: 2022    Name: Avelino Cody  : 1950        Savanah Cook MD         Subjective:     Chief complaint: Polycythemia    History of Present Illness:   Mr. Salvador Godwin is a most pleasant 74y.o. year old male who was seen for consultation of polycythemia. The patient has a past medical history significant of asthma/COPD with chronic shortness of breath, hypertension, dyslipidemia,  diabetes mellitus, hypothyroidism, low serum testosterone (?). He was former smoker, quit since . He denied any testosterone injection. He reported chronic SOB and occasional headache. The patient reports on-off fatigue. Otherwise has no other complaints. Denied fever, chills, night sweat, unintentional weight loss, skin lumps or bumps, acute bleeding or bruising issues. Denied acute vision change, chest pain, palpitation, productive cough, nausea, vomiting, abdominal pain, altered bowel habits, dysuria, worsen bone pain or back pain, new focal numbness or weakness. Past Medical History, Family History, and Social History:    Past Medical History:   Diagnosis Date    Arthritis     Asthma     Cancer ([facility] [address]     BASAL     Cardiac catheterization     [address] [facility]:  No significant CAD. Cardiac echocardiogram 2014    EF 50-55%. No WMA. Gr 1 DDfx. RVSP 25-30 mmHg. Mild TR.       DM type 2 (diabetes mellitus, type 2) (HCC)     Enlarged prostate     Failed back syndrome     GERD (gastroesophageal reflux disease)     Hearing loss, bilateral     Hearing Aids    Hypertension     Hypothyroidism     Insomnia     Low serum testosterone level     Neuropathy     Osteoarthritis of multiple joints     Polycythemia     S/P colonoscopy 13    mild diverticulosis in sigmoid colon w/o evidence of diverticulitis; f/u 5 years    SOB (shortness of breath)     chronic; 2' \"lung fever\"    Vertigo      Past Surgical History:   Procedure Laterality Date    COLONOSCOPY N/A 4/3/2018 COLONOSCOPY performed by Smitha Cortes MD at [facility]      removal of defective hardware    HX [facility]    to remove bone fragments    HX LUMBAR FUSION      fusion from L4-S1 and implantation of hardware    HX LUMBAR FUSION      fusion on L5 area    HX ORTHOPAEDIC Bilateral 2004    trigger finger syndrome-all 4 fingers    HX ORTHOPAEDIC Left 2004    left arm torn muscle repair and placement of 2 screws    HX OTHER SURGICAL      skin cancer removal     Social History     Socioeconomic History    Marital status:      Spouse name: Not on file    Number of children: Not on file    Years of education: Not on file    Highest education level: Not on file   Occupational History    Occupation: Retired-[employer]   Tobacco Use    Smoking status: Former     Packs/day: 1.00     Years: 31.00     Pack years: 31.00     Types: Pipe, Cigars, Cigarettes     Quit date: 1995     Years since quittin.9    Smokeless tobacco: Never    Tobacco comments:     smoked cigar   Vaping Use    Vaping Use: Never used   Substance and Sexual Activity    Alcohol use: No    Drug use: No    Sexual activity: Yes     Partners: Female   Other Topics Concern    Not on file   Social History Narrative    Not on file     Social Determinants of Health     Financial Resource Strain: Not on file   Food Insecurity: Not on file   Transportation Needs: Not on file   Physical Activity: Not on file   Stress: Not on file   Social Connections: Not on file   Intimate Partner Violence: Not on file   Housing Stability: Not on file     Family History   Problem Relation Age of Onset    Cancer Mother         Bone and Brain Cancer    Diabetes Mother     Liver Disease Father         Lung Cancer    Kidney Disease Sister     Diabetes Daughter     Colon Cancer Brother      Current Outpatient Medications   Medication Sig Dispense Refill    Glucagon Emergency Kit, human, 1 mg solr       metoprolol tartrate (LOPRESSOR) 25 mg tablet Take 1 Tablet by mouth two (2) times a day. 180 Tablet 1    DULoxetine (CYMBALTA) 60 mg capsule Take 1 Capsule by mouth two (2) times a day. 180 Capsule 1    amitriptyline (ELAVIL) 10 mg tablet Take 1 Tablet by mouth nightly. 30 Tablet 2    montelukast (SINGULAIR) 10 mg tablet Take 1 Tablet by mouth daily. 90 Tablet 1    OTHER daily. Methyfolate       topiramate (TOPAMAX) 25 mg tablet Take 1 tablet by mouth once daily with breakfast 90 Tablet 1    Euthyrox 125 mcg tablet TAKE 1 TABLET BY MOUTH ONCE DAILY BEFORE BREAKFAST 90 Tablet 1    Creon 24,000-76,000 -120,000 unit capsule TAKE 2 CAPSULES BY MOUTH TWICE DAILY      tamsulosin (FLOMAX) 0.4 mg capsule Take 2 Capsules by mouth daily (after dinner). 180 Capsule 3    albuterol (PROVENTIL VENTOLIN) 2.5 mg /3 mL (0.083 %) nebu 3 mL by Nebulization route every four (4) hours as needed for Wheezing or Shortness of Breath. 50 Nebule 3    albuterol (PROVENTIL HFA, VENTOLIN HFA, PROAIR HFA) 90 mcg/actuation inhaler Take 1-2 Puffs by inhalation every four (4) hours as needed for Wheezing or Shortness of Breath. 1 Each 5    budesonide-formoteroL (Symbicort) 160-4.5 mcg/actuation HFAA Take 2 Puffs by inhalation two (2) times a day. (Patient taking differently: Take 2 Puffs by inhalation two (2) times daily as needed.) 1 Inhaler 5    pantoprazole (PROTONIX) 40 mg tablet Take 1 Tab by mouth daily. 90 Tab 1    pregabalin (Lyrica) 150 mg capsule Take 1 Cap by mouth three (3) times daily. Max Daily Amount: 450 mg. TAKE 1 CAPSULE BY MOUTH THREE TIMES DAILY FOR 30 DAYS. MAXIMUM 3 CAPSULES DAILY. Indications: neuropathy 90 Cap 0    insulin glargine (BASAGLAR KWIKPEN U-100 INSULIN) 100 unit/mL (3 mL) inpn 52 Units by SubCUTAneous route nightly.  52 units at bedtime (Patient taking differently: 48 Units by SubCUTAneous route nightly.) 5 Pen 3    fluticasone (FLONASE) 50 mcg/actuation nasal spray USE 1 SPRAY(S) IN EACH NOSTRIL ONCE DAILY (Patient taking differently: 2 Sprays by Both Nostrils route nightly.) 1 Bottle 0    FANI PEN NEEDLE 32 gauge x 5/32\" ndle USE AT BEDTIME 100 Pen Needle 6    ACCU-CHEK JAZZY PLUS TEST STRP strip       ACCU-CHEK SOFTCLIX LANCETS misc       insulin glulisine (APIDRA SOLOSTAR) 100 unit/mL pen 2 units per carb consumed. Max 30 / day (Patient taking differently: 10 Units by SubCUTAneous route Before breakfast, lunch, and dinner. 2 units per carb consumed. Max 30 / day  Indications: patient states taking 15-20 units three times a day) 5 Pen 3    PEN NEEDLE 31 gauge x 5/16\" ndle USE ONE PEN NEEDLE FOR LANTUS FLEXPEN AND 3 PEN NEEDLES FOR APIDRA WITH EACH MEAL 1 Package 3    ketorolac (ACULAR) 0.5 % ophthalmic solution Administer 1 Drop to both eyes two (2) times daily as needed. Review of Systems   Constitutional:  Negative for chills, diaphoresis, fever, malaise/fatigue and weight loss. Respiratory:  Negative for cough, hemoptysis, shortness of breath and wheezing. Cardiovascular:  Negative for chest pain, palpitations and leg swelling. Gastrointestinal:  Negative for abdominal pain, diarrhea, heartburn, nausea and vomiting. Genitourinary:  Negative for dysuria, frequency, hematuria and urgency. Musculoskeletal:  Negative for joint pain and myalgias. Skin:  Negative for itching and rash. Neurological:  Negative for dizziness, seizures, weakness and headaches. Psychiatric/Behavioral:  Negative for depression. The patient does not have insomnia.            Objective:     Visit Vitals  BP 99/67   Pulse 82   Resp 16   Ht 5' 10\" (1.778 m)   Wt 112.1 kg (247 lb 3.2 oz)   SpO2 98%   BMI 35.47 kg/m²       ECOG Performance Status (grade): 1  0 - able to carry on all pre-disease activity w/out restriction  1 - restricted but able to carry out light work  2 - ambulatory and can self- care but unable to carry out work  3 - bed or chair >50% of waking hours  4 - completely disable, total care, confined to bed or chair    Physical Exam  Constitutional:       General: He is not in acute distress. HENT:      Head: Normocephalic and atraumatic. Eyes:      Pupils: Pupils are equal, round, and reactive to light. Cardiovascular:      Pulses: Normal pulses. Heart sounds: Normal heart sounds. No murmur heard. Pulmonary:      Effort: Pulmonary effort is normal. No respiratory distress. Breath sounds: Normal breath sounds. Abdominal:      General: Bowel sounds are normal. There is no distension. Palpations: Abdomen is soft. There is no mass. Tenderness: There is no abdominal tenderness. There is no guarding. Musculoskeletal:         General: No swelling or tenderness. Cervical back: Neck supple. No rigidity. Lymphadenopathy:      Cervical: No cervical adenopathy. Skin:     General: Skin is warm. Findings: No rash. Neurological:      Mental Status: He is alert and oriented to person, place, and time. Mental status is at baseline. Cranial Nerves: No cranial nerve deficit. Psychiatric:         Mood and Affect: Mood normal.        Diagnostics:      No results found for this or any previous visit (from the past 96 hour(s)). Imaging:  No results found for this or any previous visit. Results for orders placed during the hospital encounter of 09/28/20    XR CHEST PA LAT    Narrative  EXAMINATION: Chest 2 views    INDICATION: Shortness of breath, cough    COMPARISON: 5/17/2017    FINDINGS: Frontal and lateral views of the chest obtained. Mediastinal  silhouette and pulmonary vasculature unremarkable. No consolidation. No evidence  of pneumothorax. No acute osseous findings. Impression  IMPRESSION:    No acute findings. Results for orders placed during the hospital encounter of 09/08/20    CT CHEST W CON PE PROTOCOL    Narrative  EXAM: CT CHEST W CON PE PROTOCOL    INDICATION: low o2 sat. TECHNIQUE: CT scan of the chest with IV contrast including coronal and sagittal  images.   DICOM format image data is available to non-affiliated external healthcare  facilities or entities on a secure, media free, reciprocally searchable basis  with patient authorization for 12 months following the date of the study. COMPARISON:  No relevant prior studies available. FINDINGS:  Pulmonary arteries: Unremarkable. No pulmonary embolism. Aorta: No acute findings. No thoracic aortic aneurysm or dissection. Lungs: Right lower lobe discoid atelectasis. Lymph nodes: Unremarkable. No enlarged lymph nodes. Esophagus: Normal  Pleural spaces: Unremarkable. No significant effusion. No pneumothorax. Heart: Unremarkable. No cardiomegaly. No significant pericardial effusion. No  evidence of right ventricular dysfunction. Thyroid: Normal  Chest wall: No abnormal findings. Bones: No fractures identified. Impression  IMPRESSION:  Right lower lobe atelectasis. No evidence of pulmonary embolism. Pathology          Assessment:                                        1. Secondary polycythemia          Plan:                                          # Secondary Polycythemia    -- Past medical history significant of asthma/COPD with chronic shortness of breath, hypertension, dyslipidemia,  diabetes mellitus, hypothyroidism, low serum testosterone (?). He was former smoker, quit since 1995. He denied any testosterone injection. -- 6/18/2021 CBC reported hemoglobin 16.2, hematocrit 50.2%, WBC 9.2, platelet 270,108.  -- 8/26/2021 CBC reported hemoglobin 15.8, hematocrit 47.6%, WBC 7.1, platelet 421,543. Erythropoietin 11.6. Negative JAK2/MPL/CALR mutations/BCR/ABL1, CO level 1.8.  -- He has f/u with Pulmonary. He admitted he stopped using sleep machine for a while. -- His polycythemia was likely secondary, 2/2 COPD/Asthma with chronic shortness of breath.   -- He had COVID infection in the past.   -- 01/12/2022 CBC reported hemoglobin 15, hematocrit 47.6%, WBC of 7.4, platelet 252,240.  -- His last phlebotomy was about few weeks ago and tolerated it well. -- Today I have reviewed with the patient about recent lab reports. 11/10/2022 hemoglobin 13.5, hematocrit 41.3%, WBC 9.3, platelet 632,165. Plan:  -- He will continue Phlebotomy 0.5 unit (250ml) each time with the goal of inducing an iron deficient state and reducing the Hct < 45%. -- Will continue phlebotomy every 2-3 month schedule. -- Encouraged patient to maintain hydration and avoid vigorous exercise within 24 hours of phlebotomy. -- He will need to follow up Pulmonary for COPD/Asthma management. -- He will fu PCP for age appropriate cancer screening. -- I will see the patient back in about 6 months. Always sooner if required. No orders of the defined types were placed in this encounter. Mr. Yady Hong has a reminder for a \"due or due soon\" health maintenance. I have asked that he contact his primary care provider for follow-up on this health maintenance. All of patient's questions answered to their apparent satisfaction. They verbally show understanding and agreement with aforementioned plan. Lorena Cisneros MD  11/21/2022        Parts of this document has been produced using Dragon dictation system. Unrecognized errors in transcription may be present. Please do not hesitate to reach out for any questions or clarifications.       CC: Landon Vogt MD

## 2023-01-12 ENCOUNTER — HOSPITAL ENCOUNTER (OUTPATIENT)
Dept: INFUSION THERAPY | Age: 73
End: 2023-01-12
Payer: MEDICARE

## 2023-01-12 VITALS
DIASTOLIC BLOOD PRESSURE: 67 MMHG | HEART RATE: 88 BPM | TEMPERATURE: 98 F | RESPIRATION RATE: 20 BRPM | OXYGEN SATURATION: 97 % | SYSTOLIC BLOOD PRESSURE: 105 MMHG

## 2023-01-12 LAB
BASO+EOS+MONOS # BLD AUTO: 0.8 K/UL (ref 0–2.3)
BASO+EOS+MONOS NFR BLD AUTO: 10 % (ref 0.1–17)
DIFFERENTIAL METHOD BLD: NORMAL
ERYTHROCYTE [DISTWIDTH] IN BLOOD BY AUTOMATED COUNT: 12.3 % (ref 11.5–14.5)
HCT VFR BLD AUTO: 42 % (ref 36–48)
HGB BLD-MCNC: 13.8 G/DL (ref 12–16)
LYMPHOCYTES # BLD: 1.9 K/UL (ref 1.1–5.9)
LYMPHOCYTES NFR BLD: 24 % (ref 14–44)
MCH RBC QN AUTO: 27.7 PG (ref 25–35)
MCHC RBC AUTO-ENTMCNC: 32.9 G/DL (ref 31–37)
MCV RBC AUTO: 84.2 FL (ref 78–102)
NEUTS SEG # BLD: 5.4 K/UL (ref 1.8–9.5)
NEUTS SEG NFR BLD: 66 % (ref 40–70)
PLATELET # BLD AUTO: 223 K/UL (ref 140–440)
RBC # BLD AUTO: 4.99 M/UL (ref 4.1–5.1)
WBC # BLD AUTO: 8.1 K/UL (ref 4.5–13)

## 2023-01-12 PROCEDURE — 85025 COMPLETE CBC W/AUTO DIFF WBC: CPT

## 2023-01-12 PROCEDURE — 36415 COLL VENOUS BLD VENIPUNCTURE: CPT

## 2023-01-12 NOTE — PROGRESS NOTES
[facility] Lab Visit:    Zainab Brown  1950  [id number]    [id number] Pt arrived ambulatory w/o assist. Labs drawn per order via Right AC venipuncture x1 attempt. Pt tolerated well without complaints. Gauze/ coban to site. Pt departed [facility] ambulatory and in no distress at 1315.      Visit Vitals  /67 (BP 1 Location: Right upper arm, BP Patient Position: Sitting)   Pulse 88   Temp 98 °F (36.7 °C)   Resp 20   SpO2 97%

## 2023-02-07 ENCOUNTER — PATIENT OUTREACH (OUTPATIENT)
Dept: CASE MANAGEMENT | Age: 73
End: 2023-02-07

## 2023-02-07 NOTE — PROGRESS NOTES
Care Transitions Initial Call    Call within 2 business days of discharge: Yes     Patient: Mallory Bingham Patient : 1950 MRN: [id number]    Last Discharge  Street       Date Complaint Diagnosis Description Type Department Provider    23 Abdominal Pain; Vomiting Acute pancreatitis without infection or necrosis, unspecified pancreatitis type . .. ED to Hosp-Admission (Discharged) (ADMIT) Gretta Sharma MD; Palmer Mendoza. .. Was this an external facility discharge? No Discharge Facility: N/A    Challenges to be reviewed by the provider   Additional needs identified to be addressed with provider: no  none         Method of communication with provider : none    Discussed COVID-19 related testing which was not done at this time. Test results were not done. Patient informed of results, if available? N/A     Advance Care Planning:   Does patient have an Advance Directive: yes, reviewed and current. Inpatient Readmission Risk score: Unplanned Readmit Risk Score: 11.7    Was this a readmission? no   Patient stated reason for the admission: N/A    Patients top risk factors for readmission: medical condition-pancreatitis    Interventions to address risk factors: Obtained and reviewed discharge summary and/or continuity of care documents, Education of patient/family/caregiver/guardian to support self-management-CTN educated patient on pancreatitis red flags. CTN instructed patient to return to ED with severe abdominal pain or N/V that prevents eating, drinking or taking medication, and Patient reported a little abdominal pain but not bad. Patient was able to tolerate oatmeal this morning and has eaten a few thin slices of turkey for lunch. Patient is aware he is to follow a low-fat diet. CTN advised patient to drink 1 or 2% milk, avoid/limit use of butter margarine, ramirez, creamy sauces or gravies. Patient verbalized understanding.      Care Transition Nurse (CTN) contacted the patient by telephone to perform post hospital discharge assessment. Verified name and  with patient as identifiers. Provided introduction to self, and explanation of the CTN role. CTN reviewed discharge instructions, medical action plan and red flags with patient who verbalized understanding. Were discharge instructions available to patient? yes. Reviewed appropriate site of care based on symptoms and resources available to patient including: PCP, Specialist, When to call 911, and CTN . Patient given an opportunity to ask questions and does not have any further questions or concerns at this time. The patient agrees to contact the PCP office for questions related to their healthcare. Medication reconciliation was performed with patient, who verbalizes understanding of administration of home medications. Advised obtaining a 90-day supply of all daily and as-needed medications. Referral to Pharm D needed: no     Patient voiced he was instructed to take Creon 3 times daily until seen by endocrinology. Home Health/Outpatient orders at discharge: [address]: N/A  Date of initial visit: [facility] ordered at discharge: None  [facility]: N/A  Durable Medical Equipment received: N/A    Was patient discharged with a pulse oximeter? no    Discussed follow-up appointments. If no appointment was previously scheduled, appointment scheduling offered: no. Is follow up appointment scheduled within 7 days of discharge? yes. [facility] follow up appointment(s):   Future Appointments   Date Time Provider Rina Urban   2023 12:30 PM Derrek Lucio MD [facility] BS [facility]   3/9/2023  1:00 PM [facility] INFUSION NURSE 3 [facility]     Non-BS follow up appointment(s): ALEXX Albert (endocrinology) 3/1/23    Plan for follow-up call in 7-10 days based on severity of symptoms and risk factors.   Plan for next call: symptom management-assess for pancreatitis red flags and follow up appointment-verify attendance of [facility] appt  CTN provided contact information for future needs. Goals Addressed                   This Visit's Progress     Attends follow-up appointments as directed. Goal: Patient will attend all appointments scheduled within the next 30 days. Ensure provider appt is scheduled within 7 business days post-discharge; 2/7: [facility] appt 2/9 @ 1230 PM.    Confirm patient attended post-discharge provider appt   Complete post-visit call to confirm attendance and update care needs           Prevent complications post hospitalization. Goal: No admissions post 30 days from discharge of 2/6/23. Review/educate common or potential \"red flags\" of condition worsening; 2/7: Reviewed/educated on red flags of acute pancreatitis:  Upper abdominal pain  Abdominal pain that radiates to your back  Tenderness when touching the abdomen  Fever  Rapid pulse  Nausea  Vomiting    Assess for health risk behaviors and educate patient/caregivers on reducing risk; 2/7: CTN educated patient on following a low fat diet.

## 2023-02-09 ENCOUNTER — OFFICE VISIT (OUTPATIENT)
Dept: FAMILY MEDICINE CLINIC | Age: 73
End: 2023-02-09
Payer: MEDICARE

## 2023-02-09 VITALS
BODY MASS INDEX: 34.93 KG/M2 | RESPIRATION RATE: 18 BRPM | DIASTOLIC BLOOD PRESSURE: 68 MMHG | TEMPERATURE: 98.1 F | WEIGHT: 244 LBS | OXYGEN SATURATION: 97 % | HEIGHT: 70 IN | SYSTOLIC BLOOD PRESSURE: 122 MMHG | HEART RATE: 83 BPM

## 2023-02-09 DIAGNOSIS — J44.9 ASTHMA WITH COPD (CHRONIC OBSTRUCTIVE PULMONARY DISEASE) (HCC): ICD-10-CM

## 2023-02-09 DIAGNOSIS — E66.01 SEVERE OBESITY (BMI 35.0-39.9) WITH COMORBIDITY (HCC): ICD-10-CM

## 2023-02-09 DIAGNOSIS — N18.30 STAGE 3 CHRONIC KIDNEY DISEASE, UNSPECIFIED WHETHER STAGE 3A OR 3B CKD (HCC): ICD-10-CM

## 2023-02-09 DIAGNOSIS — E11.8 TYPE 2 DIABETES MELLITUS WITH COMPLICATION, WITH LONG-TERM CURRENT USE OF INSULIN (HCC): ICD-10-CM

## 2023-02-09 DIAGNOSIS — Z79.4 TYPE 2 DIABETES MELLITUS WITH COMPLICATION, WITH LONG-TERM CURRENT USE OF INSULIN (HCC): ICD-10-CM

## 2023-02-09 DIAGNOSIS — E03.9 HYPOTHYROIDISM, UNSPECIFIED TYPE: ICD-10-CM

## 2023-02-09 DIAGNOSIS — I50.32 CHRONIC DIASTOLIC HEART FAILURE (HCC): Primary | ICD-10-CM

## 2023-02-09 DIAGNOSIS — K85.90 ACUTE PANCREATITIS WITHOUT INFECTION OR NECROSIS, UNSPECIFIED PANCREATITIS TYPE: ICD-10-CM

## 2023-02-09 DIAGNOSIS — D45 POLYCYTHEMIA VERA (HCC): ICD-10-CM

## 2023-02-09 PROCEDURE — G0463 HOSPITAL OUTPT CLINIC VISIT: HCPCS | Performed by: FAMILY MEDICINE

## 2023-02-09 NOTE — PROGRESS NOTES
Visit Vitals  /68 (BP 1 Location: Right upper arm, BP Patient Position: Sitting, BP Cuff Size: Adult)   Pulse 83   Temp 98.1 °F (36.7 °C) (Temporal)   Resp 18   Ht 5' 10\" (1.778 m)   Wt 244 lb (110.7 kg)   SpO2 97%   BMI 35.01 kg/m²    1. \"Have you been to the ER, urgent care clinic since your last visit? Hospitalized since your last visit? \" Yes, [address] [place] [address] Pancreatitis 2/4/23-2/6/23    2. \"Have you seen or consulted any other health care providers outside of the [address] [place] [address] since your last visit? \" No     3. For patients aged 39-70: Has the patient had a colonoscopy / FIT/ Cologuard? Yes - no Care Gap present      If the patient is female:    4. For patients aged 41-77: Has the patient had a mammogram within the past 2 years? NA - based on age or sex      11. For patients aged 21-65: Has the patient had a pap smear?  NA - based on age or sex

## 2023-02-10 NOTE — PROGRESS NOTES
HISTORY OF PRESENT ILLNESS  Jacob Silverman is a 74 y.o. male. HPI: Here for follow-up. Also here for follow-up after hospital discharge. Reviewed records through the chart. Diagnosis of pancreatitis. He is still having mild intensity of abdominal pain starts from the epigastric to umbilical area. Very mild in intensity. Tolerating p.o. well. No nausea vomiting or any change in her bowel movements. No urinary complaint. Has an appt on Monday with GI. Discussed light diet. Vitals stable. H/o diabetes. Lately blood sugar is above 130 and it is stable. No hypo or hyperglycemic symptoms. Taking insulin with compliance. Also blood pressure been stable. Following hematology for polycythemia. No concern. Taking metoprolol once a day on day of phlebotomy . Currently vitals stable. Copd/ asthma. Fairly stable. No increase use of albuterol. Chronic CHF. No signs of volume over load. No leg swelling. No shortness of breath or chest pain. Visit Vitals  /68 (BP 1 Location: Right upper arm, BP Patient Position: Sitting, BP Cuff Size: Adult)   Pulse 83   Temp 98.1 °F (36.7 °C) (Temporal)   Resp 18   Ht 5' 10\" (1.778 m)   Wt 244 lb (110.7 kg)   SpO2 97%   BMI 35.01 kg/m²     Review medication list, vitals, problem list,allergies.    Lab Results   Component Value Date/Time    WBC 7.1 02/06/2023 12:47 AM    HGB 11.8 (L) 02/06/2023 12:47 AM    HCT 37.0 02/06/2023 12:47 AM    PLATELET 433 63/74/3539 12:47 AM    MCV 86.0 02/06/2023 12:47 AM     Lab Results   Component Value Date/Time    Cholesterol, total 244 (H) 06/21/2022 09:31 AM    HDL Cholesterol 46 06/21/2022 09:31 AM    LDL, calculated 148 (H) 06/21/2022 09:31 AM    LDL-C, External 82 06/16/2017 12:00 AM    Triglyceride 250 (H) 06/21/2022 09:31 AM    CHOL/HDL Ratio 5.3 (H) 06/21/2022 09:31 AM     Lab Results   Component Value Date/Time    TSH 2.23 06/21/2022 09:31 AM    T4, Free 1.4 07/19/2017 08:24 AM      Lab Results   Component Value Date/Time Sodium 141 02/06/2023 12:47 AM    Potassium 3.9 02/06/2023 12:47 AM    Chloride 110 (H) 02/06/2023 12:47 AM    CO2 23 02/06/2023 12:47 AM    Anion gap 8 02/06/2023 12:47 AM    Glucose 82 02/06/2023 12:47 AM    BUN 9 02/06/2023 12:47 AM    Creatinine 0.94 02/06/2023 12:47 AM    BUN/Creatinine ratio 11 (L) 11/07/2022 02:36 PM    GFR est AA >60 02/06/2023 12:47 AM    GFR est non-AA >60 02/06/2023 12:47 AM    Calcium 8.1 (L) 02/06/2023 12:47 AM      Lab Results   Component Value Date/Time    Sodium 141 02/06/2023 12:47 AM    Potassium 3.9 02/06/2023 12:47 AM    Chloride 110 (H) 02/06/2023 12:47 AM    CO2 23 02/06/2023 12:47 AM    Anion gap 8 02/06/2023 12:47 AM    Glucose 82 02/06/2023 12:47 AM    BUN 9 02/06/2023 12:47 AM    Creatinine 0.94 02/06/2023 12:47 AM    BUN/Creatinine ratio 11 (L) 11/07/2022 02:36 PM    GFR est AA >60 02/06/2023 12:47 AM    GFR est non-AA >60 02/06/2023 12:47 AM    Calcium 8.1 (L) 02/06/2023 12:47 AM    Bilirubin, total 0.50 02/06/2023 12:47 AM    ALT (SGPT) 14 02/06/2023 12:47 AM    Alk. phosphatase 40 (L) 02/06/2023 12:47 AM    Protein, total 5.6 (L) 02/06/2023 12:47 AM    Albumin 3.2 (L) 02/06/2023 12:47 AM    Globulin 2.7 11/07/2022 02:36 PM    A-G Ratio 1.3 11/07/2022 02:36 PM      Lab Results   Component Value Date/Time    Hemoglobin A1c 6.6 (H) 02/05/2023 06:35 AM    Hemoglobin A1c (POC) 6.8 02/15/2022 10:40 AM    Hemoglobin A1c, External 7.2 06/16/2017 12:00 AM      CT Results (most recent):  Results from Hospital Encounter encounter on 02/04/23    CT ABD PELV W CONT    Narrative  Indication: abd pain, vomit, hx pancreatitis. .    Impression  IMPRESSION: Mild acute pancreatitis. .    Comment: CT images of the abdomen and pelvis were obtained following  administration of intravenous contrast. This was compared with September 8, 2020. The lung bases are clear. The liver is of mild low attenuation from fatty infiltration.     There is mild edema about the pancreatic head compatible with acute  pancreatitis. The spleen adrenal glands and gallbladder are unremarkable. Mild cortical scarring is present in the right kidney. Otherwise the kidneys,  ureters and bladder are unremarkable. No radiopaque urinary tract calculi. Pelvic viscera is unremarkable. No bowel obstruction, free intraperitoneal air, free fluid or appendicitis. Laminectomy and fusion is been performed in the lower lumbar spine. All CT exams at this facility use one or more dose reduction techniques  including automatic exposure control, mA/kV adjustment per patient's size, or  iterative reconstruction technique. Electronically signed by: Breezy Lamb MD 2/4/2023 7:38 PM EST        ROS: see HPI     Physical Exam  Constitutional:       General: He is not in acute distress. Cardiovascular:      Rate and Rhythm: Normal rate and regular rhythm. Heart sounds: Normal heart sounds. Abdominal:      General: Bowel sounds are normal.      Palpations: Abdomen is soft. Tenderness: There is no abdominal tenderness. Musculoskeletal:         General: No swelling. Cervical back: Neck supple. Neurological:      Mental Status: He is oriented to person, place, and time. Psychiatric:         Behavior: Behavior normal.       ASSESSMENT and PLAN    ICD-10-CM ICD-9-CM    1. Chronic diastolic heart failure (HCC) : no sings of volume over load. Stable continue current plan. I50.32 428.32       2. Polycythemia vera ([facility]): Following hematology. Phlebotomy as needed. D45 238.4     following Dr. Penelope Suarez      3. Asthma with COPD (chronic obstructive pulmonary disease) ([facility]): Fairly stable no increased use of albuterol J44.9 493.20       4. Type 2 diabetes mellitus with complication, with long-term current use of insulin ([facility] Compliance with Lantus. Currently fasting sugar around 1 30-1 40. Following Endo and the recommendation. No hyper or hypoglycemic symptoms.   Continue current plan and more diet modification E11.8 250.90     Z79.4 V58.67       5. Stage 3 chronic kidney disease, unspecified whether stage 3a or 3b CKD ([facility]): Following nephrology. Currently stable. Avoiding NSAID's N18.30 585.3       6. Acute pancreatitis without infection or necrosis, unspecified pancreatitis type: Recent ER visit. Pain gradually improving. Has an appointment with GI on next Monday. K85.90 577.0       7. Severe obesity (BMI 35.0-39. 9) with comorbidity ([facility]  E66.01 278.01       8. Hypothyroidism, unspecified type: Asymptomatic. Taking medication. No trouble swallowing or change in voice E03.9 244.9       Pt understood and agree with the plan   He is up-to-date with eye and foot exam.  Will obtain records  Follow-up and Dispositions    Return in about 3 months (around 5/9/2023). Please note that this dictation was completed with [email or web address], the computer voice recognition software. Quite often unanticipated grammatical, syntax, homophones, and other interpretive errors are inadvertently transcribed by the computer software. Please disregard these errors. Please excuse any errors that have escaped final proofreading.

## 2023-02-14 ENCOUNTER — CARE COORDINATION (OUTPATIENT)
Facility: CLINIC | Age: 73
End: 2023-02-14

## 2023-02-14 NOTE — CARE COORDINATION
[facility] Care Transitions Follow Up Call    Patient Current Location:  [facility] Transition Nurse contacted the patient by telephone to follow up after admission on 23. Verified name and  with patient as identifiers. Patient: Alice Lane  Patient : 1950   MRN: [id number]  Reason for Admission: Pancreatitis  Discharge Date: 23 RARS: No data recorded    Needs to be reviewed by the provider   Additional needs identified to be addressed with provider: Yes  Patient reported 10/10 worsening abd pain; onset yesterday. Reported epigastric abd pain that radiating down to umbilical area. Pain today is down to 4/10 but is it constant. Denied N/V. Last BM 3 days ago. Patient has gone back to a clear liquid diet. GI appt scheduled for  with Dr. Jerry Medley. Method of communication with provider: chart routing. Addressed changes since last contact:   Worsening abdominal pain; returned to clear liquid diet  Did patient attended EDDIE AMBROSE appointment: Yes. Was appointment scheduled within 7 days of discharge? Yes. Follow Up  Future Appointments   Date Time Provider Vilma Bernard   3/9/2023  1:00 PM HBV INFUSION NURSE 3 [facility]   2023  9:15 AM LAB_BSMO [facility]   5/10/2023 10:30 AM Cherry Deutsch MD [facility]   2023  9:15 AM Juan Koo MD [address] [place] [address]   2023 10:15 AM Oswald Teran MD [facility]   2023 11:40 AM Mauricio Contreras MD [facility]     Non-[facility] follow up appointment(s): Dr. Jerry Medley (GI) 23    Care Transition Nurse reviewed medical action plan and red flags with patient and discussed any barriers to care and/or understanding of plan of care after discharge. Discussed appropriate site of care based on symptoms and resources available to patient including: PCP  Specialist  When to call 911  CTN . The patient agrees to contact the PCP office for questions related to their healthcare.      Advance Care Planning:   reviewed and current. Patients top risk factors for readmission: medical condition-pancreatitis  Interventions to address risk factors: Education of patient/family/caregiver/guardian to support self-management-CTN reinforced pancreatitis red flags. CTN instructed patient to return to ED with severe abdominal pain and/or N/V that prevents eating/drinking. Patient voiced understanding. and Patient reported constant 10/10 abdominal pain from epigastric area to umbilicus area that started yesterday. Patient verbalized he had not been eating any fried foods. Today pain is 4/10 but remains constant. Abdomen is slightly tender to touch. Reverted back to clear liquid diet yesterday evening and today. Patient denied N/V. Patient reported last BM was 3 days ago but denies constipation. Offered patient enrollment in the Remote Patient Monitoring (RPM) program for in-home monitoring: NA.     Care Transitions Subsequent and Final Call    Subsequent and Final Calls  Do you have any ongoing symptoms?: Yes  Onset of Patient-reported symptoms: Yesterday  Patient-reported symptoms: Abdominal Pain  Interventions for patient-reported symptoms: Notified PCP/Physician  Have your medications changed?: No  Do you have any questions related to your medications?: No  Do you currently have any active services?: No  Do you have any needs or concerns that I can assist you with?: No  Identified Barriers: None  Care Transitions Interventions  No Identified Needs  Other Interventions:             Care Transition Nurse provided contact information for future needs. Plan for follow-up call in 1-2 days based on severity of symptoms and risk factors.   Plan for next call: symptom management-reassess abdominal pain    Catherine Martell RN

## 2023-02-15 ENCOUNTER — TELEPHONE (OUTPATIENT)
Age: 73
End: 2023-02-15

## 2023-02-15 NOTE — TELEPHONE ENCOUNTER
Pt called stating he is having abdominal pain once again but not as severe. Pt states he spoke with Dr. Dolly Peraza at [facility] and he mentioned that the patient may need a new CT scan of his kidneys and lab work. Dr. Sylvia Rodarte was the primary doctor when the pt was in the hospital. He also mentioned maybe the pt may need to go back to the hospital for further testing for possible inflammation. Pt states he does not see GI until 2/27/2023. Please advise.

## 2023-02-16 ENCOUNTER — CARE COORDINATION (OUTPATIENT)
Facility: CLINIC | Age: 73
End: 2023-02-16

## 2023-02-16 NOTE — CARE COORDINATION
CTN contacted patient. Call answered by Cori Scherer, wife (listed on PHI). Informed wife PCP has advised patient to return to ED due to recurring abdominal pain. Wife voiced pain is tolerable right now since patient has gone back to clear liquid diet. Wife voiced patient is concerned about right kidney since he is having pain. Wife also voiced at the advice of Dr. Mccarthy Files they would like patient to have a CTN scan and labs. CTN will notify PCP. CTN did instruct wife to have patient to return to ED with worsening abdominal or right flank pain.

## 2023-02-16 NOTE — TELEPHONE ENCOUNTER
Left a voice message on home/mobile phone advising patient Dr. Serenity Saavedra said to go to ER as he recently had pancreatitis episode and he has been getting frequent pain. Patient was asked to return call to [facility], at [phone], to let me know he received this message. Ask to speak with Xin.

## 2023-02-16 NOTE — CARE COORDINATION
[address] Care Transitions Follow Up Call    Patient Current Location:  [facility] Transition Nurse contacted the patient by telephone to follow up after admission on 23. Verified name and  with patient as identifiers. Patient: Arti Wilson  Patient : 1950   MRN: [id number]  Reason for Admission: Pancreatitis  Discharge Date: 23 RARS: No data recorded    Needs to be reviewed by the provider   Additional needs identified to be addressed with provider: Yes  Patient reported 4/10 constant abd pain this AM. Also reported 8/10 right flank pain. Patient denied dysuria, frequency/urgency. N/V, fever, chills or sweats. Patient reported urine is clear, pale yellow. Patient did report abd distention that resolved after having diarrhea last night; last BM had been 5 days ago. Patient would like to know if there is any pain medication he may take. Please advise. Method of communication with provider: chart routing. Patient reported 4/10 abdominal pain. Pain is constant. Patient also reported 8/10 right flank pain. Patient voiced he had lower abdominal pain last night but this resolved after having a BM last night. Last BM had been about 5 days ago. Patient reported stool was loose and watery; no blood. Patient denied fever, chills, sweats. Patient remains on a clear liquid diet. Patient verbalized he spoke with Dr. Krysta Galarza with [facility] and he suggested having another CT scan as well as lab work. Per patient, Dr. Krysta Galarza thinks pain may be a result of inflammation. Patient plans to call GI today to be placed on cancellation list for an earlier appt. Addressed changes since last contact:  none  Did patient attend follow up appointment: Yes. Was follow up appointment scheduled within 7 days of discharge? Yes.     Follow Up  Future Appointments   Date Time Provider Vilma Bernard   3/9/2023  1:00 PM HBV INFUSION NURSE 3 [facility]   2023  9:15 AM LAB_BSMO [facility] AMB 5/10/2023 10:30 AM Francisca Cameron MD [facility]   5/22/2023  9:15 AM Ruby Tavares MD [facility]   6/8/2023 10:15 AM Kaci Regalado MD Adeline Hamilton [facility]   7/7/2023 11:40 AM Trey Jules MD [facility] follow up appointment(s): GI appt 2/27/23    Care Transition Nurse reviewed red flags with patient and discussed any barriers to care and/or understanding of plan of care after discharge. Discussed appropriate site of care based on symptoms and resources available to patient including: PCP  Specialist  When to call 911  CTN . The patient agrees to contact the PCP office for questions related to their healthcare. Advance Care Planning:   reviewed and current. Patients top risk factors for readmission: medical condition-pancreatitis  Interventions to address risk factors: Education of patient/family/caregiver/guardian to support self-management-Reinforced pancreatitis red flags to monitor for. Offered patient enrollment in the Remote Patient Monitoring (RPM) program for in-home monitoring: NA.     Care Transitions Subsequent and Final Call    Subsequent and Final Calls  Do you have any ongoing symptoms?: Yes  Onset of Patient-reported symptoms: In the past 7 days  Patient-reported symptoms: Abdominal Pain  Interventions for patient-reported symptoms: Notified PCP/Physician  Have your medications changed?: No  Do you have any questions related to your medications?: No  Do you currently have any active services?: No  Do you have any needs or concerns that I can assist you with?: No  Identified Barriers: None  Care Transitions Interventions  No Identified Needs  Other Interventions:             Care Transition Nurse provided contact information for future needs. Plan for follow-up call in 5-7 days based on severity of symptoms and risk factors.   Plan for next call: symptom management-reassess s/s  follow-up appointment-follow up on earlier GI appt    Jolly Brasher RN

## 2023-02-18 PROBLEM — K81.0 ACUTE CHOLECYSTITIS: Status: ACTIVE | Noted: 2023-02-18

## 2023-02-20 ENCOUNTER — CARE COORDINATION (OUTPATIENT)
Facility: CLINIC | Age: 73
End: 2023-02-20

## 2023-02-20 ENCOUNTER — PATIENT OUTREACH (OUTPATIENT)
Dept: CASE MANAGEMENT | Age: 73
End: 2023-02-20

## 2023-02-21 ENCOUNTER — CARE COORDINATION (OUTPATIENT)
Facility: CLINIC | Age: 73
End: 2023-02-21

## 2023-02-21 NOTE — CARE COORDINATION
[facility] Care Transitions Initial Follow Up Call    Call within 2 business days of discharge: Yes    Patient Current Location:  [place] [address] Transition Nurse contacted the patient by telephone to perform post hospital discharge assessment. Verified name and  with patient as identifiers. Provided introduction to self, and explanation of the Care Transition Nurse role. Patient: Loralyn Eisenmenger Patient : 1950   MRN: [id number]  Reason for Admission: Acute cholecystitis  Discharge Date: 23 RARS: Readmission Risk Score: 11.9      Last Discharge  Street       Date Complaint Diagnosis Description Type Department Provider    23 Abdominal Pain Acute cholecystitis . .. ED to Hosp-Admission (Discharged) (ADMITTED) Claudine Montoya MD; [facility]. .. Was this an external facility discharge? Yes, 23-23  Discharge Facility: [facility] for [facility],  Acute cholecystitis  s/p lap celina     Challenges to be reviewed by the provider   Additional needs identified to be addressed with provider:     FYI- Patient recently admitted to [facility] for Abdominal Pain,  Acute cholecystitis  s/p lap celina . CTN spoke with Pt. Today for follow up. Patient reported that he is doing well. Pt. Reports that he is taking Symbicort and Singulair  as needed. Pt. Reported that  he is taking Insulin glargine 45 units instead of 52 units. Pt. States that He can take up to 10 units of Apidra as needed depending on his Bg readings. Bg reading has been 120s per Pt. Pt. Also Reported that he is on Creon 3x daily and lopressor once daily. PCP appt. Request sent. Method of communication with provider: chart routing. Patient reported that he is doing good. No new or worsening of symptoms at this time as per Pt. Pt. Reported that his Incision is sore. No other issues with surgical site verbalized by Pt. At this time. Pt. States that he is taking pain medication as needed.    Pt. Aj Hobson CP, fever and chills at this time. Pt. Denied Abdominal pain, nausea, vomiting, diarrhea and constipation at this time. Pt. States that his SOB is still there but not worsening. Pt. Reports that his inhaler helps. No questions, concerns and/or needs at this time as per Pt. Reminded Pt. to go to the nearest emergency room for chest pain, shortness of breath, returning of symptoms that brought him to the emergency room and/or worsening of symptoms. Pt. Verbalized and repeated back understanding    Care Transition Nurse reviewed discharge instructions, medical action plan, and red flags with patient who verbalized understanding. The patient was given an opportunity to ask questions and does not have any further questions or concerns at this time. Were discharge instructions available to patient? Yes. Reviewed appropriate site of care based on symptoms and resources available to patient including: PCP  After hours contact [phone]  When to call 911. The patient agrees to contact the PCP office for questions related to their healthcare. Medication reconciliation was performed with patient, who verbalizes understanding of administration of home medications. Medications reviewed, 1111F entered: N/A    Was patient discharged with a pulse oximeter? no    Non-face-to-face services provided:  Obtained and reviewed discharge summary and/or continuity of care documents    Offered patient enrollment in the Remote Patient Monitoring (RPM) program for in-home monitoring: NA.    Care Transitions 24 Hour Call    Do you have support at home?: Partner/Spouse/SO  Are you an active caregiver in your home?: No  Care Transitions Interventions  Continue to take all medications as prescribed. Call CTN or PCP office for any questions, concerns and/or needs. Discussed follow-up appointments. If no appointment was previously scheduled, appointment scheduling offered: PCP appt. Request sent.    Is follow up appointment scheduled within 7 days of discharge? No.    Follow Up  Future Appointments   Date Time Provider Vilma Harrisi   3/9/2023  1:00 PM HBV INFUSION NURSE 3 [facility]   5/8/2023  9:15 AM LAB[facility]MO [facility]   5/10/2023 10:30 AM Francis Nava MD [facility]   5/22/2023  9:15 AM Dorathy Meigs,  [address]   6/8/2023 10:15 AM Elizaebth York MD [facility]   7/7/2023 11:40 AM Vinny Treadwell MD [facility]       Care Transition Nurse provided contact information. Plan for follow-up call in 5-7 days based on severity of symptoms and risk factors. Plan for next call:  PCP appt. , follow up symptoms, assess for any needs.      Barb Yates RN

## 2023-02-28 ENCOUNTER — CARE COORDINATION (OUTPATIENT)
Facility: CLINIC | Age: 73
End: 2023-02-28

## 2023-02-28 NOTE — CARE COORDINATION
[facility] Care Transitions Follow Up Call    Patient Current Location:  [facility] Transition Nurse contacted the patient by telephone to follow up after admission on 23. Verified name and  with patient as identifiers. Patient: Emeli Schuster  Patient : 1950   MRN: [id number]  Reason for Admission: Acute Cholecystitis, NIGEL. Discharge Date: 23 RARS: Readmission Risk Score: 11.9      Needs to be reviewed by the provider   Additional needs identified to be addressed with provider: No  none             Method of communication with provider: none. Patient reported that he is doing good. Pt. Happily reported that he is feeling better and symptoms are getting better. No new or worsening of symptoms reported by Pt. At this time. Pt. Reported that he attended appt. With Dr. Jaimie Stokes ([address]) yesterday 23. Pt. Reported that tomorrow he will see his Endocrinology Dr. Cammy Gleason. States that he saw his PCP last week. No questions concerns and/or needs at this time as per Pt. Reminded Pt. to go to the nearest emergency room for chest pain, shortness of breath, returning of symptoms that brought him to the emergency room and/or worsening of symptoms. Pt. Verbalized and repeated back understanding. Follow Up  Future Appointments   Date Time Provider Vilma Bernard   3/9/2023  1:00 PM HBV INFUSION NURSE 3 [facility]OOR[facility]   2023  9:15 AM LAB_BSMO [facility]   5/10/2023 10:30 AM Latasha Patel MD [facility]   2023  9:15 AM Clif Nguyen MD [facility]   2023 10:15 AM MD Kalli Lainez [facility]   2023 11:40 AM Dariel Edmond MD [facility]     Non-[facility] follow up appointment(s): Dr. Jaimie Stokes ([address]) yesterday 23. Endocrinology Tomorrow 3/1/23. Care Transition Nurse reviewed medical action plan and red flags with patient and discussed any barriers to care and/or understanding of plan of care after discharge.  Discussed appropriate site of care based on symptoms and resources available to patient including: PCP  After hours contact [phone]  When to call 911. The patient agrees to contact the PCP office for questions related to their healthcare. Advance Care Planning: Noted on file. Patients top risk factors for readmission: medical condition-chronic medical conditions  Interventions to address risk factors: Obtained and reviewed discharge summary and/or continuity of care documents. Continue to take all medications as prescribed. Continue to closely follow up with providers. Offered patient enrollment in the Remote Patient Monitoring (RPM) program for in-home monitoring: NA.     Care Transitions Subsequent and Final Call    Subsequent and Final Calls  Do you have any ongoing symptoms?: Yes  Onset of Patient-reported symptoms: Other  Have your medications changed?: No  Do you have any questions related to your medications?: No  Do you have any needs or concerns that I can assist you with?: No  Care Transitions Interventions  No Identified Needs  Other Interventions:             Care Transition Nurse provided contact information for future needs. Plan for follow-up call in 7-10 days based on severity of symptoms and risk factors. Plan for next call: assess symptoms , assess for any needs.      Jessica Oneil RN

## 2023-03-02 LAB — CREATININE, EXTERNAL: 0.98

## 2023-03-07 ENCOUNTER — CARE COORDINATION (OUTPATIENT)
Facility: CLINIC | Age: 73
End: 2023-03-07

## 2023-03-07 NOTE — CARE COORDINATION
[facility] Care Transitions Follow Up Call    Care Transitions Outreach Attempt    CTN Attempted to reach patient on phone  for transitions of care follow up. Unable to reach patient. Left a voice message with office contact information. No Pt. Medical/health information left on message. Patient: Aubrey Cuellar Patient : 1950 MRN: [id number]    Last Discharge [address]       Date Complaint Diagnosis Description Type Department Provider    23 Abdominal Pain Acute cholecystitis . .. ED to Hosp-Admission (Discharged) (ADMITTED) Fransico Jaffe MD; Radha Wu. ..             Noted following upcoming appointments from discharge chart review:     [facility] follow up appointment(s):   Future Appointments   Date Time Provider Vilma Bernard   3/9/2023  1:00 PM HBV INFUSION NURSE 3 HBVAPOORVA [facility]   2023  9:15 AM LAB_BSMO [facility] BS [facility]   5/10/2023 10:30 AM Rudolph Lewis MD [facility] BS [facility]   2023  9:15 AM Naty Fountain  [address] [place] [address] BS AMB   2023 10:15 AM Johnice Clos, MD Belinda Bosworth [facility]   2023 11:40 AM Verna Bailon MD [facility] BS AMB

## 2023-03-09 ENCOUNTER — APPOINTMENT (OUTPATIENT)
Dept: INFUSION THERAPY | Age: 73
End: 2023-03-09

## 2023-03-09 ENCOUNTER — HOSPITAL ENCOUNTER (OUTPATIENT)
Facility: HOSPITAL | Age: 73
Setting detail: INFUSION SERIES
End: 2023-03-09
Payer: MEDICARE

## 2023-03-09 VITALS
RESPIRATION RATE: 18 BRPM | HEART RATE: 61 BPM | OXYGEN SATURATION: 98 % | TEMPERATURE: 97.5 F | DIASTOLIC BLOOD PRESSURE: 68 MMHG | SYSTOLIC BLOOD PRESSURE: 106 MMHG

## 2023-03-09 LAB
BASO+EOS+MONOS # BLD AUTO: 0.7 K/UL (ref 0–2.3)
BASO+EOS+MONOS NFR BLD AUTO: 10 % (ref 0.1–17)
DIFFERENTIAL METHOD BLD: NORMAL
ERYTHROCYTE [DISTWIDTH] IN BLOOD BY AUTOMATED COUNT: 12.9 % (ref 11.5–14.5)
HCT VFR BLD AUTO: 39.3 % (ref 36–48)
HGB BLD-MCNC: 13 G/DL (ref 12–16)
LYMPHOCYTES # BLD: 2.1 K/UL (ref 1.1–5.9)
LYMPHOCYTES NFR BLD: 32 % (ref 14–44)
MCH RBC QN AUTO: 27.8 PG (ref 25–35)
MCHC RBC AUTO-ENTMCNC: 33.1 G/DL (ref 31–37)
MCV RBC AUTO: 84 FL (ref 78–102)
NEUTS SEG # BLD: 3.7 K/UL (ref 1.8–9.5)
NEUTS SEG NFR BLD: 58 % (ref 40–70)
PLATELET # BLD AUTO: 242 K/UL (ref 140–440)
RBC # BLD AUTO: 4.68 M/UL (ref 4.1–5.1)
WBC # BLD AUTO: 6.5 K/UL (ref 4.5–13)

## 2023-03-09 PROCEDURE — 85025 COMPLETE CBC W/AUTO DIFF WBC: CPT

## 2023-03-09 PROCEDURE — 36415 COLL VENOUS BLD VENIPUNCTURE: CPT

## 2023-03-09 NOTE — PROGRESS NOTES
[facility] Progress Note    Date: 2023    Name: Hina Valentin    MRN: [id number]         : 1950    Therapeutic Phlebotomy/CBC (Q 2 Months Status)    Mr. Yuli Harris arrived to [facility] at 18. Mr. Yuli Harris was assessed and education was provided. Mr. Jocelyne Garcia vitals were reviewed. Vitals:    23 1317   BP: 106/68   Pulse:    Resp:    Temp:    SpO2:        Blood drawn for labs via L ACvenipuncture x1 attempt. Gauze & coban applied. Lab results were obtained and reviewed. Recent Results (from the past 12 hour(s))   CBC with Partial Differential    Collection Time: 23  1:26 PM   Result Value Ref Range    WBC 6.5 4.5 - 13.0 K/uL    RBC 4.68 4.10 - 5.10 M/uL    Hemoglobin 13.0 12.0 - 16 g/dL    Hematocrit 39.3 36 - 48 %    MCV 84.0 78 - 102 FL    MCH 27.8 25.0 - 35.0 PG    MCHC 33.1 31 - 37 g/dL    RDW 12.9 11.5 - 14.5 %    Platelets 948 519 - 278 K/uL    Seg Neutrophils 58 40 - 70 %    Mixed Cells 10 0.1 - 17 %    Lymphocytes 32 14 - 44 %    Segs Absolute 3.7 1.8 - 9.5 K/UL    ABSOLUTE MIXED CELLS 0.7 0.0 - 2.3 K/uL    Absolute Lymph # 2.1 1.1 - 5.9 K/UL    Differential Type AUTOMATED         Hct 39.3    Phlebotomy was HELD today per order, for HCT < 45.0 (Q2 months status parameters). Mr. Yuli Harris tolerated well without complaints. Mr. Yuli Harris was discharged from [place] [address] in stable condition at 1335. He is to return on 2023 at 1300 for his next appointment.     Saul Su  2023

## 2023-03-14 ENCOUNTER — CARE COORDINATION (OUTPATIENT)
Facility: CLINIC | Age: 73
End: 2023-03-14

## 2023-03-14 NOTE — CARE COORDINATION
[facility] Care Transitions Follow Up Call. Patient Current Location:  [facility] Transition Nurse contacted the patient by telephone to follow up after admission on 23. Verified name and  with patient as identifiers. Patient: Emanuel Navarro  Patient : 1950   MRN: [id number]    Discharge Date: 23 RARS: Readmission Risk Score: 11.9      Needs to be reviewed by the provider   Additional needs identified to be addressed with provider: No  none             Method of communication with provider: none. Patient reported  that he is doing good. No new or worsening of symptoms reported by Pt. At this time. Pt. Denied CP, SOB, fever and chills at this time. Pt. Reports that he starting to feel better now  from nose congestions few days ago. Offer PCP appt. Pt. Declined. No questions, concerns and/or needs at this time as per Pt. Reminded Pt. to go to the nearest emergency room for chest pain, shortness of breath, returning of symptoms that brought him to the emergency room and/or worsening of symptoms. Pt. Verbalized and repeated back understanding. Follow Up  Future Appointments   Date Time Provider Vilma Bernard   2023  9:15 AM LAB_BSMO [facility] BS AMB   5/10/2023 10:30 AM Arabella Walton MD [facility] BS [facility]   2023  1:00 PM HBV INFUSION NURSE 3 [facility]OP[facility]   2023  9:15 AM Josue Weldon MD [facility] BS [facility]   2023 10:15 AM Juan Guo MD [facility]   2023 11:40 AM Peyton Lincoln MD [facility] BS [facility]     Non-[facility] follow up appointment(s): none noted at this time. Care Transition Nurse reviewed medical action plan and red flags with patient and discussed any barriers to care and/or understanding of plan of care after discharge. Discussed appropriate site of care based on symptoms and resources available to patient including: PCP  When to call 911. The patient agrees to contact the PCP office for questions related to their healthcare. Advance Care Planning:   reviewed and current. Advance Care Planning   Healthcare Decision Maker:    Primary Decision Maker: Nicholas Angel - [phone]    Secondary Decision Maker: Merced Ruiz - [phone]    Click here to complete Healthcare Decision Makers including selection of the Healthcare Decision Maker Relationship (ie \"Primary\"). Offered patient enrollment in the Remote Patient Monitoring (RPM) program for in-home monitoring: NA.     Care Transitions Subsequent and Final Call    Subsequent and Final Calls  Care Transitions Interventions  Other Interventions:             Care Transition Nurse provided contact information for future needs. Plan for follow-up call in 7-10 days based on severity of symptoms and risk factors. Plan for next call:  assess symptoms, assess for any needs.      Vignesh Fischer RN

## 2023-03-20 ENCOUNTER — CARE COORDINATION (OUTPATIENT)
Facility: CLINIC | Age: 73
End: 2023-03-20

## 2023-03-20 NOTE — CARE COORDINATION
[facility] Care Transitions Follow Up Call    CTN Attempt to contact patient via telephone on 3/20/23 for  follow up. Unable to reach. Left message on voicemail with office contact information. No Patient medical information left on message. This episode is closed and resolved.     Follow Up  Future Appointments   Date Time Provider Vilma Harrisi   5/8/2023  9:15 AM LAB_BSMO [facility] BS AMB   5/10/2023 10:30 AM Juan R Burleson MD [facility] BS [facility]   5/11/2023  1:00 PM HBV INFUSION NURSE 3 [facility]OPI [facility]   5/22/2023  9:15 AM Samuel Graysno MD [facility] BS AMB   6/8/2023 10:15 AM MD Steph Shah [facility]   7/7/2023 11:40 AM Leti Brunson MD [facility] BS AMB       Zac Simmons, JAMIE

## 2023-05-05 DIAGNOSIS — D75.1 SECONDARY POLYCYTHEMIA: Primary | ICD-10-CM

## 2023-05-11 ENCOUNTER — HOSPITAL ENCOUNTER (OUTPATIENT)
Facility: HOSPITAL | Age: 73
Setting detail: INFUSION SERIES
End: 2023-05-11
Payer: MEDICARE

## 2023-05-11 VITALS
HEART RATE: 76 BPM | SYSTOLIC BLOOD PRESSURE: 114 MMHG | DIASTOLIC BLOOD PRESSURE: 74 MMHG | OXYGEN SATURATION: 100 % | RESPIRATION RATE: 18 BRPM | TEMPERATURE: 98.6 F

## 2023-05-11 DIAGNOSIS — D75.1 SECONDARY POLYCYTHEMIA: ICD-10-CM

## 2023-05-11 LAB
ALBUMIN SERPL-MCNC: 3.5 G/DL (ref 3.4–5)
ALBUMIN/GLOB SERPL: 1.1 (ref 0.8–1.7)
ALP SERPL-CCNC: 61 U/L (ref 45–117)
ALT SERPL-CCNC: 20 U/L (ref 16–61)
ANION GAP SERPL CALC-SCNC: 0 MMOL/L (ref 3–18)
AST SERPL-CCNC: 14 U/L (ref 10–38)
BASO+EOS+MONOS # BLD AUTO: 0.5 K/UL (ref 0–2.3)
BASO+EOS+MONOS NFR BLD AUTO: 7 % (ref 0.1–17)
BILIRUB SERPL-MCNC: 0.4 MG/DL (ref 0.2–1)
BUN SERPL-MCNC: 15 MG/DL (ref 7–18)
BUN/CREAT SERPL: 12 (ref 12–20)
CALCIUM SERPL-MCNC: 8.7 MG/DL (ref 8.5–10.1)
CHLORIDE SERPL-SCNC: 110 MMOL/L (ref 100–111)
CO2 SERPL-SCNC: 27 MMOL/L (ref 21–32)
CREAT SERPL-MCNC: 1.28 MG/DL (ref 0.6–1.3)
DIFFERENTIAL METHOD BLD: NORMAL
ERYTHROCYTE [DISTWIDTH] IN BLOOD BY AUTOMATED COUNT: 12.9 % (ref 11.5–14.5)
GLOBULIN SER CALC-MCNC: 3.1 G/DL (ref 2–4)
GLUCOSE SERPL-MCNC: 164 MG/DL (ref 74–99)
HCT VFR BLD AUTO: 40.1 % (ref 36–48)
HGB BLD-MCNC: 13 G/DL (ref 12–16)
LYMPHOCYTES # BLD: 2.1 K/UL (ref 1.1–5.9)
LYMPHOCYTES NFR BLD: 30 % (ref 14–44)
MCH RBC QN AUTO: 27.3 PG (ref 25–35)
MCHC RBC AUTO-ENTMCNC: 32.4 G/DL (ref 31–37)
MCV RBC AUTO: 84.1 FL (ref 78–102)
NEUTS SEG # BLD: 4.5 K/UL (ref 1.8–9.5)
NEUTS SEG NFR BLD: 63 % (ref 40–70)
PLATELET # BLD AUTO: 213 K/UL (ref 140–440)
POTASSIUM SERPL-SCNC: 4.9 MMOL/L (ref 3.5–5.5)
PROT SERPL-MCNC: 6.6 G/DL (ref 6.4–8.2)
RBC # BLD AUTO: 4.77 M/UL (ref 4.1–5.1)
SODIUM SERPL-SCNC: 137 MMOL/L (ref 136–145)
WBC # BLD AUTO: 7.1 K/UL (ref 4.5–13)

## 2023-05-11 PROCEDURE — 36415 COLL VENOUS BLD VENIPUNCTURE: CPT

## 2023-05-11 PROCEDURE — 85025 COMPLETE CBC W/AUTO DIFF WBC: CPT

## 2023-05-11 PROCEDURE — 80053 COMPREHEN METABOLIC PANEL: CPT

## 2023-05-11 NOTE — PROGRESS NOTES
[facility] Progress Note    Date: May 11, 2023    Name: Basia Baptiste    MRN: [id number]         : 1950    Therapeutic Phlebotomy (Every 2 Months) and Dr. Lucille Winslow labs    Mr. Ivelisse Burnett arrived to [facility] at 1310, ambulatory with cane. Mr. Ivelisse Burnett was assessed and education was provided. Mr. Luis Wakefield vitals were reviewed. Vitals:    23 1310   BP: 114/74   Pulse: 76   Resp: 18   Temp: 98.6 °F (37 °C)   SpO2: 100%       Blood drawn for labs via RIGHT AC venipuncture x1 attempt. CBC processed on site and CMP sent out for processing. Lab results were obtained and reviewed. Recent Results (from the past 12 hour(s))   CBC with Partial Differential    Collection Time: 23  1:18 PM   Result Value Ref Range    WBC 7.1 4.5 - 13.0 K/uL    RBC 4.77 4.10 - 5.10 M/uL    Hemoglobin 13.0 12.0 - 16 g/dL    Hematocrit 40.1 36 - 48 %    MCV 84.1 78 - 102 FL    MCH 27.3 25.0 - 35.0 PG    MCHC 32.4 31 - 37 g/dL    RDW 12.9 11.5 - 14.5 %    Platelets 730 979 - 093 K/uL    Seg Neutrophils 63 40 - 70 %    Mixed Cells 7 0.1 - 17 %    Lymphocytes 30 14 - 44 %    Segs Absolute 4.5 1.8 - 9.5 K/UL    ABSOLUTE MIXED CELLS 0.5 0.0 - 2.3 K/uL    Absolute Lymph # 2.1 1.1 - 5.9 K/UL    Differential Type AUTOMATED         Hgb 13.0 and Hct 40.1. Therapeutic phlebotomy HELD TODAY PER PARAMETERS OF ORDER. Mr. Ivelisse Burnett was discharged from [place] [address] in stable condition at 1330. He is to return on 23 at 1300 for his next appointment.     Keshav Polo RN  May 11, 2023

## 2023-05-22 ENCOUNTER — OFFICE VISIT (OUTPATIENT)
Age: 73
End: 2023-05-22
Payer: MEDICARE

## 2023-05-22 VITALS
DIASTOLIC BLOOD PRESSURE: 71 MMHG | OXYGEN SATURATION: 98 % | WEIGHT: 244 LBS | HEIGHT: 70 IN | SYSTOLIC BLOOD PRESSURE: 109 MMHG | TEMPERATURE: 96.7 F | BODY MASS INDEX: 34.93 KG/M2 | HEART RATE: 79 BPM

## 2023-05-22 DIAGNOSIS — D75.1 SECONDARY POLYCYTHEMIA: Primary | ICD-10-CM

## 2023-05-22 PROBLEM — Z84.1 FAMILY HISTORY OF CHRONIC RENAL IMPAIRMENT: Status: ACTIVE | Noted: 2023-05-22

## 2023-05-22 PROCEDURE — 3074F SYST BP LT 130 MM HG: CPT | Performed by: INTERNAL MEDICINE

## 2023-05-22 PROCEDURE — 3017F COLORECTAL CA SCREEN DOC REV: CPT | Performed by: INTERNAL MEDICINE

## 2023-05-22 PROCEDURE — 3078F DIAST BP <80 MM HG: CPT | Performed by: INTERNAL MEDICINE

## 2023-05-22 PROCEDURE — 99213 OFFICE O/P EST LOW 20 MIN: CPT | Performed by: INTERNAL MEDICINE

## 2023-05-22 PROCEDURE — 1036F TOBACCO NON-USER: CPT | Performed by: INTERNAL MEDICINE

## 2023-05-22 PROCEDURE — G8417 CALC BMI ABV UP PARAM F/U: HCPCS | Performed by: INTERNAL MEDICINE

## 2023-05-22 PROCEDURE — G8428 CUR MEDS NOT DOCUMENT: HCPCS | Performed by: INTERNAL MEDICINE

## 2023-05-22 PROCEDURE — 1123F ACP DISCUSS/DSCN MKR DOCD: CPT | Performed by: INTERNAL MEDICINE

## 2023-05-22 ASSESSMENT — ENCOUNTER SYMPTOMS
SHORTNESS OF BREATH: 0
ABDOMINAL PAIN: 0
BACK PAIN: 0
COUGH: 0
NAUSEA: 0
DIARRHEA: 0
VOMITING: 0

## 2023-05-22 NOTE — PROGRESS NOTES
show understanding and agreement with aforementioned plan. Ruth Rivero MD  05/22/2023         Above mentioned total time spent for this encounter with more than 50% of the time spent in face-to-face counseling, discussing on diagnosis and management plan going forward, and co-ordination of care. Parts of this document has been produced using Dragon dictation system. Unrecognized errors in transcription may be present. Please do not hesitate to reach out for any questions or clarifications.       CC: Masha Musa MD,

## 2023-06-01 ENCOUNTER — ANESTHESIA EVENT (OUTPATIENT)
Facility: HOSPITAL | Age: 73
End: 2023-06-01
Payer: MEDICARE

## 2023-06-01 RX ORDER — FAMOTIDINE 20 MG/1
20 TABLET, FILM COATED ORAL ONCE
Status: CANCELLED | OUTPATIENT
Start: 2023-06-01 | End: 2023-06-01

## 2023-06-02 ENCOUNTER — ANESTHESIA (OUTPATIENT)
Facility: HOSPITAL | Age: 73
End: 2023-06-02
Payer: MEDICARE

## 2023-06-02 ENCOUNTER — HOSPITAL ENCOUNTER (OUTPATIENT)
Facility: HOSPITAL | Age: 73
Setting detail: OUTPATIENT SURGERY
Discharge: HOME | End: 2023-06-02
Attending: INTERNAL MEDICINE | Admitting: INTERNAL MEDICINE
Payer: MEDICARE

## 2023-06-02 VITALS
RESPIRATION RATE: 14 BRPM | SYSTOLIC BLOOD PRESSURE: 144 MMHG | HEART RATE: 68 BPM | BODY MASS INDEX: 34.6 KG/M2 | HEIGHT: 70 IN | DIASTOLIC BLOOD PRESSURE: 67 MMHG | OXYGEN SATURATION: 96 % | WEIGHT: 241.7 LBS | TEMPERATURE: 97.8 F

## 2023-06-02 LAB
GLUCOSE BLD STRIP.AUTO-MCNC: 113 MG/DL (ref 70–110)
GLUCOSE BLD STRIP.AUTO-MCNC: 97 MG/DL (ref 70–110)

## 2023-06-02 PROCEDURE — 2580000003 HC RX 258: Performed by: NURSE ANESTHETIST, CERTIFIED REGISTERED

## 2023-06-02 PROCEDURE — 2500000003 HC RX 250 WO HCPCS: Performed by: NURSE ANESTHETIST, CERTIFIED REGISTERED

## 2023-06-02 PROCEDURE — 7100000011 HC PHASE II RECOVERY - ADDTL 15 MIN: Performed by: INTERNAL MEDICINE

## 2023-06-02 PROCEDURE — 2709999900 HC NON-CHARGEABLE SUPPLY: Performed by: INTERNAL MEDICINE

## 2023-06-02 PROCEDURE — 6370000000 HC RX 637 (ALT 250 FOR IP): Performed by: INTERNAL MEDICINE

## 2023-06-02 PROCEDURE — 3600007502: Performed by: INTERNAL MEDICINE

## 2023-06-02 PROCEDURE — 3700000001 HC ADD 15 MINUTES (ANESTHESIA): Performed by: INTERNAL MEDICINE

## 2023-06-02 PROCEDURE — 82962 GLUCOSE BLOOD TEST: CPT

## 2023-06-02 PROCEDURE — 3700000000 HC ANESTHESIA ATTENDED CARE: Performed by: INTERNAL MEDICINE

## 2023-06-02 PROCEDURE — 7100000000 HC PACU RECOVERY - FIRST 15 MIN: Performed by: INTERNAL MEDICINE

## 2023-06-02 PROCEDURE — 6360000002 HC RX W HCPCS: Performed by: NURSE ANESTHETIST, CERTIFIED REGISTERED

## 2023-06-02 PROCEDURE — 3600007512: Performed by: INTERNAL MEDICINE

## 2023-06-02 PROCEDURE — 7100000010 HC PHASE II RECOVERY - FIRST 15 MIN: Performed by: INTERNAL MEDICINE

## 2023-06-02 PROCEDURE — 88305 TISSUE EXAM BY PATHOLOGIST: CPT

## 2023-06-02 RX ORDER — SODIUM CHLORIDE, SODIUM LACTATE, POTASSIUM CHLORIDE, CALCIUM CHLORIDE 600; 310; 30; 20 MG/100ML; MG/100ML; MG/100ML; MG/100ML
INJECTION, SOLUTION INTRAVENOUS CONTINUOUS
Status: DISCONTINUED | OUTPATIENT
Start: 2023-06-02 | End: 2023-06-02 | Stop reason: HOSPADM

## 2023-06-02 RX ORDER — PROPOFOL 10 MG/ML
INJECTION, EMULSION INTRAVENOUS PRN
Status: DISCONTINUED | OUTPATIENT
Start: 2023-06-02 | End: 2023-06-02 | Stop reason: SDUPTHER

## 2023-06-02 RX ORDER — LIDOCAINE HYDROCHLORIDE 20 MG/ML
INJECTION, SOLUTION EPIDURAL; INFILTRATION; INTRACAUDAL; PERINEURAL PRN
Status: DISCONTINUED | OUTPATIENT
Start: 2023-06-02 | End: 2023-06-02 | Stop reason: SDUPTHER

## 2023-06-02 RX ORDER — SIMETHICONE 20 MG/.3ML
EMULSION ORAL PRN
Status: DISCONTINUED | OUTPATIENT
Start: 2023-06-02 | End: 2023-06-02 | Stop reason: ALTCHOICE

## 2023-06-02 RX ORDER — SODIUM CHLORIDE 0.9 % (FLUSH) 0.9 %
5-40 SYRINGE (ML) INJECTION PRN
Status: DISCONTINUED | OUTPATIENT
Start: 2023-06-02 | End: 2023-06-02 | Stop reason: HOSPADM

## 2023-06-02 RX ORDER — INSULIN LISPRO 100 [IU]/ML
0-15 INJECTION, SOLUTION INTRAVENOUS; SUBCUTANEOUS ONCE
Status: DISCONTINUED | OUTPATIENT
Start: 2023-06-02 | End: 2023-06-02 | Stop reason: HOSPADM

## 2023-06-02 RX ORDER — LIDOCAINE HYDROCHLORIDE 10 MG/ML
1 INJECTION, SOLUTION EPIDURAL; INFILTRATION; INTRACAUDAL; PERINEURAL
Status: DISCONTINUED | OUTPATIENT
Start: 2023-06-02 | End: 2023-06-02 | Stop reason: HOSPADM

## 2023-06-02 RX ADMIN — PROPOFOL 50 MG: 10 INJECTION, EMULSION INTRAVENOUS at 08:20

## 2023-06-02 RX ADMIN — PROPOFOL 50 MG: 10 INJECTION, EMULSION INTRAVENOUS at 08:27

## 2023-06-02 RX ADMIN — PROPOFOL 50 MG: 10 INJECTION, EMULSION INTRAVENOUS at 08:32

## 2023-06-02 RX ADMIN — LIDOCAINE HYDROCHLORIDE 60 MG: 20 INJECTION, SOLUTION EPIDURAL; INFILTRATION; INTRACAUDAL; PERINEURAL at 08:18

## 2023-06-02 RX ADMIN — PROPOFOL 50 MG: 10 INJECTION, EMULSION INTRAVENOUS at 08:18

## 2023-06-02 RX ADMIN — SODIUM CHLORIDE, SODIUM LACTATE, POTASSIUM CHLORIDE, AND CALCIUM CHLORIDE: 600; 310; 30; 20 INJECTION, SOLUTION INTRAVENOUS at 08:12

## 2023-06-02 ASSESSMENT — PAIN - FUNCTIONAL ASSESSMENT: PAIN_FUNCTIONAL_ASSESSMENT: 0-10

## 2023-06-02 ASSESSMENT — COPD QUESTIONNAIRES: CAT_SEVERITY: MILD

## 2023-06-02 ASSESSMENT — ENCOUNTER SYMPTOMS: SHORTNESS OF BREATH: 1

## 2023-06-02 NOTE — H&P
Chief Complaint:    Abdominal pain history of colon polyps     History of Present Illness: This patient is a 58-year-old gentleman seen for the above. In recent months he has developed abdominal pain in the lower portion of his abdomen. The pain occurs before and during a bowel movement. He reports constipation having bowel movements every 4 days. He denies any melena but describes bright red blood which she attributes to hemorrhoids. I note that he has a strong family history of colorectal cancer stating that his brother 2 aunts and 2 uncles all had colon cancer. His last colonoscopic exam was in 2018. Examination was positive for diverticulosis coli but was otherwise negative. He is 2 wk s/p lap cholecystectomy.     Past Medical History  Medical Conditions:   acid reflux  Acute pancreatitis  Arthritis  asthma  Back Pain (chronic)  colon polyps  deafness  Diabetes Mellitus  GERD  hemorrhoids  Hypercholesterolemia  Hypertension  labs from [facility]  migraines  neuropathy  Polycythemia  skin cancer  Thyroid disorder  Surgical Procedures:   Gallbladder removed, 2/11/23  Colon Polyp removed, 2013  Both hands surgery trigger point, 2006  tumor removed from leg  trigger finger surgery  tendon L Arm  Tendon (L) Arm  spinal surgery  Dx Studies:   Marlon Grady, 2/10/23-gallbladder removed  [facility], 2/4-6/2023 pancreatitis  Colonoscopy, 2014, diverticulosis of sigmoid colon, hx of polyps, fam hx colon CA, 5 year recall  Colonoscopy - AM, 4/3/2018, Mild diverticulosis of the sigmoid colon, Otherwise normal colonoscopy to cecum and Unable to retroflex in R colon due to looping-2 passes made in the R colon; 5 year recall  EGD - [facility], 3/30/2016, Abnormal Motility and Erythema in the stomach compatible with gastritis  [facility]  Radiology CT Abdomen Pelvis, 2/18/2023  Radiology CT Abdomen Pelvis, 2/4/2023  Radiology Hida Scan, 2/18/2023  Radiology US Abdomen,

## 2023-06-02 NOTE — DISCHARGE INSTRUCTIONS
Colonoscopy: What to Expect at [address] [place] [address]  After a colonoscopy, you'll stay at the clinic until you wake up. Then you can go home. But you'll need to arrange for a ride. Your doctor will tell you when you can eat and do your other usual activities. Your doctor will talk to you about when you'll need your next colonoscopy. Your doctor can help you decide how often you need to be checked. This will depend on the results of your test and your risk for colorectal cancer. After the test, you may be bloated or have gas pains. You may need to pass gas. If a biopsy was done or a polyp was removed, you may have streaks of blood in your stool (feces) for a few days. Problems such as heavy rectal bleeding may not occur until several weeks after the test. This isn't common. But it can happen after polyps are removed. This care sheet gives you a general idea about how long it will take for you to recover. But each person recovers at a different pace. Follow the steps below to get better as quickly as possible. How can you care for yourself at home? Activity    Rest when you feel tired. You can do your normal activities when it feels okay to do so. Diet    Follow your doctor's directions for eating. Unless your doctor has told you not to, drink plenty of fluids. This helps to replace the fluids that were lost during the colon prep. Do not drink alcohol. Medicines    Your doctor will tell you if and when you can restart your medicines. You will also be given instructions about taking any new medicines. If you stopped taking aspirin or some other blood thinner, your doctor will tell you when to start taking it again. If polyps were removed or a biopsy was done during the test, your doctor may tell you not to take aspirin or other anti-inflammatory medicines for a few days. These include ibuprofen (Advil, Motrin) and naproxen (Aleve).    Other instructions    For your safety, do not drive or

## 2023-06-02 NOTE — ANESTHESIA POSTPROCEDURE EVALUATION
Department of Anesthesiology  Postprocedure Note    Patient: Rubens Huertas  MRN: [id number]  YOB: 1950  Date of evaluation: 6/2/2023      Procedure Summary     Date: 06/02/23 Room / Location: [facility] ENDO 03 / [facility] ENDOSCOPY    Anesthesia Start: 1305 Anesthesia Stop: Lebron Sleet    Procedure: COLONOSCOPY Diagnosis:       Abdominal pain of multiple sites      Family history of colon cancer      (Abdominal pain of multiple sites [R10.9])      (Family history of colon cancer [Z80.0])    Surgeons: Ken Joyner MD Responsible Provider: Larisa Chamorro MD    Anesthesia Type: MAC ASA Status: 3          Anesthesia Type: MAC    Vernell Phase I: Vernell Score: 10    Vernell Phase II: Vernell Score: 10      Anesthesia Post Evaluation    Patient location during evaluation: PACU  Patient participation: complete - patient participated  Level of consciousness: sleepy but conscious  Pain score: 0  Airway patency: patent  Nausea & Vomiting: no nausea and no vomiting  Complications: no  Cardiovascular status: blood pressure returned to baseline  Respiratory status: acceptable  Hydration status: euvolemic

## 2023-06-02 NOTE — ANESTHESIA PRE PROCEDURE
Department of Anesthesiology  Preprocedure Note       Name:  Kay Nelson   Age:  74 y.o.  :  1950                                          MRN:  [id number]         Date:  2023      Surgeon: Rula Davidson):  Trey Gonzales MD    Procedure: Procedure(s):  COLONOSCOPY    Medications prior to admission:   Prior to Admission medications    Medication Sig Start Date End Date Taking? Authorizing Provider   vitamin D 25 MCG (1000 UT) CAPS Take 3 capsules by mouth every evening   Yes Historical Provider, MD   tamsulosin (FLOMAX) 0.4 MG capsule TAKE 2 CAPSULES BY MOUTH ONCE DAILY AFTER SUPPER  Patient taking differently: Take 2 capsules by mouth every evening 2/15/23   Samantha Qureshi MD   Accu-Chek Softclix Lancets MISC E clarify this medication.  Outside name: ACCU-CHEK SOFTCLIX LANCETS misc 3/13/17   Ar Automatic Reconciliation   albuterol sulfate HFA (PROVENTIL;VENTOLIN;PROAIR) 108 (90 Base) MCG/ACT inhaler Inhale 1-2 puffs into the lungs every 4 hours as needed 21   Ar Automatic Reconciliation   albuterol (PROVENTIL) (2.5 MG/3ML) 0.083% nebulizer solution Inhale 3 mLs into the lungs every 4 hours as needed 21   Ar Automatic Reconciliation   amitriptyline (ELAVIL) 10 MG tablet Take 1 tablet by mouth nightly 22   Ar Automatic Reconciliation   budesonide-formoterol (SYMBICORT) 160-4.5 MCG/ACT AERO Inhale 2 puffs into the lungs 2 times daily 20   Ar Automatic Reconciliation   DULoxetine (CYMBALTA) 60 MG extended release capsule Take 1 capsule by mouth 2 times daily 22   Ar Automatic Reconciliation   fluticasone (FLONASE) 50 MCG/ACT nasal spray 2 sprays by Nasal route daily as needed 19   Ar Automatic Reconciliation   glucagon 1 MG injection ceived the following from [facility]: Outside name: Glucagon Emergency Kit, human, 1 mg solr 22   Ar Automatic Reconciliation   insulin glargine (BASAGLAR KWIKPEN) 100 UNIT/ML injection pen Inject 40 Units into the skin

## 2023-06-26 RX ORDER — AMITRIPTYLINE HYDROCHLORIDE 10 MG/1
10 TABLET, FILM COATED ORAL NIGHTLY
Qty: 90 TABLET | Refills: 0 | Status: SHIPPED | OUTPATIENT
Start: 2023-06-26 | End: 2023-07-24

## 2023-06-26 RX ORDER — TOPIRAMATE 25 MG/1
TABLET ORAL
Qty: 90 TABLET | Refills: 0 | Status: SHIPPED | OUTPATIENT
Start: 2023-06-26

## 2023-06-26 RX ORDER — LEVOTHYROXINE SODIUM 0.12 MG/1
TABLET ORAL
Qty: 90 TABLET | Refills: 0 | Status: SHIPPED | OUTPATIENT
Start: 2023-06-26

## 2023-07-07 ENCOUNTER — OFFICE VISIT (OUTPATIENT)
Age: 73
End: 2023-07-07

## 2023-07-07 VITALS
HEART RATE: 80 BPM | HEIGHT: 70 IN | SYSTOLIC BLOOD PRESSURE: 136 MMHG | DIASTOLIC BLOOD PRESSURE: 86 MMHG | OXYGEN SATURATION: 97 % | BODY MASS INDEX: 35.22 KG/M2 | WEIGHT: 246 LBS

## 2023-07-07 DIAGNOSIS — E78.00 PURE HYPERCHOLESTEROLEMIA, UNSPECIFIED: ICD-10-CM

## 2023-07-07 DIAGNOSIS — I10 ESSENTIAL (PRIMARY) HYPERTENSION: ICD-10-CM

## 2023-07-07 DIAGNOSIS — E11.21 TYPE 2 DIABETES MELLITUS WITH DIABETIC NEPHROPATHY, UNSPECIFIED WHETHER LONG TERM INSULIN USE (HCC): ICD-10-CM

## 2023-07-07 DIAGNOSIS — R00.2 PALPITATIONS: Primary | ICD-10-CM

## 2023-07-07 DIAGNOSIS — I49.1 ATRIAL PREMATURE CONTRACTIONS: ICD-10-CM

## 2023-07-07 PROBLEM — Z77.29 CONTACT WITH AND (SUSPECTED) EXPOSURE TO OTHER HAZARDOUS SUBSTANCES: Status: ACTIVE | Noted: 2023-07-07

## 2023-07-07 PROBLEM — H90.5 UNSPECIFIED SENSORINEURAL HEARING LOSS: Status: ACTIVE | Noted: 2023-07-07

## 2023-07-07 PROBLEM — M25.569 PAIN IN UNSPECIFIED KNEE: Status: ACTIVE | Noted: 2023-07-07

## 2023-07-07 RX ORDER — METRONIDAZOLE 10 MG/G
2 GEL TOPICAL DAILY
COMMUNITY

## 2023-07-07 ASSESSMENT — ENCOUNTER SYMPTOMS
NAUSEA: 0
VOMITING: 0
SHORTNESS OF BREATH: 0
COUGH: 0
ABDOMINAL DISTENTION: 0
SORE THROAT: 0
ABDOMINAL PAIN: 0

## 2023-07-07 ASSESSMENT — PATIENT HEALTH QUESTIONNAIRE - PHQ9
SUM OF ALL RESPONSES TO PHQ QUESTIONS 1-9: 0
SUM OF ALL RESPONSES TO PHQ9 QUESTIONS 1 & 2: 0
2. FEELING DOWN, DEPRESSED OR HOPELESS: 0
1. LITTLE INTEREST OR PLEASURE IN DOING THINGS: 0

## 2023-07-07 NOTE — PROGRESS NOTES
Bennett Malik presents today for   Chief Complaint   Patient presents with    Follow-up     1 year follow up       Bennett Malik preferred language for health care discussion is english/other. Is someone accompanying this pt? no    Is the patient using any DME equipment during [address]? cane    Depression Screening:  Depression: Not at risk    PHQ-2 Score: 0        Learning Assessment:  Who is the primary learner? Patient    What is the preferred language for health care of the primary learner? ENGLISH    How does the primary learner prefer to learn new concepts? DEMONSTRATION    Answered By patient    Relationship to Learner SELF           Pt currently taking Anticoagulant therapy? no    Pt currently taking Antiplatelet therapy ? no      Coordination of Care:  1. Have you been to the ER, urgent care clinic since your last visit? Hospitalized since your last visit? no    2. Have you seen or consulted any other health care providers outside of the [address] [place] [address] Avenue since your last visit? Include any pap smears or colon screening.  no

## 2023-07-07 NOTE — PATIENT INSTRUCTIONS
KARDIAMOBILE EKG MONITOR [ORIGINAL-SINGLE LEAD] BY ALIVECOR  for 79.00 at [facility]

## 2023-07-07 NOTE — PROGRESS NOTES
07/07/23     Keith Jain  is a 74 y.o. male     Chief Complaint   Patient presents with    Follow-up     1 year follow up       HPI    Patient presents for a follow-up office visit. He has a past medical history significant for hypertension, dyslipidemia,  diabetes mellitus, and COPD. He was initially referred here for evaluation of dyspnea on exertion and tachycardia with activity. The patient does not have a previous cardiac history. He does have a history of atypical chest pain, and reports a significant cardiac workup while living in [address] [place] [address] in 2009 which included a cardiac catheterization showing no significant coronary artery disease. The patient underwent an echocardiogram in January 2014, showing an LVEF of 63-39%, grade 1 diastolic dysfunction, in no significant valvular heart disease. Normal PA pressures. Patient underwent a 30-day event monitor in July 2020 which was a normal study demonstrated no significant arrhythmias. Shortly thereafter he states he was admitted to the hospital for an acute episode of pancreatitis was hospitalized for almost 2 weeks. Patient was last seen in our office 1 year ago. He was diagnosed with gallstone pancreatitis in February 2023 and was hospitalized briefly at [facility].  He eventually underwent a laparoscopic cholecystectomy has had no recurrent abdominal pain since his surgery. He underwent an echocardiogram during that hospital stay as part of a preoperative work-up which demonstrated a low normal LV function of 52%, mild septal hypertrophy, no valvular heart disease and normal PA pressures. He reports an episode of heart palpitations either at the end of May or beginning of June this year which lasted on and off for about a week. He did have associated shortness of breath during the episode. The episode resolved on its own and has not reoccurred.   He states he is now on a lower dose of metoprolol due to low

## 2023-07-13 DIAGNOSIS — D75.1 SECONDARY POLYCYTHEMIA: Primary | ICD-10-CM

## 2023-07-24 RX ORDER — AMITRIPTYLINE HYDROCHLORIDE 10 MG/1
10 TABLET, FILM COATED ORAL NIGHTLY
Qty: 90 TABLET | Refills: 1 | Status: SHIPPED | OUTPATIENT
Start: 2023-07-24 | End: 2023-08-30

## 2023-08-29 NOTE — PROGRESS NOTES
1. \"Have you been to the ER, urgent care clinic since your last visit? Hospitalized since your last visit? \" Yes When: [facility] 6/02/23 Colonoscopy Dr. Ethan Boo. [facility] 2/18/23 - 2/20/23 for acute cholecystitis. 2. \"Have you seen or consulted any other health care providers outside of the [address] [place] [address] since your last visit? \" Yes When: Dr. Bruno Dobbins LOV: 6/23/23. Urology Lov: 6/15/23. [address] [place] [address] LOV: 6/20/23. Cardiology. Eye Exam. Foot Exam.    3. For patients aged 43-73: Has the patient had a colonoscopy / FIT/ Cologuard?  Yes - no Care Gap present      Chief Complaint   Patient presents with    Medicare AWV    Diabetes    Thyroid Problem

## 2023-08-30 ENCOUNTER — OFFICE VISIT (OUTPATIENT)
Age: 73
End: 2023-08-30
Payer: MEDICARE

## 2023-08-30 VITALS
HEART RATE: 80 BPM | RESPIRATION RATE: 16 BRPM | TEMPERATURE: 97.5 F | WEIGHT: 251 LBS | SYSTOLIC BLOOD PRESSURE: 112 MMHG | DIASTOLIC BLOOD PRESSURE: 66 MMHG | BODY MASS INDEX: 35.93 KG/M2 | HEIGHT: 70 IN | OXYGEN SATURATION: 96 %

## 2023-08-30 DIAGNOSIS — I10 ESSENTIAL HYPERTENSION: ICD-10-CM

## 2023-08-30 DIAGNOSIS — M54.42 CHRONIC BILATERAL LOW BACK PAIN WITH BILATERAL SCIATICA: ICD-10-CM

## 2023-08-30 DIAGNOSIS — D45 POLYCYTHEMIA VERA (HCC): ICD-10-CM

## 2023-08-30 DIAGNOSIS — G89.29 CHRONIC BILATERAL LOW BACK PAIN WITH BILATERAL SCIATICA: ICD-10-CM

## 2023-08-30 DIAGNOSIS — E78.00 PURE HYPERCHOLESTEROLEMIA: ICD-10-CM

## 2023-08-30 DIAGNOSIS — R00.2 PALPITATION: ICD-10-CM

## 2023-08-30 DIAGNOSIS — E11.40 TYPE 2 DIABETES MELLITUS WITH DIABETIC NEUROPATHY, WITH LONG-TERM CURRENT USE OF INSULIN (HCC): ICD-10-CM

## 2023-08-30 DIAGNOSIS — M54.41 CHRONIC BILATERAL LOW BACK PAIN WITH BILATERAL SCIATICA: ICD-10-CM

## 2023-08-30 DIAGNOSIS — Z79.4 TYPE 2 DIABETES MELLITUS WITH DIABETIC NEUROPATHY, WITH LONG-TERM CURRENT USE OF INSULIN (HCC): ICD-10-CM

## 2023-08-30 DIAGNOSIS — Z00.00 MEDICARE ANNUAL WELLNESS VISIT, SUBSEQUENT: Primary | ICD-10-CM

## 2023-08-30 DIAGNOSIS — J44.9 ASTHMA WITH COPD (CHRONIC OBSTRUCTIVE PULMONARY DISEASE) (HCC): ICD-10-CM

## 2023-08-30 PROCEDURE — G0439 PPPS, SUBSEQ VISIT: HCPCS | Performed by: FAMILY MEDICINE

## 2023-08-30 PROCEDURE — 1123F ACP DISCUSS/DSCN MKR DOCD: CPT | Performed by: FAMILY MEDICINE

## 2023-08-30 PROCEDURE — 3017F COLORECTAL CA SCREEN DOC REV: CPT | Performed by: FAMILY MEDICINE

## 2023-08-30 PROCEDURE — 3044F HG A1C LEVEL LT 7.0%: CPT | Performed by: FAMILY MEDICINE

## 2023-08-30 PROCEDURE — 3078F DIAST BP <80 MM HG: CPT | Performed by: FAMILY MEDICINE

## 2023-08-30 PROCEDURE — 3074F SYST BP LT 130 MM HG: CPT | Performed by: FAMILY MEDICINE

## 2023-08-30 SDOH — ECONOMIC STABILITY: FOOD INSECURITY: WITHIN THE PAST 12 MONTHS, YOU WORRIED THAT YOUR FOOD WOULD RUN OUT BEFORE YOU GOT MONEY TO BUY MORE.: NEVER TRUE

## 2023-08-30 SDOH — ECONOMIC STABILITY: FOOD INSECURITY: WITHIN THE PAST 12 MONTHS, THE FOOD YOU BOUGHT JUST DIDN'T LAST AND YOU DIDN'T HAVE MONEY TO GET MORE.: NEVER TRUE

## 2023-08-30 SDOH — ECONOMIC STABILITY: INCOME INSECURITY: HOW HARD IS IT FOR YOU TO PAY FOR THE VERY BASICS LIKE FOOD, HOUSING, MEDICAL CARE, AND HEATING?: NOT HARD AT ALL

## 2023-08-30 SDOH — ECONOMIC STABILITY: HOUSING INSECURITY
IN THE LAST 12 MONTHS, WAS THERE A TIME WHEN YOU DID NOT HAVE A STEADY PLACE TO SLEEP OR SLEPT IN A SHELTER (INCLUDING NOW)?: NO

## 2023-08-30 ASSESSMENT — PATIENT HEALTH QUESTIONNAIRE - PHQ9
SUM OF ALL RESPONSES TO PHQ QUESTIONS 1-9: 0
2. FEELING DOWN, DEPRESSED OR HOPELESS: 0
SUM OF ALL RESPONSES TO PHQ QUESTIONS 1-9: 0
1. LITTLE INTEREST OR PLEASURE IN DOING THINGS: 0
SUM OF ALL RESPONSES TO PHQ9 QUESTIONS 1 & 2: 0

## 2023-08-30 ASSESSMENT — LIFESTYLE VARIABLES
HOW MANY STANDARD DRINKS CONTAINING ALCOHOL DO YOU HAVE ON A TYPICAL DAY: PATIENT DOES NOT DRINK
HOW OFTEN DO YOU HAVE A DRINK CONTAINING ALCOHOL: NEVER

## 2023-08-30 NOTE — ACP (ADVANCE CARE PLANNING)
Advance Care Planning     General Advance Care Planning (ACP) Conversation    Date of Conversation: 8/30/2023  Conducted with: Patient with Decision Making Capacity    Healthcare Decision Maker:    Primary Decision Maker: Xin Ramsey - Spouse - [phone]    Secondary Decision Maker: Jesús Whitaker - Child - [phone]  Click here to complete Healthcare Decision Makers including selection of the Healthcare Decision Maker Relationship (ie \"Primary\"). Today we documented Decision Maker(s) consistent with ACP documents on file. Content/Action Overview:   Has ACP document(s) on file - reflects the patient's care preferences  Reviewed DNR/DNI and patient elects Full Code (Attempt Resuscitation)      Length of Voluntary ACP Conversation in minutes:  <16 minutes (Non-Billable)    Raman Castro MD

## 2023-08-30 NOTE — PROGRESS NOTES
Medicare Annual Wellness Visit    Conchita Francois is here for Medicare AWV, Diabetes, and Thyroid Problem    Assessment & Plan   Medicare annual wellness visit, subsequent  Recommendations for Preventive Services Due: see orders and patient instructions/AVS.  Recommended screening schedule for the next 5-10 years is provided to the patient in written form: see Patient Instructions/AVS.   Type 2 diabetes: Neuropathy: Well-controlled. A1c 6.6. We will repeat labs. Taking medication and insulin with compliance. No side effects. Following Endo and the recommendation. Hypertension:well controlled. Continue current dose of medication and low salt diet. Exercise as tolerated. Hyperlipidemia: On statin. No side effects. Chronic bilateral low back pain with bilateral sciatica: On symptomatic treatment. Lyrica helps. Following spine center. BMI 36.0-36.9: Working on diet modification. Limitation in exercise due to chronic back pain and radiculopathy  Palpitation: Following cardiology. Had a event monitor in the past.  Did not reveal any arrhythmia. We will follow current recommendation. Probably plan for Kardia   Polycythemia vera: Used to see Dr. Gina Armstrong but now needed a new provider. Currently resolved. Asthma with COPD: Fairly stable. Reviewed use of albuterol. Patient understood agreed with the plan  Up-to-date with an eye exam.  Will obtain records  We will get due immunization from the [facility]. Follow-up in 4 months. Please note that this dictation was completed with Darudar, the computer voice recognition software. Quite often unanticipated grammatical, syntax, homophones, and other interpretive errors are inadvertently transcribed by the computer software. Please disregard these errors. Please excuse any errors that have escaped final proofreading. No follow-ups on file. Subjective     Here for Medicare wellness. Diabetes. Well-controlled. We will recheck labs.   No hypo or hyperglycemic

## 2023-10-09 RX ORDER — AMITRIPTYLINE HYDROCHLORIDE 10 MG/1
TABLET, FILM COATED ORAL
Qty: 90 TABLET | Refills: 0 | OUTPATIENT
Start: 2023-10-09

## 2023-10-16 RX ORDER — TOPIRAMATE 25 MG/1
TABLET ORAL
Qty: 90 TABLET | Refills: 1 | Status: SHIPPED | OUTPATIENT
Start: 2023-10-16

## 2023-10-16 RX ORDER — LEVOTHYROXINE SODIUM 0.12 MG/1
TABLET ORAL
Qty: 90 TABLET | Refills: 1 | Status: SHIPPED | OUTPATIENT
Start: 2023-10-16

## 2023-10-16 RX ORDER — AMITRIPTYLINE HYDROCHLORIDE 10 MG/1
TABLET, FILM COATED ORAL
Qty: 90 TABLET | Refills: 1 | Status: SHIPPED | OUTPATIENT
Start: 2023-10-16

## 2023-10-16 NOTE — TELEPHONE ENCOUNTER
LOV 08/30/2023  F/U 01/02/2024  [facility] keeps requesting Amitriptyline, but it looks like it was discontinued.
Negative

## 2023-10-17 ENCOUNTER — HOSPITAL ENCOUNTER (OUTPATIENT)
Facility: HOSPITAL | Age: 73
Discharge: HOME | End: 2023-10-20
Payer: MEDICARE

## 2023-10-17 DIAGNOSIS — Z79.4 TYPE 2 DIABETES MELLITUS WITH DIABETIC NEUROPATHY, WITH LONG-TERM CURRENT USE OF INSULIN (HCC): ICD-10-CM

## 2023-10-17 DIAGNOSIS — E11.40 TYPE 2 DIABETES MELLITUS WITH DIABETIC NEUROPATHY, WITH LONG-TERM CURRENT USE OF INSULIN (HCC): ICD-10-CM

## 2023-10-17 LAB
ALBUMIN SERPL-MCNC: 3.8 G/DL (ref 3.4–5)
ALBUMIN/GLOB SERPL: 1.2 (ref 0.8–1.7)
ALP SERPL-CCNC: 61 U/L (ref 45–117)
ALT SERPL-CCNC: 23 U/L (ref 16–61)
ANION GAP SERPL CALC-SCNC: 2 MMOL/L (ref 3–18)
AST SERPL-CCNC: 11 U/L (ref 10–38)
BILIRUB SERPL-MCNC: 0.5 MG/DL (ref 0.2–1)
BUN SERPL-MCNC: 14 MG/DL (ref 7–18)
BUN/CREAT SERPL: 11 (ref 12–20)
CALCIUM SERPL-MCNC: 8.7 MG/DL (ref 8.5–10.1)
CHLORIDE SERPL-SCNC: 108 MMOL/L (ref 100–111)
CHOLEST SERPL-MCNC: 243 MG/DL
CO2 SERPL-SCNC: 30 MMOL/L (ref 21–32)
CREAT SERPL-MCNC: 1.27 MG/DL (ref 0.6–1.3)
ERYTHROCYTE [DISTWIDTH] IN BLOOD BY AUTOMATED COUNT: 13.2 % (ref 11.6–14.5)
EST. AVERAGE GLUCOSE BLD GHB EST-MCNC: 146 MG/DL
GLOBULIN SER CALC-MCNC: 3.3 G/DL (ref 2–4)
GLUCOSE SERPL-MCNC: 146 MG/DL (ref 74–99)
HBA1C MFR BLD: 6.7 % (ref 4.2–5.6)
HCT VFR BLD AUTO: 45.6 % (ref 36–48)
HDLC SERPL-MCNC: 49 MG/DL (ref 40–60)
HDLC SERPL: 5 (ref 0–5)
HGB BLD-MCNC: 14.6 G/DL (ref 13–16)
LDLC SERPL CALC-MCNC: 154.8 MG/DL (ref 0–100)
LIPID PANEL: ABNORMAL
MCH RBC QN AUTO: 27.9 PG (ref 24–34)
MCHC RBC AUTO-ENTMCNC: 32 G/DL (ref 31–37)
MCV RBC AUTO: 87.2 FL (ref 78–100)
NRBC # BLD: 0 K/UL (ref 0–0.01)
NRBC BLD-RTO: 0 PER 100 WBC
PLATELET # BLD AUTO: 246 K/UL (ref 135–420)
PMV BLD AUTO: 10.1 FL (ref 9.2–11.8)
POTASSIUM SERPL-SCNC: 4.3 MMOL/L (ref 3.5–5.5)
PROT SERPL-MCNC: 7.1 G/DL (ref 6.4–8.2)
RBC # BLD AUTO: 5.23 M/UL (ref 4.35–5.65)
SODIUM SERPL-SCNC: 140 MMOL/L (ref 136–145)
TRIGL SERPL-MCNC: 196 MG/DL
VLDLC SERPL CALC-MCNC: 39.2 MG/DL
WBC # BLD AUTO: 8.4 K/UL (ref 4.6–13.2)

## 2023-10-17 PROCEDURE — 80061 LIPID PANEL: CPT

## 2023-10-17 PROCEDURE — 85027 COMPLETE CBC AUTOMATED: CPT

## 2023-10-17 PROCEDURE — 80053 COMPREHEN METABOLIC PANEL: CPT

## 2023-10-17 PROCEDURE — 83036 HEMOGLOBIN GLYCOSYLATED A1C: CPT

## 2023-10-17 PROCEDURE — 36415 COLL VENOUS BLD VENIPUNCTURE: CPT

## 2024-01-10 ENCOUNTER — OFFICE VISIT (OUTPATIENT)
Age: 74
End: 2024-01-10

## 2024-01-10 VITALS
BODY MASS INDEX: 36.79 KG/M2 | DIASTOLIC BLOOD PRESSURE: 80 MMHG | HEIGHT: 70 IN | OXYGEN SATURATION: 97 % | WEIGHT: 257 LBS | SYSTOLIC BLOOD PRESSURE: 124 MMHG

## 2024-01-10 DIAGNOSIS — I49.1 ATRIAL PREMATURE CONTRACTIONS: ICD-10-CM

## 2024-01-10 DIAGNOSIS — I50.32 CHRONIC DIASTOLIC (CONGESTIVE) HEART FAILURE (HCC): ICD-10-CM

## 2024-01-10 DIAGNOSIS — R06.02 SHORTNESS OF BREATH: ICD-10-CM

## 2024-01-10 DIAGNOSIS — E66.01 SEVERE OBESITY (BMI 35.0-39.9) WITH COMORBIDITY (HCC): ICD-10-CM

## 2024-01-10 DIAGNOSIS — R42 DIZZINESS: ICD-10-CM

## 2024-01-10 DIAGNOSIS — E11.21 TYPE 2 DIABETES MELLITUS WITH DIABETIC NEPHROPATHY, UNSPECIFIED WHETHER LONG TERM INSULIN USE (HCC): ICD-10-CM

## 2024-01-10 DIAGNOSIS — I10 ESSENTIAL (PRIMARY) HYPERTENSION: ICD-10-CM

## 2024-01-10 DIAGNOSIS — E78.00 PURE HYPERCHOLESTEROLEMIA, UNSPECIFIED: ICD-10-CM

## 2024-01-10 DIAGNOSIS — R07.9 CHEST PAIN, UNSPECIFIED TYPE: ICD-10-CM

## 2024-01-10 DIAGNOSIS — R00.2 PALPITATIONS: Primary | ICD-10-CM

## 2024-01-10 PROBLEM — D75.1 SECONDARY POLYCYTHEMIA: Status: ACTIVE | Noted: 2024-01-10

## 2024-01-10 PROBLEM — R53.83 FATIGUE: Status: ACTIVE | Noted: 2024-01-10

## 2024-01-10 PROBLEM — G47.33 OBSTRUCTIVE SLEEP APNEA SYNDROME: Status: ACTIVE | Noted: 2024-01-10

## 2024-01-10 PROBLEM — E16.2 HYPOGLYCEMIA, UNSPECIFIED: Status: ACTIVE | Noted: 2024-01-10

## 2024-01-10 RX ORDER — PSEUDOEPHEDRINE HCL 30 MG
1 TABLET ORAL DAILY PRN
COMMUNITY

## 2024-01-10 RX ORDER — KETOROLAC TROMETHAMINE 5 MG/ML
SOLUTION OPHTHALMIC
COMMUNITY
End: 2024-01-10

## 2024-01-10 ASSESSMENT — PATIENT HEALTH QUESTIONNAIRE - PHQ9
SUM OF ALL RESPONSES TO PHQ QUESTIONS 1-9: 0
SUM OF ALL RESPONSES TO PHQ QUESTIONS 1-9: 0
SUM OF ALL RESPONSES TO PHQ9 QUESTIONS 1 & 2: 0
SUM OF ALL RESPONSES TO PHQ QUESTIONS 1-9: 0
1. LITTLE INTEREST OR PLEASURE IN DOING THINGS: 0
2. FEELING DOWN, DEPRESSED OR HOPELESS: 0
SUM OF ALL RESPONSES TO PHQ QUESTIONS 1-9: 0

## 2024-01-10 ASSESSMENT — ENCOUNTER SYMPTOMS
COUGH: 0
ABDOMINAL DISTENTION: 0
DIARRHEA: 0
CHEST TIGHTNESS: 0
SHORTNESS OF BREATH: 1
WHEEZING: 0
BLOOD IN STOOL: 0
VOMITING: 0
CONSTIPATION: 0
NAUSEA: 0

## 2024-01-10 ASSESSMENT — ANXIETY QUESTIONNAIRES
1. FEELING NERVOUS, ANXIOUS, OR ON EDGE: 0
5. BEING SO RESTLESS THAT IT IS HARD TO SIT STILL: 0
4. TROUBLE RELAXING: 0
6. BECOMING EASILY ANNOYED OR IRRITABLE: 0
2. NOT BEING ABLE TO STOP OR CONTROL WORRYING: 0
3. WORRYING TOO MUCH ABOUT DIFFERENT THINGS: 0
GAD7 TOTAL SCORE: 0
7. FEELING AFRAID AS IF SOMETHING AWFUL MIGHT HAPPEN: 0

## 2024-01-10 NOTE — PROGRESS NOTES
Noé Ramsey presents today for   Chief Complaint   Patient presents with    Follow-up     sob       Noé Ramsey preferred language for health care discussion is english/other.    Is someone accompanying this pt? no    Is the patient using any DME equipment during OV? no    Depression Screening:  Depression: Not at risk (1/10/2024)    PHQ-2     PHQ-2 Score: 0        Learning Assessment:  Who is the primary learner? Patient    What is the preferred language for health care of the primary learner? ENGLISH    How does the primary learner prefer to learn new concepts? DEMONSTRATION    Answered By patient    Relationship to Learner SELF           Pt currently taking Anticoagulant therapy? no    Pt currently taking Antiplatelet therapy ? no      Coordination of Care:  1. Have you been to the ER, urgent care clinic since your last visit? Hospitalized since your last visit? no    2. Have you seen or consulted any other health care providers outside of the [facility] since your last visit? Include any pap smears or colon screening. no    
breath  Hypertension, blood pressure controlled  Dyslipidemia  Diabetes mellitus, recommend Hgb A1c less than 7% from cardiac standpoint  COPD  Palpitations, states [facility] concerned about possible [facility], complains of heart racing  Dizziness, frequent.  Often associated with chest pain and SOB  Chest pain, mostly at night & early morning      Noé Ramsey was seen today for evaluation of complaints of shortness of breath, chest pain, dizziness, palpitations \"racing heart\", occasional diaphoresis with nausea.  These symptoms have been occurring over the past several months.  He states that the [facility] is concerned about possible AFIB.  No records from the [facility] is available for review.  He states that he just recently became established with the [facility] in [place] and they are requesting cardiac testing.    For his palpitations, will request that he wear a 30 day event monitor.  Rationale for wearing the monitor explained to him (assess for arrhythmias, AFIB, pauses, ectopy).     For his nocturnal and early AM chest pain associated with dizziness, SOB, occasional diaphoresis with nausea, will request that he undergo a pharmacologic nuclear stress test and echocardiogram.  Testing explained to him and his questions were answered.     He was instructed to change his metoprolol tartrate to 12.5mg BID from 25mg once a day.  His EKG today shows sinus arrhythmia.  His blood pressure and heart rate will be re-evaluated when he returns for follow-up with Dr Villa on 2/2/24.     Time: 35  minutes    He will follow-up with Dr. Villa as scheduled and as needed.      Sally Hagen MSN, FNP-BC    Please note:  Portions of this chart were created with Dragon medical speech to text program.  Unrecognized errors may be present.

## 2024-01-10 NOTE — PATIENT INSTRUCTIONS
Change metoprolol to 12.5mg twice a day - take 1/2 of the 25mg tablets twice a day  30 day event monitor; Dx: palpitations, ? AFIB  Pharmacologic nuclear stress test; Dx: SOB, dizziness, chest pain  Echocardiogram: Dx: SOB, dizziness, chest pain  Follow-up with Dr. Villa as scheduled and as needed

## 2024-01-15 ENCOUNTER — OFFICE VISIT (OUTPATIENT)
Age: 74
End: 2024-01-15
Payer: MEDICARE

## 2024-01-15 VITALS
WEIGHT: 261.4 LBS | OXYGEN SATURATION: 100 % | BODY MASS INDEX: 37.42 KG/M2 | HEIGHT: 70 IN | HEART RATE: 99 BPM | SYSTOLIC BLOOD PRESSURE: 110 MMHG | RESPIRATION RATE: 16 BRPM | TEMPERATURE: 96.9 F | DIASTOLIC BLOOD PRESSURE: 70 MMHG

## 2024-01-15 DIAGNOSIS — I10 ESSENTIAL HYPERTENSION: ICD-10-CM

## 2024-01-15 DIAGNOSIS — I50.32 CHRONIC DIASTOLIC HEART FAILURE (HCC): ICD-10-CM

## 2024-01-15 DIAGNOSIS — E11.8 TYPE 2 DIABETES MELLITUS WITH COMPLICATION, WITH LONG-TERM CURRENT USE OF INSULIN (HCC): ICD-10-CM

## 2024-01-15 DIAGNOSIS — K21.9 GASTROESOPHAGEAL REFLUX DISEASE WITHOUT ESOPHAGITIS: ICD-10-CM

## 2024-01-15 DIAGNOSIS — L02.92 BOIL: ICD-10-CM

## 2024-01-15 DIAGNOSIS — G47.33 OBSTRUCTIVE SLEEP APNEA SYNDROME: ICD-10-CM

## 2024-01-15 DIAGNOSIS — J44.9 CHRONIC OBSTRUCTIVE PULMONARY DISEASE, UNSPECIFIED COPD TYPE (HCC): ICD-10-CM

## 2024-01-15 DIAGNOSIS — D45 POLYCYTHEMIA VERA (HCC): Primary | ICD-10-CM

## 2024-01-15 DIAGNOSIS — Z90.49 HISTORY OF CHOLECYSTECTOMY: ICD-10-CM

## 2024-01-15 DIAGNOSIS — N18.31 STAGE 3A CHRONIC KIDNEY DISEASE (HCC): ICD-10-CM

## 2024-01-15 DIAGNOSIS — Z79.4 TYPE 2 DIABETES MELLITUS WITH COMPLICATION, WITH LONG-TERM CURRENT USE OF INSULIN (HCC): ICD-10-CM

## 2024-01-15 DIAGNOSIS — M47.816 SPONDYLOSIS OF LUMBAR REGION WITHOUT MYELOPATHY OR RADICULOPATHY: ICD-10-CM

## 2024-01-15 PROBLEM — E11.22 TYPE 2 DIABETES MELLITUS WITH CHRONIC KIDNEY DISEASE (HCC): Status: ACTIVE | Noted: 2024-01-15

## 2024-01-15 PROCEDURE — 99213 OFFICE O/P EST LOW 20 MIN: CPT | Performed by: FAMILY MEDICINE

## 2024-01-15 PROCEDURE — 3046F HEMOGLOBIN A1C LEVEL >9.0%: CPT | Performed by: FAMILY MEDICINE

## 2024-01-15 PROCEDURE — G8417 CALC BMI ABV UP PARAM F/U: HCPCS | Performed by: FAMILY MEDICINE

## 2024-01-15 PROCEDURE — 3017F COLORECTAL CA SCREEN DOC REV: CPT | Performed by: FAMILY MEDICINE

## 2024-01-15 PROCEDURE — G8427 DOCREV CUR MEDS BY ELIG CLIN: HCPCS | Performed by: FAMILY MEDICINE

## 2024-01-15 PROCEDURE — 3078F DIAST BP <80 MM HG: CPT | Performed by: FAMILY MEDICINE

## 2024-01-15 PROCEDURE — 1036F TOBACCO NON-USER: CPT | Performed by: FAMILY MEDICINE

## 2024-01-15 PROCEDURE — 3023F SPIROM DOC REV: CPT | Performed by: FAMILY MEDICINE

## 2024-01-15 PROCEDURE — 2022F DILAT RTA XM EVC RTNOPTHY: CPT | Performed by: FAMILY MEDICINE

## 2024-01-15 PROCEDURE — G8484 FLU IMMUNIZE NO ADMIN: HCPCS | Performed by: FAMILY MEDICINE

## 2024-01-15 PROCEDURE — 1123F ACP DISCUSS/DSCN MKR DOCD: CPT | Performed by: FAMILY MEDICINE

## 2024-01-15 PROCEDURE — 3074F SYST BP LT 130 MM HG: CPT | Performed by: FAMILY MEDICINE

## 2024-01-15 NOTE — PROGRESS NOTES
1. \"Have you been to the ER, urgent care clinic since your last visit?  Hospitalized since your last visit?\" No    2. \"Have you seen or consulted any other health care providers outside of the [facility] since your last visit?\" Yes When: [facility] LOV: 9/25/23 . Marily Milian NP LOV: 1/15/24.    Chief Complaint   Patient presents with    Diabetes    Hypertension    Cholesterol Problem          
(PROVENTIL;VENTOLIN;PROAIR) 108 (90 Base) MCG/ACT inhaler Inhale 1-2 puffs into the lungs every 4 hours as needed      albuterol (PROVENTIL) (2.5 MG/3ML) 0.083% nebulizer solution Inhale 3 mLs into the lungs every 4 hours as needed      DULoxetine (CYMBALTA) 60 MG extended release capsule Take 1 capsule by mouth 2 times daily      glucagon 1 MG injection Inject 1 mg into the muscle as needed      insulin glargine (BASAGLAR KWIKPEN) 100 UNIT/ML injection pen Inject 44 Units into the skin nightly      insulin glulisine (APIDRA) 100 UNIT/ML injection pen Inject 10 Units into the skin 3 times daily (before meals)      montelukast (SINGULAIR) 10 MG tablet Take 1 tablet by mouth daily as needed      pantoprazole (PROTONIX) 40 MG tablet Take 1 tablet by mouth daily      pregabalin (LYRICA) 150 MG capsule Take 1 capsule by mouth in the morning, at noon, and at bedtime.       No current facility-administered medications for this visit.            OBJECTIVE:  /70 (Site: Left Upper Arm, Position: Sitting, Cuff Size: Large Adult)   Pulse 99   Temp 96.9 °F (36.1 °C) (Temporal)   Resp 16   Ht 1.778 m (5' 10\")   Wt 118.6 kg (261 lb 6.4 oz)   SpO2 100%   BMI 37.51 kg/m²       Physical Exam       ASSESSMENT/ PLAN:    Diagnoses and all orders for this visit:  Polycythemia vera (HCC): Fairly stable.  Seen hematology.  Will observe    Stage 3a chronic kidney disease (HCC): Probably due to chronic medical conditions.  Avoid NSAIDs.  Will observe    Chronic obstructive pulmonary disease, unspecified COPD type (HCC): Fairly stable.  Following pulmonary.  No increased use of albuterol.  Will follow the recommendation    Obstructive sleep apnea syndrome: Using CPAP with compliance    Gastroesophageal reflux disease without esophagitis: Fairly stable on medication.  No concern.  Has been modifying diet    Spondylosis of lumbar region without myelopathy or radiculopathy: Following spine center.  Fairly stable on current

## 2024-01-23 ENCOUNTER — PROCEDURE VISIT (OUTPATIENT)
Age: 74
End: 2024-01-23
Payer: MEDICARE

## 2024-01-23 VITALS — WEIGHT: 260 LBS | BODY MASS INDEX: 37.22 KG/M2 | HEIGHT: 70 IN

## 2024-01-23 DIAGNOSIS — J44.9 ASTHMA-COPD OVERLAP SYNDROME: Primary | ICD-10-CM

## 2024-01-23 PROCEDURE — 94727 GAS DIL/WSHOT DETER LNG VOL: CPT | Performed by: INTERNAL MEDICINE

## 2024-01-23 PROCEDURE — 94729 DIFFUSING CAPACITY: CPT | Performed by: INTERNAL MEDICINE

## 2024-01-23 PROCEDURE — 94060 EVALUATION OF WHEEZING: CPT | Performed by: INTERNAL MEDICINE

## 2024-01-24 ENCOUNTER — OFFICE VISIT (OUTPATIENT)
Age: 74
End: 2024-01-24
Payer: MEDICARE

## 2024-01-24 VITALS
DIASTOLIC BLOOD PRESSURE: 67 MMHG | BODY MASS INDEX: 37.22 KG/M2 | SYSTOLIC BLOOD PRESSURE: 123 MMHG | RESPIRATION RATE: 18 BRPM | HEART RATE: 84 BPM | WEIGHT: 260 LBS | HEIGHT: 70 IN | OXYGEN SATURATION: 97 % | TEMPERATURE: 99.2 F

## 2024-01-24 DIAGNOSIS — J44.9 ASTHMA-COPD OVERLAP SYNDROME: Primary | ICD-10-CM

## 2024-01-24 DIAGNOSIS — E66.01 SEVERE OBESITY (HCC): ICD-10-CM

## 2024-01-24 DIAGNOSIS — D75.1 SECONDARY POLYCYTHEMIA: ICD-10-CM

## 2024-01-24 DIAGNOSIS — R53.81 PHYSICAL DECONDITIONING: ICD-10-CM

## 2024-01-24 DIAGNOSIS — R06.09 DYSPNEA ON EXERTION: ICD-10-CM

## 2024-01-24 PROCEDURE — G8484 FLU IMMUNIZE NO ADMIN: HCPCS | Performed by: INTERNAL MEDICINE

## 2024-01-24 PROCEDURE — 1123F ACP DISCUSS/DSCN MKR DOCD: CPT | Performed by: INTERNAL MEDICINE

## 2024-01-24 PROCEDURE — 3074F SYST BP LT 130 MM HG: CPT | Performed by: INTERNAL MEDICINE

## 2024-01-24 PROCEDURE — 3023F SPIROM DOC REV: CPT | Performed by: INTERNAL MEDICINE

## 2024-01-24 PROCEDURE — 3017F COLORECTAL CA SCREEN DOC REV: CPT | Performed by: INTERNAL MEDICINE

## 2024-01-24 PROCEDURE — 99214 OFFICE O/P EST MOD 30 MIN: CPT | Performed by: INTERNAL MEDICINE

## 2024-01-24 PROCEDURE — 1036F TOBACCO NON-USER: CPT | Performed by: INTERNAL MEDICINE

## 2024-01-24 PROCEDURE — G8417 CALC BMI ABV UP PARAM F/U: HCPCS | Performed by: INTERNAL MEDICINE

## 2024-01-24 PROCEDURE — G8428 CUR MEDS NOT DOCUMENT: HCPCS | Performed by: INTERNAL MEDICINE

## 2024-01-24 PROCEDURE — 3078F DIAST BP <80 MM HG: CPT | Performed by: INTERNAL MEDICINE

## 2024-01-24 RX ORDER — FLUTICASONE FUROATE, UMECLIDINIUM BROMIDE AND VILANTEROL TRIFENATATE 200; 62.5; 25 UG/1; UG/1; UG/1
1 POWDER RESPIRATORY (INHALATION) DAILY
Qty: 3 EACH | Refills: 3 | Status: SHIPPED | OUTPATIENT
Start: 2024-01-24

## 2024-01-24 RX ORDER — ALBUTEROL SULFATE 90 UG/1
1-2 AEROSOL, METERED RESPIRATORY (INHALATION) EVERY 4 HOURS PRN
Qty: 1 EACH | Refills: 5 | Status: SHIPPED | OUTPATIENT
Start: 2024-01-24

## 2024-01-24 RX ORDER — FLUTICASONE PROPIONATE AND SALMETEROL 500; 50 UG/1; UG/1
1 POWDER RESPIRATORY (INHALATION) EVERY 12 HOURS
Qty: 3 EACH | Refills: 3 | Status: SHIPPED | OUTPATIENT
Start: 2024-01-24

## 2024-01-24 ASSESSMENT — SLEEP AND FATIGUE QUESTIONNAIRES
HOW LIKELY ARE YOU TO NOD OFF OR FALL ASLEEP WHILE SITTING QUIETLY AFTER LUNCH WITHOUT ALCOHOL: 0
HOW LIKELY ARE YOU TO NOD OFF OR FALL ASLEEP WHILE WATCHING TV: 1
HOW LIKELY ARE YOU TO NOD OFF OR FALL ASLEEP WHEN YOU ARE A PASSENGER IN A CAR FOR AN HOUR WITHOUT A BREAK: 3
HOW LIKELY ARE YOU TO NOD OFF OR FALL ASLEEP WHILE SITTING AND READING: 1
HOW LIKELY ARE YOU TO NOD OFF OR FALL ASLEEP IN A CAR, WHILE STOPPED FOR A FEW MINUTES IN TRAFFIC: 0
HOW LIKELY ARE YOU TO NOD OFF OR FALL ASLEEP WHILE LYING DOWN TO REST IN THE AFTERNOON WHEN CIRCUMSTANCES PERMIT: 3
ESS TOTAL SCORE: 8
HOW LIKELY ARE YOU TO NOD OFF OR FALL ASLEEP WHILE SITTING AND TALKING TO SOMEONE: 0
HOW LIKELY ARE YOU TO NOD OFF OR FALL ASLEEP WHILE SITTING INACTIVE IN A PUBLIC PLACE: 0

## 2024-01-24 NOTE — PROGRESS NOTES
[address]   [address]   [place]  [address]   [phone]        [facility]   Pulmonary, Critical Care, and Sleep Medicine      Pulmonary Office F/U  Name: Noé Ramsey 73 y.o. male   MRN: [id number]  : 1950  Service Date: 24   Chief Complaint:   Chief Complaint   Patient presents with    COPD     Follow up from 2021    Sleep Apnea     obstructive    Nicotine Dependence     History of    Congestive Heart Failure     Chronic diastolic    Breathing Problem     dyspnea    Malaise    Obesity     D/t excess calories    Other     Polycythemia vera    Results     PSG 10/1/2021, Echo 2023, CT abdomen & pelvis  & 2023 ([facility]), CXR 2023 ([facility]), PFT 2023       History of Present Illness:  Noé Ramsey is a 73 y.o. male, who presents to Pulmonary clinic for f/u of polycythemia and mild asthma/COPD overlap  Patient was last seen in our clinic on 21  Patient reports he was seen by his [facility] PCP who noted that patient had \"low oxygen on a blood test\" as well as an abnormal CT scan.  In the interval, pt stopped therapeutic phlebotomy in the interval.  PRN albuterol use during spring is 1-2x per day.  Otherwise reports no new dyspnea.  Still has dyspnea with mild exertion, however patient is more limited by leg and back pain, which is chronic.  No exacerbations    Past Medical History:   Diagnosis Date    Arthritis     Asthma     Seasonal    Cancer (HCC)     BASAL     DM type 2 (diabetes mellitus, type 2) ([facility])     IDDM    Enlarged prostate     Failed back syndrome     GERD (gastroesophageal reflux disease)     Hearing loss, bilateral     Hearing Aids    Hypertension     Hypothyroidism     Insomnia     Low serum testosterone level     Migraine     Neuropathy     Osteoarthritis of multiple joints     Polycythemia     SOB (shortness of breath)     chronic; 2' \"lung fever\"    Vertigo      Past Surgical History:   Procedure Laterality Date    BACK SURGERY

## 2024-01-24 NOTE — PROGRESS NOTES
Noé Ramsey presents today for   Chief Complaint   Patient presents with    COPD     Follow up from 2021    Sleep Apnea     obstructive    Nicotine Dependence     History of    Congestive Heart Failure     Chronic diastolic    Breathing Problem     dyspnea    Malaise    Obesity     D/t excess calories    Other     Polycythemia vera    Results     PSG 10/1/2021, Echo 2023, CT abdomen & pelvis  & 2023 ([facility]), CXR 2023 ([facility]), PFT 2023       Is someone accompanying this pt? No    Is the patient using any DME equipment during OV? Yes. Nebulizer, cane    -DME Company N/A    Depression Screenin/10/2024    10:25 AM   PHQ-9 Questionaire   Little interest or pleasure in doing things 0   Feeling down, depressed, or hopeless 0   PHQ-9 Total Score 0       Learning Needs Questionnaire:     Who is the primary learner? Patient    What is the preferred language for health care of the primary learner? ENGLISH    How does the primary learner prefer to learn new concepts? DEMONSTRATION    Answered By Patient    Relationship to Learner SELF          Fall Risk:         1/15/2024     1:42 PM 1/10/2024    10:25 AM 2023    10:38 AM 2023    12:08 PM 2023     9:33 AM   Fall Risk   2 or more falls in past year? no no yes no yes   Fall with injury in past year? no no yes no no        Abuse Screenin/24/2024    12:00 PM 2023    12:00 PM   AMB Abuse Screening   Do you ever feel afraid of your partner? N N   Are you in a relationship with someone who physically or mentally threatens you? N N   Is it safe for you to go home? Y Y         Coordination of Care:    1. Have you been to the ER, urgent care clinic since your last visit? Hospitalized since your last visit? Yes. [facility]--2023-acute pancreatitis and dehydration & -2023-acute cholecystitis and [facility] endoscopy-2023-colonoscopy    2. Have you seen or consulted any other health care providers outside of the [facility]

## 2024-01-26 DIAGNOSIS — U07.1 POSITIVE SELF-ADMINISTERED ANTIGEN TEST FOR COVID-19: Primary | ICD-10-CM

## 2024-01-27 NOTE — PROGRESS NOTES
Patient contacted me regarding a positive home covid test after being sick for two days and worsening symptoms of cough, sore throat, nasal congestion.     Not on statin therapy  Cmp 10/17/2023 reviewed    Discussed s/e of Paxlovid

## 2024-03-07 ENCOUNTER — TELEPHONE (OUTPATIENT)
Age: 74
End: 2024-03-07

## 2024-03-07 NOTE — TELEPHONE ENCOUNTER
----- Message from Mirza Villa MD sent at 3/1/2024  3:50 PM EST -----  Regarding: Abnormal nuclear stress test  Please let the patient know that his stress test was abnormal concerning for ischemia.  His echocardiogram was normal.  He had normal heart function.  Based on his recent symptoms, I would recommend scheduling him for a cardiac catheterization with Dr. Loya.  ----- Message -----  From: Daphne Robert MA  Sent: 3/1/2024   3:14 PM EST  To: Mirza Villa MD    Per Sally's Note on 01-10-24 \"  For his nocturnal and early AM chest pain associated with dizziness, SOB, occasional diaphoresis with nausea, will request that he undergo a pharmacologic nuclear stress test and echocardiogram.  Testing explained to him and his questions were answered.     
How Severe Is Your Skin Lesion?: moderate
Verbal order and read back per Mirza Villa MD  Please let the patient know that his stress test was abnormal concerning for ischemia.  His echocardiogram was normal.  He had normal heart function.  Based on his recent symptoms, I would recommend scheduling him for a cardiac catheterization with Dr. Loya.     -Informed patient of results and told him the  Gretchen will call with dates then I will call him back with instructions.    This has been fully explained to the patient, who indicates understanding.    
Has Your Skin Lesion Been Treated?: not been treated
Is This A New Presentation, Or A Follow-Up?: Moles

## 2024-03-11 ENCOUNTER — TELEPHONE (OUTPATIENT)
Age: 74
End: 2024-03-11

## 2024-03-11 NOTE — TELEPHONE ENCOUNTER
Per pt, he has tried 3 times to take the Advair within the last couple weeks. Due to having covid when he first started he waited to try the 2nd and 3rd time until he was feeling better and each time developed a severe reaction,  with sore throat.  Please call pt at [phone].

## 2024-03-11 NOTE — TELEPHONE ENCOUNTER
Patient is not able to take the Advair. He states every time he tries to use it causes him to have a sore throat, eyes hurt and causes congestion. He is rinsing after.   He is working on getting the Trelegy approved due to they switched him to a new [facility] medicare federal plan that he did not approve them doing and is having it switched back to [employer] [facility] jail plan with A and B Medicare. Patient to get Trelegy once they fix his insurance

## 2024-03-22 ENCOUNTER — OFFICE VISIT (OUTPATIENT)
Age: 74
End: 2024-03-22

## 2024-03-22 ENCOUNTER — HOSPITAL ENCOUNTER (OUTPATIENT)
Facility: HOSPITAL | Age: 74
End: 2024-03-22
Payer: MEDICARE

## 2024-03-22 VITALS
HEIGHT: 70 IN | OXYGEN SATURATION: 100 % | SYSTOLIC BLOOD PRESSURE: 122 MMHG | BODY MASS INDEX: 35.93 KG/M2 | HEART RATE: 79 BPM | WEIGHT: 251 LBS | DIASTOLIC BLOOD PRESSURE: 80 MMHG

## 2024-03-22 DIAGNOSIS — I49.1 ATRIAL PREMATURE CONTRACTIONS: ICD-10-CM

## 2024-03-22 DIAGNOSIS — E11.21 TYPE 2 DIABETES MELLITUS WITH DIABETIC NEPHROPATHY, UNSPECIFIED WHETHER LONG TERM INSULIN USE (HCC): ICD-10-CM

## 2024-03-22 DIAGNOSIS — E78.00 PURE HYPERCHOLESTEROLEMIA, UNSPECIFIED: ICD-10-CM

## 2024-03-22 DIAGNOSIS — I10 ESSENTIAL (PRIMARY) HYPERTENSION: ICD-10-CM

## 2024-03-22 DIAGNOSIS — R00.2 PALPITATIONS: ICD-10-CM

## 2024-03-22 DIAGNOSIS — R94.39 ABNORMAL NUCLEAR STRESS TEST: ICD-10-CM

## 2024-03-22 DIAGNOSIS — R94.39 ABNORMAL NUCLEAR STRESS TEST: Primary | ICD-10-CM

## 2024-03-22 DIAGNOSIS — R06.09 DOE (DYSPNEA ON EXERTION): ICD-10-CM

## 2024-03-22 PROBLEM — K03.6 DEPOSITS (ACCRETIONS) ON TEETH: Status: ACTIVE | Noted: 2024-03-22

## 2024-03-22 PROBLEM — N17.9 ACUTE KIDNEY INJURY (HCC): Status: ACTIVE | Noted: 2024-03-22

## 2024-03-22 PROBLEM — K08.531 FRACTURED DENTAL RESTORATIVE MATERIAL WITH LOSS OF MATERIAL: Status: ACTIVE | Noted: 2024-03-22

## 2024-03-22 LAB
ALBUMIN SERPL-MCNC: 4.1 G/DL (ref 3.4–5)
ALBUMIN/GLOB SERPL: 1.1 (ref 0.8–1.7)
ALP SERPL-CCNC: 64 U/L (ref 45–117)
ALT SERPL-CCNC: 23 U/L (ref 16–61)
ANION GAP SERPL CALC-SCNC: 8 MMOL/L (ref 3–18)
AST SERPL-CCNC: 15 U/L (ref 10–38)
BILIRUB SERPL-MCNC: 0.5 MG/DL (ref 0.2–1)
BUN SERPL-MCNC: 17 MG/DL (ref 7–18)
BUN/CREAT SERPL: 11 (ref 12–20)
CALCIUM SERPL-MCNC: 9.8 MG/DL (ref 8.5–10.1)
CHLORIDE SERPL-SCNC: 107 MMOL/L (ref 100–111)
CO2 SERPL-SCNC: 25 MMOL/L (ref 21–32)
CREAT SERPL-MCNC: 1.53 MG/DL (ref 0.6–1.3)
ERYTHROCYTE [DISTWIDTH] IN BLOOD BY AUTOMATED COUNT: 13.4 % (ref 11.6–14.5)
GLOBULIN SER CALC-MCNC: 3.6 G/DL (ref 2–4)
GLUCOSE SERPL-MCNC: 116 MG/DL (ref 74–99)
HCT VFR BLD AUTO: 46 % (ref 36–48)
HGB BLD-MCNC: 15 G/DL (ref 13–16)
INR PPP: 1.1 (ref 0.9–1.1)
MCH RBC QN AUTO: 27.8 PG (ref 24–34)
MCHC RBC AUTO-ENTMCNC: 32.6 G/DL (ref 31–37)
MCV RBC AUTO: 85.3 FL (ref 78–100)
NRBC # BLD: 0 K/UL (ref 0–0.01)
NRBC BLD-RTO: 0 PER 100 WBC
PLATELET # BLD AUTO: 307 K/UL (ref 135–420)
PMV BLD AUTO: 10.2 FL (ref 9.2–11.8)
POTASSIUM SERPL-SCNC: 4.9 MMOL/L (ref 3.5–5.5)
PROT SERPL-MCNC: 7.7 G/DL (ref 6.4–8.2)
PROTHROMBIN TIME: 13.9 SEC (ref 11.9–14.7)
RBC # BLD AUTO: 5.39 M/UL (ref 4.35–5.65)
SODIUM SERPL-SCNC: 140 MMOL/L (ref 136–145)
WBC # BLD AUTO: 9.1 K/UL (ref 4.6–13.2)

## 2024-03-22 PROCEDURE — 85027 COMPLETE CBC AUTOMATED: CPT

## 2024-03-22 PROCEDURE — 80053 COMPREHEN METABOLIC PANEL: CPT

## 2024-03-22 PROCEDURE — 85610 PROTHROMBIN TIME: CPT

## 2024-03-22 PROCEDURE — 36415 COLL VENOUS BLD VENIPUNCTURE: CPT

## 2024-03-22 PROCEDURE — 71046 X-RAY EXAM CHEST 2 VIEWS: CPT

## 2024-03-22 RX ORDER — ASPIRIN 81 MG/1
81 TABLET ORAL DAILY
COMMUNITY

## 2024-03-22 ASSESSMENT — ENCOUNTER SYMPTOMS
NAUSEA: 0
COUGH: 0
SORE THROAT: 0
SHORTNESS OF BREATH: 1
ABDOMINAL PAIN: 0
ABDOMINAL DISTENTION: 0

## 2024-03-22 ASSESSMENT — PATIENT HEALTH QUESTIONNAIRE - PHQ9
SUM OF ALL RESPONSES TO PHQ QUESTIONS 1-9: 0
2. FEELING DOWN, DEPRESSED OR HOPELESS: NOT AT ALL
SUM OF ALL RESPONSES TO PHQ QUESTIONS 1-9: 0
1. LITTLE INTEREST OR PLEASURE IN DOING THINGS: NOT AT ALL
SUM OF ALL RESPONSES TO PHQ9 QUESTIONS 1 & 2: 0

## 2024-03-22 ASSESSMENT — ANXIETY QUESTIONNAIRES
7. FEELING AFRAID AS IF SOMETHING AWFUL MIGHT HAPPEN: NOT AT ALL
2. NOT BEING ABLE TO STOP OR CONTROL WORRYING: NOT AT ALL
GAD7 TOTAL SCORE: 0
4. TROUBLE RELAXING: NOT AT ALL
5. BEING SO RESTLESS THAT IT IS HARD TO SIT STILL: NOT AT ALL
1. FEELING NERVOUS, ANXIOUS, OR ON EDGE: NOT AT ALL
3. WORRYING TOO MUCH ABOUT DIFFERENT THINGS: NOT AT ALL
6. BECOMING EASILY ANNOYED OR IRRITABLE: NOT AT ALL

## 2024-03-22 NOTE — PROGRESS NOTES
Noé Ramsey presents today for   Chief Complaint   Patient presents with    Follow-up     6 month       Noé Ramsey preferred language for health care discussion is english/other.    Is someone accompanying this pt? no    Is the patient using any DME equipment during OV? no    Depression Screening:  Depression: Not at risk (3/22/2024)    PHQ-2     PHQ-2 Score: 0        Learning Assessment:  Who is the primary learner? Patient    What is the preferred language for health care of the primary learner? ENGLISH    How does the primary learner prefer to learn new concepts? DEMONSTRATION    Answered By patient    Relationship to Learner SELF           Pt currently taking Anticoagulant therapy? no    Pt currently taking Antiplatelet therapy ? no      Coordination of Care:  1. Have you been to the ER, urgent care clinic since your last visit? Hospitalized since your last visit? no    2. Have you seen or consulted any other health care providers outside of the [facility] since your last visit? Include any pap smears or colon screening. no    
      General: No swelling or deformity.   Skin:     General: Skin is warm and dry.      Findings: No rash.   Neurological:      General: No focal deficit present.      Mental Status: He is alert and oriented to person, place, and time.   Psychiatric:         Mood and Affect: Mood normal.         Behavior: Behavior normal.         EKG: Normal sinus rhythm with sinus arrhythmia,  normal axis, normal QTc interval.  No ST or T wave changes concerning for ischemia.  No change compared to the previous EKG.    Assessment / Plan:     Abnormal nuclear stress test.  Patient underwent a moderate risk pharmacologic nuclear stress test in February 2024 demonstrating a reversible inferolateral defect and reversible inferoseptal defect concerning for ischemia which can be seen with a dominant left circumflex or possibly RCA disease.  Given his progressive dyspnea on exertion over the past 7 months, have recommended invasive coronary angiography with our interventional cardiologist for definitive evaluation and treatment for suspected CAD.  I recommend starting aspirin 81 mg daily.    Essential hypertension.  Patient's blood pressure appears to be well-controlled on metoprolol as monotherapy.    Dyslipidemia.  Patient has been intolerant to atorvastatin in the past for possible pancreatitis.  His most recent lipid profile from October 2023 was elevated: Total cholesterol 243, HDL 49, , triglycerides 196.  If he does have significant CAD, I would recommend starting Repatha.    Diabetes mellitus.  According to the patient this is now better controlled.  This is followed by endocrinology.    Obesity.  Patient's weight has been fairly stable over the past year.  He was encouraged to try and lose 15 to 20 pounds weight with lifestyle changes.    Heart palpitations.  Patient wore an event monitor in February 2024.  This did not show any arrhythmias but frequent APCs.  I suspect this may be the cause of his symptoms.  He was

## 2024-03-22 NOTE — PATIENT INSTRUCTIONS
Catheterization Instructions       You are scheduled to have a Left Cardiac Catheterization on March 28, 2024. Cath holding will call you 72 hours prior to give you time of arrival.      Please go to [facility] and park in the outpatient parking lot that is located around to the back of the hospital and enter through the [facility] building.  Once you enter through the [facility] check in with the  there. The  will either give you directions or assist you in getting to the cath holding area. ([place] [id number])     You are not to eat or drink anything after midnight the night before your procedure. Small sips of water to take your medications is ok.      If you are diabetic, do not take your insulin/sugar pill the morning of the procedure.    MEDICATION INSTRUCTIONS:   Please take your morning medications with the following special instructions:           [x]          Please make sure to take your Blood pressure medication : Metoprolol Tartrate.          [x]         Take your Aspirin    We encourage families to wait in the waiting room on the first floor while the procedure is being done.  The Doctor will come out and talk with you as soon as the procedure is over. You will need someone to drive you home after you have been discharged from the hospital.     There is the possibility that you may spend the night in the hospital, depending on the results of the procedure.  This will be determined after the procedure is done.  If angioplasty or stent is planned, you will stay at least one day.     If you or your family have any questions, please call our office Monday -Friday, 8:30 a.m.-4:30 p.m.,  at [phone], and ask to speak to one of the nurses.

## 2024-03-28 ENCOUNTER — HOSPITAL ENCOUNTER (OUTPATIENT)
Facility: HOSPITAL | Age: 74
Setting detail: OUTPATIENT SURGERY
Discharge: HOME | End: 2024-03-28
Attending: INTERNAL MEDICINE | Admitting: INTERNAL MEDICINE
Payer: MEDICARE

## 2024-03-28 VITALS
HEART RATE: 71 BPM | WEIGHT: 251 LBS | HEIGHT: 70 IN | OXYGEN SATURATION: 97 % | SYSTOLIC BLOOD PRESSURE: 118 MMHG | BODY MASS INDEX: 35.93 KG/M2 | RESPIRATION RATE: 16 BRPM | DIASTOLIC BLOOD PRESSURE: 81 MMHG | TEMPERATURE: 97.8 F

## 2024-03-28 DIAGNOSIS — R07.9 CHEST PAIN: ICD-10-CM

## 2024-03-28 LAB
ECHO BSA: 2.37 M2
GLUCOSE BLD STRIP.AUTO-MCNC: 120 MG/DL (ref 70–110)

## 2024-03-28 PROCEDURE — 82962 GLUCOSE BLOOD TEST: CPT

## 2024-03-28 PROCEDURE — 76000 FLUOROSCOPY <1 HR PHYS/QHP: CPT | Performed by: INTERNAL MEDICINE

## 2024-03-28 PROCEDURE — C1894 INTRO/SHEATH, NON-LASER: HCPCS | Performed by: INTERNAL MEDICINE

## 2024-03-28 PROCEDURE — 2709999900 HC NON-CHARGEABLE SUPPLY: Performed by: INTERNAL MEDICINE

## 2024-03-28 PROCEDURE — 99153 MOD SED SAME PHYS/QHP EA: CPT | Performed by: INTERNAL MEDICINE

## 2024-03-28 PROCEDURE — 93458 L HRT ARTERY/VENTRICLE ANGIO: CPT | Performed by: INTERNAL MEDICINE

## 2024-03-28 PROCEDURE — 99152 MOD SED SAME PHYS/QHP 5/>YRS: CPT | Performed by: INTERNAL MEDICINE

## 2024-03-28 PROCEDURE — 6360000004 HC RX CONTRAST MEDICATION: Performed by: INTERNAL MEDICINE

## 2024-03-28 PROCEDURE — C1725 CATH, TRANSLUMIN NON-LASER: HCPCS | Performed by: INTERNAL MEDICINE

## 2024-03-28 PROCEDURE — 6360000002 HC RX W HCPCS: Performed by: INTERNAL MEDICINE

## 2024-03-28 PROCEDURE — 2580000003 HC RX 258: Performed by: INTERNAL MEDICINE

## 2024-03-28 PROCEDURE — 2500000003 HC RX 250 WO HCPCS: Performed by: INTERNAL MEDICINE

## 2024-03-28 PROCEDURE — C1769 GUIDE WIRE: HCPCS | Performed by: INTERNAL MEDICINE

## 2024-03-28 PROCEDURE — C1713 ANCHOR/SCREW BN/BN,TIS/BN: HCPCS | Performed by: INTERNAL MEDICINE

## 2024-03-28 RX ORDER — MIDAZOLAM HYDROCHLORIDE 1 MG/ML
INJECTION INTRAMUSCULAR; INTRAVENOUS PRN
Status: DISCONTINUED | OUTPATIENT
Start: 2024-03-28 | End: 2024-03-28 | Stop reason: HOSPADM

## 2024-03-28 RX ORDER — HEPARIN SODIUM 1000 [USP'U]/ML
INJECTION, SOLUTION INTRAVENOUS; SUBCUTANEOUS PRN
Status: DISCONTINUED | OUTPATIENT
Start: 2024-03-28 | End: 2024-03-28 | Stop reason: HOSPADM

## 2024-03-28 RX ORDER — ASPIRIN 81 MG/1
81 TABLET, CHEWABLE ORAL
Status: DISCONTINUED | OUTPATIENT
Start: 2024-03-28 | End: 2024-03-28

## 2024-03-28 RX ORDER — FENTANYL CITRATE 50 UG/ML
INJECTION, SOLUTION INTRAMUSCULAR; INTRAVENOUS PRN
Status: DISCONTINUED | OUTPATIENT
Start: 2024-03-28 | End: 2024-03-28 | Stop reason: HOSPADM

## 2024-03-28 RX ORDER — VERAPAMIL HYDROCHLORIDE 2.5 MG/ML
INJECTION, SOLUTION INTRAVENOUS PRN
Status: DISCONTINUED | OUTPATIENT
Start: 2024-03-28 | End: 2024-03-28 | Stop reason: HOSPADM

## 2024-03-28 RX ORDER — SODIUM CHLORIDE 9 MG/ML
INJECTION, SOLUTION INTRAVENOUS PRN
Status: DISCONTINUED | OUTPATIENT
Start: 2024-03-28 | End: 2024-03-28 | Stop reason: HOSPADM

## 2024-03-28 RX ADMIN — SODIUM CHLORIDE 100 ML/HR: 9 INJECTION, SOLUTION INTRAVENOUS at 09:45

## 2024-03-28 ASSESSMENT — PAIN SCALES - GENERAL
PAINLEVEL_OUTOF10: 2
PAINLEVEL_OUTOF10: 2

## 2024-03-28 ASSESSMENT — PAIN DESCRIPTION - LOCATION
LOCATION: BACK
LOCATION: BACK

## 2024-03-28 NOTE — H&P
Noé Ramsey  is a 73 y.o. male           Chief Complaint   Patient presents with    Follow-up       6 month         HPI     Patient presents for an add-on office visit.  He has a past medical history significant for hypertension, dyslipidemia,  diabetes mellitus, and COPD.  He was initially referred here for evaluation of dyspnea on exertion and tachycardia with activity.  He does have a history of atypical chest pain, and reports a significant cardiac workup while living in [facility] back in 2009 which included a cardiac catheterization showing no significant coronary artery disease.  The patient underwent an echocardiogram in January 2014, showing an LVEF of 50-55%, grade 1 diastolic dysfunction, in no significant valvular heart disease.  Normal PA pressures.     Patient underwent a 30-day event monitor in July 2020 which was a normal study demonstrated no significant arrhythmias.  Shortly thereafter he states he was admitted to the hospital for an acute episode of pancreatitis was hospitalized for almost 2 weeks.       Patient was last seen in our office in January 2024 by her nurse practitioner at the request of his [facility] physician for worsening shortness of breath with minimal physical activity.  As result, he underwent repeat noninvasive cardiac testing in February 2024 including echocardiogram and a pharmacologic nuclear stress test.  His echocardiogram showed preserved LVEF of 55 to 60% without any valvular heart disease.  The nuclear stress test was abnormal and moderate risk with a reversible inferolateral defect and a reversible inferoseptal defect concerning for ischemia.  EF 54%.     Lastly, he wore an event monitor in February 2024 which did not show any arrhythmias but sinus rhythm and frequent PACs.     Over the past 7 to 8 months, he continues to notice progressive shortness of breath with minimal physical activity, just walking to his car will even quite winded.  He notes occasional  MG extended release capsule Take 1 capsule by mouth 2 times daily        glucagon 1 MG injection Inject 1 mg into the muscle as needed (low blood sugar)        insulin glargine (BASAGLAR KWIKPEN) 100 UNIT/ML injection pen Inject 44 Units into the skin nightly        insulin glulisine (APIDRA) 100 UNIT/ML injection pen Inject 10 Units into the skin 3 times daily (before meals)        montelukast (SINGULAIR) 10 MG tablet Take 1 tablet by mouth daily as needed (allergies)        pantoprazole (PROTONIX) 40 MG tablet Take 1 tablet by mouth 2 times daily        pregabalin (LYRICA) 150 MG capsule Take 1 capsule by mouth 3 times daily.          No current facility-administered medications for this visit.               Allergies   Allergen Reactions    Latex Rash    Ciprofloxacin Anaphylaxis    Morphine Other (See Comments)       \"loss of blood pressure\"  \"Stopped my heart\"    Thiopental Shortness Of Breath       Other reaction(s): respiratory distress    Atorvastatin Other (See Comments)       Possible Pancreatitis, which may also have been due to januvia.    Metformin Other (See Comments)       Elevated cr 1.4    Sitagliptin Other (See Comments)       Possible pancreatitis.    Terbinafine Swelling       Tongue swelling    Doxycycline Rash and Other (See Comments)       Severe GERD  Side effect--GERD       Penicillins Hives and Rash    Sulfa Antibiotics Rash      Social History            Tobacco Use    Smoking status: Former       Types: Cigars       Start date:        Quit date:        Years since quittin.2    Smokeless tobacco: Never   Vaping Use    Vaping Use: Never used   Substance Use Topics    Alcohol use: Not Currently       Comment: Quit     Drug use: Never      Family History         Family History   Problem Relation Age of Onset    Diabetes Mother      Cancer Mother           Bone and Brain Cancer    Liver Disease Father           Lung Cancer    Kidney Disease Sister      Colon Cancer Brother

## 2024-03-28 NOTE — PROGRESS NOTES
Received from [facility], ambulatory with cane, denied any complaints. (+) NPO before midnight

## 2024-03-28 NOTE — PROGRESS NOTES
Right wrist band removed. Sterile hemostatic dressing applied. No bleeding or swelling. Safety splint applied. Normal radial pulse, normal distal circulation and neuro check. Safety instructions reviewed with the patient.

## 2024-03-28 NOTE — DISCHARGE INSTRUCTIONS
DISCHARGE SUMMARY from Nurse    PATIENT INSTRUCTIONS:    After general anesthesia or intravenous sedation, for 24 hours or while taking prescription Narcotics:  Limit your activities  Do not drive and operate hazardous machinery  Do not make important personal or business decisions  Do  not drink alcoholic beverages  If you have not urinated within 8 hours after discharge, please contact your surgeon on call.    Report the following to your surgeon:  Excessive pain, swelling, redness or odor of or around the surgical area  Temperature over 100.5  Nausea and vomiting lasting longer than 4 hours or if unable to take medications  Any signs of decreased circulation or nerve impairment to extremity: change in color, persistent  numbness, tingling, coldness or increase pain  Any questions    What to do at Home:  Recommended activity: no lifting, Driving, or Strenuous exercise for 24 hours.    If you experience any of the following symptoms bleeding,swelling,acute pain or numbness, fever, please follow up with Dr. JOVAN Loya MD or Dr. MADISON Villa MD.    *  Please give a list of your current medications to your Primary Care Provider.    *  Please update this list whenever your medications are discontinued, doses are      changed, or new medications (including over-the-counter products) are added.    *  Please carry medication information at all times in case of emergency situations.    These are general instructions for a healthy lifestyle:    No smoking/ No tobacco products/ Avoid exposure to second hand smoke  Surgeon General's Warning:  Quitting smoking now greatly reduces serious risk to your health.    Obesity, smoking, and sedentary lifestyle greatly increases your risk for illness    A healthy diet, regular physical exercise & weight monitoring are important for maintaining a healthy lifestyle    You may be retaining fluid if you have a history of heart failure or if you experience any of the following symptoms:

## 2024-04-16 RX ORDER — LEVOTHYROXINE SODIUM 0.12 MG/1
TABLET ORAL
Qty: 90 TABLET | Refills: 1 | Status: SHIPPED | OUTPATIENT
Start: 2024-04-16

## 2024-04-16 RX ORDER — AMITRIPTYLINE HYDROCHLORIDE 10 MG/1
10 TABLET, FILM COATED ORAL NIGHTLY
Qty: 90 TABLET | Refills: 1 | Status: SHIPPED | OUTPATIENT
Start: 2024-04-16

## 2024-05-06 RX ORDER — TOPIRAMATE 25 MG/1
TABLET ORAL
Qty: 90 TABLET | Refills: 1 | Status: SHIPPED | OUTPATIENT
Start: 2024-05-06

## 2024-05-09 ENCOUNTER — OFFICE VISIT (OUTPATIENT)
Age: 74
End: 2024-05-09
Payer: MEDICARE

## 2024-05-09 VITALS
OXYGEN SATURATION: 95 % | BODY MASS INDEX: 36.79 KG/M2 | DIASTOLIC BLOOD PRESSURE: 90 MMHG | HEIGHT: 70 IN | HEART RATE: 81 BPM | SYSTOLIC BLOOD PRESSURE: 138 MMHG | WEIGHT: 257 LBS

## 2024-05-09 DIAGNOSIS — R00.2 PALPITATIONS: ICD-10-CM

## 2024-05-09 DIAGNOSIS — R94.39 ABNORMAL NUCLEAR STRESS TEST: Primary | ICD-10-CM

## 2024-05-09 DIAGNOSIS — E78.00 PURE HYPERCHOLESTEROLEMIA, UNSPECIFIED: ICD-10-CM

## 2024-05-09 DIAGNOSIS — I10 ESSENTIAL (PRIMARY) HYPERTENSION: ICD-10-CM

## 2024-05-09 DIAGNOSIS — E11.21 TYPE 2 DIABETES MELLITUS WITH DIABETIC NEPHROPATHY, UNSPECIFIED WHETHER LONG TERM INSULIN USE (HCC): ICD-10-CM

## 2024-05-09 PROCEDURE — 3017F COLORECTAL CA SCREEN DOC REV: CPT | Performed by: INTERNAL MEDICINE

## 2024-05-09 PROCEDURE — 1036F TOBACCO NON-USER: CPT | Performed by: INTERNAL MEDICINE

## 2024-05-09 PROCEDURE — 1123F ACP DISCUSS/DSCN MKR DOCD: CPT | Performed by: INTERNAL MEDICINE

## 2024-05-09 PROCEDURE — 3080F DIAST BP >= 90 MM HG: CPT | Performed by: INTERNAL MEDICINE

## 2024-05-09 PROCEDURE — 99214 OFFICE O/P EST MOD 30 MIN: CPT | Performed by: INTERNAL MEDICINE

## 2024-05-09 PROCEDURE — 3075F SYST BP GE 130 - 139MM HG: CPT | Performed by: INTERNAL MEDICINE

## 2024-05-09 PROCEDURE — G8427 DOCREV CUR MEDS BY ELIG CLIN: HCPCS | Performed by: INTERNAL MEDICINE

## 2024-05-09 PROCEDURE — 93000 ELECTROCARDIOGRAM COMPLETE: CPT | Performed by: INTERNAL MEDICINE

## 2024-05-09 PROCEDURE — 3046F HEMOGLOBIN A1C LEVEL >9.0%: CPT | Performed by: INTERNAL MEDICINE

## 2024-05-09 PROCEDURE — G8417 CALC BMI ABV UP PARAM F/U: HCPCS | Performed by: INTERNAL MEDICINE

## 2024-05-09 PROCEDURE — 2022F DILAT RTA XM EVC RTNOPTHY: CPT | Performed by: INTERNAL MEDICINE

## 2024-05-09 ASSESSMENT — ENCOUNTER SYMPTOMS
COUGH: 0
ABDOMINAL DISTENTION: 0
ABDOMINAL PAIN: 0
SHORTNESS OF BREATH: 0
VOMITING: 0
SORE THROAT: 0
NAUSEA: 0

## 2024-05-09 ASSESSMENT — PATIENT HEALTH QUESTIONNAIRE - PHQ9
SUM OF ALL RESPONSES TO PHQ9 QUESTIONS 1 & 2: 0
SUM OF ALL RESPONSES TO PHQ QUESTIONS 1-9: 0
1. LITTLE INTEREST OR PLEASURE IN DOING THINGS: NOT AT ALL
2. FEELING DOWN, DEPRESSED OR HOPELESS: NOT AT ALL
SUM OF ALL RESPONSES TO PHQ QUESTIONS 1-9: 0

## 2024-05-09 NOTE — PROGRESS NOTES
Noé Ramsey presents today for   Chief Complaint   Patient presents with    Follow-up     F/u after left heart cath on 3.28.24    Chest Pain     Left chest pains with sharp/stabbing pains at times     Palpitations     Racing palps at times     Shortness of Breath     SOB with exertion     Dizziness     Dizzy spells yesterday        Noé Ramsey preferred language for health care discussion is english/other.    Is someone accompanying this pt? no    Is the patient using any DME equipment during OV? no    Depression Screening:  Depression: Not at risk (5/9/2024)    PHQ-2     PHQ-2 Score: 0        Learning Assessment:  Who is the primary learner? Patient    What is the preferred language for health care of the primary learner? ENGLISH    How does the primary learner prefer to learn new concepts? DEMONSTRATION    Answered By patient    Relationship to Learner SELF           Pt currently taking Anticoagulant therapy? no    Pt currently taking Antiplatelet therapy ? ASA 81 mg 1x daily       Coordination of Care:  1. Have you been to the ER, urgent care clinic since your last visit? Hospitalized since your last visit? no    2. Have you seen or consulted any other health care providers outside of the [facility] since your last visit? Include any pap smears or colon screening. no

## 2024-05-09 NOTE — PROGRESS NOTES
05/09/24     Noé Ramsey  is a 73 y.o. male     Chief Complaint   Patient presents with    Follow-up     F/u after left heart cath on 3.28.24    Chest Pain     Left chest pains with sharp/stabbing pains at times     Palpitations     Racing palps at times     Shortness of Breath     SOB with exertion     Dizziness     Dizzy spells yesterday        HPI    Patient presents for a post hospital follow-up office visit.  He has a past medical history significant for hypertension, dyslipidemia,  diabetes mellitus, and COPD.  He was initially referred here for evaluation of dyspnea on exertion and tachycardia with activity.  He does have a history of atypical chest pain, and reports a significant cardiac workup while living in [facility] back in 2009 which included a cardiac catheterization showing no significant coronary artery disease.  The patient underwent an echocardiogram in January 2014, showing an LVEF of 50-55%, grade 1 diastolic dysfunction, in no significant valvular heart disease.  Normal PA pressures.    Patient underwent a 30-day event monitor in July 2020 which was a normal study demonstrated no significant arrhythmias.  Shortly thereafter he states he was admitted to the hospital for an acute episode of pancreatitis was hospitalized for almost 2 weeks.      Patient was last seen in our office in January 2024 by her nurse practitioner at the request of his [facility] physician for worsening shortness of breath with minimal physical activity.  As result, he underwent repeat noninvasive cardiac testing in February 2024 including echocardiogram and a pharmacologic nuclear stress test.  His echocardiogram showed preserved LVEF of 55 to 60% without any valvular heart disease.  The nuclear stress test was abnormal and moderate risk with a reversible inferolateral defect and a reversible inferoseptal defect concerning for ischemia.  EF 54%.    Lastly, he wore an event monitor in February 2024 which did not show

## 2024-05-28 ENCOUNTER — OFFICE VISIT (OUTPATIENT)
Age: 74
End: 2024-05-28
Payer: MEDICARE

## 2024-05-28 VITALS
WEIGHT: 257 LBS | RESPIRATION RATE: 20 BRPM | HEIGHT: 70 IN | OXYGEN SATURATION: 94 % | SYSTOLIC BLOOD PRESSURE: 120 MMHG | BODY MASS INDEX: 36.79 KG/M2 | TEMPERATURE: 97.9 F | DIASTOLIC BLOOD PRESSURE: 79 MMHG | HEART RATE: 79 BPM

## 2024-05-28 DIAGNOSIS — R53.81 PHYSICAL DECONDITIONING: ICD-10-CM

## 2024-05-28 DIAGNOSIS — E66.01 SEVERE OBESITY (HCC): ICD-10-CM

## 2024-05-28 DIAGNOSIS — J44.89 ASTHMA-COPD OVERLAP SYNDROME (HCC): Primary | ICD-10-CM

## 2024-05-28 DIAGNOSIS — D75.1 SECONDARY POLYCYTHEMIA: ICD-10-CM

## 2024-05-28 DIAGNOSIS — R06.09 DYSPNEA ON EXERTION: ICD-10-CM

## 2024-05-28 PROCEDURE — 1123F ACP DISCUSS/DSCN MKR DOCD: CPT | Performed by: INTERNAL MEDICINE

## 2024-05-28 PROCEDURE — G8417 CALC BMI ABV UP PARAM F/U: HCPCS | Performed by: INTERNAL MEDICINE

## 2024-05-28 PROCEDURE — 3074F SYST BP LT 130 MM HG: CPT | Performed by: INTERNAL MEDICINE

## 2024-05-28 PROCEDURE — 3017F COLORECTAL CA SCREEN DOC REV: CPT | Performed by: INTERNAL MEDICINE

## 2024-05-28 PROCEDURE — 3023F SPIROM DOC REV: CPT | Performed by: INTERNAL MEDICINE

## 2024-05-28 PROCEDURE — 1036F TOBACCO NON-USER: CPT | Performed by: INTERNAL MEDICINE

## 2024-05-28 PROCEDURE — 99214 OFFICE O/P EST MOD 30 MIN: CPT | Performed by: INTERNAL MEDICINE

## 2024-05-28 PROCEDURE — G8428 CUR MEDS NOT DOCUMENT: HCPCS | Performed by: INTERNAL MEDICINE

## 2024-05-28 PROCEDURE — 3078F DIAST BP <80 MM HG: CPT | Performed by: INTERNAL MEDICINE

## 2024-05-28 NOTE — PROGRESS NOTES
Noé Ramsey presents today for   Chief Complaint   Patient presents with    Asthma     W/ copd overlap    Shortness of Breath    Sleep Apnea       Is someone accompanying this pt? No    Is the patient using any DME equipment during OV? Cane    [employer]    Depression Screenin/9/2024     8:33 AM   PHQ-9 Questionaire   Little interest or pleasure in doing things 0   Feeling down, depressed, or hopeless 0   PHQ-9 Total Score 0       Learning Assessment:    Failed to redirect to the Timeline version of the [employer] SmartLink.    Abuse Screening:         No data to display                Fall Risk    Failed to redirect to the Timeline version of the [employer] SmartLink.    Coordination of Care:    1. Have you been to the ER, urgent care clinic since your last visit? Hospitalized since your last visit? No    2. Have you seen or consulted any other health care providers outside of the [facility] since your last visit? Include any pap smears or colon screening. No    Medication list has been update per patient.    
03/22/2024    Narrative  EXAM: XR CHEST (2 VW)    CLINICAL INDICATION/HISTORY : Palpitations; Type 2 diabetes mellitus with  diabetic nephropathy; Essential (primary) hypertension; Pure  hypercholesterolemia, unspecified; Atrial premature depolarization; Abnormal  result of other cardiovascular function study.    COMPARISON: September 28, 2020    TECHNIQUE: 2 VIEWS    FINDINGS:    The lungs are clear.  The heart and mediastinum are unremarkable.  No evidence of pleural effusion.  Moderate thoracic degenerative changes including degenerative disc disease and  diffuse idiopathic skeletal hyperostosis.    Impression  1.  No active cardiopulmonary disease        PFTs: 1/23/2024: Spirometry is normal without BD response.  Lung volumes are normal.  Diffusion capacity is mildly reduced.     TTE:  I have reviewed the patient's TTE results  From [facility] on 2/19/2023  Interpretation Summary  A complete two-dimensional transthoracic echocardiogram was performed (2D,  M-mode, Doppler and color flow Doppler).  The study was technically difficult.  Definity used for endocardial border enhancement.  The left ventricle is normal in size with mild septal wall hypertrophy and  low normal systolic function.  The calculated left ventricular ejection fraction is 52%.  Based on the transmitral spectral Doppler flow pattern the diastolic  function is normal for age.  The right ventricle is normal size and systolic function.  No hemodynamically significant valvular pathology.  Pulmonary artery pressure estimated at 29 mmHg.  Previous report dated 1/2014, shows an EF of 50-55%.         Assessment and Plan:  73 y.o. male with:     Impression:  1.  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  Work-up for primary polycythemia vera negative  2.  Asthma COPD overlap, mild: No airflow obstruction on PFTs.  Symptoms of bronchospasm are mild to moderate, better controlled

## 2024-07-01 ENCOUNTER — HOSPITAL ENCOUNTER (OUTPATIENT)
Facility: HOSPITAL | Age: 74
Discharge: HOME | End: 2024-07-04
Payer: MEDICARE

## 2024-07-01 LAB
ALBUMIN SERPL-MCNC: 3.7 G/DL (ref 3.4–5)
ALBUMIN/GLOB SERPL: 1.1 (ref 0.8–1.7)
ALP SERPL-CCNC: 61 U/L (ref 45–117)
ALT SERPL-CCNC: 20 U/L (ref 16–61)
ANION GAP SERPL CALC-SCNC: 7 MMOL/L (ref 3–18)
AST SERPL-CCNC: 12 U/L (ref 10–38)
BILIRUB SERPL-MCNC: 0.6 MG/DL (ref 0.2–1)
BUN SERPL-MCNC: 13 MG/DL (ref 7–18)
BUN/CREAT SERPL: 11 (ref 12–20)
CALCIUM SERPL-MCNC: 8.9 MG/DL (ref 8.5–10.1)
CHLORIDE SERPL-SCNC: 108 MMOL/L (ref 100–111)
CO2 SERPL-SCNC: 26 MMOL/L (ref 21–32)
CREAT SERPL-MCNC: 1.17 MG/DL (ref 0.6–1.3)
EST. AVERAGE GLUCOSE BLD GHB EST-MCNC: 140 MG/DL
GLOBULIN SER CALC-MCNC: 3.3 G/DL (ref 2–4)
GLUCOSE SERPL-MCNC: 131 MG/DL (ref 74–99)
HBA1C MFR BLD: 6.5 % (ref 4.2–5.6)
POTASSIUM SERPL-SCNC: 4.2 MMOL/L (ref 3.5–5.5)
PROT SERPL-MCNC: 7 G/DL (ref 6.4–8.2)
SODIUM SERPL-SCNC: 141 MMOL/L (ref 136–145)

## 2024-07-01 PROCEDURE — 83036 HEMOGLOBIN GLYCOSYLATED A1C: CPT

## 2024-07-01 PROCEDURE — 80053 COMPREHEN METABOLIC PANEL: CPT

## 2024-07-01 PROCEDURE — 36415 COLL VENOUS BLD VENIPUNCTURE: CPT

## 2024-07-24 ENCOUNTER — OFFICE VISIT (OUTPATIENT)
Facility: CLINIC | Age: 74
End: 2024-07-24

## 2024-07-24 VITALS
TEMPERATURE: 97.4 F | WEIGHT: 254.8 LBS | HEIGHT: 70 IN | DIASTOLIC BLOOD PRESSURE: 68 MMHG | HEART RATE: 73 BPM | RESPIRATION RATE: 16 BRPM | BODY MASS INDEX: 36.48 KG/M2 | SYSTOLIC BLOOD PRESSURE: 118 MMHG | OXYGEN SATURATION: 98 %

## 2024-07-24 DIAGNOSIS — E11.8 TYPE 2 DIABETES MELLITUS WITH COMPLICATION, WITH LONG-TERM CURRENT USE OF INSULIN (HCC): ICD-10-CM

## 2024-07-24 DIAGNOSIS — L98.9 BUMPS ON SKIN: ICD-10-CM

## 2024-07-24 DIAGNOSIS — I50.32 CHRONIC DIASTOLIC HEART FAILURE (HCC): ICD-10-CM

## 2024-07-24 DIAGNOSIS — J44.9 CHRONIC OBSTRUCTIVE PULMONARY DISEASE, UNSPECIFIED COPD TYPE (HCC): ICD-10-CM

## 2024-07-24 DIAGNOSIS — E03.9 HYPOTHYROIDISM, UNSPECIFIED TYPE: ICD-10-CM

## 2024-07-24 DIAGNOSIS — Z79.4 TYPE 2 DIABETES MELLITUS WITH COMPLICATION, WITH LONG-TERM CURRENT USE OF INSULIN (HCC): ICD-10-CM

## 2024-07-24 DIAGNOSIS — M47.816 SPONDYLOSIS OF LUMBAR REGION WITHOUT MYELOPATHY OR RADICULOPATHY: ICD-10-CM

## 2024-07-24 DIAGNOSIS — D75.1 SECONDARY POLYCYTHEMIA: ICD-10-CM

## 2024-07-24 DIAGNOSIS — I10 ESSENTIAL HYPERTENSION: Primary | ICD-10-CM

## 2024-07-24 DIAGNOSIS — H90.3 SENSORINEURAL HEARING LOSS (SNHL) OF BOTH EARS: ICD-10-CM

## 2024-07-24 DIAGNOSIS — G47.33 OBSTRUCTIVE SLEEP APNEA SYNDROME: ICD-10-CM

## 2024-07-24 DIAGNOSIS — N40.0 BENIGN PROSTATIC HYPERPLASIA WITHOUT LOWER URINARY TRACT SYMPTOMS: ICD-10-CM

## 2024-07-24 DIAGNOSIS — K21.9 GASTROESOPHAGEAL REFLUX DISEASE WITHOUT ESOPHAGITIS: ICD-10-CM

## 2024-07-24 DIAGNOSIS — R94.39 ABNORMAL STRESS TEST: ICD-10-CM

## 2024-07-24 NOTE — PROGRESS NOTES
Noé Ramsey (:  1950) is a 73 y.o. male, Established patient, here for evaluation of the following chief complaint(s):  Gastroesophageal Reflux, Hypertension, Chronic Kidney Disease, Diabetes, Polycythemia, Sleep Apnea, and Congestive Heart Failure         Assessment & Plan      Results  CMP:  Lab Results   Component Value Date/Time     2024 09:32 AM    K 4.2 2024 09:32 AM     2024 09:32 AM    CO2 26 2024 09:32 AM    CO2 23.0 2020 07:38 PM    BUN 13 2024 09:32 AM    CREATININE 1.17 2024 09:32 AM    GLUCOSE 131 2024 09:32 AM    GLUCOSE 151 2023 06:00 AM    CALCIUM 8.9 2024 09:32 AM    BILITOT 0.6 2024 09:32 AM    AST 12 2024 09:32 AM    ALT 20 2024 09:32 AM          CBC:  Lab Results   Component Value Date/Time    WBC 9.1 2024 09:51 AM    RBC 5.39 2024 09:51 AM    HGB 15.0 2024 09:51 AM    HCT 46.0 2024 09:51 AM    MCV 85.3 2024 09:51 AM    MCH 27.8 2024 09:51 AM    MCHC 32.6 2024 09:51 AM    RDW 13.4 2024 09:51 AM     2024 09:51 AM    MPV 10.2 2024 09:51 AM        Lipids   Lab Results   Component Value Date/Time    CHOL 243 10/17/2023 10:48 AM    TRIG 196 10/17/2023 10:48 AM    HDL 49 10/17/2023 10:48 AM    CHOLHDLRATIO 5.0 10/17/2023 10:48 AM       A1c:   Hemoglobin A1C   Date Value Ref Range Status   2024 6.5 (H) 4.2 - 5.6 % Final     Comment:     (NOTE)  HbA1C Interpretive Ranges  <5.7              Normal  5.7 - 6.4         Consider Prediabetes  >6.5              Consider Diabetes     10/17/2023 6.7 (H) 4.2 - 5.6 % Final     Comment:     (NOTE)  HbA1C Interpretive Ranges  <5.7              Normal  5.7 - 6.4         Consider Prediabetes  >6.5              Consider Diabetes     2023 6.6 (H) 3.8 - 5.6 % Final     Comment:       DIABETIC = GREATER THAN OR EQUALS TO 6.5%    PREDIABETIC = 5.7 - 6.4%                      IF A1C % IS:

## 2024-07-24 NOTE — PROGRESS NOTES
\"Have you been to the ER, urgent care clinic since your last visit?  Hospitalized since your last visit?\"    [facility] LOV: 3/28/24 Cath Lab    “Have you seen or consulted any other health care providers outside of [facility] since your last visit?”    Urology LOV: 6/17/24. Dr. Sera Champion LOV: 4/02/24. Dr. Montiel LOV: 1/18/24.      Last dm eye Dr. Garzon ([facility]) - 3/26/24    Last dm foot exam 1 Foot 2 Foot - 5/29/24. This office note has been received at \[facility]\"".    Chief Complaint   Patient presents with    Gastroesophageal Reflux    Hypertension    Chronic Kidney Disease    Diabetes    Polycythemia    Sleep Apnea    Congestive Heart Failure       Click Here for Release of Records Request

## 2024-10-08 RX ORDER — LEVOTHYROXINE SODIUM 125 UG/1
TABLET ORAL
Qty: 90 TABLET | Refills: 1 | Status: SHIPPED | OUTPATIENT
Start: 2024-10-08

## 2024-10-09 RX ORDER — TOPIRAMATE 25 MG/1
TABLET, FILM COATED ORAL
Qty: 90 TABLET | Refills: 1 | Status: SHIPPED | OUTPATIENT
Start: 2024-10-09

## 2024-11-14 ENCOUNTER — OFFICE VISIT (OUTPATIENT)
Age: 74
End: 2024-11-14

## 2024-11-14 VITALS
HEIGHT: 70 IN | BODY MASS INDEX: 35.93 KG/M2 | HEART RATE: 82 BPM | OXYGEN SATURATION: 95 % | DIASTOLIC BLOOD PRESSURE: 70 MMHG | SYSTOLIC BLOOD PRESSURE: 128 MMHG | WEIGHT: 251 LBS

## 2024-11-14 DIAGNOSIS — E11.21 TYPE 2 DIABETES MELLITUS WITH DIABETIC NEPHROPATHY, UNSPECIFIED WHETHER LONG TERM INSULIN USE (HCC): ICD-10-CM

## 2024-11-14 DIAGNOSIS — E78.00 PURE HYPERCHOLESTEROLEMIA, UNSPECIFIED: ICD-10-CM

## 2024-11-14 DIAGNOSIS — R00.2 PALPITATIONS: ICD-10-CM

## 2024-11-14 DIAGNOSIS — I10 ESSENTIAL (PRIMARY) HYPERTENSION: ICD-10-CM

## 2024-11-14 DIAGNOSIS — R94.39 ABNORMAL NUCLEAR STRESS TEST: Primary | ICD-10-CM

## 2024-11-14 PROBLEM — Z77.29 EXPOSURE TO POTENTIALLY HAZARDOUS SUBSTANCE: Status: ACTIVE | Noted: 2024-11-14

## 2024-11-14 PROBLEM — J44.9 CHRONIC OBSTRUCTIVE PULMONARY DISEASE (HCC): Status: ACTIVE | Noted: 2024-11-14

## 2024-11-14 ASSESSMENT — ANXIETY QUESTIONNAIRES
5. BEING SO RESTLESS THAT IT IS HARD TO SIT STILL: NOT AT ALL
4. TROUBLE RELAXING: NOT AT ALL
7. FEELING AFRAID AS IF SOMETHING AWFUL MIGHT HAPPEN: NOT AT ALL
2. NOT BEING ABLE TO STOP OR CONTROL WORRYING: NOT AT ALL
GAD7 TOTAL SCORE: 0
3. WORRYING TOO MUCH ABOUT DIFFERENT THINGS: NOT AT ALL
1. FEELING NERVOUS, ANXIOUS, OR ON EDGE: NOT AT ALL
6. BECOMING EASILY ANNOYED OR IRRITABLE: NOT AT ALL

## 2024-11-14 ASSESSMENT — ENCOUNTER SYMPTOMS
VOMITING: 0
SORE THROAT: 0
NAUSEA: 0
ABDOMINAL DISTENTION: 0
ABDOMINAL PAIN: 0
SHORTNESS OF BREATH: 0
COUGH: 0

## 2024-11-14 ASSESSMENT — PATIENT HEALTH QUESTIONNAIRE - PHQ9
SUM OF ALL RESPONSES TO PHQ QUESTIONS 1-9: 0
SUM OF ALL RESPONSES TO PHQ9 QUESTIONS 1 & 2: 0
1. LITTLE INTEREST OR PLEASURE IN DOING THINGS: NOT AT ALL
2. FEELING DOWN, DEPRESSED OR HOPELESS: NOT AT ALL

## 2024-11-14 NOTE — PROGRESS NOTES
CHIEF COMPLAINT  Chief Complaint   Patient presents with    Sore Throat       HPI  María Thorpe a 49 y.o. female who presents with c/o sore throat, dry cough,and congestion since last night. Pt denies fever, rash, abdominal pain, n/v/d, chest pain or SOB. Pt reports she has been taking mucinex and tylenol with no relief of symptoms.       CURRENT MEDICATIONS  Current Outpatient Medications on File Prior to Visit   Medication Sig Dispense Refill    allopurinoL (ZYLOPRIM) 100 MG tablet TAKE ONE TABLET BY MOUTH DAILY FOR PAIN      dextroamphetamine-amphetamine 30 mg Tab Take 1 tablet by mouth once daily.      loratadine (CLARITIN) 10 mg tablet Take 1 tablet (10 mg total) by mouth once daily. for 14 days 14 tablet 0    valsartan-hydrochlorothiazide (DIOVAN-HCT) 320-25 mg per tablet Take 1 tablet by mouth.      lisinopriL-hydrochlorothiazide (PRINZIDE,ZESTORETIC) 20-25 mg Tab       methylPREDNISolone (MEDROL DOSEPACK) 4 mg tablet use as directed 21 tablet 0     No current facility-administered medications on file prior to visit.       ALLERGIES  Review of patient's allergies indicates:  No Known Allergies      There is no immunization history on file for this patient.    PAST MEDICAL HISTORY  History reviewed. No pertinent past medical history.    SURGICAL HISTORY  History reviewed. No pertinent surgical history.    SOCIAL HISTORY  Social History     Socioeconomic History    Marital status: Unknown   Tobacco Use    Smoking status: Never       FAMILY HISTORY  History reviewed. No pertinent family history.    REVIEW OF SYSTEMS  Constitutional: No fever, chills, or weakness.  Eyes: No redness, pain, or discharge  HENT: No ear pain, no headache,+ sinus congestion, + throat pain  Respiratory: +dry cough,no  wheezing or shortness of breath  Cardiovascular: No chest pain, palpitations or edema    All systems otherwise negative except as noted in the Review of Systems and History of Present Illness      PHYSICAL 
Noé Ramsey presents today for   Chief Complaint   Patient presents with    Follow-up     6 month       Noé Ramsey preferred language for health care discussion is english/other.    Is someone accompanying this pt? no    Is the patient using any DME equipment during OV? no    Depression Screening:  Depression: Not at risk (11/14/2024)    PHQ-2     PHQ-2 Score: 0        Learning Assessment:  Who is the primary learner? Patient    What is the preferred language for health care of the primary learner? ENGLISH    How does the primary learner prefer to learn new concepts? DEMONSTRATION    Answered By patient    Relationship to Learner SELF           Pt currently taking Anticoagulant therapy? no    Pt currently taking Antiplatelet therapy ? Aspirin 81 mg daily      Coordination of Care:  1. Have you been to the ER, urgent care clinic since your last visit? Hospitalized since your last visit? no    2. Have you seen or consulted any other health care providers outside of the [facility] since your last visit? Include any pap smears or colon screening. no    
EXAM  Reviewed Triage Note  VITAL SIGNS: 152/73  Constitutional: Well developed, well nourished, Alert and oriented x3, No acute distress, obviously ill  HENT: Normocephalic, Atraumatic, Bilateral external ears normal, bilateral ear canals clear, external nose negative, oropharynx moist, No oral exudates, edema or erythema  Eyes: PERRL, EOMI, Conjunctiva normal, No discharge.  Neck: Normal range of motion, no tenderness, supple, no carotid bruits  Respiratory: Normal breath sounds, no respiratory distress, no wheezing, no rhonchi, no rales  Cardiovascular: HR 88, normal rhythm, no murmurs, no rubs, no gallops.        LABS  Pertinent labs reviewed. (see chart for details)  Strep negative  MEDICAL DECISION MAKING    Physical exam findings and lab results discussed with patient. No acute emergent medical condition identified at this time to warrant further testing. Will dispo home with instructions to follow up with PCP tomorrow.  Script for duraflu since pharmacy of choice with instructions on usage Pt agrees with plan of care.     DISPOSITION  Patient discharged in stable condition       CLINICAL IMPRESSION:  The primary encounter diagnosis was Sore throat. Diagnoses of Congestion of nasal sinus and Acute cough were also pertinent to this visit.    Patient advised to follow-up with your PCP within 3 days for BP re-check if Blood Pressure was >120/80 without history of hypertension.        
Effort: Pulmonary effort is normal.      Breath sounds: No wheezing, rhonchi or rales.   Abdominal:      General: Bowel sounds are normal. There is no distension.      Palpations: Abdomen is soft.      Tenderness: There is no abdominal tenderness.   Musculoskeletal:         General: No swelling or deformity.   Skin:     General: Skin is warm and dry.      Findings: No rash.   Neurological:      General: No focal deficit present.      Mental Status: He is alert and oriented to person, place, and time.   Psychiatric:         Mood and Affect: Mood normal.         Behavior: Behavior normal.         EKG: Normal sinus rhythm with sinus arrhythmia, occasional atrial premature contractions, normal axis, normal QTc interval.  No ST or T wave changes concerning for ischemia.  No change compared to the previous EKG.    Assessment / Plan:     False positive abnormal nuclear stress test.  Patient underwent a moderate risk pharmacologic nuclear stress test in February 2024.  This led to a cardiac catheterization in March 2024 which did not show any significant obstructive coronary disease.  His highest grade lesion was in a left-sided PDA of 40 to 50%.  No new symptoms concerning for angina.  I would continue on aspirin and a low-dose beta-blocker.  He has been intolerant to statins in the past.    Heart palpitations.  Patient wore an event monitor in February 2024.  This did not show any arrhythmias but frequent APCs.  I suspect this may be the cause of his symptoms.  He was started on low-dose beta-blocker which he appears to be tolerating.  I advised him that he can take an extra full 25 mg of metoprolol for he has prolonged heart palpitations.    Essential hypertension.  Patient's blood pressure appears to be well-controlled on metoprolol as monotherapy.    Dyslipidemia.  Patient has been intolerant to atorvastatin in the past for possible pancreatitis.  His most recent lipid profile from October 2023 was elevated: Total

## 2025-01-09 ENCOUNTER — HOSPITAL ENCOUNTER (OUTPATIENT)
Facility: HOSPITAL | Age: 75
Discharge: HOME | End: 2025-01-12
Payer: MEDICARE

## 2025-01-09 DIAGNOSIS — Z79.4 TYPE 2 DIABETES MELLITUS WITH COMPLICATION, WITH LONG-TERM CURRENT USE OF INSULIN (HCC): ICD-10-CM

## 2025-01-09 DIAGNOSIS — E11.8 TYPE 2 DIABETES MELLITUS WITH COMPLICATION, WITH LONG-TERM CURRENT USE OF INSULIN (HCC): ICD-10-CM

## 2025-01-09 LAB
ALBUMIN SERPL-MCNC: 3.8 G/DL (ref 3.4–5)
ALBUMIN/GLOB SERPL: 1.1 (ref 0.8–1.7)
ALP SERPL-CCNC: 57 U/L (ref 45–117)
ALT SERPL-CCNC: 20 U/L (ref 16–61)
ANION GAP SERPL CALC-SCNC: 5 MMOL/L (ref 3–18)
AST SERPL-CCNC: 12 U/L (ref 10–38)
BILIRUB SERPL-MCNC: 0.4 MG/DL (ref 0.2–1)
BUN SERPL-MCNC: 15 MG/DL (ref 7–18)
BUN/CREAT SERPL: 11 (ref 12–20)
CALCIUM SERPL-MCNC: 9.1 MG/DL (ref 8.5–10.1)
CHLORIDE SERPL-SCNC: 106 MMOL/L (ref 100–111)
CHOLEST SERPL-MCNC: 212 MG/DL
CO2 SERPL-SCNC: 27 MMOL/L (ref 21–32)
CREAT SERPL-MCNC: 1.32 MG/DL (ref 0.6–1.3)
CREAT UR-MCNC: 238 MG/DL (ref 30–125)
ERYTHROCYTE [DISTWIDTH] IN BLOOD BY AUTOMATED COUNT: 13.1 % (ref 11.6–14.5)
EST. AVERAGE GLUCOSE BLD GHB EST-MCNC: 143 MG/DL
GLOBULIN SER CALC-MCNC: 3.4 G/DL (ref 2–4)
GLUCOSE SERPL-MCNC: 151 MG/DL (ref 74–99)
HBA1C MFR BLD: 6.6 % (ref 4.2–5.6)
HCT VFR BLD AUTO: 45.1 % (ref 36–48)
HDLC SERPL-MCNC: 44 MG/DL (ref 40–60)
HDLC SERPL: 4.8 (ref 0–5)
HGB BLD-MCNC: 14.3 G/DL (ref 13–16)
LDLC SERPL CALC-MCNC: 125.4 MG/DL (ref 0–100)
LIPID PANEL: ABNORMAL
MCH RBC QN AUTO: 26.7 PG (ref 24–34)
MCHC RBC AUTO-ENTMCNC: 31.7 G/DL (ref 31–37)
MCV RBC AUTO: 84.1 FL (ref 78–100)
MICROALBUMIN UR-MCNC: 14.5 MG/DL (ref 0–3)
MICROALBUMIN/CREAT UR-RTO: 61 MG/G (ref 0–30)
NRBC # BLD: 0 K/UL (ref 0–0.01)
NRBC BLD-RTO: 0 PER 100 WBC
PLATELET # BLD AUTO: 281 K/UL (ref 135–420)
PMV BLD AUTO: 10.5 FL (ref 9.2–11.8)
POTASSIUM SERPL-SCNC: 4.8 MMOL/L (ref 3.5–5.5)
PROT SERPL-MCNC: 7.2 G/DL (ref 6.4–8.2)
RBC # BLD AUTO: 5.36 M/UL (ref 4.35–5.65)
SODIUM SERPL-SCNC: 138 MMOL/L (ref 136–145)
TRIGL SERPL-MCNC: 213 MG/DL
TSH SERPL DL<=0.05 MIU/L-ACNC: 1.62 UIU/ML (ref 0.36–3.74)
VLDLC SERPL CALC-MCNC: 42.6 MG/DL
WBC # BLD AUTO: 8.8 K/UL (ref 4.6–13.2)

## 2025-01-09 PROCEDURE — 80053 COMPREHEN METABOLIC PANEL: CPT

## 2025-01-09 PROCEDURE — 82043 UR ALBUMIN QUANTITATIVE: CPT

## 2025-01-09 PROCEDURE — 83036 HEMOGLOBIN GLYCOSYLATED A1C: CPT

## 2025-01-09 PROCEDURE — 85027 COMPLETE CBC AUTOMATED: CPT

## 2025-01-09 PROCEDURE — 84443 ASSAY THYROID STIM HORMONE: CPT

## 2025-01-09 PROCEDURE — 82570 ASSAY OF URINE CREATININE: CPT

## 2025-01-09 PROCEDURE — 80061 LIPID PANEL: CPT

## 2025-01-10 SDOH — ECONOMIC STABILITY: FOOD INSECURITY: WITHIN THE PAST 12 MONTHS, THE FOOD YOU BOUGHT JUST DIDN'T LAST AND YOU DIDN'T HAVE MONEY TO GET MORE.: NEVER TRUE

## 2025-01-10 SDOH — ECONOMIC STABILITY: FOOD INSECURITY: WITHIN THE PAST 12 MONTHS, YOU WORRIED THAT YOUR FOOD WOULD RUN OUT BEFORE YOU GOT MONEY TO BUY MORE.: NEVER TRUE

## 2025-01-10 SDOH — ECONOMIC STABILITY: TRANSPORTATION INSECURITY
IN THE PAST 12 MONTHS, HAS LACK OF TRANSPORTATION KEPT YOU FROM MEETINGS, WORK, OR FROM GETTING THINGS NEEDED FOR DAILY LIVING?: NO

## 2025-01-10 SDOH — ECONOMIC STABILITY: TRANSPORTATION INSECURITY
IN THE PAST 12 MONTHS, HAS THE LACK OF TRANSPORTATION KEPT YOU FROM MEDICAL APPOINTMENTS OR FROM GETTING MEDICATIONS?: NO

## 2025-01-10 SDOH — ECONOMIC STABILITY: INCOME INSECURITY: IN THE LAST 12 MONTHS, WAS THERE A TIME WHEN YOU WERE NOT ABLE TO PAY THE MORTGAGE OR RENT ON TIME?: NO

## 2025-01-10 SDOH — HEALTH STABILITY: PHYSICAL HEALTH: ON AVERAGE, HOW MANY DAYS PER WEEK DO YOU ENGAGE IN MODERATE TO STRENUOUS EXERCISE (LIKE A BRISK WALK)?: 2 DAYS

## 2025-01-10 ASSESSMENT — PATIENT HEALTH QUESTIONNAIRE - PHQ9
SUM OF ALL RESPONSES TO PHQ QUESTIONS 1-9: 0
1. LITTLE INTEREST OR PLEASURE IN DOING THINGS: NOT AT ALL
2. FEELING DOWN, DEPRESSED OR HOPELESS: NOT AT ALL
SUM OF ALL RESPONSES TO PHQ QUESTIONS 1-9: 0
SUM OF ALL RESPONSES TO PHQ QUESTIONS 1-9: 0
SUM OF ALL RESPONSES TO PHQ9 QUESTIONS 1 & 2: 0
SUM OF ALL RESPONSES TO PHQ QUESTIONS 1-9: 0

## 2025-01-10 ASSESSMENT — LIFESTYLE VARIABLES
HOW MANY STANDARD DRINKS CONTAINING ALCOHOL DO YOU HAVE ON A TYPICAL DAY: PATIENT DOES NOT DRINK
HOW MANY STANDARD DRINKS CONTAINING ALCOHOL DO YOU HAVE ON A TYPICAL DAY: 0
HOW OFTEN DO YOU HAVE A DRINK CONTAINING ALCOHOL: 1
HOW OFTEN DO YOU HAVE SIX OR MORE DRINKS ON ONE OCCASION: 1
HOW OFTEN DO YOU HAVE A DRINK CONTAINING ALCOHOL: NEVER

## 2025-01-13 ENCOUNTER — OFFICE VISIT (OUTPATIENT)
Facility: CLINIC | Age: 75
End: 2025-01-13

## 2025-01-13 VITALS
TEMPERATURE: 97.1 F | BODY MASS INDEX: 35.39 KG/M2 | RESPIRATION RATE: 16 BRPM | DIASTOLIC BLOOD PRESSURE: 60 MMHG | WEIGHT: 247.2 LBS | HEIGHT: 70 IN | HEART RATE: 56 BPM | OXYGEN SATURATION: 98 % | SYSTOLIC BLOOD PRESSURE: 102 MMHG

## 2025-01-13 DIAGNOSIS — I10 ESSENTIAL HYPERTENSION: ICD-10-CM

## 2025-01-13 DIAGNOSIS — Z98.890 H/O LUMBOSACRAL SPINE SURGERY: ICD-10-CM

## 2025-01-13 DIAGNOSIS — N40.0 BENIGN PROSTATIC HYPERPLASIA WITHOUT LOWER URINARY TRACT SYMPTOMS: ICD-10-CM

## 2025-01-13 DIAGNOSIS — E11.21 TYPE 2 DIABETES MELLITUS WITH DIABETIC NEPHROPATHY, UNSPECIFIED WHETHER LONG TERM INSULIN USE (HCC): ICD-10-CM

## 2025-01-13 DIAGNOSIS — G43.709 CHRONIC MIGRAINE WITHOUT AURA WITHOUT STATUS MIGRAINOSUS, NOT INTRACTABLE: ICD-10-CM

## 2025-01-13 DIAGNOSIS — J44.9 CHRONIC OBSTRUCTIVE PULMONARY DISEASE, UNSPECIFIED COPD TYPE (HCC): ICD-10-CM

## 2025-01-13 DIAGNOSIS — D45 POLYCYTHEMIA VERA (HCC): ICD-10-CM

## 2025-01-13 DIAGNOSIS — I50.32 CHRONIC DIASTOLIC HEART FAILURE (HCC): ICD-10-CM

## 2025-01-13 DIAGNOSIS — E03.9 HYPOTHYROIDISM, UNSPECIFIED TYPE: ICD-10-CM

## 2025-01-13 DIAGNOSIS — E78.00 PURE HYPERCHOLESTEROLEMIA: ICD-10-CM

## 2025-01-13 DIAGNOSIS — N18.31 STAGE 3A CHRONIC KIDNEY DISEASE (HCC): ICD-10-CM

## 2025-01-13 DIAGNOSIS — Z00.00 MEDICARE ANNUAL WELLNESS VISIT, SUBSEQUENT: Primary | ICD-10-CM

## 2025-01-13 RX ORDER — LINACLOTIDE 145 UG/1
CAPSULE, GELATIN COATED ORAL
COMMUNITY

## 2025-01-13 NOTE — ACP (ADVANCE CARE PLANNING)
Advance Care Planning     General Advance Care Planning (ACP) Conversation    Date of Conversation: 1/13/2025  Conducted with: Patient with Decision Making Capacity  Other persons present: None    Healthcare Decision Maker:   Primary Decision Maker: Xin Ramsey here - Spouse - [phone]    Secondary Decision Maker: MIS ESPINOZA - Child - [phone]     Today we documented Decision Maker(s) consistent with ACP documents on file.  Content/Action Overview:  Has ACP document(s) on file - reflects the patient's care preferences  Reviewed DNR/DNI and patient elects Full Code (Attempt Resuscitation)  treatment goals, benefit/burden of treatment options, artificial nutrition, ventilation preferences, hospitalization preferences, resuscitation preferences, end of life care preferences (vegetative state/imminent death), and hospice care      Length of Voluntary ACP Conversation in minutes:  16 minutes    Ivana Daly MD

## 2025-01-13 NOTE — PATIENT INSTRUCTIONS
Fatigue: Care Instructions  Overview     Fatigue is a feeling of tiredness, exhaustion, or lack of energy. You may feel fatigue because of too much or not enough activity. It can also come from stress, lack of sleep, boredom, and poor diet. Many medical problems, such as viral infections, can cause fatigue. Emotional problems, especially depression, are often the cause of fatigue.  Fatigue is most often a symptom of another problem. Treatment for fatigue depends on the cause. For example, if you have fatigue because you have a certain health problem, treating this problem also treats your fatigue. If depression or anxiety is the cause, treatment may help.  Follow-up care is a key part of your treatment and safety. Be sure to make and go to all appointments, and call your doctor if you are having problems. It's also a good idea to know your test results and keep a list of the medicines you take.  How can you care for yourself at home?  Get regular exercise. But try not to overdo it. It may help to go back and forth between rest and exercise.  Get plenty of rest.  Eat a variety of healthy foods. Try not to skip any meals.  Avoid or try to cut back on your use of caffeine, tobacco, and alcohol. Caffeine is most often found in coffee, tea, cola drinks, and energy drinks.  Limit medicines that can cause fatigue. These include medicines such as cold and allergy medicines.  When should you call for help?  Watch closely for changes in your health, and be sure to contact your doctor if:    You have new symptoms such as fever or a rash.     Your fatigue gets worse.     You have been feeling down, depressed, or hopeless. Or you may have lost interest in things that you usually enjoy.     You are not getting better as expected.   Where can you learn more?  Go to https://www.healthwise.net/patientEd and enter W864 to learn more about \"Fatigue: Care Instructions.\"  Current as of: July 31, 2024  Content Version: 14.3  © 2024

## 2025-01-13 NOTE — PROGRESS NOTES
Medicare Annual Wellness Visit    Noé Ramsey is here for Medicare AWV, Cough (Off/on), Chills (Off/on - last episode was on 1/10/25), Headache (Off/on - no pain at this time), and Head congestion     Assessment & Plan  1. Chronic back pain.  His chronic back pain has shown significant improvement, with a reduction in pain levels from 6-8 to 4-5 after physical therapy and injections. He is advised to continue his current regimen of physical therapy and exercises.    2. Hypertension.  His blood pressure readings are within the normal range. He should continue his current antihypertensive medications and monitor his blood pressure regularly.    3. Headaches.  His headaches have improved significantly, except during flu episodes. No changes in current management are necessary.    4. Prostate issues.  He reports no issues with urine frequency, burning, or urgency and is regularly seeing a urologist. He should continue his current medication regimen, including Flomax, and follow up with his urologist as needed.    5. Hyperlipidemia.  He is not currently on any cholesterol medication due to past intolerance. He should continue managing his cholesterol levels through diet and regular monitoring.    6. Hypothyroidism.  His thyroid function tests are within normal limits. He should continue his current dose of levothyroxine.    7. Diabetes Mellitus.  His diabetes is well-controlled with an A1C of 6.6. He should continue his current insulin regimen (30 units at night and 11 units with meals) and follow up with his endocrinologist as scheduled.    8. Chronic kidney disease.  His kidney function is slightly low but stable. He should avoid NSAIDs like Motrin, Aleve, and Advil, and use Tylenol for any aches or pains.    9. Congestive heart failure.  His congestive heart failure is stable with no symptoms of shortness of breath, cough, cold, leg swelling, or chest pain. He should continue his current medications and follow up with

## 2025-01-13 NOTE — PROGRESS NOTES
\"Have you been to the ER, urgent care clinic since your last visit?  Hospitalized since your last visit?\"    NO    “Have you seen or consulted any other health care providers outside our system since your last visit?”    Pain management, Dr. Sera Champion LOV: 12/17/24. Podiatry, Dr. Mirza Silva LOV: 9/05/24.    Chief Complaint   Patient presents with    Medicare AWV    Cough     Off/on    Chills     Off/on - last episode was on 1/10/25    Headache     Off/on - no pain at this time    Head congestion

## 2025-01-20 RX ORDER — LEVOTHYROXINE SODIUM 125 UG/1
TABLET ORAL
Qty: 90 TABLET | Refills: 0 | Status: SHIPPED | OUTPATIENT
Start: 2025-01-20 | End: 2025-01-21 | Stop reason: SDUPTHER

## 2025-01-20 NOTE — TELEPHONE ENCOUNTER
This pharmacy faxed over request for the following prescriptions to be filled:    Medication requested : Levothyroxin    PCP: Dr. Daly  Pharmacy or Print:  [facility]  Mail order or Local [facility]      Scheduled appointment if not seen by current providers in office: F/u 07/14/2025, Lov 01/16/2025

## 2025-01-22 RX ORDER — LEVOTHYROXINE SODIUM 125 UG/1
125 TABLET ORAL
Qty: 90 TABLET | Refills: 1 | Status: SHIPPED | OUTPATIENT
Start: 2025-01-22

## 2025-03-13 PROCEDURE — 99285 EMERGENCY DEPT VISIT HI MDM: CPT

## 2025-03-14 ENCOUNTER — HOSPITAL ENCOUNTER (EMERGENCY)
Facility: HOSPITAL | Age: 75
Discharge: HOME | End: 2025-03-14
Attending: EMERGENCY MEDICINE
Payer: MEDICARE

## 2025-03-14 VITALS
TEMPERATURE: 98.3 F | RESPIRATION RATE: 24 BRPM | SYSTOLIC BLOOD PRESSURE: 142 MMHG | HEART RATE: 100 BPM | WEIGHT: 239 LBS | HEIGHT: 70 IN | DIASTOLIC BLOOD PRESSURE: 69 MMHG | OXYGEN SATURATION: 92 % | BODY MASS INDEX: 34.22 KG/M2

## 2025-03-14 DIAGNOSIS — T38.3X5A HYPOGLYCEMIA DUE TO INSULIN: Primary | ICD-10-CM

## 2025-03-14 DIAGNOSIS — I10 ESSENTIAL HYPERTENSION: ICD-10-CM

## 2025-03-14 DIAGNOSIS — E16.0 HYPOGLYCEMIA DUE TO INSULIN: Primary | ICD-10-CM

## 2025-03-14 LAB
ANION GAP SERPL CALC-SCNC: 6 MMOL/L (ref 3–18)
BASOPHILS # BLD: 0.12 K/UL (ref 0–0.1)
BASOPHILS NFR BLD: 1.4 % (ref 0–2)
BUN SERPL-MCNC: 14 MG/DL (ref 7–18)
BUN/CREAT SERPL: 9 (ref 12–20)
CALCIUM SERPL-MCNC: 9.3 MG/DL (ref 8.5–10.1)
CHLORIDE SERPL-SCNC: 105 MMOL/L (ref 100–111)
CO2 SERPL-SCNC: 27 MMOL/L (ref 21–32)
CREAT SERPL-MCNC: 1.57 MG/DL (ref 0.6–1.3)
DIFFERENTIAL METHOD BLD: ABNORMAL
EOSINOPHIL # BLD: 0.33 K/UL (ref 0–0.4)
EOSINOPHIL NFR BLD: 3.8 % (ref 0–5)
ERYTHROCYTE [DISTWIDTH] IN BLOOD BY AUTOMATED COUNT: 14.4 % (ref 11.6–14.5)
GLUCOSE BLD STRIP.AUTO-MCNC: 101 MG/DL (ref 70–110)
GLUCOSE BLD STRIP.AUTO-MCNC: 114 MG/DL (ref 70–110)
GLUCOSE BLD STRIP.AUTO-MCNC: 120 MG/DL (ref 70–110)
GLUCOSE BLD STRIP.AUTO-MCNC: 122 MG/DL (ref 70–110)
GLUCOSE BLD STRIP.AUTO-MCNC: 148 MG/DL (ref 70–110)
GLUCOSE BLD STRIP.AUTO-MCNC: 170 MG/DL (ref 70–110)
GLUCOSE BLD STRIP.AUTO-MCNC: 221 MG/DL (ref 70–110)
GLUCOSE BLD STRIP.AUTO-MCNC: 262 MG/DL (ref 70–110)
GLUCOSE BLD STRIP.AUTO-MCNC: 69 MG/DL (ref 70–110)
GLUCOSE BLD STRIP.AUTO-MCNC: 80 MG/DL (ref 70–110)
GLUCOSE BLD STRIP.AUTO-MCNC: 99 MG/DL (ref 70–110)
GLUCOSE SERPL-MCNC: 55 MG/DL (ref 74–99)
HCT VFR BLD AUTO: 45.3 % (ref 36–48)
HGB BLD-MCNC: 14.6 G/DL (ref 13–16)
IMM GRANULOCYTES # BLD AUTO: 0.03 K/UL (ref 0–0.04)
IMM GRANULOCYTES NFR BLD AUTO: 0.3 % (ref 0–0.5)
LYMPHOCYTES # BLD: 2.99 K/UL (ref 0.9–3.6)
LYMPHOCYTES NFR BLD: 34.5 % (ref 21–52)
MCH RBC QN AUTO: 26.2 PG (ref 24–34)
MCHC RBC AUTO-ENTMCNC: 32.2 G/DL (ref 31–37)
MCV RBC AUTO: 81.2 FL (ref 78–100)
MONOCYTES # BLD: 0.67 K/UL (ref 0.05–1.2)
MONOCYTES NFR BLD: 7.7 % (ref 3–10)
NEUTS SEG # BLD: 4.53 K/UL (ref 1.8–8)
NEUTS SEG NFR BLD: 52.3 % (ref 40–73)
NRBC # BLD: 0 K/UL (ref 0–0.01)
NRBC BLD-RTO: 0 PER 100 WBC
PLATELET # BLD AUTO: 269 K/UL (ref 135–420)
PMV BLD AUTO: 9.7 FL (ref 9.2–11.8)
POTASSIUM SERPL-SCNC: 3.8 MMOL/L (ref 3.5–5.5)
RBC # BLD AUTO: 5.58 M/UL (ref 4.35–5.65)
SODIUM SERPL-SCNC: 138 MMOL/L (ref 136–145)
WBC # BLD AUTO: 8.7 K/UL (ref 4.6–13.2)

## 2025-03-14 PROCEDURE — 82962 GLUCOSE BLOOD TEST: CPT

## 2025-03-14 PROCEDURE — 94761 N-INVAS EAR/PLS OXIMETRY MLT: CPT

## 2025-03-14 PROCEDURE — 2580000003 HC RX 258: Performed by: EMERGENCY MEDICINE

## 2025-03-14 PROCEDURE — 80048 BASIC METABOLIC PNL TOTAL CA: CPT

## 2025-03-14 PROCEDURE — 85025 COMPLETE CBC W/AUTO DIFF WBC: CPT

## 2025-03-14 RX ORDER — LEVETIRACETAM 500 MG/5ML
1500 INJECTION, SOLUTION, CONCENTRATE INTRAVENOUS
Status: DISCONTINUED | OUTPATIENT
Start: 2025-03-14 | End: 2025-03-14

## 2025-03-14 RX ADMIN — DEXTROSE 250 ML: 10 SOLUTION INTRAVENOUS at 01:04

## 2025-03-14 ASSESSMENT — PAIN - FUNCTIONAL ASSESSMENT: PAIN_FUNCTIONAL_ASSESSMENT: NONE - DENIES PAIN

## 2025-03-14 NOTE — ED PROVIDER NOTES
[facility] EMERGENCY DEPARTMENT  EMERGENCY DEPARTMENT ENCOUNTER    Patient Name: Noé Ramsey  MRN: [id number]  YOB: 1950  Provider: Paul Garber DO  PCP: Ivana Daly MD   Time/Date of evaluation: 2:50 AM EDT on 3/14/25    History of Presenting Illness     History Provided by: Patient  History is limited by: Nothing     HISTORY:   Noé Ramsey is a 74 y.o. male Mr. Ramsey, an elderly male patient with a history of type 2 diabetes, presents with the chief complaint of accidental insulin overdose. He reports that around 11:30 AM, he mistakenly administered 36 units of regular insulin instead of his long-acting insulin, Basaglar. The patient states, \"I've been lightheaded all day. Real dizzy.\" He realized his error after administering the insulin when he noticed the color difference between his insulin pens.  Mr. Ramsey's usual insulin regimen consists of 36 units of Basaglar (long-acting insulin) at bedtime and 11 units of Humalog (fast-acting insulin) with meals. He has a complex medical history, including:  Past Medical History:  Type 2 diabetes with diabetic autonomic polyneuropathy  Post-procedural hypothyroidism  COPD  Essential hypertension  Atrial fibrillation  Mixed hyperlipidemia  GERD  History of pancreatitis requiring admission  Chronic kidney disease (unspecified)  Benign prostatic hyperplasia (BPH)  Vitamin D deficiency  Polycythemia  Obesity  Status post cholecystectomy  Surgical History:  Multiple hand and arm surgeries  Back surgery  Cholecystectomy  Medications:  Basaglar 36 units at bedtime  Humalog 11 units with meals  (Full medication list provided separately)  Allergies:  Atorvastatin (affects liver function)  Ciprofloxacin (anaphylaxis)  Doxycycline (severe GERD)  Elmiron (heart stoppage)  Januvia  Lamisil (tongue swelling)  Metformin (increases creatinine)  Morphine (anaphylaxis)  Penicillin (hives and shortness of breath)  Sodium pentothal (anaphylaxis)  Sulfa

## 2025-03-14 NOTE — ED NOTES
Provider aware of BG at this time. Cardiac monitor in place. Pt voiced no concerns at this time. Pt awake, alert and orientated. Allergies verified. Medicated as per MAR. Resperations even and unlabored. Call bell within reach.  Family at bedside.    
Pt BG 80 at this time. Pt given sandwich and orange juice at this time. Pt also given crackers and peanut butter.   
Pt given orange juice and a peanut butter sandwich  
Pt stated he took his regular insulin on accident instead of taking his Lantus. Pt stated it was an accident and when he noticed he immediately called the hospital and came in.   
No

## 2025-03-14 NOTE — ED TRIAGE NOTES
Pt arrived via Triage ambulatory c/o taking too much Regular insulin. States he took 36 units at 1120 pm. BG in triage 101.

## 2025-03-31 RX ORDER — METOPROLOL TARTRATE 25 MG/1
TABLET, FILM COATED ORAL
Qty: 90 TABLET | Refills: 3 | Status: SHIPPED | OUTPATIENT
Start: 2025-03-31

## 2025-04-23 RX ORDER — FLUTICASONE FUROATE, UMECLIDINIUM BROMIDE AND VILANTEROL TRIFENATATE 200; 62.5; 25 UG/1; UG/1; UG/1
1 POWDER RESPIRATORY (INHALATION) DAILY
Qty: 3 EACH | Refills: 1 | Status: SHIPPED | OUTPATIENT
Start: 2025-04-23

## 2025-05-22 ENCOUNTER — OFFICE VISIT (OUTPATIENT)
Age: 75
End: 2025-05-22
Payer: MEDICARE

## 2025-05-22 VITALS
BODY MASS INDEX: 34.07 KG/M2 | DIASTOLIC BLOOD PRESSURE: 80 MMHG | HEIGHT: 70 IN | HEART RATE: 78 BPM | OXYGEN SATURATION: 98 % | WEIGHT: 238 LBS | SYSTOLIC BLOOD PRESSURE: 130 MMHG

## 2025-05-22 DIAGNOSIS — E11.21 TYPE 2 DIABETES MELLITUS WITH DIABETIC NEPHROPATHY, UNSPECIFIED WHETHER LONG TERM INSULIN USE (HCC): ICD-10-CM

## 2025-05-22 DIAGNOSIS — E78.00 PURE HYPERCHOLESTEROLEMIA, UNSPECIFIED: ICD-10-CM

## 2025-05-22 DIAGNOSIS — I10 ESSENTIAL (PRIMARY) HYPERTENSION: ICD-10-CM

## 2025-05-22 DIAGNOSIS — R94.39 ABNORMAL NUCLEAR STRESS TEST: Primary | ICD-10-CM

## 2025-05-22 DIAGNOSIS — R00.2 PALPITATIONS: ICD-10-CM

## 2025-05-22 PROCEDURE — 3044F HG A1C LEVEL LT 7.0%: CPT | Performed by: INTERNAL MEDICINE

## 2025-05-22 PROCEDURE — G8417 CALC BMI ABV UP PARAM F/U: HCPCS | Performed by: INTERNAL MEDICINE

## 2025-05-22 PROCEDURE — G8427 DOCREV CUR MEDS BY ELIG CLIN: HCPCS | Performed by: INTERNAL MEDICINE

## 2025-05-22 PROCEDURE — 1160F RVW MEDS BY RX/DR IN RCRD: CPT | Performed by: INTERNAL MEDICINE

## 2025-05-22 PROCEDURE — 99214 OFFICE O/P EST MOD 30 MIN: CPT | Performed by: INTERNAL MEDICINE

## 2025-05-22 PROCEDURE — 3079F DIAST BP 80-89 MM HG: CPT | Performed by: INTERNAL MEDICINE

## 2025-05-22 PROCEDURE — 1036F TOBACCO NON-USER: CPT | Performed by: INTERNAL MEDICINE

## 2025-05-22 PROCEDURE — 93000 ELECTROCARDIOGRAM COMPLETE: CPT | Performed by: INTERNAL MEDICINE

## 2025-05-22 PROCEDURE — 1123F ACP DISCUSS/DSCN MKR DOCD: CPT | Performed by: INTERNAL MEDICINE

## 2025-05-22 PROCEDURE — 3075F SYST BP GE 130 - 139MM HG: CPT | Performed by: INTERNAL MEDICINE

## 2025-05-22 PROCEDURE — 1126F AMNT PAIN NOTED NONE PRSNT: CPT | Performed by: INTERNAL MEDICINE

## 2025-05-22 PROCEDURE — 1159F MED LIST DOCD IN RCRD: CPT | Performed by: INTERNAL MEDICINE

## 2025-05-22 ASSESSMENT — PATIENT HEALTH QUESTIONNAIRE - PHQ9
2. FEELING DOWN, DEPRESSED OR HOPELESS: NOT AT ALL
SUM OF ALL RESPONSES TO PHQ QUESTIONS 1-9: 0
1. LITTLE INTEREST OR PLEASURE IN DOING THINGS: NOT AT ALL
SUM OF ALL RESPONSES TO PHQ QUESTIONS 1-9: 0

## 2025-05-22 ASSESSMENT — ENCOUNTER SYMPTOMS
SHORTNESS OF BREATH: 0
ABDOMINAL DISTENTION: 0
VOMITING: 0
NAUSEA: 0
ABDOMINAL PAIN: 0
COUGH: 0
SORE THROAT: 0

## 2025-05-22 NOTE — PROGRESS NOTES
Noé Ramsey presents today for   Chief Complaint   Patient presents with    Follow-up     6 month       Noé Ramsey preferred language for health care discussion is english/other.    Is someone accompanying this pt? no    Is the patient using any DME equipment during OV? no    Depression Screening:  Depression: Not at risk (5/22/2025)    PHQ-2     PHQ-2 Score: 0        Learning Assessment:  Who is the primary learner? Patient    What is the preferred language for health care of the primary learner? ENGLISH    How does the primary learner prefer to learn new concepts? DEMONSTRATION    Answered By patient    Relationship to Learner SELF           Pt currently taking Anticoagulant therapy? no    Pt currently taking Antiplatelet therapy ? Aspirin 81mg daily      Coordination of Care:  1. Have you been to the ER, urgent care clinic since your last visit? Hospitalized since your last visit? no    2. Have you seen or consulted any other health care providers outside of the [facility] since your last visit? Include any pap smears or colon screening. no    
palpitations.  Each lasted several minutes at most in duration.  No associated dizziness, diaphoresis or syncope.  He has not had any chest pain or shortness of breath above baseline.  He has lost weight with lifestyle changes.  He states he is eating less.    Past Medical History:   Diagnosis Date    Arthritis     Asthma     Seasonal    Cancer ([facility])     BASAL     DM type 2 (diabetes mellitus, type 2) ([facility])     IDDM    Enlarged prostate     Failed back syndrome     GERD (gastroesophageal reflux disease)     Hearing loss, bilateral     Hearing Aids    Hypertension     Hypothyroidism     Insomnia     Low serum testosterone level     Migraine     Neuropathy     Osteoarthritis of multiple joints     Polycythemia     SOB (shortness of breath)     chronic; 2' \"lung fever\"    Vertigo      Current Outpatient Medications   Medication Sig Dispense Refill    metoprolol tartrate (LOPRESSOR) 25 MG tablet Take 1/2 (one-half) tablet by mouth twice daily 90 tablet 3    levothyroxine (SYNTHROID) 125 MCG tablet Take 1 tablet by mouth every morning (before breakfast) 90 tablet 1    LINZESS 145 MCG capsule TAKE 1 CAPSULE EACH DAY ON AN EMPTY STOMACH AT LEAST 30 MINUTES PRIOR TO A MEAL AT APPROXIMATELY THE SAME TIME EACH DAY      Chlorpheniramine-DM 2-15 MG/5ML LIQD Take by mouth      topiramate (TOPAMAX) 25 MG tablet Take 1 tablet by mouth once daily with breakfast 90 tablet 1    tamsulosin (FLOMAX) 0.4 MG capsule TAKE 2 CAPSULES BY MOUTH ONCE DAILY AFTER SUPPER 180 capsule 4    amitriptyline (ELAVIL) 10 MG tablet TAKE 1 TABLET BY MOUTH EVERY NIGHT 90 tablet 1    aspirin 81 MG EC tablet Take 1 tablet by mouth daily      metroNIDAZOLE (METROGEL) 1 % gel Apply 2 g topically nightly      vitamin D 25 MCG (1000 UT) CAPS Take 3 capsules by mouth Daily      Accu-Chek Softclix Lancets MISC E clarify this medication. Outside name: ACCU-CHEK SOFTCLIX LANCETS misc      DULoxetine (CYMBALTA) 60 MG extended release capsule Take 1 capsule by mouth

## 2025-06-18 NOTE — PROGRESS NOTES
H&P were faxed.    FRANC [place].   HISTORY OF PRESENT ILLNESS  Connie Persaud is a 77 y.o. male. HPI: Here for follow up. Last visit was having migraine headache. Started on topamax. Helped a lot and very thankful for that. Frequency and intensity of headaches much better. Compliant with medication and no side effects. H/o diabetes. Fairly controlled. Working on diet modification and compliant with instructions. Following endo. No hypoglycemia. [place] he was having some viral infection and since then has cough. Now almost couple of months. On and off sputum. No wheezing. No chest congestion. Denies any GERD symptoms. No sinus congestion or post nasal drip at this time. Not using albuterol. [place] it is more at night time then day. No sob. No chest pain. No headache or dizziness. Noted on lisinopril. discussed to change it to losartan and take albuterol as needed. Feeling getting memory changes since sometime. Able to manage his bills. Said having some trouble with calculation. Not much memory of his weeding but does recall few movements etc. Able to drive. No loosing directions. More forgetful than before. [place] it has been bothering him some but not to the point feels needs medication. Wanted to mention. Doing ADL on his own. Now taking pain medication only as needed. Since last visit needed only twice pain medication. Chronic back pain and now on AVERY unit which is helping for pain as well. No urinary or bowel complains. No unusual fatigue. No chest pain or sob. No palpitation. No diaphoresis. No nausea or vomiting. No ext weakness, no mood changes. Sleep is fair. Visit Vitals  /78 (BP 1 Location: Left arm, BP Patient Position: Sitting)   Pulse 88   Temp 98.1 °F (36.7 °C) (Oral)   Resp 16   Ht 5' 11\" (1.803 m)   Wt 272 lb (123.4 kg)   SpO2 98%   BMI 37.94 kg/m²     Review medication list, vitals, problem list,allergies. Review labs.    Lab Results   Component Value Date/Time    WBC 7.9 05/24/2019 08:38 AM    HGB 14.8 05/24/2019 08:38 AM    HCT 42.4 05/24/2019 08:38 AM    PLATELET 350 93/85/8878 08:38 AM    MCV 84.3 05/24/2019 08:38 AM     Lab Results   Component Value Date/Time    Sodium 142 05/24/2019 08:38 AM    Potassium 4.5 05/24/2019 08:38 AM    Chloride 110 (H) 05/24/2019 08:38 AM    CO2 25 05/24/2019 08:38 AM    Anion gap 7 05/24/2019 08:38 AM    Glucose 126 (H) 05/24/2019 08:38 AM    BUN 18 05/24/2019 08:38 AM    Creatinine 1.16 05/24/2019 08:38 AM    BUN/Creatinine ratio 16 05/24/2019 08:38 AM    GFR est AA >60 05/24/2019 08:38 AM    GFR est non-AA >60 05/24/2019 08:38 AM    Calcium 8.7 05/24/2019 08:38 AM    Bilirubin, total 0.3 05/24/2019 08:38 AM    AST (SGOT) 15 05/24/2019 08:38 AM    Alk. phosphatase 50 05/24/2019 08:38 AM    Protein, total 6.7 05/24/2019 08:38 AM    Albumin 3.7 05/24/2019 08:38 AM    Globulin 3.0 05/24/2019 08:38 AM    A-G Ratio 1.2 05/24/2019 08:38 AM    ALT (SGPT) 39 05/24/2019 08:38 AM     Lab Results   Component Value Date/Time    Cholesterol, total 187 05/24/2019 08:38 AM    HDL Cholesterol 36 (L) 05/24/2019 08:38 AM    LDL, calculated 91.2 05/24/2019 08:38 AM    VLDL, calculated 59.8 05/24/2019 08:38 AM    Triglyceride 299 (H) 05/24/2019 08:38 AM    CHOL/HDL Ratio 5.2 (H) 05/24/2019 08:38 AM     Lab Results   Component Value Date/Time    TSH 0.21 (L) 05/24/2019 08:38 AM     Lab Results   Component Value Date/Time    Hemoglobin A1c 6.8 (H) 05/24/2019 08:38 AM    Hemoglobin A1c (POC) 6.9 12/16/2015 10:29 AM    Hemoglobin A1c, External 7.2 06/16/2017     Noted low TSH./ said just recently had labs done at [facility]. Will obtain records. Said adjusted thyroid medication and was taken off of tricor and increased lipitor dose. ROS: see HPI     Physical Exam   Constitutional: He is oriented to person, place, and time. No distress. Neck: No thyromegaly present. Cardiovascular: Normal rate, regular rhythm and normal heart sounds. Pulmonary/Chest:   CTA   Abdominal: Soft.  Bowel sounds are normal. There is no tenderness. Musculoskeletal: He exhibits no edema. Lymphadenopathy:     He has no cervical adenopathy. Neurological: He is oriented to person, place, and time. Psychiatric: His behavior is normal.       ASSESSMENT and PLAN    ICD-10-CM ICD-9-CM    1. Medicare annual wellness visit, subsequent Z00.00 V70.0    2. Memory changes: will observe . See HPI. If still concern by next visit as mentioned first time and if needed will consider [facility] exam next visit. R41.3 780.93    3. Frequent headaches: improved with topamax. Continue current dose of medication. R51 784.0    4. Other chronic pain G89.29 338.29     on TENS unit and helping. chronic back pain. taking pain medicatoin only as needed. topamax has helped headaches . 5. Well controlled diabetes mellitus ([address]): following endo. Continue management per endo and diet modification. E11.9 250.00    6. Essential hypertension: well controlled. For now as he is having cough since last couple of months. Advised albuterol and changed lisinopril to losartan. F/u next visit. Low salt diet. I10 401.9    7. BMI 37.0-37.9, adult: limitation in doing exercise due to chronic lower back pain. Diet modification importance discussed. Z68.37 V85.37    8. Low TSH level: following endo. For now recently had labs done by them. Will obtain records. R79.89 794.5    9. Diabetes 1.5, managed as type 2 [facility]): see above  E13.9 250.00 insulin glargine (BASAGLAR KWIKPEN U-100 INSULIN) 100 unit/mL (3 mL) inpn   10. Cough: albuterol. Changed lisinopril to losartan. R05 786.2    Pt understood and agree with the plan   Review hM     Had an eye exam done last week. Will try to obtain records. Also on waiting list for shingrix. Follow-up and Dispositions    · Return in about 4 months (around 12/1/2019).

## 2025-06-30 LAB
HBA1C MFR BLD HPLC: 6.8 %
LDL CHOLESTEROL, EXTERNAL: 124
TOTAL CHOLESTEROL, EXTERNAL: 212

## 2025-07-09 RX ORDER — TOPIRAMATE 25 MG/1
25 TABLET, FILM COATED ORAL
Qty: 90 TABLET | Refills: 1 | Status: SHIPPED | OUTPATIENT
Start: 2025-07-09

## 2025-07-14 ENCOUNTER — OFFICE VISIT (OUTPATIENT)
Facility: CLINIC | Age: 75
End: 2025-07-14

## 2025-07-14 VITALS
HEART RATE: 82 BPM | OXYGEN SATURATION: 98 % | TEMPERATURE: 97.8 F | SYSTOLIC BLOOD PRESSURE: 108 MMHG | WEIGHT: 242.4 LBS | BODY MASS INDEX: 34.7 KG/M2 | RESPIRATION RATE: 16 BRPM | DIASTOLIC BLOOD PRESSURE: 60 MMHG | HEIGHT: 70 IN

## 2025-07-14 DIAGNOSIS — M54.42 CHRONIC BILATERAL LOW BACK PAIN WITH BILATERAL SCIATICA: ICD-10-CM

## 2025-07-14 DIAGNOSIS — N40.0 BENIGN PROSTATIC HYPERPLASIA WITHOUT LOWER URINARY TRACT SYMPTOMS: ICD-10-CM

## 2025-07-14 DIAGNOSIS — N18.31 STAGE 3A CHRONIC KIDNEY DISEASE (HCC): ICD-10-CM

## 2025-07-14 DIAGNOSIS — D45 POLYCYTHEMIA VERA (HCC): ICD-10-CM

## 2025-07-14 DIAGNOSIS — E11.8 TYPE 2 DIABETES MELLITUS WITH COMPLICATION, WITH LONG-TERM CURRENT USE OF INSULIN (HCC): ICD-10-CM

## 2025-07-14 DIAGNOSIS — Z79.4 TYPE 2 DIABETES MELLITUS WITH COMPLICATION, WITH LONG-TERM CURRENT USE OF INSULIN (HCC): ICD-10-CM

## 2025-07-14 DIAGNOSIS — J44.9 CHRONIC OBSTRUCTIVE PULMONARY DISEASE, UNSPECIFIED COPD TYPE (HCC): ICD-10-CM

## 2025-07-14 DIAGNOSIS — I50.32 CHRONIC DIASTOLIC HEART FAILURE (HCC): ICD-10-CM

## 2025-07-14 DIAGNOSIS — M54.6 CHRONIC MIDLINE THORACIC BACK PAIN: ICD-10-CM

## 2025-07-14 DIAGNOSIS — G89.4 CHRONIC PAIN SYNDROME: ICD-10-CM

## 2025-07-14 DIAGNOSIS — G89.29 CHRONIC MIDLINE THORACIC BACK PAIN: ICD-10-CM

## 2025-07-14 DIAGNOSIS — K21.9 GASTROESOPHAGEAL REFLUX DISEASE WITHOUT ESOPHAGITIS: ICD-10-CM

## 2025-07-14 DIAGNOSIS — E78.00 PURE HYPERCHOLESTEROLEMIA: ICD-10-CM

## 2025-07-14 DIAGNOSIS — R00.2 PALPITATIONS: ICD-10-CM

## 2025-07-14 DIAGNOSIS — E11.21 TYPE 2 DIABETES WITH NEPHROPATHY (HCC): ICD-10-CM

## 2025-07-14 DIAGNOSIS — E53.8 VITAMIN B12 DEFICIENCY: ICD-10-CM

## 2025-07-14 DIAGNOSIS — I10 ESSENTIAL HYPERTENSION: Primary | ICD-10-CM

## 2025-07-14 DIAGNOSIS — G89.29 CHRONIC BILATERAL LOW BACK PAIN WITH BILATERAL SCIATICA: ICD-10-CM

## 2025-07-14 DIAGNOSIS — E03.9 HYPOTHYROIDISM, UNSPECIFIED TYPE: ICD-10-CM

## 2025-07-14 DIAGNOSIS — M54.41 CHRONIC BILATERAL LOW BACK PAIN WITH BILATERAL SCIATICA: ICD-10-CM

## 2025-07-14 PROBLEM — G47.33 OBSTRUCTIVE SLEEP APNEA SYNDROME: Status: RESOLVED | Noted: 2024-01-10 | Resolved: 2025-07-14

## 2025-07-14 RX ORDER — ASCORBIC ACID 125 MG
100 TABLET,CHEWABLE ORAL DAILY
COMMUNITY

## 2025-07-14 NOTE — PROGRESS NOTES
Have you been to the ER, urgent care clinic since your last visit?  Hospitalized since your last visit?   [facility] ED LOV: 3/14/25    Have you seen or consulted any other health care providers outside our system since your last visit?   Dr. Sera Champion LOV: 3/11/25. [facility], Dr. Montiel LOV: 2/13/25. Dr. Mckeon LOV: 1/28/25. Dr. Supriya Wallis LOV: 1/17/25.    Chief Complaint   Patient presents with    Hypertension    Diabetes    Cholesterol Problem

## 2025-07-14 NOTE — PROGRESS NOTES
Noé Ramsey (:  1950) is a 74 y.o. male, Established patient, here for evaluation of the following chief complaint(s):  Hypertension, Diabetes, and Cholesterol Problem         Assessment & Plan  1. Upper back pain.  - Reports thoracic section collapsing with severe pain causing temporary immobilization.  - MRI scheduled for 2025.  - Dr. Christensen will review MRI results and may administer higher back injections followed by acupuncture.  - Continues current medication regimen for pain management.    2. Chronic pain syndrome.  - Chronic pain syndrome has been stable but recently exacerbated.  - Symptomatic treatment will be continued.  - Monitoring response to current pain management strategies.  - Follow recommendations from specialists for pain control.    3. Hypertension.  - Vitals have been stable.  - Regular monitoring of blood pressure.  - Continue current antihypertensive medications.  - Routine follow-up to assess blood pressure control.    4. Chronic diastolic heart failure.  - Vitals have been stable.  - Monitoring for any signs of heart failure exacerbation.  - Continue current heart failure management regimen.  - Follow-up with cardiologist as needed.    5. Palpitations.  - Experiences intermittent palpitations.  - Advised to avoid caffeine to help manage palpitations.  - If symptoms worsen, an ICD may be considered.  - Regular follow-up to monitor palpitations and response to lifestyle modifications.    6. Acid reflux.  - Acid reflux remains unchanged and is managed with medication.  - Monitoring for any changes in symptoms.  - Continue current acid reflux medication.  - Follow-up to assess effectiveness of treatment.    7. Chronic obstructive pulmonary disease (COPD).  - Reports no recent issues with coughing, colds, or wheezing.  - Monitoring for any respiratory symptoms.  - Continue current COPD management plan.  - Routine follow-up to assess respiratory status.    8. Sleep apnea.  - Does

## 2025-08-26 RX ORDER — HYDROXYZINE HYDROCHLORIDE 50 MG/1
50 TABLET, FILM COATED ORAL EVERY 8 HOURS PRN
COMMUNITY

## 2025-08-26 RX ORDER — CLOTRIMAZOLE AND BETAMETHASONE DIPROPIONATE 10; .64 MG/G; MG/G
CREAM TOPICAL 2 TIMES DAILY
COMMUNITY
Start: 2025-07-16 | End: 2025-08-27 | Stop reason: DRUGHIGH

## 2025-08-26 RX ORDER — MONTELUKAST SODIUM 10 MG/1
10 TABLET ORAL NIGHTLY
COMMUNITY

## 2025-08-27 ENCOUNTER — OFFICE VISIT (OUTPATIENT)
Age: 75
End: 2025-08-27
Payer: MEDICARE

## 2025-08-27 VITALS
TEMPERATURE: 99.3 F | DIASTOLIC BLOOD PRESSURE: 55 MMHG | OXYGEN SATURATION: 97 % | HEART RATE: 72 BPM | RESPIRATION RATE: 16 BRPM | SYSTOLIC BLOOD PRESSURE: 101 MMHG | BODY MASS INDEX: 34.01 KG/M2 | HEIGHT: 70 IN | WEIGHT: 237.6 LBS

## 2025-08-27 DIAGNOSIS — J44.89 ASTHMA-COPD OVERLAP SYNDROME (HCC): ICD-10-CM

## 2025-08-27 DIAGNOSIS — R06.09 DYSPNEA ON EXERTION: Primary | ICD-10-CM

## 2025-08-27 PROCEDURE — 1126F AMNT PAIN NOTED NONE PRSNT: CPT | Performed by: HOSPITALIST

## 2025-08-27 PROCEDURE — 3074F SYST BP LT 130 MM HG: CPT | Performed by: HOSPITALIST

## 2025-08-27 PROCEDURE — 3023F SPIROM DOC REV: CPT | Performed by: HOSPITALIST

## 2025-08-27 PROCEDURE — G8417 CALC BMI ABV UP PARAM F/U: HCPCS | Performed by: HOSPITALIST

## 2025-08-27 PROCEDURE — 1123F ACP DISCUSS/DSCN MKR DOCD: CPT | Performed by: HOSPITALIST

## 2025-08-27 PROCEDURE — 3078F DIAST BP <80 MM HG: CPT | Performed by: HOSPITALIST

## 2025-08-27 PROCEDURE — 1036F TOBACCO NON-USER: CPT | Performed by: HOSPITALIST

## 2025-08-27 PROCEDURE — 99214 OFFICE O/P EST MOD 30 MIN: CPT | Performed by: HOSPITALIST

## 2025-08-27 PROCEDURE — G8428 CUR MEDS NOT DOCUMENT: HCPCS | Performed by: HOSPITALIST

## 2025-08-27 RX ORDER — AMITRIPTYLINE HYDROCHLORIDE 10 MG/1
10 TABLET ORAL NIGHTLY PRN
Qty: 90 TABLET | Refills: 1 | Status: SHIPPED | OUTPATIENT
Start: 2025-08-27

## 2025-08-27 RX ORDER — INSULIN GLARGINE 100 [IU]/ML
40 INJECTION, SOLUTION SUBCUTANEOUS NIGHTLY
Status: SHIPPED | COMMUNITY
Start: 2025-08-27

## 2025-08-27 RX ORDER — CLOTRIMAZOLE AND BETAMETHASONE DIPROPIONATE 10; .64 MG/G; MG/G
CREAM TOPICAL NIGHTLY
Status: SHIPPED | COMMUNITY
Start: 2025-08-27

## 2025-08-27 RX ORDER — AMITRIPTYLINE HYDROCHLORIDE 10 MG/1
10 TABLET ORAL NIGHTLY
Qty: 90 TABLET | Refills: 1 | Status: SHIPPED | OUTPATIENT
Start: 2025-08-27 | End: 2025-08-27 | Stop reason: DRUGHIGH

## 2025-09-02 DIAGNOSIS — E03.9 HYPOTHYROIDISM, UNSPECIFIED TYPE: ICD-10-CM

## 2025-09-02 DIAGNOSIS — E11.21 TYPE 2 DIABETES WITH NEPHROPATHY (HCC): ICD-10-CM

## 2025-09-02 DIAGNOSIS — Z79.4 TYPE 2 DIABETES MELLITUS WITH STAGE 3A CHRONIC KIDNEY DISEASE, WITH LONG-TERM CURRENT USE OF INSULIN (HCC): Primary | ICD-10-CM

## 2025-09-02 DIAGNOSIS — N18.31 TYPE 2 DIABETES MELLITUS WITH STAGE 3A CHRONIC KIDNEY DISEASE, WITH LONG-TERM CURRENT USE OF INSULIN (HCC): Primary | ICD-10-CM

## 2025-09-02 DIAGNOSIS — E11.22 TYPE 2 DIABETES MELLITUS WITH STAGE 3A CHRONIC KIDNEY DISEASE, WITH LONG-TERM CURRENT USE OF INSULIN (HCC): Primary | ICD-10-CM

## (undated) DEVICE — Device

## (undated) DEVICE — MEDI-VAC NON-CONDUCTIVE SUCTION TUBING: Brand: CARDINAL HEALTH

## (undated) DEVICE — PROCEDURE KIT FLUID MGMT 10 FR CUST MAINFOLD

## (undated) DEVICE — SYRINGE MED 10ML LUERLOCK TIP W/O SFTY DISP

## (undated) DEVICE — SNARE VASC L240CM LOOP W10MM SHTH DIA2.4MM RND STIFF CLD

## (undated) DEVICE — FLEX ADVANTAGE 1500CC: Brand: FLEX ADVANTAGE

## (undated) DEVICE — SYRINGE MED 50ML LUERSLIP TIP

## (undated) DEVICE — PRESSURE MONITORING SET: Brand: TRUWAVE

## (undated) DEVICE — CATH IV SAFE STR 22GX1IN BLU -- PROTECTIV PLUS

## (undated) DEVICE — TRAP ENDOSCP POLYP SINGLE CHMBR DRAWER TYP

## (undated) DEVICE — GAUZE,SPONGE,4"X4",16PLY,STRL,LF,10/TRAY: Brand: MEDLINE

## (undated) DEVICE — GLIDESHEATH SLENDER STAINLESS STEEL KIT: Brand: GLIDESHEATH SLENDER

## (undated) DEVICE — PACK PROCEDURE SURG VASC CATH 161 MMC LF

## (undated) DEVICE — CATHETER SCLERO L240CM NDL 25GA L4MM SHTH DIA2.3MM CNTRST

## (undated) DEVICE — BASIN EMSIS 16OZ GRAPHITE PLAS KID SHP MOLD GRAD FOR ORAL

## (undated) DEVICE — SYRINGE MED 25GA 3ML L5/8IN SUBQ PLAS W/ DETACH NDL SFTY

## (undated) DEVICE — CANNULA ORIG TL CLR W FOAM CUSHIONS AND 14FT SUPL TB 3 CHN

## (undated) DEVICE — UNDERPAD INCONT W23XL36IN STD BLU POLYPR BK FLUF SFT

## (undated) DEVICE — SET FLD ADMIN 3 W STPCOCK FIX FEM L BOR 1IN

## (undated) DEVICE — DRAPE,ANGIO,BRACH,STERILE,38X44: Brand: MEDLINE

## (undated) DEVICE — RADIFOCUS OPTITORQUE ANGIOGRAPHIC CATHETER: Brand: OPTITORQUE

## (undated) DEVICE — SYRINGE MED 50ML LUERLOCK TIP

## (undated) DEVICE — GUIDEWIRE VASC L180CM DIA0.035IN L15CM STR TIP PTFE S STL

## (undated) DEVICE — AGENT HEMOSTATIC DRY TOP D-STAT

## (undated) DEVICE — AIRLIFE™ NASAL OXYGEN CANNULA CURVED, NONFLARED TIP WITH 14 FOOT (4.3 M) CRUSH-RESISTANT TUBING, OVER-THE-EAR STYLE: Brand: AIRLIFE™